# Patient Record
Sex: FEMALE | Race: WHITE | NOT HISPANIC OR LATINO | Employment: OTHER | URBAN - NONMETROPOLITAN AREA
[De-identification: names, ages, dates, MRNs, and addresses within clinical notes are randomized per-mention and may not be internally consistent; named-entity substitution may affect disease eponyms.]

---

## 2017-01-06 ENCOUNTER — TRANSCRIBE ORDERS (OUTPATIENT)
Dept: ADMINISTRATIVE | Facility: HOSPITAL | Age: 52
End: 2017-01-06

## 2017-01-06 DIAGNOSIS — E66.9 OBESITY, UNSPECIFIED OBESITY SEVERITY, UNSPECIFIED OBESITY TYPE: Primary | ICD-10-CM

## 2017-01-17 ENCOUNTER — NUTRITION (OUTPATIENT)
Dept: NUTRITION | Facility: HOSPITAL | Age: 52
End: 2017-01-17

## 2017-01-17 ENCOUNTER — TRANSCRIBE ORDERS (OUTPATIENT)
Dept: ADMINISTRATIVE | Facility: HOSPITAL | Age: 52
End: 2017-01-17

## 2017-01-17 DIAGNOSIS — E66.9 OBESITY, UNSPECIFIED OBESITY SEVERITY, UNSPECIFIED OBESITY TYPE: Primary | ICD-10-CM

## 2017-01-17 PROCEDURE — 97802 MEDICAL NUTRITION INDIV IN: CPT

## 2017-01-17 NOTE — MR AVS SNAPSHOT
[facility]  [phone]                    Edith Stephens   2017 10:00 AM   Nutrition    Dept Phone:  [phone]   Encounter #:  [id number]    Provider:  PAD DIETITIAN   Department:  [facility]                Your Full Care Plan              Your Updated Medication List          This list is accurate as of: 17 11:59 PM.  Always use your most recent med list.                ARIPiprazole 10 MG tablet   Commonly known as:  ABILIFY       CENTRUM SILVER PO       fluconazole 200 MG tablet   Commonly known as:  DIFLUCAN       FLUVIRIN suspension injection   Generic drug:  Influenza Vac Typ A&B Surf Ant       ibuprofen 200 MG tablet   Commonly known as:  ADVIL,MOTRIN       lamoTRIgine 25 MG tablet   Commonly known as:  LaMICtal       MAGNESIUM PO       mupirocin 2 % ointment   Commonly known as:  BACTROBAN       omeprazole 40 MG capsule   Commonly known as:  priLOSEC               Instructions     None    Patient Instructions History      Upcoming Appointments     Visit Type Date Time Department    NUTRITION COUNSELING 2017 10:00 AM [facility]    ULTRASOUND 2017 10:30 AM [facility] [place]      [facility]     [facility] allows you to send messages to your doctor, view your test results, renew your prescriptions, schedule appointments, and more. To sign up, go to [employer] and click on the Sign Up Now link in the New User? box. Enter your [employer] Activation Code exactly as it appears below along with the last four digits of your Social Security Number and your Date of Birth () to complete the sign-up process. If you do not sign up before the expiration date, you must request a new code.    [employer] Activation Code: [email or web address]  Expires: 2017  2:35 PM    If you have questions, you can email [email or web address] or call [phone] to talk to our [employer] staff. Remember, [employer] is NOT to be used  for urgent needs. For medical emergencies, dial 911.               Other Info from Your Visit           Your Appointments     Jan 31, 2017 10:00 AM CST   Nutrition Counseling with PAD DIETITIAN   [facility] (--)    [address] [address] [phone]            May 23, 2017 10:30 AM CDT   Ultrasound with ULTRASOUND 1 OBGYN [facility] (--)    [facility] [address] [address] [phone]              Allergies     Adderall [Amphetamine-dextroamphetamine]      Amphetamines      Aspirin      Bc Powder [Aspirin-salicylamide-caffeine]      Benztropine      Bupropion      Caffeine      Depakote [Divalproex Sodium]      Effexor [Venlafaxine]      Estradiol      Geodon [Ziprasidone Hcl]      Lithium      Lortab [Hydrocodone-acetaminophen]      Metoprolol      Neurontin [Gabapentin]      Penicillins      Provera [Medroxyprogesterone Acetate]      Prozac [Fluoxetine Hcl]      Tetracyclines & Related      Trazodone And Nefazodone      Trileptal [Oxcarbazepine]      Valium [Diazepam]      Zoloft [Sertraline Hcl]      Zyprexa [Olanzapine]        Vital Signs     Smoking Status                   Never Smoker

## 2017-01-17 NOTE — PROGRESS NOTES
Adult Outpatient Nutrition  Assessment/PES    Patient Name:  Edith Stephens  YOB: 1965  MRN: [id number]    Assessment Date:  1/17/2017          General Info       01/17/17 1114    Today's Session    Person(s) attending today's session Patient     Services Used Today? No    General Information    How well do you speak English? very well    Do you speak a language other than English at home? no    Lives With child(kaur), dependent                Nutritional Info/Activity       01/17/17 1115    Nutritional Information    Have you had weight changes? Yes    Describe weight changes 3# decrease at last MD visit    Have you tried to lose weight before? Yes    What is your motivation to lose weight? To be healthy, prevent DM and heart disease    Use of Supplements other (see comments)   sometimes snacks on glucerna snack bars    History of eating disorder? No    Do you have difficulty chewing food? No    Who Prepares Meals self    List any food cravings/trigger foods you have craves sweets and salty snacks, usually in the evening    Food Behaviors Stress eater;Boredom eater;Comfort eater;Continuous snacking    How often do you eat out and where? Has been eating out more often d/t family being in the hosptial (son's father).    How many snacks do you eat each day? --   usually has a snack between each meal and before bed.    What is the biggest challenge you have with your diet? Poor choices;Portions;Eating out;Weight maintenance;Food causing negative symptoms;Financial burden of food    What type of support do you currently use to help you with your health issues? Sees RD on a routine basis.    Eating Environment    Eating environment Alone;Family    Physical Activity    Are you currently involved in an activity/exercise program?  Yes    Describe physical activity Pt has been trying to go to the mall and walk. Has been trying to gradually increase endurance.                   Labs/Tests/Procedures/Meds       01/17/17 1121    Labs/Tests/Procedures/Meds    Labs/Tests Review Reviewed;Hgb A1C;Glucose;Triglycerides   (12/27/16) OaG5e-1.0, TRIG-166, Chol-169            Problem/Interventions:        Problem 1       01/17/17 1123    Nutrition Diagnoses Problem 1    Problem 1 Excessive Nutrient Intake    Macronutrient Kcal;Carbohydrate;Fat    Etiology (related to) Goals of Care;Medical Diagnosis;MNT for Treatment/Condition    Endocrine Pre-diabetes    Signs/Symptoms (evidenced by) Biochemical    Specific Labs Noted HgbA1C;Triglyceride;Glucose                    Intervention Goal       01/17/17 1125    Intervention Goal    General Improved nutrition related lab(s);Provide information regarding MNT for treatment/condition;Disease management/therapy    Weight Appropriate weight loss              Comments:      Electronically signed by:  Olimpia Tidwell  01/17/17 11:31 AM

## 2017-01-31 ENCOUNTER — APPOINTMENT (OUTPATIENT)
Dept: NUTRITION | Facility: HOSPITAL | Age: 52
End: 2017-01-31

## 2017-02-07 ENCOUNTER — NUTRITION (OUTPATIENT)
Dept: NUTRITION | Facility: HOSPITAL | Age: 52
End: 2017-02-07

## 2017-02-07 PROCEDURE — 97803 MED NUTRITION INDIV SUBSEQ: CPT

## 2017-02-07 NOTE — PROGRESS NOTES
Adult Outpatient Nutrition  Assessment/PES    Patient Name:  Edith Stephens  YOB: 1965  MRN: [id number]    Assessment Date:  2/7/2017          General Info       02/07/17 1145    Today's Session    Person(s) attending today's session Patient     Services Used Today? No    General Information    How well do you speak English? very well    Do you speak a language other than English at home? no    Are you able to read and write English? Yes    Lives With child(kaur), dependent            Physical Findings       02/07/17 1146    Physical Findings/Assessment    Additional Documentation Physical Appearance (Group)    Physical Appearance    Overall Physical Appearance overweight              Nutritional Info/Activity       02/07/17 1146    Nutritional Information    Have you had weight changes? Yes    Describe weight changes Pt reports that she has lost weight because her bras do not fit any longer.    What is your motivation to lose weight? To be healthy, prevent the progression of DM    Use of Supplements --   glucerna snack bars    Who Prepares Meals self    List any food cravings/trigger foods you have candy-pt can't stop eating candy if it is in the house    List any food aversions Does not eat very many vegetables.    How often during the day do you find yourself snacking? Always has a snack after breakfast and in the evening.    Food Behaviors Stress eater;Boredom eater;Comfort eater   When she becomes upset, she turns to food for comfort.    How often do you eat out and where? Although pt reports that she barely has enough money to pay her bills, she reports that she has been eating at [employer], [employer], etc. often.    What is the biggest challenge you have with your diet? Poor choices;Portions;Eating out;Food causing negative symptoms;Financial burden of food    What type of support do you currently use to help you with your health issues? Sees RD on a regular basis, also has a  counselor.    Eating Environment    Eating environment Alone;Family    Physical Activity    Are you currently involved in an activity/exercise program?  No    Reasons for Inactivity Limited activity                    Problem/Interventions:        Problem 1       02/07/17 1151    Nutrition Diagnoses Problem 1    Problem 1 Excessive Nutrient Intake    Macronutrient Kcal;Carbohydrate;Fat    Etiology (related to) Medical Diagnosis;MNT for Treatment/Condition;Goals of Care    Endocrine Pre-diabetes    Signs/Symptoms (evidenced by) Demonstrated Information Deficit;Report/Observation;Biochemical    Other Comment overweight, struggle to make appropriate food choices    Specific Labs Noted HgbA1C                    Intervention Goal       02/07/17 1154    Intervention Goal    General Provide information regarding MNT for treatment/condition    Weight Appropriate weight loss              Comments:  Pt very emotional today. Having problems with ex-. She reports that she has been unable to see her counselor recently. She reports that when she feels stressed, anxious, etc that she turns to food to feel better. Cont to be reluctant to write down all of her intake in her food journal. Admits that when she knows that she has made a bad choice, she does not want to face it or let anyone else see it. She is triggered by candy and can't stop eating it when it is in the house. Encouraged pt to keep those trigger foods out of the house. Encouraged her to be more active when she is feeling anxious and to try to plan meals ahead of time instead of after she has already eaten them. Pt upset that the MD told her that she still has Pre-DM. Stressed to pt that our goal was to keep the pre-DM from advancing to DM2. The only way to do that was to avoid excess calories, added sugars, saturated fats, etc. Encouraged pt to focus on increasing fiber intake (fruits/veg, whole grains) and increasing physical activity. Cont to follow and  reinforce.    Electronically signed by:  Olimpia Tidwell  02/07/17 12:01 PM

## 2017-02-28 ENCOUNTER — TRANSCRIBE ORDERS (OUTPATIENT)
Dept: ADMINISTRATIVE | Facility: HOSPITAL | Age: 52
End: 2017-02-28

## 2017-02-28 ENCOUNTER — NUTRITION (OUTPATIENT)
Dept: NUTRITION | Facility: HOSPITAL | Age: 52
End: 2017-02-28

## 2017-02-28 VITALS — WEIGHT: 239.5 LBS | BODY MASS INDEX: 41.11 KG/M2

## 2017-02-28 DIAGNOSIS — E66.9 OBESITY, UNSPECIFIED OBESITY SEVERITY, UNSPECIFIED OBESITY TYPE: Primary | ICD-10-CM

## 2017-02-28 PROCEDURE — 97803 MED NUTRITION INDIV SUBSEQ: CPT

## 2017-03-13 ENCOUNTER — NUTRITION (OUTPATIENT)
Dept: NUTRITION | Facility: HOSPITAL | Age: 52
End: 2017-03-13

## 2017-03-13 ENCOUNTER — TRANSCRIBE ORDERS (OUTPATIENT)
Dept: ADMINISTRATIVE | Facility: HOSPITAL | Age: 52
End: 2017-03-13

## 2017-03-13 VITALS — WEIGHT: 240 LBS | BODY MASS INDEX: 41.2 KG/M2

## 2017-03-13 DIAGNOSIS — R73.03 PRE-DIABETES: ICD-10-CM

## 2017-03-13 DIAGNOSIS — E66.9 OBESITY, UNSPECIFIED OBESITY SEVERITY, UNSPECIFIED OBESITY TYPE: Primary | ICD-10-CM

## 2017-03-13 PROCEDURE — 97803 MED NUTRITION INDIV SUBSEQ: CPT

## 2017-03-13 NOTE — PROGRESS NOTES
Adult Outpatient Nutrition  Assessment/PES    Patient Name:  Edith Stephens  YOB: 1965  MRN: [id number]    Assessment Date:  3/13/2017          General Info       03/13/17 1126    Today's Session    Person(s) attending today's session Patient     Services Used Today? No    General Information    How well do you speak English? very well    Do you speak a language other than English at home? no    Are you able to read and write English? Yes    Lives With child(kaur), dependent            Physical Findings       03/13/17 1126    Physical Findings/Assessment    Additional Documentation Physical Appearance (Group)    Physical Appearance    Overall Physical Appearance overweight              Nutritional Info/Activity       03/13/17 1126    Nutritional Information    Have you tried to lose weight before? Yes    List programs tried, date, and success Pt is now attending [employer]    What is your motivation to lose weight? Wants to prevent her Pre-DM from progressing to DM2    Use of Supplements other (see comments)   glucerna snack bars    History of eating disorder? No    Do you have difficulty chewing food? No    Who Prepares Meals self    List any food cravings/trigger foods you have Pt is triggered by her ex-. When she becomes anxious/stressed r/t issues with him, she tends to lose control of her eating habits.    How often during the day do you find yourself snacking? Usually snacks between each meal.    Food Behaviors Stress eater;Comfort eater    How often do you eat out and where? Pt has been eating out frequently, and reports that she does not have enough money to make through the rest of the month. Her parents are going to help her pay bills. Discussed food pantries and [employer] meals.    Do you use Food Assistance programs (WIC, food stamps, food bank)? yes   $45/month    What is the biggest challenge you have with your diet? Poor choices;Portions;Eating out;Food  procurement;Financial burden of food    What type of support do you currently use to help you with your health issues? MD, counselor, OA sponsor, parents    Enter everything you can remember eating in the last 24 hours (1 day) Brought in food diary for RD to review.  Pt consistently does not write down calorie content of evening meal and consistently goes over allotted daily calories. Pt gained 0.5# since last visit    Eating Environment    Eating environment Alone;Family    Physical Activity    Are you currently involved in an activity/exercise program?  Yes    Describe physical activity Inconsistent, but tries to go walking in the nearby park for 10minutes at a time. Going to the mall today to walk before dentist appt.              Estimated/Assessed Needs       03/13/17 1140    Calculation Measurements    Weight Used For Calculations 109 kg (240 lb)    Estimated/Assessed Energy Needs    Energy Need Method Kcal/kg    kcal/kg 16    16 Kcal/Kg (kcal) 1741.81    Estimated Kcal Range  9435-6351                Problem/Interventions:        Problem 1       03/13/17 1142    Nutrition Diagnoses Problem 1    Problem 1 Excessive Nutrient Intake    Macronutrient Kcal;Carbohydrate;Fat    Etiology (related to) MNT for Treatment/Condition;Factors Affecting Nutrition    Signs/Symptoms (evidenced by) BMI;PO Intake;Report/Observation                    Intervention Goal       03/13/17 1144    Intervention Goal    Weight Appropriate weight loss              Comments:      Electronically signed by:  Olimpia Tidwell  03/13/17 11:50 AM

## 2017-03-27 ENCOUNTER — TRANSCRIBE ORDERS (OUTPATIENT)
Dept: ADMINISTRATIVE | Facility: HOSPITAL | Age: 52
End: 2017-03-27

## 2017-03-27 DIAGNOSIS — Z09 FOLLOW UP: Primary | ICD-10-CM

## 2017-04-12 ENCOUNTER — APPOINTMENT (OUTPATIENT)
Dept: NUTRITION | Facility: HOSPITAL | Age: 52
End: 2017-04-12

## 2017-04-13 ENCOUNTER — OFFICE VISIT (OUTPATIENT)
Dept: OBSTETRICS AND GYNECOLOGY | Facility: CLINIC | Age: 52
End: 2017-04-13

## 2017-04-13 VITALS
SYSTOLIC BLOOD PRESSURE: 130 MMHG | WEIGHT: 231 LBS | HEIGHT: 64 IN | DIASTOLIC BLOOD PRESSURE: 90 MMHG | BODY MASS INDEX: 39.44 KG/M2

## 2017-04-13 DIAGNOSIS — N76.4 VULVAR ABSCESS: Primary | ICD-10-CM

## 2017-04-13 PROCEDURE — 99213 OFFICE O/P EST LOW 20 MIN: CPT | Performed by: NURSE PRACTITIONER

## 2017-04-13 RX ORDER — SULFAMETHOXAZOLE AND TRIMETHOPRIM 800; 160 MG/1; MG/1
1 TABLET ORAL 2 TIMES DAILY
Qty: 14 TABLET | Refills: 0 | Status: SHIPPED | OUTPATIENT
Start: 2017-04-13 | End: 2017-04-20 | Stop reason: SDUPTHER

## 2017-04-13 RX ORDER — NYSTATIN 100000 [USP'U]/G
POWDER TOPICAL 4 TIMES DAILY
COMMUNITY
End: 2018-12-13 | Stop reason: SDUPTHER

## 2017-04-13 NOTE — PATIENT INSTRUCTIONS
Bartholin Cyst or Abscess  A Bartholin cyst is a fluid-filled sac that forms on a Bartholin gland. Bartholin glands are small glands that are located within the folds of skin (labia) along the sides of the lower opening of the vagina. These glands produce a fluid to moisten the outside of the vagina during sexual intercourse.  A Bartholin cyst causes a bulge on the side of the vagina. A cyst that is not large or infected may not cause symptoms or problems. However, if the fluid within the cyst becomes infected, the cyst can turn into an abscess. An abscess may cause discomfort or pain.  CAUSES  A Bartholin cyst may develop when the duct of the gland becomes blocked. In many cases, the cause of this is not known. Various kinds of bacteria can cause the cyst to become infected and develop into an abscess.  RISK FACTORS  You may be at an increased risk of developing a Bartholin cyst or abscess if:  · You are a woman of reproductive age.  · You have a history of previous Bartholin cysts or abscesses.  · You have diabetes.  · You have a sexually transmitted disease (STD).  SIGNS AND SYMPTOMS  The severity of symptoms varies depending on the size of the cyst and whether it is infected. Symptoms may include:  · A bulge or swelling near the lower opening of your vagina.  · Discomfort or pain.  · Redness.  · Pain during sexual intercourse.  · Pain when walking.  · Fluid draining from the area.  DIAGNOSIS  Your health care provider may make a diagnosis based on your symptoms and a physical exam. He or she will look for swelling in your vaginal area. Blood tests may be done to check for infections. A sample of fluid from the cyst or abscess may also be taken to be tested in a lab.  TREATMENT  Small cysts that are not infected may not require any treatment. These often go away on their own. Your health care provider will recommend hot baths and the use of warm compresses. These may also be part of the treatment for an abscess.  Treatment options for a large cyst or abscess may include:    · Antibiotic medicine.  · A surgical procedure to drain the abscess. One of the following procedures may be done:    Incision and drainage. An incision is made in the cyst or abscess so that the fluid drains out. A catheter may be placed inside the cyst so that it does not close and fill up with fluid again. The catheter will be removed after you have a follow-up visit with a specialist (gynecologist).    Marsupialization. The cyst or abscess is opened and kept open by stitching the edges of the skin to the walls of the cyst or abscess. This allows it to continue to drain and not fill up with fluid again.  If you have cysts or abscesses that keep returning and have required incision and drainage multiple times, your health care provider may talk to you about surgery to remove the Bartholin gland.  HOME CARE INSTRUCTIONS  · Take medicines only as directed by your health care provider.  · If you were prescribed an antibiotic medicine, finish it all even if you start to feel better.  · Apply warm, wet compresses to the area or take warm, shallow baths that cover your pelvic region (sitz baths) several times a day or as directed by your health care provider.  · Do not squeeze the cyst or apply heavy pressure to it.  · Do not have sexual intercourse until the cyst has gone away.  · If your cyst or abscess was opened, a small piece of gauze or a drain may have been placed in the area to allow drainage. Do not remove the gauze or the drain until directed by your health care provider.  · Wear feminine pads--not tampons--as needed for any drainage or bleeding.  · Keep all follow-up visits as directed by your health care provider. This is important.  PREVENTION  Take these steps to help prevent a Bartholin cyst from returning:  · Practice good hygiene.    · Clean your vaginal area with mild soap and a soft cloth when you bathe.  · Practice safe sex to prevent  STDs.  SEEK MEDICAL CARE IF:  · You have increased pain, swelling, or redness in the area of the cyst.  · Puslike drainage is coming from the cyst.  · You have a fever.     This information is not intended to replace advice given to you by your health care provider. Make sure you discuss any questions you have with your health care provider.     Document Released: 12/18/2006 Document Revised: 01/13/2017 Document Reviewed: 08/03/2015  [employer] Interactive Patient Education ©2016 [employer]

## 2017-04-13 NOTE — PROGRESS NOTES
Subjective   Edith Stephens is a 52 y.o. female is here today as a self referral.    HPI Comments: I'm here to see about this sore that is on a sweat gland that I noticed last night It is very painful and did bleed this AM with wiping      The following portions of the patient's history were reviewed and updated as appropriate: allergies, current medications, past family history, past social history, past surgical history and problem list.    Review of Systems   Constitutional: Negative for activity change, appetite change, chills and fatigue.   HENT: Negative for congestion, dental problem, ear pain, hearing loss, nosebleeds, rhinorrhea, sneezing, sore throat and voice change.    Eyes: Negative for discharge, redness, itching and visual disturbance.   Respiratory: Negative for apnea, cough, choking, shortness of breath and wheezing.    Cardiovascular: Negative for chest pain and palpitations.   Gastrointestinal: Negative for abdominal distention, abdominal pain, constipation, diarrhea, nausea and vomiting.   Endocrine: Negative for cold intolerance, heat intolerance and polyuria.   Genitourinary: Positive for vaginal pain (on a sweat gland a sore spot). Negative for difficulty urinating, dyspareunia, flank pain, genital sores, menstrual problem, pelvic pain, vaginal bleeding and vaginal discharge.   Musculoskeletal: Negative for arthralgias, back pain, myalgias and neck pain.   Skin: Negative for pallor and rash.   Allergic/Immunologic: Negative for environmental allergies and food allergies.   Neurological: Negative for dizziness, tremors, seizures, numbness and headaches.   Hematological: Negative for adenopathy. Does not bruise/bleed easily.   Psychiatric/Behavioral: Negative for agitation, decreased concentration, sleep disturbance and suicidal ideas. The patient is not nervous/anxious.        Objective   Physical Exam   Constitutional: She is oriented to person, place, and time. She appears well-developed and  well-nourished.   Genitourinary: Vagina normal and uterus normal. Pelvic exam was performed with patient supine. There is no rash or tenderness on the right labia. There is no rash or tenderness on the left labia. Uterus is not tender. Cervix exhibits no motion tenderness. Right adnexum displays no mass and no tenderness. Left adnexum displays no mass and no tenderness. No vaginal discharge found.   Genitourinary Comments: Lt Vulvar abscess 3 cm x 2 cm   Urethra and urethral meatus normal  Bladder normal placement  Perineum and rectum examined and intact   Musculoskeletal: Normal range of motion.   Neurological: She is alert and oriented to person, place, and time.   Skin: Skin is warm and dry.   Psychiatric: She has a normal mood and affect. Her behavior is normal. Judgment and thought content normal.   Nursing note and vitals reviewed.        Assessment/Plan   Edith was seen today for groin swelling.    Diagnoses and all orders for this visit:    Vulvar abscess    Discussed treatment with patient since it does have a little cellulitis associated wityh it. Option Lidocaine 2% with lancing or just try the antibiotic. She opted to just try the antibiotic. She will RTO  In a week for reevaluation if no improvement then we will have to try lancing it.   -     sulfamethoxazole-trimethoprim (BACTRIM DS) 800-160 MG per tablet; Take 1 tablet by mouth 2 (Two) Times a Day for 7 days.

## 2017-04-20 ENCOUNTER — OFFICE VISIT (OUTPATIENT)
Dept: OBSTETRICS AND GYNECOLOGY | Facility: CLINIC | Age: 52
End: 2017-04-20

## 2017-04-20 VITALS
BODY MASS INDEX: 39.27 KG/M2 | SYSTOLIC BLOOD PRESSURE: 130 MMHG | DIASTOLIC BLOOD PRESSURE: 70 MMHG | WEIGHT: 230 LBS | HEIGHT: 64 IN

## 2017-04-20 DIAGNOSIS — N76.4 VULVAR ABSCESS: Primary | ICD-10-CM

## 2017-04-20 PROCEDURE — 99212 OFFICE O/P EST SF 10 MIN: CPT | Performed by: NURSE PRACTITIONER

## 2017-04-20 RX ORDER — SULFAMETHOXAZOLE AND TRIMETHOPRIM 800; 160 MG/1; MG/1
1 TABLET ORAL 2 TIMES DAILY
Qty: 14 TABLET | Refills: 0 | Status: SHIPPED | OUTPATIENT
Start: 2017-04-20 | End: 2017-04-27

## 2017-04-20 NOTE — PROGRESS NOTES
Subjective   Edith Stephens is a 52 y.o. female is here today for follow-up.    HPI Comments: I'm here for follow up on an abscess from last week.     Abscess   Pertinent negatives include no abdominal pain, arthralgias, chest pain, chills, congestion, coughing, fatigue, headaches, myalgias, nausea, neck pain, numbness, rash, sore throat or vomiting.       The following portions of the patient's history were reviewed and updated as appropriate: allergies, current medications, past family history, past social history, past surgical history and problem list.    Review of Systems   Constitutional: Negative for activity change, appetite change, chills and fatigue.   HENT: Negative for congestion, dental problem, ear pain, hearing loss, nosebleeds, rhinorrhea, sneezing, sore throat and voice change.    Eyes: Negative for discharge, redness, itching and visual disturbance.   Respiratory: Negative for apnea, cough, choking, shortness of breath and wheezing.    Cardiovascular: Negative for chest pain and palpitations.   Gastrointestinal: Negative for abdominal distention, abdominal pain, constipation, diarrhea, nausea and vomiting.   Endocrine: Negative for cold intolerance, heat intolerance and polyuria.   Genitourinary: Positive for genital sores (healing). Negative for difficulty urinating, dyspareunia, flank pain, menstrual problem, pelvic pain, vaginal bleeding and vaginal discharge.   Musculoskeletal: Negative for arthralgias, back pain, myalgias and neck pain.   Skin: Negative for pallor and rash.   Allergic/Immunologic: Negative for environmental allergies and food allergies.   Neurological: Negative for dizziness, tremors, seizures, numbness and headaches.   Hematological: Negative for adenopathy. Does not bruise/bleed easily.   Psychiatric/Behavioral: Negative for agitation, decreased concentration, sleep disturbance and suicidal ideas. The patient is not nervous/anxious.        Objective   Physical Exam    Constitutional: She is oriented to person, place, and time. She appears well-developed and well-nourished.   Genitourinary: Vagina normal and uterus normal. Pelvic exam was performed with patient supine. There is no rash or tenderness on the right labia. There is no rash or tenderness on the left labia. Uterus is not tender. Cervix exhibits no motion tenderness. Right adnexum displays no mass and no tenderness. Left adnexum displays no mass and no tenderness. No vaginal discharge found.   Genitourinary Comments: Urethra and urethral meatus normal  Bladder normal placement  Perineum and rectum examined and intact  Lt vulvar abscess healing small area of tenderness noted.    Musculoskeletal: Normal range of motion.   Neurological: She is alert and oriented to person, place, and time.   Skin: Skin is warm and dry.   Psychiatric: She has a normal mood and affect. Her behavior is normal. Judgment and thought content normal.   Nursing note and vitals reviewed.        Assessment/Plan   Edith was seen today for abscess.    Diagnoses and all orders for this visit:    Vulvar abscess     Abscess on the lt vulva was 3 X 2 cm and now is almost completely healed. It does have a small area that could use more antibiotic treatment but is doing much better. She has another appointment  Next month and will follow up on it then.   -     sulfamethoxazole-trimethoprim (BACTRIM DS) 800-160 MG per tablet; Take 1 tablet by mouth 2 (Two) Times a Day for 7 days.

## 2017-05-01 ENCOUNTER — OFFICE VISIT (OUTPATIENT)
Dept: OBSTETRICS AND GYNECOLOGY | Facility: CLINIC | Age: 52
End: 2017-05-01

## 2017-05-01 ENCOUNTER — PROCEDURE VISIT (OUTPATIENT)
Dept: OBSTETRICS AND GYNECOLOGY | Facility: CLINIC | Age: 52
End: 2017-05-01

## 2017-05-01 VITALS
DIASTOLIC BLOOD PRESSURE: 80 MMHG | HEIGHT: 64 IN | SYSTOLIC BLOOD PRESSURE: 110 MMHG | BODY MASS INDEX: 38.58 KG/M2 | WEIGHT: 226 LBS

## 2017-05-01 DIAGNOSIS — N95.0 POSTMENOPAUSAL BLEEDING: Primary | ICD-10-CM

## 2017-05-01 DIAGNOSIS — N95.0 PMB (POSTMENOPAUSAL BLEEDING): Primary | ICD-10-CM

## 2017-05-01 DIAGNOSIS — R93.89 THICKENED ENDOMETRIUM: ICD-10-CM

## 2017-05-01 PROCEDURE — 99213 OFFICE O/P EST LOW 20 MIN: CPT | Performed by: NURSE PRACTITIONER

## 2017-05-01 PROCEDURE — 76830 TRANSVAGINAL US NON-OB: CPT | Performed by: OBSTETRICS & GYNECOLOGY

## 2017-05-03 ENCOUNTER — PREP FOR SURGERY (OUTPATIENT)
Dept: OTHER | Facility: HOSPITAL | Age: 52
End: 2017-05-03

## 2017-05-03 DIAGNOSIS — N95.0 PMB (POSTMENOPAUSAL BLEEDING): Primary | ICD-10-CM

## 2017-05-03 RX ORDER — SODIUM CHLORIDE, SODIUM LACTATE, POTASSIUM CHLORIDE, CALCIUM CHLORIDE 600; 310; 30; 20 MG/100ML; MG/100ML; MG/100ML; MG/100ML
125 INJECTION, SOLUTION INTRAVENOUS CONTINUOUS
Status: CANCELLED | OUTPATIENT
Start: 2017-05-03

## 2017-05-03 RX ORDER — SODIUM CHLORIDE 0.9 % (FLUSH) 0.9 %
1-10 SYRINGE (ML) INJECTION AS NEEDED
Status: CANCELLED | OUTPATIENT
Start: 2017-05-03

## 2017-05-05 ENCOUNTER — TELEPHONE (OUTPATIENT)
Dept: OBSTETRICS AND GYNECOLOGY | Facility: CLINIC | Age: 52
End: 2017-05-05

## 2017-05-23 ENCOUNTER — APPOINTMENT (OUTPATIENT)
Dept: PREADMISSION TESTING | Facility: HOSPITAL | Age: 52
End: 2017-05-23

## 2017-05-23 ENCOUNTER — HOSPITAL ENCOUNTER (OUTPATIENT)
Dept: GENERAL RADIOLOGY | Facility: HOSPITAL | Age: 52
Discharge: HOME | End: 2017-05-23
Admitting: OBSTETRICS & GYNECOLOGY

## 2017-05-23 VITALS
HEART RATE: 70 BPM | DIASTOLIC BLOOD PRESSURE: 72 MMHG | RESPIRATION RATE: 18 BRPM | OXYGEN SATURATION: 97 % | BODY MASS INDEX: 40.47 KG/M2 | SYSTOLIC BLOOD PRESSURE: 131 MMHG | HEIGHT: 63 IN | WEIGHT: 228.4 LBS

## 2017-05-23 DIAGNOSIS — N95.0 PMB (POSTMENOPAUSAL BLEEDING): ICD-10-CM

## 2017-05-23 LAB
ANION GAP SERPL CALCULATED.3IONS-SCNC: 15 MMOL/L (ref 4–13)
BASOPHILS # BLD AUTO: 0.04 10*3/MM3 (ref 0–0.2)
BASOPHILS NFR BLD AUTO: 0.4 % (ref 0–2)
BUN BLD-MCNC: 13 MG/DL (ref 5–21)
BUN/CREAT SERPL: 19.4 (ref 7–25)
CALCIUM SPEC-SCNC: 9.8 MG/DL (ref 8.4–10.4)
CHLORIDE SERPL-SCNC: 107 MMOL/L (ref 98–110)
CO2 SERPL-SCNC: 20 MMOL/L (ref 24–31)
CREAT BLD-MCNC: 0.67 MG/DL (ref 0.5–1.4)
DEPRECATED RDW RBC AUTO: 44.5 FL (ref 40–54)
EOSINOPHIL # BLD AUTO: 0.1 10*3/MM3 (ref 0–0.7)
EOSINOPHIL NFR BLD AUTO: 1 % (ref 0–4)
ERYTHROCYTE [DISTWIDTH] IN BLOOD BY AUTOMATED COUNT: 13.6 % (ref 12–15)
GFR SERPL CREATININE-BSD FRML MDRD: 93 ML/MIN/1.73
GLUCOSE BLD-MCNC: 101 MG/DL (ref 70–100)
HCG SERPL QL: NEGATIVE
HCT VFR BLD AUTO: 42.5 % (ref 37–47)
HGB BLD-MCNC: 14.5 G/DL (ref 12–16)
IMM GRANULOCYTES # BLD: 0.07 10*3/MM3 (ref 0–0.03)
IMM GRANULOCYTES NFR BLD: 0.7 % (ref 0–5)
LYMPHOCYTES # BLD AUTO: 1.58 10*3/MM3 (ref 0.72–4.86)
LYMPHOCYTES NFR BLD AUTO: 15.5 % (ref 15–45)
MCH RBC QN AUTO: 30.3 PG (ref 28–32)
MCHC RBC AUTO-ENTMCNC: 34.1 G/DL (ref 33–36)
MCV RBC AUTO: 88.9 FL (ref 82–98)
MONOCYTES # BLD AUTO: 0.58 10*3/MM3 (ref 0.19–1.3)
MONOCYTES NFR BLD AUTO: 5.7 % (ref 4–12)
NEUTROPHILS # BLD AUTO: 7.82 10*3/MM3 (ref 1.87–8.4)
NEUTROPHILS NFR BLD AUTO: 76.7 % (ref 39–78)
PLATELET # BLD AUTO: 298 10*3/MM3 (ref 130–400)
PMV BLD AUTO: 10.4 FL (ref 6–12)
POTASSIUM BLD-SCNC: 4 MMOL/L (ref 3.5–5.3)
RBC # BLD AUTO: 4.78 10*6/MM3 (ref 4.2–5.4)
SODIUM BLD-SCNC: 142 MMOL/L (ref 135–145)
WBC NRBC COR # BLD: 10.19 10*3/MM3 (ref 4.8–10.8)

## 2017-05-23 PROCEDURE — 93010 ELECTROCARDIOGRAM REPORT: CPT | Performed by: INTERNAL MEDICINE

## 2017-05-23 PROCEDURE — 93005 ELECTROCARDIOGRAM TRACING: CPT

## 2017-05-23 PROCEDURE — 36415 COLL VENOUS BLD VENIPUNCTURE: CPT

## 2017-05-23 PROCEDURE — 71020 HC CHEST PA AND LATERAL: CPT

## 2017-05-23 PROCEDURE — 84703 CHORIONIC GONADOTROPIN ASSAY: CPT | Performed by: OBSTETRICS & GYNECOLOGY

## 2017-05-23 PROCEDURE — 85025 COMPLETE CBC W/AUTO DIFF WBC: CPT | Performed by: OBSTETRICS & GYNECOLOGY

## 2017-05-23 PROCEDURE — 80048 BASIC METABOLIC PNL TOTAL CA: CPT | Performed by: OBSTETRICS & GYNECOLOGY

## 2017-05-30 ENCOUNTER — HOSPITAL ENCOUNTER (OUTPATIENT)
Facility: HOSPITAL | Age: 52
Discharge: HOME | End: 2017-05-30
Attending: OBSTETRICS & GYNECOLOGY | Admitting: OBSTETRICS & GYNECOLOGY

## 2017-05-30 ENCOUNTER — ANESTHESIA EVENT (OUTPATIENT)
Dept: PERIOP | Facility: HOSPITAL | Age: 52
End: 2017-05-30

## 2017-05-30 ENCOUNTER — ANESTHESIA (OUTPATIENT)
Dept: PERIOP | Facility: HOSPITAL | Age: 52
End: 2017-05-30

## 2017-05-30 VITALS
SYSTOLIC BLOOD PRESSURE: 104 MMHG | DIASTOLIC BLOOD PRESSURE: 44 MMHG | TEMPERATURE: 97.2 F | HEART RATE: 92 BPM | RESPIRATION RATE: 16 BRPM | OXYGEN SATURATION: 92 %

## 2017-05-30 DIAGNOSIS — N95.0 PMB (POSTMENOPAUSAL BLEEDING): ICD-10-CM

## 2017-05-30 LAB
GLUCOSE BLDC GLUCOMTR-MCNC: 118 MG/DL (ref 70–130)
GLUCOSE BLDC GLUCOMTR-MCNC: 96 MG/DL (ref 70–130)

## 2017-05-30 PROCEDURE — 57505 ENDOCERVICAL CURETTAGE: CPT | Performed by: OBSTETRICS & GYNECOLOGY

## 2017-05-30 PROCEDURE — 25010000002 DEXAMETHASONE PER 1 MG: Performed by: NURSE ANESTHETIST, CERTIFIED REGISTERED

## 2017-05-30 PROCEDURE — 58558 HYSTEROSCOPY BIOPSY: CPT | Performed by: OBSTETRICS & GYNECOLOGY

## 2017-05-30 PROCEDURE — 82962 GLUCOSE BLOOD TEST: CPT

## 2017-05-30 PROCEDURE — 25010000002 SUCCINYLCHOLINE PER 20 MG: Performed by: NURSE ANESTHETIST, CERTIFIED REGISTERED

## 2017-05-30 PROCEDURE — 25010000002 ONDANSETRON PER 1 MG: Performed by: NURSE ANESTHETIST, CERTIFIED REGISTERED

## 2017-05-30 PROCEDURE — 25010000002 PROPOFOL 10 MG/ML EMULSION: Performed by: NURSE ANESTHETIST, CERTIFIED REGISTERED

## 2017-05-30 PROCEDURE — 25010000002 ONDANSETRON PER 1 MG: Performed by: ANESTHESIOLOGY

## 2017-05-30 PROCEDURE — 88305 TISSUE EXAM BY PATHOLOGIST: CPT | Performed by: OBSTETRICS & GYNECOLOGY

## 2017-05-30 RX ORDER — SODIUM CHLORIDE 0.9 % (FLUSH) 0.9 %
1-10 SYRINGE (ML) INJECTION AS NEEDED
Status: DISCONTINUED | OUTPATIENT
Start: 2017-05-30 | End: 2017-05-30 | Stop reason: HOSPADM

## 2017-05-30 RX ORDER — FLUMAZENIL 0.1 MG/ML
0.2 INJECTION INTRAVENOUS AS NEEDED
Status: DISCONTINUED | OUTPATIENT
Start: 2017-05-30 | End: 2017-05-30 | Stop reason: HOSPADM

## 2017-05-30 RX ORDER — SUCCINYLCHOLINE CHLORIDE 20 MG/ML
INJECTION INTRAMUSCULAR; INTRAVENOUS AS NEEDED
Status: DISCONTINUED | OUTPATIENT
Start: 2017-05-30 | End: 2017-05-30 | Stop reason: SURG

## 2017-05-30 RX ORDER — SCOLOPAMINE TRANSDERMAL SYSTEM 1 MG/1
1 PATCH, EXTENDED RELEASE TRANSDERMAL
Status: DISCONTINUED | OUTPATIENT
Start: 2017-05-30 | End: 2017-05-30 | Stop reason: HOSPADM

## 2017-05-30 RX ORDER — IPRATROPIUM BROMIDE AND ALBUTEROL SULFATE 2.5; .5 MG/3ML; MG/3ML
3 SOLUTION RESPIRATORY (INHALATION) ONCE AS NEEDED
Status: DISCONTINUED | OUTPATIENT
Start: 2017-05-30 | End: 2017-05-30 | Stop reason: HOSPADM

## 2017-05-30 RX ORDER — SODIUM CHLORIDE, SODIUM LACTATE, POTASSIUM CHLORIDE, CALCIUM CHLORIDE 600; 310; 30; 20 MG/100ML; MG/100ML; MG/100ML; MG/100ML
125 INJECTION, SOLUTION INTRAVENOUS CONTINUOUS
Status: DISCONTINUED | OUTPATIENT
Start: 2017-05-30 | End: 2017-05-30 | Stop reason: HOSPADM

## 2017-05-30 RX ORDER — ROCURONIUM BROMIDE 10 MG/ML
INJECTION, SOLUTION INTRAVENOUS AS NEEDED
Status: DISCONTINUED | OUTPATIENT
Start: 2017-05-30 | End: 2017-05-30 | Stop reason: SURG

## 2017-05-30 RX ORDER — ONDANSETRON 2 MG/ML
INJECTION INTRAMUSCULAR; INTRAVENOUS AS NEEDED
Status: DISCONTINUED | OUTPATIENT
Start: 2017-05-30 | End: 2017-05-30 | Stop reason: SURG

## 2017-05-30 RX ORDER — ONDANSETRON 2 MG/ML
4 INJECTION INTRAMUSCULAR; INTRAVENOUS AS NEEDED
Status: DISCONTINUED | OUTPATIENT
Start: 2017-05-30 | End: 2017-05-30 | Stop reason: HOSPADM

## 2017-05-30 RX ORDER — SODIUM CHLORIDE, SODIUM LACTATE, POTASSIUM CHLORIDE, CALCIUM CHLORIDE 600; 310; 30; 20 MG/100ML; MG/100ML; MG/100ML; MG/100ML
100 INJECTION, SOLUTION INTRAVENOUS CONTINUOUS
Status: DISCONTINUED | OUTPATIENT
Start: 2017-05-30 | End: 2017-05-30 | Stop reason: HOSPADM

## 2017-05-30 RX ORDER — HYDRALAZINE HYDROCHLORIDE 20 MG/ML
5 INJECTION INTRAMUSCULAR; INTRAVENOUS
Status: DISCONTINUED | OUTPATIENT
Start: 2017-05-30 | End: 2017-05-30 | Stop reason: HOSPADM

## 2017-05-30 RX ORDER — NALOXONE HCL 0.4 MG/ML
0.04 VIAL (ML) INJECTION AS NEEDED
Status: DISCONTINUED | OUTPATIENT
Start: 2017-05-30 | End: 2017-05-30 | Stop reason: HOSPADM

## 2017-05-30 RX ORDER — MEPERIDINE HYDROCHLORIDE 25 MG/ML
12.5 INJECTION INTRAMUSCULAR; INTRAVENOUS; SUBCUTANEOUS
Status: DISCONTINUED | OUTPATIENT
Start: 2017-05-30 | End: 2017-05-30 | Stop reason: HOSPADM

## 2017-05-30 RX ORDER — SUFENTANIL CITRATE 50 UG/ML
INJECTION EPIDURAL; INTRAVENOUS AS NEEDED
Status: DISCONTINUED | OUTPATIENT
Start: 2017-05-30 | End: 2017-05-30 | Stop reason: SURG

## 2017-05-30 RX ORDER — DEXAMETHASONE SODIUM PHOSPHATE 4 MG/ML
INJECTION, SOLUTION INTRA-ARTICULAR; INTRALESIONAL; INTRAMUSCULAR; INTRAVENOUS; SOFT TISSUE AS NEEDED
Status: DISCONTINUED | OUTPATIENT
Start: 2017-05-30 | End: 2017-05-30 | Stop reason: SURG

## 2017-05-30 RX ORDER — METOCLOPRAMIDE HYDROCHLORIDE 5 MG/ML
5 INJECTION INTRAMUSCULAR; INTRAVENOUS
Status: DISCONTINUED | OUTPATIENT
Start: 2017-05-30 | End: 2017-05-30 | Stop reason: HOSPADM

## 2017-05-30 RX ORDER — PROPOFOL 10 MG/ML
VIAL (ML) INTRAVENOUS AS NEEDED
Status: DISCONTINUED | OUTPATIENT
Start: 2017-05-30 | End: 2017-05-30 | Stop reason: SURG

## 2017-05-30 RX ORDER — LIDOCAINE HYDROCHLORIDE 40 MG/ML
SOLUTION TOPICAL AS NEEDED
Status: DISCONTINUED | OUTPATIENT
Start: 2017-05-30 | End: 2017-05-30 | Stop reason: SURG

## 2017-05-30 RX ORDER — LABETALOL HYDROCHLORIDE 5 MG/ML
5 INJECTION, SOLUTION INTRAVENOUS
Status: DISCONTINUED | OUTPATIENT
Start: 2017-05-30 | End: 2017-05-30 | Stop reason: HOSPADM

## 2017-05-30 RX ADMIN — SUFENTANIL CITRATE 20 MCG: 50 INJECTION, SOLUTION EPIDURAL; INTRAVENOUS at 08:22

## 2017-05-30 RX ADMIN — ONDANSETRON 4 MG: 2 INJECTION INTRAMUSCULAR; INTRAVENOUS at 09:25

## 2017-05-30 RX ADMIN — ROCURONIUM BROMIDE 10 MG: 10 INJECTION INTRAVENOUS at 08:22

## 2017-05-30 RX ADMIN — SCOPOLAMINE 1 PATCH: 1 PATCH, EXTENDED RELEASE TRANSDERMAL at 07:02

## 2017-05-30 RX ADMIN — SODIUM CHLORIDE, POTASSIUM CHLORIDE, SODIUM LACTATE AND CALCIUM CHLORIDE: 600; 310; 30; 20 INJECTION, SOLUTION INTRAVENOUS at 08:22

## 2017-05-30 RX ADMIN — DEXAMETHASONE SODIUM PHOSPHATE 4 MG: 4 INJECTION, SOLUTION INTRAMUSCULAR; INTRAVENOUS at 08:22

## 2017-05-30 RX ADMIN — SODIUM CHLORIDE, POTASSIUM CHLORIDE, SODIUM LACTATE AND CALCIUM CHLORIDE 100 ML/HR: 600; 310; 30; 20 INJECTION, SOLUTION INTRAVENOUS at 07:01

## 2017-05-30 RX ADMIN — SUFENTANIL CITRATE 5 MCG: 50 INJECTION, SOLUTION EPIDURAL; INTRAVENOUS at 08:40

## 2017-05-30 RX ADMIN — SUCCINYLCHOLINE CHLORIDE 100 MG: 20 INJECTION, SOLUTION INTRAMUSCULAR; INTRAVENOUS at 08:22

## 2017-05-30 RX ADMIN — ONDANSETRON HYDROCHLORIDE 4 MG: 2 SOLUTION INTRAMUSCULAR; INTRAVENOUS at 08:22

## 2017-05-30 RX ADMIN — PROPOFOL 80 MG: 10 INJECTION, EMULSION INTRAVENOUS at 08:22

## 2017-05-30 RX ADMIN — LIDOCAINE HYDROCHLORIDE 1 EACH: 40 SOLUTION TOPICAL at 08:22

## 2017-05-31 LAB
CYTO UR: NORMAL
LAB AP CASE REPORT: NORMAL
LAB AP CLINICAL INFORMATION: NORMAL
Lab: NORMAL
PATH REPORT.FINAL DX SPEC: NORMAL
PATH REPORT.GROSS SPEC: NORMAL

## 2017-06-07 ENCOUNTER — OFFICE VISIT (OUTPATIENT)
Dept: OBSTETRICS AND GYNECOLOGY | Facility: CLINIC | Age: 52
End: 2017-06-07

## 2017-06-07 VITALS
HEIGHT: 63 IN | BODY MASS INDEX: 39.51 KG/M2 | SYSTOLIC BLOOD PRESSURE: 122 MMHG | DIASTOLIC BLOOD PRESSURE: 82 MMHG | WEIGHT: 223 LBS

## 2017-06-07 DIAGNOSIS — N95.0 PMB (POSTMENOPAUSAL BLEEDING): Primary | ICD-10-CM

## 2017-06-07 PROCEDURE — 99212 OFFICE O/P EST SF 10 MIN: CPT | Performed by: OBSTETRICS & GYNECOLOGY

## 2017-06-07 RX ORDER — LAMOTRIGINE 100 MG/1
TABLET ORAL
Status: ON HOLD | COMMUNITY
Start: 2017-05-16 | End: 2017-12-15

## 2017-06-07 NOTE — PROGRESS NOTES
Subjective   Edith Stephens is a 52 y.o. female who is here to discuss her pathology report.    History of Present Illness  Patient had a D&C for PMB but she had some proliferative endometrium. Patient is having some mild spotting now.   The following portions of the patient's history were reviewed and updated as appropriate: allergies, current medications, past family history, past medical history, past social history, past surgical history and problem list.    Review of Systems   Constitutional: Negative for fatigue and unexpected weight change.   HENT: Negative.    Eyes: Negative.    Respiratory: Negative for cough, chest tightness and shortness of breath.    Cardiovascular: Negative for chest pain, palpitations and leg swelling.   Gastrointestinal: Negative for abdominal distention, abdominal pain, anal bleeding, blood in stool, constipation, diarrhea, nausea and vomiting.   Endocrine: Negative for polydipsia and polyuria.   Genitourinary: Positive for vaginal bleeding. Negative for difficulty urinating, dyspareunia, dysuria, frequency, genital sores, hematuria, menstrual problem, pelvic pain, urgency, vaginal discharge and vaginal pain.   Musculoskeletal: Negative.    Skin: Negative for color change and rash.   Allergic/Immunologic: Negative.    Neurological: Negative.  Negative for dizziness, seizures, syncope, light-headedness and headaches.   Hematological: Negative for adenopathy. Does not bruise/bleed easily.   Psychiatric/Behavioral: Negative for agitation, confusion, dysphoric mood, sleep disturbance and suicidal ideas. The patient is not nervous/anxious.        Objective   Physical Exam   Constitutional: She is oriented to person, place, and time. She appears well-developed.   HENT:   Head: Normocephalic.   Eyes: Pupils are equal, round, and reactive to light.   Neck: Normal range of motion.   Cardiovascular: Normal rate and regular rhythm.    Pulmonary/Chest: Effort normal.   Abdominal: Soft.    Musculoskeletal: Normal range of motion.   Neurological: She is alert and oriented to person, place, and time.   Skin: Skin is warm and dry.   Psychiatric: She has a normal mood and affect. Her behavior is normal. Judgment and thought content normal.   Vitals reviewed.        Assessment/Plan     Postop visit from D&C, Hysteroscopy without problems  Increase activity  RV 2month(s)  Keep a bleeding calendar.  I offered her Provera but she stated that is interfered with her psych meds.  Edith was seen today for post-op.    Diagnoses and all orders for this visit:    PMB (postmenopausal bleeding)

## 2017-08-28 ENCOUNTER — OFFICE VISIT (OUTPATIENT)
Dept: OBSTETRICS AND GYNECOLOGY | Facility: CLINIC | Age: 52
End: 2017-08-28

## 2017-08-28 VITALS
WEIGHT: 223 LBS | BODY MASS INDEX: 39.51 KG/M2 | SYSTOLIC BLOOD PRESSURE: 122 MMHG | DIASTOLIC BLOOD PRESSURE: 94 MMHG | HEIGHT: 63 IN

## 2017-08-28 DIAGNOSIS — Z09 POSTOP CHECK: Primary | ICD-10-CM

## 2017-08-28 PROCEDURE — 99213 OFFICE O/P EST LOW 20 MIN: CPT | Performed by: OBSTETRICS & GYNECOLOGY

## 2017-08-28 RX ORDER — LAMOTRIGINE 150 MG/1
TABLET ORAL
COMMUNITY
Start: 2017-08-08 | End: 2017-08-28

## 2017-08-28 NOTE — PROGRESS NOTES
Subjective   Edith Stephens is a 52 y.o. female who had a D&C, Hysteroscopy on 5/30/2017.    History of Present Illness  Patient has had no bleeding since her D&C on 5/30/2017.  Her pathology report was benign showing disordered proliferative endometrium.  The following portions of the patient's history were reviewed and updated as appropriate: allergies, current medications, past family history, past medical history, past social history, past surgical history and problem list.    Review of Systems   Constitutional: Negative for fatigue and unexpected weight change.   HENT: Negative.    Eyes: Negative.    Respiratory: Negative for cough, chest tightness and shortness of breath.    Cardiovascular: Negative for chest pain, palpitations and leg swelling.   Gastrointestinal: Negative for abdominal distention, abdominal pain, anal bleeding, blood in stool, constipation, diarrhea, nausea and vomiting.   Endocrine: Negative for polydipsia and polyuria.   Genitourinary: Negative for difficulty urinating, dyspareunia, dysuria, frequency, genital sores, hematuria, menstrual problem, pelvic pain, urgency, vaginal bleeding, vaginal discharge and vaginal pain.   Musculoskeletal: Negative.    Skin: Negative for color change and rash.   Allergic/Immunologic: Negative.    Neurological: Negative.  Negative for dizziness, seizures, syncope, light-headedness and headaches.   Hematological: Negative for adenopathy. Does not bruise/bleed easily.   Psychiatric/Behavioral: Negative for agitation, confusion, dysphoric mood, sleep disturbance and suicidal ideas. The patient is not nervous/anxious.        Objective   Physical Exam   Constitutional: She is oriented to person, place, and time. She appears well-developed and well-nourished.   HENT:   Head: Normocephalic.   Eyes: Pupils are equal, round, and reactive to light.   Cardiovascular: Normal rate and regular rhythm.    Pulmonary/Chest: Effort normal and breath sounds normal.    Abdominal: Soft.   Genitourinary: Vagina normal and uterus normal. Rectal exam shows anal tone normal. Pelvic exam was performed with patient supine. No labial fusion. There is no rash, tenderness, lesion or injury on the right labia. There is no rash, tenderness, lesion or injury on the left labia. Uterus is not enlarged and not tender. Cervix exhibits no motion tenderness, no discharge and no friability. Right adnexum displays no mass, no tenderness and no fullness. Left adnexum displays no mass, no tenderness and no fullness. No erythema, tenderness or bleeding in the vagina. No signs of injury around the vagina. No vaginal discharge found.   Genitourinary Comments: BUS glands appear nl, perineum intact, urethral orifice appears nl, vagina has good support.   Musculoskeletal: Normal range of motion.   Neurological: She is alert and oriented to person, place, and time.   Skin: Skin is warm and dry.   Psychiatric: She has a normal mood and affect. Her behavior is normal.   Nursing note and vitals reviewed.        Assessment/Plan     Postop visit from D&C, Hysteroscopy without problems  Since endometrium was proliferative, will order an FSH and T estrogens  Increase activity  RV 1year(s)    Edith was seen today for follow-up.    Diagnoses and all orders for this visit:    Postop check

## 2017-08-29 LAB
ESTRADIOL SERPL-MCNC: 5.9 PG/ML
ESTRONE SERPL-MCNC: 43 PG/ML
FSH SERPL-ACNC: 70.6 MIU/ML

## 2017-09-27 ENCOUNTER — APPOINTMENT (OUTPATIENT)
Dept: GENERAL RADIOLOGY | Facility: HOSPITAL | Age: 52
End: 2017-09-27

## 2017-09-27 ENCOUNTER — HOSPITAL ENCOUNTER (EMERGENCY)
Facility: HOSPITAL | Age: 52
Discharge: HOME | End: 2017-09-27
Attending: EMERGENCY MEDICINE | Admitting: EMERGENCY MEDICINE

## 2017-09-27 ENCOUNTER — APPOINTMENT (OUTPATIENT)
Dept: CARDIOLOGY | Facility: HOSPITAL | Age: 52
End: 2017-09-27
Attending: EMERGENCY MEDICINE

## 2017-09-27 VITALS
BODY MASS INDEX: 36.19 KG/M2 | RESPIRATION RATE: 18 BRPM | HEART RATE: 73 BPM | HEIGHT: 64 IN | SYSTOLIC BLOOD PRESSURE: 137 MMHG | WEIGHT: 212 LBS | OXYGEN SATURATION: 100 % | DIASTOLIC BLOOD PRESSURE: 68 MMHG | TEMPERATURE: 99.1 F

## 2017-09-27 DIAGNOSIS — R07.9 CHEST PAIN, UNSPECIFIED TYPE: Primary | ICD-10-CM

## 2017-09-27 LAB
ALBUMIN SERPL-MCNC: 4.6 G/DL (ref 3.5–5)
ALBUMIN/GLOB SERPL: 1.6 G/DL (ref 1.1–2.5)
ALP SERPL-CCNC: 98 U/L (ref 24–120)
ALT SERPL W P-5'-P-CCNC: 57 U/L (ref 0–54)
ANION GAP SERPL CALCULATED.3IONS-SCNC: 11 MMOL/L (ref 4–13)
AST SERPL-CCNC: 28 U/L (ref 7–45)
BASOPHILS # BLD AUTO: 0.04 10*3/MM3 (ref 0–0.2)
BASOPHILS NFR BLD AUTO: 0.5 % (ref 0–2)
BH CV ECHO MEAS - BSA(HAYCOCK): 2.1 M^2
BH CV ECHO MEAS - BSA: 2 M^2
BH CV ECHO MEAS - BZI_BMI: 36.4 KILOGRAMS/M^2
BH CV ECHO MEAS - BZI_METRIC_HEIGHT: 162.6 CM
BH CV ECHO MEAS - BZI_METRIC_WEIGHT: 96.2 KG
BH CV STRESS BP STAGE 1: NORMAL
BH CV STRESS DURATION MIN STAGE 1: 6
BH CV STRESS DURATION SEC STAGE 1: 0
BH CV STRESS ECHO POST STRESS EJECTION FRACTION EF: 65 %
BH CV STRESS GRADE STAGE 1: 10
BH CV STRESS HR STAGE 1: 144
BH CV STRESS PROTOCOL 1: NORMAL
BH CV STRESS RECOVERY BP: NORMAL MMHG
BH CV STRESS RECOVERY HR: 84 BPM
BH CV STRESS SPEED STAGE 1: 1.7
BH CV STRESS STAGE 1: 1
BILIRUB SERPL-MCNC: 0.6 MG/DL (ref 0.1–1)
BUN BLD-MCNC: 16 MG/DL (ref 5–21)
BUN/CREAT SERPL: 22.2 (ref 7–25)
CALCIUM SPEC-SCNC: 9.8 MG/DL (ref 8.4–10.4)
CHLORIDE SERPL-SCNC: 109 MMOL/L (ref 98–110)
CO2 SERPL-SCNC: 25 MMOL/L (ref 24–31)
CREAT BLD-MCNC: 0.72 MG/DL (ref 0.5–1.4)
DEPRECATED RDW RBC AUTO: 47.2 FL (ref 40–54)
EOSINOPHIL # BLD AUTO: 0.13 10*3/MM3 (ref 0–0.7)
EOSINOPHIL NFR BLD AUTO: 1.6 % (ref 0–4)
ERYTHROCYTE [DISTWIDTH] IN BLOOD BY AUTOMATED COUNT: 13.8 % (ref 12–15)
GFR SERPL CREATININE-BSD FRML MDRD: 85 ML/MIN/1.73
GLOBULIN UR ELPH-MCNC: 2.9 GM/DL
GLUCOSE BLD-MCNC: 104 MG/DL (ref 70–100)
HCT VFR BLD AUTO: 43 % (ref 37–47)
HGB BLD-MCNC: 14.3 G/DL (ref 12–16)
IMM GRANULOCYTES # BLD: 0.03 10*3/MM3 (ref 0–0.03)
IMM GRANULOCYTES NFR BLD: 0.4 % (ref 0–5)
LV EF 2D ECHO EST: 55 %
LYMPHOCYTES # BLD AUTO: 1.47 10*3/MM3 (ref 0.72–4.86)
LYMPHOCYTES NFR BLD AUTO: 18.4 % (ref 15–45)
MAXIMAL PREDICTED HEART RATE: 169 BPM
MCH RBC QN AUTO: 31.2 PG (ref 28–32)
MCHC RBC AUTO-ENTMCNC: 33.3 G/DL (ref 33–36)
MCV RBC AUTO: 93.7 FL (ref 82–98)
MONOCYTES # BLD AUTO: 0.62 10*3/MM3 (ref 0.19–1.3)
MONOCYTES NFR BLD AUTO: 7.8 % (ref 4–12)
NEUTROPHILS # BLD AUTO: 5.69 10*3/MM3 (ref 1.87–8.4)
NEUTROPHILS NFR BLD AUTO: 71.3 % (ref 39–78)
PERCENT MAX PREDICTED HR: 85.21 %
PLATELET # BLD AUTO: 305 10*3/MM3 (ref 130–400)
PMV BLD AUTO: 10.2 FL (ref 6–12)
POTASSIUM BLD-SCNC: 4.4 MMOL/L (ref 3.5–5.3)
PROT SERPL-MCNC: 7.5 G/DL (ref 6.3–8.7)
RBC # BLD AUTO: 4.59 10*6/MM3 (ref 4.2–5.4)
SODIUM BLD-SCNC: 145 MMOL/L (ref 135–145)
STRESS BASELINE BP: NORMAL MMHG
STRESS BASELINE HR: 68 BPM
STRESS PERCENT HR: 100 %
STRESS POST EXERCISE DUR MIN: 6 MIN
STRESS POST PEAK BP: NORMAL MMHG
STRESS POST PEAK HR: 144 BPM
STRESS TARGET HR: 144 BPM
TROPONIN I SERPL-MCNC: <0.012 NG/ML (ref 0–0.03)
TROPONIN I SERPL-MCNC: <0.012 NG/ML (ref 0–0.03)
WBC NRBC COR # BLD: 7.98 10*3/MM3 (ref 4.8–10.8)

## 2017-09-27 PROCEDURE — 84484 ASSAY OF TROPONIN QUANT: CPT | Performed by: EMERGENCY MEDICINE

## 2017-09-27 PROCEDURE — 71010 HC CHEST PA OR AP: CPT

## 2017-09-27 PROCEDURE — 93352 ADMIN ECG CONTRAST AGENT: CPT | Performed by: INTERNAL MEDICINE

## 2017-09-27 PROCEDURE — 93010 ELECTROCARDIOGRAM REPORT: CPT | Performed by: INTERNAL MEDICINE

## 2017-09-27 PROCEDURE — 93017 CV STRESS TEST TRACING ONLY: CPT

## 2017-09-27 PROCEDURE — 99284 EMERGENCY DEPT VISIT MOD MDM: CPT

## 2017-09-27 PROCEDURE — 80053 COMPREHEN METABOLIC PANEL: CPT | Performed by: EMERGENCY MEDICINE

## 2017-09-27 PROCEDURE — 93005 ELECTROCARDIOGRAM TRACING: CPT | Performed by: EMERGENCY MEDICINE

## 2017-09-27 PROCEDURE — 93350 STRESS TTE ONLY: CPT | Performed by: INTERNAL MEDICINE

## 2017-09-27 PROCEDURE — 93350 STRESS TTE ONLY: CPT

## 2017-09-27 PROCEDURE — 85025 COMPLETE CBC W/AUTO DIFF WBC: CPT | Performed by: EMERGENCY MEDICINE

## 2017-09-27 PROCEDURE — 93018 CV STRESS TEST I&R ONLY: CPT | Performed by: INTERNAL MEDICINE

## 2017-09-27 PROCEDURE — 25010000002 PERFLUTREN 6.52 MG/ML SUSPENSION: Performed by: INTERNAL MEDICINE

## 2017-09-27 RX ORDER — SODIUM CHLORIDE 0.9 % (FLUSH) 0.9 %
10 SYRINGE (ML) INJECTION AS NEEDED
Status: DISCONTINUED | OUTPATIENT
Start: 2017-09-27 | End: 2017-09-27 | Stop reason: HOSPADM

## 2017-09-27 RX ORDER — MAGNESIUM HYDROXIDE/ALUMINUM HYDROXICE/SIMETHICONE 120; 1200; 1200 MG/30ML; MG/30ML; MG/30ML
15 SUSPENSION ORAL ONCE
Status: COMPLETED | OUTPATIENT
Start: 2017-09-27 | End: 2017-09-27

## 2017-09-27 RX ADMIN — PERFLUTREN 8.48 MG: 6.52 INJECTION, SUSPENSION INTRAVENOUS at 11:41

## 2017-09-27 RX ADMIN — LIDOCAINE HYDROCHLORIDE 15 ML: 20 SOLUTION ORAL; TOPICAL at 08:42

## 2017-09-27 RX ADMIN — ALUMINUM HYDROXIDE, MAGNESIUM HYDROXIDE, AND SIMETHICONE 15 ML: 200; 200; 20 SUSPENSION ORAL at 08:42

## 2017-10-02 NOTE — ED NOTES
"ED Call Back Questions    1. How are you doing since leaving the Emergency Department?    Feeling much better  2. Do you have any questions about your discharge instructions? No     3. Have you filled your new prescriptions yet? N/A  a. Do you have any questions about those medications? N/A    4. Were you able to make a follow-up appointment with the physician? Yes     5. Do you have a primary care physician? Yes   a. If No, would you like for me to set you up with one? N/A  i. If Yes, “I will have our ED  give you a call right back at this number to work with you on the best time for an appointment.”    6. We are always looking to get better at what we do. Do you have any suggestions for what we can do to be even better? N/A  a. If Yes, \"Thank you for sharing your concerns. I apologize. I will follow up with our manager and patient . Would you like someone to call you back?\" No     7. Is there anything else I can do for you? No   Visit was really good, everyone was so nice and kept me comfortable even when I was scared to death     Eron Smith  10/02/17 1218    "

## 2017-11-13 LAB
T4 FREE: 1.3 NG/DL (ref 0.9–1.7)
TSH SERPL DL<=0.05 MIU/L-ACNC: 1.05 UIU/ML (ref 0.27–4.2)
VITAMIN B-12: 677 PG/ML (ref 211–946)
VITAMIN D 25-HYDROXY: 32.8 NG/ML

## 2017-11-18 LAB
ESTRADIOL LEVEL: 8.6 PG/ML
ESTROGEN TOTAL: 43.1 PG/ML
ESTRONE: 34.5 PG/ML

## 2017-12-11 ENCOUNTER — HOSPITAL ENCOUNTER (EMERGENCY)
Facility: HOSPITAL | Age: 52
Discharge: PSYCHIATRIC OR SUBSTANCE USE FACILITY | End: 2017-12-11
Attending: EMERGENCY MEDICINE | Admitting: EMERGENCY MEDICINE

## 2017-12-11 ENCOUNTER — HOSPITAL ENCOUNTER (INPATIENT)
Facility: HOSPITAL | Age: 52
LOS: 4 days | Discharge: HOME | End: 2017-12-15
Attending: PSYCHIATRY & NEUROLOGY | Admitting: PSYCHIATRY & NEUROLOGY

## 2017-12-11 VITALS
DIASTOLIC BLOOD PRESSURE: 65 MMHG | TEMPERATURE: 97.9 F | RESPIRATION RATE: 18 BRPM | HEIGHT: 64 IN | HEART RATE: 77 BPM | SYSTOLIC BLOOD PRESSURE: 128 MMHG | WEIGHT: 216 LBS | OXYGEN SATURATION: 97 % | BODY MASS INDEX: 36.88 KG/M2

## 2017-12-11 DIAGNOSIS — R45.851 SUICIDAL IDEATION: Primary | ICD-10-CM

## 2017-12-11 DIAGNOSIS — F31.4 BIPOLAR DISORDER, CURRENT EPISODE DEPRESSED, SEVERE, WITHOUT PSYCHOTIC FEATURES (HCC): ICD-10-CM

## 2017-12-11 DIAGNOSIS — N39.0 UTI (URINARY TRACT INFECTION), UNCOMPLICATED: ICD-10-CM

## 2017-12-11 LAB
ALBUMIN SERPL-MCNC: 4.7 G/DL (ref 3.4–4.8)
ALBUMIN/GLOB SERPL: 1.5 G/DL (ref 1.1–1.8)
ALP SERPL-CCNC: 84 U/L (ref 38–126)
ALT SERPL W P-5'-P-CCNC: 34 U/L (ref 9–52)
AMPHET+METHAMPHET UR QL: NEGATIVE
ANION GAP SERPL CALCULATED.3IONS-SCNC: 12 MMOL/L (ref 5–15)
APAP SERPL-MCNC: <10 MCG/ML (ref 10–30)
AST SERPL-CCNC: 25 U/L (ref 14–36)
BACTERIA UR QL AUTO: ABNORMAL /HPF
BARBITURATES UR QL SCN: NEGATIVE
BASOPHILS # BLD AUTO: 0.03 10*3/MM3 (ref 0–0.2)
BASOPHILS NFR BLD AUTO: 0.3 % (ref 0–2)
BENZODIAZ UR QL SCN: NEGATIVE
BILIRUB SERPL-MCNC: 0.4 MG/DL (ref 0.2–1.3)
BILIRUB UR QL STRIP: NEGATIVE
BUN BLD-MCNC: 16 MG/DL (ref 7–21)
BUN/CREAT SERPL: 20 (ref 7–25)
CALCIUM SPEC-SCNC: 10.4 MG/DL (ref 8.4–10.2)
CANNABINOIDS SERPL QL: NEGATIVE
CHLORIDE SERPL-SCNC: 100 MMOL/L (ref 95–110)
CLARITY UR: ABNORMAL
CO2 SERPL-SCNC: 27 MMOL/L (ref 22–31)
COCAINE UR QL: NEGATIVE
COLOR UR: YELLOW
CREAT BLD-MCNC: 0.8 MG/DL (ref 0.5–1)
DEPRECATED RDW RBC AUTO: 44.9 FL (ref 36.4–46.3)
EOSINOPHIL # BLD AUTO: 0.16 10*3/MM3 (ref 0–0.7)
EOSINOPHIL NFR BLD AUTO: 1.4 % (ref 0–7)
ERYTHROCYTE [DISTWIDTH] IN BLOOD BY AUTOMATED COUNT: 13.3 % (ref 11.5–14.5)
ETHANOL BLD-MCNC: <10 MG/DL (ref 0–10)
ETHANOL UR QL: <0.01 %
GFR SERPL CREATININE-BSD FRML MDRD: 76 ML/MIN/1.73 (ref 51–120)
GLOBULIN UR ELPH-MCNC: 3.2 GM/DL (ref 2.3–3.5)
GLUCOSE BLD-MCNC: 92 MG/DL (ref 60–100)
GLUCOSE UR STRIP-MCNC: NEGATIVE MG/DL
HCG SERPL QL: NEGATIVE
HCT VFR BLD AUTO: 42.4 % (ref 35–45)
HGB BLD-MCNC: 14.1 G/DL (ref 12–15.5)
HGB UR QL STRIP.AUTO: NEGATIVE
HYALINE CASTS UR QL AUTO: ABNORMAL /LPF
IMM GRANULOCYTES # BLD: 0.04 10*3/MM3 (ref 0–0.02)
IMM GRANULOCYTES NFR BLD: 0.3 % (ref 0–0.5)
KETONES UR QL STRIP: NEGATIVE
LEUKOCYTE ESTERASE UR QL STRIP.AUTO: ABNORMAL
LYMPHOCYTES # BLD AUTO: 2.47 10*3/MM3 (ref 0.6–4.2)
LYMPHOCYTES NFR BLD AUTO: 21.1 % (ref 10–50)
MCH RBC QN AUTO: 30.7 PG (ref 26.5–34)
MCHC RBC AUTO-ENTMCNC: 33.3 G/DL (ref 31.4–36)
MCV RBC AUTO: 92.2 FL (ref 80–98)
METHADONE UR QL SCN: NEGATIVE
MONOCYTES # BLD AUTO: 0.87 10*3/MM3 (ref 0–0.9)
MONOCYTES NFR BLD AUTO: 7.4 % (ref 0–12)
NEUTROPHILS # BLD AUTO: 8.12 10*3/MM3 (ref 2–8.6)
NEUTROPHILS NFR BLD AUTO: 69.5 % (ref 37–80)
NITRITE UR QL STRIP: NEGATIVE
OPIATES UR QL: NEGATIVE
OXYCODONE UR QL SCN: NEGATIVE
PH UR STRIP.AUTO: 6 [PH] (ref 5–9)
PLATELET # BLD AUTO: 331 10*3/MM3 (ref 150–450)
PMV BLD AUTO: 9.7 FL (ref 8–12)
POTASSIUM BLD-SCNC: 4 MMOL/L (ref 3.5–5.1)
PROT SERPL-MCNC: 7.9 G/DL (ref 6.3–8.6)
PROT UR QL STRIP: NEGATIVE
RBC # BLD AUTO: 4.6 10*6/MM3 (ref 3.77–5.16)
RBC # UR: ABNORMAL /HPF
REF LAB TEST METHOD: ABNORMAL
SALICYLATES SERPL-MCNC: <1 MG/DL (ref 10–20)
SODIUM BLD-SCNC: 139 MMOL/L (ref 137–145)
SP GR UR STRIP: 1.02 (ref 1–1.03)
SQUAMOUS #/AREA URNS HPF: ABNORMAL /HPF
UROBILINOGEN UR QL STRIP: ABNORMAL
WBC NRBC COR # BLD: 11.69 10*3/MM3 (ref 3.2–9.8)
WBC UR QL AUTO: ABNORMAL /HPF

## 2017-12-11 PROCEDURE — 99284 EMERGENCY DEPT VISIT MOD MDM: CPT

## 2017-12-11 PROCEDURE — 80307 DRUG TEST PRSMV CHEM ANLYZR: CPT | Performed by: EMERGENCY MEDICINE

## 2017-12-11 PROCEDURE — 81001 URINALYSIS AUTO W/SCOPE: CPT | Performed by: EMERGENCY MEDICINE

## 2017-12-11 PROCEDURE — 85025 COMPLETE CBC W/AUTO DIFF WBC: CPT | Performed by: EMERGENCY MEDICINE

## 2017-12-11 PROCEDURE — 87086 URINE CULTURE/COLONY COUNT: CPT | Performed by: EMERGENCY MEDICINE

## 2017-12-11 PROCEDURE — 80053 COMPREHEN METABOLIC PANEL: CPT | Performed by: EMERGENCY MEDICINE

## 2017-12-11 PROCEDURE — 84703 CHORIONIC GONADOTROPIN ASSAY: CPT | Performed by: EMERGENCY MEDICINE

## 2017-12-11 RX ORDER — MELATONIN 200 MCG
3 TABLET ORAL NIGHTLY PRN
Status: ON HOLD | COMMUNITY
End: 2017-12-15

## 2017-12-11 RX ORDER — LEVOFLOXACIN 500 MG/1
500 TABLET, FILM COATED ORAL ONCE
Status: DISCONTINUED | OUTPATIENT
Start: 2017-12-11 | End: 2017-12-11 | Stop reason: HOSPADM

## 2017-12-11 RX ORDER — FAMOTIDINE 20 MG/1
20 TABLET, FILM COATED ORAL 2 TIMES DAILY PRN
Status: DISCONTINUED | OUTPATIENT
Start: 2017-12-11 | End: 2017-12-11 | Stop reason: HOSPADM

## 2017-12-11 RX ORDER — IBUPROFEN 600 MG/1
600 TABLET ORAL EVERY 6 HOURS PRN
Status: DISCONTINUED | OUTPATIENT
Start: 2017-12-11 | End: 2017-12-11 | Stop reason: HOSPADM

## 2017-12-11 RX ORDER — HYDROXYZINE PAMOATE 25 MG/1
50 CAPSULE ORAL EVERY 6 HOURS PRN
Status: DISCONTINUED | OUTPATIENT
Start: 2017-12-11 | End: 2017-12-11 | Stop reason: HOSPADM

## 2017-12-11 RX ORDER — LEVOFLOXACIN 500 MG/1
500 TABLET, FILM COATED ORAL DAILY
Qty: 3 TABLET | Refills: 0 | Status: SHIPPED | OUTPATIENT
Start: 2017-12-11 | End: 2017-12-15 | Stop reason: HOSPADM

## 2017-12-11 RX ORDER — LOPERAMIDE HYDROCHLORIDE 2 MG/1
2 CAPSULE ORAL 4 TIMES DAILY PRN
Status: DISCONTINUED | OUTPATIENT
Start: 2017-12-11 | End: 2017-12-11 | Stop reason: HOSPADM

## 2017-12-12 PROBLEM — F39 MOOD DISORDER (HCC): Status: ACTIVE | Noted: 2017-12-12

## 2017-12-12 PROBLEM — Z63.4 BEREAVEMENT: Status: ACTIVE | Noted: 2017-12-12

## 2017-12-12 PROBLEM — F41.1 GENERALIZED ANXIETY DISORDER: Status: ACTIVE | Noted: 2017-12-12

## 2017-12-12 PROCEDURE — 99232 SBSQ HOSP IP/OBS MODERATE 35: CPT | Performed by: FAMILY MEDICINE

## 2017-12-12 PROCEDURE — 90791 PSYCH DIAGNOSTIC EVALUATION: CPT | Performed by: PSYCHIATRY & NEUROLOGY

## 2017-12-12 RX ORDER — NICOTINE 21 MG/24HR
1 PATCH, TRANSDERMAL 24 HOURS TRANSDERMAL EVERY 24 HOURS
Status: DISCONTINUED | OUTPATIENT
Start: 2017-12-12 | End: 2017-12-13

## 2017-12-12 RX ORDER — NYSTATIN 100000 [USP'U]/G
POWDER TOPICAL 4 TIMES DAILY
Status: DISCONTINUED | OUTPATIENT
Start: 2017-12-12 | End: 2017-12-15 | Stop reason: HOSPADM

## 2017-12-12 RX ORDER — ARIPIPRAZOLE 10 MG/1
10 TABLET ORAL NIGHTLY
Status: DISCONTINUED | OUTPATIENT
Start: 2017-12-12 | End: 2017-12-15 | Stop reason: HOSPADM

## 2017-12-12 RX ORDER — LOPERAMIDE HYDROCHLORIDE 2 MG/1
2 CAPSULE ORAL 4 TIMES DAILY PRN
Status: DISCONTINUED | OUTPATIENT
Start: 2017-12-12 | End: 2017-12-15 | Stop reason: HOSPADM

## 2017-12-12 RX ORDER — MULTIVIT-MIN/FA/LYCOPEN/LUTEIN .4-300-25
1 TABLET ORAL DAILY
Status: DISCONTINUED | OUTPATIENT
Start: 2017-12-12 | End: 2017-12-15 | Stop reason: HOSPADM

## 2017-12-12 RX ORDER — FAMOTIDINE 20 MG/1
20 TABLET, FILM COATED ORAL 2 TIMES DAILY
Status: DISCONTINUED | OUTPATIENT
Start: 2017-12-12 | End: 2017-12-15 | Stop reason: HOSPADM

## 2017-12-12 RX ORDER — ASCORBATE CALCIUM/BIOFLAVONOID 1000-200MG
3 TABLET, EXTENDED RELEASE ORAL DAILY
Status: DISCONTINUED | OUTPATIENT
Start: 2017-12-12 | End: 2017-12-15 | Stop reason: HOSPADM

## 2017-12-12 RX ORDER — CLONIDINE HYDROCHLORIDE 0.1 MG/1
0.1 TABLET ORAL EVERY 4 HOURS PRN
Status: DISCONTINUED | OUTPATIENT
Start: 2017-12-12 | End: 2017-12-15 | Stop reason: HOSPADM

## 2017-12-12 RX ORDER — METRONIDAZOLE 7.5 MG/G
0.75 GEL TOPICAL 2 TIMES DAILY
Status: DISCONTINUED | OUTPATIENT
Start: 2017-12-12 | End: 2017-12-15 | Stop reason: HOSPADM

## 2017-12-12 RX ORDER — DOCUSATE SODIUM 100 MG/1
100 CAPSULE, LIQUID FILLED ORAL 2 TIMES DAILY PRN
Status: DISCONTINUED | OUTPATIENT
Start: 2017-12-12 | End: 2017-12-15 | Stop reason: HOSPADM

## 2017-12-12 RX ORDER — PANTOPRAZOLE SODIUM 40 MG/1
40 TABLET, DELAYED RELEASE ORAL EVERY MORNING
Status: DISCONTINUED | OUTPATIENT
Start: 2017-12-12 | End: 2017-12-12

## 2017-12-12 RX ORDER — LANOLIN ALCOHOL/MO/W.PET/CERES
6 CREAM (GRAM) TOPICAL NIGHTLY
Status: DISCONTINUED | OUTPATIENT
Start: 2017-12-12 | End: 2017-12-15 | Stop reason: HOSPADM

## 2017-12-12 RX ORDER — MULTIVIT WITH MINERALS/LUTEIN
1000 TABLET ORAL DAILY
Status: DISCONTINUED | OUTPATIENT
Start: 2017-12-12 | End: 2017-12-15 | Stop reason: HOSPADM

## 2017-12-12 RX ORDER — LAMOTRIGINE 100 MG/1
100 TABLET ORAL NIGHTLY
Status: DISCONTINUED | OUTPATIENT
Start: 2017-12-12 | End: 2017-12-13

## 2017-12-12 RX ORDER — HYDROXYZINE PAMOATE 50 MG/1
50 CAPSULE ORAL EVERY 6 HOURS PRN
Status: DISCONTINUED | OUTPATIENT
Start: 2017-12-12 | End: 2017-12-15 | Stop reason: HOSPADM

## 2017-12-12 RX ORDER — LANOLIN ALCOHOL/MO/W.PET/CERES
3 CREAM (GRAM) TOPICAL NIGHTLY
Status: DISCONTINUED | OUTPATIENT
Start: 2017-12-12 | End: 2017-12-12

## 2017-12-12 RX ORDER — ONDANSETRON 4 MG/1
4 TABLET, FILM COATED ORAL EVERY 6 HOURS PRN
Status: DISCONTINUED | OUTPATIENT
Start: 2017-12-12 | End: 2017-12-15 | Stop reason: HOSPADM

## 2017-12-12 RX ADMIN — FAMOTIDINE 20 MG: 20 TABLET ORAL at 17:04

## 2017-12-12 RX ADMIN — LAMOTRIGINE 100 MG: 100 TABLET ORAL at 21:35

## 2017-12-12 RX ADMIN — NYSTATIN: 100000 POWDER TOPICAL at 17:04

## 2017-12-12 RX ADMIN — METRONIDAZOLE 0.75 APPLICATION: 7.5 GEL TOPICAL at 17:04

## 2017-12-12 RX ADMIN — NYSTATIN: 100000 POWDER TOPICAL at 21:37

## 2017-12-12 RX ADMIN — ARIPIPRAZOLE 10 MG: 10 TABLET ORAL at 21:35

## 2017-12-12 RX ADMIN — NYSTATIN: 100000 POWDER TOPICAL at 12:32

## 2017-12-12 RX ADMIN — MELATONIN 6 MG: 3 TAB ORAL at 21:35

## 2017-12-12 RX ADMIN — FAMOTIDINE 20 MG: 20 TABLET ORAL at 12:38

## 2017-12-12 NOTE — CONSULTS
CHIEF COMPLAINT/REASON FOR VISIT:  Suicidal Ideation    HPI:  Patient presented to our ED with the above complaint on December 11 at 10:30 PM.  She had a plan to overdose on pills and reported these ideas to her therapist earlier in the day and was suggested she go to the emergency room for evaluation.  Once on the behavioral health unit she reported 2 previous suicide attempts and that her plan this time was to take an overdose of ibuprofen in response to significant problems with her ex-.    PROBLEM LIST:  Patient Active Problem List    Diagnosis   • Suicidal ideation [R45.851]   • PMB (postmenopausal bleeding) [N95.0]         CURRENT MEDICATIONS:  Prescriptions Prior to Admission   Medication Sig Dispense Refill Last Dose   • ARIPiprazole (ABILIFY) 10 MG tablet Take 10 mg by mouth Every Night.   12/11/2017 at Unknown time   • ibuprofen (ADVIL,MOTRIN) 200 MG tablet Take 200 mg by mouth Every 8 (Eight) Hours As Needed for Mild Pain (1-3).   12/11/2017 at Unknown time   • lamoTRIgine (LaMICtal) 100 MG tablet    12/11/2017 at Unknown time   • levoFLOXacin (LEVAQUIN) 500 MG tablet Take 1 tablet by mouth Daily for 3 days. 3 tablet 0    • Melatonin 3 MG tablet Take 3 mg by mouth At Night As Needed for Sleep.   12/10/2017 at Unknown time   • Multiple Minerals-Vitamins (CALCIUM-MAGNESIUM-ZINC-D3 PO) Take 3 tablets by mouth Daily.   12/11/2017 at Unknown time   • Multiple Vitamins-Minerals (CENTRUM SILVER PO) Take 1 tablet by mouth Daily.   12/11/2017 at Unknown time   • omeprazole (PriLOSEC) 40 MG capsule Take 40 mg by mouth Daily.   12/11/2017 at Unknown time   • nystatin (MYCOSTATIN) 066870 UNIT/GM powder Apply  topically 4 (Four) Times a Day. USES UNDER BREAST   Taking       ALLERGIES:  Bc powder [aspirin-salicylamide-caffeine]; Zoloft [sertraline hcl]; Adderall [amphetamine-dextroamphetamine]; Amphetamines; Aspirin; Benztropine; Bupropion; Caffeine; Depakote [divalproex sodium]; Effexor [venlafaxine];  Estradiol; Geodon [ziprasidone hcl]; Lithium; Lortab [hydrocodone-acetaminophen]; Metoprolol; Neurontin [gabapentin]; Penicillins; Provera [medroxyprogesterone acetate]; Prozac [fluoxetine hcl]; Tetracyclines & related; Trazodone and nefazodone; Trileptal [oxcarbazepine]; Valium [diazepam]; Vancomycin; and Zyprexa [olanzapine]      PAST MEDICAL/SURGICAL HISTORY:  Past Medical History:   Diagnosis Date   • Abnormal vaginal bleeding    • Anxiety    • Bipolar disorder    • Depression    • Diabetes mellitus     Prediabetic   • GERD (gastroesophageal reflux disease)    • Hypoglycemia    • Hypoglycemic reaction    • PONV (postoperative nausea and vomiting)    • Pre-diabetes    • Prediabetes    • Psychiatric illness    • PTSD (post-traumatic stress disorder)    • Suicide attempt     pt. has had two previous attempts       Past Surgical History:   Procedure Laterality Date   • CERVICAL BIOPSY  W/ LOOP ELECTRODE EXCISION     • D&C HYSTEROSCOPY N/A 5/30/2017    Procedure: DILATATION AND CURETTAGE HYSTEROSCOPY;  Surgeon: Citlali Arita MD;  Location: [facility] OR;  Service:    • OTHER SURGICAL HISTORY      cyst removed from foot as a child       Review of Systems   Constitutional: Negative for activity change, appetite change, fatigue and fever.   HENT: Negative for congestion, ear discharge, ear pain, facial swelling, hearing loss, nosebleeds, postnasal drip, rhinorrhea, sinus pressure, sore throat, tinnitus and trouble swallowing.    Eyes: Negative for pain, discharge and visual disturbance.   Respiratory: Negative for cough, shortness of breath and wheezing.    Cardiovascular: Negative for chest pain, palpitations and leg swelling.   Gastrointestinal: Negative for abdominal pain, blood in stool, constipation, diarrhea, nausea and vomiting.   Genitourinary: Negative for difficulty urinating, dyspareunia, dysuria, flank pain, frequency, hematuria, menstrual problem, pelvic pain, urgency, vaginal bleeding and vaginal discharge.  "  Musculoskeletal: Negative for arthralgias, back pain, joint swelling, myalgias and neck pain.   Skin: Negative for rash and wound.   Neurological: Negative for dizziness, seizures, syncope, weakness, light-headedness and headaches.   Hematological: Negative for adenopathy.       Social History     Social History   • Marital status:      Spouse name: N/A   • Number of children: N/A   • Years of education: N/A     Occupational History   • Not on file.     Social History Main Topics   • Smoking status: Never Smoker   • Smokeless tobacco: Never Used   • Alcohol use No   • Drug use: No   • Sexual activity: No      Comment: last act intercourse 7 years     Other Topics Concern   • Not on file     Social History Narrative       Family History   Problem Relation Age of Onset   • Hypertension Father    • Coronary artery disease Paternal Grandfather    • Coronary artery disease Paternal Grandmother    • Diabetes Paternal Grandmother    • Kidney cancer Maternal Grandfather              Objective     /52 (BP Location: Left arm, Patient Position: Lying)  Pulse 77  Temp 98.3 °F (36.8 °C) (Tympanic)   Resp 18  Ht 162.6 cm (64\")  Wt 96.2 kg (212 lb)  SpO2 96%  BMI 36.39 kg/m2    Physical Exam   Constitutional: She appears well-developed and well-nourished.   HENT:   Head: Normocephalic and atraumatic.   Eyes: Conjunctivae and EOM are normal.   Neck: Normal range of motion. Neck supple. No thyromegaly present.   Cardiovascular: Normal rate, regular rhythm and normal heart sounds.  Exam reveals no gallop and no friction rub.    No murmur heard.  Pulmonary/Chest: Effort normal and breath sounds normal. No respiratory distress. She has no wheezes. She has no rales.   Abdominal: Soft. She exhibits no distension and no mass. There is no tenderness. There is no rebound and no guarding.   Musculoskeletal: Normal range of motion.   Lymphadenopathy:     She has no cervical adenopathy.   Neurological: She is alert. She " has normal strength and normal reflexes. She displays no tremor and normal reflexes. No cranial nerve deficit or sensory deficit. She exhibits normal muscle tone. Coordination normal. She displays no Babinski's sign on the right side. She displays no Babinski's sign on the left side.   Reflex Scores:       Tricep reflexes are 2+ on the right side and 2+ on the left side.       Bicep reflexes are 2+ on the right side and 2+ on the left side.       Brachioradialis reflexes are 2+ on the right side and 2+ on the left side.       Patellar reflexes are 2+ on the right side and 2+ on the left side.       Achilles reflexes are 2+ on the right side and 2+ on the left side.  Skin: Skin is warm and dry. Rash noted. There is erythema.   Patient has diffuse acne across the face bilaterally with some very small cystic changes   Nursing note and vitals reviewed.      Dystonia/Tardive Dyskinesia  Absent  Meningeal Signs  Absent    Diagnostic Studies  CBC, CMP,TSH, UDS, acetaminophen level, salicylate level, ethanol level, U/A all normal except  COMPREHENSIVE METABOLIC PANEL - Abnormal; Notable for the following:        Result Value      Calcium 10.4 (*)       All other components within normal limits   URINALYSIS W/ CULTURE IF INDICATED - Abnormal; Notable for the following:      Appearance, UA Cloudy (*)       Leuk Esterase, UA Large (3+) (*)       All other components within normal limits   SALICYLATE LEVEL - Abnormal; Notable for the following:      Salicylate <1.0 (*)       All other components within normal limits   ACETAMINOPHEN LEVEL - Abnormal; Notable for the following:      Acetaminophen <10.0 (*)       All other components within normal limits   CBC WITH AUTO DIFFERENTIAL - Abnormal; Notable for the following:      WBC 11.69 (*)       Immature Grans, Absolute 0.04 (*)       All other components within normal limits   URINALYSIS, MICROSCOPIC ONLY - Abnormal; Notable for the following:      RBC, UA 3-5 (*)       WBC, UA  Too Numerous to Count (*)       Bacteria, UA 1+ (*)       Squamous Epithelial Cells, UA 3-5 (*)       All other components within normal limits   HCG, SERUM, QUALITATIVE - Normal   URINE DRUG SCREEN - Normal   Ethanol less than 10      On September 7 2017:  Normal stress echo with no significant echocardiographic evidence for myocardial ischemia.    UCG  CXR Nl except  EKG Nl except    Assessment/Plan     Patient Active Problem List    Diagnosis   • Suicidal ideation [R45.851]   • PMB (postmenopausal bleeding) [N95.0]     Urinalysis is borderline and patient denies any symptoms of UTI.  She prefers that we not use antibiotics as in the past that has affected her psychiatric medicines.  I agree.  Will monitor for symptoms.    Minimal elevation of calcium.  No further evaluation at this time.    GERD by history    Pre-diabetes is listed in the problem list but hypoglycemia also is.  With normal blood sugars here we will evaluate no further here area      Continue Home Meds as ordered. Mental health and pain issues managed per psychiatry.  Further diagnostic studies or intervention based on hospital course.

## 2017-12-12 NOTE — NURSING NOTE
Dr Nolasco ROS       Patient is hypoglycemic, dermatitis around her mouth that she uses metrodinazole cream for also she was diagnosed with a uti in the ED that she has a RX for levaquin to tx it.        General  Good general health lately    Eyes   glasses/contact lens    ENT/Mouth   None    Cardio   None    Resp   None    GI    Heart burn       None    MS    Joint Pain    Skin/Hair/Nails   None    Neuro   None

## 2017-12-12 NOTE — NURSING NOTE
Patient arrived to unit escorted by security and ED tech. Patient has stable vital signs, alert and oriented. She presented to the ED for medical clearance from [address] [place] [address] in [place] for admission with suicidal ideation with a plan. Patient's speech is clear, makes appropriate eye contact, kempt. She said that her plan was to take a lot of ibuprofen to commit suicide. She's having some problems with her ex  that she says is one of her main stressors. She appears to be very depressed but seems to have a handle on it for now. She said that she's here for some help, also she has a long mental health history that extends back to her teenage years. She's had two previous suicide attempts. Her mother is very supportive and was with her up until the decision was made to admit.

## 2017-12-12 NOTE — PLAN OF CARE
Problem: [facility] Patient Care Overview (Adult)  Goal: Individualization and Mutuality  Outcome: Ongoing (interventions implemented as appropriate)  Goal: Discharge Needs Assessment  Outcome: Ongoing (interventions implemented as appropriate)    Problem:  Overarching Goals  Goal: Adheres to Safety Considerations for Self and Others  Outcome: Ongoing (interventions implemented as appropriate)  Goal: Optimized Coping Skills in Response to Life Stressors  Outcome: Ongoing (interventions implemented as appropriate)  Goal: Develops/Participates in Therapeutic [place] to Support Successful Transition  Outcome: Ongoing (interventions implemented as appropriate)

## 2017-12-12 NOTE — ED TRIAGE NOTES
Pt presents to the ED stating she is suicidal, she was going to overdose on her son's 600 mg Ibuprofen. X 2 suicide attempts, ingestion of chemicals and overdoses. Denies homicidal ideations. No AH or VH.

## 2017-12-12 NOTE — NURSING NOTE
Dr Gaurav MCCAULEY     Patient was diagnosed with a uti in the ED and given a prescription for levaquin to treat it, also patient has metronidazole cream for her dermatitis around her mouth area, also patient states that she's hypoglycemic and gets her HgbA1C checked every 3 months by her pcp.      General  Good general health lately    Eyes   glasses/contact lens    ENT/Mouth   None    Cardio   None    Resp   None    GI    None       None    MS    Joint Pain    Skin/Hair/Nails   Rash    Neuro   None

## 2017-12-12 NOTE — NURSING NOTE
"Behavior   Feeling anxious  loss of energy/fatigue  Pain 0  AVH no  S/I no  H/I no    Affect flat    Note: Patient has been to nurses station multiple times this morning, frequently seeks out staff, pt has voiced concern over her vitamins and face cream multiple times, stating she \"has to have it\". Patient states she has not had her Prilosec \"and I take it in the mornings\". Patient eager to speak to provider about her medications.       Intervention  Instructed in medication usage and effects  Medications administered as ordered  Encouraged to verbalize needs. Patient reassured that provider has her list of medications and will order them, that provider will speak with patient soon.       Response  Verbalized understanding but remains overly concerned  Did patient take medications as ordered yes   Did patient interact with assessment?  yes     Plan  Will monitor for safety  Will monitor every 15 minutes as ordered  Will evaluate and promote the plan of care    "

## 2017-12-12 NOTE — ED PROVIDER NOTES
Subjective   HPI Comments: 51yo female pmh significant bipolar disorder presents ED c/o suicidal ideation with plan for overdose.  Pt reported symptoms to therapist earlier today who referred patient to ED for evaluation.  Denies HI/av hallucination/etoh/illicit/ingestion.  Denies physical complaints.    Patient is a 52 y.o. female presenting with mental health disorder.   Mental Health Problem   Presenting symptoms: depression and suicidal thoughts    Patient accompanied by:  Parent  Onset quality:  Sudden  Duration:  1 day  Timing:  Constant  Chronicity:  Recurrent      Review of Systems   Constitutional: Negative.    HENT: Negative.    Eyes: Negative.    Respiratory: Negative.    Cardiovascular: Negative.    Gastrointestinal: Negative.    Musculoskeletal: Negative.    Skin: Negative.    Psychiatric/Behavioral: Positive for dysphoric mood and suicidal ideas.       Past Medical History:   Diagnosis Date   • Abnormal vaginal bleeding    • Anxiety    • Bipolar disorder    • Depression    • Diabetes mellitus     Prediabetic   • GERD (gastroesophageal reflux disease)    • Hypoglycemia    • Hypoglycemic reaction    • PONV (postoperative nausea and vomiting)    • Pre-diabetes    • Prediabetes    • Psychiatric illness    • PTSD (post-traumatic stress disorder)    • Suicide attempt     pt. has had two previous attempts       Allergies   Allergen Reactions   • Bc Powder [Aspirin-Salicylamide-Caffeine] Swelling     LIPS SWELL   • Zoloft [Sertraline Hcl] Other (See Comments)     SUICIDAL   • Adderall [Amphetamine-Dextroamphetamine] Other (See Comments)     UNKNOWN    • Amphetamines Other (See Comments)     UNKNOWN   • Aspirin Other (See Comments)     UNKNOWN   • Benztropine Other (See Comments)     UNKNOWN   • Bupropion Other (See Comments)     Makes patient manic   • Caffeine Other (See Comments)     STATES CAN TAKE IN THE MORNING BUT IF TAKEN IN THE AFTERNOON SHE CANNOT SLEEP   • Depakote [Divalproex Sodium] Other (See  Comments)     UNKNOWN   • Effexor [Venlafaxine] Other (See Comments)     UNKNOWN   • Estradiol Other (See Comments)     UNKNOWN   • Geodon [Ziprasidone Hcl] Other (See Comments)     UNKNOWN   • Lithium Other (See Comments)     UNKNOWN   • Lortab [Hydrocodone-Acetaminophen] Other (See Comments)     UNKNOWN     • Metoprolol Other (See Comments)     UNKNOWN   • Neurontin [Gabapentin] Other (See Comments)     UNKNOWN   • Penicillins Other (See Comments)     UNKNOWN   • Provera [Medroxyprogesterone Acetate] Other (See Comments)     UNKNOWN   • Prozac [Fluoxetine Hcl] Rash   • Tetracyclines & Related Other (See Comments)     UNKNOWN     • Trazodone And Nefazodone Other (See Comments)     UNKNOWN   • Trileptal [Oxcarbazepine] Other (See Comments)     UNKNOWN   • Valium [Diazepam] Other (See Comments)     UNKNOWN   • Vancomycin Other (See Comments)     ALLERGIC TO ALL MYCIN ANTIBIOTICS - UNKNOWN REACTION   • Zyprexa [Olanzapine] Other (See Comments)     UNKNOWN       Past Surgical History:   Procedure Laterality Date   • CERVICAL BIOPSY  W/ LOOP ELECTRODE EXCISION     • D&C HYSTEROSCOPY N/A 5/30/2017    Procedure: DILATATION AND CURETTAGE HYSTEROSCOPY;  Surgeon: Citlali Arita MD;  Location: [facility] OR;  Service:    • OTHER SURGICAL HISTORY      cyst removed from foot as a child       Family History   Problem Relation Age of Onset   • Hypertension Father    • Coronary artery disease Paternal Grandfather    • Coronary artery disease Paternal Grandmother    • Diabetes Paternal Grandmother    • Kidney cancer Maternal Grandfather        Social History     Social History   • Marital status:      Spouse name: N/A   • Number of children: N/A   • Years of education: N/A     Social History Main Topics   • Smoking status: Never Smoker   • Smokeless tobacco: Never Used   • Alcohol use No   • Drug use: No   • Sexual activity: No      Comment: last act intercourse 7 years     Other Topics Concern   • None     Social History  Narrative   • None           Objective   Physical Exam   Constitutional: She is oriented to person, place, and time. She appears well-developed and well-nourished.   HENT:   Head: Normocephalic and atraumatic.   Mouth/Throat: Oropharynx is clear and moist.   Eyes: Pupils are equal, round, and reactive to light.   Neck: Normal range of motion. Neck supple. No JVD present. No tracheal deviation present. No thyromegaly present.   Cardiovascular: Normal rate, regular rhythm, normal heart sounds and intact distal pulses.  Exam reveals no gallop and no friction rub.    No murmur heard.  Pulmonary/Chest: Effort normal and breath sounds normal. She has no wheezes. She has no rales.   Abdominal: Soft. Bowel sounds are normal. There is no tenderness. There is no rebound and no guarding.   Lymphadenopathy:     She has no cervical adenopathy.   Neurological: She is alert and oriented to person, place, and time. GCS eye subscore is 4. GCS verbal subscore is 5. GCS motor subscore is 6.   Skin: Skin is warm and dry.   Psychiatric: Her speech is normal and behavior is normal. Thought content is not paranoid. She expresses impulsivity. She exhibits a depressed mood. She expresses suicidal ideation. She expresses no homicidal ideation. She expresses suicidal plans. She expresses no homicidal plans.   Nursing note and vitals reviewed.      Procedures         ED Course  ED Course   Comment By Time   Pt evaluated per [facility] psych and staffed with them to Dr. Isbell. Pt stable admission for stabilization. Gabe Peng MD 12/11 8531        Labs Reviewed   COMPREHENSIVE METABOLIC PANEL - Abnormal; Notable for the following:        Result Value    Calcium 10.4 (*)     All other components within normal limits   URINALYSIS W/ CULTURE IF INDICATED - Abnormal; Notable for the following:     Appearance, UA Cloudy (*)     Leuk Esterase, UA Large (3+) (*)     All other components within normal limits   SALICYLATE LEVEL - Abnormal; Notable for  the following:     Salicylate <1.0 (*)     All other components within normal limits   ACETAMINOPHEN LEVEL - Abnormal; Notable for the following:     Acetaminophen <10.0 (*)     All other components within normal limits   CBC WITH AUTO DIFFERENTIAL - Abnormal; Notable for the following:     WBC 11.69 (*)     Immature Grans, Absolute 0.04 (*)     All other components within normal limits   URINALYSIS, MICROSCOPIC ONLY - Abnormal; Notable for the following:     RBC, UA 3-5 (*)     WBC, UA Too Numerous to Count (*)     Bacteria, UA 1+ (*)     Squamous Epithelial Cells, UA 3-5 (*)     All other components within normal limits   HCG, SERUM, QUALITATIVE - Normal   URINE DRUG SCREEN - Normal    Narrative:     Negative Thresholds For Drugs Screened in Urine:     Amphetamines          500 ng/ml  Barbiturates          200 ng/ml  Benzodiazepines       200 ng/ml  Cocaine               150 ng/ml  Methadone             300 ng/mL  Opiates               300 ng/mL  Oxycodone             100 ng/mL  THC                   20 ng/mL    The normal value for all drugs tested is negative. This report includes final unconfirmed screening results.  A positive result by this assay can be, at your request, sent to the Reference Lab for confirmation by gas chromatography. Unconfirmed results must not be used for non-medical purposes, such as employment or legal testing. Clinical consideration should be applied to any drug of abuse test result, particularly when unconfirmed results are used.   URINE CULTURE   ETHANOL   CBC AND DIFFERENTIAL    Narrative:     The following orders were created for panel order CBC & Differential.  Procedure                               Abnormality         Status                     ---------                               -----------         ------                     CBC Auto Differential[[id number]]        Abnormal            Final result                 Please view results for these tests on the individual orders.              MDM    Final diagnoses:   Suicidal ideation   Bipolar disorder, current episode depressed, severe, without psychotic features   UTI (urinary tract infection), uncomplicated            Gabe Peng MD  12/11/17 2905
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
## 2017-12-12 NOTE — PLAN OF CARE
Problem: BH Patient Care Overview (Adult)  Goal: Plan of Care Review  Outcome: Ongoing (interventions implemented as appropriate)    Problem: BH Overarching Goals  Goal: Adheres to Safety Considerations for Self and Others  Outcome: Ongoing (interventions implemented as appropriate)  Goal: Optimized Coping Skills in Response to Life Stressors  Outcome: Ongoing (interventions implemented as appropriate)  Goal: Develops/Participates in Therapeutic [place] to Support Successful Transition  Outcome: Ongoing (interventions implemented as appropriate)

## 2017-12-13 LAB
BACTERIA SPEC AEROBE CULT: NORMAL
BACTERIA SPEC AEROBE CULT: NORMAL

## 2017-12-13 PROCEDURE — 99232 SBSQ HOSP IP/OBS MODERATE 35: CPT | Performed by: PSYCHIATRY & NEUROLOGY

## 2017-12-13 RX ORDER — IBUPROFEN 200 MG
200 TABLET ORAL EVERY 6 HOURS SCHEDULED
Status: DISCONTINUED | OUTPATIENT
Start: 2017-12-13 | End: 2017-12-15 | Stop reason: HOSPADM

## 2017-12-13 RX ADMIN — MELATONIN 6 MG: 3 TAB ORAL at 21:08

## 2017-12-13 RX ADMIN — METRONIDAZOLE 0.75 APPLICATION: 7.5 GEL TOPICAL at 09:09

## 2017-12-13 RX ADMIN — Medication 1000 MG: at 08:23

## 2017-12-13 RX ADMIN — METRONIDAZOLE 0.75 APPLICATION: 7.5 GEL TOPICAL at 17:24

## 2017-12-13 RX ADMIN — ARIPIPRAZOLE 10 MG: 10 TABLET ORAL at 21:08

## 2017-12-13 RX ADMIN — FAMOTIDINE 20 MG: 20 TABLET ORAL at 17:25

## 2017-12-13 RX ADMIN — NYSTATIN: 100000 POWDER TOPICAL at 09:08

## 2017-12-13 RX ADMIN — NYSTATIN: 100000 POWDER TOPICAL at 17:26

## 2017-12-13 RX ADMIN — Medication 3 TABLET: at 08:23

## 2017-12-13 RX ADMIN — Medication 200 MG: at 17:25

## 2017-12-13 RX ADMIN — FAMOTIDINE 20 MG: 20 TABLET ORAL at 08:23

## 2017-12-13 RX ADMIN — LAMOTRIGINE 150 MG: 100 TABLET ORAL at 21:09

## 2017-12-13 RX ADMIN — Medication 1 TABLET: at 08:23

## 2017-12-13 RX ADMIN — Medication 200 MG: at 12:51

## 2017-12-13 NOTE — NURSING NOTE
"Behavior   Feeling anxious, Feeling worried and Sleep disturbance  anxiety and disturbed sleep  Pain 5  AVH no  S/I no  H/I no    Affect anxious    Note:  Patient appears anxious.  States she is feeling anxiety - \"I feel nervous\".  When asked what is causing her to feel that way she stated that she is going to have to start talking to the people that she has concerns with - for instance, she needs to talk to her mom when there are problems and she needs to talk to her ex- as he just has a triple bi-pass surgery and wants their 16 year old son to go and take care of him.  Patient states that her ex- is staying with his brother right now and she doesn't feel her son needs to have all that responsibility put on him.  She states that she is having pain which she states is caused by arthritis in her right hand/thumb area and requested that doctor order her Ibuprofen gel caps that she takes at home.      Intervention  Instructed in medication usage and effects  Medications administered as ordered  Encouraged to verbalize needs  Spoke with Dr. Isbell regarding her Ibuprofen gel caps and Dr. Isbell gave new order for this med.  Encouraged patient to attend groups and let staff know if she has any other concerns.      Response  Verbalized understanding   Did patient take medications as ordered yes   Did patient interact with assessment?  yes     Plan  Will monitor for safety  Will monitor every 15 minutes as ordered  Will evaluate and promote the plan of care    "
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
## 2017-12-13 NOTE — NURSING NOTE
Patient had been up and interacting well throughout day with staff and other peers.  She has attended and participated in groups today.  She receives Nystatin powder under breasts and under abdominal fold for redness.  Slight redness under left breast and redness under abdominal fold.

## 2017-12-13 NOTE — PLAN OF CARE
Problem: BH Patient Care Overview (Adult)  Goal: Individualization and Mutuality  Outcome: Ongoing (interventions implemented as appropriate)  Goal: Discharge Needs Assessment  Outcome: Ongoing (interventions implemented as appropriate)    Problem: BH Overarching Goals  Goal: Adheres to Safety Considerations for Self and Others  Outcome: Ongoing (interventions implemented as appropriate)  Goal: Optimized Coping Skills in Response to Life Stressors  Outcome: Ongoing (interventions implemented as appropriate)  Goal: Develops/Participates in Therapeutic [place] to Support Successful Transition  Outcome: Ongoing (interventions implemented as appropriate)    Problem: Depression  Goal: Treatment Goal: Demonstrate behavioral control of depressive symptoms, verbalize feelings of improved mood/affect, and adopt new coping skills prior to discharge  Outcome: Ongoing (interventions implemented as appropriate)  Goal: Verbalize thoughts and feelings associated with:  Outcome: Ongoing (interventions implemented as appropriate)  Goal: Refrain from harming self  Outcome: Ongoing (interventions implemented as appropriate)    Problem: Risk for Self Injury/Neglect  Goal: Treatment Goal: Remain safe during length of stay, learn and adopt new coping skills, and be free of self-injurious ideation, impulses and acts at the time of discharge  Outcome: Ongoing (interventions implemented as appropriate)  Goal: Verbalize thoughts and feelings associated with:  Outcome: Ongoing (interventions implemented as appropriate)

## 2017-12-13 NOTE — PROGRESS NOTES
12/13/2017    Chief Complaint: suicidal ideation    Subjective:  Patient is a 52 y.o. female who was hospitalized for suicidal ideation.   Since yesterday the patient has been had some difficulty with sleep and continues to grieve the loss of her grandmothers.  She also continues to be concerned about her relationship with her mom.  She notes she would like to trial an increase in her meds b/c she continues to be feeling depressed.    Objective     Vital Signs    Temp:  [96.1 °F (35.6 °C)-97.5 °F (36.4 °C)] 96.1 °F (35.6 °C)  Heart Rate:  [72-73] 73  Resp:  [18] 18  BP: (117-126)/(55-58) 126/58    Physical Exam:   General Appearance: alert, appears stated age and cooperative,  Hygiene:   good  Gait & Station: Normal  Musculoskeletal: No tremors or abnormal involuntary movements    Mental Status Exam:   Cooperation:  Cooperative  Eye Contact:  Good  Psychomotor Behavior:  Appropriate  Mood: Sad/Depressed, Anxious/Nervous and Improving  Affect:  mood-congruent  Speech:  Normal  Thought Process:  Coherent  Associations: Goal Directed  Thought Content:     Normal   Suicidal:  None   Homicidal:  None   Hallucinations:  None   Delusion:  None  Cognitive Functioning:   Consciousness: awake, alert and oriented  Reliability:  fair  Insight:  Fair  Judgement:  Fair  Impulse Control:  Fair    Lab Results (last 24 hours)     ** No results found for the last 24 hours. **        Imaging Results (last 24 hours)     ** No results found for the last 24 hours. **          Medicine:   Current Facility-Administered Medications:   •  ARIPiprazole (ABILIFY) tablet 10 mg, 10 mg, Oral, Nightly, Martha Isbell MD, 10 mg at 12/12/17 2135  •  ascorbic acid (VITAMIN C) tablet 1,000 mg, 1,000 mg, Oral, Daily, Martha Isbell MD, 1,000 mg at 12/13/17 0823  •  Calcium-Magnesium-Zinc tablet 3 tablet, 3 tablet, Oral, Daily, Martha Isbell MD, 3 tablet at 12/13/17 0823  •  CENTRUM SILVER ADULT 50+ tablet 1 tablet, 1 tablet, Oral, Daily,  Martha Isbell MD, 1 tablet at 12/13/17 0823  •  CloNIDine (CATAPRES) tablet 0.1 mg, 0.1 mg, Oral, Q4H PRN, Martha Isbell MD  •  docusate sodium (COLACE) capsule 100 mg, 100 mg, Oral, BID PRN, Martha Isbell MD  •  famotidine (PEPCID) tablet 20 mg, 20 mg, Oral, BID, Martha Isbell MD, 20 mg at 12/13/17 0823  •  hydrOXYzine (VISTARIL) capsule 50 mg, 50 mg, Oral, Q6H PRN, Martha Isbell MD  •  lamoTRIgine (LaMICtal) tablet 100 mg, 100 mg, Oral, Nightly, Martha Isbell MD, 100 mg at 12/12/17 2135  •  loperamide (IMODIUM) capsule 2 mg, 2 mg, Oral, 4x Daily PRN, Martha Isbell MD  •  magnesium hydroxide (MILK OF MAGNESIA) suspension 2400 mg/10mL 10 mL, 10 mL, Oral, Daily PRN, Martha Isbell MD  •  melatonin tablet 6 mg, 6 mg, Oral, Nightly, Martha Isbell MD, 6 mg at 12/12/17 2135  •  metroNIDAZOLE (METROGEL) 0.75 % gel 0.75 application, 0.75 application, Topical, BID, Martha Isbell MD, 0.75 application at 12/13/17 0909  •  nicotine (NICODERM CQ) 21 MG/24HR patch 1 patch, 1 patch, Transdermal, Q24H, Martha Isbell MD  •  nystatin (MYCOSTATIN) powder, , Topical, 4x Daily, Martha Isbell MD  •  ondansetron (ZOFRAN) tablet 4 mg, 4 mg, Oral, Q6H PRN, Martha Isbell MD    Diagnoses/Assessment:   Principal Problem:    Generalized anxiety disorder  Active Problems:    Suicidal ideation    Mood disorder    Bereavement      Treatment Plan:    1) Will continue care for the patient on the behavioral health unit at [facility] to ensure patient safety.  2) Will continue to provide treatment with the unit milieu, activities, therapies and psychopharmacological management.  3) Patient to be placed on or continued on  Q15 minute checks  and Suicide precautions.  4) Will continue medical management by Dr. Nolasco.  5) Will order following labs: none  6) Will make the following medication changes: Will continue the melatonin and abilify.  Will increase the lamictal to  150mg qhs.  7) Will continue discharge planning as appropriate for patient.  8) Psychotherapy provided for less than 16 minutes.    Treatment plan and medication risks and benefits discussed with: Patient    Martha Isbell MD  12/13/17  10:52 AM

## 2017-12-13 NOTE — NURSING NOTE
"Behavior   Feeling anxious and Difficulty concentrating  depressed mood, fatigue, difficulty concentrating and anxiety  Pain 0  AVH no  S/I no  H/I no  Affect anxious    Patient has been out in day area interacting with peers.  Cooperative and polite, smiles frequently. Reports feeling very anxious.  States \"I have a problem with my mom, she lives right across the road and doesn't give me any privacy\".  Notes that she relies on her parents financially so she feels \"stuck\" and is afraid to confront her mom about their problems.  Also reports that she has been having problems with her ex .  \"He has had heart surgery and he expects our 16 year old son to come and take care of him\".  Patient got tearful as she was talking.  Vistaril offered, does not want at this time. No needs voiced at this time.  Will continue to monitor.     Intervention  Medications reviewed and administered  Assessment complete    Response  Verbalized understanding   Took medications  Interacted with assessment    Plan  Will promote and reinforce current treatment plan and encourage involvement in care plan goals.   Will provide for safe, calm, quiet environment.  Will promote open communication with staff and foster a trusting/working relationship with patient.   Will promote participation in groups and therapies and independent reflection.      "

## 2017-12-14 LAB
ARTICHOKE IGE QN: 67 MG/DL (ref 1–129)
CHOLEST SERPL-MCNC: 153 MG/DL (ref 0–199)
GLUCOSE P FAST SERPL-MCNC: 108 MG/DL (ref 60–110)
HDLC SERPL-MCNC: 51 MG/DL (ref 60–200)
LDLC/HDLC SERPL: 1.38 {RATIO} (ref 0–3.22)
TRIGL SERPL-MCNC: 157 MG/DL (ref 20–199)

## 2017-12-14 PROCEDURE — 82947 ASSAY GLUCOSE BLOOD QUANT: CPT | Performed by: PSYCHIATRY & NEUROLOGY

## 2017-12-14 PROCEDURE — 80061 LIPID PANEL: CPT | Performed by: PSYCHIATRY & NEUROLOGY

## 2017-12-14 PROCEDURE — 99232 SBSQ HOSP IP/OBS MODERATE 35: CPT | Performed by: PSYCHIATRY & NEUROLOGY

## 2017-12-14 RX ADMIN — FAMOTIDINE 20 MG: 20 TABLET ORAL at 08:02

## 2017-12-14 RX ADMIN — NYSTATIN: 100000 POWDER TOPICAL at 08:59

## 2017-12-14 RX ADMIN — Medication 3 TABLET: at 08:03

## 2017-12-14 RX ADMIN — Medication 200 MG: at 17:09

## 2017-12-14 RX ADMIN — Medication 200 MG: at 08:04

## 2017-12-14 RX ADMIN — Medication 200 MG: at 12:05

## 2017-12-14 RX ADMIN — METRONIDAZOLE 0.75 APPLICATION: 7.5 GEL TOPICAL at 20:37

## 2017-12-14 RX ADMIN — Medication 1 TABLET: at 08:04

## 2017-12-14 RX ADMIN — Medication 1000 MG: at 08:03

## 2017-12-14 RX ADMIN — METRONIDAZOLE 0.75 APPLICATION: 7.5 GEL TOPICAL at 08:59

## 2017-12-14 RX ADMIN — FAMOTIDINE 20 MG: 20 TABLET ORAL at 17:09

## 2017-12-14 RX ADMIN — LAMOTRIGINE 150 MG: 100 TABLET ORAL at 20:38

## 2017-12-14 RX ADMIN — MELATONIN 6 MG: 3 TAB ORAL at 20:37

## 2017-12-14 RX ADMIN — ARIPIPRAZOLE 10 MG: 10 TABLET ORAL at 20:38

## 2017-12-14 RX ADMIN — NYSTATIN: 100000 POWDER TOPICAL at 20:37

## 2017-12-14 NOTE — PLAN OF CARE
Problem: BH Patient Care Overview (Adult)  Goal: Individualization and Mutuality  Outcome: Ongoing (interventions implemented as appropriate)  Goal: Discharge Needs Assessment  Outcome: Ongoing (interventions implemented as appropriate)    Problem:  Overarching Goals  Goal: Adheres to Safety Considerations for Self and Others  Outcome: Ongoing (interventions implemented as appropriate)  Goal: Optimized Coping Skills in Response to Life Stressors  Outcome: Ongoing (interventions implemented as appropriate)  Goal: Develops/Participates in Therapeutic [place] to Support Successful Transition  Outcome: Ongoing (interventions implemented as appropriate)

## 2017-12-14 NOTE — PROGRESS NOTES
12/14/2017    Chief Complaint: suicidal ideation    Subjective:  Patient is a 52 y.o. female who was hospitalized for suicidal ideation.   Since yesterday the patient has been feeling better.  She notes she is sleeping better on the increased dose of lamictal.  She has not tolerance issues.  She has been thinking about her issues with communicating and dealing with her mom.    Objective     Vital Signs    Temp:  [97.7 °F (36.5 °C)-98.2 °F (36.8 °C)] 97.7 °F (36.5 °C)  Heart Rate:  [69-86] 86  Resp:  [18] 18  BP: (109-130)/(62-75) 130/62    Physical Exam:   General Appearance: alert, appears stated age and cooperative,  Hygiene:   good  Gait & Station: Normal  Musculoskeletal: No tremors or abnormal involuntary movements    Mental Status Exam:   Cooperation:  Cooperative  Eye Contact:  Good  Psychomotor Behavior:  Appropriate  Mood: Euthymic  Affect:  normal  Speech:  Normal  Thought Process:  Coherent  Associations: Goal Directed  Thought Content:     Normal   Suicidal:  None   Homicidal:  None   Hallucinations:  None   Delusion:  None  Cognitive Functioning:   Consciousness: awake, alert and oriented  Reliability:  good  Insight:  Good  Judgement:  Good  Impulse Control:  Good    Lab Results (last 24 hours)     Procedure Component Value Units Date/Time    Glucose, Fasting [[id number]]  (Normal) Collected:  12/14/17 0655    Specimen:  Blood Updated:  12/14/17 0809     Glucose, Fasting 108 mg/dL     Lipid Panel [[id number]]  (Abnormal) Collected:  12/14/17 0655    Specimen:  Blood Updated:  12/14/17 0820     Total Cholesterol 153 mg/dL      Triglycerides 157 mg/dL      HDL Cholesterol 51 (L) mg/dL      LDL Cholesterol  67 mg/dL      LDL/HDL Ratio 1.38        Imaging Results (last 24 hours)     ** No results found for the last 24 hours. **          Medicine:   Current Facility-Administered Medications:   •  ARIPiprazole (ABILIFY) tablet 10 mg, 10 mg, Oral, Nightly, Martha Isbell MD, 10 mg at 12/13/17 2975  •   ascorbic acid (VITAMIN C) tablet 1,000 mg, 1,000 mg, Oral, Daily, Martha Isbell MD, 1,000 mg at 12/14/17 0803  •  Calcium-Magnesium-Zinc tablet 3 tablet, 3 tablet, Oral, Daily, Martha Isbell MD, 3 tablet at 12/14/17 0803  •  CENTRUM SILVER ADULT 50+ tablet 1 tablet, 1 tablet, Oral, Daily, Martha Isbell MD, 1 tablet at 12/14/17 0804  •  CloNIDine (CATAPRES) tablet 0.1 mg, 0.1 mg, Oral, Q4H PRN, Martha Isbell MD  •  docusate sodium (COLACE) capsule 100 mg, 100 mg, Oral, BID PRN, Martha Isbell MD  •  famotidine (PEPCID) tablet 20 mg, 20 mg, Oral, BID, Martha Isbell MD, 20 mg at 12/14/17 0802  •  hydrOXYzine (VISTARIL) capsule 50 mg, 50 mg, Oral, Q6H PRN, Martha Isbell MD  •  ibuprofen (ADVIL,MOTRIN) tablet 200 mg, 200 mg, Oral, Q6H, Martha Isbell MD, 200 mg at 12/14/17 1205  •  lamoTRIgine (LaMICtal) tablet 150 mg, 150 mg, Oral, Nightly, Martha Isbell MD, 150 mg at 12/13/17 2109  •  loperamide (IMODIUM) capsule 2 mg, 2 mg, Oral, 4x Daily PRN, Martha Isbell MD  •  magnesium hydroxide (MILK OF MAGNESIA) suspension 2400 mg/10mL 10 mL, 10 mL, Oral, Daily PRN, Martha Isbell MD  •  melatonin tablet 6 mg, 6 mg, Oral, Nightly, Martha Isbell MD, 6 mg at 12/13/17 2108  •  metroNIDAZOLE (METROGEL) 0.75 % gel 0.75 application, 0.75 application, Topical, BID, Martha Isbell MD, 0.75 application at 12/14/17 0859  •  nystatin (MYCOSTATIN) powder, , Topical, 4x Daily, Martha Isbell MD  •  ondansetron (ZOFRAN) tablet 4 mg, 4 mg, Oral, Q6H PRN, Martha Isbell MD    Diagnoses/Assessment:   Principal Problem:    Generalized anxiety disorder  Active Problems:    Suicidal ideation    Mood disorder    Bereavement      Treatment Plan:    1) Will continue care for the patient on the behavioral health unit at [facility] to ensure patient safety.  2) Will continue to provide treatment with the unit milieu, activities, therapies and psychopharmacological  management.  3) Patient to be placed on or continued on  Q15 minute checks  and Suicide precautions.  4) Will continue medical management by Dr. Nolasco.  5) Will order following labs: none  6) Will make the following medication changes: Will continue the melatonin, abilify and lamictal.  7) Will continue discharge planning as appropriate for patient.  8) Psychotherapy provided for less than 16 minutes.    Treatment plan and medication risks and benefits discussed with: Patient    Martha Isbell MD  12/14/17  1:42 PM

## 2017-12-14 NOTE — NURSING NOTE
"Behavior   Feeling anxious    Pain 0  AVH no  S/I no  H/I no    Affect calm and pleasant and anxious    Note: Patient denies feeling sad. Reveals feeling anxious with statement of \" I am not as anxious this morning as I was last night, but I am nervous trying to become more assertive\". Nystatin applied under breast and at abd. Folds, skin clean & dry without redness. Patient stated \" I slept better last night, dreamed all night, but they were good dreams.      Intervention  Instructed in medication usage and effects  Medications administered as ordered  Encouraged to verbalize needs, Active listening, emotional support provided;empathic listening provided;questions answered;questions encouraged;reassurance provided;thoughts/feelings acknowledged      Response  Verbalized understanding   Did patient take medications as ordered yes   Did patient interact with assessment?  yes     Plan  Will monitor for safety  Will monitor every 15 minutes as ordered  Will evaluate and promote the plan of care  "

## 2017-12-14 NOTE — NURSING NOTE
Behavior   Feeling anxious  anxiety  Pain 0  AVH no  S/I no  H/I no    Affect anxious    Note:  Pt has been interacting with ts and staff. Pt was in visitation area watching tv and interacting with peers most of this shift. Pt has been to desk a few times to discuss medications and let the RN know that she did not want her nystatin at 2100 and that she did not want to be waken up for her ibuprofen at 0000. Pt has been sleeping well throughout the night. Since going to bed pt  Has not returned to desk.    Intervention  Instructed in medication usage and effects  Medications administered as ordered  Encouraged to verbalize needs      Response  Verbalized understanding   Did patient take medications as ordered no  Did patient interact with assessment?  yes     Plan  Will monitor for safety  Will monitor every 15 minutes as ordered  Will evaluate and promote the plan of care

## 2017-12-14 NOTE — PLAN OF CARE
Problem: Communication Impaired, Verbal (Adult,Obstetrics,Pediatric)  Goal: Improved/Effective Communication  Outcome: Ongoing (interventions implemented as appropriate)

## 2017-12-15 VITALS
HEART RATE: 78 BPM | TEMPERATURE: 97.9 F | OXYGEN SATURATION: 98 % | WEIGHT: 212 LBS | DIASTOLIC BLOOD PRESSURE: 69 MMHG | SYSTOLIC BLOOD PRESSURE: 104 MMHG | HEIGHT: 64 IN | RESPIRATION RATE: 18 BRPM | BODY MASS INDEX: 36.19 KG/M2

## 2017-12-15 PROCEDURE — 99238 HOSP IP/OBS DSCHRG MGMT 30/<: CPT | Performed by: PSYCHIATRY & NEUROLOGY

## 2017-12-15 RX ORDER — LAMOTRIGINE 150 MG/1
150 TABLET ORAL
Qty: 30 TABLET | Refills: 0 | Status: ON HOLD | OUTPATIENT
Start: 2017-12-15 | End: 2018-01-08

## 2017-12-15 RX ORDER — MELATONIN 200 MCG
6 TABLET ORAL NIGHTLY PRN
Start: 2017-12-15 | End: 2022-10-10

## 2017-12-15 RX ORDER — FAMOTIDINE 20 MG/1
20 TABLET, FILM COATED ORAL 2 TIMES DAILY
Qty: 60 TABLET | Refills: 0 | Status: SHIPPED | OUTPATIENT
Start: 2017-12-15 | End: 2018-08-22

## 2017-12-15 RX ADMIN — METRONIDAZOLE 0.75 APPLICATION: 7.5 GEL TOPICAL at 08:46

## 2017-12-15 RX ADMIN — Medication 3 TABLET: at 08:44

## 2017-12-15 RX ADMIN — Medication 1000 MG: at 08:44

## 2017-12-15 RX ADMIN — NYSTATIN: 100000 POWDER TOPICAL at 08:47

## 2017-12-15 RX ADMIN — FAMOTIDINE 20 MG: 20 TABLET ORAL at 08:43

## 2017-12-15 RX ADMIN — Medication 1 TABLET: at 08:44

## 2017-12-15 RX ADMIN — Medication 200 MG: at 08:44

## 2017-12-15 NOTE — NURSING NOTE
"Behavior   Interacting with peers. Good eye conntact  Pleasant and cooperative  Pain 0  AVH no  S/I no  H/I no    Affect calm and pleasant    Note:up in visitation room with peers. Good eye contact. Interacting well. \"I dreamed a lot last night about my grandparents most of the night.I was worried about increasing my medicine but I did ok.\"     Intervention  Instructed in medication usage and effects  Medications administered as ordered  Encouraged to verbalize needs.       Response  Verbalized understanding   Did patient take medications as ordered yes   Did patient interact with assessment?  yes     Plan  Will monitor for safety  Will monitor every 15 minutes as ordered  Will evaluate and promote the plan of care    "

## 2017-12-15 NOTE — PLAN OF CARE
Problem: BH Overarching Goals  Goal: Adheres to Safety Considerations for Self and Others  Outcome: Ongoing (interventions implemented as appropriate)  Goal: Optimized Coping Skills in Response to Life Stressors  Outcome: Ongoing (interventions implemented as appropriate)  Goal: Develops/Participates in Therapeutic [place] to Support Successful Transition  Outcome: Ongoing (interventions implemented as appropriate)    Problem: Depression  Goal: Treatment Goal: Demonstrate behavioral control of depressive symptoms, verbalize feelings of improved mood/affect, and adopt new coping skills prior to discharge  Outcome: Ongoing (interventions implemented as appropriate)  Goal: Verbalize thoughts and feelings associated with:  Outcome: Ongoing (interventions implemented as appropriate)  Goal: Refrain from harming self  Outcome: Ongoing (interventions implemented as appropriate)    Problem: Grieving (Adult)  Goal: Identify Related Risk Factors and Signs and Symptoms  Outcome: Ongoing (interventions implemented as appropriate)  Goal: Knowledge of Grief and Grieving  Outcome: Ongoing (interventions implemented as appropriate)    Problem: Communication Impaired, Verbal (Adult,Obstetrics,Pediatric)  Goal: Improved/Effective Communication  Outcome: Ongoing (interventions implemented as appropriate)

## 2017-12-15 NOTE — NURSING NOTE
Patient ambulated from [facility] for discharge home, pt stable, no s/s of distress noted. All discharge paperwork, follow up appointments and medications reviewed with the patient. Patient verbalized understanding. All personal belongings and medications from pharmacy sent with patient. Prescriptions sent to patient's pharmacy. Patient denies any questions or concerns.

## 2017-12-15 NOTE — PLAN OF CARE
Problem: BH Patient Care Overview (Adult)  Goal: Plan of Care Review  Outcome: Ongoing (interventions implemented as appropriate)    Problem: BH Overarching Goals  Goal: Adheres to Safety Considerations for Self and Others  Outcome: Ongoing (interventions implemented as appropriate)  Goal: Optimized Coping Skills in Response to Life Stressors  Outcome: Ongoing (interventions implemented as appropriate)  Goal: Develops/Participates in Therapeutic [place] to Support Successful Transition  Outcome: Ongoing (interventions implemented as appropriate)
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
## 2017-12-15 NOTE — NURSING NOTE
Behavior   Feeling worried  difficulty concentrating  Pain 0  AVH no  S/I no  H/I no    Affect calm and pleasant    Note: Patient has been up in hallway sitting at table with peers, pt pleasant, calm and interacting appropriately.       Intervention  Instructed in medication usage and effects  Medications administered as ordered  Encouraged to verbalize needs      Response  Verbalized understanding   Did patient take medications as ordered yes   Did patient interact with assessment?  yes     Plan  Will monitor for safety  Will monitor every 15 minutes as ordered  Will evaluate and promote the plan of care

## 2018-01-04 ENCOUNTER — HOSPITAL ENCOUNTER (INPATIENT)
Facility: HOSPITAL | Age: 53
LOS: 4 days | Discharge: HOME | End: 2018-01-08
Attending: PSYCHIATRY & NEUROLOGY | Admitting: PSYCHIATRY & NEUROLOGY

## 2018-01-04 ENCOUNTER — HOSPITAL ENCOUNTER (EMERGENCY)
Facility: HOSPITAL | Age: 53
Discharge: PSYCHIATRIC OR SUBSTANCE USE FACILITY | End: 2018-01-04
Attending: EMERGENCY MEDICINE

## 2018-01-04 ENCOUNTER — TRANSCRIBE ORDERS (OUTPATIENT)
Dept: ADMINISTRATIVE | Facility: HOSPITAL | Age: 53
End: 2018-01-04

## 2018-01-04 VITALS
WEIGHT: 216 LBS | RESPIRATION RATE: 18 BRPM | HEART RATE: 80 BPM | BODY MASS INDEX: 35.99 KG/M2 | SYSTOLIC BLOOD PRESSURE: 111 MMHG | OXYGEN SATURATION: 98 % | HEIGHT: 65 IN | TEMPERATURE: 97.7 F | DIASTOLIC BLOOD PRESSURE: 68 MMHG

## 2018-01-04 DIAGNOSIS — R45.851 SUICIDAL IDEATION: Primary | ICD-10-CM

## 2018-01-04 DIAGNOSIS — E66.9 OBESITY, UNSPECIFIED CLASSIFICATION, UNSPECIFIED OBESITY TYPE, UNSPECIFIED WHETHER SERIOUS COMORBIDITY PRESENT: Primary | ICD-10-CM

## 2018-01-04 LAB
ALBUMIN SERPL-MCNC: 4.9 G/DL (ref 3.4–4.8)
ALBUMIN/GLOB SERPL: 1.4 G/DL (ref 1.1–1.8)
ALP SERPL-CCNC: 94 U/L (ref 38–126)
ALT SERPL W P-5'-P-CCNC: 45 U/L (ref 9–52)
AMPHET+METHAMPHET UR QL: NEGATIVE
ANION GAP SERPL CALCULATED.3IONS-SCNC: 11 MMOL/L (ref 5–15)
APAP SERPL-MCNC: <10 MCG/ML (ref 10–30)
AST SERPL-CCNC: 25 U/L (ref 14–36)
B-HCG UR QL: NEGATIVE
BARBITURATES UR QL SCN: NEGATIVE
BASOPHILS # BLD AUTO: 0.02 10*3/MM3 (ref 0–0.2)
BASOPHILS NFR BLD AUTO: 0.2 % (ref 0–2)
BENZODIAZ UR QL SCN: NEGATIVE
BILIRUB SERPL-MCNC: 0.6 MG/DL (ref 0.2–1.3)
BILIRUB UR QL STRIP: NEGATIVE
BUN BLD-MCNC: 16 MG/DL (ref 7–21)
BUN/CREAT SERPL: 22.9 (ref 7–25)
CALCIUM SPEC-SCNC: 10.5 MG/DL (ref 8.4–10.2)
CANNABINOIDS SERPL QL: NEGATIVE
CHLORIDE SERPL-SCNC: 103 MMOL/L (ref 95–110)
CLARITY UR: CLEAR
CO2 SERPL-SCNC: 24 MMOL/L (ref 22–31)
COCAINE UR QL: NEGATIVE
COLOR UR: YELLOW
CREAT BLD-MCNC: 0.7 MG/DL (ref 0.5–1)
DEPRECATED RDW RBC AUTO: 45.8 FL (ref 36.4–46.3)
EOSINOPHIL # BLD AUTO: 0.09 10*3/MM3 (ref 0–0.7)
EOSINOPHIL NFR BLD AUTO: 0.8 % (ref 0–7)
ERYTHROCYTE [DISTWIDTH] IN BLOOD BY AUTOMATED COUNT: 13.6 % (ref 11.5–14.5)
ETHANOL BLD-MCNC: <10 MG/DL (ref 0–10)
ETHANOL UR QL: <0.01 %
GFR SERPL CREATININE-BSD FRML MDRD: 88 ML/MIN/1.73 (ref 51–120)
GLOBULIN UR ELPH-MCNC: 3.4 GM/DL (ref 2.3–3.5)
GLUCOSE BLD-MCNC: 87 MG/DL (ref 60–100)
GLUCOSE UR STRIP-MCNC: NEGATIVE MG/DL
HCT VFR BLD AUTO: 44.9 % (ref 35–45)
HGB BLD-MCNC: 14.9 G/DL (ref 12–15.5)
HGB UR QL STRIP.AUTO: NEGATIVE
IMM GRANULOCYTES # BLD: 0.04 10*3/MM3 (ref 0–0.02)
IMM GRANULOCYTES NFR BLD: 0.4 % (ref 0–0.5)
KETONES UR QL STRIP: NEGATIVE
LEUKOCYTE ESTERASE UR QL STRIP.AUTO: NEGATIVE
LYMPHOCYTES # BLD AUTO: 2.16 10*3/MM3 (ref 0.6–4.2)
LYMPHOCYTES NFR BLD AUTO: 19.3 % (ref 10–50)
MCH RBC QN AUTO: 30.6 PG (ref 26.5–34)
MCHC RBC AUTO-ENTMCNC: 33.2 G/DL (ref 31.4–36)
MCV RBC AUTO: 92.2 FL (ref 80–98)
METHADONE UR QL SCN: NEGATIVE
MONOCYTES # BLD AUTO: 0.77 10*3/MM3 (ref 0–0.9)
MONOCYTES NFR BLD AUTO: 6.9 % (ref 0–12)
NEUTROPHILS # BLD AUTO: 8.1 10*3/MM3 (ref 2–8.6)
NEUTROPHILS NFR BLD AUTO: 72.4 % (ref 37–80)
NITRITE UR QL STRIP: NEGATIVE
OPIATES UR QL: NEGATIVE
OXYCODONE UR QL SCN: NEGATIVE
PH UR STRIP.AUTO: 7 [PH] (ref 5–9)
PLATELET # BLD AUTO: 309 10*3/MM3 (ref 150–450)
PMV BLD AUTO: 10 FL (ref 8–12)
POTASSIUM BLD-SCNC: 3.9 MMOL/L (ref 3.5–5.1)
PROT SERPL-MCNC: 8.3 G/DL (ref 6.3–8.6)
PROT UR QL STRIP: NEGATIVE
RBC # BLD AUTO: 4.87 10*6/MM3 (ref 3.77–5.16)
SALICYLATES SERPL-MCNC: <1 MG/DL (ref 10–20)
SODIUM BLD-SCNC: 138 MMOL/L (ref 137–145)
SP GR UR STRIP: 1.01 (ref 1–1.03)
TSH SERPL DL<=0.05 MIU/L-ACNC: 0.79 MIU/ML (ref 0.46–4.68)
UROBILINOGEN UR QL STRIP: NORMAL
WBC NRBC COR # BLD: 11.18 10*3/MM3 (ref 3.2–9.8)
WHOLE BLOOD HOLD SPECIMEN: NORMAL

## 2018-01-04 PROCEDURE — 81025 URINE PREGNANCY TEST: CPT | Performed by: PHYSICIAN ASSISTANT

## 2018-01-04 PROCEDURE — 80307 DRUG TEST PRSMV CHEM ANLYZR: CPT | Performed by: PHYSICIAN ASSISTANT

## 2018-01-04 PROCEDURE — 81003 URINALYSIS AUTO W/O SCOPE: CPT | Performed by: PHYSICIAN ASSISTANT

## 2018-01-04 PROCEDURE — 93005 ELECTROCARDIOGRAM TRACING: CPT | Performed by: PSYCHIATRY & NEUROLOGY

## 2018-01-04 PROCEDURE — 93010 ELECTROCARDIOGRAM REPORT: CPT | Performed by: INTERNAL MEDICINE

## 2018-01-04 PROCEDURE — 80050 GENERAL HEALTH PANEL: CPT | Performed by: PHYSICIAN ASSISTANT

## 2018-01-04 RX ORDER — HYDROXYZINE PAMOATE 50 MG/1
50 CAPSULE ORAL EVERY 6 HOURS PRN
Status: DISCONTINUED | OUTPATIENT
Start: 2018-01-04 | End: 2018-01-08 | Stop reason: HOSPADM

## 2018-01-04 RX ORDER — ALUMINA, MAGNESIA, AND SIMETHICONE 2400; 2400; 240 MG/30ML; MG/30ML; MG/30ML
15 SUSPENSION ORAL EVERY 6 HOURS PRN
Status: DISCONTINUED | OUTPATIENT
Start: 2018-01-04 | End: 2018-01-08 | Stop reason: HOSPADM

## 2018-01-04 RX ORDER — ARIPIPRAZOLE 10 MG/1
10 TABLET ORAL NIGHTLY
Status: DISCONTINUED | OUTPATIENT
Start: 2018-01-04 | End: 2018-01-08 | Stop reason: HOSPADM

## 2018-01-04 RX ORDER — LOPERAMIDE HYDROCHLORIDE 2 MG/1
2 CAPSULE ORAL 4 TIMES DAILY PRN
Status: DISCONTINUED | OUTPATIENT
Start: 2018-01-04 | End: 2018-01-08 | Stop reason: HOSPADM

## 2018-01-04 RX ADMIN — ARIPIPRAZOLE 10 MG: 10 TABLET ORAL at 20:21

## 2018-01-04 RX ADMIN — LAMOTRIGINE 150 MG: 100 TABLET ORAL at 20:21

## 2018-01-04 NOTE — PLAN OF CARE
Problem: Anxiety (Adult)  Goal: Identify Related Risk Factors and Signs and Symptoms  Outcome: Ongoing (interventions implemented as appropriate)    Goal: Reduction/Resolution  Outcome: Ongoing (interventions implemented as appropriate)      Problem: Depression  Goal: Treatment Goal: Demonstrate behavioral control of depressive symptoms, verbalize feelings of improved mood/affect, and adopt new coping skills prior to discharge  Outcome: Ongoing (interventions implemented as appropriate)    Goal: Verbalize thoughts and feelings associated with:  Outcome: Ongoing (interventions implemented as appropriate)    Goal: Refrain from harming self  Outcome: Ongoing (interventions implemented as appropriate)    Goal: Refrain from isolation  Outcome: Ongoing (interventions implemented as appropriate)    Goal: Attend and participate in unit activities, including therapeutic, recreational, and educational groups  Outcome: Ongoing (interventions implemented as appropriate)

## 2018-01-04 NOTE — ED PROVIDER NOTES
Subjective   Patient is a 52 y.o. female presenting with mental health disorder.   History provided by:  Patient   used: No    Mental Health Problem   Presenting symptoms: depression, suicidal thoughts and suicidal threats    Presenting symptoms: no aggressive behavior, no agitation, no bizarre behavior, no delusions, no disorganized speech, no disorganized thought process, no hallucinations, no homicidal ideas, no paranoid behavior, no self-mutilation and no suicide attempt    Degree of incapacity (severity):  Moderate  Onset quality:  Gradual  Duration:  2 days  Timing:  Constant  Progression:  Worsening  Chronicity:  New  Context: medication    Context: not alcohol use, not drug abuse, not noncompliant, not recent medication change and not stressful life event    Treatment compliance:  All of the time  Time since last psychoactive medication taken:  3 hours  Relieved by:  Nothing  Worsened by:  Nothing  Ineffective treatments:  Antidepressants and mood stabilizers  Associated symptoms: feelings of worthlessness, irritability and poor judgment    Associated symptoms: no abdominal pain, no anhedonia, no anxiety, no appetite change, no chest pain, no decreased need for sleep, not distractible, no euphoric mood, no fatigue, no headaches, no hypersomnia, no hyperventilation, no insomnia, no school problems and no weight change    Risk factors: hx of mental illness    Risk factors: no family hx of mental illness, no family violence, no hx of suicide attempts, no neurological disease and no recent psychiatric admission        Review of Systems   Constitutional: Positive for irritability. Negative for activity change, appetite change, chills, diaphoresis, fatigue, fever and unexpected weight change.   HENT: Negative.    Eyes: Negative.    Respiratory: Negative.    Cardiovascular: Negative.  Negative for chest pain.   Gastrointestinal: Negative.  Negative for abdominal pain.   Endocrine: Negative.     Genitourinary: Negative.    Musculoskeletal: Negative.    Skin: Negative.    Allergic/Immunologic: Negative.    Neurological: Negative.  Negative for headaches.   Hematological: Negative.    Psychiatric/Behavioral: Positive for suicidal ideas. Negative for agitation, behavioral problems, confusion, decreased concentration, dysphoric mood, hallucinations, homicidal ideas, paranoia, self-injury and sleep disturbance. The patient is not nervous/anxious, does not have insomnia and is not hyperactive.        Past Medical History:   Diagnosis Date   • Abnormal vaginal bleeding    • Anxiety    • Bipolar disorder    • Depression    • Diabetes mellitus     Prediabetic   • GERD (gastroesophageal reflux disease)    • Hypoglycemia    • Hypoglycemic reaction    • PONV (postoperative nausea and vomiting)    • Pre-diabetes    • Prediabetes    • Psychiatric illness    • PTSD (post-traumatic stress disorder)    • Suicide attempt     pt. has had two previous attempts       Allergies   Allergen Reactions   • Bc Powder [Aspirin-Salicylamide-Caffeine] Swelling     LIPS SWELL   • Zoloft [Sertraline Hcl] Other (See Comments)     SUICIDAL   • Adderall [Amphetamine-Dextroamphetamine] Other (See Comments)     UNKNOWN    • Amphetamines Other (See Comments)     UNKNOWN   • Aspirin Other (See Comments)     UNKNOWN   • Benztropine Other (See Comments)     UNKNOWN   • Bupropion Other (See Comments)     Makes patient manic   • Caffeine Other (See Comments)     STATES CAN TAKE IN THE MORNING BUT IF TAKEN IN THE AFTERNOON SHE CANNOT SLEEP   • Depakote [Divalproex Sodium] Other (See Comments)     UNKNOWN   • Effexor [Venlafaxine] Other (See Comments)     UNKNOWN   • Estradiol Other (See Comments)     UNKNOWN   • Geodon [Ziprasidone Hcl] Other (See Comments)     UNKNOWN   • Lithium Other (See Comments)     UNKNOWN   • Lortab [Hydrocodone-Acetaminophen] Other (See Comments)     UNKNOWN     • Metoprolol Other (See Comments)     UNKNOWN   • Neurontin  [Gabapentin] Other (See Comments)     UNKNOWN   • Penicillins Other (See Comments)     UNKNOWN   • Provera [Medroxyprogesterone Acetate] Other (See Comments)     UNKNOWN   • Prozac [Fluoxetine Hcl] Rash   • Tetracyclines & Related Other (See Comments)     UNKNOWN     • Trazodone And Nefazodone Other (See Comments)     UNKNOWN   • Trileptal [Oxcarbazepine] Other (See Comments)     UNKNOWN   • Valium [Diazepam] Other (See Comments)     UNKNOWN   • Vancomycin Other (See Comments)     ALLERGIC TO ALL MYCIN ANTIBIOTICS - UNKNOWN REACTION   • Zyprexa [Olanzapine] Other (See Comments)     UNKNOWN       Past Surgical History:   Procedure Laterality Date   • CERVICAL BIOPSY  W/ LOOP ELECTRODE EXCISION     • D&C HYSTEROSCOPY N/A 5/30/2017    Procedure: DILATATION AND CURETTAGE HYSTEROSCOPY;  Surgeon: Citlali Arita MD;  Location: [facility] OR;  Service:    • OTHER SURGICAL HISTORY      cyst removed from foot as a child       Family History   Problem Relation Age of Onset   • Hypertension Father    • Coronary artery disease Paternal Grandfather    • Coronary artery disease Paternal Grandmother    • Diabetes Paternal Grandmother    • Kidney cancer Maternal Grandfather        Social History     Social History   • Marital status:      Spouse name: N/A   • Number of children: N/A   • Years of education: N/A     Social History Main Topics   • Smoking status: Never Smoker   • Smokeless tobacco: Never Used   • Alcohol use No   • Drug use: No   • Sexual activity: No      Comment: last act intercourse 7 years     Other Topics Concern   • None     Social History Narrative    Substance Abuse: denies any A&D issues        Marriages: 4    Current Relationships:     Children: 3        Education: technical college     Occupation: on disability    Living Situation: alone and 16yr old son           Objective   Physical Exam   Constitutional: She is oriented to person, place, and time. She appears well-developed and  well-nourished. No distress.   HENT:   Head: Normocephalic and atraumatic.   Eyes: Conjunctivae and EOM are normal. Pupils are equal, round, and reactive to light. Right eye exhibits no discharge. Left eye exhibits no discharge. No scleral icterus.   Neck: Normal range of motion. Neck supple. No JVD present. No tracheal deviation present. No thyromegaly present.   Cardiovascular: Normal rate, regular rhythm, normal heart sounds and intact distal pulses.  Exam reveals no gallop and no friction rub.    No murmur heard.  Pulmonary/Chest: Effort normal and breath sounds normal. No stridor. No respiratory distress. She has no wheezes. She has no rales. She exhibits no tenderness.   Abdominal: Soft. Bowel sounds are normal. She exhibits no distension and no mass. There is no tenderness. There is no rebound and no guarding. No hernia.   Musculoskeletal: Normal range of motion. She exhibits no edema, tenderness or deformity.   Lymphadenopathy:     She has no cervical adenopathy.   Neurological: She is alert and oriented to person, place, and time. She has normal reflexes.   Skin: Skin is warm and dry. No rash noted. She is not diaphoretic. No erythema. No pallor.   Psychiatric: Her speech is normal. Judgment normal. Her mood appears not anxious. Her affect is not angry, not blunt, not labile and not inappropriate. She is agitated. She is not aggressive, not hyperactive, not slowed, not withdrawn, not actively hallucinating and not combative. Thought content is not paranoid and not delusional. Cognition and memory are normal. She exhibits a depressed mood. She expresses suicidal ideation. She expresses no homicidal ideation. She expresses suicidal plans. She expresses no homicidal plans. She is attentive.   Nursing note and vitals reviewed.      Procedures         ED Course  ED Course      Labs Reviewed   COMPREHENSIVE METABOLIC PANEL - Abnormal; Notable for the following:        Result Value    Calcium 10.5 (*)     Albumin  4.90 (*)     All other components within normal limits   ACETAMINOPHEN LEVEL - Abnormal; Notable for the following:     Acetaminophen <10.0 (*)     All other components within normal limits   SALICYLATE LEVEL - Abnormal; Notable for the following:     Salicylate <1.0 (*)     All other components within normal limits   CBC WITH AUTO DIFFERENTIAL - Abnormal; Notable for the following:     WBC 11.18 (*)     Immature Grans, Absolute 0.04 (*)     All other components within normal limits   URINALYSIS W/ CULTURE IF INDICATED - Normal    Narrative:     Urine microscopic not indicated.   PREGNANCY, URINE - Normal   URINE DRUG SCREEN - Normal    Narrative:     Negative Thresholds For Drugs Screened in Urine:     Amphetamines          500 ng/ml  Barbiturates          200 ng/ml  Benzodiazepines       200 ng/ml  Cocaine               150 ng/ml  Methadone             300 ng/mL  Opiates               300 ng/mL  Oxycodone             100 ng/mL  THC                   20 ng/mL    The normal value for all drugs tested is negative. This report includes final unconfirmed screening results.  A positive result by this assay can be, at your request, sent to the Reference Lab for confirmation by gas chromatography. Unconfirmed results must not be used for non-medical purposes, such as employment or legal testing. Clinical consideration should be applied to any drug of abuse test result, particularly when unconfirmed results are used.   TSH - Normal   ETHANOL   CBC AND DIFFERENTIAL    Narrative:     The following orders were created for panel order CBC & Differential.  Procedure                               Abnormality         Status                     ---------                               -----------         ------                     CBC Auto Differential[[id number]]        Abnormal            Final result                 Please view results for these tests on the individual orders.   EXTRA TUBES    Narrative:     The following orders  were created for panel order Extra Tubes.  Procedure                               Abnormality         Status                     ---------                               -----------         ------                     Light Blue Top[[id number]]                                   Final result                 Please view results for these tests on the individual orders.   LIGHT BLUE TOP       Patient will be discharge to behavioral health for admission.             MDM  Number of Diagnoses or Management Options      Final diagnoses:   Suicidal ideation            KALE Hoffman  01/04/18 1522

## 2018-01-04 NOTE — ED TRIAGE NOTES
Pt presents to the ED with SI. Pt reports that she has been having thoughts of overdosing on ibuprofen to end her life. Pt states that the last few days she has also had thoughts of taking her son's knives and cutting herself to end her life.

## 2018-01-04 NOTE — NURSING NOTE
Pt arrived to unit via wheelchair with security and ER staff. Pt reports she is here because she wanted to hurt herself this morning. She is alert, oriented x3 verbal and ambulatory. Denies suicidal thoughts at this time.

## 2018-01-04 NOTE — NURSING NOTE
Dr Nolasco ROS         General  Good general health lately    Eyes   None     ENT/Mouth   None    Cardio   None    Resp   None    GI    None       None    MS    None    Skin/Hair/Nails   None    Neuro   None

## 2018-01-05 PROBLEM — F33.3 MAJOR DEPRESSIVE DISORDER, RECURRENT, SEVERE WITH PSYCHOTIC FEATURES: Status: ACTIVE | Noted: 2017-12-12

## 2018-01-05 PROBLEM — R45.851 SUICIDAL IDEATION: Status: RESOLVED | Noted: 2017-12-11 | Resolved: 2018-01-05

## 2018-01-05 LAB
ARTICHOKE IGE QN: 76 MG/DL (ref 1–129)
CHOLEST SERPL-MCNC: 160 MG/DL (ref 0–199)
GLUCOSE P FAST SERPL-MCNC: 143 MG/DL (ref 60–110)
HDLC SERPL-MCNC: 54 MG/DL (ref 60–200)
LDLC/HDLC SERPL: 1.47 {RATIO} (ref 0–3.22)
TRIGL SERPL-MCNC: 133 MG/DL (ref 20–199)

## 2018-01-05 PROCEDURE — 80061 LIPID PANEL: CPT | Performed by: PSYCHIATRY & NEUROLOGY

## 2018-01-05 PROCEDURE — 90791 PSYCH DIAGNOSTIC EVALUATION: CPT | Performed by: PSYCHIATRY & NEUROLOGY

## 2018-01-05 PROCEDURE — 82947 ASSAY GLUCOSE BLOOD QUANT: CPT | Performed by: PSYCHIATRY & NEUROLOGY

## 2018-01-05 PROCEDURE — 99232 SBSQ HOSP IP/OBS MODERATE 35: CPT | Performed by: FAMILY MEDICINE

## 2018-01-05 RX ORDER — NYSTATIN 100000 [USP'U]/G
POWDER TOPICAL 4 TIMES DAILY
Status: DISCONTINUED | OUTPATIENT
Start: 2018-01-05 | End: 2018-01-08 | Stop reason: HOSPADM

## 2018-01-05 RX ORDER — NYSTATIN 100000 [USP'U]/G
POWDER TOPICAL EVERY 8 HOURS SCHEDULED
Status: DISCONTINUED | OUTPATIENT
Start: 2018-01-05 | End: 2018-01-05

## 2018-01-05 RX ORDER — FAMOTIDINE 20 MG/1
20 TABLET, FILM COATED ORAL 2 TIMES DAILY
Status: DISCONTINUED | OUTPATIENT
Start: 2018-01-05 | End: 2018-01-08 | Stop reason: HOSPADM

## 2018-01-05 RX ORDER — LAMOTRIGINE 100 MG/1
100 TABLET ORAL EVERY 12 HOURS SCHEDULED
Status: DISCONTINUED | OUTPATIENT
Start: 2018-01-05 | End: 2018-01-08 | Stop reason: HOSPADM

## 2018-01-05 RX ORDER — LANOLIN ALCOHOL/MO/W.PET/CERES
6 CREAM (GRAM) TOPICAL NIGHTLY
Status: DISCONTINUED | OUTPATIENT
Start: 2018-01-05 | End: 2018-01-08 | Stop reason: HOSPADM

## 2018-01-05 RX ORDER — IBUPROFEN 200 MG
200 TABLET ORAL EVERY 8 HOURS PRN
Status: DISCONTINUED | OUTPATIENT
Start: 2018-01-05 | End: 2018-01-08 | Stop reason: HOSPADM

## 2018-01-05 RX ORDER — LAMOTRIGINE 100 MG/1
100 TABLET ORAL NIGHTLY
Status: DISCONTINUED | OUTPATIENT
Start: 2018-01-05 | End: 2018-01-05

## 2018-01-05 RX ADMIN — LAMOTRIGINE 100 MG: 100 TABLET ORAL at 21:09

## 2018-01-05 RX ADMIN — LAMOTRIGINE 100 MG: 100 TABLET ORAL at 13:41

## 2018-01-05 RX ADMIN — ARIPIPRAZOLE 10 MG: 10 TABLET ORAL at 21:09

## 2018-01-05 RX ADMIN — IBUPROFEN 200 MG: 200 TABLET, FILM COATED ORAL at 13:43

## 2018-01-05 RX ADMIN — MELATONIN TAB 3 MG 6 MG: 3 TAB at 21:09

## 2018-01-05 RX ADMIN — FAMOTIDINE 20 MG: 20 TABLET, FILM COATED ORAL at 13:38

## 2018-01-05 RX ADMIN — NYSTATIN: 100000 POWDER TOPICAL at 21:10

## 2018-01-05 RX ADMIN — NYSTATIN: 100000 POWDER TOPICAL at 10:59

## 2018-01-05 RX ADMIN — FAMOTIDINE 20 MG: 20 TABLET, FILM COATED ORAL at 21:09
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
## 2018-01-05 NOTE — PLAN OF CARE
Problem: BH Patient Care Overview (Adult)  Goal: Plan of Care Review  Outcome: Ongoing (interventions implemented as appropriate)    Goal: Interdisciplinary Rounds/Family Conference  Outcome: Ongoing (interventions implemented as appropriate)    Goal: Individualization and Mutuality  Outcome: Ongoing (interventions implemented as appropriate)    Goal: Discharge Needs Assessment  Outcome: Ongoing (interventions implemented as appropriate)      Problem: BH Overarching Goals  Goal: Adheres to Safety Considerations for Self and Others  Outcome: Ongoing (interventions implemented as appropriate)    Goal: Optimized Coping Skills in Response to Life Stressors  Outcome: Ongoing (interventions implemented as appropriate)    Goal: Develops/Participates in Therapeutic [place] to Support Successful Transition  Outcome: Ongoing (interventions implemented as appropriate)      Problem: Anxiety (Adult)  Goal: Identify Related Risk Factors and Signs and Symptoms  Outcome: Ongoing (interventions implemented as appropriate)    Goal: Reduction/Resolution  Outcome: Ongoing (interventions implemented as appropriate)      Problem: Depression  Goal: Treatment Goal: Demonstrate behavioral control of depressive symptoms, verbalize feelings of improved mood/affect, and adopt new coping skills prior to discharge  Outcome: Ongoing (interventions implemented as appropriate)  Patient able to verbalize positive statements about life and is insightful of depression and effective coping skills  Goal: Verbalize thoughts and feelings associated with:  Outcome: Ongoing (interventions implemented as appropriate)  Staff to be available as needed for patient to verbalize thoughts, time spent 1:1 with patient this am  Goal: Refrain from harming self  Outcome: Ongoing (interventions implemented as appropriate)  Patient encouraged to verbalize to staff when having thoughts of self harm  Goal: Refrain from isolation  Outcome: Ongoing (interventions  implemented as appropriate)  Patient encouraged to spend time out of her room and increase interaction with staff and peers.   Goal: Attend and participate in unit activities, including therapeutic, recreational, and educational groups  Outcome: Ongoing (interventions implemented as appropriate)  Reviewed group times and encouraged patient to attend

## 2018-01-05 NOTE — CONSULTS
CHIEF COMPLAINT/REASON FOR VISIT:  Suicidal Ideation    HPI:  Patient presented to our ED with the above complaint on January 5 about 7 AM saying that she has had return of serious suicidal thoughts over the last 2 days.  She saw her PCP nurse practitioner recently and somehow this made her really think of her need for help and she presented to our emergency room.  Patient was admitted to this unit almost a month ago with similar symptoms.    PROBLEM LIST:  Patient Active Problem List    Diagnosis   • Suicide ideation [R45.851]   • Generalized anxiety disorder [F41.1]   • Mood disorder [F39]   • Bereavement [Z63.4]   • Suicidal ideation [R45.851]   • PMB (postmenopausal bleeding) [N95.0]         CURRENT MEDICATIONS:  Prescriptions Prior to Admission   Medication Sig Dispense Refill Last Dose   • ARIPiprazole (ABILIFY) 10 MG tablet Take 10 mg by mouth Every Night.   1/3/2018 at Unknown time   • famotidine (PEPCID) 20 MG tablet Take 1 tablet by mouth 2 (Two) Times a Day. 60 tablet 0 1/4/2018 at Unknown time   • ibuprofen (ADVIL,MOTRIN) 200 MG tablet Take 200 mg by mouth Every 8 (Eight) Hours As Needed for Mild Pain (1-3).   1/4/2018 at Unknown time   • lamoTRIgine (LaMICtal) 150 MG tablet Take 1 tablet by mouth every night at bedtime. 30 tablet 0 1/3/2018 at Unknown time   • Melatonin 3 MG tablet Take 2 tablets by mouth At Night As Needed for Sleep.   1/3/2018 at Unknown time   • Multiple Minerals-Vitamins (CALCIUM-MAGNESIUM-ZINC-D3 PO) Take 3 tablets by mouth Daily.   1/4/2018 at Unknown time   • Multiple Vitamins-Minerals (CENTRUM SILVER PO) Take 1 tablet by mouth Daily.   1/4/2018 at Unknown time   • nystatin (MYCOSTATIN) 090025 UNIT/GM powder Apply  topically 4 (Four) Times a Day. USES UNDER BREAST   1/3/2018 at Unknown time       ALLERGIES:  Bc powder [aspirin-salicylamide-caffeine]; Zoloft [sertraline hcl]; Adderall [amphetamine-dextroamphetamine]; Amphetamines; Aspirin; Benztropine; Bupropion; Caffeine;  Depakote [divalproex sodium]; Effexor [venlafaxine]; Estradiol; Geodon [ziprasidone hcl]; Lithium; Lortab [hydrocodone-acetaminophen]; Metoprolol; Neurontin [gabapentin]; Penicillins; Provera [medroxyprogesterone acetate]; Prozac [fluoxetine hcl]; Tetracyclines & related; Trazodone and nefazodone; Trileptal [oxcarbazepine]; Valium [diazepam]; Vancomycin; and Zyprexa [olanzapine]      PAST MEDICAL/SURGICAL HISTORY:  Past Medical History:   Diagnosis Date   • Abnormal vaginal bleeding    • Anxiety    • Bipolar disorder    • Depression    • Diabetes mellitus     Prediabetic   • GERD (gastroesophageal reflux disease)    • Hypoglycemia    • Hypoglycemic reaction    • PONV (postoperative nausea and vomiting)    • Pre-diabetes    • Prediabetes    • Psychiatric illness    • PTSD (post-traumatic stress disorder)    • Suicide attempt     pt. has had two previous attempts       Past Surgical History:   Procedure Laterality Date   • CERVICAL BIOPSY  W/ LOOP ELECTRODE EXCISION     • D&C HYSTEROSCOPY N/A 5/30/2017    Procedure: DILATATION AND CURETTAGE HYSTEROSCOPY;  Surgeon: Citlali Arita MD;  Location: [facility] OR;  Service:    • OTHER SURGICAL HISTORY      cyst removed from foot as a child       Review of Systems   Constitutional: Negative for activity change, appetite change, fatigue and fever.   HENT: Negative for congestion, ear discharge, ear pain, facial swelling, hearing loss, nosebleeds, postnasal drip, rhinorrhea, sinus pressure, sore throat, tinnitus and trouble swallowing.    Eyes: Negative for pain, discharge and visual disturbance.   Respiratory: Negative for cough, shortness of breath and wheezing.    Cardiovascular: Negative for chest pain, palpitations and leg swelling.   Gastrointestinal: Negative for abdominal pain, blood in stool, constipation, diarrhea, nausea and vomiting.   Genitourinary: Negative for difficulty urinating, dyspareunia, dysuria, flank pain, frequency, hematuria, menstrual problem, pelvic  "pain, urgency, vaginal bleeding and vaginal discharge.   Musculoskeletal: Negative for arthralgias, back pain, joint swelling, myalgias and neck pain.   Skin: Positive for rash. Negative for wound.   Neurological: Negative for dizziness, seizures, syncope, weakness, light-headedness and headaches.   Hematological: Negative for adenopathy.       Social History     Social History   • Marital status:      Spouse name: N/A   • Number of children: N/A   • Years of education: N/A     Occupational History   • Not on file.     Social History Main Topics   • Smoking status: Never Smoker   • Smokeless tobacco: Never Used   • Alcohol use No   • Drug use: No   • Sexual activity: No      Comment: last act intercourse 7 years     Other Topics Concern   • Not on file     Social History Narrative    Substance Abuse: denies any A&D issues        Marriages: 4    Current Relationships:     Children: 3        Education: technical college     Occupation: on disability    Living Situation: alone and 16yr old son       Family History   Problem Relation Age of Onset   • Hypertension Father    • Coronary artery disease Paternal Grandfather    • Coronary artery disease Paternal Grandmother    • Diabetes Paternal Grandmother    • Kidney cancer Maternal Grandfather              Objective     /58 (BP Location: Right arm, Patient Position: Lying)  Pulse 71  Temp 98.3 °F (36.8 °C) (Tympanic)   Resp 18  Ht 162.6 cm (64\")  Wt 95.8 kg (211 lb 4.8 oz)  SpO2 96%  BMI 36.27 kg/m2    Physical Exam   Constitutional: She appears well-developed and well-nourished.   HENT:   Head: Normocephalic and atraumatic.   Eyes: Conjunctivae and EOM are normal.   Neck: Normal range of motion. Neck supple. No thyromegaly present.   Cardiovascular: Normal rate, regular rhythm and normal heart sounds.  Exam reveals no gallop and no friction rub.    No murmur heard.  Pulmonary/Chest: Effort normal and breath sounds normal. No respiratory " distress. She has no wheezes. She has no rales.   Abdominal: Soft. She exhibits no distension and no mass. There is no tenderness. There is no rebound and no guarding.   Musculoskeletal: Normal range of motion.   Lymphadenopathy:     She has no cervical adenopathy.   Neurological: She is alert. She has normal strength and normal reflexes. She displays tremor. She displays normal reflexes. No cranial nerve deficit or sensory deficit. She exhibits normal muscle tone. Coordination normal. She displays no Babinski's sign on the right side. She displays no Babinski's sign on the left side.   Reflex Scores:       Tricep reflexes are 2+ on the right side and 2+ on the left side.       Bicep reflexes are 2+ on the right side and 2+ on the left side.       Brachioradialis reflexes are 2+ on the right side and 2+ on the left side.       Patellar reflexes are 2+ on the right side and 2+ on the left side.       Achilles reflexes are 2+ on the right side and 2+ on the left side.  Very slight tremor diffusely with the same at rest and with intention.   Skin: Skin is warm and dry. Rash noted. No erythema.   Under the left breast is an area approximately 6 x 8 cm of small papules with erythema without exudate   Nursing note and vitals reviewed.      Dystonia/Tardive Dyskinesia  Absent  Meningeal Signs  Absent    Diagnostic Studies  CBC, CMP,TSH, UDS, acetaminophen level, salicylate level, ethanol level, U/A all normal except  COMPREHENSIVE METABOLIC PANEL - Abnormal; Notable for the following:        Result Value      Calcium 10.5 (*)       Albumin 4.90 (*)       All other components within normal limits   ACETAMINOPHEN LEVEL - Abnormal; Notable for the following:      Acetaminophen <10.0 (*)       All other components within normal limits   SALICYLATE LEVEL - Abnormal; Notable for the following:      Salicylate <1.0 (*)       All other components within normal limits   CBC WITH AUTO DIFFERENTIAL - Abnormal; Notable for the  following:      WBC 11.18 (*)       Immature Grans, Absolute 0.04 (*)       All other components within normal limits   URINALYSIS W/ CULTURE IF INDICATED - Normal     Narrative:      Urine microscopic not indicated.   PREGNANCY, URINE - Normal   URINE DRUG SCREEN - Normal     TSH - Normal   ETHANOL Less than 10      Calcium level on December 11 was 10.4.    EKG on this admission shows:  Vent. Rate : 070 BPM     Atrial Rate : 070 BPM     P-R Int : 128 ms          QRS Dur : 088 ms      QT Int : 348 ms       P-R-T Axes : 035 -58 046 degrees     QTc Int : 375 ms    Normal sinus rhythm  Left axis deviation  RSR' or QR pattern in V1 suggests right ventricular conduction delay  Abnormal ECG    EKG from 09/27/2017 shows:  Vent. Rate : 066 BPM     Atrial Rate : 066 BPM     P-R Int : 144 ms          QRS Dur : 090 ms      QT Int : 372 ms       P-R-T Axes : 051 -32 033 degrees     QTc Int : 389 ms    Normal sinus rhythm  Left axis deviation  Abnormal ECG  When compared with ECG of 27-SEP-2017 06:56,  No significant change was found    Patient Active Problem List    Diagnosis   • Suicide ideation [R45.851]   • Generalized anxiety disorder [F41.1]   • Mood disorder [F39]   • Bereavement [Z63.4]   • Suicidal ideation [R45.851]   • PMB (postmenopausal bleeding) [N95.0]     History of prediabetes with elevated fasting blood sugar this morning.  Will check hemoglobin A1c.     mild hypercalcemia.  No further evaluation needed.    Monilial infection in the inframammary fold on the left.  Will continue to use the nystatin powder.     Continue Home Meds as ordered. Mental health and pain issues managed per psychiatry.  Further diagnostic studies or intervention based on hospital course.
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
## 2018-01-05 NOTE — NURSING NOTE
Patient up twice throughout the night, to check the time, and get a drink.  No other needs voiced or concerns.  Will continue to monitor.

## 2018-01-05 NOTE — NURSING NOTE
"Behavior   Anxiety: Feeling anxious  Depression: depressed mood, hopelessness and anxiety  Pain   0  AVH   no  S/I   yes   H/I   no  Affect   depressed and flat    Patient resting in room.  Orientated x4.  Has been seeking staff frequently.  Reports having SI at this time, states \"I dont know just started having suicidal thoughts\".  No plan.  Will continue to monitor.      Intervention  Medications reviewed and administered  Assessment complete    Response  Verbalized understanding   Took medications  Interacted with assessment    Plan  Will promote and reinforce current treatment plan and encourage involvement in care plan goals.   Will provide for safe, calm, quiet environment.  Will promote open communication with staff and foster a trusting/working relationship with patient.   Will promote participation in groups and therapies and independent reflection.      "

## 2018-01-05 NOTE — NURSING NOTE
Behavior   Anxiety: Feeling worried  Depression: depressed mood  Pain  0  AVH   no  S/I   no  H/I   no    Affect   calm and pleasant    Note: Patient has been spending most of her time out of her room, pt calm, pleasant with staff. No thoughts of self harm at this time      Intervention  Instructed in medication usage and effects  Medications administered as ordered  Encouraged to verbalize needs      Response  Verbalized understanding   Did patient take medications as ordered yes   Did patient interact with assessment?  yes     Plan  Will monitor for safety  Will monitor every 15 minutes as ordered  Will evaluate and promote the plan of care

## 2018-01-05 NOTE — PLAN OF CARE
Problem: [facility] Patient Care Overview (Adult)  Goal: Plan of Care Review  Outcome: Ongoing (interventions implemented as appropriate)   01/05/18 0455   Coping/Psychosocial Response Interventions   Plan Of Care Reviewed With patient   Coping/Psychosocial   Patient Agreement with Plan of Care agrees   Patient Care Overview   Progress no change     Goal: Individualization and Mutuality  Outcome: Ongoing (interventions implemented as appropriate)    Goal: Discharge Needs Assessment  Outcome: Ongoing (interventions implemented as appropriate)      Problem:  Overarching Goals  Goal: Adheres to Safety Considerations for Self and Others  Outcome: Ongoing (interventions implemented as appropriate)    Goal: Optimized Coping Skills in Response to Life Stressors  Outcome: Ongoing (interventions implemented as appropriate)    Goal: Develops/Participates in Therapeutic [place] to Support Successful Transition  Outcome: Ongoing (interventions implemented as appropriate)

## 2018-01-06 PROCEDURE — 99233 SBSQ HOSP IP/OBS HIGH 50: CPT | Performed by: PSYCHIATRY & NEUROLOGY

## 2018-01-06 PROCEDURE — 83036 HEMOGLOBIN GLYCOSYLATED A1C: CPT | Performed by: PSYCHIATRY & NEUROLOGY

## 2018-01-06 RX ADMIN — FAMOTIDINE 20 MG: 20 TABLET, FILM COATED ORAL at 09:11

## 2018-01-06 RX ADMIN — MELATONIN TAB 3 MG 6 MG: 3 TAB at 21:04

## 2018-01-06 RX ADMIN — IBUPROFEN 200 MG: 200 TABLET, FILM COATED ORAL at 21:08

## 2018-01-06 RX ADMIN — LAMOTRIGINE 100 MG: 100 TABLET ORAL at 21:04

## 2018-01-06 RX ADMIN — NYSTATIN: 100000 POWDER TOPICAL at 17:20

## 2018-01-06 RX ADMIN — ARIPIPRAZOLE 10 MG: 10 TABLET ORAL at 21:04

## 2018-01-06 RX ADMIN — NYSTATIN: 100000 POWDER TOPICAL at 12:58

## 2018-01-06 RX ADMIN — NYSTATIN: 100000 POWDER TOPICAL at 21:06

## 2018-01-06 RX ADMIN — FAMOTIDINE 20 MG: 20 TABLET, FILM COATED ORAL at 21:04

## 2018-01-06 RX ADMIN — LAMOTRIGINE 100 MG: 100 TABLET ORAL at 09:11

## 2018-01-06 RX ADMIN — NYSTATIN: 100000 POWDER TOPICAL at 09:12

## 2018-01-06 NOTE — NURSING NOTE
Behavior   Anxiety: Feeling anxious  Depression: depressed mood  Pain  0  AVH   no  S/I   no  H/I   no    Affect   depressed    Note:  Patient is pleasant, cooperative.  Is a/o x 4.  She socializes approp. with peers.  States she was here about 3 wks ago, is here now because she has started hearing voices at home.  Denies voices today.  States her 17 y/o son is staying with her 32 y/o daugh.  She contracts for safety while on unit.  Discussed coping skills for at home.  Patient tends to agree with whatever staff says.  Noted area under left breast is barely pink, she did apply nystatin powder.        Intervention  Interviewed patient alone.  Assisted applying powder under left breast.  Offered snack/meds.  Discussed coping skills she could use at home.  Instructed in medication usage and effects  Medications administered as ordered  Encouraged to verbalize needs      Response  Patient receptive to any sugg. staff makes.  She took meds without diff.  Ate good snack.  She interacts approp. with peers.  Verbalized understanding   Did patient take medications as ordered yes   Did patient interact with assessment?  yes     Plan  Will monitor for safety  Will monitor every 15 minutes as ordered  Will evaluate and promote the plan of care

## 2018-01-06 NOTE — NURSING NOTE
"Patient has slept well this night, total 7.25 hours.  She was up x 1 asking for \"peanutbutter/crackers and water.\"  States she is hypoglycemic and thinks BS is low.  Offered to take BS but patient refused.  States peers told her to ask for peanutbutter/crackers at night if she woke up feeling shaky.  Patient was given snack.  Noted patient showed no s/s of hypoglycemia.  She went right back to sleep.  She is independent with voiding and turning in bed.  She has water at bedside.  No distress noted.  Safety rounds completed q 1 hour by RN.  "

## 2018-01-06 NOTE — NURSING NOTE
Behavior   Anxiety: Feeling worried  Depression: depressed mood  Pain  0  AVH   no  S/I   no  H/I   no    Affect   calm and pleasant    Note: Patient worried about home life. Smiles with interactions & engages in conversations. Maintained eye contact. Patient distant towards peers & directs conversations primarily towards staff.      Intervention  Instructed in medication usage and effects  Medications administered as ordered  Encouraged to verbalize needs,active listening, emotional support provided; empathic listening provided; questions answered; questions encouraged; reassurance provided; thoughts/feelings acknowledged.      Response  Verbalized understanding   Did patient take medications as ordered yes   Did patient interact with assessment?  yes     Plan  Will monitor for safety  Will monitor every 15 minutes as ordered  Will evaluate and promote the plan of care

## 2018-01-06 NOTE — NURSING NOTE
Patient has been out of her room most of the day, pt is pleasant and cooperative, seeks out staff often and is preoccupied with her medications. Patient attended groups today and participated well. Patient able to make positive statements about life and future, denies feelings of self harm.

## 2018-01-06 NOTE — PLAN OF CARE
Problem: BH Patient Care Overview (Adult)  Goal: Plan of Care Review  Outcome: Ongoing (interventions implemented as appropriate)    Goal: Individualization and Mutuality  Outcome: Ongoing (interventions implemented as appropriate)    Goal: Discharge Needs Assessment  Outcome: Ongoing (interventions implemented as appropriate)      Problem: BH Overarching Goals  Goal: Adheres to Safety Considerations for Self and Others  Outcome: Ongoing (interventions implemented as appropriate)    Goal: Optimized Coping Skills in Response to Life Stressors  Outcome: Ongoing (interventions implemented as appropriate)    Goal: Develops/Participates in Therapeutic [place] to Support Successful Transition  Outcome: Ongoing (interventions implemented as appropriate)

## 2018-01-06 NOTE — PROGRESS NOTES
"     Chief Complaint.   Today I am feeling better and good Lst time I had suicidal thought and auditory hallucination was last Thursday                   Objective         Vital Signs     Temp:  [96.1 °F (35.6 °C)-98.3 °F (36.8 °C)] 98.3 °F (36.8 °C),98.3  Heart Rate:  [71-85] 71,79  Resp:  [16-18] 18,18  BP: (109-133)/(58-80) 124/58,128/58     Physical Exam:   General Appearance: alert, appears stated age and cooperative,  Hygiene:   good  Gait & Station: Normal  Musculoskeletal: No tremors or abnormal involuntary movements     Mental Status Exam:   Cooperation:  Cooperative  Eye Contact:  Good  Psychomotor Behavior:  Appropriate  Mood: ok  Affect:  mood congruent  Speech:  Normal  Thought Process:  Coherent  Associations: Circumstantial  Thought Content:                          Normal                        Suicidal:  Suicidal Ideation                        Homicidal:  None                        Hallucinations:  Auditory                        Delusion:  None  Cognitive Functioning:                        Consciousness: awake and alert                        Orientation:  Person, Place, Time and Situation                        Attention: normal                   Concentration: World Backwards: \"dlrow\"                        Language:  Intact                  Vocabulary: Average                        Short Term Memory: Deficits and 1/3                        Long Term Memory: Intact                        Fund of Knowledge: Below Average  Reliability:  good  Insight:  Poor  Judgement:  Poor  Impulse Control:  Good     Diagnostic Data:                   Patient Strengths: communication skills, patient is voluntary, is willing to work on problems      Patient Barriers: mary personality and poor assertiveness        Assessment/Plan          Principal Problem:    Major depressive disorder, recurrent, severe with psychotic features  Active Problems:    Generalized anxiety disorder    Suicide " ideation        Treatment Plan:     1) Pt. Will be monitored for unpredictable behavior.  2) Patient will be provided treatment with the unit milieu, activities, therapies and psychopharmacological management.  3) Patient placed on  Q15 minute checks  and Suicide precautions.  4) Patient will be continued on abilify 10mg qhs.and  lamictal to 100mg bid.  5) Psychotherapy will be provided during her stay in the hospital..           Estimated Length of Stay: 3-5 days  Prognosis: fair     Luigi WAGNER MD  01/06/18  11;50 AM

## 2018-01-07 LAB — HBA1C MFR BLD: 5.4 % (ref 4–5.6)

## 2018-01-07 PROCEDURE — 99233 SBSQ HOSP IP/OBS HIGH 50: CPT | Performed by: PSYCHIATRY & NEUROLOGY

## 2018-01-07 RX ADMIN — NYSTATIN: 100000 POWDER TOPICAL at 08:48

## 2018-01-07 RX ADMIN — ARIPIPRAZOLE 10 MG: 10 TABLET ORAL at 20:49

## 2018-01-07 RX ADMIN — LAMOTRIGINE 100 MG: 100 TABLET ORAL at 08:03

## 2018-01-07 RX ADMIN — LAMOTRIGINE 100 MG: 100 TABLET ORAL at 20:49

## 2018-01-07 RX ADMIN — FAMOTIDINE 20 MG: 20 TABLET, FILM COATED ORAL at 20:49

## 2018-01-07 RX ADMIN — MELATONIN TAB 3 MG 6 MG: 3 TAB at 20:49

## 2018-01-07 RX ADMIN — FAMOTIDINE 20 MG: 20 TABLET, FILM COATED ORAL at 08:04

## 2018-01-07 RX ADMIN — IBUPROFEN 200 MG: 200 TABLET, FILM COATED ORAL at 11:37

## 2018-01-07 NOTE — PROGRESS NOTES
"  01-    Chief Complaint.   Today I am feeling  Much better and good. Lst time I had suicidal thought and auditory hallucination was last Thursday morning          Objective          Vital Signs      Temp:  [96.1 °F (35.6 °C)-98.3 °F (36.8 °C)] 98.3 °F (36.8 °C),98.3,97.6  Heart Rate:  [71-85] 71,79,87  Resp:  [16-18] 18,18,18  BP: (109-133)/(58-80) 124/58,128/58,130/78      Physical Exam:   General Appearance: alert, appears stated age and cooperative,  Hygiene:   good  Gait & Station: Normal  Musculoskeletal: No tremors or abnormal involuntary movements      Mental Status Exam:   Cooperation:  Cooperative  Eye Contact:  Good  Psychomotor Behavior:  Appropriate  Mood: ok  Affect:  mood congruent  Speech:  Normal  Thought Process:  Coherent  Associations: Circumstantial  Thought Content:                          Normal                        Suicidal:  Suicidal Ideation                        Homicidal:  None                        Hallucinations:  Auditory                        Delusion:  None  Cognitive Functioning:                        Consciousness: awake and alert                        Orientation:  Person, Place, Time and Situation                        Attention: normal                   Concentration: World Backwards: \"dlrow\"                        Language:  Intact                  Vocabulary: Average                        Short Term Memory: Deficits and 1/3                        Long Term Memory: Intact                        Fund of Knowledge: Below Average  Reliability:  good  Insight:  Poor  Judgement:  Poor  Impulse Control:  Good      Diagnostic Data:              Patient Strengths: communication skills, patient is voluntary, is willing to work on problems       Patient Barriers: mary personality and poor assertiveness          Assessment/Plan           Principal Problem:    Major depressive disorder, recurrent, severe with psychotic features  Active Problems:    Generalized anxiety " disorder    Suicide ideation          Treatment Plan:      1) Pt. Will be monitored for unpredictable behavior.  2) Patient will be provided treatment with the unit milieu, activities, therapies and psychopharmacological management.  3) Will continue   Q15 minute checks  and Suicide precautions.  4) Patient will be continued on abilify 10mg qhs.and  lamictal to 100mg bid.  5) Psychotherapy will be provided during her stay in the hospital..       Estimated Length of Stay: 3-5 days  Prognosis: cassie WAGNER MD  01/07/18  11;50 AM

## 2018-01-07 NOTE — NURSING NOTE
"Behavior   Anxiety: Feeling anxious  Depression: anxiety  Pain   0  AVH   no  S/I   no  H/I   no  Affect   flat    Intervention  Medications reviewed and administered Applied Nystatin power below right breast, redness is minimal.    Assessment complete  Offered patient education on hypoglycemia and medication interaction with Vitamin C.   Breakfast was given this morning and she ate all of her food    Response  Verbalized understanding   Took medications  Interacted with assessment  Patient is very anxious about her medications, diet, and lab work. She is fixated on her \"hypoglycemia\"  She speaks how her mother has googled her medications and not taking vitamins with it.  I explained that she can not have grapefruit with abilify. But otherwise she is ok to take other vitamins.   She denies SI/HI, she states she is ready to go home soon.   She does need redirection at times away from desk and that she should focus on other things other than medications and diet.     Plan  Will promote and reinforce current treatment plan and encourage involvement in care plan goals.   Will provide for safe, calm, quiet environment.  Will promote open communication with staff and foster a trusting/working relationship with patient.   Will promote participation in groups and therapies and independent reflection.        "

## 2018-01-07 NOTE — NURSING NOTE
Patient has been up and attending [employer] group today. She states she enjoyed that service.  She is alert/oriented. She states that when she goes home she will follow up with her regular therapist.   She has ate well today.  She has not c/o low blood sugar today.

## 2018-01-07 NOTE — PLAN OF CARE
Problem: BH Patient Care Overview (Adult)  Goal: Plan of Care Review  Outcome: Ongoing (interventions implemented as appropriate)    Goal: Individualization and Mutuality  Outcome: Ongoing (interventions implemented as appropriate)    Goal: Discharge Needs Assessment  Outcome: Ongoing (interventions implemented as appropriate)      Problem: BH Overarching Goals  Goal: Adheres to Safety Considerations for Self and Others  Outcome: Ongoing (interventions implemented as appropriate)    Goal: Optimized Coping Skills in Response to Life Stressors  Outcome: Ongoing (interventions implemented as appropriate)    Goal: Develops/Participates in Therapeutic [place] to Support Successful Transition  Outcome: Ongoing (interventions implemented as appropriate)

## 2018-01-07 NOTE — PLAN OF CARE
Problem: BH Patient Care Overview (Adult)  Goal: Plan of Care Review  Outcome: Ongoing (interventions implemented as appropriate)   01/06/18 1811   Coping/Psychosocial Response Interventions   Plan Of Care Reviewed With patient   Coping/Psychosocial   Patient Agreement with Plan of Care agrees   Patient Care Overview   Progress progress toward functional goals as expected     Goal: Individualization and Mutuality  Outcome: Ongoing (interventions implemented as appropriate)    Goal: Discharge Needs Assessment  Outcome: Ongoing (interventions implemented as appropriate)      Problem:  Overarching Goals  Goal: Adheres to Safety Considerations for Self and Others  Outcome: Ongoing (interventions implemented as appropriate)    Goal: Optimized Coping Skills in Response to Life Stressors  Outcome: Ongoing (interventions implemented as appropriate)    Goal: Develops/Participates in Therapeutic [place] to Support Successful Transition  Outcome: Ongoing (interventions implemented as appropriate)  Participates in group.

## 2018-01-07 NOTE — NURSING NOTE
Patient has slept soundly all night for total 7.75 hours.  She has water at bedside.  She is independent with bed mobility and voiding.  Safety checks q 1 hour completed by RN.

## 2018-01-07 NOTE — NURSING NOTE
Behavior   Anxiety: Feeling anxious  Depression: depressed mood  Pain  0  AVH   no  S/I   no  H/I   no    Affect   anxious    Note:  Patient is pleasant, cooperative with staff.  She states feels better today, depression/anxiety mod.  She focused on her hypoglycemia.  States staff gave her s/s of hypoglycemia.  States she has meter at home but needs new strips and lancets.  We discussed what to do when she feels BS dropping.  States BS feels low in am, at home she immed. eats when she gets up but here she gets up at 0600 like she does at home but can't eat til 0730, this is when she feels BS low.  Patient talked about her son.  He is 15 y/o, is staying with 30 y/o sister.  Patient states he has been on zoloft for long time.  States he started cutting on self and was [facility] to [facility] in [place].  States he has been doing really well since then.        Intervention   Interviewed patient in room.  Gave patient snack and bedtime meds.  Discussed hypoglycemia with her.  Instructed in medication usage and effects  Medications administered as ordered  Encouraged to verbalize needs      Response  Patient was hyper talkative tonight.  Discussed what to do when hypoglycemia happens.  Enc. her to carry peanutbutter/crackers in her purse/car for emergency.  Enc. her to take BS when she feels this happen so she can report BS to Dr. Laguna she has talked with dietician and they told her about carbs.  Verbalized understanding   Did patient take medications as ordered yes   Did patient interact with assessment?  yes     Plan  Will monitor for safety  Will monitor every 15 minutes as ordered  Will evaluate and promote the plan of care

## 2018-01-07 NOTE — PLAN OF CARE
Problem: Anxiety (Adult)  Goal: Identify Related Risk Factors and Signs and Symptoms  Outcome: Ongoing (interventions implemented as appropriate)    Goal: Reduction/Resolution  Outcome: Ongoing (interventions implemented as appropriate)      Problem: Depression  Goal: Verbalize thoughts and feelings associated with:  Outcome: Ongoing (interventions implemented as appropriate)  Good at talking to others.     Able to speak openly to others.  Goal: Refrain from harming self  Outcome: Ongoing (interventions implemented as appropriate)    Intervention: Monitor patient closely, per order  No thoughts of self harm    Goal: Refrain from isolation  Outcome: Outcome(s) achieved Date Met: 01/07/18    Goal: Attend and participate in unit activities, including therapeutic, recreational, and educational groups  Outcome: Outcome(s) achieved Date Met: 01/07/18  Attends groups and is social with others.

## 2018-01-08 VITALS
TEMPERATURE: 99.2 F | OXYGEN SATURATION: 96 % | SYSTOLIC BLOOD PRESSURE: 121 MMHG | HEIGHT: 64 IN | RESPIRATION RATE: 18 BRPM | HEART RATE: 98 BPM | BODY MASS INDEX: 36.07 KG/M2 | WEIGHT: 211.3 LBS | DIASTOLIC BLOOD PRESSURE: 76 MMHG

## 2018-01-08 PROBLEM — F31.9 BIPOLAR DEPRESSION (HCC): Status: ACTIVE | Noted: 2017-12-12

## 2018-01-08 PROCEDURE — 99238 HOSP IP/OBS DSCHRG MGMT 30/<: CPT | Performed by: PSYCHIATRY & NEUROLOGY

## 2018-01-08 RX ORDER — LAMOTRIGINE 100 MG/1
100 TABLET ORAL 2 TIMES DAILY
Qty: 60 TABLET | Refills: 0 | Status: SHIPPED | OUTPATIENT
Start: 2018-01-08 | End: 2018-08-22

## 2018-01-08 RX ADMIN — IBUPROFEN 200 MG: 200 TABLET, FILM COATED ORAL at 08:25

## 2018-01-08 RX ADMIN — LAMOTRIGINE 100 MG: 100 TABLET ORAL at 08:16

## 2018-01-08 RX ADMIN — NYSTATIN: 100000 POWDER TOPICAL at 08:17

## 2018-01-08 RX ADMIN — FAMOTIDINE 20 MG: 20 TABLET, FILM COATED ORAL at 08:16

## 2018-01-08 NOTE — NURSING NOTE
Discharge insructions given written and verbal to pt. rx's sent to pharmacy per dr gilbert. Instructions for follow up given. Crisis number given. Go to er for emergencies. Daily planner developed with pt. Discharged via ambulatory with belongings with parents.

## 2018-01-08 NOTE — PLAN OF CARE
Problem: Anxiety (Adult)  Goal: Reduction/Resolution  wll monitor for changes in anxiety. Review anxiety methods for anxiety reduction    Problem: Depression  Goal: Treatment Goal: Demonstrate behavioral control of depressive symptoms, verbalize feelings of improved mood/affect, and adopt new coping skills prior to discharge  Outcome: Ongoing (interventions implemented as appropriate)    Goal: Verbalize thoughts and feelings associated with:  Outcome: Ongoing (interventions implemented as appropriate)    Goal: Refrain from harming self  Outcome: Ongoing (interventions implemented as appropriate)

## 2018-01-08 NOTE — NURSING NOTE
Behavior   Anxiety: Feeling anxious  Depression: depressed mood  Pain  0  AVH   no  S/I   no  H/I   no    Affect   calm and pleasant    Note:  Patient socializing with peers.  Spends free time out of room interacting with others.  States had good day.  States depression is better.  Not as attention seeking with staff.      Intervention  Interviewed patient alone, given snack and hs meds.  Instructed in medication usage and effects  Medications administered as ordered  Encouraged to verbalize needs      Response  She is pleasant, cooperative.  She ate good snack, took bedtime meds.  She did not talk about her hypoglycemia tonight.  Verbalized understanding   Did patient take medications as ordered yes   Did patient interact with assessment?  yes     Plan  Will monitor for safety  Will monitor every 15 minutes as ordered  Will evaluate and promote the plan of care

## 2018-01-08 NOTE — NURSING NOTE
Patient slept well thru out night, total 9 hours.  She is independent with turning in bed and voiding.  She has water at bedside.  No distress noted.  Safety checks q 1 hour completed by RN.

## 2018-01-08 NOTE — DISCHARGE SUMMARY
"Admission Date: 1/4/2018    Discharge Date: 1/8/2018    Psychiatric History: Patient is a 52 y.o. female who presents with suicidal ideation and auditory hallucinations. Onset of symptoms was abrupt starting 2 days ago.  Symptoms have been present on a intemittent basis. Symptoms are associated with anxiety and depressed mood.  Symptoms are aggravated by issues with her ex-.   Patient's symptoms occur in the context of chronic mental health issues and issues with her ex-.     Trouble stay out of the bed. She notes voice telling her \"why don't you overdose on your pills? Why don't you cut yourself with your knife\". She denies intent or desire but she felt scared. Ex- asked to borrow money on Tuesday and voices started Wednesday when he did not pay her back.     Psychiatric Review Of Systems:  Pertinent items are noted in HPI.     History  Past psychiatric history:  She notes struggling with depression since she was a teenager.  She felt caught b/w her mom and grandmother and had boyfriend problems and did not know how to \"stand up to them and break up with boy I needed to break up with.\"      Psychiatric Hospitalizations: Patient has had numerous prior hospitalizations.  Initially said first hosp was at [facility] for OD on \"the mean green cleaning agent\" but then noted that was the first adult hosp.  She later noted several hosp during teens.  She was hospitalized on the [facility] about a month ago.      Suicide Attempts: Patient has had 2  prior suicide attempts.  First time was about 20 yrs ago and was hosp at [facility].  She drank \"the mean green cleaning agent.\"  Second time she [place] on Depakote in 2000.      Prior Treatment and Medications Tried: report feeling suicidal on zoloft.  She has allergies listed to adderall, cogentin, wellbutrin, depakote, effexor, geodon, lithium, neurontin, prozac, trazodone, nefazodone, trileptal, valium, zyprexa and zoloft.      History of violence or legal " issues: The patient has no significant history of legal issues.       Diagnostic Data:    Recent Results (from the past 168 hour(s))   Comprehensive Metabolic Panel    Collection Time: 01/04/18 12:11 PM   Result Value Ref Range    Glucose 87 60 - 100 mg/dL    BUN 16 7 - 21 mg/dL    Creatinine 0.70 0.50 - 1.00 mg/dL    Sodium 138 137 - 145 mmol/L    Potassium 3.9 3.5 - 5.1 mmol/L    Chloride 103 95 - 110 mmol/L    CO2 24.0 22.0 - 31.0 mmol/L    Calcium 10.5 (H) 8.4 - 10.2 mg/dL    Total Protein 8.3 6.3 - 8.6 g/dL    Albumin 4.90 (H) 3.40 - 4.80 g/dL    ALT (SGPT) 45 9 - 52 U/L    AST (SGOT) 25 14 - 36 U/L    Alkaline Phosphatase 94 38 - 126 U/L    Total Bilirubin 0.6 0.2 - 1.3 mg/dL    eGFR Non  Amer 88 51 - 120 mL/min/1.73    Globulin 3.4 2.3 - 3.5 gm/dL    A/G Ratio 1.4 1.1 - 1.8 g/dL    BUN/Creatinine Ratio 22.9 7.0 - 25.0    Anion Gap 11.0 5.0 - 15.0 mmol/L   Urinalysis With / Culture If Indicated - Urine, Clean Catch    Collection Time: 01/04/18 12:11 PM   Result Value Ref Range    Color, UA Yellow Yellow, Straw, Dark Yellow, Holli    Appearance, UA Clear Clear    pH, UA 7.0 5.0 - 9.0    Specific Gravity, UA 1.007 1.003 - 1.030    Glucose, UA Negative Negative    Ketones, UA Negative Negative    Bilirubin, UA Negative Negative    Blood, UA Negative Negative    Protein, UA Negative Negative    Leuk Esterase, UA Negative Negative    Nitrite, UA Negative Negative    Urobilinogen, UA 0.2 E.U./dL 0.2 - 1.0 E.U./dL   Pregnancy, Urine - Urine, Clean Catch    Collection Time: 01/04/18 12:11 PM   Result Value Ref Range    HCG, Urine QL Negative Negative   Urine Drug Screen - Urine, Clean Catch    Collection Time: 01/04/18 12:11 PM   Result Value Ref Range    Amphetamine Screen, Urine Negative Negative    Barbiturates Screen, Urine Negative Negative    Benzodiazepine Screen, Urine Negative Negative    Cocaine Screen, Urine Negative Negative    Methadone Screen, Urine Negative Negative    Opiate Screen Negative  Negative    Oxycodone Screen, Urine Negative Negative    THC, Screen, Urine Negative Negative   Ethanol    Collection Time: 01/04/18 12:11 PM   Result Value Ref Range    Ethanol <10 0 - 10 mg/dL    Ethanol % <0.010 %   TSH    Collection Time: 01/04/18 12:11 PM   Result Value Ref Range    TSH 0.790 0.460 - 4.680 mIU/mL   Acetaminophen Level    Collection Time: 01/04/18 12:11 PM   Result Value Ref Range    Acetaminophen <10.0 (L) 10.0 - 30.0 mcg/mL   Salicylate Level    Collection Time: 01/04/18 12:11 PM   Result Value Ref Range    Salicylate <1.0 (L) 10.0 - 20.0 mg/dL   CBC Auto Differential    Collection Time: 01/04/18 12:11 PM   Result Value Ref Range    WBC 11.18 (H) 3.20 - 9.80 10*3/mm3    RBC 4.87 3.77 - 5.16 10*6/mm3    Hemoglobin 14.9 12.0 - 15.5 g/dL    Hematocrit 44.9 35.0 - 45.0 %    MCV 92.2 80.0 - 98.0 fL    MCH 30.6 26.5 - 34.0 pg    MCHC 33.2 31.4 - 36.0 g/dL    RDW 13.6 11.5 - 14.5 %    RDW-SD 45.8 36.4 - 46.3 fl    MPV 10.0 8.0 - 12.0 fL    Platelets 309 150 - 450 10*3/mm3    Neutrophil % 72.4 37.0 - 80.0 %    Lymphocyte % 19.3 10.0 - 50.0 %    Monocyte % 6.9 0.0 - 12.0 %    Eosinophil % 0.8 0.0 - 7.0 %    Basophil % 0.2 0.0 - 2.0 %    Immature Grans % 0.4 0.0 - 0.5 %    Neutrophils, Absolute 8.10 2.00 - 8.60 10*3/mm3    Lymphocytes, Absolute 2.16 0.60 - 4.20 10*3/mm3    Monocytes, Absolute 0.77 0.00 - 0.90 10*3/mm3    Eosinophils, Absolute 0.09 0.00 - 0.70 10*3/mm3    Basophils, Absolute 0.02 0.00 - 0.20 10*3/mm3    Immature Grans, Absolute 0.04 (H) 0.00 - 0.02 10*3/mm3   Light Blue Top    Collection Time: 01/04/18 12:11 PM   Result Value Ref Range    Extra Tube hold for add-on    Glucose, Fasting    Collection Time: 01/05/18  6:33 AM   Result Value Ref Range    Glucose, Fasting 143 (H) 60 - 110 mg/dL   Lipid Panel    Collection Time: 01/05/18  6:33 AM   Result Value Ref Range    Total Cholesterol 160 0 - 199 mg/dL    Triglycerides 133 20 - 199 mg/dL    HDL Cholesterol 54 (L) 60 - 200 mg/dL    LDL  Cholesterol  76 1 - 129 mg/dL    LDL/HDL Ratio 1.47 0.00 - 3.22   Hemoglobin A1c    Collection Time: 01/06/18  8:18 AM   Result Value Ref Range    Hemoglobin A1C 5.4 4 - 5.6 %     No results found.    Summary of Hospital Course:  Patient was admitted to the behavioral health unit at [facility] to ensure patient safety.  Patient was provided treatment with the unit milieu, activities, therapies and psychopharmacological management.  Patient was placed on Q15 minute checks and Suicide.  Dr. Nolasco was consulted for management of medical co-morbidities.  Patient was restarted on the following psychiatric medications: Abilify 10mg qhs.  The following medication changes were made during the hospital stay: Increased her lamictal to 100mg bid.  Patient had improvement over the course of the hospital stay and tolerated his medications.  Her AH and mood improved.  She request that I call and speak with her therapist regarding her case.  Called and informed Madeline Xiong regarding her course and concerns that can be helped by therapy. Substance abuse issues were not present.  Changed her dx to bipolar depression based on her history of sada with wellbutrin and report of manic symptoms with decreased sleep, increase energy, increased spending of money.    Patients Condition at Discharge:  Patient is stable for discharge and is not an imminent threat to self or others.  The patient's behavrior was Appropriate.  Patient reported that mood was Euthymic.  Patient's affect was normal.  Patient's thought content was as follows:   Suicidal:  None   Homicidal:  None   Hallucinations:  None   Delusion:  None    Discharge Diagnosis:  Principal Problem:    Bipolar depression  Active Problems:    Generalized anxiety disorder    Suicide ideation      Discharge Medications:      Your medication list      CHANGE how you take these medications       Instructions Last Dose Given Next Dose Due    lamoTRIgine 100 MG tablet    Commonly known as:  LaMICtal   What changed:    - medication strength  - how much to take  - when to take this        Take 1 tablet by mouth 2 (Two) Times a Day.           CONTINUE taking these medications       Instructions Last Dose Given Next Dose Due    ARIPiprazole 10 MG tablet   Commonly known as:  ABILIFY              CALCIUM-MAGNESIUM-ZINC-D3 PO              CENTRUM SILVER PO              famotidine 20 MG tablet   Commonly known as:  PEPCID        Take 1 tablet by mouth 2 (Two) Times a Day.         ibuprofen 200 MG tablet   Commonly known as:  ADVIL,MOTRIN              Melatonin 3 MG tablet        Take 2 tablets by mouth At Night As Needed for Sleep.         nystatin 114304 UNIT/GM powder   Commonly known as:  MYCOSTATIN                   Where to Get Your Medications      These medications were sent to [facility] - [place] [id number] [place] [address] [phone] [place] [phone] [facility] [address]     Phone:  [phone]    • lamoTRIgine 100 MG tablet             Justification for multiple antipsychotic medications at discharge:  Not Applicable.    Medication for smoking cessation: Patient does not smoke and medication is not indicated.    Medication for substance abuse: Patient does not have a substance use diagnosis and medication is not indicated.    Disposition: Patient was discharged home with self.    Follow-up Information     Follow up with GAYATHRI Tijerina .    Specialty:  Family Medicine    Contact information:    [address] [facility] [address] [phone]          Follow up with [facility] Follow up on 1/18/2018.    Specialty:  Behavioral Health    Why:  Appt with Madeline Pond at 9:00 am   Bring Your ID,SS, insurance card and med bottles to follow up appt   Call Crisis hotline as needed [phone]      Contact information:    [address] [place] [id number] [phone]        Follow up with [facility]. Go on 1/19/2018.    Why:  Appt with Tresa at 8:00 am   Bring Your ID,SS, insurance card and med bottles to follow up appt   Call Crisis hotline as needed [phone]    Contact information:    [address] [place] [phone]          Psychiatric follow up will be with [facility].  Medical follow up will be with primary care physician.    Time Spent: Less than 30 minutes.    Martha Isbell MD  01/08/18  10:51 AM

## 2018-01-08 NOTE — NURSING NOTE
"Behavior   Anxiety: Feeling anxious  Depression: States shes anxious to go home  Pain  0  AVH   no  S/I   no  H/I   no    Affect   calm and pleasant    Note:up in gupta. Good eye contact. Pleasant and cooperative.\"I'm a little nervous about going home.\" participated in group. Interacting well with staff and peers. Needs direction often. Daily hourly schedule developed with RN and pt. \"I dont know what to do when I get up.\"      Intervention  Instructed in medication usage and effects  Medications administered as ordered  Encouraged to verbalize needs. Encouraged pt to increase activities, make her bed follow up with appointments and continue meds      Response  Verbalized understanding   Did patient take medications as ordered no  Did patient interact with assessment?  no    Plan  Will monitor for safety  Will monitor every 15 minutes as ordered  Will evaluate and promote the plan of care    "

## 2018-01-08 NOTE — PLAN OF CARE
Problem: BH Overarching Goals  Goal: Adheres to Safety Considerations for Self and Others  Outcome: Ongoing (interventions implemented as appropriate)    Goal: Optimized Coping Skills in Response to Life Stressors  Outcome: Ongoing (interventions implemented as appropriate)

## 2018-01-08 NOTE — PLAN OF CARE
Problem: BH Patient Care Overview (Adult)  Goal: Plan of Care Review  Outcome: Ongoing (interventions implemented as appropriate)    Goal: Individualization and Mutuality  Outcome: Ongoing (interventions implemented as appropriate)    Goal: Discharge Needs Assessment  Outcome: Ongoing (interventions implemented as appropriate)      Problem: BH Overarching Goals  Goal: Adheres to Safety Considerations for Self and Others  Outcome: Ongoing (interventions implemented as appropriate)    Goal: Optimized Coping Skills in Response to Life Stressors  Outcome: Ongoing (interventions implemented as appropriate)    Goal: Develops/Participates in Therapeutic [place] to Support Successful Transition  Outcome: Ongoing (interventions implemented as appropriate)

## 2018-03-01 ENCOUNTER — HOSPITAL ENCOUNTER (OUTPATIENT)
Dept: NUTRITION | Facility: HOSPITAL | Age: 53
Discharge: HOME | End: 2018-03-01
Admitting: NURSE PRACTITIONER

## 2018-03-01 VITALS — HEIGHT: 65 IN

## 2018-03-01 PROCEDURE — 97802 MEDICAL NUTRITION INDIV IN: CPT

## 2018-03-01 NOTE — PROGRESS NOTES
Adult Outpatient Nutrition  Assessment/PES    Patient Name:  Edith Stephens  YOB: 1965  MRN: [id number]    Assessment Date:  3/1/2018    Comments:  Pt has decided that she wants to start seeing this RD again. She has not been here since March 2017. She quit going to [employer] about 6 months ago because her sponsor was too strict. She has lost weight since her last visit. Current weight is 216#, weight ~ 1 year ago was 240#. Will begin following pt on a more regular basis. Her most recent labs were good, A1c-5.5, chol/TRIG WDL. Cont to follow and support.          General Info       03/01/18 1443    Today's Session    Person(s) attending today's session Patient     Services Used Today? No    General Information    How well do you speak English? very well    Do you speak a language other than English at home? no    Lives With child(kaur), dependent            Physical Findings       03/01/18 1443    Physical Findings/Assessment    Additional Documentation Physical Appearance (Group)    Physical Appearance    Overall Physical Appearance obese              Nutritional Info/Activity       03/01/18 1444    Nutritional Information    Have you had weight changes? Yes    Describe weight changes 24lb    Have you tried to lose weight before? Yes    What is your motivation to lose weight? To stay healthy and prevent DM2    History of eating disorder? No    Do you have difficulty chewing food? No    Who Prepares Meals self    Food Behaviors Comfort eater    How often do you eat out and where? Pt reports that she eats dinner out most nights.    Do you use Food Assistance programs (WIC, food stamps, food bank)? yes    Do you need information about Food Assistance programs? no    What is the biggest challenge you have with your diet? Poor choices;Eating out;Knowledge;Food causing negative symptoms;Food procurement    Enter everything you can remember eating in the last 24 hours (1 day) Geo Salinas  "sausage, egg, cheese biscuit, 4 oz OJ; light yogurt; open face turkey sandwich with delaney, whole grain bread, cutie, salad/V-8; fast food ([employer] roast beef and side salad); cottage cheese and pineapple    Eating Environment    Eating environment Alone;Family    Physical Activity    Are you currently involved in an activity/exercise program?  Yes    Describe physical activity Pt tries to walk as often as possible. She was non-descript of how much or how often.              Estimated/Assessed Needs       03/01/18 1458    Calculation Measurements    Weight Used For Calculations 98 kg (216 lb)    Height Used for Calculations 1.65 m (5' 4.96\")    Estimated/Assessed Energy Needs    Energy Need Method [place] Je[place]    Age 52    RMR ([place] Jeor Equation) 1595.03    Activity Factors ([place] Jeor)  Out of bed, ambulatory  1.3    Total estimated needs ([place] Jeor) 2073-558=[id number]    Estimated Kcal Range  7662-1888    Estimated/Assessed Protein Needs    Weight Used for Protein Calculation 98 kg (216 lb 0.8 oz)    Protein (gm/kg) 0.8    0.8 Gm Protein (gm) 78.4    Estimated Protein Range 70-80    Estimated/Assessed Fluid Needs    Fluid Need Method Other (comment)   [id number]              Labs/Tests/Procedures/Meds       03/01/18 1500    Labs/Tests/Procedures/Meds    Labs/Tests Review Reviewed;Glucose   A1c is 5.5, chol, TRIG, HDL, LDL-WNL    Medication Review Reviewed, pertinent;Antacid;Multivitamin;Other (comment)   Has been started on Trintellix ~ 2 weeks ago; Centrum, calcium/mag/zinc/D3, Vit D          Problem/Interventions:        Problem 1       03/01/18 1502    Nutrition Diagnoses Problem 1    Problem 1 Excessive Nutrient Intake    Macronutrient Kcal;Carbohydrate    Etiology (related to) MNT for Treatment/Condition;Medical Diagnosis    Endocrine Pre-diabetes    Signs/Symptoms (evidenced by) BMI;Biochemical;Report/Observation    BMI 35 - 39.9   36    Other Comment Pt reports that she cont to use food as a " comfort    Specific Labs Noted Glucose                    Intervention Goal       03/01/18 1504    Intervention Goal    General Provide information regarding MNT for treatment/condition;Improved nutrition related lab(s)              Electronically signed by:  Olipmia Tidwell RDN, LD  03/01/18 3:06 PM

## 2018-03-02 ENCOUNTER — TRANSCRIBE ORDERS (OUTPATIENT)
Dept: ADMINISTRATIVE | Facility: HOSPITAL | Age: 53
End: 2018-03-02

## 2018-05-10 ENCOUNTER — HOSPITAL ENCOUNTER (OUTPATIENT)
Dept: NUTRITION | Facility: HOSPITAL | Age: 53
Discharge: HOME | End: 2018-05-10
Admitting: NURSE PRACTITIONER

## 2018-05-10 VITALS — BODY MASS INDEX: 35.82 KG/M2 | WEIGHT: 215 LBS

## 2018-05-10 PROCEDURE — 97803 MED NUTRITION INDIV SUBSEQ: CPT

## 2018-05-10 NOTE — PROGRESS NOTES
Adult Outpatient Nutrition  Assessment/PES    Patient Name:  Edith Stephens  YOB: 1965  MRN: [id number]    Assessment Date:  5/10/2018    Comments:  Pt counseled today for ongoing reinforcement for obesity and DM prevention.          OP RD Assessment     Row Name 05/10/18 1450       Labs/Procedures/Meds    Lab Results Comments no new labs    Row Name 05/10/18 1445       Nutritional Information    Have you had weight changes? No    Have you tried to lose weight before? Yes    List programs tried, date, and success RD counseling, OA    What is your motivation to lose weight? To be healthy, prevent DM    History of eating disorder? No    Do you have difficulty chewing food? No    Functional Status able to prepare meals    List any food cravings/trigger foods you have Has trouble being around her children or other people that are making unhealthy choices. She admits to trouble with her willpower.    List any food aversions Tries to get in her vegetables with salads and V-8.    How often during the day do you find yourself snacking? Tries to have a healthy snack between each meal.    Food Behaviors Stress eater;Comfort eater    How often do you eat out and where? Often eats out daily.    What is the biggest challenge you have with your diet? Poor choices;Portions;Knowledge;Eating out;Food causing negative symptoms;Food procurement    What type of support do you currently use to help you with your health issues? RD counseling, counselor, MD, family    Enter everything you can remember eating in the last 24 hours (1 day) cottage cheese with pineapple and blueberries, coffee with milk and stevia, yogurt or PB and crackers, fish sandwich or CB with side salad, or open faced turkey sandwich with pepper stephanie cheese, V-8 or salad, popcorn for evening snack       Eating Environment    Eating environment Alone;Family       Physical Activity    Are you currently involved in an activity/exercise program?  Yes     Describe physical activity Pt reports that she tries to go for walks as often as possible. She suffers with depression and it is a daily struggle to stay out of bed.    Row Name 05/10/18 1448       Physical Findings    Overall Physical Appearance obese       Anthropometrics    Weight Method Stated    Weight 97.5 kg (215 lb)   per pt, 2 weeks ago at her MD appt.       Body Mass Index (BMI)    BMI Grade 35 - 39.9 - obesity - grade II   35.77    Row Name 05/10/18 1443       Today's Session    Person(s) attending today's session Patient     Services Used Today? No       General Information    How Well Do You Speak English? very well    Do You Speak a Language Other Than English at Home? no    Are you able to read and write English? Yes    Lives With child(kaur), dependent          Problem/Interventions:        Problem 1     Row Name 05/10/18 2464       Nutrition Diagnoses Problem 1    Problem 1 Excessive Nutrient Intake    Macronutrient Kcal;Carbohydrate    Etiology (related to) Goals of Care    Signs/Symptoms (evidenced by) Report/Observation;BMI    BMI 35 - 39.9    Other Comment Pt cannot get in the habit of tracking her food intake, makes poor choices at times, struggles with depression and eating for comfort                    Intervention Goal     Row Name 05/10/18 1458       Intervention Goal    General Provide information regarding MNT for treatment/condition;Disease management/therapy    Weight Appropriate weight loss              Electronically signed by:  Olimpia Tidwell  05/10/18 2:55 PM

## 2018-07-19 ENCOUNTER — APPOINTMENT (OUTPATIENT)
Dept: CT IMAGING | Age: 53
DRG: 885 | End: 2018-07-19
Payer: MEDICAID

## 2018-07-19 ENCOUNTER — HOSPITAL ENCOUNTER (INPATIENT)
Age: 53
LOS: 5 days | Discharge: HOME | DRG: 885 | End: 2018-07-24
Attending: EMERGENCY MEDICINE | Admitting: PSYCHIATRY & NEUROLOGY
Payer: MEDICAID

## 2018-07-19 DIAGNOSIS — T14.91XA SUICIDE ATTEMPT (HCC): ICD-10-CM

## 2018-07-19 DIAGNOSIS — T50.902A INTENTIONAL DRUG OVERDOSE, INITIAL ENCOUNTER (HCC): Primary | ICD-10-CM

## 2018-07-19 LAB
ACETAMINOPHEN LEVEL: <15 UG/ML
ALBUMIN SERPL-MCNC: 4.7 G/DL (ref 3.5–5.2)
ALP BLD-CCNC: 101 U/L (ref 35–104)
ALT SERPL-CCNC: 20 U/L (ref 5–33)
AMPHETAMINE SCREEN, URINE: NEGATIVE
ANION GAP SERPL CALCULATED.3IONS-SCNC: 12 MMOL/L (ref 7–19)
AST SERPL-CCNC: 15 U/L (ref 5–32)
BACTERIA: NEGATIVE /HPF
BARBITURATE SCREEN URINE: NEGATIVE
BASOPHILS ABSOLUTE: 0.1 K/UL (ref 0–0.2)
BASOPHILS RELATIVE PERCENT: 0.6 % (ref 0–1)
BENZODIAZEPINE SCREEN, URINE: NEGATIVE
BILIRUB SERPL-MCNC: 0.5 MG/DL (ref 0.2–1.2)
BILIRUBIN URINE: NEGATIVE
BLOOD, URINE: NEGATIVE
BUN BLDV-MCNC: 11 MG/DL (ref 6–20)
CALCIUM SERPL-MCNC: 9.6 MG/DL (ref 8.6–10)
CANNABINOID SCREEN URINE: NEGATIVE
CHLORIDE BLD-SCNC: 104 MMOL/L (ref 98–111)
CLARITY: CLEAR
CO2: 23 MMOL/L (ref 22–29)
COCAINE METABOLITE SCREEN URINE: NEGATIVE
COLOR: YELLOW
CREAT SERPL-MCNC: 0.7 MG/DL (ref 0.5–0.9)
EOSINOPHILS ABSOLUTE: 0.1 K/UL (ref 0–0.6)
EOSINOPHILS RELATIVE PERCENT: 1.5 % (ref 0–5)
EPITHELIAL CELLS, UA: 2 /HPF (ref 0–5)
ETHANOL: <10 MG/DL (ref 0–0.08)
GFR NON-AFRICAN AMERICAN: >60
GLUCOSE BLD-MCNC: 163 MG/DL (ref 74–109)
GLUCOSE URINE: NEGATIVE MG/DL
HCT VFR BLD CALC: 47.3 % (ref 37–47)
HEMOGLOBIN: 15.2 G/DL (ref 12–16)
HYALINE CASTS: 0 /HPF (ref 0–8)
KETONES, URINE: NEGATIVE MG/DL
LEUKOCYTE ESTERASE, URINE: ABNORMAL
LITHIUM LEVEL: 0.1 MMOL/L (ref 0.6–1.2)
LYMPHOCYTES ABSOLUTE: 1.8 K/UL (ref 1.1–4.5)
LYMPHOCYTES RELATIVE PERCENT: 22.7 % (ref 20–40)
Lab: NORMAL
MCH RBC QN AUTO: 31 PG (ref 27–31)
MCHC RBC AUTO-ENTMCNC: 32.1 G/DL (ref 33–37)
MCV RBC AUTO: 96.3 FL (ref 81–99)
MONOCYTES ABSOLUTE: 0.4 K/UL (ref 0–0.9)
MONOCYTES RELATIVE PERCENT: 5.5 % (ref 0–10)
NEUTROPHILS ABSOLUTE: 5.6 K/UL (ref 1.5–7.5)
NEUTROPHILS RELATIVE PERCENT: 69.3 % (ref 50–65)
NITRITE, URINE: NEGATIVE
OPIATE SCREEN URINE: NEGATIVE
PDW BLD-RTO: 13 % (ref 11.5–14.5)
PH UA: 6
PLATELET # BLD: 281 K/UL (ref 130–400)
PMV BLD AUTO: 9.3 FL (ref 9.4–12.3)
POTASSIUM SERPL-SCNC: 4.1 MMOL/L (ref 3.5–5)
PROTEIN UA: NEGATIVE MG/DL
RBC # BLD: 4.91 M/UL (ref 4.2–5.4)
RBC UA: 2 /HPF (ref 0–4)
SALICYLATE, SERUM: <3 MG/DL (ref 3–10)
SODIUM BLD-SCNC: 139 MMOL/L (ref 136–145)
SPECIFIC GRAVITY UA: 1.01
TOTAL PROTEIN: 7.9 G/DL (ref 6.6–8.7)
URINE REFLEX TO CULTURE: YES
UROBILINOGEN, URINE: 0.2 E.U./DL
WBC # BLD: 8.1 K/UL (ref 4.8–10.8)
WBC UA: 10 /HPF (ref 0–5)

## 2018-07-19 PROCEDURE — 93005 ELECTROCARDIOGRAM TRACING: CPT

## 2018-07-19 PROCEDURE — 87086 URINE CULTURE/COLONY COUNT: CPT

## 2018-07-19 PROCEDURE — 6370000000 HC RX 637 (ALT 250 FOR IP): Performed by: EMERGENCY MEDICINE

## 2018-07-19 PROCEDURE — G0480 DRUG TEST DEF 1-7 CLASSES: HCPCS

## 2018-07-19 PROCEDURE — 81001 URINALYSIS AUTO W/SCOPE: CPT

## 2018-07-19 PROCEDURE — 80178 ASSAY OF LITHIUM: CPT

## 2018-07-19 PROCEDURE — 2580000003 HC RX 258: Performed by: EMERGENCY MEDICINE

## 2018-07-19 PROCEDURE — 70450 CT HEAD/BRAIN W/O DYE: CPT

## 2018-07-19 PROCEDURE — 80053 COMPREHEN METABOLIC PANEL: CPT

## 2018-07-19 PROCEDURE — 99285 EMERGENCY DEPT VISIT HI MDM: CPT | Performed by: EMERGENCY MEDICINE

## 2018-07-19 PROCEDURE — 99285 EMERGENCY DEPT VISIT HI MDM: CPT

## 2018-07-19 PROCEDURE — 6370000000 HC RX 637 (ALT 250 FOR IP): Performed by: PSYCHIATRY & NEUROLOGY

## 2018-07-19 PROCEDURE — 80307 DRUG TEST PRSMV CHEM ANLYZR: CPT

## 2018-07-19 PROCEDURE — 1240000000 HC EMOTIONAL WELLNESS R&B

## 2018-07-19 PROCEDURE — 85025 COMPLETE CBC W/AUTO DIFF WBC: CPT

## 2018-07-19 PROCEDURE — 36415 COLL VENOUS BLD VENIPUNCTURE: CPT

## 2018-07-19 RX ORDER — ARIPIPRAZOLE 10 MG/1
10 TABLET ORAL NIGHTLY
Status: ON HOLD | COMMUNITY
End: 2018-07-24 | Stop reason: HOSPADM

## 2018-07-19 RX ORDER — SODIUM CHLORIDE 9 MG/ML
INJECTION, SOLUTION INTRAVENOUS ONCE
Status: COMPLETED | OUTPATIENT
Start: 2018-07-19 | End: 2018-07-19

## 2018-07-19 RX ORDER — CALCIUM CARBONATE 500(1250)
500 TABLET ORAL DAILY
COMMUNITY
End: 2020-02-17

## 2018-07-19 RX ORDER — LANOLIN ALCOHOL/MO/W.PET/CERES
3 CREAM (GRAM) TOPICAL NIGHTLY
Status: ON HOLD | COMMUNITY
End: 2018-07-24 | Stop reason: HOSPADM

## 2018-07-19 RX ORDER — IBUPROFEN 200 MG
200 TABLET ORAL EVERY 8 HOURS PRN
Status: ON HOLD | COMMUNITY
End: 2018-07-24 | Stop reason: HOSPADM

## 2018-07-19 RX ORDER — MULTIVIT WITH MINERALS/LUTEIN
1000 TABLET ORAL DAILY
Status: ON HOLD | COMMUNITY
End: 2018-07-24 | Stop reason: HOSPADM

## 2018-07-19 RX ORDER — LANOLIN ALCOHOL/MO/W.PET/CERES
3 CREAM (GRAM) TOPICAL NIGHTLY PRN
Status: DISCONTINUED | OUTPATIENT
Start: 2018-07-19 | End: 2018-07-23

## 2018-07-19 RX ORDER — LAMOTRIGINE 25 MG/1
75 TABLET ORAL ONCE
Status: DISCONTINUED | OUTPATIENT
Start: 2018-07-19 | End: 2018-07-20

## 2018-07-19 RX ORDER — ERGOCALCIFEROL 1.25 MG/1
50000 CAPSULE ORAL WEEKLY
COMMUNITY
End: 2020-02-17

## 2018-07-19 RX ORDER — NYSTATIN 100000 [USP'U]/G
POWDER TOPICAL 2 TIMES DAILY PRN
COMMUNITY
End: 2020-02-17

## 2018-07-19 RX ORDER — LAMOTRIGINE 150 MG/1
75 TABLET ORAL DAILY
Status: ON HOLD | COMMUNITY
End: 2018-07-24 | Stop reason: HOSPADM

## 2018-07-19 RX ORDER — ARIPIPRAZOLE 5 MG/1
5 TABLET ORAL ONCE
Status: DISCONTINUED | OUTPATIENT
Start: 2018-07-19 | End: 2018-07-20

## 2018-07-19 RX ORDER — FAMOTIDINE 20 MG/1
20 TABLET, FILM COATED ORAL 2 TIMES DAILY
Status: ON HOLD | COMMUNITY
End: 2018-07-19 | Stop reason: ALTCHOICE

## 2018-07-19 RX ORDER — ACETAMINOPHEN 325 MG/1
650 TABLET ORAL EVERY 4 HOURS PRN
Status: DISCONTINUED | OUTPATIENT
Start: 2018-07-19 | End: 2018-07-24 | Stop reason: HOSPADM

## 2018-07-19 RX ADMIN — MELATONIN 3 MG ORAL TABLET 3 MG: 3 TABLET ORAL at 21:21

## 2018-07-19 RX ADMIN — ACTIVATED CHARCOAL 50 G: 208 SUSPENSION ORAL at 09:36

## 2018-07-19 RX ADMIN — SODIUM CHLORIDE: 9 INJECTION, SOLUTION INTRAVENOUS at 09:45

## 2018-07-19 ASSESSMENT — ENCOUNTER SYMPTOMS
RHINORRHEA: 0
DIARRHEA: 0
SHORTNESS OF BREATH: 0
ABDOMINAL PAIN: 0
BACK PAIN: 0
SORE THROAT: 0
NAUSEA: 1
VOMITING: 1

## 2018-07-19 ASSESSMENT — SLEEP AND FATIGUE QUESTIONNAIRES
RESTFUL SLEEP: YES
DIFFICULTY STAYING ASLEEP: NO
DIFFICULTY FALLING ASLEEP: YES
DIFFICULTY ARISING: NO
DO YOU USE A SLEEP AID: YES
SLEEP PATTERN: DIFFICULTY FALLING ASLEEP
AVERAGE NUMBER OF SLEEP HOURS: 7
DO YOU HAVE DIFFICULTY SLEEPING: YES

## 2018-07-19 ASSESSMENT — LIFESTYLE VARIABLES: HISTORY_ALCOHOL_USE: NO

## 2018-07-19 ASSESSMENT — PATIENT HEALTH QUESTIONNAIRE - PHQ9: SUM OF ALL RESPONSES TO PHQ QUESTIONS 1-9: 22

## 2018-07-19 NOTE — ED NOTES
Spoke with Magnolia Martinez with [facility]. Plan of care: charcoal if patient will drink (not necessary). Pt took 10 g of ibuprofen. Pt would need to take 23g to be toxic. Watch for GI upset, GI bleed, and increased renal function. Recommended fluids to help with kidney function. Labs recommended are CMP, Acetaminophen, Aspirin levels. Pt should be observed for 4 hours after ingestion (1200). Ingestion time was 0800. A consultation with psych is suggested when appropriate. Magnolia Martinez states that she will call later to follow up.       Marybeth Brown, ARCHANA  07/19/18 [address] [facility], ARCHANA  07/19/18 0788
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
## 2018-07-19 NOTE — ED NOTES
ASSESSMENT:    PT ALERT/ORIENTED X4. PUPILS EQUAL/REACTIVE    SKIN:  WARM/DRY PINK CAPILLARY REFILL < 2SECS    CARDIAC:  S1 S2 NOTED     LUNGS: CLEAR UPPER AND LOWER LOBES, RESPIRATIONS EVEN/UNLABORED     ABDOMEN: BOWEL SOUNDS NOTED UPPER AND LOWER QUADRANTS                     SOFT AND NONTENDER. EXTREMITIES:  BILATERAL DP AND PT AND NO EDEMA NOTED. SITTER AND SUICIDE PRECAUTIONS INITIATED. NO DISTRESS NOTED. SIDE RAILS UP AND CALL LIGHT IN REACH.        Rodrick Wong RN  07/19/18 8954

## 2018-07-19 NOTE — ED PROVIDER NOTES
[facility]  eMERGENCY dEPARTMENT eNCOUnter      Pt Name: Singh Huitron  MRN: [id number]  [place] 1965  Date of evaluation: 7/19/2018  Provider: Osmany Barakat MD    CHIEF COMPLAINT       Chief Complaint   Patient presents with    Drug Overdose     600mg tablets x 17    Suicide Attempt         HISTORY OF PRESENT ILLNESS   (Location/Symptom, Timing/Onset, Context/Setting, Quality, Duration, Modifying Factors, Severity)  Note limiting factors. Singh Huitron is a 47 y.o. female who presents to the emergency department After an overdose. The patient took 17 600 mg ibuprofen tablets at 8:00. She said about 8:15 she tried to vomit some of them up. She doesn't know how much she was able to get up. The patient has a history of overdosing in the past. She said she was trying to kill herself. She's been admitted here previously. Patient states she also took half a Lamictal.     HPI    Nursing Notes were reviewed. REVIEW OF SYSTEMS    (2-9 systems for level 4, 10 or more for level 5)     Review of Systems   Constitutional: Negative for chills and fever. HENT: Negative for rhinorrhea and sore throat. Respiratory: Negative for shortness of breath. Cardiovascular: Negative for chest pain and leg swelling. Gastrointestinal: Positive for nausea and vomiting. Negative for abdominal pain and diarrhea. Genitourinary: Negative for difficulty urinating. Musculoskeletal: Negative for back pain and neck pain. Skin: Negative for rash. Neurological: Negative for weakness and headaches. Psychiatric/Behavioral: Negative for confusion. A complete review of systems was performed and is negative except as noted above in the HPI.        PAST MEDICAL HISTORY     Past Medical History:   Diagnosis Date    Chest pain 2/8/2012    Depression     Hypoglycemia     Schizoaffective disorder ([facility] [address] 2/8/2012    Suicide attempt          SURGICAL HISTORY       Past Surgical History:   Procedure Laterality Date    DILATION AND CURETTAGE OF UTERUS  2017    [facility]           CURRENT MEDICATIONS       Previous Medications    ASCORBIC ACID (C 500 PO)    Take 1 tablet by mouth daily    ASCORBIC ACID (VITAMIN C) 1000 MG TABLET    Take 1,000 mg by mouth daily    BENZTROPINE (COGENTIN) 1 MG TABLET    Take 1 tablet by mouth 2 times daily    CALCIUM CARBONATE (OSCAL) 500 MG TABS TABLET    Take 500 mg by mouth daily    FAMOTIDINE (PEPCID) 20 MG TABLET    Take 20 mg by mouth 2 times daily    HALOPERIDOL (HALDOL) 10 MG TABLET    Take 1 tablet by mouth nightly    IBUPROFEN (ADVIL;MOTRIN) 200 MG TABLET    Take 200 mg by mouth every 8 hours as needed for Pain    LAMOTRIGINE (LAMICTAL) 150 MG TABLET    Take 75 mg by mouth nightly    LITHIUM 600 MG CAPSULE    Take 1 capsule by mouth 2 times daily (with meals)    MAGNESIUM PO    Take by mouth    MELATONIN 3 MG TABS TABLET    Take 3 mg by mouth daily    METFORMIN (GLUCOPHAGE) 500 MG TABLET    Take 500 mg by mouth 2 times daily (with meals)    MULTIPLE VITAMINS-MINERALS (CENTRUM SILVER PO)    Take 1 tablet by mouth daily    OLANZAPINE (ZYPREXA) 20 MG TABLET    Take 1 tablet by mouth nightly    TRAZODONE (DESYREL) 50 MG TABLET    Take 1 tablet by mouth nightly as needed for Sleep    ZINC PO    Take by mouth       ALLERGIES     Adderall [amphetamine-dextroamphetamine]; Amphetamines; Doylene Rogers [aspirin]; Codeine; Cogentin [benztropine]; Depakote er [divalproex sodium er]; Depakote [valproic acid]; Effexor [venlafaxine]; Estrogens; Geodon [ziprasidone hcl]; Hydrocodone; Lithium; Lortab [hydrocodone-acetaminophen]; Macrolides and ketolides; Metoprolol; Neurontin [gabapentin]; Other; Pcn [penicillins]; Provera [medroxyprogesterone]; Prozac [fluoxetine hcl]; Tetracyclines & related; Trazodone and nefazodone; Trintellix [vortioxetine]; Valium [diazepam]; Wellbutrin [bupropion]; and Zoloft [sertraline hcl]    FAMILY HISTORY     History reviewed. No pertinent family history.        SOCIAL HISTORY       Social History     Social History    Marital status:      Spouse name: N/A    Number of children: N/A    Years of education: N/A     Social History Main Topics    Smoking status: Never Smoker    Smokeless tobacco: Never Used    Alcohol use No    Drug use: No    Sexual activity: Not Asked     Other Topics Concern    None     Social History Narrative    None       SCREENINGS             PHYSICAL EXAM    (up to 7 for level 4, 8 or more for level 5)     ED Triage Vitals [07/19/18 0902]   BP Temp Temp Source Pulse Resp SpO2 Height Weight   116/73 98.6 °F (37 °C) Oral 70 14 98 % 5' 4\" (1.626 m) 250 lb (113.4 kg)       Physical Exam   Constitutional: She is oriented to person, place, and time. She appears well-developed and well-nourished. No distress. HENT:   Head: Normocephalic and atraumatic. Eyes: Pupils are equal, round, and reactive to light. Neck: Normal range of motion. Neck supple. Cardiovascular: Normal rate, regular rhythm, normal heart sounds and intact distal pulses. Pulmonary/Chest: Effort normal and breath sounds normal. No respiratory distress. Abdominal: Soft. Bowel sounds are normal. She exhibits no distension. There is no tenderness. Musculoskeletal: Normal range of motion. She exhibits no edema. Neurological: She is alert and oriented to person, place, and time. No cranial nerve deficit. She exhibits normal muscle tone. Coordination normal.   Skin: Skin is warm and dry. No rash noted. She is not diaphoretic. Psychiatric: She has a normal mood and affect. Her behavior is normal.   Nursing note and vitals reviewed.       DIAGNOSTIC RESULTS     EKG: All EKG's are interpreted by the Emergency Department Physician who either signs or Co-signs this chart in the absence of a cardiologist.  Sinus rhythm rate 69 no acute findings    RADIOLOGY:   Non-plain film images such as CT, Ultrasound and MRI are read by the radiologist. Plain radiographic images are visualized and preliminarily interpreted by the emergency physician with the below findings:      Interpretation per the Radiologist below, if available at the time of this note:    CT Head WO Contrast   Final Result   No acute intracranial process. Signed by Dr Neal Mallory on 7/19/2018 10:02 AM            ED BEDSIDE ULTRASOUND:   Performed by ED Physician - none    LABS:  Labs Reviewed   COMPREHENSIVE METABOLIC PANEL - Abnormal; Notable for the following:        Result Value    Glucose 163 (*)     All other components within normal limits   CBC WITH AUTO DIFFERENTIAL - Abnormal; Notable for the following:     Hematocrit 47.3 (*)     MCHC 32.1 (*)     MPV 9.3 (*)     Neutrophils % 69.3 (*)     All other components within normal limits   URINE RT REFLEX TO CULTURE - Abnormal; Notable for the following:     Leukocyte Esterase, Urine MODERATE (*)     All other components within normal limits   LITHIUM LEVEL - Abnormal; Notable for the following:     Lithium Lvl 0.10 (*)     All other components within normal limits   MICROSCOPIC URINALYSIS - Abnormal; Notable for the following:     WBC, UA 10 (*)     All other components within normal limits   URINE CULTURE   ACETAMINOPHEN LEVEL   ETHANOL   SALICYLATE LEVEL   URINE DRUG SCREEN       All other labs were within normal range or not returned as of this dictation. EMERGENCY DEPARTMENT COURSE and DIFFERENTIAL DIAGNOSIS/MDM:   Vitals:    Vitals:    07/19/18 1102 07/19/18 1130 07/19/18 1153 07/19/18 1202   BP: (!) 116/56 114/60  132/80   Pulse: 78 77 83 98   Resp: 14 14 15 18   Temp:  98.1 °F (36.7 °C)     TempSrc:  Oral     SpO2: 99% 99% 98% 94%   Weight:       Height:           MDM  Poison control     CONSULTS:  IP CONSULT TO PSYCHIATRY  Pt seen by xavier with psych intake, lynn Mcintosh and accepted for admission  PROCEDURES:  Unless otherwise noted below, none     Procedures    FINAL IMPRESSION      1. Intentional drug overdose, initial encounter ([facility] [address]    2.  Suicide attempt          DISPOSITION/PLAN DISPOSITION        PATIENT REFERRED TO:  No follow-up provider specified.     DISCHARGE MEDICATIONS:  New Prescriptions    No medications on file          (Please note that portions of this note were completed with a voice recognition program.  Efforts were made to edit the dictations but occasionally words are mis-transcribed.)    Adina Puga MD (electronically signed)  Attending Emergency Physician         Adina Puga MD  07/19/18 [phone]

## 2018-07-20 PROCEDURE — 1240000000 HC EMOTIONAL WELLNESS R&B

## 2018-07-20 PROCEDURE — 2500000003 HC RX 250 WO HCPCS: Performed by: FAMILY MEDICINE

## 2018-07-20 PROCEDURE — 6370000000 HC RX 637 (ALT 250 FOR IP): Performed by: PSYCHIATRY & NEUROLOGY

## 2018-07-20 PROCEDURE — 90791 PSYCH DIAGNOSTIC EVALUATION: CPT | Performed by: PSYCHIATRY & NEUROLOGY

## 2018-07-20 RX ORDER — LAMOTRIGINE 100 MG/1
75 TABLET ORAL 2 TIMES DAILY
Status: ON HOLD | COMMUNITY
Start: 2018-01-08 | End: 2018-07-24 | Stop reason: HOSPADM

## 2018-07-20 RX ORDER — FAMOTIDINE 20 MG/1
20 TABLET, FILM COATED ORAL
Status: ON HOLD | COMMUNITY
Start: 2017-12-15 | End: 2018-07-24 | Stop reason: HOSPADM

## 2018-07-20 RX ORDER — LANOLIN ALCOHOL/MO/W.PET/CERES
9 CREAM (GRAM) TOPICAL ONCE
Status: COMPLETED | OUTPATIENT
Start: 2018-07-20 | End: 2018-07-20

## 2018-07-20 RX ORDER — MELATONIN 200 MCG
6 TABLET ORAL
Status: ON HOLD | COMMUNITY
Start: 2017-12-15 | End: 2018-07-24 | Stop reason: HOSPADM

## 2018-07-20 RX ORDER — HYDROXYZINE HYDROCHLORIDE 25 MG/1
25 TABLET, FILM COATED ORAL 3 TIMES DAILY PRN
Status: DISCONTINUED | OUTPATIENT
Start: 2018-07-20 | End: 2018-07-24 | Stop reason: HOSPADM

## 2018-07-20 RX ORDER — SULFAMETHOXAZOLE AND TRIMETHOPRIM 800; 160 MG/1; MG/1
1 TABLET ORAL EVERY 12 HOURS SCHEDULED
Status: DISCONTINUED | OUTPATIENT
Start: 2018-07-21 | End: 2018-07-23

## 2018-07-20 RX ADMIN — MELATONIN 3 MG ORAL TABLET 9 MG: 3 TABLET ORAL at 21:00

## 2018-07-20 RX ADMIN — MICONAZOLE NITRATE: 20 POWDER TOPICAL at 21:00

## 2018-07-20 RX ADMIN — MICONAZOLE NITRATE: 20 POWDER TOPICAL at 10:46

## 2018-07-20 RX ADMIN — HYDROXYZINE HYDROCHLORIDE 25 MG: 25 TABLET ORAL at 16:31

## 2018-07-20 RX ADMIN — LURASIDONE HYDROCHLORIDE 20 MG: 40 TABLET, FILM COATED ORAL at 10:45

## 2018-07-20 NOTE — BH NOTE
[facility] OF [facility] [address], [address] [facility]                              PSYCHIATRIC EVALUATION    PATIENT NAME: Khoi Chappell                        :        1965  MED REC NO:   [id number]                              ROOM:       [facility]  ACCOUNT NO:   [de-identified]                           ADMIT DATE: 2018  PROVIDER:     Angelic Hammond MD    DATE OF EVALUATION:  2018    IDENTIFYING INFORMATION:  The patient is a 28-year-old    female, who lives with her 80-year-old son in [place], [place]. The  patient has been unemployed, but on disability since the year . The  patient was admitted from ER to [address] [place] [address] on 2018. This  MD has interviewed, examined the patient in person, face-to-face, for  psychiatric evaluation on 2018. The admission was voluntary. CHIEF COMPLAINT:  \"I overdosed 17 tablets, 600 mg, ibuprofen as suicide  attempt. \"    HISTORY OF PRESENT ILLNESS:  The patient is a 28-year-old    female with past medical history significant for GERD, arthritis,  and past psychiatric history of bipolar, question borderline personality  disorder, multiple suicide attempts, multiple hospitalizations was brought  to the emergency room by her own father, who found the patient had  overdosed ibuprofen as suicide attempt. The patient was admitted to  Inpatient Psychiatric Care as she was medically cleared at ER. Per the  patient, during the interview today, she has a long history of depression,  started at about age of 16. She has had multiple episodes of depression  and last episode of depression started last December because of financial  difficulty, she lost her car, cannot afford to buy a new one, and her  daughter and son have had depression and her ex- had been putting  pressure on her to try to get her son to live with him, not with her.    Lately, her daughter had a Denied any new medical condition, denied any new medication. SUBSTANCE ABUSE HISTORY:  Per the patient, she denied any alcohol abuse,  denied any illegal drug abuse. Denied any prescribed medication abuse. Denied IV drug use. PAST PSYCHIATRIC HISTORY:  Per the patient, she was admitted to inpatient  care too many to count times, mostly for depression, suicide attempt and  mariam. She overdosed 3 times. First overdose happened in her 25s, second  overdose happened in year 2000. This is her third overdose. Denied any  other cutting behavior. She had been followed up by Dr. Michelle Hughes, last visit  was last month. Dr. Michelle Hughes divided her dose of Lamictal from 150 mg once a  day to 75 mg twice a day. Dr. Michelle Hughes decreased her dose of Abilify from 10  mg a day to 5 mg a day. Per the patient, she tried a lot of different  medications for her mental illness including Prozac, ZOLOFT but ZOLOFT made  her more suicidal.  She tried [facility], which made her manic. She also  tried [facility], she was on LITHIUM not very long but she was on DEPAKOTE,  two episodes, each episode lasted about a year. DEPAKOTE helped her in the  beginning but did not help later on. She overdosed on Depakote once. She  was also tried on Neurontin, Trileptal, Seroquel, risperidone, Zyprexa,  Geodon, Abilify, Latuda, and Thorazine. She feels [facility] was helpful. FAMILY PSYCHIATRIC HISTORY:  Per the patient, both her parents had  depression. Her son and her daughter have depression but is not sure who  has had bipolar in her family. Denied any family history of suicide  attempts. Denied family history of substance abuse.     PAST MEDICAL HISTORY:  The patient is allergic to multiple medications  including ADDERALL, AMPHETAMINE, ASPIRIN, CODEINE, COGENTIN, DEPAKOTE ER,  DEPAKOTE, EFFEXOR, ESTROGEN, GEODON, HYDROCODONE, LITHIUM, LORTAB,  KETOLIDES, MACROBID, METOPROLOL, NEURONTIN, PENICILLIN, PROZAC,  TETRACYCLINE, TRAZODONE, nausea, vomiting or diarrhea. The patient denied any cold or heat intolerance. The patient denied any  focal testing for sensory or motor activity. The patient denied any skin  rash or itching lesion. Denied any muscle spasm or joint pain. MENTAL STATUS EXAMINATION:  The patient is a 60-year-old well-developed,  well-nourished  female who is dressed in hospital gown with poor  personal hygiene and grooming, but she looked younger than her stated age. The patient has average body, height and weight. The patient looked  anxious but cooperative during the interview. Has fair eye contact. The  patient related herself to this MD in an anxious but cooperative manner. Did show moderate to severe psychomotor retardation. She walks slow,  thinks slow, answers questions slow, but she is awake, alert, oriented to  time, place and person. Her short-term and long-term memory both seem to  be impaired. It took longer for her to answer questions because she cannot  remember either recent events or her past psychiatric history. Language is  articulated. Speech is slow rate, normal rhythm, low volume, and  articulated coherent. The patient volunteered her information for only a  few parts of the interview, most part of the interview she only yes and no  answer. Her attention span is shorter. Her conversation is poor. Mood,  the patient reports she feels sad. Affect and mood congruent, anxious,  flat. Thought process is logical and organized. Association is intact. Thought content, the patient admits active suicide thoughts, but denied any  homicide ideation. Denied any current auditory hallucinations or any  paranoia. The patient seemed to have average IQ is on a level of  vocabulary. The patient has limited insight. She was not sure what kind  of mental illness she has, possible depression, possible bipolar or  possible borderline personality disorder, she is not sure.   Her judgment  seemed

## 2018-07-20 NOTE — PLAN OF CARE
Problem: Depressive Behavior With or Without Suicide Precautions:  Intervention: Assess coping skills and behavior                                                                      Group Therapy Note    Date: 7/20/2018  Start Time: 1100  End Time:  6728  Number of Participants: 11    Type of Group: Psychoeducation    Wellness Binder Information  Module Name:  stress  Session Number:  2    Patient's Goal:  Recognizing signs of stress    Notes:  Pt acknowledged increased irritability, isolation may be signs of increased stress. Status After Intervention:  Improved    Participation Level: Interactive    Participation Quality: Appropriate, Attentive and Sharing      Speech:  normal      Thought Process/Content: Logical      Affective Functioning: Congruent      Mood: congruent      Level of consciousness:  Alert, Oriented x4 and Attentive      Response to Learning: Able to verbalize current knowledge/experience      Endings: None Reported    Modes of Intervention: Education      Discipline Responsible: Psychoeducational Specialist      Signature:  Cookie Alberts      Problem: Altered Mood, Depressive Behavior:  Intervention: Assess coping skills and behavior                                                                      Group Therapy Note    Date: 7/20/2018  Start Time: 1100  End Time:  7631  Number of Participants: 11    Type of Group: Psychoeducation    Wellness Binder Information  Module Name:  stress  Session Number:  2    Patient's Goal:  Recognizing signs of stress    Notes:  Pt acknowledged increased irritability, isolation may be signs of increased stress.     Status After Intervention:  Improved    Participation Level: Interactive    Participation Quality: Appropriate, Attentive and Sharing      Speech:  normal      Thought Process/Content: Logical      Affective Functioning: Congruent      Mood: congruent      Level of consciousness:  Alert, Oriented x4 and Attentive      Response to Learning: Able to

## 2018-07-20 NOTE — PLAN OF CARE
Problem: Depressive Behavior With or Without Suicide Precautions:  Intervention: Assess coping skills and behavior                                                                      Group Therapy Note    Date: 7/20/2018  Start Time: 1430  End Time:  1515  Number of Participants: 9    Type of Group: Cognitive Skills    Wellness Binder Information  Module Name:  Emotional wellness  Session Number:  1    Patient's Goal:  Validation of feelings    Notes:  Pt acknowledged to have feelings validated you must share feelings with other. Status After Intervention:  Improved    Participation Level: Interactive    Participation Quality: Appropriate, Attentive and Sharing      Speech:  normal      Thought Process/Content: Logical      Affective Functioning: Congruent      Mood: congruent      Level of consciousness:  Alert, Oriented x4 and Attentive      Response to Learning: Able to verbalize current knowledge/experience      Endings: None Reported    Modes of Intervention: Education      Discipline Responsible: Psychoeducational Specialist      Signature:  Petty Yung      Problem: Altered Mood, Depressive Behavior:  Intervention: Assess coping skills and behavior                                                                      Group Therapy Note    Date: 7/20/2018  Start Time: 1430  End Time:  1515  Number of Participants: 9    Type of Group: Cognitive Skills    Wellness Binder Information  Module Name:  Emotional wellness  Session Number:  1    Patient's Goal:  Validation of feelings    Notes:  Pt acknowledged to have feelings validated you must share feelings with other.     Status After Intervention:  Improved    Participation Level: Interactive    Participation Quality: Appropriate, Attentive and Sharing      Speech:  normal      Thought Process/Content: Logical      Affective Functioning: Congruent      Mood: congruent      Level of consciousness:  Alert, Oriented x4 and Attentive      Response to Learning:

## 2018-07-20 NOTE — PROGRESS NOTES
Group Therapy Note    Date:7/19/18  Time:2030    Patient attended and participated in [facility]. Patient filled out wrap up group documentation.     Notes:    Arelis CONNOR

## 2018-07-20 NOTE — PROGRESS NOTES
Group Therapy Note    Start Time: 0900  End Time:  0930  Number of Participants: 14    Type of Group: Community Meeting       Patient's Goal: \"Talkin with my doctor about changing my medicine. \"      Notes:      Participation Level:  Active Listener       Participation Quality: Appropriate      Thought Process/Content: Logical      Affective Functioning: Congruent      Mood: calm      Level of consciousness:  Alert and Oriented x4      Modes of Intervention: Support      Discipline Responsible: Behavioral Health Tech II      Signature:  Ken Rodriguez

## 2018-07-20 NOTE — PLAN OF CARE
Problem: Depressive Behavior With or Without Suicide Precautions:  Intervention: Assess coping skills and behavior                                                                      Group Therapy Note    Date: 7/20/2018  Start Time: 2191  End Time:  1600  Number of Participants: 9    Type of Group: Recovery    Wellness Binder Information  Module Name:  Emotional wellness  Session Number:  5    Patient's Goal:  Obstacles to emotional wellness    Notes:  Pt acknowledged negative thinking as an obstacle to emotional wellness. Status After Intervention:  Improved    Participation Level: Interactive    Participation Quality: Appropriate, Attentive and Sharing      Speech:  normal      Thought Process/Content: Logical      Affective Functioning: Congruent      Mood: congruent      Level of consciousness:  Alert, Oriented x4 and Attentive      Response to Learning: Able to verbalize current knowledge/experience      Endings: None Reported    Modes of Intervention: Education      Discipline Responsible: Psychoeducational Specialist      Signature:  Ta Contreras      Problem: Altered Mood, Depressive Behavior:  Intervention: Assess coping skills and behavior                                                                      Group Therapy Note    Date: 7/20/2018  Start Time: 1515  End Time:  1600  Number of Participants: 9    Type of Group: Recovery    Wellness Binder Information  Module Name:  Emotional wellness  Session Number:  5    Patient's Goal:  Obstacles to emotional wellness    Notes:  Pt acknowledged negative thinking as an obstacle to emotional wellness.     Status After Intervention:  Improved    Participation Level: Interactive    Participation Quality: Appropriate, Attentive and Sharing      Speech:  normal      Thought Process/Content: Logical      Affective Functioning: Congruent      Mood: congruent      Level of consciousness:  Alert, Oriented x4 and Attentive      Response to Learning: Able to verbalize current knowledge/experience      Endings: None Reported    Modes of Intervention: Education      Discipline Responsible: Psychoeducational Specialist      Signature:  Libra Heredia

## 2018-07-20 NOTE — PLAN OF CARE
Problem: Depressive Behavior With or Without Suicide Precautions:  Goal: Able to verbalize acceptance of life and situations over which he or she has no control  Able to verbalize acceptance of life and situations over which he or she has no control   Outcome: Ongoing                                                                      Group Therapy Note    Date: 7/20/2018  Start Time: 1000  End Time:  1045  Number of Participants: 12    Type of Group: Psychotherapy    Patient's Goal: Patient will process emotions and actions and how to relate to other or their with others. Patient discussed open communication and feelings and emotions. Notes:  Patient attended process group as scheduled, patient identified a issue to work on today regarding how they will interact and relate to others. Status After Intervention:  Improved    Participation Level:  Active Listener    Participation Quality: Appropriate, Attentive and Sharing      Speech:  normal      Thought Process/Content: Logical      Affective Functioning: Congruent      Mood: euthymic      Level of consciousness:  Alert      Response to Learning: Able to verbalize current knowledge/experience      Endings: None Reported    Modes of Intervention: Education, Support and Socialization      Discipline Responsible: /Counselor      Signature:  León Monroy [facility]

## 2018-07-21 PROBLEM — F31.9 BIPOLAR 1 DISORDER, DEPRESSED (HCC): Status: ACTIVE | Noted: 2018-07-21

## 2018-07-21 LAB
HBA1C MFR BLD: 5.5 % (ref 4–6)
TSH SERPL DL<=0.05 MIU/L-ACNC: 1.15 UIU/ML (ref 0.27–4.2)
URINE CULTURE, ROUTINE: NORMAL
VITAMIN B-12: 515 PG/ML (ref 211–946)
VITAMIN D 25-HYDROXY: 51.1 NG/ML

## 2018-07-21 PROCEDURE — 84443 ASSAY THYROID STIM HORMONE: CPT

## 2018-07-21 PROCEDURE — 99232 SBSQ HOSP IP/OBS MODERATE 35: CPT | Performed by: PSYCHIATRY & NEUROLOGY

## 2018-07-21 PROCEDURE — 6370000000 HC RX 637 (ALT 250 FOR IP): Performed by: PSYCHIATRY & NEUROLOGY

## 2018-07-21 PROCEDURE — 1240000000 HC EMOTIONAL WELLNESS R&B

## 2018-07-21 PROCEDURE — 82607 VITAMIN B-12: CPT

## 2018-07-21 PROCEDURE — 36415 COLL VENOUS BLD VENIPUNCTURE: CPT

## 2018-07-21 PROCEDURE — 82306 VITAMIN D 25 HYDROXY: CPT

## 2018-07-21 PROCEDURE — 83036 HEMOGLOBIN GLYCOSYLATED A1C: CPT

## 2018-07-21 RX ORDER — IBUPROFEN 400 MG/1
200 TABLET ORAL EVERY 8 HOURS PRN
Status: DISCONTINUED | OUTPATIENT
Start: 2018-07-21 | End: 2018-07-24 | Stop reason: HOSPADM

## 2018-07-21 RX ORDER — M-VIT,TX,IRON,MINS/CALC/FOLIC 27MG-0.4MG
1 TABLET ORAL DAILY
Status: DISCONTINUED | OUTPATIENT
Start: 2018-07-21 | End: 2018-07-24 | Stop reason: HOSPADM

## 2018-07-21 RX ORDER — ASCORBIC ACID 500 MG
500 TABLET ORAL DAILY
Status: DISCONTINUED | OUTPATIENT
Start: 2018-07-21 | End: 2018-07-24 | Stop reason: HOSPADM

## 2018-07-21 RX ORDER — CALCIUM CARBONATE 500(1250)
500 TABLET ORAL DAILY
Status: DISCONTINUED | OUTPATIENT
Start: 2018-07-21 | End: 2018-07-24 | Stop reason: HOSPADM

## 2018-07-21 RX ORDER — ERGOCALCIFEROL 1.25 MG/1
50000 CAPSULE ORAL WEEKLY
Status: DISCONTINUED | OUTPATIENT
Start: 2018-07-21 | End: 2018-07-24 | Stop reason: HOSPADM

## 2018-07-21 RX ADMIN — CALCIUM 500 MG: 500 TABLET ORAL at 17:20

## 2018-07-21 RX ADMIN — MICONAZOLE NITRATE: 20 POWDER TOPICAL at 09:54

## 2018-07-21 RX ADMIN — MULTIPLE VITAMINS W/ MINERALS TAB 1 TABLET: TAB at 17:19

## 2018-07-21 RX ADMIN — ERGOCALCIFEROL 50000 UNITS: 1.25 CAPSULE ORAL at 17:20

## 2018-07-21 RX ADMIN — MELATONIN 3 MG ORAL TABLET 3 MG: 3 TABLET ORAL at 20:31

## 2018-07-21 RX ADMIN — OXYCODONE HYDROCHLORIDE AND ACETAMINOPHEN 500 MG: 500 TABLET ORAL at 17:20

## 2018-07-21 RX ADMIN — MICONAZOLE NITRATE: 20 POWDER TOPICAL at 20:31

## 2018-07-21 RX ADMIN — HYDROXYZINE HYDROCHLORIDE 25 MG: 25 TABLET ORAL at 10:17

## 2018-07-21 RX ADMIN — LURASIDONE HYDROCHLORIDE 20 MG: 40 TABLET, FILM COATED ORAL at 08:37

## 2018-07-21 NOTE — PLAN OF CARE
Problem: Depressive Behavior With or Without Suicide Precautions:  Goal: Able to verbalize acceptance of life and situations over which he or she has no control  Able to verbalize acceptance of life and situations over which he or she has no control   Outcome: Ongoing                                                                      Group Therapy Note    Date: 7/21/2018  Start Time: 1400  End Time:  1450  Number of Participants: 17    Type of Group: Psychoeducation    Wellness Binder Information  Module Name:  Staying well   Session Number:  1    Patient's Goal:  Daily maintenance and coping skills    Notes:  Pt attended group as scheduled. Pt participated by identifying negative and positive coping skills. Pt participated in group activity worksheet of looking back over the past year and focusing on positive things in life and looking forward the next year and focusing on positive goals in life. Status After Intervention:  Improved    Participation Level:  Active Listener    Participation Quality: Attentive      Speech:  normal      Thought Process/Content: Logical      Affective Functioning: Congruent      Mood: depressed      Level of consciousness:  Attentive      Response to Learning: Able to verbalize current knowledge/experience      Endings: None Reported    Modes of Intervention: Education, Support, Socialization and Exploration      Discipline Responsible: /Counselor      Signature:  Angelic Macedo

## 2018-07-21 NOTE — PROGRESS NOTES
Department of Psychiatry   Progress Note      SUBJECTIVE:  I tried to kill myself by od on ibuprofen. , pt reports that she is feeling a little anxious today and feel slow      PHYSICAL:  VITALS:  /73   Pulse 97   Temp 97.4 °F (36.3 °C) (Temporal)   Resp 16   Ht 5' 4\" (1.626 m)   Wt 211 lb (95.7 kg)   SpO2 95%   BMI 36.22 kg/m²       MENTAL STATUS EXAMINATION:  Level of consciousness:    Patient is  A A& O x4. Appearance: Appears stated age, appropriately dressed  Cognition:  Recent memory intact , remote memory intact , good fund of knowledge,  Speech:  normal  Behavior: Cooperative with interview  Mood: scored both depression and anxiety are '8'   Affect: congruent with mood but flat  Thought Content: no evidence of delusions  Thought Process: linear and goal directed  Delusions: none  Hallucinations: none  Sleep Pattern: good with melatonin  Appetite: good today  Judgement Insight:  normal and appropriate  Gait and Station: intact  SI/HI: denies at this time        MEDICATIONS:  Current Facility-Administered Medications   Medication Dose Route Frequency Provider Last Rate Last Dose    lurasidone (LATUDA) tablet 20 mg  20 mg Oral Daily Cory Hobbs MD   20 mg at 07/21/18 0837    miconazole (MICOTIN) 2 % powder   Topical BID Jaime Bermeo MD        hydrOXYzine (ATARAX) tablet 25 mg  25 mg Oral TID PRN Cory Hobbs MD   25 mg at 07/21/18 1017    sulfamethoxazole-trimethoprim (BACTRIM DS;SEPTRA DS) 800-160 MG per tablet 1 tablet  1 tablet Oral 2 times per day Jaime Bermeo MD        acetaminophen (TYLENOL) tablet 650 mg  650 mg Oral Q4H PRN Fely Acevedo MD        magnesium hydroxide (MILK OF MAGNESIA) 400 MG/5ML suspension 30 mL  30 mL Oral Daily PRN Fely Acevedo MD        melatonin tablet 3 mg  3 mg Oral Nightly PRN Fely Acevedo MD   3 mg at 07/19/18 2121       1. Bipolar disorder type 1, current depressive episode without psychotic  feature due to PTSD, general anxiety disorder.   2.  History of GERD. 3.  Arthritis. 4.  Status post overdose of ibuprofen    ASSESSMENT : Pt seen today she admits to increasing anxiety slept well, denies any mood swings, denies any si from the meds, she said she is feeling a little lightheaded but not sure if it is her nerves or the meds. Pt denies any sihi today. PLAN:    1. Continue precautions, protocols as ordered. 2. Initiate, continue, adjust medications as ordered. .  3. Individual / group therapies, including coping skill development and hope building strategies  4. Ongoing discharge and aftercare planning. 5. Continue with current treatment at this time. 6.   Pt was educated about cessation of drugs, etoh, smoking. Pt requested to take some of her vitamins  Support given.

## 2018-07-21 NOTE — PROGRESS NOTES
Morning Group/ Community Meeting    Date: 7/21/18  Time: 0930    No. of Participants: 18    Patient attended and participated in morning group. Patient Goal: none listed.     [address] [place] Technician

## 2018-07-21 NOTE — H&P
(HALDOL) 10 MG tablet, Take 1 tablet by mouth nightly  lithium 600 MG capsule, Take 1 capsule by mouth 2 times daily (with meals)  OLANZapine (ZYPREXA) 20 MG tablet, Take 1 tablet by mouth nightly  traZODone (DESYREL) 50 MG tablet, Take 1 tablet by mouth nightly as needed for Sleep  Ascorbic Acid (C 500 PO), Take 1 tablet by mouth daily  metFORMIN (GLUCOPHAGE) 500 MG tablet, Take 500 mg by mouth 2 times daily (with meals)    Allergies: Adderall [amphetamine-dextroamphetamine]; Amphetamines; Blanco Grippe [aspirin]; Codeine; Cogentin [benztropine]; Depakote er [divalproex sodium er]; Depakote [valproic acid]; Effexor [venlafaxine]; Estrogens; Geodon [ziprasidone hcl]; Hydrocodone; Lithium; Lortab [hydrocodone-acetaminophen]; Macrolides and ketolides; Metoprolol; Neurontin [gabapentin]; Other; Pcn [penicillins]; Provera [medroxyprogesterone]; Prozac [fluoxetine hcl]; Tetracyclines & related; Trazodone and nefazodone; Trintellix [vortioxetine]; Valium [diazepam]; Wellbutrin [bupropion]; and Zoloft [sertraline hcl]    Social History:   TOBACCO:   reports that she has never smoked. She has never used smokeless tobacco.  ETOH:   reports that she does not drink alcohol. DRUGS:   reports that she does not use drugs. MARITAL STATUS:    OCCUPATION:  Not working  Patient currently lives with family       Family History:   History reviewed. No pertinent family history.   REVIEW OF SYSTEMS:  Constitutional: neg  CV: neg  Pulmonary: neg  GI: neg  : neg  Psych: depression, SA  Neuro: neg  Skin: neg  MusculoSkeletal: neg  HEENT: neg  Joints: neg    Vitals:  /73   Pulse 80   Temp 97.8 °F (36.6 °C) (Temporal)   Resp 18   Ht 5' 4\" (1.626 m)   Wt 211 lb (95.7 kg)   SpO2 94%   BMI 36.22 kg/m²     PHYSICAL EXAM:  Gen: NAD, alert  HEENT: WNL  Lymph: no LAD  Neck: no JVD or masses  Chest: CTA bilat  CV: RRR  Abdomen: NT/ND  Extrem: no C/C/E  Neuro: non focal  Skin: no rashes  Joints: no redness    DATA:  I have reviewed the admission labs and imaging tests.     ASSESSMENT AND PLAN:      Patient Active Hospital Problem List:   Intentional drug overdose   Depression--follow with Psych   Hyperglycemia--check HgA1C   UTI--start tretament   Intertrigo--use Nystatin powder          Jaime Bermeo MD  10:13 PM 7/20/2018

## 2018-07-22 PROCEDURE — 1240000000 HC EMOTIONAL WELLNESS R&B

## 2018-07-22 PROCEDURE — 6370000000 HC RX 637 (ALT 250 FOR IP): Performed by: PSYCHIATRY & NEUROLOGY

## 2018-07-22 RX ADMIN — OXYCODONE HYDROCHLORIDE AND ACETAMINOPHEN 500 MG: 500 TABLET ORAL at 08:43

## 2018-07-22 RX ADMIN — MELATONIN 3 MG ORAL TABLET 3 MG: 3 TABLET ORAL at 21:26

## 2018-07-22 RX ADMIN — CALCIUM 500 MG: 500 TABLET ORAL at 08:44

## 2018-07-22 RX ADMIN — LURASIDONE HYDROCHLORIDE 20 MG: 40 TABLET, FILM COATED ORAL at 08:43

## 2018-07-22 RX ADMIN — ACETAMINOPHEN 650 MG: 325 TABLET ORAL at 15:42

## 2018-07-22 RX ADMIN — MULTIPLE VITAMINS W/ MINERALS TAB 1 TABLET: TAB at 08:44

## 2018-07-22 RX ADMIN — MICONAZOLE NITRATE: 20 POWDER TOPICAL at 21:26

## 2018-07-22 RX ADMIN — HYDROXYZINE HYDROCHLORIDE 25 MG: 25 TABLET ORAL at 09:42

## 2018-07-22 RX ADMIN — MICONAZOLE NITRATE: 20 POWDER TOPICAL at 09:42

## 2018-07-22 ASSESSMENT — PAIN SCALES - GENERAL
PAINLEVEL_OUTOF10: 2
PAINLEVEL_OUTOF10: 5

## 2018-07-22 NOTE — PROGRESS NOTES
NURSING PROGRESS NOTE:    DATA: Patient interview    ACTION: Continuous 15 minute monitoring of patient; interview and observation of patient; medications given as ordered. Suicide Precautions in place. RESPONSE: Patient does interact though select, patient reports having been preoccupied in regards to her son, who is at this time wishing to live with his father. Patient reports good day, though during interview patient is making eye contact though expression is flat, patient concentration is poor during interview.         Depression:8  Anxiety:8  [address]    Electronically signed by Berenice Cooper LPN on 2/89/83 at 92:50 PM

## 2018-07-22 NOTE — PROGRESS NOTES
Group Therapy Note     Date: 07/22/18  Start Time: 1600  End Time:   1700     Number of Participants: 10     Type of Group: RECREATION AND RELAXATION     Patient's Goal:  TO PARTICIPATE IN A SMALL GROUP AND COMPLETE A CRAFT     Notes:  PATIENT  COMPLETED TASK AND ENJOYED GROUP!!!!     Status After Intervention:  Improved     Participation Level:  Active Listener and Interactive     Participation Quality: Appropriate, Attentive, Sharing and Supportive     Speech:  normal     Thought Process/Content: Linear, logical, obsessive      Affective Functioning: congruent     Mood:  Anxious, flat, depressed     Level of consciousness:  Alert, Oriented x4 and Attentive     Response to Learning: Capable of insight and Progressing to goal     Modes of Intervention: Education and Support     Discipline Responsible: Registered Nurse  Sunny Lindsey

## 2018-07-22 NOTE — PLAN OF CARE
Problem: Depressive Behavior With or Without Suicide Precautions:  Goal: Able to verbalize acceptance of life and situations over which he or she has no control  Able to verbalize acceptance of life and situations over which he or she has no control   Outcome: Ongoing                                                                      Group Therapy Note    Date: 7/22/2018  Start Time: 1400  End Time:  1445  Number of Participants: 20    Type of Group: Psychoeducation    Wellness Binder Information  Module Name:  Emotional wellness  Session Number:  2    Patient's Goal:  Anger management    Notes:   Pt attended group as scheduled. Pt participated in group discussion exploring anger management skills such as taking a time out, recognizing anger early, and deep breathing. .    Status After Intervention:  Improved    Participation Level:  Active Listener and Interactive    Participation Quality: Attentive and Sharing      Speech:  normal      Thought Process/Content: Logical      Affective Functioning: Congruent      Mood: depressed      Level of consciousness:  Attentive      Response to Learning: Able to verbalize current knowledge/experience and Able to verbalize/acknowledge new learning      Endings: None Reported    Modes of Intervention: Education and Support      Discipline Responsible: /Counselor      Signature:  Dread Espinosa

## 2018-07-22 NOTE — PLAN OF CARE
Problem: Depressive Behavior With or Without Suicide Precautions:  Goal: Able to verbalize and/or display a decrease in depressive symptoms  Able to verbalize and/or display a decrease in depressive symptoms   Outcome: Ongoing    Goal: Ability to disclose and discuss suicidal ideas will improve  Ability to disclose and discuss suicidal ideas will improve   Outcome: Ongoing    Goal: Able to verbalize support systems  Able to verbalize support systems   Outcome: Ongoing    Goal: Absence of self-harm  Absence of self-harm   Outcome: Met This Shift    Goal: Patient specific goal  Patient specific goal   Outcome: Ongoing

## 2018-07-23 PROCEDURE — 6370000000 HC RX 637 (ALT 250 FOR IP): Performed by: PSYCHIATRY & NEUROLOGY

## 2018-07-23 PROCEDURE — 99232 SBSQ HOSP IP/OBS MODERATE 35: CPT | Performed by: PSYCHIATRY & NEUROLOGY

## 2018-07-23 PROCEDURE — 1240000000 HC EMOTIONAL WELLNESS R&B

## 2018-07-23 PROCEDURE — 6370000000 HC RX 637 (ALT 250 FOR IP): Performed by: FAMILY MEDICINE

## 2018-07-23 RX ORDER — LANOLIN ALCOHOL/MO/W.PET/CERES
9 CREAM (GRAM) TOPICAL NIGHTLY PRN
Status: DISCONTINUED | OUTPATIENT
Start: 2018-07-23 | End: 2018-07-24 | Stop reason: HOSPADM

## 2018-07-23 RX ADMIN — HYDROXYZINE HYDROCHLORIDE 25 MG: 25 TABLET ORAL at 09:52

## 2018-07-23 RX ADMIN — CALCIUM 500 MG: 500 TABLET ORAL at 08:09

## 2018-07-23 RX ADMIN — MELATONIN 3 MG ORAL TABLET 9 MG: 3 TABLET ORAL at 21:23

## 2018-07-23 RX ADMIN — SULFAMETHOXAZOLE AND TRIMETHOPRIM 1 TABLET: 800; 160 TABLET ORAL at 08:09

## 2018-07-23 RX ADMIN — MULTIPLE VITAMINS W/ MINERALS TAB 1 TABLET: TAB at 08:09

## 2018-07-23 RX ADMIN — OXYCODONE HYDROCHLORIDE AND ACETAMINOPHEN 500 MG: 500 TABLET ORAL at 08:09

## 2018-07-23 RX ADMIN — IBUPROFEN 200 MG: 400 TABLET ORAL at 21:48

## 2018-07-23 RX ADMIN — MICONAZOLE NITRATE: 20 POWDER TOPICAL at 21:22

## 2018-07-23 RX ADMIN — LURASIDONE HYDROCHLORIDE 20 MG: 40 TABLET, FILM COATED ORAL at 08:09

## 2018-07-23 RX ADMIN — MICONAZOLE NITRATE: 20 POWDER TOPICAL at 08:12

## 2018-07-23 ASSESSMENT — PAIN SCALES - GENERAL: PAINLEVEL_OUTOF10: 5

## 2018-07-23 NOTE — PROGRESS NOTES
NURSING PROGRESS NOTE:    DATA: Patient interview    ACTION: Continuous 15 minute monitoring of patient; interview and observation of patient; medications given as ordered. Suicide Precautions in place. RESPONSE: pleasant and cooperative, patient has walked around in day area, not really talking to one select peer group. Patient has participated in group sessions , thought when asked about her sleep ,does not report poor sleep though patient said I just want to make sure I get up at the time I normally do.       Depression:3  Anxiety:3  [id number] [facility]    Electronically signed by Sacha Watson LPN on 7/72/67 at 96:80 PM

## 2018-07-23 NOTE — PROGRESS NOTES
Patient is cooperative with medication and care. Patient is social and attends groups. Patient is alert and oriented to person time place and situation. Patient is alert and oriented to person time place and situation. Patient at this time denies SI, HI & AVH. Patient rates anxiety on a scale of 1-10,8. Patient rates depression on a scale of 1-10,5.  Electronically signed by Sekou Rogers RN on 7/23/2018 at 12:18 PM

## 2018-07-23 NOTE — PLAN OF CARE
Problem: Depressive Behavior With or Without Suicide Precautions:  Intervention: Assess coping skills and behavior                                                                      Group Therapy Note    Date: 7/23/2018  Start Time: 1515  End Time:  1600  Number of Participants: 14    Type of Group: Recovery    Wellness Binder Information  Module Name:  Relapse prevention  Session Number:  3    Patient's Goal:  Too much stress can lead to relapse    Notes:  Pt acknowledged recognition of limitations to help decrease stress and prevent relapse. Status After Intervention:  Improved    Participation Level: Interactive    Participation Quality: Appropriate, Attentive and Sharing      Speech:  normal      Thought Process/Content: Logical      Affective Functioning: Congruent      Mood: congruent      Level of consciousness:  Alert, Oriented x4 and Attentive      Response to Learning: Able to verbalize current knowledge/experience      Endings: None Reported    Modes of Intervention: Education      Discipline Responsible: Psychoeducational Specialist      Signature:  Yaw Wolff      Problem: Altered Mood, Depressive Behavior:  Intervention: Assess coping skills and behavior                                                                      Group Therapy Note    Date: 7/23/2018  Start Time: 1515  End Time:  1600  Number of Participants: 14    Type of Group: Recovery    Wellness Binder Information  Module Name:  Relapse prevention  Session Number:  3    Patient's Goal:  Too much stress can lead to relapse    Notes:  Pt acknowledged recognition of limitations to help decrease stress and prevent relapse.     Status After Intervention:  Improved    Participation Level: Interactive    Participation Quality: Appropriate, Attentive and Sharing      Speech:  normal      Thought Process/Content: Logical      Affective Functioning: Congruent      Mood: congruent      Level of consciousness:  Alert, Oriented x4 and

## 2018-07-23 NOTE — PROGRESS NOTES
Group Therapy Note    Start Time: 0900  End Time:  0930  Number of Participants: 21    Type of Group: Community Meeting       Patient's Goal:  \"Going to groups\"      Notes:      Participation Level:  Active Listener       Participation Quality: Appropriate      Thought Process/Content: Logical      Affective Functioning: Congruent      Mood: Calm      Level of consciousness:  Alert      Modes of Intervention: Support      Discipline Responsible: Behavioral Health Tech II      Signature:  Lukas Mejia

## 2018-07-23 NOTE — PLAN OF CARE
Problem: Depressive Behavior With or Without Suicide Precautions:  Goal: Able to verbalize acceptance of life and situations over which he or she has no control  Able to verbalize acceptance of life and situations over which he or she has no control   Outcome: Ongoing                                                                      Group Therapy Note    Date: 7/23/2018  Start Time: 1000  End Time:  1045  Number of Participants: 14    Type of Group: Psychotherapy    Patient's Goal: Patient will process emotions and actions and how to relate to other or their with others. Patient discussed open communication and feelings and emotions. Notes:  Patient attended process group as scheduled, patient identified a issue to work on today regarding how they will interact and relate to others. Status After Intervention:  Improved    Participation Level:  Active Listener    Participation Quality: Appropriate, Attentive and Sharing      Speech:  normal      Thought Process/Content: Logical      Affective Functioning: Congruent      Mood: euthymic      Level of consciousness:  Alert      Response to Learning: Able to verbalize current knowledge/experience      Endings: None Reported    Modes of Intervention: Education, Support and Socialization      Discipline Responsible: /Counselor      Signature:  Suzi Rose [facility]

## 2018-07-24 VITALS
OXYGEN SATURATION: 99 % | HEIGHT: 64 IN | RESPIRATION RATE: 18 BRPM | HEART RATE: 79 BPM | TEMPERATURE: 96.3 F | WEIGHT: 211 LBS | BODY MASS INDEX: 36.02 KG/M2 | DIASTOLIC BLOOD PRESSURE: 62 MMHG | SYSTOLIC BLOOD PRESSURE: 106 MMHG

## 2018-07-24 LAB
ANION GAP SERPL CALCULATED.3IONS-SCNC: 14 MMOL/L (ref 7–19)
BUN BLDV-MCNC: 14 MG/DL (ref 6–20)
CALCIUM SERPL-MCNC: 9.6 MG/DL (ref 8.6–10)
CHLORIDE BLD-SCNC: 104 MMOL/L (ref 98–111)
CO2: 23 MMOL/L (ref 22–29)
CREAT SERPL-MCNC: 0.6 MG/DL (ref 0.5–0.9)
GFR NON-AFRICAN AMERICAN: >60
GLUCOSE BLD-MCNC: 147 MG/DL (ref 74–109)
POTASSIUM SERPL-SCNC: 4.4 MMOL/L (ref 3.5–5)
SODIUM BLD-SCNC: 141 MMOL/L (ref 136–145)

## 2018-07-24 PROCEDURE — 99239 HOSP IP/OBS DSCHRG MGMT >30: CPT | Performed by: PSYCHIATRY & NEUROLOGY

## 2018-07-24 PROCEDURE — 6370000000 HC RX 637 (ALT 250 FOR IP): Performed by: PSYCHIATRY & NEUROLOGY

## 2018-07-24 PROCEDURE — 80048 BASIC METABOLIC PNL TOTAL CA: CPT

## 2018-07-24 PROCEDURE — 36415 COLL VENOUS BLD VENIPUNCTURE: CPT

## 2018-07-24 RX ORDER — HYDROXYZINE HYDROCHLORIDE 25 MG/1
25 TABLET, FILM COATED ORAL 3 TIMES DAILY PRN
Qty: 30 TABLET | Refills: 0 | Status: ON HOLD | OUTPATIENT
Start: 2018-07-24 | End: 2018-08-02 | Stop reason: HOSPADM

## 2018-07-24 RX ORDER — LANOLIN ALCOHOL/MO/W.PET/CERES
9 CREAM (GRAM) TOPICAL NIGHTLY PRN
Qty: 30 TABLET | Refills: 0 | COMMUNITY
Start: 2018-07-24 | End: 2018-12-21 | Stop reason: ALTCHOICE

## 2018-07-24 RX ADMIN — CALCIUM 500 MG: 500 TABLET ORAL at 08:07

## 2018-07-24 RX ADMIN — OXYCODONE HYDROCHLORIDE AND ACETAMINOPHEN 500 MG: 500 TABLET ORAL at 08:07

## 2018-07-24 RX ADMIN — MICONAZOLE NITRATE: 20 POWDER TOPICAL at 08:09

## 2018-07-24 RX ADMIN — MULTIPLE VITAMINS W/ MINERALS TAB 1 TABLET: TAB at 08:07

## 2018-07-24 RX ADMIN — LURASIDONE HYDROCHLORIDE 20 MG: 40 TABLET, FILM COATED ORAL at 08:07

## 2018-07-24 NOTE — PROGRESS NOTES
Progress Note  Milagro Romero  7/23/2018 9:30 PM  Subjective:   Admit Date:   7/19/2018      CC/ADMIT DX:       Interval History:   Reviewed overnight events and nursing notes. No new physical complaints. I have reviewed all labs/diagnostics from the last 24hrs. ROS:   I have done a 10 point ROS and all are negative, except what is mentioned in the HPI. DIET GENERAL;    Medications:      ascorbic acid  500 mg Oral Daily    calcium elemental  500 mg Oral Daily    therapeutic multivitamin-minerals  1 tablet Oral Daily    vitamin D  50,000 Units Oral Weekly    lurasidone  20 mg Oral Daily    miconazole   Topical BID           Objective:   Vitals: /83   Pulse 93   Temp 97 °F (36.1 °C) (Temporal)   Resp 20   Ht 5' 4\" (1.626 m)   Wt 211 lb (95.7 kg)   SpO2 100%   BMI 36.22 kg/m²  No intake or output data in the 24 hours ending 07/23/18 2130  General appearance: alert and cooperative with exam  Lungs: clear to auscultation bilaterally  Heart: RRR  Abdomen: soft, non-tender; bowel sounds normal; no masses,  no organomegaly  Extremities: extremities normal, atraumatic, no cyanosis or edema  Neurologic:  No obvious focal neurologic deficits. Assessment and Plan: Active Problems:    Intentional drug overdose ([address]    Bipolar 1 disorder, depressed ([address]  Resolved Problems:    * No resolved hospital problems. *      Plan:  1. Continue present medication(s)   2. She is medically stable. I will monitor for any changes or concerns. 3.  Follow with Psych      Discharge planning:   her home     Reviewed treatment plans with the patient and/or family.              Electronically signed by Lenora Small MD on 7/23/2018 at 9:30 PM

## 2018-07-24 NOTE — PROGRESS NOTES
Wrap-Up Group Note    Date: 7/23/2018  Time: 21:00    Patient attended and participated in wrap up group therapy. Patient filled out wrap up group documentation.      Notes:    Ruth Roldan

## 2018-07-24 NOTE — DISCHARGE SUMMARY
Patient ID:  Agatha Jain  [id number]  11 y.o.  1965    Admit date: 7/19/2018  Discharge date: 7/24/2018    Admitting Physician: Dany Lieberman MD   Attending Physician: Merlin Vernon MD  Discharge Physician: Mayo Cam MD    Admission Diagnoses: Bipolar 1 disorder, depressed ([facility] [F31.9]    Discharge Diagnoses:     1.  Bipolar disorder type 1, current depressive episode without psychotic feature       General anxiety disorder. 2.  Deferred  3. Arthritis, history of GERD. 4.  Chronic mental illness. Admission Condition: poor    Discharged Condition: stable    Indication for Admission: Status post overdose of ibuprofen    Hospital Course:     Patient was admitted on a voluntary basis to inpatient psychiatry after medical clearance. She was evaluated by Dr. Merlin Vernon and started on [place] for mood stabilization for history of bipolar depression. The patient's depression did improve though persistent anxiety was noted. She reported the use of Vistaril as needed was helpful in managing her anxiety. She indicated no further suicidal ideation and expressed remorse for reports impulsive overdose with ibuprofen. During her hospital course no evidence of acute mariam or psychosis. Patient had fair participation in groups. Social with select peers otherwise isolated in her room. No noted disruptive behavior noted. Patient indicated readiness for discharge as she indicated her son would be returning home from his biological father's. Given the patient was not imminently danger to herself nor others and agreed to transition to a lower level care, she was discharged. Partial hospitalization was recommended, though the patient indicated a preference towards intensive outpatient program given schedule and limitation of transportation. A family meeting was held with patient's mother Shekhar Matamoros by telephone who agreed to support patient in transportation as well as dispense medications as an outpatient.  Patient was agreeable with tablet Comments:   Reason for Stopping:         lamoTRIgine (LAMICTAL) 100 MG tablet Comments:   Reason for Stopping:         Melatonin 3 MG SUBL Comments:   Reason for Stopping:         ibuprofen (ADVIL;MOTRIN) 200 MG tablet Comments:   Reason for Stopping:         lamoTRIgine (LAMICTAL) 150 MG tablet Comments:   Reason for Stopping:         ARIPiprazole (ABILIFY) 10 MG tablet Comments:   Reason for Stopping:         benztropine (COGENTIN) 1 MG tablet Comments:   Reason for Stopping:         haloperidol (HALDOL) 10 MG tablet Comments:   Reason for Stopping:         lithium 600 MG capsule Comments:   Reason for Stopping:         OLANZapine (ZYPREXA) 20 MG tablet Comments:   Reason for Stopping:         traZODone (DESYREL) 50 MG tablet Comments:   Reason for Stopping:         metFORMIN (GLUCOPHAGE) 500 MG tablet Comments:   Reason for Stopping:             Activity: activity as tolerated  Diet: regular diet  Wound Care: none needed    Follow-up  TING Short CNP  In 4 weeks  Post hospitalization.   [address] [facility]  On 7/26/2018  8:30AM go to outpatient registration in the [facility], then to the 3rd floor-[address], plan to stay until 2:15PM    Therapy/Intake, Med Management  [facility], [address]   Phone: [phone] or [phone]   Fax: [phone]    Primary Care Physician  In 4 weeks  or sooner if needed         Time worked: More than 31 minutes    Electronically signed by Sharon Cancino MD on 7/24/2018 at 10:29 AM

## 2018-07-25 LAB
EKG P AXIS: 64 DEGREES
EKG P-R INTERVAL: 136 MS
EKG Q-T INTERVAL: 368 MS
EKG QRS DURATION: 96 MS
EKG QTC CALCULATION (BAZETT): 382 MS
EKG T AXIS: 42 DEGREES

## 2018-07-26 ENCOUNTER — HOSPITAL ENCOUNTER (INPATIENT)
Age: 53
LOS: 7 days | Discharge: HOME | DRG: 885 | End: 2018-08-02
Attending: FAMILY MEDICINE | Admitting: PSYCHIATRY & NEUROLOGY
Payer: MEDICAID

## 2018-07-26 ENCOUNTER — HOSPITAL ENCOUNTER (OUTPATIENT)
Dept: PSYCHIATRY | Age: 53
Setting detail: THERAPIES SERIES
Discharge: HOME | End: 2018-07-26
Payer: MEDICAID

## 2018-07-26 VITALS
SYSTOLIC BLOOD PRESSURE: 116 MMHG | RESPIRATION RATE: 18 BRPM | HEART RATE: 82 BPM | TEMPERATURE: 97.2 F | OXYGEN SATURATION: 98 % | DIASTOLIC BLOOD PRESSURE: 86 MMHG

## 2018-07-26 DIAGNOSIS — N39.0 BACTERIAL UTI: ICD-10-CM

## 2018-07-26 DIAGNOSIS — A49.9 BACTERIAL UTI: ICD-10-CM

## 2018-07-26 DIAGNOSIS — F32.A DEPRESSION WITH SUICIDAL IDEATION: Primary | ICD-10-CM

## 2018-07-26 DIAGNOSIS — R45.851 DEPRESSION WITH SUICIDAL IDEATION: Primary | ICD-10-CM

## 2018-07-26 PROBLEM — F31.4 BIPOLAR 1 DISORDER, DEPRESSED, SEVERE (HCC): Status: ACTIVE | Noted: 2018-07-26

## 2018-07-26 LAB
ACETAMINOPHEN LEVEL: <15 UG/ML
ALBUMIN SERPL-MCNC: 4.3 G/DL (ref 3.5–5.2)
ALP BLD-CCNC: 97 U/L (ref 35–104)
ALT SERPL-CCNC: 22 U/L (ref 5–33)
AMPHETAMINE SCREEN, URINE: NEGATIVE
ANION GAP SERPL CALCULATED.3IONS-SCNC: 11 MMOL/L (ref 7–19)
AST SERPL-CCNC: 13 U/L (ref 5–32)
BACTERIA: ABNORMAL /HPF
BARBITURATE SCREEN URINE: NEGATIVE
BASOPHILS ABSOLUTE: 0.1 K/UL (ref 0–0.2)
BASOPHILS RELATIVE PERCENT: 0.6 % (ref 0–1)
BENZODIAZEPINE SCREEN, URINE: NEGATIVE
BILIRUB SERPL-MCNC: 0.4 MG/DL (ref 0.2–1.2)
BILIRUBIN URINE: NEGATIVE
BLOOD, URINE: NEGATIVE
BUN BLDV-MCNC: 13 MG/DL (ref 6–20)
CALCIUM SERPL-MCNC: 9.6 MG/DL (ref 8.6–10)
CANNABINOID SCREEN URINE: NEGATIVE
CHLORIDE BLD-SCNC: 105 MMOL/L (ref 98–111)
CLARITY: CLEAR
CO2: 25 MMOL/L (ref 22–29)
COCAINE METABOLITE SCREEN URINE: NEGATIVE
COLOR: YELLOW
CREAT SERPL-MCNC: 0.6 MG/DL (ref 0.5–0.9)
EOSINOPHILS ABSOLUTE: 0 K/UL (ref 0–0.6)
EOSINOPHILS RELATIVE PERCENT: 0.4 % (ref 0–5)
EPITHELIAL CELLS, UA: ABNORMAL /HPF
ETHANOL: <10 MG/DL (ref 0–0.08)
GFR NON-AFRICAN AMERICAN: >60
GLUCOSE BLD-MCNC: 151 MG/DL (ref 74–109)
GLUCOSE URINE: NEGATIVE MG/DL
HCT VFR BLD CALC: 43.7 % (ref 37–47)
HEMOGLOBIN: 14.4 G/DL (ref 12–16)
KETONES, URINE: NEGATIVE MG/DL
LEUKOCYTE ESTERASE, URINE: ABNORMAL
LYMPHOCYTES ABSOLUTE: 1.4 K/UL (ref 1.1–4.5)
LYMPHOCYTES RELATIVE PERCENT: 13.4 % (ref 20–40)
Lab: NORMAL
MCH RBC QN AUTO: 31.4 PG (ref 27–31)
MCHC RBC AUTO-ENTMCNC: 33 G/DL (ref 33–37)
MCV RBC AUTO: 95.2 FL (ref 81–99)
MONOCYTES ABSOLUTE: 0.5 K/UL (ref 0–0.9)
MONOCYTES RELATIVE PERCENT: 4.9 % (ref 0–10)
NEUTROPHILS ABSOLUTE: 8.5 K/UL (ref 1.5–7.5)
NEUTROPHILS RELATIVE PERCENT: 80 % (ref 50–65)
NITRITE, URINE: NEGATIVE
OPIATE SCREEN URINE: NEGATIVE
PDW BLD-RTO: 12.9 % (ref 11.5–14.5)
PH UA: 6
PLATELET # BLD: 292 K/UL (ref 130–400)
PMV BLD AUTO: 9.2 FL (ref 9.4–12.3)
POTASSIUM SERPL-SCNC: 4.6 MMOL/L (ref 3.5–5)
PROTEIN UA: NEGATIVE MG/DL
RBC # BLD: 4.59 M/UL (ref 4.2–5.4)
RBC UA: ABNORMAL /HPF (ref 0–2)
SALICYLATE, SERUM: <3 MG/DL (ref 3–10)
SODIUM BLD-SCNC: 141 MMOL/L (ref 136–145)
SPECIFIC GRAVITY UA: 1.01
T4 TOTAL: 6.5 UG/DL (ref 4.5–11.7)
TOTAL PROTEIN: 7.4 G/DL (ref 6.6–8.7)
TSH SERPL DL<=0.05 MIU/L-ACNC: 0.95 UIU/ML (ref 0.27–4.2)
URINE REFLEX TO CULTURE: YES
UROBILINOGEN, URINE: 0.2 E.U./DL
WBC # BLD: 10.6 K/UL (ref 4.8–10.8)
WBC UA: ABNORMAL /HPF (ref 0–5)

## 2018-07-26 PROCEDURE — 1240000000 HC EMOTIONAL WELLNESS R&B

## 2018-07-26 PROCEDURE — G0480 DRUG TEST DEF 1-7 CLASSES: HCPCS

## 2018-07-26 PROCEDURE — 90792 PSYCH DIAG EVAL W/MED SRVCS: CPT | Performed by: NURSE PRACTITIONER

## 2018-07-26 PROCEDURE — 80053 COMPREHEN METABOLIC PANEL: CPT

## 2018-07-26 PROCEDURE — 6370000000 HC RX 637 (ALT 250 FOR IP): Performed by: NURSE PRACTITIONER

## 2018-07-26 PROCEDURE — 2500000003 HC RX 250 WO HCPCS: Performed by: FAMILY MEDICINE

## 2018-07-26 PROCEDURE — 81001 URINALYSIS AUTO W/SCOPE: CPT

## 2018-07-26 PROCEDURE — 84436 ASSAY OF TOTAL THYROXINE: CPT

## 2018-07-26 PROCEDURE — 84443 ASSAY THYROID STIM HORMONE: CPT

## 2018-07-26 PROCEDURE — 85025 COMPLETE CBC W/AUTO DIFF WBC: CPT

## 2018-07-26 PROCEDURE — 36415 COLL VENOUS BLD VENIPUNCTURE: CPT

## 2018-07-26 PROCEDURE — G0378 HOSPITAL OBSERVATION PER HR: HCPCS

## 2018-07-26 PROCEDURE — 99285 EMERGENCY DEPT VISIT HI MDM: CPT | Performed by: FAMILY MEDICINE

## 2018-07-26 PROCEDURE — 99285 EMERGENCY DEPT VISIT HI MDM: CPT

## 2018-07-26 PROCEDURE — 80307 DRUG TEST PRSMV CHEM ANLYZR: CPT

## 2018-07-26 PROCEDURE — 87086 URINE CULTURE/COLONY COUNT: CPT

## 2018-07-26 PROCEDURE — 6370000000 HC RX 637 (ALT 250 FOR IP): Performed by: FAMILY MEDICINE

## 2018-07-26 RX ORDER — HYDROXYZINE HYDROCHLORIDE 25 MG/1
25 TABLET, FILM COATED ORAL 3 TIMES DAILY PRN
Status: DISCONTINUED | OUTPATIENT
Start: 2018-07-26 | End: 2018-07-30

## 2018-07-26 RX ORDER — LANOLIN ALCOHOL/MO/W.PET/CERES
9 CREAM (GRAM) TOPICAL NIGHTLY PRN
Status: DISCONTINUED | OUTPATIENT
Start: 2018-07-26 | End: 2018-07-31

## 2018-07-26 RX ORDER — CALCIUM CARBONATE 500(1250)
500 TABLET ORAL DAILY
Status: DISCONTINUED | OUTPATIENT
Start: 2018-07-26 | End: 2018-08-02 | Stop reason: HOSPADM

## 2018-07-26 RX ORDER — ERGOCALCIFEROL 1.25 MG/1
50000 CAPSULE ORAL WEEKLY
Status: DISCONTINUED | OUTPATIENT
Start: 2018-08-01 | End: 2018-08-02 | Stop reason: HOSPADM

## 2018-07-26 RX ORDER — SULFAMETHOXAZOLE AND TRIMETHOPRIM 800; 160 MG/1; MG/1
1 TABLET ORAL ONCE
Status: COMPLETED | OUTPATIENT
Start: 2018-07-26 | End: 2018-07-26

## 2018-07-26 RX ADMIN — MICONAZOLE NITRATE: 20 POWDER TOPICAL at 21:20

## 2018-07-26 RX ADMIN — SULFAMETHOXAZOLE AND TRIMETHOPRIM 1 TABLET: 800; 160 TABLET ORAL at 11:58

## 2018-07-26 RX ADMIN — CALCIUM 500 MG: 500 TABLET ORAL at 14:32

## 2018-07-26 RX ADMIN — MELATONIN 3 MG ORAL TABLET 9 MG: 3 TABLET ORAL at 21:20

## 2018-07-26 ASSESSMENT — ENCOUNTER SYMPTOMS
COLOR CHANGE: 0
COUGH: 0
CONSTIPATION: 0
BACK PAIN: 0
NAUSEA: 0
VOMITING: 0
SINUS PAIN: 0
CHEST TIGHTNESS: 0
ABDOMINAL PAIN: 0
WHEEZING: 0
RHINORRHEA: 0
SHORTNESS OF BREATH: 0
DIARRHEA: 0

## 2018-07-26 ASSESSMENT — PATIENT HEALTH QUESTIONNAIRE - PHQ9: SUM OF ALL RESPONSES TO PHQ QUESTIONS 1-9: 22

## 2018-07-26 ASSESSMENT — SLEEP AND FATIGUE QUESTIONNAIRES
SLEEP PATTERN: DIFFICULTY FALLING ASLEEP
AVERAGE NUMBER OF SLEEP HOURS: 7
DIFFICULTY ARISING: NO
RESTFUL SLEEP: YES
DO YOU USE A SLEEP AID: YES
DO YOU HAVE DIFFICULTY SLEEPING: YES
DIFFICULTY FALLING ASLEEP: YES
DIFFICULTY STAYING ASLEEP: NO

## 2018-07-26 ASSESSMENT — LIFESTYLE VARIABLES: HISTORY_ALCOHOL_USE: NO

## 2018-07-26 NOTE — PROGRESS NOTES
Discharge Note     Pt discharging on this date from the [facility] Intensive Outpatient Group Therapy Program due to being admitted to [address]

## 2018-07-26 NOTE — ED NOTES
Clean catch urine specimen obtained and sent to lab     Georgie Fuentes [facility]  07/26/18 1020

## 2018-07-26 NOTE — BH NOTE
Pt presented for admit to IOP today. Nursing assessment completed by NEHA Lara RN intake for [facility] who says pt is suicidal and at risk for self harm. She spoke with Norman MAGUIRE and pt is being taken to ER for eval--reports given to [facility] in ER. Pt escorted to ER by wheelchair by Joe Santa RN and Sheila in security. Joe Santa RN will contact the on call psychiatrist for [facility] inpt to address further.

## 2018-07-26 NOTE — ED TRIAGE NOTES
Pt was at IOP today and was having trouble \"filling out paperwork\" bc she has SI but no plan , denies HI/AH/VH

## 2018-07-26 NOTE — ED NOTES
Pt resting comfortably, no sitter at bedside.  Michelle cee, called [facility] who said there are no more sitters at this time     Emelia Dobbs, [address]  07/26/18 3238

## 2018-07-26 NOTE — PROGRESS NOTES
[facility] Admission From ED  Nursing Admission Note              Patient Active Problem List   Diagnosis    Chest pain    [facility]ffective [facility]    Bipolar 1 disorder ([address]    Bipolar I disorder with mariam ([address]    Psychosis    Mariam ([address]    Intentional drug overdose ([facility]    Bipolar 1 disorder, depressed ([address]    Bipolar 1 disorder, depressed, severe ([address]       Pt admitted from Dr. Dwain girard in ED to Adult [facility] room # 238-4. Arrived on unit via [facility] with security and [address] [place] [address] escort. Pt appropriately attired in hospital gown. Body assessment completed by ARCHANA Lund and [address] [place] [address] with no contraband discovered. All tubes, lines, and drains were appropriately discontinued by ED staff prior to pt transfer to [facility]. Pt belongings and valuables inventoried and cataloged, stored per policy. Pt oriented to surroundings, program expectations, and copy pt rights given. Received admit packet: [facility], Visitation Info, Fall Prevention, Restraints Info. Consents reviewed, signed Pt Rights, Handbook Acceptance, Visit/Call Acceptance, PHI Release, Social Info Release, and Treatment Agreement. Pt verbalizes understanding. Identifies stressors.         Mary Kay Wilson RN

## 2018-07-26 NOTE — ED PROVIDER NOTES
HISTORY     Past Medical History:   Diagnosis Date    Chest pain 2/8/2012    Depression     Hypoglycemia     Schizoaffective disorder ([facility] 2/8/2012    Suicide attempt          SURGICAL HISTORY       Past Surgical History:   Procedure Laterality Date    DILATION AND CURETTAGE OF UTERUS  2017    [facility]           CURRENT MEDICATIONS       Previous Medications    ASCORBIC ACID (C 500 PO)    Take 1 tablet by mouth daily    CALCIUM CARBONATE (OSCAL) 500 MG TABS TABLET    Take 500 mg by mouth daily    HYDROXYZINE (ATARAX) 25 MG TABLET    Take 1 tablet by mouth 3 times daily as needed for Anxiety    LURASIDONE (LATUDA) 20 MG TABS TABLET    Take 1 tablet by mouth daily    MAGNESIUM PO    Take by mouth    MELATONIN 3 MG TABS TABLET    Take 3 tablets by mouth nightly as needed (INSOMNIA)    MULTIPLE VITAMINS-MINERALS (CENTRUM SILVER PO)    Take 1 tablet by mouth daily    NYSTATIN (MYCOSTATIN) 263214 UNIT/GM POWDER    Apply topically 2 times daily as needed     VITAMIN D (ERGOCALCIFEROL) 03335 UNITS CAPS CAPSULE    Take 50,000 Units by mouth once a week    ZINC PO    Take by mouth       ALLERGIES     Adderall [amphetamine-dextroamphetamine]; Amphetamines; Doylene Rogers [aspirin]; Codeine; Cogentin [benztropine]; Depakote er [divalproex sodium er]; Depakote [valproic acid]; Effexor [venlafaxine]; Estrogens; Geodon [ziprasidone hcl]; Hydrocodone; Lithium; Lortab [hydrocodone-acetaminophen]; Macrolides and ketolides; Metoprolol; Neurontin [gabapentin]; Other; Pcn [penicillins]; Provera [medroxyprogesterone]; Prozac [fluoxetine hcl]; Tetracyclines & related; Trazodone and nefazodone; Trintellix [vortioxetine]; Valium [diazepam]; Wellbutrin [bupropion]; and Zoloft [sertraline hcl]    FAMILY HISTORY     History reviewed. No pertinent family history.        SOCIAL HISTORY       Social History     Social History    Marital status:      Spouse name: N/A    Number of children: N/A    Years of education: N/A     Social History Main Scale Score: 15        PHYSICAL EXAM    (up to 7 for level 4, 8 or more for level 5)     ED Triage Vitals   BP Temp Temp src Pulse Resp SpO2 Height Weight   -- -- -- -- -- -- -- --       Physical Exam   Constitutional: She is oriented to person, place, and time. She appears well-developed and well-nourished. No distress. HENT:   Head: Normocephalic. Cardiovascular: Normal rate, normal heart sounds and intact distal pulses. Pulmonary/Chest: Effort normal and breath sounds normal.   Abdominal: Soft. Bowel sounds are normal. She exhibits no distension and no mass. There is no tenderness. There is no rebound and no guarding. Musculoskeletal: Normal range of motion. She exhibits no edema or tenderness. Neurological: She is alert and oriented to person, place, and time. Skin: She is not diaphoretic. Psychiatric: Her speech is tangential. Thought content is paranoid. Cognition and memory are normal. She expresses impulsivity. She exhibits a depressed mood. She expresses no homicidal and no suicidal ideation.        DIAGNOSTIC RESULTS     EKG: All EKG's are interpreted by the Emergency Department Physician who either signs or Co-signs this chart in the absence of a cardiologist.        RADIOLOGY:   Non-plain film images such as CT, Ultrasound and MRI are read by the radiologist. Plain radiographic images are visualized and preliminarily interpreted by the emergency physician with the below findings:          No orders to display           LABS:  Labs Reviewed   CBC WITH AUTO DIFFERENTIAL - Abnormal; Notable for the following:        Result Value    MCH 31.4 (*)     MPV 9.2 (*)     Neutrophils % 80.0 (*)     Lymphocytes % 13.4 (*)     Neutrophils # 8.5 (*)     All other components within normal limits   COMPREHENSIVE METABOLIC PANEL - Abnormal; Notable for the following:     Glucose 151 (*)     All other components within normal limits   URINE RT REFLEX TO CULTURE - Abnormal; Notable for the following: Leukocyte Esterase, Urine LARGE (*)     All other components within normal limits   MICROSCOPIC URINALYSIS - Abnormal; Notable for the following:     WBC, UA 11-15 (*)     All other components within normal limits   URINE CULTURE   TSH WITHOUT REFLEX   URINE DRUG SCREEN   ETHANOL   T4   SALICYLATE LEVEL   ACETAMINOPHEN LEVEL       All other labs were within normal range or not returned as of this dictation. EMERGENCY DEPARTMENT COURSE and DIFFERENTIAL DIAGNOSIS/MDM:   Vitals:    Vitals:    07/26/18 1003   BP: (!) 99/57   Pulse: 81   Resp: 16   Temp: 97.8 °F (36.6 °C)   TempSrc: Oral   SpO2: 97%   Weight: 211 lb (95.7 kg)   Height: 5' 4\" (1.626 m)       MDM    Reassessment  12:03 PM patient appears medically clear she does have a UTI which is most likely a chronic issue for this time she is refusing to take the antibiotics and wishes to talk to her mental health provider as she was told in the past that antibiotics can make her depression worse. CONSULTS:  None    PROCEDURES:  Unless otherwise noted below, none     Procedures    FINAL IMPRESSION      1. Depression with suicidal ideation    2. Bacterial UTI          DISPOSITION/PLAN   DISPOSITION Decision To Admit 07/26/2018 12:03:18 PM      PATIENT REFERRED TO:  No follow-up provider specified.     DISCHARGE MEDICATIONS:  New Prescriptions    No medications on file          (Please note that portions of this note were completed with a voice recognition program.  Efforts were made to edit the dictations but occasionally words are mis-transcribed.)    Lor Metcalf MD (electronically signed)  Attending Emergency Physician          Blayne Ford MD  07/26/18 1965

## 2018-07-27 PROCEDURE — 6370000000 HC RX 637 (ALT 250 FOR IP): Performed by: NURSE PRACTITIONER

## 2018-07-27 PROCEDURE — 1240000000 HC EMOTIONAL WELLNESS R&B

## 2018-07-27 PROCEDURE — G0378 HOSPITAL OBSERVATION PER HR: HCPCS

## 2018-07-27 PROCEDURE — 99231 SBSQ HOSP IP/OBS SF/LOW 25: CPT | Performed by: NURSE PRACTITIONER

## 2018-07-27 RX ADMIN — MICONAZOLE NITRATE: 20 POWDER TOPICAL at 10:03

## 2018-07-27 RX ADMIN — CALCIUM 500 MG: 500 TABLET ORAL at 10:02

## 2018-07-27 RX ADMIN — MICONAZOLE NITRATE: 20 POWDER TOPICAL at 20:46

## 2018-07-27 RX ADMIN — LURASIDONE HYDROCHLORIDE 20 MG: 40 TABLET, FILM COATED ORAL at 10:03

## 2018-07-27 RX ADMIN — MELATONIN 3 MG ORAL TABLET 9 MG: 3 TABLET ORAL at 20:46

## 2018-07-27 NOTE — PROGRESS NOTES
Group Therapy Note    Date: 7/26/18  Start Time: 2000  End Time:  2015  Number of Participants: 8    Type of Group: Wrap-up    Notes:  Patient refused to attend Wrap-Up group, staff encouraged patient to attend group sessions. Notified nurse. Status After Intervention: pt. Refused to attend group    Participation Level: Pt. Refused to participate in group session at this time. Participation Quality: patient refused to attend. Speech: Thought Process/Content: pt. Did not participate in group session      Affective Functioning: non-participating      Mood: pt declined to participate      Level of consciousness:        Response to Learning: pt. Declined patient education and group therapy at this time. Endings: Patient did not participate in session at this time. Modes of Intervention: Staff did encourage patient to participate in session, pt. Continued to decline at this time.       Discipline Responsible: Behavioral Health Tech      Signature:  Ann Rocha

## 2018-07-27 NOTE — PROGRESS NOTES
Collateral obtained from: patients daughter Jimmy Sharp [phone]    Immediate Stressors & Time Episode Began: Hasnt seen her much and has only talked to her on the phone. Patient has been very anxious. Diagnosis/Hx of compliance with meds: Has not been taking her medication. Tx Hx/Past hospitalizations:  Previous admissions. Family hx of psychiatric issues: No issues    Substance Abuse: No issues    Pending Legal: No issues    Safety Issues (Weapons? Hx of attempts): No guns    Support system/Medication Managed by: The importance of medication management and locking extra medication in a secured location was explained and reccommended to collateral.     Additional Info: Patient lives across the street from her parents and they help with her medication.

## 2018-07-27 NOTE — H&P
Trazodone and nefazodone; Trintellix [vortioxetine]; Valium [diazepam]; Wellbutrin [bupropion]; and Zoloft [sertraline hcl]    Social History:   TOBACCO:   reports that she has never smoked. She has never used smokeless tobacco.  ETOH:   reports that she does not drink alcohol. DRUGS:   reports that she does not use drugs. MARITAL STATUS:    OCCUPATION:  Not working  Patient currently lives with family       Family History:   History reviewed. No pertinent family history. REVIEW OF SYSTEMS:  Constitutional: neg  CV: neg  Pulmonary: neg  GI: neg  : neg  Psych: depression, SI  Neuro: neg  Skin: neg  MusculoSkeletal: neg  HEENT: neg  Joints: neg    Vitals:  BP (!) 114/49   Pulse 74   Temp 96.6 °F (35.9 °C) (Temporal)   Resp 18   Ht 5' 4\" (1.626 m)   Wt 211 lb (95.7 kg)   SpO2 93%   BMI 36.22 kg/m²     PHYSICAL EXAM:  Gen: NAD, alert  HEENT: WNL  Lymph: no LAD  Neck: no JVD or masses  Chest: CTA bilat  CV: RRR  Abdomen: NT/ND  Extrem: no C/C/E  Neuro: non focal  Skin: no rashes  Joints: no redness    DATA:  I have reviewed the admission labs and imaging tests.     ASSESSMENT AND PLAN:      Patient Active Hospital Problem List:   Depression, SI--follow with Psych   Pyuria---follow with culture   Intertrigo--use Nystatin powder          Aracely Noel MD  11:02 PM 7/26/2018
Nemo Villagran denies  Suicidal Ideation:  active  Delusions:  no evidence of delusions  Perceptual Disturbance:  denies any perceptual disturbance  Cognition:  oriented to person, place, and time  Concentration :good  Memory intact for recent and remote  Fund of knowledge:  average  Abstract thinking:  adequate  Insight:  fair  Judgment:  fair        Review of Systems:  History obtained from the patient  General ROS: positive for  - sleep disturbance  Psychological ROS: positive for - anxiety, depression, sleep disturbances and suicidal ideation  Ophthalmic ROS: negative  ENT ROS: negative  Allergy and Immunology ROS: negative  Hematological and Lymphatic ROS: negative  Endocrine ROS: negative  Breast ROS: negative  Respiratory ROS: negative  Cardiovascular ROS: negative  Gastrointestinal ROS: negative  Genito-Urinary ROS: negative  Musculoskeletal ROS: negative  Neurological ROS: negative  Dermatological ROS: negative               ACTIVE MEDICAL PROBLEMS:  Bipolar Depression    Assessment  Patient is a 48 y/o c/f with a history of Bipolar Depression. Patient was recently discharged from this unit. Patient was following up in [facility] and reported feeling suicidal and stated \"I don't feel like being on this earth. \" Patient reports several stressors in her life at this time. Will admit patient and monitor. Will restart Latuda and increase dose to 40 mg po daily. ELOS 3-5 days. Recommendations:  1. Admit to [facility] and monitor on 15 min checks  2. Betito Bonner to be reviewed. 3. Collateral information from family with release  4. Medical monitoring by Dr Ayah Zhong and associates  5. Acclimate to the unit and encourage group attendance  6. Reconcile home medications   7.  Increase patients home medication of Latuda to 40 mg po daily     TING Rodas

## 2018-07-28 LAB — URINE CULTURE, ROUTINE: NORMAL

## 2018-07-28 PROCEDURE — 6370000000 HC RX 637 (ALT 250 FOR IP): Performed by: NURSE PRACTITIONER

## 2018-07-28 PROCEDURE — 99232 SBSQ HOSP IP/OBS MODERATE 35: CPT | Performed by: PSYCHIATRY & NEUROLOGY

## 2018-07-28 PROCEDURE — 6370000000 HC RX 637 (ALT 250 FOR IP): Performed by: PSYCHIATRY & NEUROLOGY

## 2018-07-28 PROCEDURE — G0378 HOSPITAL OBSERVATION PER HR: HCPCS

## 2018-07-28 PROCEDURE — 1240000000 HC EMOTIONAL WELLNESS R&B

## 2018-07-28 RX ADMIN — MELATONIN 3 MG ORAL TABLET 9 MG: 3 TABLET ORAL at 20:36

## 2018-07-28 RX ADMIN — LURASIDONE HYDROCHLORIDE 40 MG: 40 TABLET, FILM COATED ORAL at 20:51

## 2018-07-28 RX ADMIN — MICONAZOLE NITRATE: 20 POWDER TOPICAL at 10:21

## 2018-07-28 RX ADMIN — CALCIUM 500 MG: 500 TABLET ORAL at 10:21

## 2018-07-28 NOTE — PROGRESS NOTES
NURSING PROGRESS NOTE:    DATA: Patient interview    ACTION: Continuous 15 minute monitoring of patient; interview and observation of patient; medications given as ordered. Suicide Precautions in place. RESPONSE: Patient has been out in day area, though does at times talk with others,does not socialize often, stays out in day area, during interview with patient noted patient was repetitive in conversation in regarding taking Latuda and her concerns with the medication, redirection with patient at this time, asked patient if literature had been given regarding medications. Patient reports yes days nurses as well as physician had went over medication with her, though she had seen on the papers where it caused this symptom, redirection given once again with patient, asked patient to talk with psychiatrist with any concerns, that not every symptom listed will a patient experience with medications, those are only possible symptoms. Talked with patient on coping mechanisms to use in place. Patient does make eye contact. Complies with medication regime.       Depression:5  Anxiety:5  [id number] [address]    Electronically signed by Jeanette Savage LPN on 2/05/38 at 0:26 PM

## 2018-07-28 NOTE — PROGRESS NOTES
Morning Group/ Community Meeting    Date: 7/28/18  Time: 0930    No. of Participants: 14    Patient attended and participated in morning group. Patient Goal: none listed.     [address] [place] Technician

## 2018-07-28 NOTE — PROGRESS NOTES
BHI Daily Shift Assessment  Nursing Progress Note    Room: [facility]/608-02 Name: Mendez Rice Age: 47 y.o.    Ethnicity:  Gender: female   Dx: Bipolar 1 disorder, depressed, severe ([address]  Precautions: suicide risk  CPAP: No Accu-Chek: No  MSE:  Status and Exam  Normal: No  Facial Expression: Flat, Worried  Affect: Blunt, Congruent  Level of Consciousness: Alert  Mood:Normal: No  Mood: Depressed, Anxious, Worthless, low self-esteem  Motor Activity:Normal: No  Motor Activity: Decreased  Interview Behavior: Cooperative  Preception: [place] to Person, Bianca Ahum[place] to Time, [place] to Place, [place] to Situation  Attention:Normal: No  Attention: Unable to Concentrate  Thought Processes: Blocking  Thought Content:Normal: No  Thought Content: Preoccupations  Hallucinations: None  Delusions: No  Memory:Normal: Yes  Memory: Poor Remote  Insight and Judgment: No  Insight and Judgment: Poor Judgment, Poor Insight  Present Suicidal Ideation: No  Present Homicidal Ideation: No  Sleep: Yes, Good, no sleep issues and sleeps through the night Hours Slept: 6 Other PRN Meds: No Med Compliant: Yes Appetite: good Percent Meals: 100% Social: No ADLs: No Speech: normal Depression: 3 Anxiety: 5    Kassandra Ocampo RN

## 2018-07-28 NOTE — PLAN OF CARE
Problem: Depressive Behavior With or Without Suicide Precautions:  Goal: Able to verbalize acceptance of life and situations over which he or she has no control  Able to verbalize acceptance of life and situations over which he or she has no control   Outcome: Ongoing    Goal: Able to verbalize and/or display a decrease in depressive symptoms  Able to verbalize and/or display a decrease in depressive symptoms   Outcome: Ongoing    Goal: Ability to disclose and discuss suicidal ideas will improve  Ability to disclose and discuss suicidal ideas will improve   Outcome: Ongoing    Goal: Able to verbalize support systems  Able to verbalize support systems   Outcome: Ongoing    Goal: Absence of self-harm  Absence of self-harm   Outcome: Ongoing    Goal: Patient specific goal  Patient specific goal   Outcome: Ongoing    Goal: Participates in care planning  Participates in care planning   Outcome: Ongoing

## 2018-07-28 NOTE — PROGRESS NOTES
ADMIT DATE: 7/26/2018  Day 2     PROGRESS NOTE (Weekend Coverage)    SUBJECTIVE / INTERVAL HX    Milagro Romero expressed concerns and anxiety related to the use of Latuda. She expressed primary concerns of feeling overly tired or sedated on this medication and affordability. We discussed transitioning this medication to bedtime if she were to continue to have any further sedation. She denies any suicidal ideation at this time. Denied hallucinations. Patient declined this morning's Genetta Joel so she was able to speak with her provider today. No other somatic complaints reported. Reports she slept throughout the night. Depression rated as 3/10 and anxiety 5/10 per nursing report. OBJECTIVE    Vitals:    07/28/18 0913   BP: (!) 104/55   Pulse: 87   Resp: 18   Temp: 96.9 °F (36.1 °C)   SpO2: 90%       MENTAL STATUS EXAM:     female casually dressed. No distress. Stable gait, no abnormal movements. Eye contact fair. Speech regular rate and rhythm. Mood \"tired. \" affect constricted and anxious, mood congruent and not labile nor expansive. Thought process: linear, logical goal-directed, future oriented. Thought content: denies suicidal or homicidal ideation, no hallucinations, no paranoia, no delusions. No grandiosity. ++ anxiety , no panic symptoms. Insight and judgment fair. Cognition: Alert and oriented x 3. Recall is grossly intact. LABS:  No results found for this or any previous visit (from the past 24 hour(s)).       CURRENT HOSPITAL MEDICATIONS    Current Facility-Administered Medications   Medication Dose Route Frequency Provider Last Rate Last Dose    lurasidone (LATUDA) tablet 40 mg  40 mg Oral Daily Magnolia العراقي APRN   Stopped at 07/28/18 1023    calcium elemental (OSCAL) tablet 500 mg  500 mg Oral Daily Magnolia Keeng, APRN   500 mg at 07/28/18 1021    hydrOXYzine (ATARAX) tablet 25 mg  25 mg Oral TID PRN Magnolia Keeng, APRN        melatonin tablet 9 mg  9 mg Oral Nightly PRN

## 2018-07-29 LAB
ALBUMIN SERPL-MCNC: 4.3 G/DL (ref 3.5–5.2)
ALP BLD-CCNC: 103 U/L (ref 35–104)
ALT SERPL-CCNC: 21 U/L (ref 5–33)
ANION GAP SERPL CALCULATED.3IONS-SCNC: 10 MMOL/L (ref 7–19)
AST SERPL-CCNC: 14 U/L (ref 5–32)
BASOPHILS ABSOLUTE: 0.1 K/UL (ref 0–0.2)
BASOPHILS RELATIVE PERCENT: 0.6 % (ref 0–1)
BILIRUB SERPL-MCNC: 0.3 MG/DL (ref 0.2–1.2)
BUN BLDV-MCNC: 11 MG/DL (ref 6–20)
CALCIUM SERPL-MCNC: 10.1 MG/DL (ref 8.6–10)
CHLORIDE BLD-SCNC: 99 MMOL/L (ref 98–111)
CO2: 29 MMOL/L (ref 22–29)
CREAT SERPL-MCNC: 0.6 MG/DL (ref 0.5–0.9)
EOSINOPHILS ABSOLUTE: 0.1 K/UL (ref 0–0.6)
EOSINOPHILS RELATIVE PERCENT: 1 % (ref 0–5)
GFR NON-AFRICAN AMERICAN: >60
GLUCOSE BLD-MCNC: 150 MG/DL (ref 74–109)
HCT VFR BLD CALC: 42.6 % (ref 37–47)
HEMOGLOBIN: 13.9 G/DL (ref 12–16)
LYMPHOCYTES ABSOLUTE: 2.1 K/UL (ref 1.1–4.5)
LYMPHOCYTES RELATIVE PERCENT: 18.4 % (ref 20–40)
MCH RBC QN AUTO: 31 PG (ref 27–31)
MCHC RBC AUTO-ENTMCNC: 32.6 G/DL (ref 33–37)
MCV RBC AUTO: 94.9 FL (ref 81–99)
MONOCYTES ABSOLUTE: 0.7 K/UL (ref 0–0.9)
MONOCYTES RELATIVE PERCENT: 6.3 % (ref 0–10)
NEUTROPHILS ABSOLUTE: 8.3 K/UL (ref 1.5–7.5)
NEUTROPHILS RELATIVE PERCENT: 72.9 % (ref 50–65)
PDW BLD-RTO: 12.7 % (ref 11.5–14.5)
PLATELET # BLD: 317 K/UL (ref 130–400)
PMV BLD AUTO: 9.2 FL (ref 9.4–12.3)
POTASSIUM SERPL-SCNC: 4.2 MMOL/L (ref 3.5–5)
RBC # BLD: 4.49 M/UL (ref 4.2–5.4)
SODIUM BLD-SCNC: 138 MMOL/L (ref 136–145)
TOTAL PROTEIN: 7.2 G/DL (ref 6.6–8.7)
WBC # BLD: 11.4 K/UL (ref 4.8–10.8)

## 2018-07-29 PROCEDURE — 6370000000 HC RX 637 (ALT 250 FOR IP): Performed by: NURSE PRACTITIONER

## 2018-07-29 PROCEDURE — 36415 COLL VENOUS BLD VENIPUNCTURE: CPT

## 2018-07-29 PROCEDURE — 1240000000 HC EMOTIONAL WELLNESS R&B

## 2018-07-29 PROCEDURE — 99232 SBSQ HOSP IP/OBS MODERATE 35: CPT | Performed by: PSYCHIATRY & NEUROLOGY

## 2018-07-29 PROCEDURE — 85025 COMPLETE CBC W/AUTO DIFF WBC: CPT

## 2018-07-29 PROCEDURE — 80053 COMPREHEN METABOLIC PANEL: CPT

## 2018-07-29 PROCEDURE — 6360000002 HC RX W HCPCS: Performed by: PSYCHIATRY & NEUROLOGY

## 2018-07-29 PROCEDURE — G0378 HOSPITAL OBSERVATION PER HR: HCPCS

## 2018-07-29 RX ORDER — ONDANSETRON 4 MG/1
4 TABLET, ORALLY DISINTEGRATING ORAL EVERY 8 HOURS PRN
Status: DISCONTINUED | OUTPATIENT
Start: 2018-07-29 | End: 2018-08-02 | Stop reason: HOSPADM

## 2018-07-29 RX ADMIN — HYDROXYZINE HYDROCHLORIDE 25 MG: 25 TABLET ORAL at 17:27

## 2018-07-29 RX ADMIN — ONDANSETRON 4 MG: 4 TABLET, ORALLY DISINTEGRATING ORAL at 09:39

## 2018-07-29 RX ADMIN — MELATONIN 3 MG ORAL TABLET 9 MG: 3 TABLET ORAL at 20:25

## 2018-07-29 RX ADMIN — CALCIUM 500 MG: 500 TABLET ORAL at 08:26

## 2018-07-29 RX ADMIN — ONDANSETRON 4 MG: 4 TABLET, ORALLY DISINTEGRATING ORAL at 16:59

## 2018-07-29 RX ADMIN — MICONAZOLE NITRATE: 20 POWDER TOPICAL at 08:26

## 2018-07-29 NOTE — PLAN OF CARE
Problem: Depressive Behavior With or Without Suicide Precautions:  Goal: Able to verbalize and/or display a decrease in depressive symptoms  Able to verbalize and/or display a decrease in depressive symptoms   Outcome: Ongoing                                                                      Group Therapy Note    Date: 7/29/2018  Start Time: 1300  End Time:  1330  Number of Participants: 13    Type of Group: Recovery    Wellness Binder Information  Module Name:  Emotional Wellness    Session Number: 3. Patient's Goal: Happiness    Notes: Pt acknowledged that in order to improve their lives, they should strive to be more positive, develop a daily routine, and focus on one task or goal at a time. Status After Intervention:  Improved    Participation Level:  Active Listener and Interactive    Participation Quality: Appropriate, Attentive and Sharing      Speech:  normal      Thought Process/Content: Logical      Affective Functioning: Congruent      Mood: depressed      Level of consciousness:  Alert, Oriented x4 and Attentive      Response to Learning: Progressing to goal      Endings: None Reported    Modes of Intervention: Support      Discipline Responsible: Psychoeducational Specialist      Signature:  Maye Andre

## 2018-07-29 NOTE — PROGRESS NOTES
ADMIT DATE: 7/26/2018  Day 3     PROGRESS NOTE (Weekend Coverage)      SUBJECTIVE / INTERVAL HX     Dana Zambrano  had an episode of nausea vomiting that was witnessed after breakfast. Prior to that, she stated felt \"fine\" and had no somatic issues. She reports she slept well last night with change in Latuda to bedtime after lunch today she remained nauseous. And has remained in her bed for most of the day. no diarrhea, no reported chest pain. Currently reports feeling \"not good\". No new medications have been started. She was offered Zofran ODT for N/V with mild relief. OBJECTIVE    Vitals:    07/29/18 1137   BP: 114/61   Pulse: 71   Resp: 18   Temp: 96.4 °F (35.8 °C)   SpO2: 97%       MENTAL STATUS EXAM:     female casually dressed. Lying in bed in not acute distress. Eye contact fair. Speech regular rate and rhythm, soft spoken. Mood \"not good. \" affect constricted, mood congruent and not labile nor expansive. Thought process: linear, logical goal-directed, future oriented. Thought content: denies suicidal or homicidal ideation, no hallucinations, no paranoia, no delusions. No grandiosity. Less anxiety today, no panic symptoms. Insight and judgment fair. Cognition: Alert and oriented x 3. Recall is grossly intact.     LABS:  Recent Results (from the past 24 hour(s))   CBC Auto Differential    Collection Time: 07/29/18  2:48 PM   Result Value Ref Range    WBC 11.4 (H) 4.8 - 10.8 K/uL    RBC 4.49 4.20 - 5.40 M/uL    Hemoglobin 13.9 12.0 - 16.0 g/dL    Hematocrit 42.6 37.0 - 47.0 %    MCV 94.9 81.0 - 99.0 fL    MCH 31.0 27.0 - 31.0 pg    MCHC 32.6 (L) 33.0 - 37.0 g/dL    RDW 12.7 11.5 - 14.5 %    Platelets 851 456 - 170 K/uL    MPV 9.2 (L) 9.4 - 12.3 fL    Neutrophils % 72.9 (H) 50.0 - 65.0 %    Lymphocytes % 18.4 (L) 20.0 - 40.0 %    Monocytes % 6.3 0.0 - 10.0 %    Eosinophils % 1.0 0.0 - 5.0 %    Basophils % 0.6 0.0 - 1.0 %    Neutrophils # 8.3 (H) 1.5 - 7.5 K/uL    Lymphocytes # 2.1 1.1 - 4.5 K/uL Monocytes # 0.70 0.00 - 0.90 K/uL    Eosinophils # 0.10 0.00 - 0.60 K/uL    Basophils # 0.10 0.00 - 0.20 K/uL         CURRENT HOSPITAL MEDICATIONS    Current Facility-Administered Medications   Medication Dose Route Frequency Provider Last Rate Last Dose    ondansetron (ZOFRAN-ODT) disintegrating tablet 4 mg  4 mg Oral Q8H PRN Carl Leone MD   4 mg at 07/29/18 3740    lurasidone (LATUDA) tablet 40 mg  40 mg Oral Nightly Carl Leone MD   40 mg at 07/28/18 2051    calcium elemental (OSCAL) tablet 500 mg  500 mg Oral Daily Carmel Maxon, APRN   500 mg at 07/29/18 4101    hydrOXYzine (ATARAX) tablet 25 mg  25 mg Oral TID PRN Carmel Maxon, APRN        melatonin tablet 9 mg  9 mg Oral Nightly PRN Carmel Maxon, APRN   9 mg at 07/28/18 2036    [START ON 8/1/2018] vitamin D (ERGOCALCIFEROL) capsule 50,000 Units  50,000 Units Oral Weekly Carmel Maxon, APRN        miconazole (MICOTIN) 2 % powder   Topical BID Ying Montgomery MD        magnesium hydroxide (MILK OF MAGNESIA) 400 MG/5ML suspension 30 mL  30 mL Oral Daily PRN Rex Torres MD             DIAGNOSES    Bipolar depression. General anxiety disorder. Arthritis  history of GERD. ASSESSMENT / TREATMENT PLAN    Acute nausea and vomiting. Zofran ODT 4 mg q8 prn N/V offered. Check CMP. CBC shows mild WBC and neutrophil shift, VSS. Will up with medicine if symptoms persists. Unlikely secondary from [place], though will continue to monitor. TIME SPENT:  20 minutes with greater than 50% devoted to direct patient care and case management.

## 2018-07-29 NOTE — PROGRESS NOTES
BHI Daily Shift Assessment  Nursing Progress Note    Room: [facility]/608-02 Name: Ignacia Mi Age: 47 y.o.    Ethnicity:  Gender: female   Dx: Bipolar 1 disorder, depressed, severe ([address]  Precautions: suicide risk  CPAP: No Accu-Chek: No  MSE:  Status and Exam  Normal: No  Facial Expression: Avoids Gaze, Flat, Worried, Sad  Affect: Blunt, Congruent  Level of Consciousness: Alert  Mood:Normal: No  Mood: Depressed, Anxious  Motor Activity:Normal: No  Motor Activity: Decreased  Interview Behavior: Cooperative  Preception: [place] to Time, [place] to Person, [place] to Place, [place] to Situation  Attention:Normal: No  Attention: Unable to Concentrate  Thought Processes: Blocking  Thought Content:Normal: No  Thought Content: Poverty of Content, Preoccupations  Hallucinations: None  Delusions: No  Memory:Normal: Yes  Memory: Poor Remote  Insight and Judgment: No  Insight and Judgment: Poor Insight, Poor Judgment  Present Suicidal Ideation: No  Present Homicidal Ideation: No  Sleep: Yes, Good, sleeps excessively Hours Slept: 9 } Other PRN Meds: Yes and zofran Med Compliant: Yes Appetite: good Percent Meals: 100% Social: No ADLs: No Speech: normal Depression: 8 Anxiety: 5    Jenni Taylor RN

## 2018-07-29 NOTE — PROGRESS NOTES
Talked with patient's mother. Mother says that patient is much more lethargic than she has been in the past. Patient has spent most of the day today and yesterday in bed, getting up rarely. This is different behavior than she exhibited when she was here last week. Patient also c/o of feelings of paranoia. Mother stated that patient has taken [place] before and had these problems to the point that she was finally taken off of the medication.

## 2018-07-29 NOTE — PROGRESS NOTES
Patients mother came up to nurses desk during visitation concerned about her daughter. States Redd Valente is going backward and is more paranoid now then when she came in. I would like to speak with the doctor tomorrow if you could leave a message. \"  Patient has been less sociable and has been in bed most of day due to nausea and vomiting. Affect is flat. Paranoia noted. Hesitant when spoken to.

## 2018-07-29 NOTE — PLAN OF CARE
Problem: Depressive Behavior With or Without Suicide Precautions:  Goal: Able to verbalize acceptance of life and situations over which he or she has no control  Able to verbalize acceptance of life and situations over which he or she has no control   Outcome: Ongoing                                                                      Group Therapy Note    Date: 7/29/2018  Start Time: 1330  End Time:  1400  Number of Participants: 13    Type of Group: Cognitive Skills    Wellness Binder Information  Module Name:  Staying Well  Session Number:  1. Patient's Goal: Daily Maintenance and Coping Skills       SW used verbal prompts to encourage pt to attend group but pt refused.       Discipline Responsible: Psychoeducational Specialist      Signature:  Jax Jacobson

## 2018-07-29 NOTE — PROGRESS NOTES
BHI Daily Shift Assessment  Nursing Progress Note    Room: [facility]/608-02 Name: Milagro Romero Age: 47 y.o.    Ethnicity:  Gender: female   Dx: Bipolar 1 disorder, depressed, severe ([address]  Precautions: suicide risk  CPAP: No Accu-Chek: No  MSE:  Status and Exam  Normal: No  Facial Expression: Flat, Worried  Affect: Blunt, Congruent  Level of Consciousness: Alert  Mood:Normal: No  Mood: Depressed, Anxious  Motor Activity:Normal: No  Motor Activity: Decreased  Interview Behavior: Cooperative  Preception: [place] to Person, [place] to Time, [place] to Place, [place] to Situation  Attention:Normal: No  Attention: Unable to Concentrate  Thought Processes: Blocking  Thought Content:Normal: No  Thought Content: Preoccupations  Hallucinations: None  Delusions: No  Memory:Normal: Yes  Memory: Poor Remote  Insight and Judgment: No  Insight and Judgment: Poor Judgment, Poor Insight  Present Suicidal Ideation: No  Present Homicidal Ideation: No  Sleep: Yes, Fair, has difficulty falling asleep and has interrupted sleep Sched Sleep Meds: No PRN Sleep Meds: Yes Other PRN Meds: No Med Compliant: Yes Appetite: good Percent Meals: 75% Social: No ADLs: No Speech: normal Depression: 8 Anxiety: 5    Sadia aPn RN

## 2018-07-29 NOTE — PROGRESS NOTES
Progress Note  Elton Chinchilla  7/28/2018 8:57 PM  Subjective:   Admit Date:   7/26/2018      CC/ADMIT DX:       Interval History:   Reviewed overnight events and nursing notes. She is c/o nausea today. No vomiting. She is eating. I have reviewed all labs/diagnostics from the last 24hrs. ROS:   I have done a 10 point ROS and all are negative, except what is mentioned in the HPI. DIET GENERAL;    Medications:      lurasidone  40 mg Oral Nightly    calcium elemental  500 mg Oral Daily    [START ON 8/1/2018] vitamin D  50,000 Units Oral Weekly    miconazole   Topical BID           Objective:   Vitals: BP (!) 114/55   Pulse 89   Temp 98.3 °F (36.8 °C) (Temporal)   Resp 16   Ht 5' 4\" (1.626 m)   Wt 211 lb (95.7 kg)   SpO2 95%   BMI 36.22 kg/m²  No intake or output data in the 24 hours ending 07/28/18 2057  General appearance: alert and cooperative with exam  Lungs: clear to auscultation bilaterally  Heart: RRR  Abdomen: soft, non-tender; bowel sounds normal; no masses,  no organomegaly  Extremities: extremities normal, atraumatic, no cyanosis or edema  Neurologic:  No obvious focal neurologic deficits. Assessment and Plan:   Principal Problem:    Bipolar 1 disorder, depressed, severe ([address]  Active Problems:    Suicidal ideation  Resolved Problems:    Depression with suicidal ideation      Plan:  1. Continue present medication(s)   2. She has no new medical issues. I will monitor for any changes or concerns. 3.  Her urine culture is negative. 4.  Follow with Psych      Discharge planning:   her home     Reviewed treatment plans with the patient and/or family.              Electronically signed by Irena Cazares MD on 7/28/2018 at 8:57 PM

## 2018-07-29 NOTE — PROGRESS NOTES
Morning Group/ Community Meeting    Date: 7/29/18  Time: 0900    No. of Participants: 16    Patient attended and participated in morning group. Patient Goal: Doing daily things.      [address] [place] Technician

## 2018-07-30 PROCEDURE — 6370000000 HC RX 637 (ALT 250 FOR IP): Performed by: NURSE PRACTITIONER

## 2018-07-30 PROCEDURE — 1240000000 HC EMOTIONAL WELLNESS R&B

## 2018-07-30 PROCEDURE — G0378 HOSPITAL OBSERVATION PER HR: HCPCS

## 2018-07-30 PROCEDURE — 99231 SBSQ HOSP IP/OBS SF/LOW 25: CPT | Performed by: NURSE PRACTITIONER

## 2018-07-30 RX ORDER — QUETIAPINE FUMARATE 50 MG/1
50 TABLET, EXTENDED RELEASE ORAL NIGHTLY
Status: DISCONTINUED | OUTPATIENT
Start: 2018-07-30 | End: 2018-08-02 | Stop reason: HOSPADM

## 2018-07-30 RX ADMIN — QUETIAPINE FUMARATE 50 MG: 50 TABLET, EXTENDED RELEASE ORAL at 20:44

## 2018-07-30 RX ADMIN — MELATONIN 3 MG ORAL TABLET 9 MG: 3 TABLET ORAL at 20:44

## 2018-07-30 RX ADMIN — CALCIUM 500 MG: 500 TABLET ORAL at 09:17

## 2018-07-30 RX ADMIN — MICONAZOLE NITRATE: 20 POWDER TOPICAL at 09:18

## 2018-07-30 NOTE — PROGRESS NOTES
Group Therapy Note    Date: 7/30/2018  Start Time: 1600  End Time:  1630  Number of Participants: 14    Type of Group: Healthy Living/Wellness    Notes: Focusing on the positive     Status After Intervention:  Unchanged    Participation Level:  Active Listener    Participation Quality: Appropriate      Speech:  normal      Thought Process/Content: Logical  Linear      Affective Functioning: Congruent      Mood: anxious and depressed      Level of consciousness:  Oriented x4      Response to Learning: Able to retain information      Signature:  Floresita Foster RN

## 2018-07-30 NOTE — PROGRESS NOTES
Group Therapy Note    Date: 7/30/18  Start Time: 0830  End Time:  0900  Number of Participants: 19    Type of Group: Community Meeting    Patient's Goal:  Going to groups and talking with the doctor    Notes:    Patient attended group, filled out menu and goals sheet. Continue to monitor for safety. Status After Intervention:  Improved    Participation Level:  Active Listener    Participation Quality: Appropriate    Discipline Responsible: SOHM      Signature:  Xiao Arreola

## 2018-07-30 NOTE — PROGRESS NOTES
BHI Daily Shift Assessment  Nursing Progress Note    Room: [facility]/608-02 Name: Yun Stafford Age: 47 y.o.    Ethnicity:  Gender: female   Dx: Bipolar 1 disorder, depressed, severe ([address]  Precautions: suicide risk  CPAP: No Accu-Chek: No  MSE:  Status and Exam  Normal: No  Facial Expression: Avoids Gaze  Affect: Blunt  Level of Consciousness: Alert  Mood:Normal: No  Mood: Depressed, Anxious  Motor Activity:Normal: No  Motor Activity: Decreased  Interview Behavior: Cooperative  Preception: [place] to Person, Geraldene Piggs to Time, [place] to Place, [place] to Situation  Attention:Normal: No  Attention: Unable to Concentrate  Thought Processes: Blocking  Thought Content:Normal: No  Thought Content: Poverty of Content  Hallucinations: None  Delusions: No  Memory:Normal: Yes  Memory: Poor Remote  Insight and Judgment: No  Insight and Judgment: Poor Judgment, Poor Insight  Present Suicidal Ideation: No  Present Homicidal Ideation: No  Sleep: Yes, Good, has difficulty falling asleep Sched Sleep Meds: No PRN Sleep Meds: Yes Other PRN Meds: No Med Compliant: Yes Appetite: improved Percent Meals: 75% Social: No ADLs: No Speech: normal Depression: 8 Anxiety: 5    Cinthya Osuna RN

## 2018-07-30 NOTE — PLAN OF CARE
Problem: Depressive Behavior With or Without Suicide Precautions:  Intervention: Assess coping skills and behavior                                                                      Group Therapy Note    Date: 7/30/2018  Start Time: 1430  End Time:  6614  Number of Participants: 13    Type of Group: Cognitive Skills    Wellness Binder Information  Module Name:  Staying well  Session Number:  1    Patient's Goal:  Daily maintenance and coping skills    Notes:  Pt  Acknowledged use of positive coping skills daily to help stay well. Status After Intervention:  Improved    Participation Level: Interactive    Participation Quality: Appropriate, Attentive and Sharing      Speech:  normal      Thought Process/Content: Logical      Affective Functioning: Congruent      Mood: congruent      Level of consciousness:  Alert, Oriented x4 and Attentive      Response to Learning: Able to verbalize current knowledge/experience      Endings: None Reported    Modes of Intervention: Education      Discipline Responsible: Psychoeducational Specialist      Signature:  Amos Pritchard      Problem: Altered Mood, Depressive Behavior:  Intervention: Assess coping skills and behavior                                                                      Group Therapy Note    Date: 7/30/2018  Start Time: 1430  End Time:  1515  Number of Participants: 13    Type of Group: Cognitive Skills    Wellness Binder Information  Module Name:  Staying well  Session Number:  1    Patient's Goal:  Daily maintenance and coping skills    Notes:  Pt  Acknowledged use of positive coping skills daily to help stay well.     Status After Intervention:  Improved    Participation Level: Interactive    Participation Quality: Appropriate, Attentive and Sharing      Speech:  normal      Thought Process/Content: Logical      Affective Functioning: Congruent      Mood: congruent      Level of consciousness:  Alert, Oriented x4 and Attentive      Response to Learning:

## 2018-07-30 NOTE — PLAN OF CARE
Problem: Depressive Behavior With or Without Suicide Precautions:  Intervention: Assess coping skills and behavior                                                                      Group Therapy Note    Date: 7/30/2018  Start Time: 1100  End Time:  0646  Number of Participants: 16    Type of Group: Psychoeducation    Wellness Binder Information  Module Name:  stress  Session Number:  2    Patient's Goal:  Recognizing signs of stress    Notes:  Pt acknowledged increased anger or depression can be a sign of increased stress. Status After Intervention:  Improved    Participation Level: Interactive    Participation Quality: Appropriate, Attentive and Sharing      Speech:  normal      Thought Process/Content: Logical      Affective Functioning: Congruent      Mood: congruent      Level of consciousness:  Alert, Oriented x4 and Attentive      Response to Learning: Able to verbalize current knowledge/experience      Endings: None Reported    Modes of Intervention: Education      Discipline Responsible: Psychoeducational Specialist      Signature:  David Sandoval      Problem: Altered Mood, Depressive Behavior:  Intervention: Assess coping skills and behavior                                                                      Group Therapy Note    Date: 7/30/2018  Start Time: 1100  End Time:  9685  Number of Participants: 16    Type of Group: Psychoeducation    Wellness Binder Information  Module Name:  stress  Session Number:  2    Patient's Goal:  Recognizing signs of stress    Notes:  Pt acknowledged increased anger or depression can be a sign of increased stress.     Status After Intervention:  Improved    Participation Level: Interactive    Participation Quality: Appropriate, Attentive and Sharing      Speech:  normal      Thought Process/Content: Logical      Affective Functioning: Congruent      Mood: congruent      Level of consciousness:  Alert, Oriented x4 and Attentive      Response to Learning: Able to verbalize current knowledge/experience      Endings: None Reported    Modes of Intervention: Education      Discipline Responsible: Psychoeducational Specialist      Signature:  Tana Rosado

## 2018-07-30 NOTE — PROGRESS NOTES
Group Therapy Note    Start Time: 245  End Time:  403  Number of Participants: 14    Type of Group: Wrap-Up      Patient's Goal:  Pt was compliant with the wrap up paper. Notes:        Participation Level:  Active Listener    Participation Quality: Appropriate      Speech:  normal      Thought Process/Content: Perseverating      Affective Functioning: Flat      Mood: calm      Level of consciousness:  Alert, Oriented x4 and Attentive        Modes of Intervention: Support      Discipline Responsible: Behavorial Health Tech       Signature:  [employer]

## 2018-07-31 PROCEDURE — 6370000000 HC RX 637 (ALT 250 FOR IP): Performed by: FAMILY MEDICINE

## 2018-07-31 PROCEDURE — G0378 HOSPITAL OBSERVATION PER HR: HCPCS

## 2018-07-31 PROCEDURE — 1240000000 HC EMOTIONAL WELLNESS R&B

## 2018-07-31 PROCEDURE — 6370000000 HC RX 637 (ALT 250 FOR IP): Performed by: NURSE PRACTITIONER

## 2018-07-31 PROCEDURE — 99231 SBSQ HOSP IP/OBS SF/LOW 25: CPT | Performed by: NURSE PRACTITIONER

## 2018-07-31 PROCEDURE — 6370000000 HC RX 637 (ALT 250 FOR IP): Performed by: PSYCHIATRY & NEUROLOGY

## 2018-07-31 RX ORDER — POLYETHYLENE GLYCOL 3350 17 G/17G
17 POWDER, FOR SOLUTION ORAL DAILY
Status: DISCONTINUED | OUTPATIENT
Start: 2018-07-31 | End: 2018-08-02 | Stop reason: HOSPADM

## 2018-07-31 RX ORDER — DOCUSATE SODIUM 100 MG/1
100 CAPSULE, LIQUID FILLED ORAL 2 TIMES DAILY
Status: DISCONTINUED | OUTPATIENT
Start: 2018-07-31 | End: 2018-08-02 | Stop reason: HOSPADM

## 2018-07-31 RX ADMIN — CALCIUM 500 MG: 500 TABLET ORAL at 08:44

## 2018-07-31 RX ADMIN — QUETIAPINE FUMARATE 50 MG: 50 TABLET, EXTENDED RELEASE ORAL at 20:19

## 2018-07-31 RX ADMIN — MAGNESIUM HYDROXIDE 30 ML: 400 SUSPENSION ORAL at 10:59

## 2018-07-31 RX ADMIN — DOCUSATE SODIUM 100 MG: 100 CAPSULE, LIQUID FILLED ORAL at 20:19

## 2018-07-31 RX ADMIN — MICONAZOLE NITRATE: 20 POWDER TOPICAL at 20:19

## 2018-07-31 NOTE — PROGRESS NOTES
BHI Daily Shift Assessment  Nursing Progress Note    Room: [facility]/608-02 Name: Tali Sharp Age: 47 y.o.    Ethnicity:  Gender: female   Dx: Bipolar 1 disorder, depressed, severe ([address]  Precautions: suicide risk  CPAP: No Accu-Chek: No  MSE:  Status and Exam  Normal: No  Facial Expression: Avoids Gaze  Affect: Blunt, Constricted  Level of Consciousness: Alert  Mood:Normal: No  Mood: Depressed, Anxious  Motor Activity:Normal: No  Motor Activity: Decreased  Interview Behavior: Cooperative  Preception: [place] to Person, Erleen Bussey to Place, [place] to Time, [place] to Situation  Attention:Normal: No  Attention: Unable to Concentrate  Thought Processes: Circumstantial  Thought Content:Normal: No  Thought Content: Poverty of Content  Hallucinations: None  Delusions: No  Memory:Normal: Yes  Memory: Poor Recent  Insight and Judgment: No  Insight and Judgment: Poor Judgment, Poor Insight  Present Suicidal Ideation: No  Present Homicidal Ideation: No  Sleep: Yes, Good, no sleep issues Hours Slept: 7 Sched Sleep Meds: Yes PRN Sleep Meds: Yes Other PRN Meds: Yes Med Compliant: Yes Appetite: good Percent Meals: 100% Social: Yes ADLs: Yes Speech: normal Depression: 5 Anxiety: 5    Manohar Nina RN

## 2018-07-31 NOTE — PLAN OF CARE
current knowledge/experience      Endings: None Reported    Modes of Intervention: Education      Discipline Responsible: Psychoeducational Specialist      Signature:  Shakila Ramos

## 2018-07-31 NOTE — PLAN OF CARE
Attentive and Sharing      Speech:  normal      Thought Process/Content: Logical      Affective Functioning: Congruent      Mood: euthymic      Level of consciousness:  Alert      Response to Learning: Able to verbalize current knowledge/experience      Endings: None Reported    Modes of Intervention: Education, Support and Socialization      Discipline Responsible: /Counselor      Signature:  [facility] [place]

## 2018-07-31 NOTE — PLAN OF CARE
Problem: Depressive Behavior With or Without Suicide Precautions:  Intervention: Assess coping skills and behavior                                                                      Group Therapy Note    Date: 7/31/2018  Start Time: 1100  End Time:  2436  Number of Participants: 15    Type of Group: Psychoeducation    Wellness Binder Information  Module Name:  Emotional wellness  Session Number:  1    Patient's Goal:  Validation of feelings    Notes:  Pt acknowledged to have feelings validated it may require sharing feelings with others. Status After Intervention:  Improved    Participation Level: Interactive    Participation Quality: Appropriate, Attentive and Sharing      Speech:  normal      Thought Process/Content: Logical      Affective Functioning: Congruent      Mood: congruent      Level of consciousness:  Alert, Oriented x4 and Attentive      Response to Learning: Able to verbalize current knowledge/experience      Endings: None Reported    Modes of Intervention: Education      Discipline Responsible: Psychoeducational Specialist      Signature:  Suzi Morley      Problem: Altered Mood, Depressive Behavior:  Intervention: Assess coping skills and behavior                                                                      Group Therapy Note    Date: 7/31/2018  Start Time: 1100  End Time:  6916  Number of Participants: 15    Type of Group: Psychoeducation    Wellness Binder Information  Module Name:  Emotional wellness  Session Number:  1    Patient's Goal:  Validation of feelings    Notes:  Pt acknowledged to have feelings validated it may require sharing feelings with others.     Status After Intervention:  Improved    Participation Level: Interactive    Participation Quality: Appropriate, Attentive and Sharing      Speech:  normal      Thought Process/Content: Logical      Affective Functioning: Congruent      Mood: congruent      Level of consciousness:  Alert, Oriented x4 and

## 2018-07-31 NOTE — PROGRESS NOTES
Progress Note  [place]  7/30/2018 11:26 PM  Subjective:   Admit Date:   7/26/2018      CC/ADMIT DX:       Interval History:   Reviewed overnight events and nursing notes. She denies any abdominal pain or N/V. She is eating. I have reviewed all labs/diagnostics from the last 24hrs. ROS:   I have done a 10 point ROS and all are negative, except what is mentioned in the HPI. DIET GENERAL;    Medications:      QUEtiapine  50 mg Oral Nightly    calcium elemental  500 mg Oral Daily    [START ON 8/1/2018] vitamin D  50,000 Units Oral Weekly    miconazole   Topical BID           Objective:   Vitals: BP (!) 107/49   Pulse 78   Temp 97.8 °F (36.6 °C) (Temporal)   Resp 18   Ht 5' 4\" (1.626 m)   Wt 211 lb (95.7 kg)   SpO2 96%   BMI 36.22 kg/m²  No intake or output data in the 24 hours ending 07/30/18 2326  General appearance: alert and cooperative with exam  Lungs: clear to auscultation bilaterally  Heart: RRR  Abdomen: soft, non-tender; bowel sounds normal; no masses,  no organomegaly  Extremities: extremities normal, atraumatic, no cyanosis or edema  Neurologic:  No obvious focal neurologic deficits. Assessment and Plan:   Principal Problem:    Bipolar 1 disorder, depressed, severe ([address]  Active Problems:    Suicidal ideation  Resolved Problems:    Depression with suicidal ideation   Nausea   Vit D Def    Plan:  1. Continue present medication(s)   2. Resume Vit D  3. Follow with Psych      Discharge planning:   her home     Reviewed treatment plans with the patient and/or family.              Electronically signed by Susan Bernstein MD on 7/30/2018 at 11:26 PM

## 2018-07-31 NOTE — PROGRESS NOTES
abnormalities noted  Attitude toward examiner:  cooperative, attentive and good eye contact  Speech:  normal rate and normal volume  Mood:  \" I had a hard time waking. \"  Affect:  flat  Thought processes:  linear and goal directed  Thought content:  Homocidal ideation : denies  Suicidal Ideation:  denies suicidal ideation  Delusions:  no evidence of delusions  Perceptual Disturbance:  denies any perceptual disturbance  Cognition:  oriented to person, place, and time  Concentration : fair  Memory intact for recent and remote  Fund of knowledge:  average  Abstract thinking:  adequate  Insight:  fair  Judgment:  good        Current Medications:  Current Facility-Administered Medications   Medication Dose Route Frequency Provider Last Rate Last Dose    QUEtiapine (SEROQUEL XR) extended release tablet 50 mg  50 mg Oral Nightly Elizabeth Andrews, APRN   50 mg at 07/30/18 2044    ondansetron (ZOFRAN-ODT) disintegrating tablet 4 mg  4 mg Oral Q8H PRN Kelsy Joseph MD   4 mg at 07/29/18 1659    calcium elemental (OSCAL) tablet 500 mg  500 mg Oral Daily Elizabeth Andrews, APRN   500 mg at 07/31/18 0844    [START ON 8/1/2018] vitamin D (ERGOCALCIFEROL) capsule 50,000 Units  50,000 Units Oral Weekly Elizabeth Andrews, APRN        miconazole (MICOTIN) 2 % powder   Topical BID Ofelia Ragland MD   Stopped at 07/30/18 2044    magnesium hydroxide (MILK OF MAGNESIA) 400 MG/5ML suspension 30 mL  30 mL Oral Daily PRN Castillo Schofield MD           Psychotherapy:   SUPPORTIVE    DSM V Diagnoses:      ACTIVE MEDICAL PROBLEMS:  Patient Active Problem List   Diagnosis    Chest pain    Schizoaffective disorder ([address]    Bipolar 1 disorder ([address]    Bipolar I disorder with mariam ([address]    Psychosis    Mariam ([address]    Intentional drug overdose ([address]    Bipolar 1 disorder, depressed ([address]    Bipolar 1 disorder, depressed, severe ([address]    Suicidal ideation             Plan:    Encourage group therapy  15 minute safety

## 2018-07-31 NOTE — PROGRESS NOTES
Group Therapy Note    Date:7/30/18  Time:2030    Patient attended and participated in [facility] [employer]. Patient filled out wrap up group documentation.     Notes:met daily goal    Judie CONNOR

## 2018-08-01 PROCEDURE — G0378 HOSPITAL OBSERVATION PER HR: HCPCS

## 2018-08-01 PROCEDURE — 6370000000 HC RX 637 (ALT 250 FOR IP): Performed by: NURSE PRACTITIONER

## 2018-08-01 PROCEDURE — 1240000000 HC EMOTIONAL WELLNESS R&B

## 2018-08-01 PROCEDURE — 99231 SBSQ HOSP IP/OBS SF/LOW 25: CPT | Performed by: NURSE PRACTITIONER

## 2018-08-01 PROCEDURE — 6370000000 HC RX 637 (ALT 250 FOR IP): Performed by: FAMILY MEDICINE

## 2018-08-01 RX ORDER — POLYETHYLENE GLYCOL 3350 17 G/17G
17 POWDER, FOR SOLUTION ORAL DAILY
Qty: 527 G | Refills: 0 | Status: SHIPPED | OUTPATIENT
Start: 2018-08-02 | End: 2018-09-01

## 2018-08-01 RX ORDER — PSEUDOEPHEDRINE HCL 30 MG
100 TABLET ORAL 2 TIMES DAILY
Qty: 60 CAPSULE | Refills: 0 | Status: SHIPPED | OUTPATIENT
Start: 2018-08-01 | End: 2018-12-21 | Stop reason: ALTCHOICE

## 2018-08-01 RX ADMIN — ERGOCALCIFEROL 50000 UNITS: 1.25 CAPSULE ORAL at 09:01

## 2018-08-01 RX ADMIN — POLYETHYLENE GLYCOL 3350 17 G: 17 POWDER, FOR SOLUTION ORAL at 09:02

## 2018-08-01 RX ADMIN — DOCUSATE SODIUM 100 MG: 100 CAPSULE, LIQUID FILLED ORAL at 21:05

## 2018-08-01 RX ADMIN — CALCIUM 500 MG: 500 TABLET ORAL at 09:02

## 2018-08-01 RX ADMIN — DOCUSATE SODIUM 100 MG: 100 CAPSULE, LIQUID FILLED ORAL at 09:01

## 2018-08-01 RX ADMIN — QUETIAPINE FUMARATE 50 MG: 50 TABLET, EXTENDED RELEASE ORAL at 21:05

## 2018-08-01 RX ADMIN — MICONAZOLE NITRATE: 20 POWDER TOPICAL at 09:02

## 2018-08-01 NOTE — PLAN OF CARE
Problem: Depressive Behavior With or Without Suicide Precautions:  Goal: Able to verbalize and/or display a decrease in depressive symptoms  Able to verbalize and/or display a decrease in depressive symptoms   Outcome: Ongoing   Group Therapy Note     Date: 8/1/2018  Start Time: 1100  End Time:  6402  Number of Participants: 12     Type of Group: Psychoeducation     Wellness Binder Information  Module Name:  Staying Well   Session Number:  1     Patient's Goal:  Daily maintenance and coping skills      Notes: Pt participated in group as scheduled. Pt discussed 25 Ways of Relieving Anxiety and everyday stressors         Status After Intervention:  Improved     Participation Level: Active Listener and Interactive     Participation Quality: Appropriate, Attentive and Sharing        Speech:  normal        Thought Process/Content: Logical        Affective Functioning: Congruent        Mood: congruent         Level of consciousness:  Alert and Oriented x4        Response to Learning: Able to verbalize current knowledge/experience, Able to verbalize/acknowledge new learning and Able to retain information        Endings: None Reported     Modes of Intervention: Education, Support and Socialization        Discipline Responsible: Psychoeducational Specialist        Signature:   Vy Mackey

## 2018-08-01 NOTE — PROGRESS NOTES
Group Therapy Note    Start Time: 415  End Time:  930  Number of Participants: 17    Type of Group: Community Meeting       Patient's Goal:  \"going to group\"      Notes:      Participation Level:  Active Listener       Participation Quality: Appropriate      Thought Process/Content: Logical      Affective Functioning: Congruent      Mood: calm      Level of consciousness:  Alert      Modes of Intervention: Support      Discipline Responsible: Behavioral Health Tech II      Signature:  Celina Jones

## 2018-08-02 VITALS
HEART RATE: 100 BPM | TEMPERATURE: 97.2 F | WEIGHT: 211 LBS | OXYGEN SATURATION: 96 % | RESPIRATION RATE: 20 BRPM | HEIGHT: 64 IN | BODY MASS INDEX: 36.02 KG/M2 | SYSTOLIC BLOOD PRESSURE: 121 MMHG | DIASTOLIC BLOOD PRESSURE: 66 MMHG

## 2018-08-02 PROBLEM — F31.32 BIPOLAR AFFECTIVE DISORDER, DEPRESSED, MODERATE (HCC): Status: ACTIVE | Noted: 2018-08-02

## 2018-08-02 PROCEDURE — G0378 HOSPITAL OBSERVATION PER HR: HCPCS

## 2018-08-02 PROCEDURE — 99238 HOSP IP/OBS DSCHRG MGMT 30/<: CPT | Performed by: NURSE PRACTITIONER

## 2018-08-02 PROCEDURE — 6370000000 HC RX 637 (ALT 250 FOR IP): Performed by: FAMILY MEDICINE

## 2018-08-02 PROCEDURE — 5130000000 HC BRIDGE APPOINTMENT

## 2018-08-02 PROCEDURE — 6370000000 HC RX 637 (ALT 250 FOR IP): Performed by: NURSE PRACTITIONER

## 2018-08-02 RX ORDER — QUETIAPINE FUMARATE 50 MG/1
50 TABLET, EXTENDED RELEASE ORAL NIGHTLY
Qty: 30 TABLET | Refills: 0 | Status: SHIPPED | OUTPATIENT
Start: 2018-08-02 | End: 2018-08-06 | Stop reason: SINTOL

## 2018-08-02 RX ADMIN — MICONAZOLE NITRATE: 20 POWDER TOPICAL at 08:09

## 2018-08-02 RX ADMIN — DOCUSATE SODIUM 100 MG: 100 CAPSULE, LIQUID FILLED ORAL at 08:09

## 2018-08-02 RX ADMIN — POLYETHYLENE GLYCOL 3350 17 G: 17 POWDER, FOR SOLUTION ORAL at 08:09

## 2018-08-02 RX ADMIN — CALCIUM 500 MG: 500 TABLET ORAL at 08:09

## 2018-08-02 NOTE — PROGRESS NOTES
Patient discharged to home as per doctor's orders. Reviewed discharge medications, belongings, and follow-up appointments with patient and patient expressed understanding. Patient denies SI, HI, and AVH and appears to be in no distress. Patient to continue routine discharge medications until discontinued by patient's provider. At this visit patient has no valuables or home medications. Bridge Appointment completed: Reviewed Discharge Instructions with patient. Patient verbalizes understanding and agreement with the discharge plan using the teachback method.      Referral for Outpatient Tobacco Cessation Counseling, upon discharge (umu X if applicable and completed):    ( )  Hospital staff assisted patient to call Quit Line or faxed referral                                   during hospitalization                  ( )  Recognizing danger situations (included triggers and roadblocks), if not completed on admission                    ( )  Coping skills (new ways to manage stress, exercise, relaxation techniques, changing routine, distraction), if not completed on admission                                                           ( )  Basic information about quitting (benefits of quitting, techniques in how to quit, available resources, if not completed on admission  ( ) Referral for counseling faxed to [facility]   ( ) Patient refused referral  (X ) Patient refused counseling  ( ) Patient refused smoking cessation medication upon discharge

## 2018-08-06 ENCOUNTER — APPOINTMENT (OUTPATIENT)
Dept: GENERAL RADIOLOGY | Age: 53
End: 2018-08-06
Payer: MEDICAID

## 2018-08-06 ENCOUNTER — HOSPITAL ENCOUNTER (EMERGENCY)
Age: 53
Discharge: HOME | End: 2018-08-06
Payer: MEDICAID

## 2018-08-06 VITALS
OXYGEN SATURATION: 96 % | HEIGHT: 64 IN | TEMPERATURE: 98.5 F | WEIGHT: 211 LBS | DIASTOLIC BLOOD PRESSURE: 84 MMHG | RESPIRATION RATE: 20 BRPM | SYSTOLIC BLOOD PRESSURE: 121 MMHG | BODY MASS INDEX: 36.02 KG/M2 | HEART RATE: 85 BPM

## 2018-08-06 DIAGNOSIS — F32.A DEPRESSION, UNSPECIFIED DEPRESSION TYPE: Primary | ICD-10-CM

## 2018-08-06 DIAGNOSIS — T50.905A MEDICATION SIDE EFFECT, INITIAL ENCOUNTER: ICD-10-CM

## 2018-08-06 LAB
ALBUMIN SERPL-MCNC: 4.6 G/DL (ref 3.5–5.2)
ALP BLD-CCNC: 105 U/L (ref 35–104)
ALT SERPL-CCNC: 19 U/L (ref 5–33)
AMPHETAMINE SCREEN, URINE: NEGATIVE
ANION GAP SERPL CALCULATED.3IONS-SCNC: 14 MMOL/L (ref 7–19)
AST SERPL-CCNC: 15 U/L (ref 5–32)
BACTERIA: ABNORMAL /HPF
BARBITURATE SCREEN URINE: NEGATIVE
BENZODIAZEPINE SCREEN, URINE: NEGATIVE
BILIRUB SERPL-MCNC: 0.3 MG/DL (ref 0.2–1.2)
BILIRUBIN URINE: NEGATIVE
BLOOD, URINE: NEGATIVE
BUN BLDV-MCNC: 14 MG/DL (ref 6–20)
CALCIUM SERPL-MCNC: 10 MG/DL (ref 8.6–10)
CANNABINOID SCREEN URINE: NEGATIVE
CHLORIDE BLD-SCNC: 103 MMOL/L (ref 98–111)
CLARITY: ABNORMAL
CO2: 24 MMOL/L (ref 22–29)
COCAINE METABOLITE SCREEN URINE: NEGATIVE
COLOR: YELLOW
CREAT SERPL-MCNC: 0.6 MG/DL (ref 0.5–0.9)
CRYSTALS, UA: ABNORMAL /HPF
EPITHELIAL CELLS, UA: ABNORMAL /HPF
ETHANOL: <10 MG/DL (ref 0–0.08)
GFR NON-AFRICAN AMERICAN: >60
GLUCOSE BLD-MCNC: 113 MG/DL (ref 74–109)
GLUCOSE URINE: NEGATIVE MG/DL
HCT VFR BLD CALC: 44.8 % (ref 37–47)
HEMOGLOBIN: 14.4 G/DL (ref 12–16)
KETONES, URINE: NEGATIVE MG/DL
LEUKOCYTE ESTERASE, URINE: ABNORMAL
Lab: NORMAL
MCH RBC QN AUTO: 31.1 PG (ref 27–31)
MCHC RBC AUTO-ENTMCNC: 32.1 G/DL (ref 33–37)
MCV RBC AUTO: 96.8 FL (ref 81–99)
NITRITE, URINE: NEGATIVE
OPIATE SCREEN URINE: NEGATIVE
PDW BLD-RTO: 12.9 % (ref 11.5–14.5)
PH UA: 6
PLATELET # BLD: 300 K/UL (ref 130–400)
PMV BLD AUTO: 9.4 FL (ref 9.4–12.3)
POTASSIUM SERPL-SCNC: 4.4 MMOL/L (ref 3.5–5)
PROTEIN UA: NEGATIVE MG/DL
RBC # BLD: 4.63 M/UL (ref 4.2–5.4)
RBC UA: ABNORMAL /HPF (ref 0–2)
SODIUM BLD-SCNC: 141 MMOL/L (ref 136–145)
SPECIFIC GRAVITY UA: 1.02
TOTAL PROTEIN: 7.2 G/DL (ref 6.6–8.7)
UROBILINOGEN, URINE: 0.2 E.U./DL
WBC # BLD: 10.3 K/UL (ref 4.8–10.8)
WBC UA: ABNORMAL /HPF (ref 0–5)

## 2018-08-06 PROCEDURE — 81001 URINALYSIS AUTO W/SCOPE: CPT

## 2018-08-06 PROCEDURE — 87086 URINE CULTURE/COLONY COUNT: CPT

## 2018-08-06 PROCEDURE — 80053 COMPREHEN METABOLIC PANEL: CPT

## 2018-08-06 PROCEDURE — 36415 COLL VENOUS BLD VENIPUNCTURE: CPT

## 2018-08-06 PROCEDURE — 99283 EMERGENCY DEPT VISIT LOW MDM: CPT

## 2018-08-06 PROCEDURE — 99284 EMERGENCY DEPT VISIT MOD MDM: CPT | Performed by: NURSE PRACTITIONER

## 2018-08-06 PROCEDURE — 71045 X-RAY EXAM CHEST 1 VIEW: CPT

## 2018-08-06 PROCEDURE — G0480 DRUG TEST DEF 1-7 CLASSES: HCPCS

## 2018-08-06 PROCEDURE — 80307 DRUG TEST PRSMV CHEM ANLYZR: CPT

## 2018-08-06 PROCEDURE — 85027 COMPLETE CBC AUTOMATED: CPT

## 2018-08-06 RX ORDER — QUETIAPINE FUMARATE 25 MG/1
25 TABLET, FILM COATED ORAL 2 TIMES DAILY
Qty: 30 TABLET | Refills: 0 | Status: ON HOLD | OUTPATIENT
Start: 2018-08-06 | End: 2018-10-05 | Stop reason: HOSPADM

## 2018-08-06 ASSESSMENT — ENCOUNTER SYMPTOMS
COUGH: 0
EYE REDNESS: 0
TROUBLE SWALLOWING: 0
ABDOMINAL DISTENTION: 0
VOMITING: 0
EYE DISCHARGE: 0
ABDOMINAL PAIN: 0
DIARRHEA: 0
SHORTNESS OF BREATH: 0
NAUSEA: 0

## 2018-08-07 NOTE — ED PROVIDER NOTES
ketolides; Metoprolol; Neurontin [gabapentin]; Other; Pcn [penicillins]; Provera [medroxyprogesterone]; Prozac [fluoxetine hcl]; Tetracyclines & related; Trazodone and nefazodone; Trintellix [vortioxetine]; Valium [diazepam]; Wellbutrin [bupropion]; and Zoloft [sertraline hcl]    FAMILY HISTORY     History reviewed. No pertinent family history. SOCIAL HISTORY       Social History     Social History    Marital status:      Spouse name: N/A    Number of children: N/A    Years of education: N/A     Social History Main Topics    Smoking status: Never Smoker    Smokeless tobacco: Never Used    Alcohol use No    Drug use: No    Sexual activity: Not Asked     Other Topics Concern    None     Social History Narrative    Per the patient, she was admitted to inpatient care too many to count times, mostly for depression, suicide attempt and mariam. She overdosed 3 times. First overdose happened in her 25s, second overdose happened in year 2000. This is her third overdose. Denied any ther cutting behavior. She had been followed up by Dr. Sunita Trent, last visit    was last month. Dr. Sunita Trent divided her dose of Lamictal from 150 mg once a day to 75 mg twice a day. Dr. Sunita Trent decreased her dose of Abilify from 10    mg a day to 5 mg a day. Per the patient, she tried a lot of different medications for her mental illness including Prozac, ZOLOFT but ZOLOFT made    her more suicidal.  She tried [facility], which made her manic. She also tried [place] [facility], she was on LITHIUM not very long but she was on DEPAKOTE,    two episodes, each episode lasted about a year. DEPAKOTE helped her in the beginning but did not help later on. She overdosed on Depakote once. She    was also tried on Neurontin, Trileptal, Seroquel, risperidone, Zyprexa, Geodon, Abilify, Latuda, and Thorazine. She feels Mounika Jama was helpful.         The patient is allergic to multiple medications    including ADDERALL, AMPHETAMINE, ASPIRIN, CODEINE, COGENTIN, DEPAKOTE ER,    DEPAKOTE, EFFEXOR, ESTROGEN, GEODON, HYDROCODONE, LITHIUM, LORTAB,    KETOLIDES, MACROBID, METOPROLOL, NEURONTIN, PENICILLIN, PROZAC,    TETRACYCLINE, TRAZODONE, NEFAZODONE, VALIUM, WELLBUTRIN, ZOLOFT. The    patient denied any history of seizures, denied any history of head injury,    denied any history of surgery. She had tonsillectomy, and a D and C.       SCREENINGS             PHYSICAL EXAM    (up to 7 for level 4, 8 or more for level 5)     ED Triage Vitals   BP Temp Temp Source Pulse Resp SpO2 Height Weight   08/06/18 1801 08/06/18 1801 08/06/18 1801 08/06/18 1801 08/06/18 1801 08/06/18 1801 08/06/18 1802 08/06/18 1802   121/84 98.5 °F (36.9 °C) Temporal 85 20 96 % 5' 4\" (1.626 m) 211 lb (95.7 kg)       Physical Exam   Constitutional: She appears well-developed and well-nourished. HENT:   Head: Normocephalic and atraumatic. Eyes: Right eye exhibits no discharge. Left eye exhibits no discharge. No scleral icterus. Neck: Normal range of motion. Neck supple. Cardiovascular: Normal rate, regular rhythm and normal heart sounds. Pulmonary/Chest: No respiratory distress. She has no wheezes. Neurological: She is alert. Psychiatric: Her speech is normal and behavior is normal. She exhibits a depressed mood. She expresses no homicidal and no suicidal ideation. Flat effect   Nursing note and vitals reviewed. DIAGNOSTIC RESULTS     EKG: All EKG's are interpreted by the Emergency Department Physician who either signs or Co-signs this chart in the absence of a cardiologist.        RADIOLOGY:   Non-plain film images such as CT, Ultrasound and MRI are read by the radiologist. Plain radiographic images are visualized and preliminarily interpreted by the emergency physician with the below findings:      Interpretation per the Radiologist below, if available at the time of this note:    XR CHEST PORTABLE   Final Result   1.   No radiographic evidence of acute 36080  [phone]      as scheduled      DISCHARGE MEDICATIONS:  Discharge Medication List as of 8/6/2018 10:55 PM      START taking these medications    Details   QUEtiapine (SEROQUEL) 25 MG tablet Take 1 tablet by mouth 2 times daily, Disp-30 tablet, R-0Print                (Please note that portions of this note were completed with a voice recognition program.  Efforts were made to edit the dictations but occasionally words are mis-transcribed.)    TING Moody (electronically signed)          TING Moody  08/07/18 3699

## 2018-08-08 LAB — URINE CULTURE, ROUTINE: NORMAL

## 2018-08-22 ENCOUNTER — OFFICE VISIT (OUTPATIENT)
Dept: OBSTETRICS AND GYNECOLOGY | Facility: CLINIC | Age: 53
End: 2018-08-22

## 2018-08-22 VITALS
DIASTOLIC BLOOD PRESSURE: 74 MMHG | BODY MASS INDEX: 35.34 KG/M2 | SYSTOLIC BLOOD PRESSURE: 120 MMHG | HEIGHT: 64 IN | WEIGHT: 207 LBS

## 2018-08-22 DIAGNOSIS — R30.0 DYSURIA: ICD-10-CM

## 2018-08-22 DIAGNOSIS — B37.31 YEAST VAGINITIS: Primary | ICD-10-CM

## 2018-08-22 LAB
BILIRUB BLD-MCNC: NEGATIVE MG/DL
CLARITY, POC: CLEAR
COLOR UR: YELLOW
GLUCOSE UR STRIP-MCNC: NEGATIVE MG/DL
KETONES UR QL: NEGATIVE
LEUKOCYTE EST, POC: NORMAL
NITRITE UR-MCNC: NEGATIVE MG/ML
PH UR: 5.5 [PH] (ref 5–8)
PROT UR STRIP-MCNC: NEGATIVE MG/DL
RBC # UR STRIP: NEGATIVE /UL
SP GR UR: 1.02 (ref 1–1.03)
UROBILINOGEN UR QL: NORMAL
WET PREP GENITAL: ABNORMAL

## 2018-08-22 PROCEDURE — 99213 OFFICE O/P EST LOW 20 MIN: CPT | Performed by: NURSE PRACTITIONER

## 2018-08-22 PROCEDURE — 51701 INSERT BLADDER CATHETER: CPT | Performed by: NURSE PRACTITIONER

## 2018-08-22 PROCEDURE — 87210 SMEAR WET MOUNT SALINE/INK: CPT | Performed by: NURSE PRACTITIONER

## 2018-08-22 NOTE — PROGRESS NOTES
"      Edith Stephens is a 53 y.o.      Chief Complaint   Patient presents with   • Vaginitis     Pt complains of vaginal discharge.           HPI -postmenopausal female with c/o vaginal itching and discharge for several days. She thinks she has a yeast infection.  She is concerned that she is not urinating often enough, she states she has not urinated since this am at 4:00 am.      The following portions of the patient's history were reviewed and updated as appropriate:vital signs, allergies, current medications, past family history, past medical history, past social history, past surgical history and problem list.      Current Outpatient Prescriptions:   •  ibuprofen (ADVIL,MOTRIN) 200 MG tablet, Take 200 mg by mouth Every 8 (Eight) Hours As Needed for Mild Pain (1-3)., Disp: , Rfl:   •  Melatonin 3 MG tablet, Take 2 tablets by mouth At Night As Needed for Sleep., Disp: , Rfl:   •  Multiple Minerals-Vitamins (CALCIUM-MAGNESIUM-ZINC-D3 PO), Take 3 tablets by mouth Daily., Disp: , Rfl:   •  Multiple Vitamins-Minerals (CENTRUM SILVER PO), Take 1 tablet by mouth Daily., Disp: , Rfl:   •  nystatin (MYCOSTATIN) 259802 UNIT/GM powder, Apply  topically 4 (Four) Times a Day. USES UNDER BREAST, Disp: , Rfl:     Review of Systems   Constitutional: Negative for appetite change, chills and fatigue.   HENT: Negative for congestion, dental problem and ear discharge.    Eyes: Negative for blurred vision and double vision.   Respiratory: Negative for apnea and chest tightness.    Gastrointestinal: Negative for abdominal distention, abdominal pain and blood in stool.   Endocrine: Negative for breast discharge.   Genitourinary: Positive for vaginal discharge. Negative for breast lump.        VAGINAL ITCHING  Patient thinks she is not voiding her usual amount (frequency)   Psychiatric/Behavioral:        Patient appears anxious      Breast ROS: negative    Objective      /74   Ht 162.6 cm (64\")   Wt 93.9 kg (207 lb)   " BMI 35.53 kg/m²       Physical Exam   Constitutional: She appears well-developed.   Obese, appears anxious   Eyes: Right eye exhibits no discharge. Left eye exhibits no discharge.   Pulmonary/Chest: Effort normal.   Abdominal: Soft. She exhibits no distension.   Genitourinary: There is no rash, tenderness or lesion on the right labia. There is no tenderness or lesion on the left labia. Uterus is not deviated, enlarged, mobile or exhibiting a mass.   Cervix is not parous and not nulliparous. Cervix does not exhibit motion tenderness or discharge. Right adnexum displays no mass and no tenderness. Left adnexum displays no mass and no tenderness. There is erythema in the vagina. No tenderness or bleeding in the vagina. No foreign body in the vagina. No signs of injury around the vagina. Vaginal discharge found.   Musculoskeletal: She exhibits no deformity.   Neurological: She is alert.   Skin: No erythema. No pallor.   Nursing note and vitals reviewed.       Assessment/Plan     ASSESSMENT/PLAN    Edith was seen today for vaginitis.    Diagnoses and all orders for this visit:    Yeast vaginitis  -     POC Wet Prep   yeast  -     terconazole (TERAZOL 7) 0.4 % vaginal cream; Insert 1 applicator into the vagina Every Night for 7 days.    Dysuria  -     POC Urinalysis Dipstick, Automated  negative  -      -     Urine Culture, Comprehen ([facility]) - Urine, Clean Catch    Cath. Post void residual is 60 cc    Will treat if has UTI, patient is counseled re: no sig. Urinary retention today and if her symptoms persist, she should see her PCP.        Urine culture negative for infection    Mary Hawkins, APRN  8/22/2018

## 2018-08-27 LAB
BACTERIA UR CULT: NORMAL
BACTERIA UR CULT: NORMAL

## 2018-09-24 ENCOUNTER — HOSPITAL ENCOUNTER (INPATIENT)
Age: 53
LOS: 10 days | Discharge: HOME | DRG: 885 | End: 2018-10-05
Attending: EMERGENCY MEDICINE | Admitting: PSYCHIATRY & NEUROLOGY
Payer: MEDICAID

## 2018-09-24 ENCOUNTER — APPOINTMENT (OUTPATIENT)
Dept: CT IMAGING | Age: 53
DRG: 885 | End: 2018-09-24
Payer: MEDICAID

## 2018-09-24 DIAGNOSIS — F41.8 DEPRESSION WITH ANXIETY: Primary | ICD-10-CM

## 2018-09-24 LAB
ACETAMINOPHEN LEVEL: <15 UG/ML
ALBUMIN SERPL-MCNC: 4.4 G/DL (ref 3.5–5.2)
ALP BLD-CCNC: 92 U/L (ref 35–104)
ALT SERPL-CCNC: 22 U/L (ref 5–33)
AMPHETAMINE SCREEN, URINE: NEGATIVE
ANION GAP SERPL CALCULATED.3IONS-SCNC: 14 MMOL/L (ref 7–19)
AST SERPL-CCNC: 21 U/L (ref 5–32)
BACTERIA: ABNORMAL /HPF
BARBITURATE SCREEN URINE: NEGATIVE
BASOPHILS ABSOLUTE: 0 K/UL (ref 0–0.2)
BASOPHILS RELATIVE PERCENT: 0.4 % (ref 0–1)
BENZODIAZEPINE SCREEN, URINE: NEGATIVE
BILIRUB SERPL-MCNC: 0.5 MG/DL (ref 0.2–1.2)
BILIRUBIN URINE: NEGATIVE
BLOOD, URINE: NEGATIVE
BUN BLDV-MCNC: 15 MG/DL (ref 6–20)
CALCIUM SERPL-MCNC: 9.9 MG/DL (ref 8.6–10)
CANNABINOID SCREEN URINE: NEGATIVE
CHLORIDE BLD-SCNC: 108 MMOL/L (ref 98–111)
CLARITY: ABNORMAL
CO2: 18 MMOL/L (ref 22–29)
COCAINE METABOLITE SCREEN URINE: NEGATIVE
COLOR: YELLOW
CREAT SERPL-MCNC: 0.6 MG/DL (ref 0.5–0.9)
EOSINOPHILS ABSOLUTE: 0.1 K/UL (ref 0–0.6)
EOSINOPHILS RELATIVE PERCENT: 0.7 % (ref 0–5)
EPITHELIAL CELLS, UA: ABNORMAL /HPF
ETHANOL: <10 MG/DL (ref 0–0.08)
GFR NON-AFRICAN AMERICAN: >60
GLUCOSE BLD-MCNC: 96 MG/DL (ref 74–109)
GLUCOSE URINE: NEGATIVE MG/DL
HCT VFR BLD CALC: 44.5 % (ref 37–47)
HEMOGLOBIN: 14.7 G/DL (ref 12–16)
KETONES, URINE: NEGATIVE MG/DL
LEUKOCYTE ESTERASE, URINE: ABNORMAL
LYMPHOCYTES ABSOLUTE: 2 K/UL (ref 1.1–4.5)
LYMPHOCYTES RELATIVE PERCENT: 18.7 % (ref 20–40)
Lab: NORMAL
MCH RBC QN AUTO: 30.8 PG (ref 27–31)
MCHC RBC AUTO-ENTMCNC: 33 G/DL (ref 33–37)
MCV RBC AUTO: 93.1 FL (ref 81–99)
MONOCYTES ABSOLUTE: 0.7 K/UL (ref 0–0.9)
MONOCYTES RELATIVE PERCENT: 6.9 % (ref 0–10)
NEUTROPHILS ABSOLUTE: 7.6 K/UL (ref 1.5–7.5)
NEUTROPHILS RELATIVE PERCENT: 72.7 % (ref 50–65)
NITRITE, URINE: NEGATIVE
OPIATE SCREEN URINE: NEGATIVE
PDW BLD-RTO: 12.6 % (ref 11.5–14.5)
PH UA: 6.5
PLATELET # BLD: 352 K/UL (ref 130–400)
PMV BLD AUTO: 9.8 FL (ref 9.4–12.3)
POTASSIUM SERPL-SCNC: 3.7 MMOL/L (ref 3.5–5)
PROTEIN UA: NEGATIVE MG/DL
RBC # BLD: 4.78 M/UL (ref 4.2–5.4)
RBC UA: ABNORMAL /HPF (ref 0–2)
SALICYLATE, SERUM: <3 MG/DL (ref 3–10)
SODIUM BLD-SCNC: 140 MMOL/L (ref 136–145)
SPECIFIC GRAVITY UA: 1.02
TOTAL PROTEIN: 7.5 G/DL (ref 6.6–8.7)
UROBILINOGEN, URINE: 0.2 E.U./DL
WBC # BLD: 10.5 K/UL (ref 4.8–10.8)
WBC UA: ABNORMAL /HPF (ref 0–5)

## 2018-09-24 PROCEDURE — 70450 CT HEAD/BRAIN W/O DYE: CPT

## 2018-09-24 PROCEDURE — 87086 URINE CULTURE/COLONY COUNT: CPT

## 2018-09-24 PROCEDURE — G0480 DRUG TEST DEF 1-7 CLASSES: HCPCS

## 2018-09-24 PROCEDURE — 80053 COMPREHEN METABOLIC PANEL: CPT

## 2018-09-24 PROCEDURE — 99285 EMERGENCY DEPT VISIT HI MDM: CPT

## 2018-09-24 PROCEDURE — 36415 COLL VENOUS BLD VENIPUNCTURE: CPT

## 2018-09-24 PROCEDURE — 85025 COMPLETE CBC W/AUTO DIFF WBC: CPT

## 2018-09-24 PROCEDURE — 6370000000 HC RX 637 (ALT 250 FOR IP): Performed by: EMERGENCY MEDICINE

## 2018-09-24 PROCEDURE — 80307 DRUG TEST PRSMV CHEM ANLYZR: CPT

## 2018-09-24 PROCEDURE — 81001 URINALYSIS AUTO W/SCOPE: CPT

## 2018-09-24 RX ORDER — DIPHENHYDRAMINE HCL 25 MG
25 TABLET ORAL ONCE
Status: COMPLETED | OUTPATIENT
Start: 2018-09-24 | End: 2018-09-24

## 2018-09-24 RX ADMIN — DIPHENHYDRAMINE HCL 25 MG: 25 TABLET ORAL at 18:33

## 2018-09-24 NOTE — PROGRESS NOTES
ED:no  Use of 02 or CPAP: no  Ambulatory: yes  Independent Self Care: yes  Use of OTC: yes   Somatic symptoms: yes     PCP: TING Rivera CNP     Current Medications:   Scheduled Meds: No current facility-administered medications for this encounter.      Current Outpatient Prescriptions:     QUEtiapine (SEROQUEL) 25 MG tablet, Take 1 tablet by mouth 2 times daily, Disp: 30 tablet, Rfl: 0    docusate sodium (COLACE, DULCOLAX) 100 MG CAPS, Take 100 mg by mouth 2 times daily, Disp: 60 capsule, Rfl: 0    melatonin 3 MG TABS tablet, Take 3 tablets by mouth nightly as needed (INSOMNIA), Disp: 30 tablet, Rfl: 0    calcium carbonate (OSCAL) 500 MG TABS tablet, Take 500 mg by mouth daily, Disp: , Rfl:     MAGNESIUM PO, Take by mouth, Disp: , Rfl:     ZINC PO, Take by mouth, Disp: , Rfl:     vitamin D (ERGOCALCIFEROL) 32183 units CAPS capsule, Take 50,000 Units by mouth once a week, Disp: , Rfl:     nystatin (MYCOSTATIN) 821631 UNIT/GM powder, Apply topically 2 times daily as needed , Disp: , Rfl:     Multiple Vitamins-Minerals (CENTRUM SILVER PO), Take 1 tablet by mouth daily, Disp: , Rfl:     Ascorbic Acid (C 500 PO), Take 1 tablet by mouth daily, Disp: , Rfl:        Mental Status Evaluation:     Appearance:  disheveled   Behavior:  Psychomotor Agitation and Tics   Speech:  normal pitch   Mood:  anxious   Affect:  constricted   Thought Process:  flight of ideas   Thought Content:  Denies SI, HI & AVH   Sensorium:  person, place, time/date, situation, day of week, month of year and year   Cognition:  grossly intact   Insight:  fair   Judgment:  fair     Social Information:    Education: high school diploma/ged  Employment where   unemployeed   Positive support system: Yes  Social Supports: yes and Family    Collateral Information:     Name: Venus Hoang   Relationship: Mother  Phone Number: [phone]                                  Disposition:     Choose one of the four options below for   disposition:

## 2018-09-24 NOTE — ED TRIAGE NOTES
Pt c/o anxiety/depression and mpossible medication Rx with \"thick tongue\" and pill rolling movements for approx 1 week Pt seen other ED for similar symptoms but has been unable to follow up with PCP

## 2018-09-25 PROBLEM — F22 PSYCHOSIS, PARANOID (HCC): Status: ACTIVE | Noted: 2018-09-25

## 2018-09-25 PROCEDURE — 99285 EMERGENCY DEPT VISIT HI MDM: CPT | Performed by: EMERGENCY MEDICINE

## 2018-09-25 PROCEDURE — 1240000000 HC EMOTIONAL WELLNESS R&B

## 2018-09-25 PROCEDURE — 90792 PSYCH DIAG EVAL W/MED SRVCS: CPT | Performed by: PSYCHIATRY & NEUROLOGY

## 2018-09-25 PROCEDURE — 6370000000 HC RX 637 (ALT 250 FOR IP): Performed by: PSYCHIATRY & NEUROLOGY

## 2018-09-25 RX ORDER — ERGOCALCIFEROL 1.25 MG/1
50000 CAPSULE ORAL WEEKLY
Status: DISCONTINUED | OUTPATIENT
Start: 2018-09-26 | End: 2018-10-05 | Stop reason: HOSPADM

## 2018-09-25 RX ORDER — DOCUSATE SODIUM 100 MG/1
100 CAPSULE, LIQUID FILLED ORAL 2 TIMES DAILY
Status: DISCONTINUED | OUTPATIENT
Start: 2018-09-26 | End: 2018-10-05 | Stop reason: HOSPADM

## 2018-09-25 RX ORDER — LAMOTRIGINE 100 MG/1
100 TABLET ORAL 2 TIMES DAILY
Status: ON HOLD | COMMUNITY
End: 2018-10-05 | Stop reason: HOSPADM

## 2018-09-25 RX ORDER — CALCIUM CARBONATE 500(1250)
500 TABLET ORAL DAILY
Status: DISCONTINUED | OUTPATIENT
Start: 2018-09-26 | End: 2018-10-05 | Stop reason: HOSPADM

## 2018-09-25 RX ORDER — HYDROXYZINE HYDROCHLORIDE 25 MG/1
25 TABLET, FILM COATED ORAL 3 TIMES DAILY PRN
Status: DISCONTINUED | OUTPATIENT
Start: 2018-09-25 | End: 2018-10-03

## 2018-09-25 RX ORDER — BENZTROPINE MESYLATE 1 MG/1
1 TABLET ORAL 3 TIMES DAILY
Status: ON HOLD | COMMUNITY
End: 2018-10-05 | Stop reason: HOSPADM

## 2018-09-25 RX ADMIN — HYDROXYZINE HYDROCHLORIDE 25 MG: 25 TABLET, FILM COATED ORAL at 20:56

## 2018-09-25 RX ADMIN — LURASIDONE HYDROCHLORIDE 20 MG: 40 TABLET, FILM COATED ORAL at 20:55

## 2018-09-25 ASSESSMENT — SLEEP AND FATIGUE QUESTIONNAIRES
DO YOU HAVE DIFFICULTY SLEEPING: YES
DIFFICULTY STAYING ASLEEP: YES
SLEEP PATTERN: DIFFICULTY FALLING ASLEEP;RESTLESSNESS
DIFFICULTY ARISING: NO
DO YOU USE A SLEEP AID: NO
DIFFICULTY FALLING ASLEEP: YES
AVERAGE NUMBER OF SLEEP HOURS: 7
RESTFUL SLEEP: NO

## 2018-09-25 ASSESSMENT — PATIENT HEALTH QUESTIONNAIRE - PHQ9: SUM OF ALL RESPONSES TO PHQ QUESTIONS 1-9: 16

## 2018-09-25 ASSESSMENT — ENCOUNTER SYMPTOMS
TROUBLE SWALLOWING: 0
SHORTNESS OF BREATH: 1
FACIAL SWELLING: 0
EYE PAIN: 0
DIARRHEA: 0
VOMITING: 0
VOICE CHANGE: 0

## 2018-09-25 ASSESSMENT — LIFESTYLE VARIABLES: HISTORY_ALCOHOL_USE: NO

## 2018-09-25 NOTE — PROGRESS NOTES
Collateral obtained from: [place]    Immediate Stressors & Time Episode Began: Patient had a medication change and she became very anxious for 3 weeks and her tounge and lips began to swell or seemed like she was chewing involuntary    Diagnosis/Hx of compliance with meds: Patient has been taking her medication but felt as if she was having a allergic reaction to her medication. Patient went to the ER    Tx Hx/Past hospitalizations:  Previous admissions    Family hx of psychiatric issues: No family history    Substance Abuse: No issues    Pending Legal: No issues    Safety Issues (Weapons? Hx of attempts): No guns    Support system/Medication Managed by: The importance of medication management and locking extra medication in a secured location was explained and reccommended to collateral.     Additional Info: Has legal custody of her son and she has a good support system.

## 2018-09-25 NOTE — ED NOTES
Patient undecided on whether to stay and go upstairs, or to go home. She is pacing around room making changes in her decisions. Dr. Tobi Avila has made four separate attempts to address this, but patient remains indecisive.       Nini Garces RN  09/24/18 [id number]

## 2018-09-25 NOTE — BH NOTE
Psychiatry Initial Intake    9/24/18    Comfort Taylor ,a 47 y.o. female, presents to the ED for a psychiatric assessment. ED Arrival time: 1300  ED physician: [facility] Notification time: In ER  [facility] AN AFFILIATE OF [facility] Assess time: 2330  Psychiatrist call time: Claire Phelan with Dr. Titi Martell    Patient is referred by: self. Patient's mother drove her to the ER. Reason for visit to ED - Presenting problem:     PT states reason for ED visit, \"My tongue is twirling and chewing after I was taking Cogentin. I had trouble taking the Seroquel because it sedated me, so we tried a smaller amount but it still made me really tired. I have been without meds for about 2 weeks. There has been some isolation but we do try to get out some times. I have been having some trouble getting things done at home. I live at home with my 16year old son. I go to [facility] and see Dr. Manjinder Kapadia. I have been very anxious and depressed but  I have a son at home and he has to go to school. I need to be there. I'm very scared. I have a long list of allergies. I am nervous. \"  Patient denies SI, HI, and AVH at this time. Patient is evasive about voluntary status. She states that she is scared because she has so many allergies. She has repeated that about 10 times. She states that she is having trouble eating because of her tongue. Patient was evaluated this afternoon by MALATHI. MALATHI reports:  PT states reason for ED visit, \" I haven't been acting right lately, having trouble with my medicine. \" Denies SI, Denies AVH, Denies HI. Pt reports she needs her meds regulated, I am depressed and anxious. Patient states she is having problems and want to go upstairs, \"I really don't want to answer these questions, my tongue keeps rolling. \"     Patient was told that she was discharging and her mother insisted to get a head CT. Head CT was completed and unremarkable.   Patient again was told that she was discharging and her mother stated that she needed to stay

## 2018-09-25 NOTE — PROGRESS NOTES
Treatment Team Note:    NAINA met with [facility] team to discuss Pts [address] plans. Progress/Behavior/Group Attendance: attending groups    Target Symptoms/Reason for admission:  Patient admitted to [facility] due to My tongue is twirling and chewing after I was taking Cogentin.  I had trouble taking the Seroquel because it sedated me, so we tried a smaller amount but it still made me really tired.  I have been without meds for about 2 weeks. There has been some isolation but we do try to get out some times.  I have been having some trouble getting things done at home.  I live at home with my 16year old son. Desireenoemi Vazquez go to [facility] and see Dr. Anneliese Paris. Diagnoses:     UDS: Neg-     BAL: Neg    AftercarePlan: [address] lives with: NAINA will meet with pt to gather information. Collateral obtained from: NAINA will meet with pt to gather release of information.   On:    Family Session: CRISTEL    Misc:

## 2018-09-25 NOTE — PROGRESS NOTES
Group Therapy Note    Date: 9/25/18  Start Time: 0900  End Time:  0930  Number of Participants: 20    Type of Group: Community Meeting    Patient's Goal:  Going home    Notes:    Patient attended group, filled out menu and goals sheet. Continue to monitor for safety. Status After Intervention:  Improved    Participation Level:  Active Listener    Participation Quality: Appropriate    Discipline Responsible: [employer]      Signature:  Praveen Rodriguez

## 2018-09-26 LAB
TSH SERPL DL<=0.05 MIU/L-ACNC: 1.2 UIU/ML (ref 0.27–4.2)
VITAMIN B-12: 809 PG/ML (ref 211–946)
VITAMIN D 25-HYDROXY: 42 NG/ML

## 2018-09-26 PROCEDURE — 6370000000 HC RX 637 (ALT 250 FOR IP): Performed by: PSYCHIATRY & NEUROLOGY

## 2018-09-26 PROCEDURE — 6370000000 HC RX 637 (ALT 250 FOR IP): Performed by: FAMILY MEDICINE

## 2018-09-26 PROCEDURE — 82306 VITAMIN D 25 HYDROXY: CPT

## 2018-09-26 PROCEDURE — 82607 VITAMIN B-12: CPT

## 2018-09-26 PROCEDURE — 36415 COLL VENOUS BLD VENIPUNCTURE: CPT

## 2018-09-26 PROCEDURE — 84443 ASSAY THYROID STIM HORMONE: CPT

## 2018-09-26 PROCEDURE — 99232 SBSQ HOSP IP/OBS MODERATE 35: CPT | Performed by: PSYCHIATRY & NEUROLOGY

## 2018-09-26 PROCEDURE — 1240000000 HC EMOTIONAL WELLNESS R&B

## 2018-09-26 RX ADMIN — CALCIUM 500 MG: 500 TABLET ORAL at 10:20

## 2018-09-26 RX ADMIN — HYDROXYZINE HYDROCHLORIDE 25 MG: 25 TABLET, FILM COATED ORAL at 21:19

## 2018-09-26 RX ADMIN — DOCUSATE SODIUM 100 MG: 100 CAPSULE, LIQUID FILLED ORAL at 10:20

## 2018-09-26 RX ADMIN — DOCUSATE SODIUM 100 MG: 100 CAPSULE, LIQUID FILLED ORAL at 21:19

## 2018-09-26 RX ADMIN — ERGOCALCIFEROL 50000 UNITS: 1.25 CAPSULE ORAL at 10:20

## 2018-09-26 RX ADMIN — Medication: at 21:19

## 2018-09-26 RX ADMIN — LURASIDONE HYDROCHLORIDE 40 MG: 40 TABLET, FILM COATED ORAL at 21:19

## 2018-09-26 NOTE — BH NOTE
Group Therapy Note    Date: 9/26/2018  Start Time: 1330  End Time:  3448  Number of Participants: 9    Type of Group: Spirituality    Wellness Binder Information  Module Name:  Mindfulness  Session Number:      Patient's Goal:  Skills in meditation and relaxation    Notes:      Status After Intervention:  Improved    Participation Level: Interactive    Participation Quality: Attentive and Sharing      Speech:  normal      Thought Process/Content:       Affective Functioning: Congruent      Mood: euthymic      Level of consciousness:  Oriented x4 and Attentive      Response to Learning: Capable of insight      Endings:     Modes of Intervention: Education and Activity      Discipline Responsible:       Signature:  Fei Spencer MA [facility]

## 2018-09-26 NOTE — PROGRESS NOTES
Content:   a little unclear about suicidal ideation but suggests she might have had suicidal thoughts yesterday, no HI, as above, it is almost impossible to get her to stop talking about her medication allergies   Insight:  Absent  Judgement:  lacking       Assessment:  61-year-old female with long-standing bipolar disorder and multiple hospitalizations. Her current psychosis including paranoia about her medications making it very difficult to obtain her cooperation treatment. Unfortunately, she has developed tardive symptoms I suspect either from chronic medication use or from the withdrawal of the medication. Although her psychosis and anxiety remains rather extreme I do think she is somewhat better than yesterday. Diagnoses:       Patient Active Problem List   Diagnosis    Chest pain    Schizoaffective disorder ([address]    Bipolar 1 disorder ([address]    Bipolar I disorder with estela ([address]    Psychosis    Estela ([address]    Intentional drug overdose ([address]    Bipolar 1 disorder, depressed ([address]    Bipolar 1 disorder, depressed, severe ([address]    Suicidal ideation    Bipolar affective disorder, depressed, moderate ([address]    Psychosis, paranoid ([address]       Plan:   1. PsychiatricMedications: I will increase her Latuda to 40 mg at bedtime. I had considered making it to 20 and 20 but it is difficult enough just to get her to take medication once a day. We will continue to consider other options to treat TD. 2. Medical Issues:  Continue medical monitoring by Dr Stefany Lund and associates. Amount of time spent with patient:  25 minutes with greater than 50% of the time spent in counseling and collaboration of care. (Please note that portions of this note were completed with a voice recognition program.  Efforts were made to edit the dictations but occasionally words are mis-transcribed. )  MD Name: Sharyle Fillers, MD, Psychiatrist

## 2018-09-27 PROBLEM — G24.01 TARDIVE DYSKINESIA: Status: ACTIVE | Noted: 2018-09-27

## 2018-09-27 LAB — URINE CULTURE, ROUTINE: NORMAL

## 2018-09-27 PROCEDURE — 6370000000 HC RX 637 (ALT 250 FOR IP): Performed by: FAMILY MEDICINE

## 2018-09-27 PROCEDURE — 1240000000 HC EMOTIONAL WELLNESS R&B

## 2018-09-27 PROCEDURE — 99233 SBSQ HOSP IP/OBS HIGH 50: CPT | Performed by: PSYCHIATRY & NEUROLOGY

## 2018-09-27 RX ORDER — DIVALPROEX SODIUM 250 MG/1
250 TABLET, EXTENDED RELEASE ORAL 2 TIMES DAILY
Status: DISCONTINUED | OUTPATIENT
Start: 2018-09-27 | End: 2018-09-28

## 2018-09-27 RX ADMIN — DOCUSATE SODIUM 100 MG: 100 CAPSULE, LIQUID FILLED ORAL at 09:37

## 2018-09-27 RX ADMIN — CALCIUM 500 MG: 500 TABLET ORAL at 09:37

## 2018-09-27 NOTE — PROGRESS NOTES
Patient is calm and cooperative with staff and peers. Patient has fair appetite, not social, participated in group, med compliant,  ADLs performed. Patient would not rate depression or anxiety but denies SI, HI, and AVH. Will continue to monitor.

## 2018-09-28 PROCEDURE — 1240000000 HC EMOTIONAL WELLNESS R&B

## 2018-09-28 PROCEDURE — 6370000000 HC RX 637 (ALT 250 FOR IP): Performed by: FAMILY MEDICINE

## 2018-09-28 PROCEDURE — 99233 SBSQ HOSP IP/OBS HIGH 50: CPT | Performed by: PSYCHIATRY & NEUROLOGY

## 2018-09-28 PROCEDURE — 6370000000 HC RX 637 (ALT 250 FOR IP): Performed by: PSYCHIATRY & NEUROLOGY

## 2018-09-28 RX ORDER — FLUCONAZOLE 150 MG/1
150 TABLET ORAL ONCE
Status: COMPLETED | OUTPATIENT
Start: 2018-09-28 | End: 2018-09-28

## 2018-09-28 RX ORDER — DIVALPROEX SODIUM 250 MG/1
250 TABLET, EXTENDED RELEASE ORAL 3 TIMES DAILY
Status: DISCONTINUED | OUTPATIENT
Start: 2018-09-28 | End: 2018-10-01

## 2018-09-28 RX ADMIN — HYDROXYZINE HYDROCHLORIDE 25 MG: 25 TABLET, FILM COATED ORAL at 16:48

## 2018-09-28 RX ADMIN — DIVALPROEX SODIUM 250 MG: 250 TABLET, FILM COATED, EXTENDED RELEASE ORAL at 20:49

## 2018-09-28 RX ADMIN — DOCUSATE SODIUM 100 MG: 100 CAPSULE, LIQUID FILLED ORAL at 08:13

## 2018-09-28 RX ADMIN — LURASIDONE HYDROCHLORIDE 40 MG: 40 TABLET, FILM COATED ORAL at 20:49

## 2018-09-28 RX ADMIN — DIVALPROEX SODIUM 250 MG: 250 TABLET, EXTENDED RELEASE ORAL at 11:15

## 2018-09-28 RX ADMIN — FLUCONAZOLE 150 MG: 150 TABLET ORAL at 08:12

## 2018-09-28 RX ADMIN — CALCIUM 500 MG: 500 TABLET ORAL at 08:13

## 2018-09-28 RX ADMIN — DOCUSATE SODIUM 100 MG: 100 CAPSULE, LIQUID FILLED ORAL at 20:49

## 2018-09-28 NOTE — PLAN OF CARE
Problem: Altered Mood, Depressive Behavior:  Intervention: Assess coping skills and behavior                                                                      Group Therapy Note    Date: 9/28/2018  Start Time: 1000  End Time:  3134  Number of Participants: 15    Type of Group: Psychoeducation    Wellness Binder Information  Module Name:  Emotional wellness  Session Number:  1    Patient's Goal:  Validation of feelings    Notes:  Pt was verbally prompted to attend group. Pt refused. Information about validation of feelings was provided. Status After Intervention:      Participation Level:     Participation Quality:       Speech:         Thought Process/Content:       Affective Functioning:       Mood:       Level of consciousness:        Response to Learning:       Endings:     Modes of Intervention:       Discipline Responsible: Psychoeducational Specialist      Signature:  Mary Kay Castellanos

## 2018-09-28 NOTE — PLAN OF CARE
Problem: Pain:  Goal: Pain level will decrease  Pain level will decrease   Outcome: Ongoing    Goal: Control of acute pain  Control of acute pain   Outcome: Ongoing    Goal: Control of chronic pain  Control of chronic pain   Outcome: Ongoing      Problem: Depressive Behavior With or Without Suicide Precautions:  Goal: Able to verbalize acceptance of life and situations over which he or she has no control  Able to verbalize acceptance of life and situations over which he or she has no control   Outcome: Ongoing    Goal: Able to verbalize and/or display a decrease in depressive symptoms  Able to verbalize and/or display a decrease in depressive symptoms   Outcome: Ongoing    Goal: Ability to disclose and discuss suicidal ideas will improve  Ability to disclose and discuss suicidal ideas will improve   Outcome: Ongoing    Goal: Able to verbalize support systems  Able to verbalize support systems   Outcome: Ongoing    Goal: Absence of self-harm  Absence of self-harm   Outcome: Ongoing

## 2018-09-29 PROCEDURE — 1240000000 HC EMOTIONAL WELLNESS R&B

## 2018-09-29 PROCEDURE — 6370000000 HC RX 637 (ALT 250 FOR IP): Performed by: FAMILY MEDICINE

## 2018-09-29 PROCEDURE — 6370000000 HC RX 637 (ALT 250 FOR IP): Performed by: PSYCHIATRY & NEUROLOGY

## 2018-09-29 PROCEDURE — 99233 SBSQ HOSP IP/OBS HIGH 50: CPT | Performed by: PSYCHIATRY & NEUROLOGY

## 2018-09-29 RX ADMIN — LURASIDONE HYDROCHLORIDE 40 MG: 40 TABLET, FILM COATED ORAL at 20:17

## 2018-09-29 RX ADMIN — DOCUSATE SODIUM 100 MG: 100 CAPSULE, LIQUID FILLED ORAL at 20:18

## 2018-09-29 RX ADMIN — DIVALPROEX SODIUM 250 MG: 250 TABLET, FILM COATED, EXTENDED RELEASE ORAL at 18:00

## 2018-09-29 RX ADMIN — DOCUSATE SODIUM 100 MG: 100 CAPSULE, LIQUID FILLED ORAL at 09:49

## 2018-09-29 RX ADMIN — CALCIUM 500 MG: 500 TABLET ORAL at 09:48

## 2018-09-29 RX ADMIN — HYDROXYZINE HYDROCHLORIDE 25 MG: 25 TABLET, FILM COATED ORAL at 20:18

## 2018-09-29 RX ADMIN — DIVALPROEX SODIUM 250 MG: 250 TABLET, FILM COATED, EXTENDED RELEASE ORAL at 09:48

## 2018-09-29 RX ADMIN — DIVALPROEX SODIUM 250 MG: 250 TABLET, FILM COATED, EXTENDED RELEASE ORAL at 20:17

## 2018-09-29 NOTE — PROGRESS NOTES
BHI Daily Shift Assessment  Nursing Progress Note    [place] [facility] [place] [place] Name: Gatito Devi Age: 47 y.o.    Ethnicity:  Gender: female   Dx: <principal problem not specified>  Precautions: suicide risk  CPAP: No Accu-Chek: No  MSE:  Status and Exam  Normal: No  Facial Expression: Flat, Worried, Sad  Affect: Incongruent, Congruent  Level of Consciousness: Alert  Mood:Normal: No  Mood: Depressed, Anxious, Sad, Terrified  Motor Activity:Normal: No  Motor Activity: Repetitive Acts  Interview Behavior: Cooperative  Preception: [place] to Person, [place] to Time, [place] to Situation, [place] to Place  Attention:Normal: No  Attention: Distractible, Unable to Concentrate  Thought Processes: Circumstantial  Thought Content:Normal: No  Thought Content: Preoccupations  Hallucinations: None  Delusions: No  Memory:Normal: No  Memory: Poor Recent, Poor Remote  Insight and Judgment: No  Insight and Judgment: Poor Judgment, Poor Insight  Present Suicidal Ideation: No  Present Homicidal Ideation: No  Sleep: Yes, Fair, no sleep issues Hours Slept: 7 Sched Sleep Meds: No PRN Sleep Meds: No Other PRN Meds: Yes Med Compliant: Yes Appetite: good Percent Meals: 100% Social: No ADLs: No Speech: hesitant Depression: 8 Anxiety: 8    Giles Metcalf RN

## 2018-09-30 PROCEDURE — 6370000000 HC RX 637 (ALT 250 FOR IP): Performed by: FAMILY MEDICINE

## 2018-09-30 PROCEDURE — 6370000000 HC RX 637 (ALT 250 FOR IP): Performed by: PSYCHIATRY & NEUROLOGY

## 2018-09-30 PROCEDURE — 1240000000 HC EMOTIONAL WELLNESS R&B

## 2018-09-30 RX ADMIN — DIVALPROEX SODIUM 250 MG: 250 TABLET, FILM COATED, EXTENDED RELEASE ORAL at 15:44

## 2018-09-30 RX ADMIN — DIVALPROEX SODIUM 250 MG: 250 TABLET, FILM COATED, EXTENDED RELEASE ORAL at 09:41

## 2018-09-30 RX ADMIN — HYDROXYZINE HYDROCHLORIDE 25 MG: 25 TABLET, FILM COATED ORAL at 20:29

## 2018-09-30 RX ADMIN — DOCUSATE SODIUM 100 MG: 100 CAPSULE, LIQUID FILLED ORAL at 20:29

## 2018-09-30 RX ADMIN — DIVALPROEX SODIUM 250 MG: 250 TABLET, FILM COATED, EXTENDED RELEASE ORAL at 20:29

## 2018-09-30 RX ADMIN — LURASIDONE HYDROCHLORIDE 40 MG: 40 TABLET, FILM COATED ORAL at 20:29

## 2018-09-30 RX ADMIN — DOCUSATE SODIUM 100 MG: 100 CAPSULE, LIQUID FILLED ORAL at 09:41

## 2018-09-30 RX ADMIN — CALCIUM 500 MG: 500 TABLET ORAL at 09:42

## 2018-09-30 NOTE — PROGRESS NOTES
Group Therapy Note    Date: 9/30/18  Start Time: 0900  End Time:  0930  Number of Participants: 11    Type of Group: Community Meeting    Patient's Goal:  Patient did not attend group    Notes:  Patient did not attend group    Status After Intervention:  Unchanged    Participation Level: Minimal    Participation Quality: Resistant    Discipline Responsible: [address] [place] [address]      Signature:  Kenrick Begum

## 2018-09-30 NOTE — BH NOTE
BHI Daily Shift Assessment  Nursing Progress Note    Room: [facility]/613-01 Name: Day Chaney Age: 47 y.o. Ethnicity:  Gender: female   Dx: <principal problem not specified>  Precautions: suicide risk  CPAP: No Accu-Chek: No  MSE:  Status and Exam  Normal: No  Facial Expression: Flat, Worried  Affect: Appropriate, Blunt, Congruent  Level of Consciousness: Alert  Mood:Normal: No  Mood: Depressed, Anxious, Ambivalent, Anhedonia, Worthless, low self-esteem, Sad, Helpless  Motor Activity:Normal: No  Motor Activity: Decreased, Repetitive Acts  Interview Behavior: Cooperative, Impulsive  Preception: [place] to Person, [place] Goes to Time, [place] to Place, [place] to Situation  Attention:Normal: No  Attention: Unable to Concentrate (very poor)  Thought Processes: Tangential, Perseveration (difficulty comprehending what is explained to her)  Thought Content:Normal: No  Thought Content: Compulsions, Preoccupations, Obsessions  Hallucinations: None  Delusions: No  Memory:Normal: No  Memory: Poor Recent, Poor Remote (very very poor)  Insight and Judgment: No  Insight and Judgment: Poor Judgment, Poor Insight  Present Suicidal Ideation: No  Present Homicidal Ideation: No  Sleep: Yes, Very good, falls asleep easily with meds  Hours Slept: see flow sheet in AM Sched Sleep Meds: Yes PRN Sleep Meds: Yes Other PRN Meds: Yes Med Compliant: Yes Appetite: decreased Percent Meals: 75% Social: No ADLs: No Speech: normal Depression: 8 Anxiety: 10    Pt noted with flat/expressionless affect, anxious mood. Pt continues to ask staff the same questions over and over, very poor concentration, poor comprehension, and very very poor memory. Pt unable to concentrate, disorganized. She continues to obsess over her allergies, writing  down her meds and allergies for her Dr. She reports feeling exhausted from doing this all day. Gave pt some raisin bran with mild for a PM snack to help with constipation. She reports no BM for the past 2 days.  She

## 2018-10-01 PROCEDURE — 6370000000 HC RX 637 (ALT 250 FOR IP): Performed by: PSYCHIATRY & NEUROLOGY

## 2018-10-01 PROCEDURE — 6370000000 HC RX 637 (ALT 250 FOR IP): Performed by: FAMILY MEDICINE

## 2018-10-01 PROCEDURE — 99231 SBSQ HOSP IP/OBS SF/LOW 25: CPT | Performed by: NURSE PRACTITIONER

## 2018-10-01 PROCEDURE — 6370000000 HC RX 637 (ALT 250 FOR IP): Performed by: NURSE PRACTITIONER

## 2018-10-01 PROCEDURE — 1240000000 HC EMOTIONAL WELLNESS R&B

## 2018-10-01 RX ORDER — LANOLIN ALCOHOL/MO/W.PET/CERES
6 CREAM (GRAM) TOPICAL NIGHTLY PRN
Status: DISCONTINUED | OUTPATIENT
Start: 2018-10-01 | End: 2018-10-05 | Stop reason: HOSPADM

## 2018-10-01 RX ORDER — DIVALPROEX SODIUM 500 MG/1
500 TABLET, EXTENDED RELEASE ORAL 2 TIMES DAILY
Status: DISCONTINUED | OUTPATIENT
Start: 2018-10-01 | End: 2018-10-05 | Stop reason: HOSPADM

## 2018-10-01 RX ORDER — RISPERIDONE 1 MG/1
1 TABLET, FILM COATED ORAL 2 TIMES DAILY
Status: DISCONTINUED | OUTPATIENT
Start: 2018-10-01 | End: 2018-10-05 | Stop reason: HOSPADM

## 2018-10-01 RX ADMIN — DIVALPROEX SODIUM 250 MG: 250 TABLET, FILM COATED, EXTENDED RELEASE ORAL at 09:43

## 2018-10-01 RX ADMIN — DOCUSATE SODIUM 100 MG: 100 CAPSULE, LIQUID FILLED ORAL at 21:16

## 2018-10-01 RX ADMIN — RISPERIDONE 1 MG: 1 TABLET, FILM COATED ORAL at 21:16

## 2018-10-01 RX ADMIN — CALCIUM 500 MG: 500 TABLET ORAL at 09:48

## 2018-10-01 RX ADMIN — MELATONIN 3 MG ORAL TABLET 6 MG: 3 TABLET ORAL at 21:16

## 2018-10-01 RX ADMIN — DIVALPROEX SODIUM 500 MG: 500 TABLET, EXTENDED RELEASE ORAL at 15:23

## 2018-10-01 RX ADMIN — HYDROXYZINE HYDROCHLORIDE 25 MG: 25 TABLET, FILM COATED ORAL at 21:17

## 2018-10-01 RX ADMIN — RISPERIDONE 1 MG: 1 TABLET, FILM COATED ORAL at 15:23

## 2018-10-01 RX ADMIN — DOCUSATE SODIUM 100 MG: 100 CAPSULE, LIQUID FILLED ORAL at 09:43

## 2018-10-01 NOTE — BH NOTE
BHI Daily Shift Assessment  Nursing Progress Note    Room: [facility]/613-01 Name: Freda Jurado Age: 47 y.o. Ethnicity:  Gender: female   Dx: <principal problem not specified>  Precautions: suicide risk  CPAP: No Accu-Chek: No  MSE:  Status and Exam  Normal: No  Facial Expression: Flat, Worried  Affect: Blunt, Congruent  Level of Consciousness: Confused  Mood:Normal: No  Mood: Depressed, Anxious, Helpless, Sad, Anhedonia, Worthless, low self-esteem, Ambivalent  Motor Activity:Normal: No  Motor Activity: Decreased, Repetitive Acts, Other(See Comments) (EPS mouth movements)  Interview Behavior: Cooperative  Preception: [place] to Person, [address] to Time, [place] to Place, [place] to Situation  Attention:Normal: No  Attention: Unable to Concentrate  Thought Processes: Perseveration, Tangential  Thought Content:Normal: No  Thought Content: Compulsions, Preoccupations, Paranoia (pt endorses paranoia)  Hallucinations: None  Delusions: No  Memory:Normal: No  Memory: Poor Recent, Poor Remote  Insight and Judgment: No  Insight and Judgment: Poor Judgment, Poor Insight  Present Suicidal Ideation: No  Present Homicidal Ideation: No  Sleep: Yes, Very good, no sleep issues Hours Slept: see flow sheet in AM Sched Sleep Meds: Yes PRN Sleep Meds: Yes Other PRN Meds: Yes Med Compliant: Yes Appetite: decreased Percent Meals: 50% Social: Yes ADLs: Yes Speech: normal Depression: 5 Anxiety: 10    Pt condition and behaviors have not changed since last PM assessment. Pt continues to wander around the unit holding papers in her hand, telling staff repeatedly that she has been working on her papers for the Dr, trying to remember all of her meds and allergies. Pt has to be reassured multiple times that she does not need to do this, that everything is in the computer. Pt has a hard time processing and understanding this, and continues to want reassurance within the same conversation. Pt continues to have the same EPS mouth movements.  Pt did take a shower and wash her hair this PM. Pt endorses being paranoid, and has very poor concentration and very, very poor memory. Pt stated, \"I'm very nervous\". Pt was a little more sociable this PM and sat with peers during recreation group.     Juan Iyer RN

## 2018-10-01 NOTE — PLAN OF CARE
Problem: Altered Mood, Depressive Behavior:  Intervention: Assess coping skills and behavior                                                                      Group Therapy Note    Date: 10/1/2018  Start Time: 1430  End Time:  2319  Number of Participants: 15    Type of Group: Cognitive Skills    Wellness Binder Information  Module Name:  Emotional wellness  Session Number:  5    Patient's Goal:  Obstacles to emotional wellness    Notes:  Pt acknowledged negative thinking as an obstacle to emotional wellness.     Status After Intervention:  Improved    Participation Level: Interactive    Participation Quality: Appropriate, Attentive and Sharing      Speech:  normal      Thought Process/Content: Logical      Affective Functioning: Congruent      Mood: congruent      Level of consciousness:  Alert, Oriented x4 and Attentive      Response to Learning: Able to verbalize current knowledge/experience      Endings: None Reported    Modes of Intervention: Education      Discipline Responsible: Psychoeducational Specialist      Signature:  Bradley Encinas

## 2018-10-02 PROCEDURE — 6370000000 HC RX 637 (ALT 250 FOR IP): Performed by: NURSE PRACTITIONER

## 2018-10-02 PROCEDURE — 6370000000 HC RX 637 (ALT 250 FOR IP): Performed by: PSYCHIATRY & NEUROLOGY

## 2018-10-02 PROCEDURE — 1240000000 HC EMOTIONAL WELLNESS R&B

## 2018-10-02 PROCEDURE — 6370000000 HC RX 637 (ALT 250 FOR IP): Performed by: FAMILY MEDICINE

## 2018-10-02 PROCEDURE — 99231 SBSQ HOSP IP/OBS SF/LOW 25: CPT | Performed by: NURSE PRACTITIONER

## 2018-10-02 RX ADMIN — MELATONIN 3 MG ORAL TABLET 6 MG: 3 TABLET ORAL at 20:43

## 2018-10-02 RX ADMIN — DIVALPROEX SODIUM 500 MG: 500 TABLET, EXTENDED RELEASE ORAL at 17:07

## 2018-10-02 RX ADMIN — DOCUSATE SODIUM 100 MG: 100 CAPSULE, LIQUID FILLED ORAL at 20:43

## 2018-10-02 RX ADMIN — Medication: at 21:42

## 2018-10-02 RX ADMIN — DOCUSATE SODIUM 100 MG: 100 CAPSULE, LIQUID FILLED ORAL at 09:42

## 2018-10-02 RX ADMIN — CALCIUM 500 MG: 500 TABLET ORAL at 09:42

## 2018-10-02 RX ADMIN — DIVALPROEX SODIUM 500 MG: 500 TABLET, EXTENDED RELEASE ORAL at 09:42

## 2018-10-02 RX ADMIN — HYDROXYZINE HYDROCHLORIDE 25 MG: 25 TABLET, FILM COATED ORAL at 17:08

## 2018-10-02 RX ADMIN — RISPERIDONE 1 MG: 1 TABLET, FILM COATED ORAL at 20:43

## 2018-10-02 RX ADMIN — RISPERIDONE 1 MG: 1 TABLET, FILM COATED ORAL at 09:42

## 2018-10-02 NOTE — PROGRESS NOTES
BHI Daily Shift Assessment  Nursing Progress Note    Room: [facility]/613-01 Name: Olga Torres Age: 47 y.o. Ethnicity:  Gender: female   Dx: <principal problem not specified>  Precautions: suicide risk  CPAP: No Accu-Chek: No  MSE:  Status and Exam  Normal: No  Facial Expression: Worried, Flat  Affect: Congruent  Level of Consciousness: Alert  Mood:Normal: No  Mood: Anxious, Depressed, Helpless  Motor Activity:Normal: No  Motor Activity: Increased  Interview Behavior: Cooperative  Preception: [place] to Person, Euel Alpers to Place, [place] to Situation, [place] to Time  Attention:Normal: No  Attention: Distractible, Unable to Concentrate  Thought Processes: Circumstantial  Thought Content:Normal: Yes  Thought Content: Preoccupations, Obsessions  Hallucinations: None  Delusions: No  Memory:Normal: No  Memory: Poor Recent, Poor Remote  Insight and Judgment: No  Insight and Judgment: Poor Insight, Poor Judgment  Present Suicidal Ideation: No  Present Homicidal Ideation: No  Sleep: Yes, Good, has difficulty falling asleep, has interrupted sleep and awakens early Hours Slept: 9 Sched Sleep Meds: Yes PRN Sleep Meds: Yes Other PRN Meds: Yes Med Compliant: Yes Appetite: improved Percent Meals: 75% Social: No ADLs: Yes Speech: normal Depression: 7 Anxiety: 7    Chato Cali RN       Pt is at the desk asking about evening meds at 2000. Pt is polite and cooperative. ,wants printouts for medications,prntouts given. Pt complains of a sore outh from repeated mouth movements,biotene prescribed to help with the discomfort. Took medications and stated she was looking forward to going home,thinks her son thought she would be home today.

## 2018-10-02 NOTE — PROGRESS NOTES
Group Therapy Note    Date: 10/2/18  Start Time: 0900  End Time:  0930  Number of Participants: 17    Type of Group: Community Meeting    Patient's Goal:  My responsibilities    Notes:    Patient attended group, filled out menu and goals sheet. Continue to monitor for safety. Status After Intervention:  Improved    Participation Level:  Active Listener    Participation Quality: Appropriate    Discipline Responsible: [employer]      Signature:  Karie Adam

## 2018-10-02 NOTE — PROGRESS NOTES
MD   50,000 Units at 09/26/18 1020       Psychotherapy:   SUPPORTIVE    DSM V Diagnoses:      ACTIVE MEDICAL PROBLEMS:  Patient Active Problem List   Diagnosis    Chest pain    Schizoaffective disorder ([address]    Bipolar 1 disorder ([address]    Bipolar I disorder with estela ([address]    Psychosis ([address]    Estela ([address]    Intentional drug overdose ([address]    Bipolar 1 disorder, depressed ([address]    Bipolar 1 disorder, depressed, severe ([address]    Suicidal ideation    Bipolar affective disorder, depressed, moderate ([address]    Psychosis, paranoid ([address]    Tardive dyskinesia    Depression with anxiety             Plan:    Encourage group therapy  15 minute safety checks  Medical monitoring by Dr. Stefany Lund and associates  Continue current therapy and medications    Amount of time spent with patient:  15 minutes with greater than 50% of the time spent in counseling and collaboration of care.     Amadou Cox, APRN  Clinician Signature: signed electronically

## 2018-10-02 NOTE — PROGRESS NOTES
Group Therapy Note    Date:10/1/18  Time:2030    Patient attended and participated in [id number]. Patient filled out wrap up group documentation.     Notes:    Janell CONNOR

## 2018-10-03 PROCEDURE — 6370000000 HC RX 637 (ALT 250 FOR IP): Performed by: FAMILY MEDICINE

## 2018-10-03 PROCEDURE — 1240000000 HC EMOTIONAL WELLNESS R&B

## 2018-10-03 PROCEDURE — 6370000000 HC RX 637 (ALT 250 FOR IP): Performed by: NURSE PRACTITIONER

## 2018-10-03 PROCEDURE — 99231 SBSQ HOSP IP/OBS SF/LOW 25: CPT | Performed by: NURSE PRACTITIONER

## 2018-10-03 RX ADMIN — DIVALPROEX SODIUM 500 MG: 500 TABLET, EXTENDED RELEASE ORAL at 15:50

## 2018-10-03 RX ADMIN — DOCUSATE SODIUM 100 MG: 100 CAPSULE, LIQUID FILLED ORAL at 20:08

## 2018-10-03 RX ADMIN — RISPERIDONE 1 MG: 1 TABLET, FILM COATED ORAL at 20:08

## 2018-10-03 RX ADMIN — CALCIUM 500 MG: 500 TABLET ORAL at 08:37

## 2018-10-03 RX ADMIN — RISPERIDONE 1 MG: 1 TABLET, FILM COATED ORAL at 08:37

## 2018-10-03 RX ADMIN — DIVALPROEX SODIUM 500 MG: 500 TABLET, EXTENDED RELEASE ORAL at 08:37

## 2018-10-03 RX ADMIN — DOCUSATE SODIUM 100 MG: 100 CAPSULE, LIQUID FILLED ORAL at 08:37

## 2018-10-03 RX ADMIN — ERGOCALCIFEROL 50000 UNITS: 1.25 CAPSULE ORAL at 08:37

## 2018-10-03 RX ADMIN — MELATONIN 3 MG ORAL TABLET 6 MG: 3 TABLET ORAL at 20:08

## 2018-10-03 NOTE — PROGRESS NOTES
10/03/18 8393       Psychotherapy:   SUPPORTIVE    DSM V Diagnoses:      ACTIVE MEDICAL PROBLEMS:  Patient Active Problem List   Diagnosis    Chest pain    Schizoaffective disorder ([address]    Bipolar 1 disorder ([address]    Bipolar I disorder with estela ([address]    Psychosis ([address]    Estela ([address]    Intentional drug overdose ([address]    Bipolar 1 disorder, depressed ([address]    Bipolar 1 disorder, depressed, severe ([address]    Suicidal ideation    Bipolar affective disorder, depressed, moderate ([address]    Psychosis, paranoid ([address]    Tardive dyskinesia    Depression with anxiety             Plan:    Encourage group therapy  15 minute safety checks  Medical monitoring by Dr. Stefany Lund and associates  Continue current therapy and medications  VPA level in the am    Amount of time spent with patient:  15 minutes with greater than 50% of the time spent in counseling and collaboration of care.     Amadou Cox, APRN  Clinician Signature: signed electronically

## 2018-10-04 LAB — VALPROIC ACID LEVEL: 82.7 UG/ML (ref 50–100)

## 2018-10-04 PROCEDURE — 1240000000 HC EMOTIONAL WELLNESS R&B

## 2018-10-04 PROCEDURE — 36415 COLL VENOUS BLD VENIPUNCTURE: CPT

## 2018-10-04 PROCEDURE — 80164 ASSAY DIPROPYLACETIC ACD TOT: CPT

## 2018-10-04 PROCEDURE — 6370000000 HC RX 637 (ALT 250 FOR IP): Performed by: FAMILY MEDICINE

## 2018-10-04 PROCEDURE — 99231 SBSQ HOSP IP/OBS SF/LOW 25: CPT | Performed by: NURSE PRACTITIONER

## 2018-10-04 PROCEDURE — 6370000000 HC RX 637 (ALT 250 FOR IP): Performed by: NURSE PRACTITIONER

## 2018-10-04 RX ADMIN — RISPERIDONE 1 MG: 1 TABLET, FILM COATED ORAL at 08:33

## 2018-10-04 RX ADMIN — RISPERIDONE 1 MG: 1 TABLET, FILM COATED ORAL at 21:04

## 2018-10-04 RX ADMIN — DOCUSATE SODIUM 100 MG: 100 CAPSULE, LIQUID FILLED ORAL at 21:04

## 2018-10-04 RX ADMIN — CALCIUM 500 MG: 500 TABLET ORAL at 08:33

## 2018-10-04 RX ADMIN — DIVALPROEX SODIUM 500 MG: 500 TABLET, EXTENDED RELEASE ORAL at 16:45

## 2018-10-04 RX ADMIN — MELATONIN 3 MG ORAL TABLET 6 MG: 3 TABLET ORAL at 21:04

## 2018-10-04 RX ADMIN — DOCUSATE SODIUM 100 MG: 100 CAPSULE, LIQUID FILLED ORAL at 08:33

## 2018-10-04 RX ADMIN — DIVALPROEX SODIUM 500 MG: 500 TABLET, EXTENDED RELEASE ORAL at 08:33

## 2018-10-04 NOTE — PROGRESS NOTES
[address] [place] [address]      Psychiatric Progress Note    Name:  Asuncion Beard  Date:  10/4/2018  Age:  47 y.o. Sex:  female  Ethnicity:   Primary Care Physician:  TING Rahman CNP   Patient Care Team:  Patient Care Team:  TING Rahman CNP as PCP - General  Lisette Mims MD (Cardiology)  Chief Complaint: \"I have been moving my tongue a lot. \"        Historian:patient  Complaint Type: anxiety, decreased appetite, depression, fatigue, loss of interest in favorite activities, sleep disturbance and tobacco use  Course of Symptoms: ongoing  Precipitating Factors: history of mental illness        Subjective  Patient reports sleep as \"it was fine. \" Patient denies SI and HI. Patient reports seeing \"flames\" on her paper today. Patient states, \"I saw them when I first came here, the have faded now. \" Patient continues to reports dry mouth. Patients mouth is not involuntary moving today nor are her lips and tongue. Patient does report that her tongue stills feels thick however it has improved. Patient has been performing ADL'S which is an improvement. Patient reports depression as 4/10. Patient has been calm and cooperative with staff and peers. Patient has been compliant with medications. Patient has been attending groups. Patient reports appetite as good. Patient reports no side effects from medications. Previous Psychiatric/Substance Use History      Medical History:  Past Medical History:   Diagnosis Date    Chest pain 2/8/2012    Depression     Hypoglycemia     [facility]ive disorder [facility] 2/8/2012    Suicide attempt ([facility]         BRAMBILA History:   History   Alcohol Use No         History   Drug Use No        History   Smoking Status    Never Smoker   Smokeless Tobacco    Never Used        Family History:     History reviewed. No pertinent family history.       Vital Signs:  Last set of tests and vitals:  Vitals:    10/04/18 0803   BP: (!) 144/79   Pulse: 97   Resp: 18 Temp: 96.5 °F (35.8 °C)   SpO2: 99%          Mental Status:  Level of consciousness:  within normal limits and awake  Appearance:  well-appearing, street clothes, in chair, good grooming and good hygiene  Behavior/Motor:  no abnormalities noted  Attitude toward examiner:  cooperative, attentive and good eye contact  Speech:  normal rate and normal volume  Mood:  \" I am doing a little better today. \"  Affect:  mood congruent  Thought processes:  linear and goal directed  Thought content:  Homocidal ideation : denies  Suicidal Ideation:  denies suicidal ideation  Delusions:  no evidence of delusions  Perceptual Disturbance:   Auditory- patient report seeing flames on her paper today  Cognition:  oriented to person, place, and time  Concentration : fair  Memory intact for recent and remote  Fund of knowledge:  average  Abstract thinking:  adequate  Insight:  fair  Judgment:  fair     risperiDONE  1 mg Oral BID    divalproex  500 mg Oral BID    calcium elemental  500 mg Oral Daily    docusate sodium  100 mg Oral BID    vitamin D  50,000 Units Oral Weekly       Current Medications:  Current Facility-Administered Medications   Medication Dose Route Frequency Provider Last Rate Last Dose    risperiDONE (RISPERDAL) tablet 1 mg  1 mg Oral BID Shaune José Miguel, APRN   1 mg at 10/04/18 7181    melatonin tablet 6 mg  6 mg Oral Nightly PRN Shaune José Miguel, APRN   6 mg at 10/03/18 2008    divalproex (DEPAKOTE ER) extended release tablet 500 mg  500 mg Oral BID Shaune [place], APRN   500 mg at 10/04/18 [place]   Oral PRN Didi Tenorio MD        calcium elemental (OSCAL) tablet 500 mg  500 mg Oral Daily Didi Tenorio MD   500 mg at 10/04/18 9630    docusate sodium (COLACE) capsule 100 mg  100 mg Oral BID Didi Tenorio MD   100 mg at 10/04/18 5342    vitamin D (ERGOCALCIFEROL) capsule 50,000 Units  50,000 Units Oral Weekly Didi Tenorio MD   50,000 Units at 10/03/18

## 2018-10-04 NOTE — PROGRESS NOTES
Group Therapy Note    Start Time: 930  End Time:  1000  Number of Participants: 13    Type of Group: Community Meeting       Patient's Goal:  \"groups\"      Notes:      Participation Level:  Active Listener       Participation Quality: Appropriate      Thought Process/Content: Logical      Affective Functioning: Congruent      Mood: calm      Level of consciousness:  Alert      Modes of Intervention: Support      Discipline Responsible: Behavioral Health Tech II      Signature:  Sathish Acevedo

## 2018-10-04 NOTE — PROGRESS NOTES
Patient is up and  awake and in her room. She has all of her medication information sheets spread on the floor, bed, tables and bed. \" I struggle with my medicines\". \" Some of the side effects are amarilis scary\" She reports her mood as \"ok\" . She rates depression and anxiety at 4. She denies suicidal and homicidal ideation. She denies hallucinations. She reports she is \" a little better\"  She remains preoccupied with her medications. She is calm and cooperative with the staff and peers. She is attending and participating in groups.

## 2018-10-05 VITALS
SYSTOLIC BLOOD PRESSURE: 139 MMHG | TEMPERATURE: 97 F | HEIGHT: 64 IN | WEIGHT: 202.6 LBS | HEART RATE: 101 BPM | RESPIRATION RATE: 18 BRPM | DIASTOLIC BLOOD PRESSURE: 94 MMHG | BODY MASS INDEX: 34.59 KG/M2 | OXYGEN SATURATION: 97 %

## 2018-10-05 PROBLEM — F22 PSYCHOSIS, PARANOID (HCC): Status: RESOLVED | Noted: 2018-09-25 | Resolved: 2018-10-05

## 2018-10-05 PROCEDURE — 99238 HOSP IP/OBS DSCHRG MGMT 30/<: CPT | Performed by: NURSE PRACTITIONER

## 2018-10-05 PROCEDURE — 6370000000 HC RX 637 (ALT 250 FOR IP): Performed by: FAMILY MEDICINE

## 2018-10-05 PROCEDURE — 6370000000 HC RX 637 (ALT 250 FOR IP): Performed by: NURSE PRACTITIONER

## 2018-10-05 PROCEDURE — 5130000000 HC BRIDGE APPOINTMENT

## 2018-10-05 RX ORDER — DIVALPROEX SODIUM 500 MG/1
500 TABLET, EXTENDED RELEASE ORAL 2 TIMES DAILY
Qty: 60 TABLET | Refills: 0 | Status: ON HOLD | OUTPATIENT
Start: 2018-10-05 | End: 2018-12-27 | Stop reason: HOSPADM

## 2018-10-05 RX ORDER — RISPERIDONE 1 MG/1
1 TABLET, FILM COATED ORAL 2 TIMES DAILY
Qty: 60 TABLET | Refills: 0 | Status: ON HOLD | OUTPATIENT
Start: 2018-10-05 | End: 2019-01-14 | Stop reason: HOSPADM

## 2018-10-05 RX ADMIN — DIVALPROEX SODIUM 500 MG: 500 TABLET, EXTENDED RELEASE ORAL at 09:15

## 2018-10-05 RX ADMIN — DOCUSATE SODIUM 100 MG: 100 CAPSULE, LIQUID FILLED ORAL at 09:15

## 2018-10-05 RX ADMIN — RISPERIDONE 1 MG: 1 TABLET, FILM COATED ORAL at 09:15

## 2018-10-05 RX ADMIN — CALCIUM 500 MG: 500 TABLET ORAL at 09:15

## 2018-10-05 NOTE — DISCHARGE SUMMARY
Discharge Summary     Patient ID:  Day Chaney  [id number]  69 y.o.  1965    Admit date: 9/24/2018  Discharge date: 10/5/2018    Admitting Physician: Vida Keller MD   Attending Physician: Taylor att. providers found  Discharge Provider: Kel Young     Discharge Diagnoses: Bipolar 1 Disorder, with psychosis    Admission Condition: fair    Discharged Condition: good    Indication for Admission: lip smakcing    HPI: PER  PSYCH EVAL  This is a 47 y. o. female with a long psychiatric history, who presented to the emergency room with paranoia, psychosis and symptoms of tardive dyskinesia despite being off psychiatric medications. She was admitted here in July 2018 and discharged on August 2. At that time she was discharged on Seroquel. She had been on Latuda previously but Latuda was discontinued. Patient felt like the Seroquel was making her fatigued so this was discontinued on August 6 about 4 days after she left the hospital. Patient followed back up with her psychiatrist Dr. Debra Sifuentes. Her Cogentin was increased and about a week ago she started having trouble with constant movement of her lips along list of allergies as were. She felt like her tongue was swollen a week ago. It is not swollen at all now. She is still having constant lip smacking which she says she cannot control. Uncertain if this might be tardive dyskinesia. I think dystonic reactions very unlikely. She discontinued the Cogentin a week ago. Currently, she is not taking any medication for her depression or anxiety or bipolar. She has been taking Benadryl at night for anxiety.      Today she tells me that she really does not remember when the mouth movements started. Mostly she is just worried about all the medications she can't take and keeps getting up to go make a list of all the medicines she is allergic to.  I show her the medications we have again the computer as allergies, but she says there are more and insists she has to go write 4  Appearance:  Grooming and Hygiene attended to  Speech with Regular Rate and Rhythm  Eye Contact:  Good  No Psychomotor Agitation/Retardation Noted  Attitude:  Cooperative  Mood:  \"I am feeling really good\"  Affective: Congruent, appropriate to the situation, with a normal range and intensity  Thought Processes:  Coherently communicated, logical and goal oriented  Thought Content:  At this time  No Suicidal Ideation, No Homicidal Ideation, No Auditory or Visual  Hallucinations, NO Overt Delusions  Insight:  Present  Judgement:  Normal  Memory is intact for both remote and recent  Intellectual Functioning:  Within the [address] of Knowledge:  Adequate  Attention and Concentration:  Adequate        Discharge Exam:  PLEASE SEE  MEDICAL NOTE    Disposition: home    Patient Instructions:   Current Discharge Medication List      START taking these medications    Details   divalproex (DEPAKOTE ER) 500 MG extended release tablet Take 1 tablet by mouth 2 times daily  Qty: 60 tablet, Refills: 0      risperiDONE (RISPERDAL) 1 MG tablet Take 1 tablet by mouth 2 times daily  Qty: 60 tablet, Refills: 0         CONTINUE these medications which have NOT CHANGED    Details   docusate sodium (COLACE, DULCOLAX) 100 MG CAPS Take 100 mg by mouth 2 times daily  Qty: 60 capsule, Refills: 0      vitamin D (ERGOCALCIFEROL) 04771 units CAPS capsule Take 50,000 Units by mouth once a week      melatonin 3 MG TABS tablet Take 3 tablets by mouth nightly as needed (INSOMNIA)  Qty: 30 tablet, Refills: 0      calcium carbonate (OSCAL) 500 MG TABS tablet Take 500 mg by mouth daily      nystatin (MYCOSTATIN) 945536 UNIT/GM powder Apply topically 2 times daily as needed          STOP taking these medications       benztropine (COGENTIN) 1 MG tablet Comments:   Reason for Stopping:         lamoTRIgine (LAMICTAL) 100 MG tablet Comments:   Reason for Stopping:         QUEtiapine (SEROQUEL) 25 MG tablet Comments:   Reason for Stopping:

## 2018-10-05 NOTE — PROGRESS NOTES
Group Therapy Note     Date: 10/5/2018  Start Time: 1115  End Time:  1200  Number of Participants: 10     Type of Group: Psychoeducation     Wellness Binder Information  Module Name:  Social Wellness   Session Number:  1     Patient's Goal:  Your Support Network      Notes:  Pt was able to identify support system and participate using an ecogram activity sheet      Status After Intervention:  Improved     Participation Level: Active Listener and Interactive     Participation Quality: Appropriate, Attentive, Sharing and Supportive        Speech:  normal        Thought Process/Content: Logical        Affective Functioning: Congruent        Mood: congruent         Level of consciousness:  Alert, Oriented x4 and Attentive        Response to Learning: Able to verbalize current knowledge/experience, Able to verbalize/acknowledge new learning and Able to retain information        Endings: None Reported     Modes of Intervention: Education, Support and Socialization        Discipline Responsible: Psychoeducational Specialist        Signature:   Anthony Amado

## 2018-11-20 DIAGNOSIS — Z12.31 VISIT FOR SCREENING MAMMOGRAM: Primary | ICD-10-CM

## 2018-11-20 NOTE — PROGRESS NOTES
Pt has upcoming appt with Kimberly Daley and need mammogram order before then to get done at [facility]

## 2018-12-04 DIAGNOSIS — Z12.31 VISIT FOR SCREENING MAMMOGRAM: ICD-10-CM

## 2018-12-10 LAB — VALPROIC ACID LEVEL: 75.5 UG/ML (ref 50–100)

## 2018-12-13 ENCOUNTER — OFFICE VISIT (OUTPATIENT)
Dept: OBSTETRICS AND GYNECOLOGY | Facility: CLINIC | Age: 53
End: 2018-12-13

## 2018-12-13 VITALS
HEIGHT: 64 IN | SYSTOLIC BLOOD PRESSURE: 102 MMHG | WEIGHT: 200 LBS | DIASTOLIC BLOOD PRESSURE: 64 MMHG | BODY MASS INDEX: 34.15 KG/M2

## 2018-12-13 DIAGNOSIS — L68.0 HIRSUTISM: ICD-10-CM

## 2018-12-13 DIAGNOSIS — F31.9 BIPOLAR DEPRESSION (HCC): ICD-10-CM

## 2018-12-13 DIAGNOSIS — R21 RASH: ICD-10-CM

## 2018-12-13 DIAGNOSIS — Z01.419 WELL WOMAN EXAM WITH ROUTINE GYNECOLOGICAL EXAM: Primary | ICD-10-CM

## 2018-12-13 PROCEDURE — 99396 PREV VISIT EST AGE 40-64: CPT | Performed by: NURSE PRACTITIONER

## 2018-12-13 PROCEDURE — G0123 SCREEN CERV/VAG THIN LAYER: HCPCS | Performed by: NURSE PRACTITIONER

## 2018-12-13 PROCEDURE — 99213 OFFICE O/P EST LOW 20 MIN: CPT | Performed by: NURSE PRACTITIONER

## 2018-12-13 PROCEDURE — 87624 HPV HI-RISK TYP POOLED RSLT: CPT | Performed by: NURSE PRACTITIONER

## 2018-12-13 RX ORDER — DIVALPROEX SODIUM 250 MG/1
250 TABLET, DELAYED RELEASE ORAL 3 TIMES DAILY
COMMUNITY
End: 2020-12-22

## 2018-12-13 RX ORDER — RISPERIDONE 1 MG/1
1 TABLET ORAL 2 TIMES DAILY
COMMUNITY
End: 2020-12-22

## 2018-12-13 RX ORDER — NYSTATIN 100000 [USP'U]/G
POWDER TOPICAL 4 TIMES DAILY
Qty: 1 EACH | Refills: 3 | Status: SHIPPED | OUTPATIENT
Start: 2018-12-13 | End: 2020-12-22

## 2018-12-13 RX ORDER — DIVALPROEX SODIUM 500 MG/1
500 TABLET, DELAYED RELEASE ORAL 3 TIMES DAILY
COMMUNITY
End: 2020-12-22

## 2018-12-13 NOTE — PROGRESS NOTES
Subjective     Edith Stephens is a 53 y.o. female    Here for yearly check up. Has C/O hair growth on her face. She is followed by Psych for her Bi-Polar disorder and they have recently increased her meds.       Gynecologic Exam   The patient's pertinent negatives include no pelvic pain, vaginal bleeding or vaginal discharge. The patient is experiencing no pain. Associated symptoms include rash. Pertinent negatives include no abdominal pain, anorexia, back pain, chills, constipation, diarrhea, discolored urine, dysuria, fever, flank pain, frequency, headaches, hematuria, joint pain, joint swelling, nausea, painful intercourse, sore throat, urgency or vomiting. She is sexually active. She is postmenopausal.   Hirsutism   This is a new problem. The current episode started more than 1 month ago. The problem occurs daily. The problem has been gradually worsening. Associated symptoms include a rash. Pertinent negatives include no abdominal pain, anorexia, arthralgias, change in bowel habit, chest pain, chills, congestion, coughing, diaphoresis, fatigue, fever, headaches, joint swelling, myalgias, nausea, neck pain, numbness, sore throat, swollen glands, urinary symptoms, vertigo, visual change, vomiting or weakness. Nothing aggravates the symptoms. She has tried nothing for the symptoms.   Other   This is a chronic (Rash) problem. The current episode started more than 1 year ago. The problem occurs intermittently. The problem has been waxing and waning. Associated symptoms include a rash. Pertinent negatives include no abdominal pain, anorexia, arthralgias, change in bowel habit, chest pain, chills, congestion, coughing, diaphoresis, fatigue, fever, headaches, joint swelling, myalgias, nausea, neck pain, numbness, sore throat, swollen glands, urinary symptoms, vertigo, visual change, vomiting or weakness. Nothing aggravates the symptoms. Treatments tried: Nystatin powder.  The treatment provided significant relief.  "  Depression   Visit Type: follow-up  Patient presents with the following symptoms: depressed mood and excessive worry.  Patient is not experiencing: anhedonia, chest pain, choking sensation, compulsions, confusion, decreased concentration, dizziness, dry mouth, fatigue, feelings of hopelessness, feelings of worthlessness, hypersomnia, hyperventilation, impotence, insomnia, irritability, malaise, memory impairment, muscle tension, nausea, nervousness/anxiety, obsessions, palpitations, panic, psychomotor agitation, psychomotor retardation, restlessness, shortness of breath, suicidal ideas, suicidal planning, thoughts of death, weight gain and weight loss.  Frequency of symptoms: constantly   Severity: moderate   Sleep quality: fair  Nighttime awakenings: occasional  Compliance with medications:  %              /64   Ht 162.6 cm (64\")   Wt 90.7 kg (200 lb)   LMP 12/13/2015 (Approximate)   BMI 34.33 kg/m²     Outpatient Encounter Medications as of 12/13/2018   Medication Sig Dispense Refill   • divalproex (DEPAKOTE) 250 MG DR tablet Take 250 mg by mouth 3 (Three) Times a Day.     • divalproex (DEPAKOTE) 500 MG DR tablet Take 500 mg by mouth 3 (Three) Times a Day.     • risperiDONE (risperDAL) 1 MG tablet Take 1 mg by mouth 2 (Two) Times a Day.     • ibuprofen (ADVIL,MOTRIN) 200 MG tablet Take 200 mg by mouth Every 8 (Eight) Hours As Needed for Mild Pain (1-3).     • Melatonin 3 MG tablet Take 2 tablets by mouth At Night As Needed for Sleep.     • Multiple Minerals-Vitamins (CALCIUM-MAGNESIUM-ZINC-D3 PO) Take 3 tablets by mouth Daily.     • Multiple Vitamins-Minerals (CENTRUM SILVER PO) Take 1 tablet by mouth Daily.     • nystatin (MYCOSTATIN) 380853 UNIT/GM powder Apply  topically to the appropriate area as directed 4 (Four) Times a Day. USES UNDER BREAST 1 each 3   • [DISCONTINUED] nystatin (MYCOSTATIN) 244304 UNIT/GM powder Apply  topically 4 (Four) Times a Day. USES UNDER BREAST       No " facility-administered encounter medications on file as of 12/13/2018.        Surgical History  Past Surgical History:   Procedure Laterality Date   • CERVICAL BIOPSY  W/ LOOP ELECTRODE EXCISION     • OTHER SURGICAL HISTORY      cyst removed from foot as a child       Family History  Family History   Problem Relation Age of Onset   • Hypertension Father    • Coronary artery disease Paternal Grandfather    • Coronary artery disease Paternal Grandmother    • Diabetes Paternal Grandmother    • Kidney cancer Maternal Grandfather    • Thyroid disease Mother    • No Known Problems Sister        The following portions of the patient's history were reviewed and updated as appropriate: allergies, current medications, past family history, past medical history, past social history, past surgical history and problem list.    Review of Systems   Constitutional: Negative for activity change, appetite change, chills, diaphoresis, fatigue, fever, irritability, unexpected weight gain and unexpected weight loss.   HENT: Negative for congestion, dental problem, drooling, ear discharge, ear pain, facial swelling, hearing loss, mouth sores, nosebleeds, postnasal drip, rhinorrhea, sinus pressure, sneezing, sore throat, tinnitus, trouble swallowing and voice change.    Eyes: Negative for blurred vision, double vision, photophobia, pain, discharge, redness, itching and visual disturbance.   Respiratory: Negative for apnea, cough, choking, chest tightness, shortness of breath, wheezing and stridor.    Cardiovascular: Negative for chest pain, palpitations and leg swelling.   Gastrointestinal: Negative for abdominal distention, abdominal pain, anal bleeding, anorexia, blood in stool, change in bowel habit, constipation, diarrhea, nausea, rectal pain, vomiting, GERD and indigestion.   Endocrine: Negative for cold intolerance, heat intolerance, polydipsia, polyphagia and polyuria.   Genitourinary: Negative for breast discharge, urinary  incontinence, decreased libido, decreased urine volume, difficulty urinating, dyspareunia, dysuria, flank pain, frequency, genital sores, hematuria, impotence, menstrual problem, pelvic pain, urgency, vaginal bleeding, vaginal discharge, vaginal pain, breast lump and breast pain.   Musculoskeletal: Negative for arthralgias, back pain, gait problem, joint pain, joint swelling, myalgias, neck pain, neck stiffness and bursitis.   Skin: Positive for rash. Negative for color change, dry skin, pallor, skin lesions and bruise.   Allergic/Immunologic: Negative for environmental allergies, food allergies and immunocompromised state.   Neurological: Negative for dizziness, vertigo, tremors, seizures, syncope, facial asymmetry, speech difficulty, weakness, light-headedness, numbness, headache, memory problem and confusion.   Hematological: Negative for adenopathy. Does not bruise/bleed easily.   Psychiatric/Behavioral: Positive for sleep disturbance and depressed mood. Negative for agitation, behavioral problems, decreased concentration, dysphoric mood, hallucinations, self-injury, suicidal ideas, negative for hyperactivity and stress. The patient is not nervous/anxious and does not have insomnia.        Objective   Physical Exam   Constitutional: She is oriented to person, place, and time. She appears well-developed and well-nourished. No distress.   HENT:   Head: Normocephalic.   Right Ear: External ear normal.   Left Ear: External ear normal.   Nose: Nose normal.   Mouth/Throat: Oropharynx is clear and moist.   Eyes: Conjunctivae are normal. Right eye exhibits no discharge. Left eye exhibits no discharge. No scleral icterus.   Neck: Normal range of motion. Neck supple. Carotid bruit is not present. No tracheal deviation present. No thyromegaly present.   Cardiovascular: Normal rate, regular rhythm, normal heart sounds and intact distal pulses.   No murmur heard.  Pulmonary/Chest: Effort normal and breath sounds normal. No  respiratory distress. She has no wheezes. Right breast exhibits no inverted nipple, no mass, no nipple discharge, no skin change and no tenderness. Left breast exhibits no inverted nipple, no mass, no nipple discharge, no skin change and no tenderness. Breasts are symmetrical. There is no breast swelling.   Abdominal: Soft. She exhibits no distension and no mass. There is no tenderness. There is no guarding. No hernia. Hernia confirmed negative in the right inguinal area and confirmed negative in the left inguinal area.   Genitourinary: Rectum normal, vagina normal and uterus normal. Rectal exam shows no mass. No breast tenderness, discharge or bleeding. Pelvic exam was performed with patient supine. There is no rash, tenderness, lesion or injury on the right labia. There is no rash, tenderness, lesion or injury on the left labia. Uterus is not enlarged, not fixed and not tender. Cervix exhibits no motion tenderness, no discharge and no friability. Right adnexum displays no mass, no tenderness and no fullness. Left adnexum displays no mass, no tenderness and no fullness. No erythema, tenderness or bleeding in the vagina. No foreign body in the vagina. No signs of injury around the vagina. No vaginal discharge found.   Genitourinary Comments:   BSU normal  Urethral meatus  Normal  Perineum  Normal   Musculoskeletal: Normal range of motion. She exhibits no edema or tenderness.   Lymphadenopathy:        Head (right side): No submental, no submandibular, no tonsillar, no preauricular, no posterior auricular and no occipital adenopathy present.        Head (left side): No submental, no submandibular, no tonsillar, no preauricular, no posterior auricular and no occipital adenopathy present.     She has no cervical adenopathy.        Right cervical: No superficial cervical, no deep cervical and no posterior cervical adenopathy present.       Left cervical: No superficial cervical, no deep cervical and no posterior cervical  adenopathy present.     She has no axillary adenopathy.        Right: No inguinal adenopathy present.        Left: No inguinal adenopathy present.   Neurological: She is alert and oriented to person, place, and time. Coordination normal.   Skin: Skin is warm and dry. No bruising and no rash noted. She is not diaphoretic. No erythema.        Psychiatric: Judgment and thought content normal. Her speech is delayed. She is slowed. Cognition and memory are normal. She exhibits a depressed mood.   Nursing note and vitals reviewed.      Assessment/Plan   Edith was seen today for gynecologic exam.    Diagnoses and all orders for this visit:    Well woman exam with routine gynecological exam  Normal GYN exam. Will have lab work at PCP. Encouraged SBE, pt is aware how to do self breast exam and the importance of same. Discussed weight management and importance of maintaining a healthy weight. Discussed Vitamin D intake and the importance of adequate vitamin D for both Bone Health and a healthy immune system.  Discussed Daily exercise and the importance of same, in regards to a healthy heart as well as helping to maintain her weight and improving her mental health.  BMI  34.3. Colonoscopy will be scheduled.   Mammogram was already done and was normal. Bone Density will be scheduled at [facility]. Pap smear is done per ASCCP guidelines.  -     Liquid-based Pap Smear, Screening  -     Ambulatory Referral For Screening Colonoscopy  -     DEXA Bone Density Axial; Future    Hirsutism  Comments:  Pt has C/O increased hair growth on her chin. Wishes to get labs done and try Aldactone if indicated and if won't interfere with her Psych meds.  Orders:  -     Testosterone  -     Cortisol  -     DHEA-Sulfate    Bipolar depression (CMS/HCC)  Comments:  Pt is followed by Dr. brian Rogers. She saw them this week.     Rash  Comments:  Pt has a rash under her breasts and uses Nystatin powder. Needs a RF.  Orders:  -     nystatin (MYCOSTATIN) 118474  UNIT/GM powder; Apply  topically to the appropriate area as directed 4 (Four) Times a Day. USES UNDER BREAST         Patient's Body mass index is 34.33 kg/m². BMI is above normal parameters. Recommendations include: educational material, exercise counseling and nutrition counseling.      Kimberly Daley, APRN  12/13/2018

## 2018-12-13 NOTE — PATIENT INSTRUCTIONS

## 2018-12-13 NOTE — PROGRESS NOTES
Attempted to obtain health maintenance information, patient unable to provide answers for the following items Tdap, Colonoscopy, Pneumococcal Vaccine, Medicare Annual Wellness Exam, Diabetic Eye Exam, Diabetic Foot Exam, HPV Vaccine, Chlamydia Screening  and Zoster Vaccine.

## 2018-12-14 DIAGNOSIS — R79.89 ELEVATED TESTOSTERONE LEVEL: Primary | ICD-10-CM

## 2018-12-14 LAB
CORTIS SERPL-MCNC: 13 UG/DL
DHEA-S SERPL-MCNC: 247.1 UG/DL (ref 41.2–243.7)
TESTOST SERPL-MCNC: 46 NG/DL (ref 3–41)

## 2018-12-14 RX ORDER — SPIRONOLACTONE 25 MG/1
25 TABLET ORAL 2 TIMES DAILY
Qty: 60 TABLET | Refills: 3 | Status: SHIPPED | OUTPATIENT
Start: 2018-12-14 | End: 2019-03-25

## 2018-12-18 ENCOUNTER — TELEPHONE (OUTPATIENT)
Dept: OBSTETRICS AND GYNECOLOGY | Facility: CLINIC | Age: 53
End: 2018-12-18

## 2018-12-18 NOTE — TELEPHONE ENCOUNTER
----- Message from GAYATHRI Inman sent at 12/17/2018  9:11 PM CST -----  Normal pap. TRC  ---MOVM for pt to call for results.

## 2018-12-21 ENCOUNTER — HOSPITAL ENCOUNTER (INPATIENT)
Age: 53
LOS: 6 days | Discharge: HOME | DRG: 885 | End: 2018-12-27
Attending: EMERGENCY MEDICINE | Admitting: PSYCHIATRY & NEUROLOGY
Payer: MEDICAID

## 2018-12-21 DIAGNOSIS — R45.851 SUICIDAL IDEATION: Primary | ICD-10-CM

## 2018-12-21 LAB
ALBUMIN SERPL-MCNC: 4.6 G/DL (ref 3.5–5.2)
ALP BLD-CCNC: 77 U/L (ref 35–104)
ALT SERPL-CCNC: 43 U/L (ref 5–33)
AMPHETAMINE SCREEN, URINE: NEGATIVE
ANION GAP SERPL CALCULATED.3IONS-SCNC: 10 MMOL/L (ref 7–19)
AST SERPL-CCNC: 26 U/L (ref 5–32)
BARBITURATE SCREEN URINE: NEGATIVE
BASOPHILS ABSOLUTE: 0.1 K/UL (ref 0–0.2)
BASOPHILS RELATIVE PERCENT: 0.5 % (ref 0–1)
BENZODIAZEPINE SCREEN, URINE: NEGATIVE
BILIRUB SERPL-MCNC: 0.3 MG/DL (ref 0.2–1.2)
BILIRUBIN URINE: NEGATIVE
BLOOD, URINE: NEGATIVE
BUN BLDV-MCNC: 16 MG/DL (ref 6–20)
CALCIUM SERPL-MCNC: 9.9 MG/DL (ref 8.6–10)
CANNABINOID SCREEN URINE: NEGATIVE
CHLORIDE BLD-SCNC: 105 MMOL/L (ref 98–111)
CLARITY: CLEAR
CO2: 25 MMOL/L (ref 22–29)
COCAINE METABOLITE SCREEN URINE: NEGATIVE
COLOR: YELLOW
CREAT SERPL-MCNC: 0.7 MG/DL (ref 0.5–0.9)
EOSINOPHILS ABSOLUTE: 0 K/UL (ref 0–0.6)
EOSINOPHILS RELATIVE PERCENT: 0.2 % (ref 0–5)
ETHANOL: <10 MG/DL (ref 0–0.08)
GEN CATEG CVX/VAG CYTO-IMP: NORMAL
GFR NON-AFRICAN AMERICAN: >60
GLUCOSE BLD-MCNC: 108 MG/DL (ref 74–109)
GLUCOSE URINE: NEGATIVE MG/DL
HCT VFR BLD CALC: 43.9 % (ref 37–47)
HEMOGLOBIN: 14.5 G/DL (ref 12–16)
KETONES, URINE: ABNORMAL MG/DL
LAB AP CASE REPORT: NORMAL
LAB AP GYN ADDITIONAL INFORMATION: NORMAL
LAB AP GYN OTHER FINDINGS: NORMAL
LEUKOCYTE ESTERASE, URINE: NEGATIVE
LYMPHOCYTES ABSOLUTE: 1.7 K/UL (ref 1.1–4.5)
LYMPHOCYTES RELATIVE PERCENT: 18.5 % (ref 20–40)
Lab: NORMAL
MCH RBC QN AUTO: 31.8 PG (ref 27–31)
MCHC RBC AUTO-ENTMCNC: 33 G/DL (ref 33–37)
MCV RBC AUTO: 96.3 FL (ref 81–99)
MONOCYTES ABSOLUTE: 0.9 K/UL (ref 0–0.9)
MONOCYTES RELATIVE PERCENT: 9.2 % (ref 0–10)
NEUTROPHILS ABSOLUTE: 6.6 K/UL (ref 1.5–7.5)
NEUTROPHILS RELATIVE PERCENT: 71.1 % (ref 50–65)
NITRITE, URINE: NEGATIVE
OPIATE SCREEN URINE: NEGATIVE
PATH INTERP SPEC-IMP: NORMAL
PDW BLD-RTO: 13.8 % (ref 11.5–14.5)
PH UA: 6
PLATELET # BLD: 228 K/UL (ref 130–400)
PMV BLD AUTO: 10.1 FL (ref 9.4–12.3)
POTASSIUM SERPL-SCNC: 4.4 MMOL/L (ref 3.5–5)
PROTEIN UA: NEGATIVE MG/DL
RBC # BLD: 4.56 M/UL (ref 4.2–5.4)
SODIUM BLD-SCNC: 140 MMOL/L (ref 136–145)
SPECIFIC GRAVITY UA: 1.02
STAT OF ADQ CVX/VAG CYTO-IMP: NORMAL
TOTAL PROTEIN: 7.1 G/DL (ref 6.6–8.7)
URINE REFLEX TO CULTURE: ABNORMAL
UROBILINOGEN, URINE: 0.2 E.U./DL
VALPROIC ACID LEVEL: 101.3 UG/ML (ref 50–100)
WBC # BLD: 9.2 K/UL (ref 4.8–10.8)

## 2018-12-21 PROCEDURE — 80307 DRUG TEST PRSMV CHEM ANLYZR: CPT

## 2018-12-21 PROCEDURE — 6370000000 HC RX 637 (ALT 250 FOR IP): Performed by: PSYCHIATRY & NEUROLOGY

## 2018-12-21 PROCEDURE — 80053 COMPREHEN METABOLIC PANEL: CPT

## 2018-12-21 PROCEDURE — 99285 EMERGENCY DEPT VISIT HI MDM: CPT

## 2018-12-21 PROCEDURE — 80164 ASSAY DIPROPYLACETIC ACD TOT: CPT

## 2018-12-21 PROCEDURE — 36415 COLL VENOUS BLD VENIPUNCTURE: CPT

## 2018-12-21 PROCEDURE — G0480 DRUG TEST DEF 1-7 CLASSES: HCPCS

## 2018-12-21 PROCEDURE — 81003 URINALYSIS AUTO W/O SCOPE: CPT

## 2018-12-21 PROCEDURE — 85025 COMPLETE CBC W/AUTO DIFF WBC: CPT

## 2018-12-21 PROCEDURE — 1240000000 HC EMOTIONAL WELLNESS R&B

## 2018-12-21 PROCEDURE — 99285 EMERGENCY DEPT VISIT HI MDM: CPT | Performed by: EMERGENCY MEDICINE

## 2018-12-21 RX ORDER — SPIRONOLACTONE 50 MG/1
25 TABLET, FILM COATED ORAL ONCE
Status: COMPLETED | OUTPATIENT
Start: 2018-12-21 | End: 2018-12-21

## 2018-12-21 RX ORDER — SPIRONOLACTONE 25 MG/1
25 TABLET ORAL 2 TIMES DAILY
COMMUNITY
End: 2020-02-17

## 2018-12-21 RX ORDER — LANOLIN ALCOHOL/MO/W.PET/CERES
9 CREAM (GRAM) TOPICAL ONCE
Status: COMPLETED | OUTPATIENT
Start: 2018-12-21 | End: 2018-12-21

## 2018-12-21 RX ORDER — RISPERIDONE 1 MG/1
1 TABLET, FILM COATED ORAL ONCE
Status: COMPLETED | OUTPATIENT
Start: 2018-12-21 | End: 2018-12-21

## 2018-12-21 RX ORDER — DIVALPROEX SODIUM 250 MG/1
250 TABLET, EXTENDED RELEASE ORAL NIGHTLY
Status: ON HOLD | COMMUNITY
End: 2018-12-27 | Stop reason: HOSPADM

## 2018-12-21 RX ADMIN — RISPERIDONE 1 MG: 1 TABLET, FILM COATED ORAL at 22:04

## 2018-12-21 RX ADMIN — SPIRONOLACTONE 25 MG: 50 TABLET ORAL at 22:04

## 2018-12-21 RX ADMIN — Medication 9 MG: at 22:04

## 2018-12-21 ASSESSMENT — SLEEP AND FATIGUE QUESTIONNAIRES
DIFFICULTY FALLING ASLEEP: NO
RESTFUL SLEEP: NO
DIFFICULTY ARISING: YES
SLEEP PATTERN: DIFFICULTY ARISING
DO YOU USE A SLEEP AID: YES
AVERAGE NUMBER OF SLEEP HOURS: 8
DO YOU HAVE DIFFICULTY SLEEPING: NO
DIFFICULTY STAYING ASLEEP: NO

## 2018-12-21 ASSESSMENT — ENCOUNTER SYMPTOMS
SHORTNESS OF BREATH: 0
VOMITING: 0
RHINORRHEA: 0
DIARRHEA: 0
NAUSEA: 0
ABDOMINAL PAIN: 0
BACK PAIN: 0
SORE THROAT: 0

## 2018-12-21 ASSESSMENT — LIFESTYLE VARIABLES: HISTORY_ALCOHOL_USE: NO

## 2018-12-21 ASSESSMENT — PATIENT HEALTH QUESTIONNAIRE - PHQ9: SUM OF ALL RESPONSES TO PHQ QUESTIONS 1-9: 17

## 2018-12-21 NOTE — ED NOTES
Report to [facility].      [address] [facility] [place]  12/21/18 1334

## 2018-12-21 NOTE — ED PROVIDER NOTES
[facility])          SURGICAL HISTORY       Past Surgical History:   Procedure Laterality Date    DILATION AND CURETTAGE OF UTERUS  2017    [facility]           CURRENT MEDICATIONS       Previous Medications    CALCIUM CARBONATE (OSCAL) 500 MG TABS TABLET    Take 500 mg by mouth daily    DIVALPROEX (DEPAKOTE ER) 250 MG EXTENDED RELEASE TABLET    Take 250 mg by mouth nightly    DIVALPROEX (DEPAKOTE ER) 500 MG EXTENDED RELEASE TABLET    Take 1 tablet by mouth 2 times daily    NYSTATIN (MYCOSTATIN) 808536 UNIT/GM POWDER    Apply topically 2 times daily as needed     RISPERIDONE (RISPERDAL) 1 MG TABLET    Take 1 tablet by mouth 2 times daily    SPIRONOLACTONE (ALDACTONE) 25 MG TABLET    Take 25 mg by mouth 2 times daily    VITAMIN D (ERGOCALCIFEROL) 01417 UNITS CAPS CAPSULE    Take 50,000 Units by mouth once a week       ALLERGIES     Seroquel [quetiapine fumarate]; Adderall [amphetamine-dextroamphetamine]; Amphetamines; Ruth Constable [aspirin]; Codeine; Cogentin [benztropine]; Effexor [venlafaxine]; Estrogens; Geodon [ziprasidone hcl]; Hydrocodone; Lithium; Lortab [hydrocodone-acetaminophen]; Macrolides and ketolides; Metoprolol; Neurontin [gabapentin]; Other; Pcn [penicillins]; Provera [medroxyprogesterone]; Prozac [fluoxetine hcl]; Tetracyclines & related; Trazodone and nefazodone; Trintellix [vortioxetine]; Valium [diazepam]; Wellbutrin [bupropion]; and Zoloft [sertraline hcl]    FAMILY HISTORY     History reviewed. No pertinent family history.        SOCIAL HISTORY       Social History     Social History    Marital status:      Spouse name: N/A    Number of children: N/A    Years of education: N/A     Social History Main Topics    Smoking status: Never Smoker    Smokeless tobacco: Never Used    Alcohol use No    Drug use: No    Sexual activity: Not Asked     Other Topics Concern    None     Social History Narrative    Past Psychiatric History         She has been admitted to inpatient care too many to count times,

## 2018-12-22 PROBLEM — F31.4 BIPOLAR AFFECTIVE DISORDER, DEPRESSED, SEVERE (HCC): Status: ACTIVE | Noted: 2018-12-22

## 2018-12-22 LAB
CHOLESTEROL, TOTAL: 130 MG/DL (ref 160–199)
HDLC SERPL-MCNC: 52 MG/DL (ref 65–121)
LDL CHOLESTEROL CALCULATED: 61 MG/DL
TRIGL SERPL-MCNC: 83 MG/DL (ref 0–149)

## 2018-12-22 PROCEDURE — 6370000000 HC RX 637 (ALT 250 FOR IP): Performed by: PSYCHIATRY & NEUROLOGY

## 2018-12-22 PROCEDURE — 99223 1ST HOSP IP/OBS HIGH 75: CPT | Performed by: PSYCHIATRY & NEUROLOGY

## 2018-12-22 PROCEDURE — 1240000000 HC EMOTIONAL WELLNESS R&B

## 2018-12-22 PROCEDURE — 36415 COLL VENOUS BLD VENIPUNCTURE: CPT

## 2018-12-22 PROCEDURE — 80061 LIPID PANEL: CPT

## 2018-12-22 PROCEDURE — 6370000000 HC RX 637 (ALT 250 FOR IP): Performed by: FAMILY MEDICINE

## 2018-12-22 RX ORDER — ERGOCALCIFEROL 1.25 MG/1
50000 CAPSULE ORAL WEEKLY
Status: DISCONTINUED | OUTPATIENT
Start: 2018-12-22 | End: 2018-12-27 | Stop reason: HOSPADM

## 2018-12-22 RX ORDER — LANOLIN ALCOHOL/MO/W.PET/CERES
9 CREAM (GRAM) TOPICAL NIGHTLY PRN
Status: DISCONTINUED | OUTPATIENT
Start: 2018-12-22 | End: 2018-12-27 | Stop reason: HOSPADM

## 2018-12-22 RX ORDER — SPIRONOLACTONE 50 MG/1
25 TABLET, FILM COATED ORAL 2 TIMES DAILY
Status: DISCONTINUED | OUTPATIENT
Start: 2018-12-22 | End: 2018-12-27 | Stop reason: HOSPADM

## 2018-12-22 RX ORDER — OYSTER SHELL CALCIUM WITH VITAMIN D 500; 200 MG/1; [IU]/1
1 TABLET, FILM COATED ORAL DAILY
Status: DISCONTINUED | OUTPATIENT
Start: 2018-12-22 | End: 2018-12-27 | Stop reason: HOSPADM

## 2018-12-22 RX ORDER — RISPERIDONE 1 MG/1
1 TABLET, FILM COATED ORAL DAILY
Status: DISCONTINUED | OUTPATIENT
Start: 2018-12-22 | End: 2018-12-27 | Stop reason: HOSPADM

## 2018-12-22 RX ADMIN — CALCIUM CARBONATE-VITAMIN D TAB 500 MG-200 UNIT 1 TABLET: 500-200 TAB at 15:25

## 2018-12-22 RX ADMIN — RISPERIDONE 1 MG: 1 TABLET, FILM COATED ORAL at 12:32

## 2018-12-22 RX ADMIN — SPIRONOLACTONE 25 MG: 50 TABLET ORAL at 12:30

## 2018-12-22 RX ADMIN — ERGOCALCIFEROL 50000 UNITS: 1.25 CAPSULE ORAL at 12:30

## 2018-12-22 RX ADMIN — Medication 9 MG: at 21:55

## 2018-12-22 RX ADMIN — SPIRONOLACTONE 25 MG: 50 TABLET ORAL at 21:55

## 2018-12-22 NOTE — PROGRESS NOTES
Group Therapy Note    Date: 12/22/2018  Start Time: 1600  End Time:  5276  Number of Participants: 10    Type of Group: Recreational    Wellness Binder Information  Module Name:  Loren Mace   Session Number:      Patient's Goal:  SOCIAL SKILLS    Notes:  PARTICIPATED AND ENJOYED  Status After Intervention:  Improved    Participation Level:  Active Listener and Interactive    Participation Quality: Appropriate, Attentive and Sharing      Speech:  normal      Thought Process/Content: Linear      Affective Functioning: Flat      Mood: anxious and depressed      Level of consciousness:  Alert, Oriented x4 and Preoccupied      Response to Learning: Progressing to goal      Endings: None Reported THIS EVENING    Modes of Intervention: Socialization      Discipline Responsible: Registered Nurse      Signature:  Placido Pederson RN

## 2018-12-22 NOTE — BH NOTE
[facility] [address] [facility]                           PSYCHIATRIC ADMISSION NOTE    PATIENT NAME: Kaz Plata                        :        1965  MED REC NO:   [id number]                              ROOM:       [facility]  ACCOUNT NO:   [de-identified]                           ADMIT DATE: 2018  PROVIDER:     Nancy Sow. Glenroy Bynum MD    IDENTIFICATION:  The patient is a 64-year-old female well known to the  undersigned. I have admitted her off and on for many years. She has  been hospitalized psychiatrically probably at least 30 times. CHIEF COMPLAINT:  \"Suicidal thoughts. \"    PRESENT ILLNESS:  The patient has chronic suicidal ideation  necessitating multiple, multiple admissions. She also has a history  consistent not only with depression, but with manic episodes manifested  by racing thoughts and overspending hyperactivity. When she is  depressed, she has characteristic atypical sign. She stays in bed,  tries to sleep as much as possible. Interestingly, she describes  periods when depressed, she also has racing thoughts. There is no drug  or alcohol abuse. Despite many suicidal thoughts, she has attempted 3  times, once brought her to the ICU. She sees Dr. Noelle Dallas at [facility]. Apparently, she  has had some side effects from Depakote after the dose was increased by  to 1250 mg a day couple weeks ago because of increasing depressed  symptoms. Her level here at the hospital on admission was 101.3, which  normally would not represent a dangerously toxic level. Some time this fall, her antipsychotics were discontinued, which exposed  some tardive dyskinesia with dystonic tongue movements, this has  resolved. She remains on 1 mg risperidone. Notably, she has never tried clozapine. MEDICAL HISTORY:  She claims allergic to numerous agents, they are  probably intolerances, see chart.   She is  5,

## 2018-12-23 LAB — VITAMIN D 25-HYDROXY: 43.9 NG/ML

## 2018-12-23 PROCEDURE — 1240000000 HC EMOTIONAL WELLNESS R&B

## 2018-12-23 PROCEDURE — 82306 VITAMIN D 25 HYDROXY: CPT

## 2018-12-23 PROCEDURE — 36415 COLL VENOUS BLD VENIPUNCTURE: CPT

## 2018-12-23 PROCEDURE — 6370000000 HC RX 637 (ALT 250 FOR IP): Performed by: PSYCHIATRY & NEUROLOGY

## 2018-12-23 PROCEDURE — 6370000000 HC RX 637 (ALT 250 FOR IP): Performed by: FAMILY MEDICINE

## 2018-12-23 RX ORDER — DOCUSATE SODIUM 100 MG/1
100 CAPSULE, LIQUID FILLED ORAL 2 TIMES DAILY
Status: DISCONTINUED | OUTPATIENT
Start: 2018-12-23 | End: 2018-12-27 | Stop reason: HOSPADM

## 2018-12-23 RX ADMIN — CALCIUM CARBONATE-VITAMIN D TAB 500 MG-200 UNIT 1 TABLET: 500-200 TAB at 08:40

## 2018-12-23 RX ADMIN — Medication 9 MG: at 21:24

## 2018-12-23 RX ADMIN — SPIRONOLACTONE 25 MG: 50 TABLET ORAL at 21:24

## 2018-12-23 RX ADMIN — RISPERIDONE 1 MG: 1 TABLET, FILM COATED ORAL at 08:40

## 2018-12-23 RX ADMIN — DOCUSATE SODIUM 100 MG: 100 CAPSULE, LIQUID FILLED ORAL at 21:24

## 2018-12-23 RX ADMIN — DOCUSATE SODIUM 100 MG: 100 CAPSULE, LIQUID FILLED ORAL at 12:29

## 2018-12-23 RX ADMIN — SPIRONOLACTONE 25 MG: 50 TABLET ORAL at 08:40

## 2018-12-23 NOTE — PROGRESS NOTES
Group Therapy Note    Start Time: 900  End Time:  930  Number of Participants: 9    Type of Group: Community Meeting       Patient's Goal:  Going to group      Notes:      Participation Level:  Active Listener       Participation Quality: Appropriate      Thought Process/Content: Logical      Affective Functioning: Congruent      Mood: calm      Level of consciousness:  Alert      Modes of Intervention: Support      Discipline Responsible: Behavioral Health Tech II      Signature:  Melania Terrazas

## 2018-12-24 LAB — VALPROIC ACID LEVEL: 7.4 UG/ML (ref 50–100)

## 2018-12-24 PROCEDURE — 6370000000 HC RX 637 (ALT 250 FOR IP): Performed by: PSYCHIATRY & NEUROLOGY

## 2018-12-24 PROCEDURE — 80164 ASSAY DIPROPYLACETIC ACD TOT: CPT

## 2018-12-24 PROCEDURE — 99231 SBSQ HOSP IP/OBS SF/LOW 25: CPT | Performed by: NURSE PRACTITIONER

## 2018-12-24 PROCEDURE — 6370000000 HC RX 637 (ALT 250 FOR IP): Performed by: FAMILY MEDICINE

## 2018-12-24 PROCEDURE — 2500000003 HC RX 250 WO HCPCS: Performed by: FAMILY MEDICINE

## 2018-12-24 PROCEDURE — 36415 COLL VENOUS BLD VENIPUNCTURE: CPT

## 2018-12-24 PROCEDURE — 1240000000 HC EMOTIONAL WELLNESS R&B

## 2018-12-24 RX ORDER — IBUPROFEN 400 MG/1
400 TABLET ORAL EVERY 6 HOURS PRN
Status: DISCONTINUED | OUTPATIENT
Start: 2018-12-24 | End: 2018-12-27 | Stop reason: HOSPADM

## 2018-12-24 RX ORDER — LIDOCAINE 4 G/G
1 PATCH TOPICAL DAILY
Status: DISCONTINUED | OUTPATIENT
Start: 2018-12-24 | End: 2018-12-27 | Stop reason: HOSPADM

## 2018-12-24 RX ORDER — LIDOCAINE 50 MG/G
1 PATCH TOPICAL DAILY
Status: DISCONTINUED | OUTPATIENT
Start: 2018-12-24 | End: 2018-12-24 | Stop reason: SDUPTHER

## 2018-12-24 RX ADMIN — MICONAZOLE NITRATE: 20 POWDER TOPICAL at 08:23

## 2018-12-24 RX ADMIN — CALCIUM CARBONATE-VITAMIN D TAB 500 MG-200 UNIT 1 TABLET: 500-200 TAB at 08:23

## 2018-12-24 RX ADMIN — SPIRONOLACTONE 25 MG: 50 TABLET ORAL at 20:59

## 2018-12-24 RX ADMIN — DIVALPROEX SODIUM 750 MG: 500 TABLET, EXTENDED RELEASE ORAL at 20:59

## 2018-12-24 RX ADMIN — IBUPROFEN 400 MG: 400 TABLET ORAL at 21:46

## 2018-12-24 RX ADMIN — SPIRONOLACTONE 25 MG: 50 TABLET ORAL at 08:23

## 2018-12-24 RX ADMIN — Medication 9 MG: at 20:58

## 2018-12-24 RX ADMIN — IBUPROFEN 400 MG: 400 TABLET ORAL at 11:36

## 2018-12-24 RX ADMIN — DOCUSATE SODIUM 100 MG: 100 CAPSULE, LIQUID FILLED ORAL at 20:59

## 2018-12-24 RX ADMIN — RISPERIDONE 1 MG: 1 TABLET, FILM COATED ORAL at 08:23

## 2018-12-24 RX ADMIN — DOCUSATE SODIUM 100 MG: 100 CAPSULE, LIQUID FILLED ORAL at 08:23

## 2018-12-24 ASSESSMENT — PAIN SCALES - GENERAL
PAINLEVEL_OUTOF10: 8
PAINLEVEL_OUTOF10: 4

## 2018-12-24 NOTE — PROGRESS NOTES
Patient is alert and oriented to person time place and situation. Patient is cooperative with care and medication. Patient is social and attends groups. Patient performed ADL's this shift. Patient at this time denies SI, HI & AVH.  Electronically signed by Tia Wise RN on 12/24/2018 at 9:26 AM

## 2018-12-24 NOTE — PROGRESS NOTES
[address] [place] [address]      Psychiatric Progress Note    Name:  Ruthie Begin  Date:  12/24/2018  Age:  47 y.o. Sex:  female  Ethnicity:   Primary Care Physician:  TING Rodrigez CNP   Patient Care Team:  Patient Care Team:  TING Rodrigez CNP as PCP - General  Frannie Adam MD (Cardiology)  Chief Complaint: Suha Harkins was starting to have suicidal thoughts. \"        Historian:patient  Complaint Type: anxiety, decreased appetite, depression, fatigue, loss of interest in favorite activities and sleep disturbance  Course of Symptoms: improved  Precipitating Factors: history of mental illness      HPI:  The patient has chronic suicidal ideation  necessitating multiple, multiple admissions. She also has a history  consistent not only with depression, but with manic episodes manifested  by racing thoughts and overspending hyperactivity. When she is  depressed, she has characteristic atypical sign. She stays in bed,  tries to sleep as much as possible. Interestingly, she describes  periods when depressed, she also has racing thoughts. There is no drug  or alcohol abuse. Despite many suicidal thoughts, she has attempted 3  times, once brought her to the ICU.     She sees Dr. Ang Sanchez at [address] [place] [address]. Apparently, she  has had some side effects from Depakote after the dose was increased by  to 1250 mg a day couple weeks ago because of increasing depressed  symptoms. Her level here at the hospital on admission was 101.3, which  normally would not represent a dangerously toxic level.     Some time this fall, her antipsychotics were discontinued, which exposed  some tardive dyskinesia with dystonic tongue movements, this has  resolved. She remains on 1 mg risperidone. Subjective  Patient reports sleep as \"it has been ok. \" She denies SI, HI and psychosis.  She reports that she was feeling overwhelmed at home and had added stressors, her son getting his license, reapplying

## 2018-12-24 NOTE — PROGRESS NOTES
Group Therapy Note    Start Time: 0930  End Time:  1000  Number of Participants: 12    Type of Group: Community Meeting       Patient's Goal:  \"Talking to my doctor about meds\"      Notes:      Participation Level:  Active Listener       Participation Quality: Appropriate      Thought Process/Content: Logical      Affective Functioning: Congruent      Mood: Calm      Level of consciousness:  Alert      Modes of Intervention: Support      Discipline Responsible: Behavioral Health Tech II      Signature:  Michelle Winchester

## 2018-12-24 NOTE — PLAN OF CARE
Problem: Health Behavior:  Goal: Ability to verbalize adaptive coping strategies to use when the urge to self-mutilate occurs will improve  Ability to verbalize adaptive coping strategies to use when the urge to self-mutilate occurs will improve   Outcome: Ongoing      Problem: Safety:  Goal: Ability to contract for his/her safety will improve  Ability to contract for his/her safety will improve   Outcome: Ongoing

## 2018-12-25 PROCEDURE — 6370000000 HC RX 637 (ALT 250 FOR IP): Performed by: PSYCHIATRY & NEUROLOGY

## 2018-12-25 PROCEDURE — 1240000000 HC EMOTIONAL WELLNESS R&B

## 2018-12-25 PROCEDURE — 99231 SBSQ HOSP IP/OBS SF/LOW 25: CPT | Performed by: NURSE PRACTITIONER

## 2018-12-25 PROCEDURE — 6370000000 HC RX 637 (ALT 250 FOR IP): Performed by: FAMILY MEDICINE

## 2018-12-25 RX ADMIN — SPIRONOLACTONE 25 MG: 50 TABLET ORAL at 20:29

## 2018-12-25 RX ADMIN — DOCUSATE SODIUM 100 MG: 100 CAPSULE, LIQUID FILLED ORAL at 09:09

## 2018-12-25 RX ADMIN — Medication 9 MG: at 20:28

## 2018-12-25 RX ADMIN — CALCIUM CARBONATE-VITAMIN D TAB 500 MG-200 UNIT 1 TABLET: 500-200 TAB at 09:08

## 2018-12-25 RX ADMIN — DIVALPROEX SODIUM 750 MG: 500 TABLET, EXTENDED RELEASE ORAL at 20:28

## 2018-12-25 RX ADMIN — SPIRONOLACTONE 25 MG: 50 TABLET ORAL at 09:08

## 2018-12-25 RX ADMIN — DOCUSATE SODIUM 100 MG: 100 CAPSULE, LIQUID FILLED ORAL at 20:29

## 2018-12-25 RX ADMIN — IBUPROFEN 400 MG: 400 TABLET ORAL at 21:17

## 2018-12-25 RX ADMIN — RISPERIDONE 1 MG: 1 TABLET, FILM COATED ORAL at 09:15

## 2018-12-25 ASSESSMENT — PAIN SCALES - GENERAL: PAINLEVEL_OUTOF10: 7

## 2018-12-25 NOTE — PROGRESS NOTES
delusions  Perceptual Disturbance:  denies any perceptual disturbance  Cognition:  oriented to person, place, and time  Concentration : good  Memory intact for recent and remote  Fund of knowledge:     Abstract thinking:  average  Insight:  adequate  Judgment:  good     Scheduled Medications    lidocaine  1 patch Transdermal Daily    docusate sodium  100 mg Oral BID    miconazole   Topical BID    spironolactone  25 mg Oral BID    calcium-vitamin D  1 tablet Oral Daily    vitamin D  50,000 Units Oral Weekly    risperiDONE  1 mg Oral Daily    divalproex  750 mg Oral Daily            Current Medications:  Current Facility-Administered Medications             Current Facility-Administered Medications   Medication Dose Route Frequency Provider Last Rate Last Dose    ibuprofen (ADVIL;MOTRIN) tablet 400 mg  400 mg Oral Q6H PRN Gisela Vazquez MD   400 mg at 12/24/18 1136    lidocaine 4 % external patch 1 patch  1 patch Transdermal Daily Gisela Vazquez MD   1 patch at 12/24/18 1135    docusate sodium (COLACE) capsule 100 mg  100 mg Oral BID Gisela Vazquez MD   100 mg at 12/24/18 3214    miconazole (MICOTIN) 2 % powder   Topical BID Gisela Vazquez MD        spironolactone (ALDACTONE) tablet 25 mg  25 mg Oral BID Gisela Vazquez MD   25 mg at 12/24/18 9752    calcium-vitamin D (OSCAL-500) 500-200 MG-UNIT per tablet 1 tablet  1 tablet Oral Daily Gisela Vazquez MD   1 tablet at 12/24/18 7559    vitamin D (ERGOCALCIFEROL) capsule 50,000 Units  50,000 Units Oral Weekly Gisela Vazquez MD   50,000 Units at 12/22/18 1230    risperiDONE (RISPERDAL) tablet 1 mg  1 mg Oral Daily Dasha Mejia MD   1 mg at 12/24/18 1827    divalproex (DEPAKOTE ER) extended release tablet 750 mg  750 mg Oral Daily Dasha Mejia MD        melatonin tablet 9 mg  9 mg Oral Nightly PRN Dasha Mejia MD   9 mg at 12/23/18 2124            Psychotherapy:   SUPPORTIVE     DSM V Diagnoses:     Bipolar affective disorder,

## 2018-12-26 PROBLEM — F31.9 BIPOLAR 1 DISORDER, DEPRESSED (HCC): Status: RESOLVED | Noted: 2018-07-21 | Resolved: 2018-12-26

## 2018-12-26 PROBLEM — F31.32 BIPOLAR AFFECTIVE DISORDER, DEPRESSED, MODERATE (HCC): Status: RESOLVED | Noted: 2018-08-02 | Resolved: 2018-12-26

## 2018-12-26 PROBLEM — F31.4 BIPOLAR 1 DISORDER, DEPRESSED, SEVERE (HCC): Status: RESOLVED | Noted: 2018-07-26 | Resolved: 2018-12-26

## 2018-12-26 PROCEDURE — 99231 SBSQ HOSP IP/OBS SF/LOW 25: CPT | Performed by: NURSE PRACTITIONER

## 2018-12-26 PROCEDURE — 6370000000 HC RX 637 (ALT 250 FOR IP): Performed by: PSYCHIATRY & NEUROLOGY

## 2018-12-26 PROCEDURE — 1240000000 HC EMOTIONAL WELLNESS R&B

## 2018-12-26 PROCEDURE — 6370000000 HC RX 637 (ALT 250 FOR IP): Performed by: FAMILY MEDICINE

## 2018-12-26 RX ADMIN — SPIRONOLACTONE 25 MG: 50 TABLET ORAL at 08:40

## 2018-12-26 RX ADMIN — RISPERIDONE 1 MG: 1 TABLET, FILM COATED ORAL at 08:40

## 2018-12-26 RX ADMIN — DOCUSATE SODIUM 100 MG: 100 CAPSULE, LIQUID FILLED ORAL at 21:36

## 2018-12-26 RX ADMIN — IBUPROFEN 400 MG: 400 TABLET ORAL at 21:38

## 2018-12-26 RX ADMIN — SPIRONOLACTONE 25 MG: 50 TABLET ORAL at 21:36

## 2018-12-26 RX ADMIN — Medication 9 MG: at 21:36

## 2018-12-26 RX ADMIN — DIVALPROEX SODIUM 750 MG: 500 TABLET, EXTENDED RELEASE ORAL at 21:36

## 2018-12-26 RX ADMIN — CALCIUM CARBONATE-VITAMIN D TAB 500 MG-200 UNIT 1 TABLET: 500-200 TAB at 08:40

## 2018-12-26 RX ADMIN — IBUPROFEN 400 MG: 400 TABLET ORAL at 11:06

## 2018-12-26 RX ADMIN — DOCUSATE SODIUM 100 MG: 100 CAPSULE, LIQUID FILLED ORAL at 08:40

## 2018-12-26 ASSESSMENT — PAIN SCALES - GENERAL
PAINLEVEL_OUTOF10: 7
PAINLEVEL_OUTOF10: 4

## 2018-12-26 NOTE — PROGRESS NOTES
I wake up in the morning. \" She reports that she has been sleeping all night and has been waking up at 4 am ready for the day. Patient has been calm and cooperative with staff and peers. Patient has been compliant with medications. Patient has been attending groups. Patient reports appetite as \"good. \"  Patient reports no side effects from medications. Previous Psychiatric/Substance Use History      Medical History:  Past Medical History:   Diagnosis Date    Chest pain 2/8/2012    Depression     Hypoglycemia     Schizoaffective disorder ([facility] 2/8/2012    Suicide attempt ([facility]         BRAMBILA History:   History   Alcohol Use No         History   Drug Use No        History   Smoking Status    Never Smoker   Smokeless Tobacco    Never Used        Family History:     History reviewed. No pertinent family history. Vital Signs:  Last set of tests and vitals:  Vitals:    12/26/18 0719   BP: 132/68   Pulse: 80   Resp: 20   Temp: 96.1 °F (35.6 °C)   SpO2: 96%          Mental Status:  Level of consciousness:  within normal limits and awake  Appearance:  well-appearing, street clothes, in chair, good grooming and good hygiene  Behavior/Motor:  no abnormalities noted  Attitude toward examiner:  cooperative, attentive and good eye contact  Speech:  normal rate and normal volume  Mood:  \" I keep going to bed. \"  Affect:  flat  Thought processes:  linear and goal directed  Thought content:  Homocidal ideation :denies  Suicidal Ideation:  denies suicidal ideation  Delusions:  no evidence of delusions  Perceptual Disturbance:  denies any perceptual disturbance  Cognition:  oriented to person, place, and time  Concentration : good  Memory intact for recent and remote  Fund of knowledge:  average  Abstract thinking:  adequate  Insight:  fair  Judgment:  fair     lidocaine  1 patch Transdermal Daily    docusate sodium  100 mg Oral BID    miconazole   Topical BID    spironolactone  25 mg Oral BID    calcium-vitamin D 1 tablet Oral Daily    vitamin D  50,000 Units Oral Weekly    risperiDONE  1 mg Oral Daily    divalproex  750 mg Oral Daily       Current Medications:  Current Facility-Administered Medications   Medication Dose Route Frequency Provider Last Rate Last Dose    ibuprofen (ADVIL;MOTRIN) tablet 400 mg  400 mg Oral Q6H PRN Jose Juan Bryan MD   400 mg at 12/25/18 2117    lidocaine 4 % external patch 1 patch  1 patch Transdermal Daily Jose Juan Bryan MD   1 patch at 12/26/18 0840    docusate sodium (COLACE) capsule 100 mg  100 mg Oral BID Jose Juan Bryan MD   100 mg at 12/26/18 0840    miconazole (MICOTIN) 2 % powder   Topical BID Jose Juan Bryan MD        spironolactone (ALDACTONE) tablet 25 mg  25 mg Oral BID Jose Juan Bryan MD   25 mg at 12/26/18 0840    calcium-vitamin D (OSCAL-500) 500-200 MG-UNIT per tablet 1 tablet  1 tablet Oral Daily Jose Juan Bryan MD   1 tablet at 12/26/18 0840    vitamin D (ERGOCALCIFEROL) capsule 50,000 Units  50,000 Units Oral Weekly Jose Juan Bryan MD   50,000 Units at 12/22/18 1230    risperiDONE (RISPERDAL) tablet 1 mg  1 mg Oral Daily Colin Mccullough MD   1 mg at 12/26/18 0840    divalproex (DEPAKOTE ER) extended release tablet 750 mg  750 mg Oral Daily Colin Mccullough MD   750 mg at 12/25/18 2028    melatonin tablet 9 mg  9 mg Oral Nightly PRN Colin Mccullough MD   9 mg at 12/25/18 2028       Psychotherapy:   SUPPORTIVE    DSM V Diagnoses:    Bipolar affective disorder, depressed, severe ([facility]            Plan:    Encourage group therapy  15 minute safety checks  Medical monitoring by Dr. Wilmer Schmitz and associates  Continue current therapy and medications  VPA level in am    Amount of time spent with patient:  15 minutes with greater than 50% of the time spent in counseling and collaboration of care.     TING Frederick  Clinician Signature: signed electronically

## 2018-12-26 NOTE — PROGRESS NOTES
NURSING PROGRESS NOTE:    DATA: Patient interview     ACTION: Continuous 15 minute monitoring of patient; interview and observation of patient. Suicide Precautions in place. Medications given as ordered. Reminders given to patient to report any issues with sleep. Patient currently on Melatonin for sleep. RESPONSE: Patient is much brighter, patient has been social and interacting with peers and staff. Patient reports she is feeling better, sleeping well with use of Melatonin at night for sleep. Patient reports she is only worried about her SSI disability check at this time, reports medications are working at this time.       Depression:8  Anxiety:7  [id number] [address]        Electronically signed by Vida Priest LPN on 00/79/35 at 7:20 PM

## 2018-12-26 NOTE — PROGRESS NOTES
[facility] Daily Shift Assessment  Nursing Progress Note    Room: 06/608-01 Name: Coral Singletary Age: 47 y.o. Ethnicity:  Gender: female   Dx: <principal problem not specified>  Precautions: suicide risk  CPAP: No Accu-Chek: No  Sleep: Yes, Good, no sleep issues Sched Sleep Meds: No PRN Sleep Meds: Yes Other PRN Meds: Yes Med Compliant: Yes Appetite: no change from normal Percent Meals: 75% Social: Yes ADLs: Yes Speech: normal Depression: 8 Anxiety: 7  Pt up in day room during interview. Pt calm and cooperative. Pt denies si, hi, and avh. Pt rates depression 8 and anxiety 7. Pt reports good sleep and normal appetite. Pt is social with others, goes to group, and does ADLs.   Torrie Alonzo RN

## 2018-12-27 VITALS
OXYGEN SATURATION: 97 % | RESPIRATION RATE: 18 BRPM | SYSTOLIC BLOOD PRESSURE: 109 MMHG | BODY MASS INDEX: 34.15 KG/M2 | HEART RATE: 75 BPM | DIASTOLIC BLOOD PRESSURE: 52 MMHG | HEIGHT: 64 IN | TEMPERATURE: 97 F | WEIGHT: 200 LBS

## 2018-12-27 LAB — VALPROIC ACID LEVEL: 48 UG/ML (ref 50–100)

## 2018-12-27 PROCEDURE — 36415 COLL VENOUS BLD VENIPUNCTURE: CPT

## 2018-12-27 PROCEDURE — 6370000000 HC RX 637 (ALT 250 FOR IP): Performed by: PSYCHIATRY & NEUROLOGY

## 2018-12-27 PROCEDURE — 80164 ASSAY DIPROPYLACETIC ACD TOT: CPT

## 2018-12-27 PROCEDURE — 6370000000 HC RX 637 (ALT 250 FOR IP): Performed by: FAMILY MEDICINE

## 2018-12-27 PROCEDURE — 5130000000 HC BRIDGE APPOINTMENT

## 2018-12-27 RX ORDER — DIVALPROEX SODIUM 250 MG/1
750 TABLET, EXTENDED RELEASE ORAL DAILY
Qty: 90 TABLET | Refills: 0 | Status: ON HOLD | OUTPATIENT
Start: 2018-12-27 | End: 2019-01-14 | Stop reason: HOSPADM

## 2018-12-27 RX ADMIN — DOCUSATE SODIUM 100 MG: 100 CAPSULE, LIQUID FILLED ORAL at 08:09

## 2018-12-27 RX ADMIN — SPIRONOLACTONE 50 MG: 50 TABLET ORAL at 08:08

## 2018-12-27 RX ADMIN — CALCIUM CARBONATE-VITAMIN D TAB 500 MG-200 UNIT 1 TABLET: 500-200 TAB at 08:08

## 2018-12-27 RX ADMIN — RISPERIDONE 1 MG: 1 TABLET, FILM COATED ORAL at 08:09

## 2018-12-27 NOTE — PROGRESS NOTES
Group Note  Date: 12/26/18  Start Time: 2030  End Time:  2045  Number of Participants in Group: 11  Number of Patients on Unit:12  Reason for Absence:  Intervention for patient absence:       Type of Group:  Wrap-Up/Relaxation __       Patient's Goal:  Met Goal:   X/worked hard          Did not Met Goal:          Staff Intervention:      Participation Level:  participated       Patient Response to Group:        Notes:     GEETA Man

## 2018-12-27 NOTE — DISCHARGE SUMMARY
and benefits of medications were reviewed with the patient. Initially patients VPA level was 101.3, Depakote was held for 48 hours and restarted at a lower dose of Depakote  mg po nightly and she tolerated well. VPA level was 48 upon discharge. She was insightful, no longer felt depressed and was looking forward to seeing her family. She would benefit greatly from [address] however reports that she no able to afford to drive [facility] three days per [facility]. Patient attended and participated in groups.  Patient was calm and cooperative with staff and peers. Patient was compliant with her medications. Patient was sleeping through the night. This patient is not suicidal, homicidal or psychotic at discharge. She does not present a danger to self or others. She will follow up at [place] [facility] and will return to her home. Number of antipsychotic medication prescribed at discharge: 1      Referral to addiction treatment: n/a    Prescription for Alcohol or Drug Disorder Medication: n/a    Prescription for Tobacco Cessation medication: n/a    If no prescriptions for Tobacco Cessation must document why: n/a    Consults: internal medicine    Significant Diagnostic Studies: labs:     Lab Results   Component Value Date    WBC 9.2 12/21/2018    HGB 14.5 12/21/2018    HCT 43.9 12/21/2018    MCV 96.3 12/21/2018     12/21/2018     Lab Results   Component Value Date     12/21/2018     05/04/2012    K 4.4 12/21/2018    K 4.7 05/04/2012     12/21/2018     05/04/2012    CO2 25 12/21/2018    BUN 16 12/21/2018    CREATININE 0.7 12/21/2018    CREATININE 0.9 05/04/2012    GLUCOSE 108 12/21/2018    CALCIUM 9.9 12/21/2018      Lab Results   Component Value Date    TSH 1.200 09/26/2018     Lab Results   Component Value Date    VITD25 43.9 12/23/2018     Results for Jorie Gitelman (MRN [id number]) as of 12/27/2018 10:17   Ref.  Range 12/22/2018 04:41   Cholesterol, Total Latest Ref Range: 160 - 199 mg/dL 130 (L) Take 25 mg by mouth 2 times daily      risperiDONE (RISPERDAL) 1 MG tablet Take 1 tablet by mouth 2 times daily  Qty: 60 tablet, Refills: 0      calcium carbonate (OSCAL) 500 MG TABS tablet Take 500 mg by mouth daily      vitamin D (ERGOCALCIFEROL) 02166 units CAPS capsule Take 50,000 Units by mouth once a week      nystatin (MYCOSTATIN) 739746 UNIT/GM powder Apply topically 2 times daily as needed            Activity: activity as tolerated  Diet: regular diet  Wound Care: none needed    Follow-up with   PCP in 2 weeks.   [facility] on 12/31/18 at 8 am and 1/11/18 at 8:30 am    Time worked: Less than 30 minutes    Participation:good    Electronically signed by TING Jaeger on 12/27/2018 at 10:15 AM

## 2019-01-07 ENCOUNTER — HOSPITAL ENCOUNTER (INPATIENT)
Age: 54
LOS: 7 days | Discharge: HOME | DRG: 885 | End: 2019-01-14
Attending: PSYCHIATRY & NEUROLOGY | Admitting: PSYCHIATRY & NEUROLOGY
Payer: MEDICAID

## 2019-01-07 DIAGNOSIS — R45.851 SUICIDAL THOUGHTS: Primary | ICD-10-CM

## 2019-01-07 DIAGNOSIS — F31.4 BIPOLAR AFFECTIVE DISORDER, DEPRESSED, SEVERE (HCC): ICD-10-CM

## 2019-01-07 LAB
ACETAMINOPHEN LEVEL: <15 UG/ML
ALBUMIN SERPL-MCNC: 4.6 G/DL (ref 3.5–5.2)
ALP BLD-CCNC: 76 U/L (ref 35–104)
ALT SERPL-CCNC: 14 U/L (ref 5–33)
AMPHETAMINE SCREEN, URINE: NEGATIVE
ANION GAP SERPL CALCULATED.3IONS-SCNC: 15 MMOL/L (ref 7–19)
AST SERPL-CCNC: 11 U/L (ref 5–32)
BACTERIA: NEGATIVE /HPF
BARBITURATE SCREEN URINE: NEGATIVE
BASOPHILS ABSOLUTE: 0.1 K/UL (ref 0–0.2)
BASOPHILS RELATIVE PERCENT: 0.7 % (ref 0–1)
BENZODIAZEPINE SCREEN, URINE: NEGATIVE
BILIRUB SERPL-MCNC: 0.3 MG/DL (ref 0.2–1.2)
BILIRUBIN URINE: NEGATIVE
BLOOD, URINE: NEGATIVE
BUN BLDV-MCNC: 13 MG/DL (ref 6–20)
CALCIUM SERPL-MCNC: 9.9 MG/DL (ref 8.6–10)
CANNABINOID SCREEN URINE: NEGATIVE
CHLORIDE BLD-SCNC: 107 MMOL/L (ref 98–111)
CLARITY: CLEAR
CO2: 21 MMOL/L (ref 22–29)
COCAINE METABOLITE SCREEN URINE: NEGATIVE
COLOR: YELLOW
CREAT SERPL-MCNC: 0.6 MG/DL (ref 0.5–0.9)
EOSINOPHILS ABSOLUTE: 0.1 K/UL (ref 0–0.6)
EOSINOPHILS RELATIVE PERCENT: 0.6 % (ref 0–5)
EPITHELIAL CELLS, UA: 2 /HPF (ref 0–5)
ETHANOL: <10 MG/DL (ref 0–0.08)
GFR NON-AFRICAN AMERICAN: >60
GLUCOSE BLD-MCNC: 128 MG/DL (ref 74–109)
GLUCOSE URINE: NEGATIVE MG/DL
HCT VFR BLD CALC: 45.4 % (ref 37–47)
HEMOGLOBIN: 14.5 G/DL (ref 12–16)
HYALINE CASTS: 2 /HPF (ref 0–8)
KETONES, URINE: NEGATIVE MG/DL
LEUKOCYTE ESTERASE, URINE: ABNORMAL
LYMPHOCYTES ABSOLUTE: 1.5 K/UL (ref 1.1–4.5)
LYMPHOCYTES RELATIVE PERCENT: 17.3 % (ref 20–40)
Lab: NORMAL
MCH RBC QN AUTO: 31.3 PG (ref 27–31)
MCHC RBC AUTO-ENTMCNC: 31.9 G/DL (ref 33–37)
MCV RBC AUTO: 97.8 FL (ref 81–99)
MONOCYTES ABSOLUTE: 0.7 K/UL (ref 0–0.9)
MONOCYTES RELATIVE PERCENT: 8.2 % (ref 0–10)
NEUTROPHILS ABSOLUTE: 6.3 K/UL (ref 1.5–7.5)
NEUTROPHILS RELATIVE PERCENT: 72.7 % (ref 50–65)
NITRITE, URINE: NEGATIVE
OPIATE SCREEN URINE: NEGATIVE
PDW BLD-RTO: 13.6 % (ref 11.5–14.5)
PH UA: 6.5
PLATELET # BLD: 253 K/UL (ref 130–400)
PMV BLD AUTO: 9.9 FL (ref 9.4–12.3)
POTASSIUM SERPL-SCNC: 4.5 MMOL/L (ref 3.5–5)
PROTEIN UA: NEGATIVE MG/DL
RBC # BLD: 4.64 M/UL (ref 4.2–5.4)
RBC UA: 0 /HPF (ref 0–4)
SALICYLATE, SERUM: <3 MG/DL (ref 3–10)
SODIUM BLD-SCNC: 143 MMOL/L (ref 136–145)
SPECIFIC GRAVITY UA: 1.01
TOTAL PROTEIN: 7.4 G/DL (ref 6.6–8.7)
URINE REFLEX TO CULTURE: YES
UROBILINOGEN, URINE: 0.2 E.U./DL
VALPROIC ACID LEVEL: 53.1 UG/ML (ref 50–100)
WBC # BLD: 8.6 K/UL (ref 4.8–10.8)
WBC UA: 3 /HPF (ref 0–5)

## 2019-01-07 PROCEDURE — G0480 DRUG TEST DEF 1-7 CLASSES: HCPCS

## 2019-01-07 PROCEDURE — 80053 COMPREHEN METABOLIC PANEL: CPT

## 2019-01-07 PROCEDURE — 81001 URINALYSIS AUTO W/SCOPE: CPT

## 2019-01-07 PROCEDURE — 36415 COLL VENOUS BLD VENIPUNCTURE: CPT

## 2019-01-07 PROCEDURE — 99285 EMERGENCY DEPT VISIT HI MDM: CPT

## 2019-01-07 PROCEDURE — 6370000000 HC RX 637 (ALT 250 FOR IP): Performed by: PSYCHIATRY & NEUROLOGY

## 2019-01-07 PROCEDURE — 85025 COMPLETE CBC W/AUTO DIFF WBC: CPT

## 2019-01-07 PROCEDURE — 99285 EMERGENCY DEPT VISIT HI MDM: CPT | Performed by: NURSE PRACTITIONER

## 2019-01-07 PROCEDURE — 1240000000 HC EMOTIONAL WELLNESS R&B

## 2019-01-07 PROCEDURE — 80164 ASSAY DIPROPYLACETIC ACD TOT: CPT

## 2019-01-07 PROCEDURE — 87086 URINE CULTURE/COLONY COUNT: CPT

## 2019-01-07 PROCEDURE — 80307 DRUG TEST PRSMV CHEM ANLYZR: CPT

## 2019-01-07 RX ORDER — RISPERIDONE 1 MG/1
1 TABLET, FILM COATED ORAL ONCE
Status: COMPLETED | OUTPATIENT
Start: 2019-01-07 | End: 2019-01-07

## 2019-01-07 RX ORDER — LANOLIN ALCOHOL/MO/W.PET/CERES
6 CREAM (GRAM) TOPICAL ONCE
Status: COMPLETED | OUTPATIENT
Start: 2019-01-07 | End: 2019-01-07

## 2019-01-07 RX ORDER — ACETAMINOPHEN 325 MG/1
650 TABLET ORAL EVERY 4 HOURS PRN
Status: DISCONTINUED | OUTPATIENT
Start: 2019-01-07 | End: 2019-01-14 | Stop reason: HOSPADM

## 2019-01-07 RX ADMIN — MELATONIN 6 MG: 3 TAB ORAL at 21:01

## 2019-01-07 RX ADMIN — RISPERIDONE 1 MG: 1 TABLET, FILM COATED ORAL at 21:01

## 2019-01-07 RX ADMIN — DIVALPROEX SODIUM 750 MG: 500 TABLET, EXTENDED RELEASE ORAL at 21:01

## 2019-01-07 ASSESSMENT — SLEEP AND FATIGUE QUESTIONNAIRES
DIFFICULTY STAYING ASLEEP: NO
DO YOU USE A SLEEP AID: YES
RESTFUL SLEEP: NO
DO YOU HAVE DIFFICULTY SLEEPING: NO
DIFFICULTY FALLING ASLEEP: NO
DIFFICULTY ARISING: YES
SLEEP PATTERN: DIFFICULTY ARISING

## 2019-01-07 ASSESSMENT — LIFESTYLE VARIABLES: HISTORY_ALCOHOL_USE: NO

## 2019-01-07 ASSESSMENT — ENCOUNTER SYMPTOMS: RESPIRATORY NEGATIVE: 1

## 2019-01-07 ASSESSMENT — PATIENT HEALTH QUESTIONNAIRE - PHQ9: SUM OF ALL RESPONSES TO PHQ QUESTIONS 1-9: 16

## 2019-01-08 ENCOUNTER — APPOINTMENT (OUTPATIENT)
Dept: BONE DENSITY | Facility: HOSPITAL | Age: 54
End: 2019-01-08

## 2019-01-08 PROBLEM — F31.75 BIPOLAR AFFECTIVE DISORDER, DEPRESSED IN PARTIAL REMISSION (HCC): Status: RESOLVED | Noted: 2019-01-08 | Resolved: 2019-01-08

## 2019-01-08 PROBLEM — F31.75 BIPOLAR AFFECTIVE DISORDER, DEPRESSED IN PARTIAL REMISSION (HCC): Status: ACTIVE | Noted: 2019-01-08

## 2019-01-08 PROCEDURE — 6370000000 HC RX 637 (ALT 250 FOR IP): Performed by: NURSE PRACTITIONER

## 2019-01-08 PROCEDURE — 6370000000 HC RX 637 (ALT 250 FOR IP): Performed by: FAMILY MEDICINE

## 2019-01-08 PROCEDURE — 90792 PSYCH DIAG EVAL W/MED SRVCS: CPT | Performed by: NURSE PRACTITIONER

## 2019-01-08 PROCEDURE — 1240000000 HC EMOTIONAL WELLNESS R&B

## 2019-01-08 RX ORDER — RISPERIDONE 1 MG/1
1 TABLET, FILM COATED ORAL DAILY
Status: DISCONTINUED | OUTPATIENT
Start: 2019-01-08 | End: 2019-01-09

## 2019-01-08 RX ORDER — SULFAMETHOXAZOLE AND TRIMETHOPRIM 800; 160 MG/1; MG/1
1 TABLET ORAL EVERY 12 HOURS SCHEDULED
Status: DISCONTINUED | OUTPATIENT
Start: 2019-01-08 | End: 2019-01-09

## 2019-01-08 RX ORDER — LITHIUM CARBONATE 150 MG/1
150 CAPSULE ORAL 2 TIMES DAILY WITH MEALS
Status: DISCONTINUED | OUTPATIENT
Start: 2019-01-08 | End: 2019-01-10

## 2019-01-08 RX ORDER — DOXEPIN HYDROCHLORIDE 25 MG/1
25 CAPSULE ORAL NIGHTLY
Status: DISCONTINUED | OUTPATIENT
Start: 2019-01-08 | End: 2019-01-14 | Stop reason: HOSPADM

## 2019-01-08 RX ORDER — SPIRONOLACTONE 50 MG/1
25 TABLET, FILM COATED ORAL 2 TIMES DAILY
Status: DISCONTINUED | OUTPATIENT
Start: 2019-01-08 | End: 2019-01-14 | Stop reason: HOSPADM

## 2019-01-08 RX ADMIN — LITHIUM CARBONATE 150 MG: 150 CAPSULE, GELATIN COATED ORAL at 16:09

## 2019-01-08 RX ADMIN — RISPERIDONE 1 MG: 1 TABLET, FILM COATED ORAL at 16:09

## 2019-01-08 RX ADMIN — SPIRONOLACTONE 25 MG: 50 TABLET ORAL at 20:32

## 2019-01-08 RX ADMIN — DOXEPIN HYDROCHLORIDE 25 MG: 25 CAPSULE ORAL at 20:32

## 2019-01-08 RX ADMIN — SULFAMETHOXAZOLE AND TRIMETHOPRIM 1 TABLET: 800; 160 TABLET ORAL at 20:31

## 2019-01-09 LAB
HCG(URINE) PREGNANCY TEST: NEGATIVE
URINE CULTURE, ROUTINE: NORMAL

## 2019-01-09 PROCEDURE — 84703 CHORIONIC GONADOTROPIN ASSAY: CPT

## 2019-01-09 PROCEDURE — 6370000000 HC RX 637 (ALT 250 FOR IP): Performed by: NURSE PRACTITIONER

## 2019-01-09 PROCEDURE — 6370000000 HC RX 637 (ALT 250 FOR IP): Performed by: FAMILY MEDICINE

## 2019-01-09 PROCEDURE — 99231 SBSQ HOSP IP/OBS SF/LOW 25: CPT | Performed by: NURSE PRACTITIONER

## 2019-01-09 PROCEDURE — 1240000000 HC EMOTIONAL WELLNESS R&B

## 2019-01-09 RX ORDER — ERGOCALCIFEROL 1.25 MG/1
50000 CAPSULE ORAL WEEKLY
Status: DISCONTINUED | OUTPATIENT
Start: 2019-01-09 | End: 2019-01-14 | Stop reason: HOSPADM

## 2019-01-09 RX ORDER — DOCUSATE SODIUM 100 MG/1
100 CAPSULE, LIQUID FILLED ORAL 2 TIMES DAILY
Status: DISCONTINUED | OUTPATIENT
Start: 2019-01-09 | End: 2019-01-14 | Stop reason: HOSPADM

## 2019-01-09 RX ORDER — RISPERIDONE 1 MG/1
1 TABLET, FILM COATED ORAL NIGHTLY
Status: DISCONTINUED | OUTPATIENT
Start: 2019-01-10 | End: 2019-01-14 | Stop reason: HOSPADM

## 2019-01-09 RX ORDER — LANOLIN ALCOHOL/MO/W.PET/CERES
6 CREAM (GRAM) TOPICAL NIGHTLY PRN
Status: DISCONTINUED | OUTPATIENT
Start: 2019-01-09 | End: 2019-01-14 | Stop reason: HOSPADM

## 2019-01-09 RX ADMIN — DOCUSATE SODIUM 100 MG: 100 CAPSULE, LIQUID FILLED ORAL at 10:36

## 2019-01-09 RX ADMIN — LITHIUM CARBONATE 150 MG: 150 CAPSULE, GELATIN COATED ORAL at 08:27

## 2019-01-09 RX ADMIN — Medication 6 MG: at 20:23

## 2019-01-09 RX ADMIN — SPIRONOLACTONE 25 MG: 50 TABLET ORAL at 20:23

## 2019-01-09 RX ADMIN — DOXEPIN HYDROCHLORIDE 25 MG: 25 CAPSULE ORAL at 20:24

## 2019-01-09 RX ADMIN — ERGOCALCIFEROL 50000 UNITS: 1.25 CAPSULE ORAL at 10:36

## 2019-01-09 RX ADMIN — SPIRONOLACTONE 25 MG: 50 TABLET ORAL at 08:27

## 2019-01-09 RX ADMIN — SULFAMETHOXAZOLE AND TRIMETHOPRIM 1 TABLET: 800; 160 TABLET ORAL at 08:27

## 2019-01-09 RX ADMIN — DOCUSATE SODIUM 100 MG: 100 CAPSULE, LIQUID FILLED ORAL at 20:23

## 2019-01-09 RX ADMIN — LITHIUM CARBONATE 150 MG: 150 CAPSULE, GELATIN COATED ORAL at 18:07

## 2019-01-10 PROCEDURE — 99231 SBSQ HOSP IP/OBS SF/LOW 25: CPT | Performed by: NURSE PRACTITIONER

## 2019-01-10 PROCEDURE — 6370000000 HC RX 637 (ALT 250 FOR IP): Performed by: PSYCHIATRY & NEUROLOGY

## 2019-01-10 PROCEDURE — 6370000000 HC RX 637 (ALT 250 FOR IP): Performed by: NURSE PRACTITIONER

## 2019-01-10 PROCEDURE — 6370000000 HC RX 637 (ALT 250 FOR IP): Performed by: FAMILY MEDICINE

## 2019-01-10 PROCEDURE — 1240000000 HC EMOTIONAL WELLNESS R&B

## 2019-01-10 RX ORDER — LITHIUM CARBONATE 150 MG/1
150 CAPSULE ORAL
Status: DISCONTINUED | OUTPATIENT
Start: 2019-01-10 | End: 2019-01-14 | Stop reason: HOSPADM

## 2019-01-10 RX ADMIN — LITHIUM CARBONATE 150 MG: 150 CAPSULE, GELATIN COATED ORAL at 16:48

## 2019-01-10 RX ADMIN — LITHIUM CARBONATE 150 MG: 150 CAPSULE, GELATIN COATED ORAL at 08:31

## 2019-01-10 RX ADMIN — DOXEPIN HYDROCHLORIDE 25 MG: 25 CAPSULE ORAL at 20:56

## 2019-01-10 RX ADMIN — RISPERIDONE 1 MG: 1 TABLET, FILM COATED ORAL at 20:56

## 2019-01-10 RX ADMIN — ACETAMINOPHEN 650 MG: 325 TABLET ORAL at 20:56

## 2019-01-10 RX ADMIN — DOCUSATE SODIUM 100 MG: 100 CAPSULE, LIQUID FILLED ORAL at 08:31

## 2019-01-10 RX ADMIN — DOCUSATE SODIUM 100 MG: 100 CAPSULE, LIQUID FILLED ORAL at 20:56

## 2019-01-10 RX ADMIN — ACETAMINOPHEN 650 MG: 325 TABLET ORAL at 12:36

## 2019-01-10 RX ADMIN — SPIRONOLACTONE 25 MG: 50 TABLET ORAL at 20:56

## 2019-01-10 RX ADMIN — Medication 6 MG: at 20:56

## 2019-01-10 RX ADMIN — SPIRONOLACTONE 25 MG: 50 TABLET ORAL at 08:29

## 2019-01-10 ASSESSMENT — PAIN SCALES - GENERAL
PAINLEVEL_OUTOF10: 7
PAINLEVEL_OUTOF10: 7
PAINLEVEL_OUTOF10: 3

## 2019-01-11 LAB — LITHIUM LEVEL: 0.2 MMOL/L (ref 0.6–1.2)

## 2019-01-11 PROCEDURE — 80178 ASSAY OF LITHIUM: CPT

## 2019-01-11 PROCEDURE — 1240000000 HC EMOTIONAL WELLNESS R&B

## 2019-01-11 PROCEDURE — 36415 COLL VENOUS BLD VENIPUNCTURE: CPT

## 2019-01-11 PROCEDURE — 99231 SBSQ HOSP IP/OBS SF/LOW 25: CPT | Performed by: NURSE PRACTITIONER

## 2019-01-11 PROCEDURE — 6370000000 HC RX 637 (ALT 250 FOR IP): Performed by: NURSE PRACTITIONER

## 2019-01-11 PROCEDURE — 6370000000 HC RX 637 (ALT 250 FOR IP): Performed by: PSYCHIATRY & NEUROLOGY

## 2019-01-11 PROCEDURE — 6370000000 HC RX 637 (ALT 250 FOR IP): Performed by: FAMILY MEDICINE

## 2019-01-11 RX ADMIN — ACETAMINOPHEN 650 MG: 325 TABLET ORAL at 16:01

## 2019-01-11 RX ADMIN — DOCUSATE SODIUM 100 MG: 100 CAPSULE, LIQUID FILLED ORAL at 20:58

## 2019-01-11 RX ADMIN — Medication 6 MG: at 20:57

## 2019-01-11 RX ADMIN — SPIRONOLACTONE 25 MG: 50 TABLET ORAL at 20:57

## 2019-01-11 RX ADMIN — DOCUSATE SODIUM 100 MG: 100 CAPSULE, LIQUID FILLED ORAL at 08:46

## 2019-01-11 RX ADMIN — DOXEPIN HYDROCHLORIDE 25 MG: 25 CAPSULE ORAL at 20:57

## 2019-01-11 RX ADMIN — LITHIUM CARBONATE 150 MG: 150 CAPSULE, GELATIN COATED ORAL at 18:30

## 2019-01-11 RX ADMIN — SPIRONOLACTONE 25 MG: 50 TABLET ORAL at 08:45

## 2019-01-11 RX ADMIN — RISPERIDONE 1 MG: 1 TABLET, FILM COATED ORAL at 20:58

## 2019-01-11 RX ADMIN — LITHIUM CARBONATE 150 MG: 150 CAPSULE, GELATIN COATED ORAL at 08:46

## 2019-01-11 RX ADMIN — LITHIUM CARBONATE 150 MG: 150 CAPSULE, GELATIN COATED ORAL at 12:41

## 2019-01-11 ASSESSMENT — PAIN SCALES - GENERAL
PAINLEVEL_OUTOF10: 4
PAINLEVEL_OUTOF10: 0

## 2019-01-12 PROCEDURE — 1240000000 HC EMOTIONAL WELLNESS R&B

## 2019-01-12 PROCEDURE — 6370000000 HC RX 637 (ALT 250 FOR IP): Performed by: NURSE PRACTITIONER

## 2019-01-12 PROCEDURE — 6370000000 HC RX 637 (ALT 250 FOR IP): Performed by: PSYCHIATRY & NEUROLOGY

## 2019-01-12 PROCEDURE — 6370000000 HC RX 637 (ALT 250 FOR IP): Performed by: FAMILY MEDICINE

## 2019-01-12 PROCEDURE — 99232 SBSQ HOSP IP/OBS MODERATE 35: CPT | Performed by: PSYCHIATRY & NEUROLOGY

## 2019-01-12 RX ORDER — LIDOCAINE 4 G/G
1 PATCH TOPICAL DAILY PRN
Status: DISCONTINUED | OUTPATIENT
Start: 2019-01-12 | End: 2019-01-14 | Stop reason: HOSPADM

## 2019-01-12 RX ADMIN — LITHIUM CARBONATE 150 MG: 150 CAPSULE, GELATIN COATED ORAL at 16:46

## 2019-01-12 RX ADMIN — DOCUSATE SODIUM 100 MG: 100 CAPSULE, LIQUID FILLED ORAL at 20:48

## 2019-01-12 RX ADMIN — LITHIUM CARBONATE 150 MG: 150 CAPSULE, GELATIN COATED ORAL at 12:57

## 2019-01-12 RX ADMIN — SPIRONOLACTONE 25 MG: 50 TABLET ORAL at 08:27

## 2019-01-12 RX ADMIN — SPIRONOLACTONE 25 MG: 50 TABLET ORAL at 20:48

## 2019-01-12 RX ADMIN — RISPERIDONE 1 MG: 1 TABLET, FILM COATED ORAL at 20:48

## 2019-01-12 RX ADMIN — DOCUSATE SODIUM 100 MG: 100 CAPSULE, LIQUID FILLED ORAL at 08:28

## 2019-01-12 RX ADMIN — LITHIUM CARBONATE 150 MG: 150 CAPSULE, GELATIN COATED ORAL at 08:27

## 2019-01-12 RX ADMIN — ACETAMINOPHEN 650 MG: 325 TABLET ORAL at 08:28

## 2019-01-12 RX ADMIN — Medication 6 MG: at 20:48

## 2019-01-12 RX ADMIN — DOXEPIN HYDROCHLORIDE 25 MG: 25 CAPSULE ORAL at 20:48

## 2019-01-12 ASSESSMENT — PAIN SCALES - GENERAL
PAINLEVEL_OUTOF10: 3
PAINLEVEL_OUTOF10: 0

## 2019-01-13 PROCEDURE — 6370000000 HC RX 637 (ALT 250 FOR IP): Performed by: NURSE PRACTITIONER

## 2019-01-13 PROCEDURE — 6370000000 HC RX 637 (ALT 250 FOR IP): Performed by: FAMILY MEDICINE

## 2019-01-13 PROCEDURE — 1240000000 HC EMOTIONAL WELLNESS R&B

## 2019-01-13 PROCEDURE — 6370000000 HC RX 637 (ALT 250 FOR IP): Performed by: PSYCHIATRY & NEUROLOGY

## 2019-01-13 RX ADMIN — LITHIUM CARBONATE 150 MG: 150 CAPSULE, GELATIN COATED ORAL at 17:01

## 2019-01-13 RX ADMIN — DOCUSATE SODIUM 100 MG: 100 CAPSULE, LIQUID FILLED ORAL at 09:28

## 2019-01-13 RX ADMIN — LITHIUM CARBONATE 150 MG: 150 CAPSULE, GELATIN COATED ORAL at 09:28

## 2019-01-13 RX ADMIN — RISPERIDONE 1 MG: 1 TABLET, FILM COATED ORAL at 22:06

## 2019-01-13 RX ADMIN — DOXEPIN HYDROCHLORIDE 25 MG: 25 CAPSULE ORAL at 21:07

## 2019-01-13 RX ADMIN — SPIRONOLACTONE 25 MG: 50 TABLET ORAL at 09:28

## 2019-01-13 RX ADMIN — SPIRONOLACTONE 25 MG: 50 TABLET ORAL at 21:08

## 2019-01-13 RX ADMIN — LITHIUM CARBONATE 150 MG: 150 CAPSULE, GELATIN COATED ORAL at 12:54

## 2019-01-13 RX ADMIN — Medication 6 MG: at 21:07

## 2019-01-13 RX ADMIN — DOCUSATE SODIUM 100 MG: 100 CAPSULE, LIQUID FILLED ORAL at 21:08

## 2019-01-13 RX ADMIN — ACETAMINOPHEN 650 MG: 325 TABLET ORAL at 14:06

## 2019-01-13 ASSESSMENT — PAIN SCALES - GENERAL: PAINLEVEL_OUTOF10: 3

## 2019-01-14 VITALS
RESPIRATION RATE: 18 BRPM | WEIGHT: 200 LBS | HEIGHT: 64 IN | SYSTOLIC BLOOD PRESSURE: 120 MMHG | DIASTOLIC BLOOD PRESSURE: 77 MMHG | HEART RATE: 83 BPM | OXYGEN SATURATION: 71 % | TEMPERATURE: 98 F | BODY MASS INDEX: 34.15 KG/M2

## 2019-01-14 PROBLEM — F31.9 BIPOLAR DISORDER WITH DEPRESSION (HCC): Status: ACTIVE | Noted: 2019-01-14

## 2019-01-14 PROBLEM — F31.9 BIPOLAR DISORDER WITH DEPRESSION (HCC): Status: RESOLVED | Noted: 2019-01-14 | Resolved: 2019-01-14

## 2019-01-14 LAB
LITHIUM LEVEL: 0.3 MMOL/L (ref 0.6–1.2)
TSH SERPL DL<=0.05 MIU/L-ACNC: 1.86 UIU/ML (ref 0.27–4.2)

## 2019-01-14 PROCEDURE — 6370000000 HC RX 637 (ALT 250 FOR IP): Performed by: FAMILY MEDICINE

## 2019-01-14 PROCEDURE — 84443 ASSAY THYROID STIM HORMONE: CPT

## 2019-01-14 PROCEDURE — 80178 ASSAY OF LITHIUM: CPT

## 2019-01-14 PROCEDURE — 6370000000 HC RX 637 (ALT 250 FOR IP): Performed by: NURSE PRACTITIONER

## 2019-01-14 PROCEDURE — 36415 COLL VENOUS BLD VENIPUNCTURE: CPT

## 2019-01-14 PROCEDURE — 99238 HOSP IP/OBS DSCHRG MGMT 30/<: CPT | Performed by: NURSE PRACTITIONER

## 2019-01-14 PROCEDURE — 5130000000 HC BRIDGE APPOINTMENT

## 2019-01-14 RX ORDER — LITHIUM CARBONATE 150 MG/1
150 CAPSULE ORAL
Qty: 90 CAPSULE | Refills: 0 | Status: SHIPPED | OUTPATIENT
Start: 2019-01-14 | End: 2020-02-17

## 2019-01-14 RX ORDER — RISPERIDONE 1 MG/1
1 TABLET, FILM COATED ORAL NIGHTLY
Qty: 30 TABLET | Refills: 0 | Status: SHIPPED | OUTPATIENT
Start: 2019-01-14 | End: 2020-02-17

## 2019-01-14 RX ORDER — LANOLIN ALCOHOL/MO/W.PET/CERES
6 CREAM (GRAM) TOPICAL NIGHTLY PRN
Qty: 30 TABLET | Refills: 3 | Status: SHIPPED | OUTPATIENT
Start: 2019-01-14 | End: 2020-02-17

## 2019-01-14 RX ORDER — DOXEPIN HYDROCHLORIDE 25 MG/1
25 CAPSULE ORAL NIGHTLY
Qty: 30 CAPSULE | Refills: 0 | Status: SHIPPED | OUTPATIENT
Start: 2019-01-14 | End: 2020-02-17

## 2019-01-14 RX ADMIN — LITHIUM CARBONATE 150 MG: 150 CAPSULE, GELATIN COATED ORAL at 08:17

## 2019-01-14 RX ADMIN — SPIRONOLACTONE 25 MG: 50 TABLET ORAL at 08:17

## 2019-01-14 RX ADMIN — DOCUSATE SODIUM 100 MG: 100 CAPSULE, LIQUID FILLED ORAL at 08:17

## 2019-01-14 RX ADMIN — LITHIUM CARBONATE 150 MG: 150 CAPSULE, GELATIN COATED ORAL at 12:55

## 2019-01-25 ENCOUNTER — NURSE TRIAGE (OUTPATIENT)
Dept: CALL CENTER | Facility: HOSPITAL | Age: 54
End: 2019-01-25

## 2019-01-25 NOTE — TELEPHONE ENCOUNTER
"Caller is wanting information of where she needs to go to obtain her Lithium lever, she has spoken with the lab, who infomed her where she should go.    Reason for Disposition  • General information question, no triage required and triager able to answer question    Additional Information  • Negative: [1] Caller is not with the adult (patient) AND [2] reporting urgent symptoms  • Negative: Lab result questions  • Negative: Medication questions  • Negative: Caller cannot be reached by phone  • Negative: Caller has already spoken to PCP or another triager  • Negative: RN needs further essential information from caller in order to complete triage  • Negative: Requesting regular office appointment  • Negative: [1] Caller requesting NON-URGENT health information AND [2] PCP's office is the best resource  • Negative: Health Information question, no triage required and triager able to answer question    Answer Assessment - Initial Assessment Questions  1. REASON FOR CALL or QUESTION: \"What is your reason for calling today?\" or \"How can I best help you?\" or \"What question do you have that I can help answer?\"      Needing  Information concerning lab    Protocols used: INFORMATION ONLY CALL-ADULT-      "

## 2019-02-01 ENCOUNTER — TRANSCRIBE ORDERS (OUTPATIENT)
Dept: ADMINISTRATIVE | Facility: HOSPITAL | Age: 54
End: 2019-02-01

## 2019-02-01 ENCOUNTER — APPOINTMENT (OUTPATIENT)
Dept: LAB | Facility: HOSPITAL | Age: 54
End: 2019-02-01

## 2019-02-01 DIAGNOSIS — Z79.899 ENCOUNTER FOR LONG-TERM (CURRENT) USE OF MEDICATIONS: Primary | ICD-10-CM

## 2019-02-01 LAB — LITHIUM SERPL-SCNC: 0.2 MMOL/L (ref 0.6–1.2)

## 2019-02-01 PROCEDURE — 36415 COLL VENOUS BLD VENIPUNCTURE: CPT

## 2019-02-01 PROCEDURE — 80178 ASSAY OF LITHIUM: CPT | Performed by: NURSE PRACTITIONER

## 2019-02-27 ENCOUNTER — APPOINTMENT (OUTPATIENT)
Dept: BONE DENSITY | Facility: HOSPITAL | Age: 54
End: 2019-02-27

## 2019-03-08 ENCOUNTER — APPOINTMENT (OUTPATIENT)
Dept: LAB | Facility: HOSPITAL | Age: 54
End: 2019-03-08

## 2019-03-08 ENCOUNTER — TRANSCRIBE ORDERS (OUTPATIENT)
Dept: ADMINISTRATIVE | Facility: HOSPITAL | Age: 54
End: 2019-03-08

## 2019-03-08 ENCOUNTER — TELEPHONE (OUTPATIENT)
Dept: OBSTETRICS AND GYNECOLOGY | Facility: CLINIC | Age: 54
End: 2019-03-08

## 2019-03-08 DIAGNOSIS — R79.89 ELEVATED TESTOSTERONE LEVEL: Primary | ICD-10-CM

## 2019-03-08 DIAGNOSIS — Z79.899 ENCOUNTER FOR LONG-TERM (CURRENT) USE OF MEDICATIONS: Primary | ICD-10-CM

## 2019-03-08 LAB — LITHIUM SERPL-SCNC: 0.3 MMOL/L (ref 0.6–1.2)

## 2019-03-08 PROCEDURE — 36415 COLL VENOUS BLD VENIPUNCTURE: CPT | Performed by: NURSE PRACTITIONER

## 2019-03-08 PROCEDURE — 80178 ASSAY OF LITHIUM: CPT | Performed by: NURSE PRACTITIONER

## 2019-03-08 NOTE — TELEPHONE ENCOUNTER
Pt said she was to come in to get her testosterone level rechecked. She is going to come in next week but just wants to make sure order is in.

## 2019-03-15 ENCOUNTER — TRANSCRIBE ORDERS (OUTPATIENT)
Dept: ADMINISTRATIVE | Facility: HOSPITAL | Age: 54
End: 2019-03-15

## 2019-03-15 ENCOUNTER — APPOINTMENT (OUTPATIENT)
Dept: LAB | Facility: HOSPITAL | Age: 54
End: 2019-03-15

## 2019-03-15 DIAGNOSIS — Z79.899 ENCOUNTER FOR LONG-TERM (CURRENT) USE OF MEDICATIONS: Primary | ICD-10-CM

## 2019-03-15 LAB — LITHIUM SERPL-SCNC: 0.4 MMOL/L (ref 0.6–1.2)

## 2019-03-15 PROCEDURE — 80178 ASSAY OF LITHIUM: CPT | Performed by: NURSE PRACTITIONER

## 2019-03-15 PROCEDURE — 82627 DEHYDROEPIANDROSTERONE: CPT | Performed by: NURSE PRACTITIONER

## 2019-03-15 PROCEDURE — 36415 COLL VENOUS BLD VENIPUNCTURE: CPT | Performed by: NURSE PRACTITIONER

## 2019-03-15 PROCEDURE — 84403 ASSAY OF TOTAL TESTOSTERONE: CPT | Performed by: NURSE PRACTITIONER

## 2019-03-16 LAB
DHEA-S SERPL-MCNC: 222.9 UG/DL (ref 41.2–243.7)
TESTOST SERPL-MCNC: 25 NG/DL (ref 3–41)

## 2019-03-22 ENCOUNTER — TELEPHONE (OUTPATIENT)
Dept: OBSTETRICS AND GYNECOLOGY | Facility: CLINIC | Age: 54
End: 2019-03-22

## 2019-03-22 NOTE — TELEPHONE ENCOUNTER
"Pt is asking about her recent labs I told her they were normal. She is asking if she is to continue the Spirinolactone because she feels its is \"washing out\" her depression medication  "

## 2019-12-03 ENCOUNTER — APPOINTMENT (OUTPATIENT)
Dept: BONE DENSITY | Facility: HOSPITAL | Age: 54
End: 2019-12-03

## 2019-12-06 ENCOUNTER — HOSPITAL ENCOUNTER (OUTPATIENT)
Dept: BONE DENSITY | Facility: HOSPITAL | Age: 54
Discharge: HOME | End: 2019-12-06
Admitting: NURSE PRACTITIONER

## 2019-12-06 DIAGNOSIS — Z01.419 WELL WOMAN EXAM WITH ROUTINE GYNECOLOGICAL EXAM: ICD-10-CM

## 2019-12-06 PROCEDURE — 77080 DXA BONE DENSITY AXIAL: CPT

## 2019-12-16 ENCOUNTER — OFFICE VISIT (OUTPATIENT)
Dept: OBSTETRICS AND GYNECOLOGY | Facility: CLINIC | Age: 54
End: 2019-12-16

## 2019-12-16 VITALS
DIASTOLIC BLOOD PRESSURE: 68 MMHG | SYSTOLIC BLOOD PRESSURE: 116 MMHG | BODY MASS INDEX: 37.22 KG/M2 | HEIGHT: 64 IN | WEIGHT: 218 LBS

## 2019-12-16 DIAGNOSIS — R35.0 URINARY FREQUENCY: ICD-10-CM

## 2019-12-16 DIAGNOSIS — Z12.31 ENCOUNTER FOR SCREENING MAMMOGRAM FOR BREAST CANCER: ICD-10-CM

## 2019-12-16 DIAGNOSIS — F31.9 BIPOLAR DEPRESSION (HCC): ICD-10-CM

## 2019-12-16 DIAGNOSIS — N89.8 VAGINAL DISCHARGE: ICD-10-CM

## 2019-12-16 DIAGNOSIS — Z01.419 WELL WOMAN EXAM WITH ROUTINE GYNECOLOGICAL EXAM: Primary | ICD-10-CM

## 2019-12-16 PROCEDURE — G0123 SCREEN CERV/VAG THIN LAYER: HCPCS | Performed by: NURSE PRACTITIONER

## 2019-12-16 PROCEDURE — 87481 CANDIDA DNA AMP PROBE: CPT | Performed by: NURSE PRACTITIONER

## 2019-12-16 PROCEDURE — 87512 GARDNER VAG DNA QUANT: CPT | Performed by: NURSE PRACTITIONER

## 2019-12-16 PROCEDURE — 87798 DETECT AGENT NOS DNA AMP: CPT | Performed by: NURSE PRACTITIONER

## 2019-12-16 PROCEDURE — 87624 HPV HI-RISK TYP POOLED RSLT: CPT | Performed by: NURSE PRACTITIONER

## 2019-12-16 PROCEDURE — 87661 TRICHOMONAS VAGINALIS AMPLIF: CPT | Performed by: NURSE PRACTITIONER

## 2019-12-16 PROCEDURE — 99396 PREV VISIT EST AGE 40-64: CPT | Performed by: NURSE PRACTITIONER

## 2019-12-16 RX ORDER — BENZOYL PEROXIDE 50 MG/ML
LIQUID TOPICAL
Refills: 3 | COMMUNITY
Start: 2019-09-25 | End: 2023-02-17

## 2019-12-16 RX ORDER — CLINDAMYCIN PHOSPHATE 11.9 MG/ML
SOLUTION TOPICAL
Refills: 3 | COMMUNITY
Start: 2019-09-25

## 2019-12-16 RX ORDER — CLINDAMYCIN PHOSPHATE 10 MG/G
GEL TOPICAL
COMMUNITY
End: 2020-12-22

## 2019-12-16 RX ORDER — FLUCONAZOLE 150 MG/1
150 TABLET ORAL ONCE
Qty: 2 TABLET | Refills: 0 | Status: SHIPPED | OUTPATIENT
Start: 2019-12-16 | End: 2019-12-16

## 2019-12-16 RX ORDER — LAMOTRIGINE 25 MG/1
100 TABLET ORAL
Refills: 2 | COMMUNITY
Start: 2019-11-22 | End: 2020-12-22

## 2019-12-16 RX ORDER — ARIPIPRAZOLE 30 MG/1
TABLET ORAL
Refills: 0 | COMMUNITY
Start: 2019-11-21 | End: 2020-12-22

## 2019-12-16 NOTE — PROGRESS NOTES
"Subjective     Edith Stephens is a 54 y.o. female    Patient is here for yearly checkup.  She feels that she has a vaginal infection or a urinary tract infection.    Gynecologic Exam   The patient's primary symptoms include vaginal discharge. The patient's pertinent negatives include no pelvic pain or vaginal bleeding. This is a recurrent problem. The current episode started in the past 7 days. The problem occurs daily. The problem has been waxing and waning. The patient is experiencing no pain. She is not pregnant. Associated symptoms include frequency. Pertinent negatives include no abdominal pain, anorexia, back pain, chills, constipation, diarrhea, discolored urine, dysuria, fever, flank pain, headaches, hematuria, joint pain, joint swelling, nausea, painful intercourse, rash, sore throat, urgency or vomiting. There has been no bleeding. She has not been passing clots. She has not been passing tissue. She is sexually active. She is postmenopausal.         /68   Ht 162.6 cm (64\")   Wt 98.9 kg (218 lb)   LMP 12/13/2015 (Approximate)   Breastfeeding No   BMI 37.42 kg/m²     Outpatient Encounter Medications as of 12/16/2019   Medication Sig Dispense Refill   • ARIPiprazole (ABILIFY) 30 MG tablet   0   • BENZOYL PEROXIDE 5 % external wash   3   • clindamycin (CLEOCIN T) 1 % external solution   3   • Influenza Vac Typ A&B Surf Ant suspension injection Fluvirin [id number] 45 mcg (15 mcg x 3)/0.5 mL intramuscular suspension     • lamoTRIgine (LaMICtal) 25 MG tablet 100 mg.  2   • Multiple Vitamins-Minerals (CENTRUM SILVER PO) Take 1 tablet by mouth Daily.     • nystatin (MYCOSTATIN) 429974 UNIT/GM powder Apply  topically to the appropriate area as directed 4 (Four) Times a Day. USES UNDER BREAST 1 each 3   • clindamycin (CLINDAGEL) 1 % gel clindamycin 1 % topical gel     • divalproex (DEPAKOTE) 250 MG DR tablet Take 250 mg by mouth 3 (Three) Times a Day.     • divalproex (DEPAKOTE) 500 MG DR tablet Take 500 mg " by mouth 3 (Three) Times a Day.     • fluconazole (DIFLUCAN) 150 MG tablet Take 1 tablet by mouth 1 (One) Time for 1 dose. Take once a week for 2 weeks. 2 tablet 0   • ibuprofen (ADVIL,MOTRIN) 200 MG tablet Take 200 mg by mouth Every 8 (Eight) Hours As Needed for Mild Pain (1-3).     • Melatonin 3 MG tablet Take 2 tablets by mouth At Night As Needed for Sleep.     • Multiple Minerals-Vitamins (CALCIUM-MAGNESIUM-ZINC-D3 PO) Take 3 tablets by mouth Daily.     • risperiDONE (risperDAL) 1 MG tablet Take 1 mg by mouth 2 (Two) Times a Day.       No facility-administered encounter medications on file as of 12/16/2019.        Surgical History  Past Surgical History:   Procedure Laterality Date   • CERVICAL BIOPSY  W/ LOOP ELECTRODE EXCISION     • D&C HYSTEROSCOPY N/A 5/30/2017    Procedure: DILATATION AND CURETTAGE HYSTEROSCOPY;  Surgeon: Citlali Arita MD;  Location: [facility] OR;  Service:    • OTHER SURGICAL HISTORY      cyst removed from foot as a child       Family History  Family History   Problem Relation Age of Onset   • Hypertension Father    • Coronary artery disease Paternal Grandfather    • Coronary artery disease Paternal Grandmother    • Diabetes Paternal Grandmother    • Kidney cancer Maternal Grandfather    • Thyroid disease Mother    • No Known Problems Sister    • Breast cancer Neg Hx    • Ovarian cancer Neg Hx    • Uterine cancer Neg Hx    • Colon cancer Neg Hx    • Melanoma Neg Hx    • Prostate cancer Neg Hx        The following portions of the patient's history were reviewed and updated as appropriate: allergies, current medications, past family history, past medical history, past social history, past surgical history and problem list.    Review of Systems   Constitutional: Negative for activity change, appetite change, chills, diaphoresis, fatigue, fever, unexpected weight gain and unexpected weight loss.   HENT: Negative for congestion, dental problem, drooling, ear discharge, ear pain, facial  swelling, hearing loss, mouth sores, nosebleeds, postnasal drip, rhinorrhea, sinus pressure, sneezing, sore throat, swollen glands, tinnitus, trouble swallowing and voice change.    Eyes: Negative for blurred vision, double vision, photophobia, pain, discharge, redness, itching and visual disturbance.   Respiratory: Negative for apnea, cough, choking, chest tightness, shortness of breath, wheezing and stridor.    Cardiovascular: Negative for chest pain, palpitations and leg swelling.   Gastrointestinal: Negative for abdominal distention, abdominal pain, anal bleeding, anorexia, blood in stool, constipation, diarrhea, nausea, rectal pain, vomiting, GERD and indigestion.   Endocrine: Negative for cold intolerance, heat intolerance, polydipsia, polyphagia and polyuria.   Genitourinary: Positive for frequency and vaginal discharge. Negative for amenorrhea, breast discharge, breast lump, breast pain, decreased libido, decreased urine volume, difficulty urinating, dyspareunia, dysuria, flank pain, genital sores, hematuria, menstrual problem, pelvic pain, pelvic pressure, urgency, urinary incontinence, vaginal bleeding and vaginal pain.   Musculoskeletal: Negative for arthralgias, back pain, gait problem, joint pain, joint swelling, myalgias, neck pain, neck stiffness and bursitis.   Skin: Negative for color change, dry skin and rash.   Allergic/Immunologic: Negative for environmental allergies, food allergies and immunocompromised state.   Neurological: Negative for dizziness, tremors, seizures, syncope, facial asymmetry, speech difficulty, weakness, light-headedness, numbness, headache, memory problem and confusion.   Hematological: Negative for adenopathy. Does not bruise/bleed easily.   Psychiatric/Behavioral: Negative for agitation, behavioral problems, decreased concentration, dysphoric mood, hallucinations, self-injury, sleep disturbance, suicidal ideas, negative for hyperactivity, depressed mood and stress. The  patient is not nervous/anxious.        Objective   Physical Exam   Constitutional: She is oriented to person, place, and time. She appears well-developed and well-nourished. No distress.   HENT:   Head: Normocephalic.   Right Ear: External ear normal.   Left Ear: External ear normal.   Nose: Nose normal.   Mouth/Throat: Oropharynx is clear and moist.   Eyes: Conjunctivae are normal. Right eye exhibits no discharge. Left eye exhibits no discharge. No scleral icterus.   Neck: Normal range of motion. Neck supple. Carotid bruit is not present. No tracheal deviation present. No thyromegaly present.   Cardiovascular: Normal rate, regular rhythm, normal heart sounds and intact distal pulses.   No murmur heard.  Pulmonary/Chest: Effort normal and breath sounds normal. No respiratory distress. She has no wheezes. Right breast exhibits no inverted nipple, no mass, no nipple discharge, no skin change and no tenderness. Left breast exhibits no inverted nipple, no mass, no nipple discharge, no skin change and no tenderness. No breast swelling, tenderness, discharge or bleeding. Breasts are symmetrical.   Abdominal: Soft. She exhibits no distension and no mass. There is no tenderness. There is no guarding. No hernia. Hernia confirmed negative in the right inguinal area and confirmed negative in the left inguinal area.   Genitourinary: Rectum normal and uterus normal. Rectal exam shows no mass. No breast swelling, tenderness, discharge or bleeding. Pelvic exam was performed with patient supine. There is no rash, tenderness, lesion or injury on the right labia. There is no rash, tenderness, lesion or injury on the left labia. Uterus is not enlarged, not fixed and not tender. Cervix exhibits no motion tenderness, no discharge and no friability. Right adnexum displays no mass, no tenderness and no fullness. Left adnexum displays no mass, no tenderness and no fullness. No erythema, tenderness or bleeding in the vagina. No foreign body  in the vagina. No signs of injury around the vagina. Vaginal discharge found.   Genitourinary Comments:   BSU normal  Urethral meatus  Normal  Perineum  Normal   Musculoskeletal: Normal range of motion. She exhibits no edema or tenderness.   Lymphadenopathy:        Head (right side): No submental, no submandibular, no tonsillar, no preauricular, no posterior auricular and no occipital adenopathy present.        Head (left side): No submental, no submandibular, no tonsillar, no preauricular, no posterior auricular and no occipital adenopathy present.     She has no cervical adenopathy.        Right cervical: No superficial cervical, no deep cervical and no posterior cervical adenopathy present.       Left cervical: No superficial cervical, no deep cervical and no posterior cervical adenopathy present.     She has no axillary adenopathy.        Right: No inguinal adenopathy present.        Left: No inguinal adenopathy present.   Neurological: She is alert and oriented to person, place, and time. Coordination normal.   Skin: Skin is warm and dry. No bruising and no rash noted. She is not diaphoretic. No erythema.   Psychiatric: She has a normal mood and affect. Her behavior is normal. Judgment and thought content normal.   Nursing note and vitals reviewed.      Assessment/Plan   Edith was seen today for gynecologic exam.    Diagnoses and all orders for this visit:    Well woman exam with routine gynecological exam  Normal GYN exam. Will have lab work at PCP. Encouraged SBE, pt is aware how to do self breast exam and the importance of same. Discussed weight management and importance of maintaining a healthy weight. Discussed Vitamin D intake and the importance of adequate vitamin D for both Bone Health and a healthy immune system.  Discussed Daily exercise and the importance of same, in regards to a healthy heart as well as helping to maintain her weight and improving her mental health.  BMI 37.4.  Patient declines  colonoscopy.  She is willing to have Cologuard order is sent for same.  Mammogram will be scheduled at [facility] per her request.  Pap smear is done per [facility] guidelines.  -     Liquid-based Pap Smear, Screening       Cologuard - Stool, Per Rectum; Future  Encounter for screening mammogram for breast cancer  Comments:  Patient will have mammogram at [facility] per her request.  Orders:  -     Mammo Screening Digital Tomosynthesis Bilateral With CAD; Future  -     Cologuard - Stool, Per Rectum; Future    Vaginal discharge  Comments:  Patient has a moderate amount of vaginal discharge.  We will add BV panel to Pap.  Orders:  -     Liquid-based Pap Smear, Screening  -     fluconazole (DIFLUCAN) 150 MG tablet; Take 1 tablet by mouth 1 (One) Time for 1 dose. Take once a week for 2 weeks.    Bipolar depression ([facility])  Comments:  Patient is followed at [facility] for her bipolar depression.    Urinary frequency  Comments:  Patient complains of urinary frequency.  Urine is sent to lab.  Orders:  -     UA / M With / Rflx Culture([facility] ONLY) - Urine, Clean Catch         Patient's Body mass index is 37.42 kg/m². BMI is above normal parameters. Recommendations include: educational material, exercise counseling and nutrition counseling.      Kimberly Daley, APRN  12/16/2019

## 2019-12-16 NOTE — PATIENT INSTRUCTIONS

## 2019-12-18 ENCOUNTER — TELEPHONE (OUTPATIENT)
Dept: OBSTETRICS AND GYNECOLOGY | Facility: CLINIC | Age: 54
End: 2019-12-18

## 2019-12-18 LAB
APPEARANCE UR: ABNORMAL
BACTERIA #/AREA URNS HPF: ABNORMAL /HPF
BACTERIA UR CULT: NORMAL
BACTERIA UR CULT: NORMAL
BILIRUB UR QL STRIP: NEGATIVE
COLOR UR: YELLOW
CRYSTALS URNS MICRO: ABNORMAL
EPI CELLS #/AREA URNS HPF: ABNORMAL /HPF (ref 0–10)
GLUCOSE UR QL: NEGATIVE
HGB UR QL STRIP: NEGATIVE
HPV I/H RISK 4 DNA CVX QL PROBE+SIG AMP: NOT DETECTED
KETONES UR QL STRIP: NEGATIVE
LEUKOCYTE ESTERASE UR QL STRIP: ABNORMAL
MICRO URNS: ABNORMAL
MUCOUS THREADS URNS QL MICRO: PRESENT /HPF
NITRITE UR QL STRIP: NEGATIVE
PH UR STRIP: 5 [PH] (ref 5–7.5)
PROT UR QL STRIP: NEGATIVE
RBC #/AREA URNS HPF: ABNORMAL /HPF (ref 0–2)
SP GR UR: 1.02 (ref 1–1.03)
TRICHOMONAS VAGINALIS PCR: NOT DETECTED
UNIDENT CRYS URNS QL MICRO: PRESENT /LPF
URINALYSIS REFLEX: ABNORMAL
UROBILINOGEN UR STRIP-MCNC: 0.2 MG/DL (ref 0.2–1)
WBC #/AREA URNS HPF: ABNORMAL /HPF (ref 0–5)

## 2019-12-19 ENCOUNTER — TELEPHONE (OUTPATIENT)
Dept: OBSTETRICS AND GYNECOLOGY | Facility: CLINIC | Age: 54
End: 2019-12-19

## 2019-12-19 DIAGNOSIS — Z12.31 ENCOUNTER FOR SCREENING MAMMOGRAM FOR BREAST CANCER: ICD-10-CM

## 2019-12-19 LAB
GEN CATEG CVX/VAG CYTO-IMP: NORMAL
LAB AP CASE REPORT: NORMAL
LAB AP GYN ADDITIONAL INFORMATION: NORMAL
LAB AP GYN OTHER FINDINGS: NORMAL
PATH INTERP SPEC-IMP: NORMAL
STAT OF ADQ CVX/VAG CYTO-IMP: NORMAL

## 2019-12-21 ENCOUNTER — RESULTS ENCOUNTER (OUTPATIENT)
Dept: OBSTETRICS AND GYNECOLOGY | Facility: CLINIC | Age: 54
End: 2019-12-21

## 2019-12-21 DIAGNOSIS — Z01.419 WELL WOMAN EXAM WITH ROUTINE GYNECOLOGICAL EXAM: ICD-10-CM

## 2020-02-17 ENCOUNTER — HOSPITAL ENCOUNTER (INPATIENT)
Age: 55
LOS: 4 days | Discharge: PSYCHIATRIC OR SUBSTANCE USE FACILITY | DRG: 918 | End: 2020-02-21
Attending: PEDIATRICS | Admitting: HOSPITALIST
Payer: MEDICAID

## 2020-02-17 ENCOUNTER — APPOINTMENT (OUTPATIENT)
Dept: GENERAL RADIOLOGY | Age: 55
DRG: 918 | End: 2020-02-17
Payer: MEDICAID

## 2020-02-17 PROBLEM — T50.902A DRUG OVERDOSE, INTENTIONAL SELF-HARM, INITIAL ENCOUNTER (HCC): Status: ACTIVE | Noted: 2020-02-17

## 2020-02-17 LAB
ACETAMINOPHEN LEVEL: <15 UG/ML
ALBUMIN SERPL-MCNC: 4.1 G/DL (ref 3.5–5.2)
ALP BLD-CCNC: 87 U/L (ref 35–104)
ALT SERPL-CCNC: 16 U/L (ref 5–33)
AMORPHOUS: ABNORMAL /HPF
AMPHETAMINE SCREEN, URINE: NEGATIVE
ANION GAP SERPL CALCULATED.3IONS-SCNC: 14 MMOL/L (ref 7–19)
APTT: 29.1 SEC (ref 26–36.2)
AST SERPL-CCNC: 15 U/L (ref 5–32)
BACTERIA: ABNORMAL /HPF
BARBITURATE SCREEN URINE: NEGATIVE
BASE EXCESS ARTERIAL: -2.3 MMOL/L (ref -2–2)
BASOPHILS ABSOLUTE: 0 K/UL (ref 0–0.2)
BASOPHILS RELATIVE PERCENT: 0.4 % (ref 0–1)
BENZODIAZEPINE SCREEN, URINE: POSITIVE
BILIRUB SERPL-MCNC: 0.6 MG/DL (ref 0.2–1.2)
BILIRUBIN URINE: NEGATIVE
BLOOD, URINE: ABNORMAL
BUN BLDV-MCNC: 13 MG/DL (ref 6–20)
CALCIUM SERPL-MCNC: 9.4 MG/DL (ref 8.6–10)
CANNABINOID SCREEN URINE: NEGATIVE
CARBOXYHEMOGLOBIN ARTERIAL: 1.4 % (ref 0–5)
CHLORIDE BLD-SCNC: 104 MMOL/L (ref 98–111)
CLARITY: ABNORMAL
CO2: 22 MMOL/L (ref 22–29)
COCAINE METABOLITE SCREEN URINE: NEGATIVE
COLOR: YELLOW
CREAT SERPL-MCNC: 0.6 MG/DL (ref 0.5–0.9)
EOSINOPHILS ABSOLUTE: 0.1 K/UL (ref 0–0.6)
EOSINOPHILS RELATIVE PERCENT: 0.9 % (ref 0–5)
EPITHELIAL CELLS, UA: ABNORMAL /HPF
ETHANOL: <10 MG/DL (ref 0–0.08)
GFR NON-AFRICAN AMERICAN: >60
GLUCOSE BLD-MCNC: 155 MG/DL (ref 74–109)
GLUCOSE URINE: NEGATIVE MG/DL
HCO3 ARTERIAL: 21.6 MMOL/L (ref 22–26)
HCT VFR BLD CALC: 45.9 % (ref 37–47)
HEMOGLOBIN, ART, EXTENDED: 14.4 G/DL (ref 12–16)
HEMOGLOBIN: 14.7 G/DL (ref 12–16)
IMMATURE GRANULOCYTES #: 0 K/UL
INR BLD: 1.01 (ref 0.88–1.18)
KETONES, URINE: NEGATIVE MG/DL
LEUKOCYTE ESTERASE, URINE: NEGATIVE
LITHIUM LEVEL: 0.1 MMOL/L (ref 0.6–1.2)
LYMPHOCYTES ABSOLUTE: 2.1 K/UL (ref 1.1–4.5)
LYMPHOCYTES RELATIVE PERCENT: 21.4 % (ref 20–40)
Lab: ABNORMAL
MAGNESIUM: 1.9 MG/DL (ref 1.6–2.6)
MCH RBC QN AUTO: 30.5 PG (ref 27–31)
MCHC RBC AUTO-ENTMCNC: 32 G/DL (ref 33–37)
MCV RBC AUTO: 95.2 FL (ref 81–99)
METHEMOGLOBIN ARTERIAL: 1.1 %
MONOCYTES ABSOLUTE: 0.7 K/UL (ref 0–0.9)
MONOCYTES RELATIVE PERCENT: 7.5 % (ref 0–10)
NEUTROPHILS ABSOLUTE: 6.7 K/UL (ref 1.5–7.5)
NEUTROPHILS RELATIVE PERCENT: 69.4 % (ref 50–65)
NITRITE, URINE: NEGATIVE
O2 CONTENT ARTERIAL: 20.9 ML/DL
O2 SAT, ARTERIAL: 97.8 %
O2 THERAPY: ABNORMAL
OPIATE SCREEN URINE: NEGATIVE
PCO2 ARTERIAL: 34 MMHG (ref 35–45)
PDW BLD-RTO: 12.9 % (ref 11.5–14.5)
PH ARTERIAL: 7.41 (ref 7.35–7.45)
PH UA: 5.5 (ref 5–8)
PHOSPHORUS: 3.3 MG/DL (ref 2.5–4.5)
PLATELET # BLD: 286 K/UL (ref 130–400)
PMV BLD AUTO: 10.1 FL (ref 9.4–12.3)
PO2 ARTERIAL: 435 MMHG (ref 80–100)
POTASSIUM REFLEX MAGNESIUM: 3.8 MMOL/L (ref 3.5–5)
POTASSIUM, WHOLE BLOOD: 3.2
PROTEIN UA: NEGATIVE MG/DL
PROTHROMBIN TIME: 12.7 SEC (ref 12–14.6)
RBC # BLD: 4.82 M/UL (ref 4.2–5.4)
RBC UA: ABNORMAL /HPF (ref 0–2)
REASON FOR REJECTION: NORMAL
REJECTED TEST: NORMAL
SALICYLATE, SERUM: <3 MG/DL (ref 3–10)
SODIUM BLD-SCNC: 140 MMOL/L (ref 136–145)
SPECIFIC GRAVITY UA: >=1.03 (ref 1–1.03)
TOTAL PROTEIN: 6.4 G/DL (ref 6.6–8.7)
TSH SERPL DL<=0.05 MIU/L-ACNC: 0.81 UIU/ML (ref 0.27–4.2)
URINE REFLEX TO CULTURE: ABNORMAL
UROBILINOGEN, URINE: 0.2 E.U./DL
WBC # BLD: 9.6 K/UL (ref 4.8–10.8)
WBC UA: ABNORMAL /HPF (ref 0–5)

## 2020-02-17 PROCEDURE — 6370000000 HC RX 637 (ALT 250 FOR IP): Performed by: HOSPITALIST

## 2020-02-17 PROCEDURE — 0BH17EZ INSERTION OF ENDOTRACHEAL AIRWAY INTO TRACHEA, VIA NATURAL OR ARTIFICIAL OPENING: ICD-10-PCS | Performed by: HOSPITALIST

## 2020-02-17 PROCEDURE — 84132 ASSAY OF SERUM POTASSIUM: CPT

## 2020-02-17 PROCEDURE — 36600 WITHDRAWAL OF ARTERIAL BLOOD: CPT

## 2020-02-17 PROCEDURE — 85610 PROTHROMBIN TIME: CPT

## 2020-02-17 PROCEDURE — 6360000002 HC RX W HCPCS

## 2020-02-17 PROCEDURE — 99285 EMERGENCY DEPT VISIT HI MDM: CPT

## 2020-02-17 PROCEDURE — G0480 DRUG TEST DEF 1-7 CLASSES: HCPCS

## 2020-02-17 PROCEDURE — 84100 ASSAY OF PHOSPHORUS: CPT

## 2020-02-17 PROCEDURE — 2700000000 HC OXYGEN THERAPY PER DAY

## 2020-02-17 PROCEDURE — 6360000002 HC RX W HCPCS: Performed by: HOSPITALIST

## 2020-02-17 PROCEDURE — 80307 DRUG TEST PRSMV CHEM ANLYZR: CPT

## 2020-02-17 PROCEDURE — 2000000000 HC ICU R&B

## 2020-02-17 PROCEDURE — 6360000002 HC RX W HCPCS: Performed by: PEDIATRICS

## 2020-02-17 PROCEDURE — 6370000000 HC RX 637 (ALT 250 FOR IP): Performed by: PEDIATRICS

## 2020-02-17 PROCEDURE — 84443 ASSAY THYROID STIM HORMONE: CPT

## 2020-02-17 PROCEDURE — 85730 THROMBOPLASTIN TIME PARTIAL: CPT

## 2020-02-17 PROCEDURE — 71045 X-RAY EXAM CHEST 1 VIEW: CPT

## 2020-02-17 PROCEDURE — 2580000003 HC RX 258: Performed by: PEDIATRICS

## 2020-02-17 PROCEDURE — 93005 ELECTROCARDIOGRAM TRACING: CPT | Performed by: PEDIATRICS

## 2020-02-17 PROCEDURE — 94002 VENT MGMT INPAT INIT DAY: CPT

## 2020-02-17 PROCEDURE — 5A1945Z RESPIRATORY VENTILATION, 24-96 CONSECUTIVE HOURS: ICD-10-PCS | Performed by: HOSPITALIST

## 2020-02-17 PROCEDURE — 83735 ASSAY OF MAGNESIUM: CPT

## 2020-02-17 PROCEDURE — C9113 INJ PANTOPRAZOLE SODIUM, VIA: HCPCS | Performed by: HOSPITALIST

## 2020-02-17 PROCEDURE — 80053 COMPREHEN METABOLIC PANEL: CPT

## 2020-02-17 PROCEDURE — 82803 BLOOD GASES ANY COMBINATION: CPT

## 2020-02-17 PROCEDURE — 2500000003 HC RX 250 WO HCPCS: Performed by: PEDIATRICS

## 2020-02-17 PROCEDURE — 36415 COLL VENOUS BLD VENIPUNCTURE: CPT

## 2020-02-17 PROCEDURE — 2580000003 HC RX 258: Performed by: HOSPITALIST

## 2020-02-17 PROCEDURE — 81001 URINALYSIS AUTO W/SCOPE: CPT

## 2020-02-17 PROCEDURE — 80178 ASSAY OF LITHIUM: CPT

## 2020-02-17 PROCEDURE — 85025 COMPLETE CBC W/AUTO DIFF WBC: CPT

## 2020-02-17 RX ORDER — HEPARIN SODIUM 5000 [USP'U]/ML
5000 INJECTION, SOLUTION INTRAVENOUS; SUBCUTANEOUS EVERY 8 HOURS SCHEDULED
Status: DISCONTINUED | OUTPATIENT
Start: 2020-02-17 | End: 2020-02-21 | Stop reason: HOSPADM

## 2020-02-17 RX ORDER — PROPOFOL 10 MG/ML
10 INJECTION, EMULSION INTRAVENOUS
Status: DISCONTINUED | OUTPATIENT
Start: 2020-02-17 | End: 2020-02-19

## 2020-02-17 RX ORDER — POLYVINYL ALCOHOL 14 MG/ML
1 SOLUTION/ DROPS OPHTHALMIC EVERY 4 HOURS
Status: DISCONTINUED | OUTPATIENT
Start: 2020-02-17 | End: 2020-02-19 | Stop reason: ALTCHOICE

## 2020-02-17 RX ORDER — CHLORHEXIDINE GLUCONATE 0.12 MG/ML
15 RINSE ORAL 2 TIMES DAILY
Status: DISCONTINUED | OUTPATIENT
Start: 2020-02-17 | End: 2020-02-19 | Stop reason: ALTCHOICE

## 2020-02-17 RX ORDER — SODIUM CHLORIDE 0.9 % (FLUSH) 0.9 %
10 SYRINGE (ML) INJECTION PRN
Status: DISCONTINUED | OUTPATIENT
Start: 2020-02-17 | End: 2020-02-21 | Stop reason: HOSPADM

## 2020-02-17 RX ORDER — PANTOPRAZOLE SODIUM 40 MG/10ML
40 INJECTION, POWDER, LYOPHILIZED, FOR SOLUTION INTRAVENOUS DAILY
Status: DISCONTINUED | OUTPATIENT
Start: 2020-02-17 | End: 2020-02-19 | Stop reason: ALTCHOICE

## 2020-02-17 RX ORDER — ARIPIPRAZOLE 10 MG/1
30 TABLET ORAL DAILY
Status: ON HOLD | COMMUNITY
End: 2020-02-25 | Stop reason: HOSPADM

## 2020-02-17 RX ORDER — MAGNESIUM SULFATE 1 G/100ML
1 INJECTION INTRAVENOUS PRN
Status: DISCONTINUED | OUTPATIENT
Start: 2020-02-17 | End: 2020-02-21 | Stop reason: HOSPADM

## 2020-02-17 RX ORDER — ONDANSETRON 2 MG/ML
4 INJECTION INTRAMUSCULAR; INTRAVENOUS EVERY 6 HOURS PRN
Status: DISCONTINUED | OUTPATIENT
Start: 2020-02-17 | End: 2020-02-21 | Stop reason: HOSPADM

## 2020-02-17 RX ORDER — POTASSIUM CHLORIDE 7.45 MG/ML
10 INJECTION INTRAVENOUS PRN
Status: DISCONTINUED | OUTPATIENT
Start: 2020-02-17 | End: 2020-02-21 | Stop reason: HOSPADM

## 2020-02-17 RX ORDER — SUCCINYLCHOLINE CHLORIDE 20 MG/ML
100 INJECTION INTRAMUSCULAR; INTRAVENOUS ONCE
Status: COMPLETED | OUTPATIENT
Start: 2020-02-17 | End: 2020-02-17

## 2020-02-17 RX ORDER — PROPOFOL 10 MG/ML
INJECTION, EMULSION INTRAVENOUS
Status: COMPLETED
Start: 2020-02-17 | End: 2020-02-17

## 2020-02-17 RX ORDER — MIDAZOLAM HYDROCHLORIDE 1 MG/ML
4 INJECTION INTRAMUSCULAR; INTRAVENOUS ONCE
Status: COMPLETED | OUTPATIENT
Start: 2020-02-17 | End: 2020-02-17

## 2020-02-17 RX ORDER — ETOMIDATE 2 MG/ML
20 INJECTION INTRAVENOUS ONCE
Status: COMPLETED | OUTPATIENT
Start: 2020-02-17 | End: 2020-02-17

## 2020-02-17 RX ORDER — 0.9 % SODIUM CHLORIDE 0.9 %
1000 INTRAVENOUS SOLUTION INTRAVENOUS ONCE
Status: COMPLETED | OUTPATIENT
Start: 2020-02-17 | End: 2020-02-17

## 2020-02-17 RX ORDER — LAMOTRIGINE 100 MG/1
100 TABLET ORAL DAILY
Status: ON HOLD | COMMUNITY
End: 2020-02-25 | Stop reason: HOSPADM

## 2020-02-17 RX ORDER — SODIUM CHLORIDE 0.9 % (FLUSH) 0.9 %
10 SYRINGE (ML) INJECTION EVERY 12 HOURS SCHEDULED
Status: DISCONTINUED | OUTPATIENT
Start: 2020-02-17 | End: 2020-02-21 | Stop reason: HOSPADM

## 2020-02-17 RX ORDER — PROPOFOL 10 MG/ML
10 INJECTION, EMULSION INTRAVENOUS ONCE
Status: COMPLETED | OUTPATIENT
Start: 2020-02-17 | End: 2020-02-17

## 2020-02-17 RX ORDER — BISACODYL 10 MG
10 SUPPOSITORY, RECTAL RECTAL DAILY PRN
Status: DISCONTINUED | OUTPATIENT
Start: 2020-02-17 | End: 2020-02-21 | Stop reason: HOSPADM

## 2020-02-17 RX ORDER — ALBUTEROL SULFATE 2.5 MG/3ML
2.5 SOLUTION RESPIRATORY (INHALATION)
Status: DISCONTINUED | OUTPATIENT
Start: 2020-02-17 | End: 2020-02-21 | Stop reason: HOSPADM

## 2020-02-17 RX ORDER — SODIUM CHLORIDE, SODIUM LACTATE, POTASSIUM CHLORIDE, CALCIUM CHLORIDE 600; 310; 30; 20 MG/100ML; MG/100ML; MG/100ML; MG/100ML
INJECTION, SOLUTION INTRAVENOUS CONTINUOUS
Status: DISCONTINUED | OUTPATIENT
Start: 2020-02-17 | End: 2020-02-20

## 2020-02-17 RX ORDER — SODIUM CHLORIDE 9 MG/ML
10 INJECTION INTRAVENOUS DAILY
Status: DISCONTINUED | OUTPATIENT
Start: 2020-02-17 | End: 2020-02-19 | Stop reason: ALTCHOICE

## 2020-02-17 RX ADMIN — ACTIVATED CHARCOAL 50 G: 208 SUSPENSION ORAL at 15:29

## 2020-02-17 RX ADMIN — SODIUM CHLORIDE, PRESERVATIVE FREE 10 ML: 5 INJECTION INTRAVENOUS at 19:35

## 2020-02-17 RX ADMIN — POLYVINYL ALCOHOL 1 DROP: 14 SOLUTION/ DROPS OPHTHALMIC at 21:09

## 2020-02-17 RX ADMIN — PROPOFOL 50 MCG/KG/MIN: 10 INJECTION, EMULSION INTRAVENOUS at 21:08

## 2020-02-17 RX ADMIN — SODIUM CHLORIDE 1000 ML: 9 INJECTION, SOLUTION INTRAVENOUS at 15:32

## 2020-02-17 RX ADMIN — HEPARIN SODIUM 5000 UNITS: 5000 INJECTION INTRAVENOUS; SUBCUTANEOUS at 22:07

## 2020-02-17 RX ADMIN — MIDAZOLAM 4 MG: 1 INJECTION INTRAMUSCULAR; INTRAVENOUS at 16:37

## 2020-02-17 RX ADMIN — CHLORHEXIDINE GLUCONATE 15 ML: 1.2 RINSE ORAL at 21:08

## 2020-02-17 RX ADMIN — ETOMIDATE 20 MG: 2 INJECTION, SOLUTION INTRAVENOUS at 16:38

## 2020-02-17 RX ADMIN — SODIUM CHLORIDE, POTASSIUM CHLORIDE, SODIUM LACTATE AND CALCIUM CHLORIDE: 600; 310; 30; 20 INJECTION, SOLUTION INTRAVENOUS at 19:55

## 2020-02-17 RX ADMIN — MINERAL OIL AND PETROLATUM: 150; 830 OINTMENT OPHTHALMIC at 19:34

## 2020-02-17 RX ADMIN — SODIUM CHLORIDE, PRESERVATIVE FREE 10 ML: 5 INJECTION INTRAVENOUS at 21:09

## 2020-02-17 RX ADMIN — PROPOFOL 40 MCG/KG/MIN: 10 INJECTION, EMULSION INTRAVENOUS at 16:36

## 2020-02-17 RX ADMIN — PANTOPRAZOLE SODIUM 40 MG: 40 INJECTION, POWDER, FOR SOLUTION INTRAVENOUS at 19:34

## 2020-02-17 RX ADMIN — MIDAZOLAM 3 MG/HR: 5 INJECTION INTRAMUSCULAR; INTRAVENOUS at 19:06

## 2020-02-17 RX ADMIN — SUCCINYLCHOLINE CHLORIDE 100 MG: 20 INJECTION INTRAMUSCULAR; INTRAVENOUS at 16:37

## 2020-02-17 RX ADMIN — MINERAL OIL AND PETROLATUM: 150; 830 OINTMENT OPHTHALMIC at 23:00

## 2020-02-17 ASSESSMENT — PULMONARY FUNCTION TESTS
PIF_VALUE: 26
PIF_VALUE: 38
PIF_VALUE: 24
PIF_VALUE: 25
PIF_VALUE: 26
PIF_VALUE: 25

## 2020-02-17 ASSESSMENT — PAIN SCALES - GENERAL: PAINLEVEL_OUTOF10: 5

## 2020-02-17 NOTE — ED NOTES
20 of etomidate given at 1614, 100 succ at 1615. Successful intubation at 1615, 22 at the lip, color change, delifna breath sounds, sp02 99%. NG tube placed at 1620, xray ordered.  Propofol given, and titrated quickly to 40mg/kg/min to maintain RASS goal       Phoebe Odom RN  02/17/20 2110

## 2020-02-17 NOTE — ED NOTES
Bed: 02-A  Expected date:   Expected time:   Means of arrival:   Comments:  137 Marco Antonio Mace RN  02/17/20 5700
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
## 2020-02-17 NOTE — H&P
Patient Information:   Caron El Centro  YOB: 1965  Date of Service: 2/17/2020  MRN: [id number]   Acct: [de-identified]   Primary Care Physician: TING Jackson CNP  Advance Directive: Prior  Admit Date: 2/17/2020       Hospital Day: 1        SUBJECTIVE:    Chief Complaint   Patient presents with    Drug Overdose     up to 3,000 mg Lamictal    Suicidal     2nd attempt in 3 weeks     EP Sign Out:  S/P 3G Lamictal  Risk of: Slurred speech, lethargy, nausea, vomiting, seizures, arrhythmias  No QTc changed  NSR  Lithium level pending  Intubated for airway protection  Propofol GGT  Versed GGT  Poison Control Following  ABG pending  CBC, PTT:  all WNL  HCG negative  UDS, Lithium, TSH, CMP: all pending    HPI:  Mrs. Pamela Luong is a 49year old  american lady. She had taken a whole bottle of Lamictal tablets which reportedly contained 120 pills of 25mg each. Poison control was contacted by the ED and warned of the possibility of seizures and arrhythmia. We will monitor in the ICU and provide supportive care. Her EP found that she also has a script for lithium, but the patient has denied taking any of these to the EP. She had also reportedly denied other substances including alchol while with the ED staff. As per records she has had prior suicide attempts in 200, 2018, and 2019.      Review of Systems:   Not possible as per general medical condition    Past Medical History:   Diagnosis Date    Chest pain 2/8/2012    Depression     Hypoglycemia     [facility]izoaffective disorder ([facility] 2/8/2012    Suicide attempt [facility])      Past Psychiatric History:  As per above    Past Surgical History:   Procedure Laterality Date    DILATION AND CURETTAGE OF UTERUS  2017    [facility]       Social History     Tobacco History     Smoking Status  Never Smoker    Smokeless Tobacco Use  Never Used          Alcohol History     Alcohol Use Status  No          Drug Use     Drug Use Status  No          Sexual Activity Sexually Active  Not Asked              Family History:  None on record    Allergies   Allergen Reactions    Seroquel [Quetiapine Fumarate] Other (See Comments)     Pt states it makes her lethargic      Adderall [Amphetamine-Dextroamphetamine]     Amphetamines     Asa [Aspirin]     Codeine     Cogentin [Benztropine]     Effexor [Venlafaxine]     Estrogens      \" I CAN'T TAKE WITH MY MEDICINE, IT MESSES IT UP. \"     Geodon [Ziprasidone Hcl]     Hydrocodone     Lortab [Hydrocodone-Acetaminophen]     Macrolides And Ketolides     Metoprolol     Neurontin [Gabapentin]     Other      BC powder    Pcn [Penicillins]     Provera [Medroxyprogesterone]     Prozac [Fluoxetine Hcl]     Tetracyclines & Related     Trazodone And Nefazodone     Trintellix [Vortioxetine]     Wellbutrin [Bupropion]     Zoloft [Sertraline Hcl]      Current Facility-Administered Medications   Medication Dose Route Frequency Provider Last Rate Last Dose    sodium chloride flush 0.9 % injection 10 mL  10 mL Intravenous 2 times per day Estrella Guardado MD        sodium chloride flush 0.9 % injection 10 mL  10 mL Intravenous PRN Estrella Guardado MD        ondansetron [facility]) injection 4 mg  4 mg Intravenous Q6H PRN Estrella Guardado MD        bisacodyl (DULCOLAX) suppository 10 mg  10 mg Rectal Daily PRN Estrella Guardado MD        potassium chloride 10 mEq/100 mL IVPB (Peripheral Line)  10 mEq Intravenous PRN Estrella Guardado MD        magnesium sulfate 1 g in dextrose 5% 100 mL IVPB  1 g Intravenous PRN Estrella Guardado MD        heparin (porcine) injection 5,000 Units  5,000 Units Subcutaneous 3 times per day Estrella Guardado MD        chlorhexidine (PERIDEX) 0.12 % solution 15 mL  15 mL Mouth/Throat BID Estrella Guardado MD        pantoprazole (PROTONIX) injection 40 mg  40 mg Intravenous Daily Estrella Guardado MD        And    sodium chloride (PF) 0.9 % injection 10 mL  10 mL Intravenous Daily Estrella Guardado MD        polyvinyl alcohol (LIQUIFILM TEARS) 1.4 % ophthalmic solution 1 drop  1 drop Both Eyes Q4H Aimee Parekh MD        And    Akwa Tears (LACRILUBE) 2-15-83 % opthalmic ointment   Both Eyes Q4H Aimee Parekh MD        albuterol (PROVENTIL) nebulizer solution 2.5 mg  2.5 mg Nebulization Q1H PRN Aimee Parekh MD        propofol injection  10 mcg/kg/min Intravenous Titrated Charlene Gallegos MD 47.8 mL/hr at 02/17/20 1804 82 mcg/kg/min at 02/17/20 1804    midazolam (VERSED) 100 mg in dextrose 5 % 100 mL infusion  1 mg/hr Intravenous Continuous Aimee Parekh MD         Home Medications:  Prior to Admission medications    Medication Sig Start Date End Date Taking? Authorizing Provider   lamoTRIgine (LAMICTAL) 100 MG tablet Take 100 mg by mouth daily   Yes Historical Provider, MD   ARIPiprazole (ABILIFY) 10 MG tablet Take 10 mg by mouth daily   Yes Historical Provider, MD   [place] at home? OBJECTIVE:    Vitals:    02/17/20 1637   BP:    Pulse: 86   Resp: 27   Temp:    SpO2: 100%     BP (!) 141/77   Pulse 86   Temp 98.2 °F (36.8 °C) (Oral)   Resp 27   Ht 5' 4\" (1.626 m)   Wt 214 lb (97.1 kg)   SpO2 100%   BMI 36.73 kg/m²     No intake or output data in the 24 hours ending 02/17/20 1639    Physical Exam  Vitals signs reviewed. Constitutional:       General: She is not in acute distress. Appearance: She is obese. She is not ill-appearing or toxic-appearing. HENT:      Head: Normocephalic and atraumatic. Comments: Hirsutism of chin     Nose: No congestion or rhinorrhea. Eyes:      General:         Right eye: No discharge. Left eye: No discharge. Neck:      Musculoskeletal: Neck supple. Comments: Trachea appears mildine  Cardiovascular:      Rate and Rhythm: Normal rate and regular rhythm. Heart sounds: No murmur. No friction rub. No gallop. Pulmonary:      Effort: No respiratory distress. Breath sounds: No stridor. No wheezing, rhonchi or rales.    Abdominal:      General: Bowel

## 2020-02-18 LAB
ALBUMIN SERPL-MCNC: 4.3 G/DL (ref 3.5–5.2)
ALP BLD-CCNC: 91 U/L (ref 35–104)
ALT SERPL-CCNC: 17 U/L (ref 5–33)
ANION GAP SERPL CALCULATED.3IONS-SCNC: 15 MMOL/L (ref 7–19)
AST SERPL-CCNC: 19 U/L (ref 5–32)
BASOPHILS ABSOLUTE: 0 K/UL (ref 0–0.2)
BASOPHILS RELATIVE PERCENT: 0.2 % (ref 0–1)
BILIRUB SERPL-MCNC: 0.5 MG/DL (ref 0.2–1.2)
BUN BLDV-MCNC: 10 MG/DL (ref 6–20)
CALCIUM SERPL-MCNC: 9.4 MG/DL (ref 8.6–10)
CHLORIDE BLD-SCNC: 104 MMOL/L (ref 98–111)
CO2: 22 MMOL/L (ref 22–29)
CREAT SERPL-MCNC: 0.6 MG/DL (ref 0.5–0.9)
EOSINOPHILS ABSOLUTE: 0.1 K/UL (ref 0–0.6)
EOSINOPHILS RELATIVE PERCENT: 0.4 % (ref 0–5)
GFR NON-AFRICAN AMERICAN: >60
GLUCOSE BLD-MCNC: 113 MG/DL (ref 74–109)
HCT VFR BLD CALC: 47.7 % (ref 37–47)
HEMOGLOBIN: 14.7 G/DL (ref 12–16)
IMMATURE GRANULOCYTES #: 0 K/UL
LYMPHOCYTES ABSOLUTE: 1.9 K/UL (ref 1.1–4.5)
LYMPHOCYTES RELATIVE PERCENT: 15.4 % (ref 20–40)
MCH RBC QN AUTO: 30.9 PG (ref 27–31)
MCHC RBC AUTO-ENTMCNC: 30.8 G/DL (ref 33–37)
MCV RBC AUTO: 100.2 FL (ref 81–99)
MONOCYTES ABSOLUTE: 0.8 K/UL (ref 0–0.9)
MONOCYTES RELATIVE PERCENT: 6.3 % (ref 0–10)
NEUTROPHILS ABSOLUTE: 9.6 K/UL (ref 1.5–7.5)
NEUTROPHILS RELATIVE PERCENT: 77.5 % (ref 50–65)
PDW BLD-RTO: 13 % (ref 11.5–14.5)
PLATELET # BLD: 249 K/UL (ref 130–400)
PMV BLD AUTO: 10 FL (ref 9.4–12.3)
POTASSIUM REFLEX MAGNESIUM: 4.6 MMOL/L (ref 3.5–5)
RBC # BLD: 4.76 M/UL (ref 4.2–5.4)
SODIUM BLD-SCNC: 141 MMOL/L (ref 136–145)
TOTAL PROTEIN: 6.8 G/DL (ref 6.6–8.7)
WBC # BLD: 12.3 K/UL (ref 4.8–10.8)

## 2020-02-18 PROCEDURE — 6360000002 HC RX W HCPCS: Performed by: PEDIATRICS

## 2020-02-18 PROCEDURE — 85025 COMPLETE CBC W/AUTO DIFF WBC: CPT

## 2020-02-18 PROCEDURE — 2700000000 HC OXYGEN THERAPY PER DAY

## 2020-02-18 PROCEDURE — 2580000003 HC RX 258: Performed by: HOSPITALIST

## 2020-02-18 PROCEDURE — 6360000002 HC RX W HCPCS: Performed by: HOSPITALIST

## 2020-02-18 PROCEDURE — 94002 VENT MGMT INPAT INIT DAY: CPT

## 2020-02-18 PROCEDURE — 36415 COLL VENOUS BLD VENIPUNCTURE: CPT

## 2020-02-18 PROCEDURE — 80053 COMPREHEN METABOLIC PANEL: CPT

## 2020-02-18 PROCEDURE — C9113 INJ PANTOPRAZOLE SODIUM, VIA: HCPCS | Performed by: HOSPITALIST

## 2020-02-18 PROCEDURE — 2000000000 HC ICU R&B

## 2020-02-18 RX ADMIN — SODIUM CHLORIDE, PRESERVATIVE FREE 10 ML: 5 INJECTION INTRAVENOUS at 08:40

## 2020-02-18 RX ADMIN — POLYVINYL ALCOHOL 1 DROP: 14 SOLUTION/ DROPS OPHTHALMIC at 05:35

## 2020-02-18 RX ADMIN — POLYVINYL ALCOHOL 1 DROP: 14 SOLUTION/ DROPS OPHTHALMIC at 09:30

## 2020-02-18 RX ADMIN — CHLORHEXIDINE GLUCONATE 15 ML: 1.2 RINSE ORAL at 08:40

## 2020-02-18 RX ADMIN — PROPOFOL 40 MCG/KG/MIN: 10 INJECTION, EMULSION INTRAVENOUS at 08:39

## 2020-02-18 RX ADMIN — PROPOFOL 40 MCG/KG/MIN: 10 INJECTION, EMULSION INTRAVENOUS at 20:29

## 2020-02-18 RX ADMIN — POLYVINYL ALCOHOL 1 DROP: 14 SOLUTION/ DROPS OPHTHALMIC at 01:33

## 2020-02-18 RX ADMIN — SODIUM CHLORIDE, PRESERVATIVE FREE 10 ML: 5 INJECTION INTRAVENOUS at 20:29

## 2020-02-18 RX ADMIN — MINERAL OIL AND PETROLATUM: 150; 830 OINTMENT OPHTHALMIC at 22:18

## 2020-02-18 RX ADMIN — HEPARIN SODIUM 5000 UNITS: 5000 INJECTION INTRAVENOUS; SUBCUTANEOUS at 13:30

## 2020-02-18 RX ADMIN — HEPARIN SODIUM 5000 UNITS: 5000 INJECTION INTRAVENOUS; SUBCUTANEOUS at 22:07

## 2020-02-18 RX ADMIN — PROPOFOL 40 MCG/KG/MIN: 10 INJECTION, EMULSION INTRAVENOUS at 16:31

## 2020-02-18 RX ADMIN — HEPARIN SODIUM 5000 UNITS: 5000 INJECTION INTRAVENOUS; SUBCUTANEOUS at 05:34

## 2020-02-18 RX ADMIN — MINERAL OIL AND PETROLATUM: 150; 830 OINTMENT OPHTHALMIC at 06:22

## 2020-02-18 RX ADMIN — POLYVINYL ALCOHOL 1 DROP: 14 SOLUTION/ DROPS OPHTHALMIC at 13:22

## 2020-02-18 RX ADMIN — SODIUM CHLORIDE, POTASSIUM CHLORIDE, SODIUM LACTATE AND CALCIUM CHLORIDE: 600; 310; 30; 20 INJECTION, SOLUTION INTRAVENOUS at 16:31

## 2020-02-18 RX ADMIN — SODIUM CHLORIDE, POTASSIUM CHLORIDE, SODIUM LACTATE AND CALCIUM CHLORIDE: 600; 310; 30; 20 INJECTION, SOLUTION INTRAVENOUS at 06:02

## 2020-02-18 RX ADMIN — MINERAL OIL AND PETROLATUM: 150; 830 OINTMENT OPHTHALMIC at 10:30

## 2020-02-18 RX ADMIN — MINERAL OIL AND PETROLATUM: 150; 830 OINTMENT OPHTHALMIC at 02:16

## 2020-02-18 RX ADMIN — PROPOFOL 40 MCG/KG/MIN: 10 INJECTION, EMULSION INTRAVENOUS at 04:17

## 2020-02-18 RX ADMIN — PROPOFOL 35 MCG/KG/MIN: 10 INJECTION, EMULSION INTRAVENOUS at 01:32

## 2020-02-18 RX ADMIN — MINERAL OIL AND PETROLATUM: 150; 830 OINTMENT OPHTHALMIC at 14:45

## 2020-02-18 RX ADMIN — POLYVINYL ALCOHOL 1 DROP: 14 SOLUTION/ DROPS OPHTHALMIC at 22:07

## 2020-02-18 RX ADMIN — CHLORHEXIDINE GLUCONATE 15 ML: 1.2 RINSE ORAL at 20:29

## 2020-02-18 RX ADMIN — PANTOPRAZOLE SODIUM 40 MG: 40 INJECTION, POWDER, FOR SOLUTION INTRAVENOUS at 08:40

## 2020-02-18 RX ADMIN — MIDAZOLAM 4 MG/HR: 5 INJECTION INTRAMUSCULAR; INTRAVENOUS at 21:05

## 2020-02-18 ASSESSMENT — PULMONARY FUNCTION TESTS
PIF_VALUE: 30
PIF_VALUE: 29
PIF_VALUE: 29
PIF_VALUE: 28
PIF_VALUE: 27
PIF_VALUE: 28
PIF_VALUE: 26
PIF_VALUE: 29
PIF_VALUE: 27
PIF_VALUE: 25
PIF_VALUE: 61
PIF_VALUE: 27
PIF_VALUE: 27
PIF_VALUE: 28
PIF_VALUE: 27
PIF_VALUE: 27
PIF_VALUE: 28
PIF_VALUE: 26
PIF_VALUE: 27
PIF_VALUE: 25
PIF_VALUE: 26
PIF_VALUE: 27

## 2020-02-18 NOTE — PLAN OF CARE
Problem: Restraint Use - Nonviolent/Non-Self-Destructive Behavior:  Goal: Absence of restraint indications  Description  Absence of restraint indications  Outcome: Ongoing  Goal: Absence of restraint-related injury  Description  Absence of restraint-related injury  Outcome: Ongoing     Problem: Suicide risk  Goal: Provide patient with safe environment  Description  Provide patient with safe environment  Outcome: Ongoing     Problem: Airway Clearance - Ineffective:  Goal: Ability to maintain a clear airway will improve  Description  Ability to maintain a clear airway will improve  Outcome: Ongoing     Problem: Aspiration:  Goal: Absence of aspiration  Description  Absence of aspiration  Outcome: Ongoing     Problem: Mental Status - Impaired:  Goal: Mental status will be restored to baseline  Description  Mental status will be restored to baseline  Outcome: Ongoing

## 2020-02-18 NOTE — PROGRESS NOTES
Nutrition Assessment    Type and Reason for Visit: Initial, Consult    Nutrition Recommendations: Initiate EN: Oxepa @ 20 mL/hr. Propofol currently provides 615.12 non-protein kcals/day. TF & Propofol will provide 1335 kcals, 30 g/PRO, 50 g/CHO, & 377 mL/fluid daily. Nutrition Assessment: Pt is nutritionally compromised AEB inadequate oral intake. Pt is at risk for further compromise d/t mechanical ventilation and NPO status. Will initiate EN per consult. Malnutrition Assessment:  · Malnutrition Status: At risk for malnutrition  · Context: Acute illness or injury  · Findings of the 6 clinical characteristics of malnutrition (Minimum of 2 out of 6 clinical characteristics is required to make the diagnosis of moderate or severe Protein Calorie Malnutrition based on AND/ASPEN Guidelines):  1. Energy Intake-Less than or equal to 50% of estimated energy requirement, Unable to assess    2. Weight Loss-No significant weight loss,    3. Fat Loss-No significant subcutaneous fat loss,    4. Muscle Loss-No significant muscle mass loss,    5. Fluid Accumulation-No significant fluid accumulation,    6.   Strength-Not measured    Nutrition Risk Level: High    Nutrient Needs:  · Estimated Daily Total Kcal: 4316-9721 kcals/day  · Estimated Daily Protein (g): 108-135 g/PRO/day  · Estimated Daily Total Fluid (ml/day): 1202-3148 mL/day    Nutrition Diagnosis:   · Problem: Inadequate oral intake  · Etiology: related to Acute injury/trauma, Impaired respiratory function-inability to consume food     Signs and symptoms:  as evidenced by Intubation, NPO status due to medical condition    Objective Information:  · Current Nutrition Therapies:  · Oral Diet Orders: NPO   · Anthropometric Measures:  · Ht: 5' 4\" (162.6 cm)   · Current Body Wt: 211 lb (95.7 kg)  · Ideal Body Wt: 120 lb (54.4 kg)   · BMI Classification: BMI 35.0 - 39.9 Obese Class II    Nutrition Interventions:   Continue NPO, Start Tube Feeding  Continued

## 2020-02-18 NOTE — PROGRESS NOTES
Subjective:   Critical Care Daily Progress Note: 2/18/2020 8:19 AM    Interval History:  [id number]:  Mrs. Sarah Grant remains sedated on vent. ROS not possible at this time. She has had no arrhythmia. Poison control following, awaiting for further reccs. Hope to wean off vent in AM.   [id number]:  Mrs. Sarah Grant is a 49year old  american lady. She had taken a whole bottle of Lamictal tablets which reportedly contained 120 pills of 25mg each. Poison control was contacted by the ED and warned of the possibility of seizures and arrhythmia. We will monitor in the ICU and provide supportive care. Her EP found that she also has a script for lithium, but the patient has denied taking any of these to the EP. She had also reportedly denied other substances including alchol while with the ED staff. As per records she has had prior suicide attempts in 200, 2018, and 2019. Scheduled Meds:   sodium chloride flush  10 mL Intravenous 2 times per day    heparin (porcine)  5,000 Units Subcutaneous 3 times per day    chlorhexidine  15 mL Mouth/Throat BID    pantoprazole  40 mg Intravenous Daily    And    sodium chloride (PF)  10 mL Intravenous Daily    polyvinyl alcohol  1 drop Both Eyes Q4H    And    Akwa Tears   Both Eyes Q4H     Continuous Infusions:   propofol 40 mcg/kg/min (02/18/20 0417)    midazolam 2 mg/hr (02/18/20 0033)    lactated ringers 100 mL/hr at 02/18/20 0602     PRN Meds:sodium chloride flush, ondansetron, bisacodyl, potassium chloride, magnesium sulfate, albuterol    Review of Systems  Pertinent items are noted in HPI. Objective:     I/O last 3 completed shifts: In: 2367.5 [I.V.:1767.5; IV Piggyback:600]  Out: 9411 [Urine:2495]  No intake/output data recorded. /63   Pulse 68   Temp 97.3 °F (36.3 °C) (Temporal)   Resp 13   Ht 5' 4\" (1.626 m)   Wt 211 lb (95.7 kg)   SpO2 100%   BMI 36.22 kg/m²     Physical Exam  Vitals signs reviewed.    Constitutional:       General:

## 2020-02-19 LAB
ANION GAP SERPL CALCULATED.3IONS-SCNC: 13 MMOL/L (ref 7–19)
BUN BLDV-MCNC: 7 MG/DL (ref 6–20)
CALCIUM SERPL-MCNC: 8.8 MG/DL (ref 8.6–10)
CHLORIDE BLD-SCNC: 106 MMOL/L (ref 98–111)
CO2: 22 MMOL/L (ref 22–29)
CREAT SERPL-MCNC: 0.6 MG/DL (ref 0.5–0.9)
EKG P AXIS: 70 DEGREES
EKG P-R INTERVAL: 142 MS
EKG Q-T INTERVAL: 346 MS
EKG QRS DURATION: 94 MS
EKG QTC CALCULATION (BAZETT): 389 MS
EKG T AXIS: 57 DEGREES
GFR NON-AFRICAN AMERICAN: >60
GLUCOSE BLD-MCNC: 95 MG/DL (ref 74–109)
HCT VFR BLD CALC: 40.2 % (ref 37–47)
HEMOGLOBIN: 13 G/DL (ref 12–16)
MAGNESIUM: 1.8 MG/DL (ref 1.6–2.6)
MCH RBC QN AUTO: 30.8 PG (ref 27–31)
MCHC RBC AUTO-ENTMCNC: 32.3 G/DL (ref 33–37)
MCV RBC AUTO: 95.3 FL (ref 81–99)
PDW BLD-RTO: 13.1 % (ref 11.5–14.5)
PLATELET # BLD: 237 K/UL (ref 130–400)
PMV BLD AUTO: 9.9 FL (ref 9.4–12.3)
POTASSIUM REFLEX MAGNESIUM: 3.4 MMOL/L (ref 3.5–5)
RBC # BLD: 4.22 M/UL (ref 4.2–5.4)
SODIUM BLD-SCNC: 141 MMOL/L (ref 136–145)
WBC # BLD: 11.9 K/UL (ref 4.8–10.8)

## 2020-02-19 PROCEDURE — 2000000000 HC ICU R&B

## 2020-02-19 PROCEDURE — 36415 COLL VENOUS BLD VENIPUNCTURE: CPT

## 2020-02-19 PROCEDURE — 2580000003 HC RX 258: Performed by: HOSPITALIST

## 2020-02-19 PROCEDURE — 93010 ELECTROCARDIOGRAM REPORT: CPT | Performed by: INTERNAL MEDICINE

## 2020-02-19 PROCEDURE — 2700000000 HC OXYGEN THERAPY PER DAY

## 2020-02-19 PROCEDURE — 94003 VENT MGMT INPAT SUBQ DAY: CPT

## 2020-02-19 PROCEDURE — 6360000002 HC RX W HCPCS: Performed by: PEDIATRICS

## 2020-02-19 PROCEDURE — 85027 COMPLETE CBC AUTOMATED: CPT

## 2020-02-19 PROCEDURE — 80048 BASIC METABOLIC PNL TOTAL CA: CPT

## 2020-02-19 PROCEDURE — 6360000002 HC RX W HCPCS: Performed by: HOSPITALIST

## 2020-02-19 PROCEDURE — 83735 ASSAY OF MAGNESIUM: CPT

## 2020-02-19 PROCEDURE — C9113 INJ PANTOPRAZOLE SODIUM, VIA: HCPCS | Performed by: HOSPITALIST

## 2020-02-19 RX ADMIN — POLYVINYL ALCOHOL 1 DROP: 14 SOLUTION/ DROPS OPHTHALMIC at 09:25

## 2020-02-19 RX ADMIN — HEPARIN SODIUM 5000 UNITS: 5000 INJECTION INTRAVENOUS; SUBCUTANEOUS at 05:51

## 2020-02-19 RX ADMIN — SODIUM CHLORIDE, PRESERVATIVE FREE 10 ML: 5 INJECTION INTRAVENOUS at 09:00

## 2020-02-19 RX ADMIN — PROPOFOL 40 MCG/KG/MIN: 10 INJECTION, EMULSION INTRAVENOUS at 07:26

## 2020-02-19 RX ADMIN — SODIUM CHLORIDE, POTASSIUM CHLORIDE, SODIUM LACTATE AND CALCIUM CHLORIDE: 600; 310; 30; 20 INJECTION, SOLUTION INTRAVENOUS at 23:31

## 2020-02-19 RX ADMIN — HEPARIN SODIUM 5000 UNITS: 5000 INJECTION INTRAVENOUS; SUBCUTANEOUS at 21:30

## 2020-02-19 RX ADMIN — SODIUM CHLORIDE, POTASSIUM CHLORIDE, SODIUM LACTATE AND CALCIUM CHLORIDE: 600; 310; 30; 20 INJECTION, SOLUTION INTRAVENOUS at 02:29

## 2020-02-19 RX ADMIN — SODIUM CHLORIDE, POTASSIUM CHLORIDE, SODIUM LACTATE AND CALCIUM CHLORIDE: 600; 310; 30; 20 INJECTION, SOLUTION INTRAVENOUS at 07:30

## 2020-02-19 RX ADMIN — SODIUM CHLORIDE, PRESERVATIVE FREE 10 ML: 5 INJECTION INTRAVENOUS at 20:39

## 2020-02-19 RX ADMIN — PROPOFOL 40 MCG/KG/MIN: 10 INJECTION, EMULSION INTRAVENOUS at 03:44

## 2020-02-19 RX ADMIN — CHLORHEXIDINE GLUCONATE 15 ML: 1.2 RINSE ORAL at 09:25

## 2020-02-19 RX ADMIN — POLYVINYL ALCOHOL 1 DROP: 14 SOLUTION/ DROPS OPHTHALMIC at 01:54

## 2020-02-19 RX ADMIN — MINERAL OIL AND PETROLATUM: 150; 830 OINTMENT OPHTHALMIC at 10:45

## 2020-02-19 RX ADMIN — MINERAL OIL AND PETROLATUM: 150; 830 OINTMENT OPHTHALMIC at 02:32

## 2020-02-19 RX ADMIN — SODIUM CHLORIDE, PRESERVATIVE FREE 10 ML: 5 INJECTION INTRAVENOUS at 09:25

## 2020-02-19 RX ADMIN — MINERAL OIL AND PETROLATUM: 150; 830 OINTMENT OPHTHALMIC at 14:30

## 2020-02-19 RX ADMIN — HEPARIN SODIUM 5000 UNITS: 5000 INJECTION INTRAVENOUS; SUBCUTANEOUS at 14:00

## 2020-02-19 RX ADMIN — PROPOFOL 40 MCG/KG/MIN: 10 INJECTION, EMULSION INTRAVENOUS at 00:20

## 2020-02-19 RX ADMIN — POLYVINYL ALCOHOL 1 DROP: 14 SOLUTION/ DROPS OPHTHALMIC at 13:45

## 2020-02-19 RX ADMIN — PANTOPRAZOLE SODIUM 40 MG: 40 INJECTION, POWDER, FOR SOLUTION INTRAVENOUS at 07:28

## 2020-02-19 ASSESSMENT — PULMONARY FUNCTION TESTS
PIF_VALUE: 55
PIF_VALUE: 24
PIF_VALUE: 33
PIF_VALUE: 29
PIF_VALUE: 28
PIF_VALUE: 30
PIF_VALUE: 28
PIF_VALUE: 29
PIF_VALUE: 29
PIF_VALUE: 27
PIF_VALUE: 11
PIF_VALUE: 26
PIF_VALUE: 29
PIF_VALUE: 30
PIF_VALUE: 43
PIF_VALUE: 27
PIF_VALUE: 48
PIF_VALUE: 32
PIF_VALUE: 39
PIF_VALUE: 28

## 2020-02-19 ASSESSMENT — PAIN SCALES - GENERAL
PAINLEVEL_OUTOF10: 0
PAINLEVEL_OUTOF10: 0

## 2020-02-19 ASSESSMENT — PAIN SCALES - WONG BAKER: WONGBAKER_NUMERICALRESPONSE: 0

## 2020-02-19 NOTE — PLAN OF CARE
Problem: Restraint Use - Nonviolent/Non-Self-Destructive Behavior:  Goal: Absence of restraint indications  Description  Absence of restraint indications  2/19/2020 0818 by Alma Álvarez RN  Outcome: Ongoing  2/19/2020 0427 by Sea Medellin RN  Outcome: Ongoing  Goal: Absence of restraint-related injury  Description  Absence of restraint-related injury  2/19/2020 0818 by Alma Álvarez RN  Outcome: Ongoing  2/19/2020 0427 by Sea Medellin RN  Outcome: Ongoing     Problem: Suicide risk  Goal: Provide patient with safe environment  Description  Provide patient with safe environment  2/19/2020 0818 by Alma Álvarez RN  Outcome: Ongoing  2/19/2020 0427 by Sea Medellin RN  Outcome: Ongoing     Problem: Airway Clearance - Ineffective:  Goal: Ability to maintain a clear airway will improve  Description  Ability to maintain a clear airway will improve  2/19/2020 0818 by Alma Álvarez RN  Outcome: Ongoing  2/19/2020 0427 by Sea Medellin RN  Outcome: Ongoing     Problem: Aspiration:  Goal: Absence of aspiration  Description  Absence of aspiration  2/19/2020 0818 by Alma Álvarez RN  Outcome: Ongoing  2/19/2020 0427 by Sea Medellin RN  Outcome: Ongoing     Problem: Mental Status - Impaired:  Goal: Mental status will be restored to baseline  Description  Mental status will be restored to baseline  2/19/2020 0818 by Alma Álvarez RN  Outcome: Ongoing  2/19/2020 0427 by Sea Medellin RN  Outcome: Ongoing     Problem: Nutrition  Goal: Optimal nutrition therapy  2/19/2020 0818 by Alma Álvarez RN  Outcome: Ongoing  2/19/2020 0427 by Sea Medellin RN  Outcome: Ongoing     Problem: Risk for Impaired Skin Integrity  Goal: Tissue integrity - skin and mucous membranes  Description  Structural intactness and normal physiological function of skin and  mucous membranes.   2/19/2020 0818 by Alma Álvarez RN  Outcome: Ongoing  2/19/2020 0427 by Sea Medellin RN  Outcome: Ongoing     Problem: Falls - Risk of:  Goal: Will remain free from falls  Description  Will remain free from falls  2/19/2020 0818 by Addison Camilo RN  Outcome: Ongoing  2/19/2020 0427 by Ulises Pathak RN  Outcome: Ongoing  Goal: Absence of physical injury  Description  Absence of physical injury  2/19/2020 0818 by Addison Camilo RN  Outcome: Ongoing  2/19/2020 0427 by Ulises Pathak RN  Outcome: Ongoing
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
## 2020-02-20 LAB
ANION GAP SERPL CALCULATED.3IONS-SCNC: 13 MMOL/L (ref 7–19)
BUN BLDV-MCNC: 6 MG/DL (ref 6–20)
CALCIUM SERPL-MCNC: 8.6 MG/DL (ref 8.6–10)
CHLORIDE BLD-SCNC: 111 MMOL/L (ref 98–111)
CO2: 22 MMOL/L (ref 22–29)
CREAT SERPL-MCNC: 0.5 MG/DL (ref 0.5–0.9)
GFR NON-AFRICAN AMERICAN: >60
GLUCOSE BLD-MCNC: 114 MG/DL (ref 70–99)
GLUCOSE BLD-MCNC: 89 MG/DL (ref 74–109)
HCT VFR BLD CALC: 39.6 % (ref 37–47)
HEMOGLOBIN: 12.4 G/DL (ref 12–16)
MCH RBC QN AUTO: 31.1 PG (ref 27–31)
MCHC RBC AUTO-ENTMCNC: 31.3 G/DL (ref 33–37)
MCV RBC AUTO: 99.2 FL (ref 81–99)
PDW BLD-RTO: 13 % (ref 11.5–14.5)
PERFORMED ON: ABNORMAL
PLATELET # BLD: 199 K/UL (ref 130–400)
PMV BLD AUTO: 9.8 FL (ref 9.4–12.3)
POTASSIUM REFLEX MAGNESIUM: 3.6 MMOL/L (ref 3.5–5)
RBC # BLD: 3.99 M/UL (ref 4.2–5.4)
SODIUM BLD-SCNC: 146 MMOL/L (ref 136–145)
WBC # BLD: 10.1 K/UL (ref 4.8–10.8)

## 2020-02-20 PROCEDURE — 99253 IP/OBS CNSLTJ NEW/EST LOW 45: CPT | Performed by: PSYCHIATRY & NEUROLOGY

## 2020-02-20 PROCEDURE — 6360000002 HC RX W HCPCS: Performed by: INTERNAL MEDICINE

## 2020-02-20 PROCEDURE — 6360000002 HC RX W HCPCS: Performed by: HOSPITALIST

## 2020-02-20 PROCEDURE — 36415 COLL VENOUS BLD VENIPUNCTURE: CPT

## 2020-02-20 PROCEDURE — 82948 REAGENT STRIP/BLOOD GLUCOSE: CPT

## 2020-02-20 PROCEDURE — 85027 COMPLETE CBC AUTOMATED: CPT

## 2020-02-20 PROCEDURE — 94640 AIRWAY INHALATION TREATMENT: CPT

## 2020-02-20 PROCEDURE — 2580000003 HC RX 258: Performed by: HOSPITALIST

## 2020-02-20 PROCEDURE — 80048 BASIC METABOLIC PNL TOTAL CA: CPT

## 2020-02-20 PROCEDURE — 2700000000 HC OXYGEN THERAPY PER DAY

## 2020-02-20 PROCEDURE — 1210000000 HC MED SURG R&B

## 2020-02-20 RX ADMIN — ALBUTEROL SULFATE 2.5 MG: 2.5 SOLUTION RESPIRATORY (INHALATION) at 15:24

## 2020-02-20 RX ADMIN — HEPARIN SODIUM 5000 UNITS: 5000 INJECTION INTRAVENOUS; SUBCUTANEOUS at 13:59

## 2020-02-20 RX ADMIN — HEPARIN SODIUM 5000 UNITS: 5000 INJECTION INTRAVENOUS; SUBCUTANEOUS at 21:51

## 2020-02-20 RX ADMIN — SODIUM CHLORIDE, PRESERVATIVE FREE 10 ML: 5 INJECTION INTRAVENOUS at 08:42

## 2020-02-20 RX ADMIN — ALBUTEROL SULFATE 2.5 MG: 2.5 SOLUTION RESPIRATORY (INHALATION) at 19:29

## 2020-02-20 RX ADMIN — SODIUM CHLORIDE, POTASSIUM CHLORIDE, SODIUM LACTATE AND CALCIUM CHLORIDE: 600; 310; 30; 20 INJECTION, SOLUTION INTRAVENOUS at 08:41

## 2020-02-20 RX ADMIN — HEPARIN SODIUM 5000 UNITS: 5000 INJECTION INTRAVENOUS; SUBCUTANEOUS at 05:56

## 2020-02-20 ASSESSMENT — ENCOUNTER SYMPTOMS
NAUSEA: 0
VOMITING: 0
DIARRHEA: 0
CONSTIPATION: 0
COUGH: 0
SHORTNESS OF BREATH: 0
BACK PAIN: 0

## 2020-02-20 ASSESSMENT — PAIN SCALES - GENERAL: PAINLEVEL_OUTOF10: 0

## 2020-02-20 NOTE — PLAN OF CARE
Problem: Restraint Use - Nonviolent/Non-Self-Destructive Behavior:  Goal: Absence of restraint indications  Description  Absence of restraint indications  Outcome: Ongoing  Goal: Absence of restraint-related injury  Description  Absence of restraint-related injury  Outcome: Ongoing     Problem: Suicide risk  Description  Suicide risk  Goal: Provide patient with safe environment  Description  Provide patient with safe environment  Outcome: Ongoing     Problem: Airway Clearance - Ineffective:  Goal: Ability to maintain a clear airway will improve  Description  Ability to maintain a clear airway will improve  Outcome: Ongoing     Problem: Aspiration:  Goal: Absence of aspiration  Description  Absence of aspiration  Outcome: Ongoing     Problem: Aspiration:  Goal: Absence of aspiration  Description  Absence of aspiration  Outcome: Ongoing     Problem: Mental Status - Impaired:  Goal: Mental status will be restored to baseline  Description  Mental status will be restored to baseline  Outcome: Ongoing     Problem: Nutrition  Goal: Optimal nutrition therapy  Outcome: Ongoing     Problem: Risk for Impaired Skin Integrity  Goal: Tissue integrity - skin and mucous membranes  Description  Structural intactness and normal physiological function of skin and  mucous membranes.   Outcome: Ongoing     Problem: Falls - Risk of:  Goal: Will remain free from falls  Description  Will remain free from falls  Outcome: Ongoing  Goal: Absence of physical injury  Description  Absence of physical injury  Outcome: Ongoing

## 2020-02-20 NOTE — PROGRESS NOTES
Nutrition Assessment (Low Risk)    Type and Reason for Visit: Reassess    Nutrition Assessment:  Patient assessed for nutritional risk. Deemed to be at low risk at this time. Will continue to monitor for changes in status. Pt has improved from a nutritional standpoint AEB extubation and transfer to medical floor. Pt is adequately nourished with good PO intake on General diet. No s/s intolerance at this time. Will continue to monitor nutrition progression and implement nutrition intervention as needed.      Malnutrition Assessment:  · Malnutrition Status: No malnutrition    Nutrition Risk Level   Risk Level: Low    Nutrition Diagnosis:   · Problem: No nutrition diagnosis at this time    Nutrition Intervention:  Food and/or Delivery: Continue current diet  Nutrition Education/Counseling/Coordination of Care:  Continued Inpatient Monitoring      Electronically signed by Humble Barney MS, RD, LD on 2/20/20 at 1:35 PM    Contact Number: [phone]
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
## 2020-02-20 NOTE — CONSULTS
tiredness and muscle weakness, decreased concentration, poor motivation, feeling of hopelessness, helplessness and worthlessness, decreased pleasure in previously enjoyed activities. Patient denies current active suicidal or homicidal ideations, denies any plans. Also, she denies any auditory and visual hallucinations. PSYCHIATRIC HISTORY:  Diagnoses: Depression, anxiety, bipolar disorder  Suicide attempts/gestures: 3 times, not counting the current one, by overdose of different medications, which included Tylenol, Depakote and using house cleaning fluid  Prior hospitalizations: Multiple to [facility] and [facility]  Medication trials: Depakote, doxepin, lithium, risperidone. Most recently prescribed - Lamictal and Abilify  Mental health contact: [facility] [place] [id number] [facility]      Allergies:  Seroquel [quetiapine fumarate]; Adderall [amphetamine-dextroamphetamine]; Amphetamines; Rafael Mealing [aspirin]; Codeine; Cogentin [benztropine]; Effexor [venlafaxine]; Estrogens; Geodon [ziprasidone hcl]; Hydrocodone; Lortab [hydrocodone-acetaminophen]; Macrolides and ketolides; Metoprolol; Neurontin [gabapentin]; Other; Pcn [penicillins]; Provera [medroxyprogesterone]; Prozac [fluoxetine hcl]; Tetracyclines & related; Trazodone and nefazodone; Trintellix [vortioxetine]; Wellbutrin [bupropion]; and Zoloft [sertraline hcl]    Social History:  Lives with her mother and 25years old son. Patient denies history of smoking tobacco, drinking alcohol or using any illicit drugs. Mental Status  Appearance: Looks very tired, constantly coughing, properly groomed, wearing hospital attire. Made intermittent eye contact. Behavior: Anxious, cooperative. Mild psychomotor agitation appreciated. Speech: Decreased in tone and volume, normal in quality. Mood: \"Not too good\"   Affect: Mood congruent. Range is restricted.    Thought Process: Circumstantial  Thought Content: Patient does not have any current active suicidal and   homicidal ideations. No overt delusions or paranoia appreciated. Perceptions: Seems patient does not have any auditory or visual hallucinations at present time. Patient did not appear to be responding to internal stimuli. No overt psychosis. Executive Functions: Appear impaired. Concentration: Poor  Reasoning: Appears impaired based on interaction from interview   Orientation: to person, place, and situation. Alert. Language: Intact. Fund of information: Intact. Memory: recent and remote appear intact. Impulsivity: Poor. Neurovegitative: Decreased appetite, decreased sleep. Insight: Poor. Judgment: Impaired. Assessment  DSM 5 DIAGNOSIS:  Bipolar disorder, most recent episode depressed  Status post suicidal attempt by overdose of prescribed psychotropic medication      Recommendations  1. Currently patient is not suicidal, homicidal or psychotic. However, considering seriousness of recent suicidal attempt, lack of the effect of prescribed psychotropic medications, lack of the coping skills, I would recommend to admit patient to psychiatry, when she is medically stable. 2.  Please, continue one-to-one sitter. 3.  Hold prescribed psychotropic medications for now. 4.  Please, call to psychiatry for evaluation when patient is stable for transfer. Patient will need to be ambulatory, performing ADLs independently, and able to participate in groups and her recovery.         Laura Carl MD

## 2020-02-21 ENCOUNTER — HOSPITAL ENCOUNTER (INPATIENT)
Age: 55
LOS: 4 days | Discharge: HOME | DRG: 885 | End: 2020-02-25
Attending: PSYCHIATRY & NEUROLOGY | Admitting: PSYCHIATRY & NEUROLOGY
Payer: MEDICAID

## 2020-02-21 VITALS
TEMPERATURE: 97.2 F | HEIGHT: 64 IN | SYSTOLIC BLOOD PRESSURE: 115 MMHG | RESPIRATION RATE: 20 BRPM | OXYGEN SATURATION: 95 % | DIASTOLIC BLOOD PRESSURE: 77 MMHG | WEIGHT: 208 LBS | BODY MASS INDEX: 35.51 KG/M2 | HEART RATE: 83 BPM

## 2020-02-21 LAB
AMYLASE: 62 U/L (ref 28–100)
GLUCOSE BLD-MCNC: 104 MG/DL (ref 70–99)
GLUCOSE BLD-MCNC: 129 MG/DL (ref 70–99)
LIPASE: 34 U/L (ref 13–60)
PERFORMED ON: ABNORMAL
PERFORMED ON: ABNORMAL
T4 FREE: 1.38 NG/DL (ref 0.93–1.7)
TSH SERPL DL<=0.05 MIU/L-ACNC: 1.14 UIU/ML (ref 0.27–4.2)

## 2020-02-21 PROCEDURE — 84443 ASSAY THYROID STIM HORMONE: CPT

## 2020-02-21 PROCEDURE — 84439 ASSAY OF FREE THYROXINE: CPT

## 2020-02-21 PROCEDURE — 99233 SBSQ HOSP IP/OBS HIGH 50: CPT | Performed by: PSYCHIATRY & NEUROLOGY

## 2020-02-21 PROCEDURE — 1240000000 HC EMOTIONAL WELLNESS R&B

## 2020-02-21 PROCEDURE — 83690 ASSAY OF LIPASE: CPT

## 2020-02-21 PROCEDURE — 84466 ASSAY OF TRANSFERRIN: CPT

## 2020-02-21 PROCEDURE — 6360000002 HC RX W HCPCS: Performed by: INTERNAL MEDICINE

## 2020-02-21 PROCEDURE — 6370000000 HC RX 637 (ALT 250 FOR IP): Performed by: PSYCHIATRY & NEUROLOGY

## 2020-02-21 PROCEDURE — 36415 COLL VENOUS BLD VENIPUNCTURE: CPT

## 2020-02-21 PROCEDURE — 6370000000 HC RX 637 (ALT 250 FOR IP): Performed by: HOSPITALIST

## 2020-02-21 PROCEDURE — 82150 ASSAY OF AMYLASE: CPT

## 2020-02-21 PROCEDURE — 82948 REAGENT STRIP/BLOOD GLUCOSE: CPT

## 2020-02-21 RX ORDER — HEPARIN SODIUM 5000 [USP'U]/ML
5000 INJECTION, SOLUTION INTRAVENOUS; SUBCUTANEOUS EVERY 8 HOURS SCHEDULED
Status: CANCELLED | OUTPATIENT
Start: 2020-02-21

## 2020-02-21 RX ORDER — LANOLIN ALCOHOL/MO/W.PET/CERES
3 CREAM (GRAM) TOPICAL ONCE
Status: COMPLETED | OUTPATIENT
Start: 2020-02-21 | End: 2020-02-21

## 2020-02-21 RX ORDER — ALBUTEROL SULFATE 2.5 MG/3ML
2.5 SOLUTION RESPIRATORY (INHALATION)
Status: CANCELLED | OUTPATIENT
Start: 2020-02-21

## 2020-02-21 RX ORDER — BISACODYL 10 MG
10 SUPPOSITORY, RECTAL RECTAL DAILY PRN
Status: CANCELLED | OUTPATIENT
Start: 2020-02-21

## 2020-02-21 RX ORDER — CHOLECALCIFEROL (VITAMIN D3) 1250 MCG
50000 CAPSULE ORAL WEEKLY
COMMUNITY
End: 2020-03-06 | Stop reason: SDUPTHER

## 2020-02-21 RX ORDER — POTASSIUM CHLORIDE 7.45 MG/ML
10 INJECTION INTRAVENOUS PRN
Status: CANCELLED | OUTPATIENT
Start: 2020-02-21

## 2020-02-21 RX ORDER — POLYETHYLENE GLYCOL 3350 17 G/17G
17 POWDER, FOR SOLUTION ORAL DAILY PRN
Status: DISCONTINUED | OUTPATIENT
Start: 2020-02-21 | End: 2020-02-25 | Stop reason: HOSPADM

## 2020-02-21 RX ORDER — MAGNESIUM SULFATE 1 G/100ML
1 INJECTION INTRAVENOUS PRN
Status: CANCELLED | OUTPATIENT
Start: 2020-02-21

## 2020-02-21 RX ORDER — BENZONATATE 100 MG/1
200 CAPSULE ORAL 3 TIMES DAILY
COMMUNITY
End: 2020-03-06

## 2020-02-21 RX ORDER — ONDANSETRON 2 MG/ML
4 INJECTION INTRAMUSCULAR; INTRAVENOUS EVERY 6 HOURS PRN
Status: CANCELLED | OUTPATIENT
Start: 2020-02-21

## 2020-02-21 RX ORDER — ONDANSETRON 4 MG/1
4 TABLET, FILM COATED ORAL EVERY 8 HOURS PRN
Status: ON HOLD | COMMUNITY
End: 2020-02-25 | Stop reason: HOSPADM

## 2020-02-21 RX ORDER — SODIUM CHLORIDE 0.9 % (FLUSH) 0.9 %
10 SYRINGE (ML) INJECTION PRN
Status: CANCELLED | OUTPATIENT
Start: 2020-02-21

## 2020-02-21 RX ORDER — DOXEPIN HYDROCHLORIDE 50 MG/1
50 CAPSULE ORAL ONCE
Status: COMPLETED | OUTPATIENT
Start: 2020-02-21 | End: 2020-02-21

## 2020-02-21 RX ORDER — ACETAMINOPHEN 325 MG/1
650 TABLET ORAL EVERY 6 HOURS PRN
Status: CANCELLED | OUTPATIENT
Start: 2020-02-21

## 2020-02-21 RX ORDER — ACETAMINOPHEN 325 MG/1
650 TABLET ORAL EVERY 4 HOURS PRN
Status: DISCONTINUED | OUTPATIENT
Start: 2020-02-21 | End: 2020-02-25 | Stop reason: HOSPADM

## 2020-02-21 RX ORDER — ACETAMINOPHEN 325 MG/1
650 TABLET ORAL EVERY 6 HOURS PRN
Status: DISCONTINUED | OUTPATIENT
Start: 2020-02-21 | End: 2020-02-21 | Stop reason: HOSPADM

## 2020-02-21 RX ORDER — SODIUM CHLORIDE 0.9 % (FLUSH) 0.9 %
10 SYRINGE (ML) INJECTION EVERY 12 HOURS SCHEDULED
Status: CANCELLED | OUTPATIENT
Start: 2020-02-21

## 2020-02-21 RX ADMIN — HEPARIN SODIUM 5000 UNITS: 5000 INJECTION INTRAVENOUS; SUBCUTANEOUS at 05:45

## 2020-02-21 RX ADMIN — DOXEPIN HYDROCHLORIDE 50 MG: 50 CAPSULE ORAL at 20:16

## 2020-02-21 RX ADMIN — MELATONIN 3 MG: at 20:16

## 2020-02-21 RX ADMIN — ACETAMINOPHEN 650 MG: 325 TABLET ORAL at 02:23

## 2020-02-21 ASSESSMENT — SLEEP AND FATIGUE QUESTIONNAIRES
DO YOU HAVE DIFFICULTY SLEEPING: YES
DIFFICULTY STAYING ASLEEP: YES
RESTFUL SLEEP: NO
DO YOU USE A SLEEP AID: YES
SLEEP PATTERN: DIFFICULTY FALLING ASLEEP;DISTURBED/INTERRUPTED SLEEP
AVERAGE NUMBER OF SLEEP HOURS: 6
DIFFICULTY ARISING: YES
DIFFICULTY FALLING ASLEEP: YES

## 2020-02-21 ASSESSMENT — PATIENT HEALTH QUESTIONNAIRE - PHQ9: SUM OF ALL RESPONSES TO PHQ QUESTIONS 1-9: 18

## 2020-02-21 ASSESSMENT — PAIN SCALES - GENERAL
PAINLEVEL_OUTOF10: 0
PAINLEVEL_OUTOF10: 7

## 2020-02-21 ASSESSMENT — LIFESTYLE VARIABLES: HISTORY_ALCOHOL_USE: NO

## 2020-02-21 NOTE — PROGRESS NOTES
[facility] Admission From   Nursing Admission Note    Admission Type: Voluntary    Reason for Admission: Patient reported that she has frequent arguments with her mother, especially, because patient was \"lying down on the bed for a few months and was not going to the Taoist\". Patient stated that her mother told her that \"I am the devil and I need to stay out of the bed\". Patient Active Problem List   Diagnosis    Chest pain    Intentional drug overdose ([facility] [address]    Suicidal ideation    Tardive dyskinesia    Depression with anxiety    Bipolar affective disorder, depressed, severe ([facility] [address]    Drug overdose, intentional self-harm, initial encounter ([facility] [address]       Pt admitted from 's care on 3rd floor to [address] [place] [address] room # 618. Arrived on unit via [facility] with staff escort. Pt appropriately attired in paper scrubs. Body assessment completed by RN with no contraband discovered. All tubes, lines, and drains were appropriately discontinued by RN staff prior to pt transfer to [facility]. Pt belongings and valuables inventoried and cataloged, stored per policy. Pt oriented to surroundings, program expectations, and copy pt rights given. Received admit packet: [facility], Visitation Info, Fall Prevention, Restraints Info. Consents reviewed, signed Pt Rights, Handbook Acceptance, Visit/Call Acceptance, PHI Release, Social Info Release, and Treatment Agreement. Pt verbalizes understanding. Identifies stressors.      Status and Exam  Normal: No  Facial Expression: Worried, Sad  Affect: Appropriate  Level of Consciousness: Alert  Mood:Normal: No  Mood: Depressed, Anxious, Sad  Motor Activity:Normal: No  Motor Activity: Decreased  Interview Behavior: Cooperative  Preception: Bowers to Person, Inna Cheko to Time, Bowers to Place, Bowers to Situation  Attention:Normal: No  Attention: Distractible, Unable to Concentrate  Thought Processes: Circumstantial  Thought Content:Normal: No  Thought Content: Preoccupations  Hallucinations: None  Delusions: No  Memory:Normal: No  Memory: Poor Recent, Poor Remote  Insight and Judgment: No  Insight and Judgment: Poor Judgment, Poor Insight  Present Suicidal Ideation: No  Present Homicidal Ideation: No    C/o:    Notes:

## 2020-02-21 NOTE — PLAN OF CARE
Problem: Health Maintenance - Impaired:  Goal: Ability to perform activities of daily living will improve  Description  Ability to perform activities of daily living will improve  Outcome: Ongoing  Goal: Able to sleep without medication for appropriate length of time  Description  Able to sleep without medication for appropriate length of time  Outcome: Ongoing     Problem: Mood - Altered:  Goal: Mood stable  Description  Mood stable  Outcome: Ongoing     Problem: Self-Esteem - Low:  Goal: Demonstrates positive self-esteem  Description  Demonstrates positive self-esteem  Outcome: Ongoing     Problem: Falls - Risk of:  Goal: Will remain free from falls  Description  Will remain free from falls  Outcome: Ongoing  Goal: Absence of physical injury  Description  Absence of physical injury  Outcome: Ongoing     Problem: Pain:  Description  Pain management should include both nonpharmacologic and pharmacologic interventions.   Goal: Pain level will decrease  Description  Pain level will decrease  Outcome: Ongoing  Goal: Control of acute pain  Description  Control of acute pain  Outcome: Ongoing

## 2020-02-21 NOTE — DISCHARGE SUMMARY
Discharge Summary        Date:2/21/2020        Patient Alvarado Jain     YOB: 1965     Age:54 y.o. Admit Date:2/17/2020   Admission Condition:serious   Discharged Condition:stable  Discharge Date: 02/21/20       Discharge Diagnoses   Active Problems:    Drug overdose, intentional self-harm, initial encounter [facility])  Resolved Problems:    * No resolved hospital problems. [facility] Stay   Narrative of Hospital Course:     49year old female with a past medical history of schizoaffective disorder, depression, and suicidal attempts in the past presented to the hospital with concerns of intentional drug overdose. Concerns that patient was have taking extra doses of her Lamictal, patient was intubated and ventilated in the ICU on admission, with improvement in medical status patient was eventually extubated, was monitored for possible seizures and arrhythmia which were not evident during the ICU stay. And has had prior suicide attempts back in 2018 and 2019, currently having a lot of social issues with mother as well as her son. Patient was seen in-house by psychiatry team which recommended that patient would benefit from inpatient psych admission. Patient psychotropics has been held in-house and to get reevaluated while up at inpatient psych unit by the psychiatrist for resuming as needed. Patient is currently medically stable for transfer to inpatient psych unit.         Consultants:   IP CONSULT TO DIETITIAN  IP CONSULT TO PSYCHIATRY    Time Spent on Discharge:  > 30mins    Surgeries/Procedures Performed:  NONE      Significant Diagnostic Studies:   Recent Labs:  CBC:   Lab Results   Component Value Date    WBC 10.1 02/20/2020    RBC 3.99 02/20/2020    HGB 12.4 02/20/2020    HCT 39.6 02/20/2020    HCT 43.2 05/04/2012    MCV 99.2 02/20/2020    MCH 31.1 02/20/2020    MCHC 31.3 02/20/2020    RDW 13.0 02/20/2020     02/20/2020     05/04/2012     BMP:    Lab Results   Component

## 2020-02-21 NOTE — PROGRESS NOTES
Department of Psychiatry  Attending Progress Note     Chief complaint: \"Very anxious and not sleeping\"    SUBJECTIVE:   Chart reviewed, discussed with the team.  No major issues reported. Patient is physically stable. Has been walking in the hallway with the sitter. Patient seen resting in bed this morning. She presents with anxious affect. States she did not sleep last night. Reports severe depression and nonspecific suicidal ideation. Agrees that she will benefit from inpatient psychiatric treatment. OBJECTIVE    Physical  Wt Readings from Last 3 Encounters:   02/21/20 208 lb (94.3 kg)   01/07/19 200 lb (90.7 kg)   12/21/18 200 lb (90.7 kg)     Temp Readings from Last 3 Encounters:   02/21/20 98.3 °F (36.8 °C) (Temporal)   01/14/19 98 °F (36.7 °C) (Temporal)   12/27/18 97 °F (36.1 °C) (Temporal)     BP Readings from Last 3 Encounters:   02/21/20 131/72   01/14/19 120/77   12/27/18 (!) 109/52     Pulse Readings from Last 3 Encounters:   02/21/20 86   01/14/19 83   12/27/18 75        Review of Systems: 14-point review of systems negative except as described above    Mental Status Examination:   Appearance: Stated age, obese, in hospital clothes. Behavior: Calm, cooperative. Speech: Normal in tone, volume, and quality. No slurring, dysarthria or pressured speech noted. Mood: \" Anxious\"   Affect: Mood congruent. Thought Process: Appears linear. Thought Content: Endorses nonspecific suicidal ideation. Denies homicidal ideation. No overt delusions or paranoia appreciated. Perceptions: Denies auditory or visual hallucinations at present time. Not responding to internal stimuli. Concentration: Intact. Orientation: to person, place, date, and situation. Language: Intact. Fund of information: Intact. Memory: Recent and remote appear intact. Impulsivity: Questionable. Neurovegitative: Poor appetite and sleep. Insight: Impaired. Judgment: Impaired.     Data  Lab Results   Component Value with patient:      35 minutes with greater than 50 % of the time spent in counseling and collaboration of care

## 2020-02-22 PROBLEM — F31.9 BIPOLAR DISORDER, UNSPECIFIED (HCC): Status: ACTIVE | Noted: 2020-02-22

## 2020-02-22 LAB
CHOLESTEROL, TOTAL: 152 MG/DL (ref 160–199)
HBA1C MFR BLD: 5.3 % (ref 4–6)
HDLC SERPL-MCNC: 52 MG/DL (ref 65–121)
LDL CHOLESTEROL CALCULATED: 74 MG/DL
TRIGL SERPL-MCNC: 130 MG/DL (ref 0–149)
VITAMIN B-12: 450 PG/ML (ref 211–946)
VITAMIN D 25-HYDROXY: 39.2 NG/ML

## 2020-02-22 PROCEDURE — 6370000000 HC RX 637 (ALT 250 FOR IP): Performed by: PSYCHIATRY & NEUROLOGY

## 2020-02-22 PROCEDURE — 82607 VITAMIN B-12: CPT

## 2020-02-22 PROCEDURE — 80061 LIPID PANEL: CPT

## 2020-02-22 PROCEDURE — 82306 VITAMIN D 25 HYDROXY: CPT

## 2020-02-22 PROCEDURE — 6370000000 HC RX 637 (ALT 250 FOR IP): Performed by: NURSE PRACTITIONER

## 2020-02-22 PROCEDURE — 1240000000 HC EMOTIONAL WELLNESS R&B

## 2020-02-22 PROCEDURE — 36415 COLL VENOUS BLD VENIPUNCTURE: CPT

## 2020-02-22 PROCEDURE — 99233 SBSQ HOSP IP/OBS HIGH 50: CPT | Performed by: PSYCHIATRY & NEUROLOGY

## 2020-02-22 PROCEDURE — 83036 HEMOGLOBIN GLYCOSYLATED A1C: CPT

## 2020-02-22 RX ORDER — LITHIUM CARBONATE 300 MG/1
300 CAPSULE ORAL 2 TIMES DAILY WITH MEALS
Status: DISCONTINUED | OUTPATIENT
Start: 2020-02-22 | End: 2020-02-25 | Stop reason: HOSPADM

## 2020-02-22 RX ORDER — HYDROXYZINE HYDROCHLORIDE 25 MG/1
25 TABLET, FILM COATED ORAL 3 TIMES DAILY PRN
Status: DISCONTINUED | OUTPATIENT
Start: 2020-02-22 | End: 2020-02-25 | Stop reason: HOSPADM

## 2020-02-22 RX ADMIN — PHENOL 1 SPRAY: 1.5 LIQUID ORAL at 17:59

## 2020-02-22 RX ADMIN — LITHIUM CARBONATE 300 MG: 300 CAPSULE, GELATIN COATED ORAL at 17:39

## 2020-02-22 RX ADMIN — PHENOL 1 SPRAY: 1.5 LIQUID ORAL at 12:20

## 2020-02-22 RX ADMIN — PHENOL 1 SPRAY: 1.5 LIQUID ORAL at 08:53

## 2020-02-22 RX ADMIN — LITHIUM CARBONATE 300 MG: 300 CAPSULE, GELATIN COATED ORAL at 10:31

## 2020-02-22 RX ADMIN — PHENOL 1 SPRAY: 1.5 LIQUID ORAL at 22:08

## 2020-02-22 ASSESSMENT — PAIN SCALES - GENERAL: PAINLEVEL_OUTOF10: 0

## 2020-02-22 NOTE — PROGRESS NOTES
Signed          CSW completed psychosocial and CSSR-S on this date. Pt long and short term goals discussed. Pt voiced understanding. Treatment plan sheet signed. Pt verbalized understanding of the treatment plan. Pt participated in goals and objectives of the treatment plan. Completed safety plan with pt and pt received copy of safety plan. Safety plan placed in chart. It was identified that pt will require outpatient follow up appointments at local community behavioral health facility such as; [facility] [address] . Pt validated need for appointments. Pt was also provided a handout of contact information for drug and alcohol treatment centers and other community support service such as [facility] and [employer]. In the last 6 months has the pt been danger to self: YES  In the last 6 months has the pt been danger to others:  NO     Provided pt with [employer] handout entitled \"Quitting Smoking,\" reviewed handout with pt addressing dangers of smoking, developing coping skills, and providing basic information about quitting. Patient declined practical counseling of tobacco practical counseling during the hospital stay.

## 2020-02-22 NOTE — PROGRESS NOTES
Treatment Team Note:     NAINA met with [facility] team to discuss Pts [address] plans. Progress/Behavior/Group Attendance: TBD     Target Symptoms/Reason for admission: Pt is a 49 yr old female who presented to the emergency department following an intentional medication overdose. Pt had just had her Lamictal 25 mg prescription filled with 120 pills and pt stated that in an effort to commit suicide she took all 120 pills for a total of 3 g. Pt reported incontinence and feeling \"sleepy\" after arriving in the ED on 2/17/20. Pt reported that this is her second attempt in the last 3 weeks. Pt denied active SI, HI, or AVH, but reported feelings of depression, anxiety, fatigue, poor motivation, and feeling hopeless and helpless recently. Diagnoses: Intentional Drug Overdose; Bipolar Affective D/O; Depression; Anxiety  UDS: Positive for Benzodiazepines  BAL: Negative <10     [facility] SW will meet with pt to gather information. Collateral obtained from: SW will meet with pt to gather release of information.   On:     Family Session: CRISTEL     Misc:

## 2020-02-22 NOTE — H&P
use.  MARITAL STATUS:    OCCUPATION:  Not working  Patient currently lives with family       Family History:   No family history on file. REVIEW OF SYSTEMS:  Constitutional: neg  CV: neg  Pulmonary: neg  GI: neg  : neg  Psych: depression, SA  Neuro: neg  Skin: neg  MusculoSkeletal: neg  HEENT: neg  Joints: neg    Vitals:  /74   Pulse 81   Temp 97.5 °F (36.4 °C) (Temporal)   Resp 16   Ht 5' 4\" (1.626 m)   Wt 217 lb (98.4 kg)   SpO2 95%   BMI 37.25 kg/m²     PHYSICAL EXAM:  Gen: NAD, alert  HEENT: WNL  Lymph: no LAD  Neck: no JVD or masses  Chest: CTA bilat  CV: RRR  Abdomen: NT/ND  Extrem: no C/C/E  Neuro: non focal  Skin: no rashes  Joints: no redness    DATA:  I have reviewed the admission labs and imaging tests.     ASSESSMENT AND PLAN:      Patient Active Hospital Problem List:   Depression, SA--follow with Kwabena Martinez MD  2:35 AM 2/22/2020

## 2020-02-22 NOTE — H&P
Department of Psychiatry  Attending History and Physical        CHIEF COMPLAINT: \"I slept better\"    History obtained from: patient, chart    HISTORY OF PRESENT ILLNESS:    49 y.o. white female with history of depression, anxiety, bipolar disorder, admitted to medical services secondary to overdose on Lamictal.     When seen by our consult service, patient reported that she has frequent arguments with her mother because patient was \"lying down on the bed for a few months and was not going to the [employer]\". Patient stated that her mother told her that she is \" the devil and needs to stay out of the bed\". Also, patient reported that they had arguments over patient's 25years old son who spends evening time outside of the house with his girlfriend. Mother requested that her grandson Washington Aly to be at home at decent hours\", and it created additional arguments between them. Patient reported that after last arguments with her mother, she started to feel \"even more depressed\" and decided to commit suicide by using whole bottle of pills off prescribed Lamictal. Patient reported that after last discharge from our hospital in January 2019, she has been compliant with prescribed risperidone and lithium, and it seems that it worked for some period of time. She stated that 6 months ago her medications have been changed for Lamictal and Abilify, but she never noticed any effect of prescribed medications. Patient described depression, anxiety, anhedonia, decreased energy level, fatigue, poor concentration, lack of motivation, hopelessness. She reported nonspecific suicidal ideation yesterday and was transferred to inpatient psychiatry for further management. No issues reported overnight. Patient is compliant with treatment. Patient is seen in the dining area working on crafts. She presents with somewhat brighter affect. States she slept better last night. Suicidal ideation is not as intense as before.   She is trying to distract herself. Talks about her life stressors. We processed her feelings. Patient states lithium worked well in the past.  She would like to try it again. Feels that doxepin is helping with sleep. Denies physical symptoms other than generalized muscle pain.     PSYCHIATRIC HISTORY:  Diagnoses: Depression, anxiety, bipolar disorder  Suicide attempts/gestures: 3 times, not counting the current one, by overdose on different medications and using house cleaning liquid  Prior hospitalizations: Multiple to [facility] and [facility]  Medication trials: Depakote, doxepin, lithium, risperidone. Most recently prescribed - Lamictal and Abilify  Mental health contact: Alexandra  [facility]    SUBSTANCE USE HISTORY:  Denies alcohol and illicit drug use. Denies tobacco use. Past Medical History:    Past Medical History:   Diagnosis Date    Chest pain 2/8/2012    Depression     Hypoglycemia     Schizoaffective disorder ([facility] [address] 2/8/2012    Suicide attempt [facility])        Past Surgical History:    Past Surgical History:   Procedure Laterality Date    DILATION AND CURETTAGE OF UTERUS  2017    LEEP         Medications Prior to Admission:   Prior to Admission medications    Medication Sig Start Date End Date Taking? Authorizing Provider   benzonatate (TESSALON) 100 MG capsule Take 200 mg by mouth three times daily   Yes Historical Provider, MD   ondansetron (ZOFRAN) 4 MG tablet Take 4 mg by mouth every 8 hours as needed for Nausea or Vomiting   Yes Historical Provider, MD   Cholecalciferol (VITAMIN D3) 1.25 MG (89898 UT) CAPS Take 50,000 Units by mouth once a week   Yes Historical Provider, MD   lamoTRIgine (LAMICTAL) 100 MG tablet Take 100 mg by mouth daily    Historical Provider, MD   ARIPiprazole (ABILIFY) 10 MG tablet Take 30 mg by mouth daily     Historical Provider, MD       Allergies:  Seroquel [quetiapine fumarate]; Adderall [amphetamine-dextroamphetamine];  Amphetamines; Tutu Norman [aspirin];
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
## 2020-02-22 NOTE — PLAN OF CARE
Problem: Health Maintenance - Impaired:  Goal: Ability to perform activities of daily living will improve  Description  Ability to perform activities of daily living will improve  2/21/2020 2234 by Meron Posey RN  Outcome: Ongoing  2/21/2020 1735 by Irvin Larry RN  Outcome: Ongoing  Goal: Able to sleep without medication for appropriate length of time  Description  Able to sleep without medication for appropriate length of time  2/21/2020 2234 by Meron Posey RN  Outcome: Ongoing  2/21/2020 1735 by Irvin Larry RN  Outcome: Ongoing     Problem: Mood - Altered:  Goal: Mood stable  Description  Mood stable  2/21/2020 2234 by Meron Posey RN  Outcome: Ongoing  2/21/2020 1735 by Irvin Larry RN  Outcome: Ongoing     Problem: Self-Esteem - Low:  Goal: Demonstrates positive self-esteem  Description  Demonstrates positive self-esteem  2/21/2020 2234 by Meron Posey RN  Outcome: Ongoing  2/21/2020 1735 by Irvin Larry RN  Outcome: Ongoing     Problem: Falls - Risk of:  Goal: Will remain free from falls  Description  Will remain free from falls  2/21/2020 2234 by Meron Posey RN  Outcome: Ongoing  2/21/2020 1735 by Irvin Larry RN  Outcome: Ongoing  Goal: Absence of physical injury  Description  Absence of physical injury  2/21/2020 2234 by Meron Posey RN  Outcome: Ongoing  2/21/2020 1735 by Irvin Larry RN  Outcome: Ongoing     Problem: Pain:  Goal: Pain level will decrease  Description  Pain level will decrease  2/21/2020 2234 by Meron Posey RN  Outcome: Ongoing  2/21/2020 1735 by Irvin Larry RN  Outcome: Ongoing  Goal: Control of acute pain  Description  Control of acute pain  2/21/2020 2234 by Meron Posey RN  Outcome: Ongoing  2/21/2020 1735 by Irvin Larry RN  Outcome: Ongoing

## 2020-02-23 PROCEDURE — 1240000000 HC EMOTIONAL WELLNESS R&B

## 2020-02-23 PROCEDURE — 6370000000 HC RX 637 (ALT 250 FOR IP): Performed by: PSYCHIATRY & NEUROLOGY

## 2020-02-23 RX ADMIN — PHENOL 1 SPRAY: 1.5 LIQUID ORAL at 20:25

## 2020-02-23 RX ADMIN — LITHIUM CARBONATE 300 MG: 300 CAPSULE, GELATIN COATED ORAL at 08:19

## 2020-02-23 RX ADMIN — LITHIUM CARBONATE 300 MG: 300 CAPSULE, GELATIN COATED ORAL at 17:32

## 2020-02-23 ASSESSMENT — PAIN SCALES - GENERAL
PAINLEVEL_OUTOF10: 0
PAINLEVEL_OUTOF10: 0

## 2020-02-23 NOTE — PLAN OF CARE
Problem: Health Maintenance - Impaired:  Goal: Ability to perform activities of daily living will improve  Description  Ability to perform activities of daily living will improve  2/23/2020 1020 by Bladimir Anderson RN  Outcome: Ongoing  2/23/2020 0951 by Bladimir Anderson RN  Outcome: Ongoing  2/23/2020 0021 by Josue Brower RN  Outcome: Ongoing  Goal: Able to sleep without medication for appropriate length of time  Description  Able to sleep without medication for appropriate length of time  2/23/2020 1020 by Bladimir Anderson RN  Outcome: Ongoing  2/23/2020 0951 by Bladimir Anderson RN  Outcome: Ongoing  2/23/2020 0021 by Josue Brower RN  Outcome: Ongoing     Problem: Mood - Altered:  Goal: Mood stable  Description  Mood stable  2/23/2020 1020 by Bladimir Anderson RN  Outcome: Ongoing  2/23/2020 0951 by Bladimir Anderson RN  Outcome: Ongoing  2/23/2020 0021 by Josue Brower RN  Outcome: Ongoing     Problem: Self-Esteem - Low:  Goal: Demonstrates positive self-esteem  Description  Demonstrates positive self-esteem  2/23/2020 1020 by Bladimir Anderson RN  Outcome: Ongoing  2/23/2020 0951 by Bladimir Anderson RN  Outcome: Ongoing  2/23/2020 0021 by Josue Brower RN  Outcome: Ongoing     Problem: Falls - Risk of:  Goal: Will remain free from falls  Description  Will remain free from falls  2/23/2020 1020 by Bladimir Anderson RN  Outcome: Ongoing  2/23/2020 0951 by Bladimir Anderson RN  Outcome: Ongoing  2/23/2020 0021 by Josue Brower RN  Outcome: Met This Shift  Goal: Absence of physical injury  Description  Absence of physical injury  2/23/2020 1020 by Bladimir Anderson RN  Outcome: Ongoing  2/23/2020 0951 by Bladimir Anderson RN  Outcome: Ongoing  2/23/2020 0021 by Josue Brower RN  Outcome: Met This Shift     Problem: Pain:  Description  Pain management should include both nonpharmacologic and pharmacologic interventions.   Goal: Pain level will decrease  Description  Pain level will decrease  2/23/2020 1020 by Bladimir Anderson RN  Outcome: Ongoing  2/23/2020 0951 by Debbie Estrada RN  Outcome: Ongoing  2/23/2020 0021 by Jerry Davies RN  Outcome: Ongoing  Goal: Control of acute pain  Description  Control of acute pain  2/23/2020 1020 by Debbie Estrada RN  Outcome: Ongoing  2/23/2020 0951 by Debbie Estrada RN  Outcome: Ongoing  2/23/2020 0021 by Jerry Davies RN  Outcome: Ongoing

## 2020-02-23 NOTE — GROUP NOTE
Group Therapy Note    Date: 2/23/2020    Group Start Time: 0830  Group End Time: 0930  Group Topic: Community Meeting    [facility] GERIATRIC BEHAVIORAL UNIT    Rah Kaba RN        Group Therapy Note    Attendees:          Patient's Goal:  \"make list of goals and setting boundaries\"    Notes:  Pt calm and cooperative during group    Status After Intervention:  Improved    Participation Level:  Active Listener and Interactive    Participation Quality: Appropriate, Attentive, Sharing and Supportive      Speech:  normal      Thought Process/Content: Logical      Affective Functioning: Congruent      Mood: bright      Level of consciousness:  Alert, Oriented x4 and Attentive      Response to Learning: Able to verbalize current knowledge/experience, Able to verbalize/acknowledge new learning, Able to retain information and Capable of insight      Endings: None Reported    Modes of Intervention: Education, Support and Socialization      Discipline Responsible: Registered Nurse      Signature:  Amanda Zhong RN

## 2020-02-23 NOTE — PROGRESS NOTES
WRAP UP GROUP NOTE:     Patient's Goal:  Providing feedback as to their own progress in the care-plan provided. Pt's have an opportunity to explore self-reflective skills and share any additional cares and concerns not yet addressed. Pt effectively participated. Energy level:  Normal   Appetite:  Improving   Concentration:  Improving     Hallucinations:  Gone/none  Depression:  Improved   Anxiety:  On/off   How I worked today:  Worked hard;  Tried a lot  What helps me sleep:  Try a relaxation exercise  Any questions/complaints/comments:  Blank   Group/activities that helped me today were:  Seeing doctor; One-to-one with staff.

## 2020-02-23 NOTE — PLAN OF CARE
Problem: Health Maintenance - Impaired:  Goal: Ability to perform activities of daily living will improve  Description  Ability to perform activities of daily living will improve  2/23/2020 0951 by Debbie Estrada RN  Outcome: Ongoing  2/23/2020 0021 by Jerry Davies RN  Outcome: Ongoing  Goal: Able to sleep without medication for appropriate length of time  Description  Able to sleep without medication for appropriate length of time  2/23/2020 0951 by Debbie Estrada RN  Outcome: Ongoing  2/23/2020 0021 by Jerry Davies RN  Outcome: Ongoing     Problem: Mood - Altered:  Goal: Mood stable  Description  Mood stable  2/23/2020 0951 by Debbie Estrada RN  Outcome: Ongoing  2/23/2020 0021 by Jerry Davies RN  Outcome: Ongoing     Problem: Self-Esteem - Low:  Goal: Demonstrates positive self-esteem  Description  Demonstrates positive self-esteem  2/23/2020 0951 by Debbie Estrada RN  Outcome: Ongoing  2/23/2020 0021 by Jerry Davies RN  Outcome: Ongoing     Problem: Falls - Risk of:  Goal: Will remain free from falls  Description  Will remain free from falls  2/23/2020 0951 by Debbie Estrada RN  Outcome: Ongoing  2/23/2020 0021 by Jerry Davies RN  Outcome: Met This Shift  Goal: Absence of physical injury  Description  Absence of physical injury  2/23/2020 0951 by Debbie Estrada RN  Outcome: Ongoing  2/23/2020 0021 by Jerry Davies RN  Outcome: Met This Shift     Problem: Pain:  Description  Pain management should include both nonpharmacologic and pharmacologic interventions.   Goal: Pain level will decrease  Description  Pain level will decrease  2/23/2020 0951 by Debbie Estrada RN  Outcome: Ongoing  2/23/2020 0021 by Jerry Davies RN  Outcome: Ongoing  Goal: Control of acute pain  Description  Control of acute pain  2/23/2020 0951 by Debbie Estrada RN  Outcome: Ongoing  2/23/2020 0021 by Jerry Davies RN  Outcome: Ongoing

## 2020-02-24 LAB — TRANSFERRIN: 242 MG/DL (ref 200–400)

## 2020-02-24 PROCEDURE — 1240000000 HC EMOTIONAL WELLNESS R&B

## 2020-02-24 PROCEDURE — 6370000000 HC RX 637 (ALT 250 FOR IP): Performed by: PSYCHIATRY & NEUROLOGY

## 2020-02-24 RX ORDER — DOXEPIN HYDROCHLORIDE 50 MG/1
50 CAPSULE ORAL NIGHTLY
Status: DISCONTINUED | OUTPATIENT
Start: 2020-02-24 | End: 2020-02-25 | Stop reason: HOSPADM

## 2020-02-24 RX ORDER — ERGOCALCIFEROL 1.25 MG/1
50000 CAPSULE ORAL WEEKLY
Status: DISCONTINUED | OUTPATIENT
Start: 2020-02-24 | End: 2020-02-25 | Stop reason: HOSPADM

## 2020-02-24 RX ADMIN — DOXEPIN HYDROCHLORIDE 50 MG: 50 CAPSULE ORAL at 20:38

## 2020-02-24 RX ADMIN — LITHIUM CARBONATE 300 MG: 300 CAPSULE, GELATIN COATED ORAL at 08:15

## 2020-02-24 RX ADMIN — PHENOL 1 SPRAY: 1.5 LIQUID ORAL at 12:07

## 2020-02-24 RX ADMIN — LITHIUM CARBONATE 300 MG: 300 CAPSULE, GELATIN COATED ORAL at 17:01

## 2020-02-24 RX ADMIN — PHENOL 1 SPRAY: 1.5 LIQUID ORAL at 08:15

## 2020-02-24 RX ADMIN — ERGOCALCIFEROL 50000 UNITS: 1.25 CAPSULE ORAL at 13:10

## 2020-02-24 RX ADMIN — PHENOL 1 SPRAY: 1.5 LIQUID ORAL at 21:15

## 2020-02-24 ASSESSMENT — PAIN SCALES - GENERAL: PAINLEVEL_OUTOF10: 0

## 2020-02-24 NOTE — PROGRESS NOTES
Admission Note      Reason for admission/Target Symptom: Patient admitted to [facility] due to overdosing on medications, depression, and anxiety. Diagnoses: Bipolar I disorder, most recent episode depressed, severe, without psychotic features, Suicidal ideation, Status post suicide attempt  UDS: Positive for benzodiazepine  BAL:  Negative    SW met with treatment team to discuss patient's treatment including care planning, discharge planning, and follow-up needs. Pt has been admitted to [facility]. Treatment team has identified patient's discharge needs as medication management and outpatient therapy/counseling. Pt confirmed  the need for ongoing treatment post inpatient stay. Pt was also provided a handout of contact information for drug and alcohol treatment centers and other community support service such as [facility], AA, and [facility] .     Electronically signed by Brian London, [address] on 2/24/2020 at 8:43 AM

## 2020-02-24 NOTE — PROGRESS NOTES
Department of Psychiatry  Attending Progress Note     Chief complaint: \"I am okay\"    SUBJECTIVE:   Chart reviewed, discussed with the team.  No major issues overnight. Patient is compliant with treatment. Pleasant and cooperative. Engages in recreational activities. All interactions with staff and peers appropriate. Patient presents with brighter affect this morning. States she slept well last night. No issues with appetite. Denies suicidal ideation. States her mother visited last night. Mother questions patient's diagnosis and worries about side effects. We processed her feelings. Supportive psychotherapy provided. Patient brought a list of questions for the writer. All questions answered and concerns addressed. She is requesting vitamin D. Patient would like to follow-up with our clinic upon discharge. OBJECTIVE    Physical  Wt Readings from Last 3 Encounters:   02/21/20 217 lb (98.4 kg)   02/21/20 208 lb (94.3 kg)   01/07/19 200 lb (90.7 kg)     Temp Readings from Last 3 Encounters:   02/24/20 99 °F (37.2 °C) (Temporal)   02/21/20 97.2 °F (36.2 °C) (Temporal)   01/14/19 98 °F (36.7 °C) (Temporal)     BP Readings from Last 3 Encounters:   02/24/20 117/75   02/21/20 115/77   01/14/19 120/77     Pulse Readings from Last 3 Encounters:   02/24/20 99   02/21/20 83   01/14/19 83        Review of Systems: 14-point review of systems negative except as described above    Mental Status Examination:   Appearance: Stated age, obese, casually dressed. Behavior: Calm, cooperative, pleasant. Speech: Normal in tone, volume, and quality. No slurring, dysarthria or pressured speech noted. Mood: \" Okay\"   Affect: Mood congruent. Thought Process: Appears linear. Thought Content: Denies suicidal homicidal ideation. No overt delusions or paranoia appreciated. Perceptions: Denies auditory or visual hallucinations at present time. Not responding to internal stimuli. Concentration: Intact.    Orientation: to person, place, date, and situation. Language: Intact. Fund of information: Intact. Memory: Recent and remote appear intact. Impulsivity: Limited. Neurovegitative: Improved appetite and sleep. Insight: Improving. Judgment: Improving. Data  Lab Results   Component Value Date    WBC 10.1 02/20/2020    HGB 12.4 02/20/2020    HCT 39.6 02/20/2020    MCV 99.2 (H) 02/20/2020     02/20/2020      Lab Results   Component Value Date     (H) 02/20/2020    K 3.6 02/20/2020     02/20/2020    CO2 22 02/20/2020    BUN 6 02/20/2020    CREATININE 0.5 02/20/2020    GLUCOSE 89 02/20/2020    CALCIUM 8.6 02/20/2020    PROT 6.8 02/18/2020    LABALBU 4.3 02/18/2020    BILITOT 0.5 02/18/2020    ALKPHOS 91 02/18/2020    AST 19 02/18/2020    ALT 17 02/18/2020    LABGLOM >60 02/20/2020    GLOB 3.3 06/18/2016       Medications    Current Facility-Administered Medications:     phenol 1.4 % mouth spray 1 spray, 1 spray, Mouth/Throat, Q2H PRN, TING Vieyra, 1 spray at 02/24/20 0815    lithium capsule 300 mg, 300 mg, Oral, BID WC, Carrolyn Curling, MD, 300 mg at 02/24/20 0815    hydrOXYzine (ATARAX) tablet 25 mg, 25 mg, Oral, TID PRN, Carrolyn Curling, MD    acetaminophen (TYLENOL) tablet 650 mg, 650 mg, Oral, Q4H PRN, Carrolyn Curling, MD    polyethylene glycol (GLYCOLAX) packet 17 g, 17 g, Oral, Daily PRN, Carrolyn Curling, MD    ASSESSMENT AND PLAN  DSM 5 DIAGNOSIS  Impression  Bipolar I disorder, most recent episode depressed, severe, without psychotic features  Status post suicide attempt  Cluster B traits    Patient is improving. Plan to discharge tomorrow if stable and pending lithium level. Plan:   1. Psychiatric Medications:   Continue current psychotropic medications as recommended. Check lithium level tomorrow. The risks, benefits, side effects, indications, contraindications, alternatives and adverse effects of the medications have been discussed with patient.     2. Continue to provide supportive psychotherapy. Encourage socialization and participation in recreational activities. Work on coping skills. 3. Medical Issues:    Continue medical monitoring by Dr. Edwar Boland and associates. Vitamin supplementation. 4. Disposition:     to provide outpatient resources and facilitate disposition.      Amount of time spent with patient:      35 minutes with greater than 50 % of the time spent in counseling and collaboration of care

## 2020-02-24 NOTE — PROGRESS NOTES
Group Therapy Note    Date: 2/24/2020  Start Time: 0900  End Time:   0930  Number of Participants: 2    Type of Group: Community Meeting    [id number]  Module Name:   Session Number:      Patient's Goal:  Socialization, self survey, goals and menu    Notes:      Status After Intervention:  Improved    Participation Level:  Active Listener and Interactive    Participation Quality: Appropriate, Attentive, Sharing and Supportive      Speech:  normal      Thought Process/Content: Logical      Affective Functioning: Congruent      Mood: anxious and depressed      Level of consciousness:  Alert, Oriented x4 and Attentive      Response to Learning: Capable of insight and Progressing to goal      Endings: None Reported    Modes of Intervention: Education, Support and Socialization      Discipline Responsible: Registered Nurse      Signature:  Diane Hinojosa RN

## 2020-02-24 NOTE — PROGRESS NOTES
SW attempted to call pt's daughter Phoebe Murdock [phone] to obtain collateral information. However, this writer received voicemail and left contact information requesting call back.      Electronically signed by Papito Whalen, [id number] on 2/24/2020 at 9:07 AM

## 2020-02-24 NOTE — PROGRESS NOTES
Patient is up and dressed. She reports she bathed last night. Her hygiene and grooming is good. She reports she slept well last night. Reports appetite 100% at breakfast. She reports depression and anxiety at 5 on a scale of 1-10. She denies suicidal and homicidal ideation. She denies any auditory or visual hallucinations.  \" I am looking forward to getting better\"

## 2020-02-25 VITALS
SYSTOLIC BLOOD PRESSURE: 125 MMHG | HEART RATE: 98 BPM | DIASTOLIC BLOOD PRESSURE: 89 MMHG | RESPIRATION RATE: 20 BRPM | TEMPERATURE: 97.8 F | HEIGHT: 64 IN | BODY MASS INDEX: 37.05 KG/M2 | OXYGEN SATURATION: 96 % | WEIGHT: 217 LBS

## 2020-02-25 PROBLEM — F31.9 BIPOLAR DISORDER, UNSPECIFIED (HCC): Status: RESOLVED | Noted: 2020-02-22 | Resolved: 2020-02-25

## 2020-02-25 PROBLEM — T50.902A DRUG OVERDOSE, INTENTIONAL SELF-HARM, INITIAL ENCOUNTER (HCC): Status: RESOLVED | Noted: 2020-02-17 | Resolved: 2020-02-25

## 2020-02-25 PROBLEM — G24.01 TARDIVE DYSKINESIA: Status: RESOLVED | Noted: 2018-09-27 | Resolved: 2020-02-25

## 2020-02-25 PROBLEM — R45.851 SUICIDAL IDEATION: Status: RESOLVED | Noted: 2018-07-26 | Resolved: 2020-02-25

## 2020-02-25 LAB — LITHIUM LEVEL: 0.4 MMOL/L (ref 0.6–1.2)

## 2020-02-25 PROCEDURE — 6370000000 HC RX 637 (ALT 250 FOR IP): Performed by: PSYCHIATRY & NEUROLOGY

## 2020-02-25 PROCEDURE — 36415 COLL VENOUS BLD VENIPUNCTURE: CPT

## 2020-02-25 PROCEDURE — 5130000000 HC BRIDGE APPOINTMENT

## 2020-02-25 PROCEDURE — 80178 ASSAY OF LITHIUM: CPT

## 2020-02-25 PROCEDURE — 99239 HOSP IP/OBS DSCHRG MGMT >30: CPT | Performed by: PSYCHIATRY & NEUROLOGY

## 2020-02-25 RX ORDER — ERGOCALCIFEROL 1.25 MG/1
50000 CAPSULE ORAL WEEKLY
Qty: 5 CAPSULE | Refills: 1 | Status: SHIPPED | OUTPATIENT
Start: 2020-03-02 | End: 2020-08-13

## 2020-02-25 RX ORDER — DOXEPIN HYDROCHLORIDE 50 MG/1
50 CAPSULE ORAL NIGHTLY
Qty: 30 CAPSULE | Refills: 1 | Status: SHIPPED | OUTPATIENT
Start: 2020-02-25 | End: 2020-04-17 | Stop reason: ALTCHOICE

## 2020-02-25 RX ORDER — LITHIUM CARBONATE 300 MG/1
300 CAPSULE ORAL 2 TIMES DAILY WITH MEALS
Qty: 90 CAPSULE | Refills: 1 | Status: SHIPPED | OUTPATIENT
Start: 2020-02-25 | End: 2020-04-03 | Stop reason: SDUPTHER

## 2020-02-25 RX ADMIN — LITHIUM CARBONATE 300 MG: 300 CAPSULE, GELATIN COATED ORAL at 08:46

## 2020-02-25 RX ADMIN — HYDROXYZINE HYDROCHLORIDE 25 MG: 25 TABLET, FILM COATED ORAL at 11:21

## 2020-02-25 RX ADMIN — PHENOL 1 SPRAY: 1.5 LIQUID ORAL at 08:50

## 2020-02-25 NOTE — DISCHARGE SUMMARY
Discharge Summary     Patient ID:  Tushar Carballo  [id number]  34 y.o.  1965    Admit date: 2/21/2020  Discharge date: 2/25/2020    Admitting Physician: Alethea Sicard, MD   Attending Physician: Alethea Sicard, MD  Discharge Provider: Alethea Sicard, MD     Admission Diagnoses:  Bipolar I disorder, most recent episode depressed, severe, without psychotic features  Suicidal ideation  Status post suicide attempt    Discharge Diagnoses:   Bipolar I disorder, most recent episode depressed, severe, without psychotic features  Cluster B traits    Admission Condition: poor    Discharged Condition: stable    Indication for Admission: Suicidal ideation    CHIEF COMPLAINT: \"I slept better\"     History obtained from: patient, chart     HISTORY OF PRESENT ILLNESS:    49 y.o. white female with history of depression, anxiety, bipolar disorder, admitted to medical services secondary to overdose on Lamictal.     When seen by our consult service, patient reported that she has frequent arguments with her mother because patient was \"lying down on the bed for a few months and was not going to the Druze\".  Patient stated that her mother told her that she is \" the devil and needs to stay out of the bed\".  Also, patient reported that they had arguments over patient's 25years old son who spends evening time outside of the house with his girlfriend. Mother requested that her grandson Ayah Tierney to be at home at decent hours\", and it created additional arguments between them.  Patient reported that after last arguments with her mother, she started to feel \"even more depressed\" and decided to commit suicide by using whole bottle of pills off prescribed Lamictal. Patient reported that after last discharge from our hospital in January 2019, she has been compliant with prescribed risperidone and lithium, and it seems that it worked for some period of time. Noe Agrawal stated that 6 months ago her medications have been changed for Lamictal and Abilify, but PROT 6.8 02/18/2020    LABALBU 4.3 02/18/2020    BILITOT 0.5 02/18/2020    ALKPHOS 91 02/18/2020    AST 19 02/18/2020    ALT 17 02/18/2020    LABGLOM >60 02/20/2020    GLOB 3.3 06/18/2016         Lab Results   Component Value Date    PYUCBCVP05 450 02/22/2020     Lab Results   Component Value Date    VITD25 39.2 02/22/2020     Lab Results   Component Value Date    CHOL 152 (L) 02/22/2020    CHOL 130 (L) 12/22/2018    CHOL 154 02/11/2014     Lab Results   Component Value Date    TRIG 130 02/22/2020    TRIG 83 12/22/2018    TRIG 113 02/11/2014     Lab Results   Component Value Date    HDL 52 (L) 02/22/2020    HDL 52 (L) 12/22/2018    HDL 56 02/11/2014     Lab Results   Component Value Date    LDLCALC 74 02/22/2020    LDLCALC 61 12/22/2018     No results found for: LABVLDL, VLDL  No results found for: CHOLHDLRATIO  Lab Results   Component Value Date    LABA1C 5.3 02/22/2020     No results found for: EAG  Lab Results   Component Value Date    TSH 1.140 02/21/2020     Lab Results   Component Value Date    LITHIUM 0.40 (L) 02/25/2020     (H) 02/20/2020    BUN 6 02/20/2020    CREATININE 0.5 02/20/2020    TSH 1.140 02/21/2020    WBC 10.1 02/20/2020       Treatments: RN and SW    Mental status examination at the time of discharge:  Alert, Oriented X 4  Appearance:  Improved Hygiene  Speech with Regular Rate and Rhythm  Eye Contact:  Good  No Psychomotor Agitation/Retardation Noted  Attitude:  Cooperative  Mood:  \"Much better\"  Affective: Congruent, appropriate to the situation, with a normal range and intensity  Thought Processes:  Coherently communicated, logical and goal oriented  Thought Content:  No Suicidal Ideation, No Homicidal Ideation, No Auditory or Visual Hallucinations, NO Overt Delusions  Insight: Improved  Judgement: Improved  Memory is intact for both remote and recent  Intellectual Functioning:  Within the [employer] of Knowledge:  Adequate  Attention and Concentration:

## 2020-02-25 NOTE — PROGRESS NOTES
SW met with treatment team to discuss pt's progress and setbacks. Pt reportedly slept well last night with medications, appetite is good, attending group scheduled group activities, social with peers/staff, performs ADL's, compliant with medications, calm, cooperative, reports her depression 4 and anxiety 3, denies SI/HI/AVH, tentative discharge today, follow up appointments will be scheduled, continue current treatment plan.     Electronically signed by Joe Lopes on 2/25/2020 at 8:25 AM

## 2020-02-25 NOTE — PROGRESS NOTES
Progress Note  Keven Favorite  2/25/2020 8:22 AM  Subjective:   Admit Date:   2/21/2020      CC/ADMIT DX:       Interval History:   Reviewed overnight events and nursing notes. No new physical complaints. I have reviewed all labs/diagnostics from the last 24hrs. ROS:   I have done a 10 point ROS and all are negative, except what is mentioned in the HPI. DIET GENERAL;    Medications:      doxepin  50 mg Oral Nightly    vitamin D  50,000 Units Oral Weekly    lithium  300 mg Oral BID WC           Objective:   Vitals: /70   Pulse 75   Temp 97 °F (36.1 °C) (Temporal)   Resp 20   Ht 5' 4\" (1.626 m)   Wt 217 lb (98.4 kg)   SpO2 93%   BMI 37.25 kg/m²  No intake or output data in the 24 hours ending 02/25/20 6641  General appearance: alert and cooperative with exam  Lungs: clear to auscultation bilaterally  Heart: RRR  Abdomen: soft, non-tender; bowel sounds normal; no masses,  no organomegaly  Extremities: extremities normal, atraumatic, no cyanosis or edema  Neurologic:  No obvious focal neurologic deficits. Assessment and Plan: Active Problems:    Bipolar disorder, unspecified ([address]  Resolved Problems:    * No resolved hospital problems. *    Vit D Def    Plan:  1. Continue present medication(s)   2. Follow with Psych  3. She is medically stable. I will monitor for any changes or concerns. Discharge planning:   her home     Reviewed treatment plans with the patient and/or family.              Electronically signed by Araceli Briseno MD on 2/25/2020 at 8:22 AM

## 2020-02-25 NOTE — PROGRESS NOTES
SW attempted to call pt's daughter Bubba Mcburney to obtain collateral. However, pt received voicemail and left contact information requesting call back. SW spoke with pt about collateral information and pt reported her daughter works a lot and would try to call this writer back when she can. Pt reported she did talk to her daughter last night and her daughter told her that this writer did call and she did get the voicemail that was left requesting a call back.      Electronically signed by Justyn Chapin, [address] on 2/25/2020 at 8:16 AM

## 2020-03-04 ENCOUNTER — OFFICE VISIT (OUTPATIENT)
Dept: PSYCHIATRY | Age: 55
End: 2020-03-04
Payer: MEDICAID

## 2020-03-04 PROCEDURE — 90791 PSYCH DIAGNOSTIC EVALUATION: CPT | Performed by: SOCIAL WORKER

## 2020-03-04 NOTE — PROGRESS NOTES
Initial Session Note  Anam Sotelo MSW, LCSW  3/4/2020  9:30 AM  10:22 AM      Time spent with Patient: 52 minutes  This is patient's FIRST  Therapy appointment. Reason for Consult:  depression and anxiety  Referring Provider: No referring provider defined for this encounter. Pt provided informed consent for the behavioral health program. Discussed with patient model of service to include the limits of confidentiality (i.e. abuse reporting, suicide intervention, etc.) and short-term intervention focused approach. Discussed no show and late cancellation policy. Pt indicated understanding. Osbaldo Horvath ,a 49 y.o. female, for initial evaluation visit. Reason:    Pt reported she was recently discharged from [facility] inpatient unit after a suicide attempt- overdose. Pt reported he mother was lecturing her when she became overwhelmed and took all of the pills in her new prescription. Pt reported her 34 y/o twin children were aware of the suicide attempt, however her 24 y/o son was told the hospital was adjusting her medications. Pt reported she lives with her 25year old son and she is a hoarder. Pt denies Suicidal Ideations, Homicidal Ideation, Auditory Hallucinations, Visual Hallucinations, Tactical Hallucinations.     Current Medications:  Scheduled Meds:   Current Outpatient Medications:     doxepin (SINEQUAN) 50 MG capsule, Take 1 capsule by mouth nightly, Disp: 30 capsule, Rfl: 1    lithium 300 MG capsule, Take 1 capsule by mouth 2 times daily (with meals), Disp: 90 capsule, Rfl: 1    vitamin D (ERGOCALCIFEROL) 1.25 MG (15011 UT) CAPS capsule, Take 1 capsule by mouth once a week for 11 doses, Disp: 5 capsule, Rfl: 1    benzonatate (TESSALON) 100 MG capsule, Take 200 mg by mouth three times daily, Disp: , Rfl:     Cholecalciferol (VITAMIN D3) 1.25 MG (78405 UT) CAPS, Take 50,000 Units by mouth once a week, Disp: , Rfl:       History:     Past Psychiatric History:   Previous therapy: yes  Previous Stress: Not on file   Relationships    Social connections:     Talks on phone: Not on file     Gets together: Not on file     Attends Restorationist service: Not on file     Active member of club or organization: Not on file     Attends meetings of clubs or organizations: Not on file     Relationship status: Not on file    Intimate partner violence:     Fear of current or ex partner: Not on file     Emotionally abused: Not on file     Physically abused: Not on file     Forced sexual activity: Not on file   Other Topics Concern    Not on file   Social History Narrative    Past Psychiatric History         She has been admitted to inpatient care too many to count times, mostly for depression, suicide attempt and mariam. She overdosed 3 times. First overdose happened in her 25s, second overdose happened in year 2000. Denied any ther cutting behavior. She had been followed up by Dr. Carine Killian  Per the patient, she tried a lot of different medications for her mental illness including Prozac, ZOLOFT but ZOLOFT made    her more suicidal.  She tried [facility], which made her manic. She also tried [facility], she was on LITHIUM not very long but she was on DEPAKOTE, two episodes, each episode lasted about a year. DEPAKOTE helped her in the beginning but did not help later on. She overdosed on Depakote once. She was also tried on Neurontin, Trileptal, Seroquel, risperidone, Zyprexa, Geodon, Abilify, Latuda, and Thorazine. She felt Nicole Cueva was helpful during an admission earlier this year.        Her most recent hospitalization here was in July                Outpatient  provider(s):  Dr. Bernadette Salazar at [facility]                Medications tried: She is obviously tried many things, just from looking at the list that she has given as \"allergies\"                Diagnoses: Bipolar 1, from the chart as it is obvious she has had multiple episodes of psychosis                Previous SI/SA: Yes        patient denied any history of HILDA

## 2020-03-06 ENCOUNTER — OFFICE VISIT (OUTPATIENT)
Dept: PSYCHIATRY | Age: 55
End: 2020-03-06
Payer: MEDICAID

## 2020-03-06 VITALS
DIASTOLIC BLOOD PRESSURE: 85 MMHG | HEART RATE: 93 BPM | OXYGEN SATURATION: 93 % | BODY MASS INDEX: 37.39 KG/M2 | SYSTOLIC BLOOD PRESSURE: 130 MMHG | HEIGHT: 64 IN | WEIGHT: 219 LBS

## 2020-03-06 PROCEDURE — G8417 CALC BMI ABV UP PARAM F/U: HCPCS | Performed by: NURSE PRACTITIONER

## 2020-03-06 PROCEDURE — G8427 DOCREV CUR MEDS BY ELIG CLIN: HCPCS | Performed by: NURSE PRACTITIONER

## 2020-03-06 PROCEDURE — 1111F DSCHRG MED/CURRENT MED MERGE: CPT | Performed by: NURSE PRACTITIONER

## 2020-03-06 PROCEDURE — 3017F COLORECTAL CA SCREEN DOC REV: CPT | Performed by: NURSE PRACTITIONER

## 2020-03-06 PROCEDURE — G8484 FLU IMMUNIZE NO ADMIN: HCPCS | Performed by: NURSE PRACTITIONER

## 2020-03-06 PROCEDURE — 1036F TOBACCO NON-USER: CPT | Performed by: NURSE PRACTITIONER

## 2020-03-06 PROCEDURE — 99215 OFFICE O/P EST HI 40 MIN: CPT | Performed by: NURSE PRACTITIONER
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
## 2020-03-13 ENCOUNTER — TELEPHONE (OUTPATIENT)
Dept: PSYCHIATRY | Age: 55
End: 2020-03-13

## 2020-03-13 NOTE — TELEPHONE ENCOUNTER
Pt called to reported she had attended [facility] and she was worried a friend from [employer] will be wanting to come over and this was upsetting. Pt verbalized she felt better and discussing negative thought stopping.

## 2020-03-16 ENCOUNTER — TELEPHONE (OUTPATIENT)
Dept: PSYCHIATRY | Age: 55
End: 2020-03-16

## 2020-03-18 ENCOUNTER — OFFICE VISIT (OUTPATIENT)
Dept: PSYCHIATRY | Age: 55
End: 2020-03-18
Payer: MEDICAID

## 2020-03-18 PROCEDURE — 90837 PSYTX W PT 60 MINUTES: CPT | Performed by: SOCIAL WORKER
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
## 2020-04-02 ENCOUNTER — VIRTUAL VISIT (OUTPATIENT)
Dept: PSYCHIATRY | Age: 55
End: 2020-04-02

## 2020-04-02 PROCEDURE — 98968 PH1 ASSMT&MGMT NQHP 21-30: CPT | Performed by: SOCIAL WORKER
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
## 2020-04-03 ENCOUNTER — TELEPHONE (OUTPATIENT)
Dept: PSYCHIATRY | Age: 55
End: 2020-04-03

## 2020-04-03 ENCOUNTER — VIRTUAL VISIT (OUTPATIENT)
Dept: PSYCHIATRY | Age: 55
End: 2020-04-03
Payer: MEDICAID

## 2020-04-03 ENCOUNTER — VIRTUAL VISIT (OUTPATIENT)
Dept: PSYCHIATRY | Age: 55
End: 2020-04-03

## 2020-04-03 DIAGNOSIS — Z79.899 HIGH RISK MEDICATION USE: ICD-10-CM

## 2020-04-03 LAB — LITHIUM LEVEL: 0.3 MMOL/L (ref 0.6–1.2)

## 2020-04-03 PROCEDURE — 99443 PR PHYS/QHP TELEPHONE EVALUATION 21-30 MIN: CPT | Performed by: NURSE PRACTITIONER

## 2020-04-03 RX ORDER — ARIPIPRAZOLE 5 MG/1
5 TABLET ORAL DAILY
Qty: 30 TABLET | Refills: 3 | Status: SHIPPED | OUTPATIENT
Start: 2020-04-03 | End: 2020-04-03 | Stop reason: ALTCHOICE

## 2020-04-03 RX ORDER — LITHIUM CARBONATE 300 MG/1
CAPSULE ORAL
Qty: 90 CAPSULE | Refills: 1 | Status: SHIPPED | OUTPATIENT
Start: 2020-04-03 | End: 2020-05-13

## 2020-04-03 NOTE — PROGRESS NOTES
Yazmin Galeas is a 48 y.o. female evaluated via telephone on 4/3/2020. Consent:  She and/or health care decision maker is aware that that she may receive a bill for this telephone service, depending on her insurance coverage, and has provided verbal consent to proceed: Yes      Documentation:  I communicated with the patient and/or health care decision maker about depression/anxiety. Details of this discussion including any medical advice provided: see below      I affirm this is a Patient Initiated Episode with an Established Patient who has not had a related appointment within my department in the past 7 days or scheduled within the next 24 hours. Total Time: minutes: 21-30 minutes    Note: not billable if this call serves to triage the patient into an appointment for the relevant concern      Yohana Tam     4/6/2020 9:07 AM   Progress Note    IN:  1010  OUT: [address] 1965      Chief Complaint   Patient presents with    Follow-up    Depression    Anxiety         Subjective:  Patient is a 48 y.o. female diagnosed with Bipolar Affective Disorder and presents today for follow-up. Last seen in clinic on 3/6/20 and prior records were reviewed. Today patient states, \"I had done pretty good until my birthday. I've been having trouble getting showers. \" She reports that she discussed this with Angus Knapp yesterday. She thinks that Lithium is making her sleepy during the day. She reports having a hard time getting things done due to being in bed a lot. Patient reports side effects as follows: none. No evidence of EPS, no cogwheeling or abnormal motor movements. Absent  suicidal ideation. Reports compliance with medications as good . Current Substance Use:  See history    BP: There were no vitals taken for this visit.       Review of Systems - 14 point review:  Negative except being treated for:  hypoglycemic (needs to eat snacks), depression, anxiety, insomnia      Constitutional: mouth daily 4/3/20  Yes Prisca LunaTING NP   lithium 300 MG capsule Take 1 capsule by mouth in the morning and 2 capsules at bedtime. 4/3/20   Prisca LunaTING NP   doxepin (SINEQUAN) 50 MG capsule Take 1 capsule by mouth nightly 2/25/20 3/26/20  Dmitry Albright MD   vitamin D (ERGOCALCIFEROL) 1.25 MG (90445 UT) CAPS capsule Take 1 capsule by mouth once a week for 11 doses 3/2/20 5/12/20  Dmitry Albright MD     Social History     Socioeconomic History    Marital status:      Spouse name: Not on file    Number of children: Not on file    Years of education: Not on file    Highest education level: Not on file   Occupational History    Not on file   Social Needs    Financial resource strain: Not on file    Food insecurity     Worry: Not on file     Inability: Not on file    Transportation needs     Medical: Not on file     Non-medical: Not on file   Tobacco Use    Smoking status: Never Smoker    Smokeless tobacco: Never Used   Substance and Sexual Activity    Alcohol use: No    Drug use: No    Sexual activity: Not on file   Lifestyle    Physical activity     Days per week: Not on file     Minutes per session: Not on file    Stress: Not on file   Relationships    Social connections     Talks on phone: Not on file     Gets together: Not on file     Attends Yarsanism service: Not on file     Active member of club or organization: Not on file     Attends meetings of clubs or organizations: Not on file     Relationship status: Not on file    Intimate partner violence     Fear of current or ex partner: Not on file     Emotionally abused: Not on file     Physically abused: Not on file     Forced sexual activity: Not on file   Other Topics Concern    Not on file   Social History Narrative    Past Psychiatric History         She has been admitted to inpatient care too many to count times, mostly for depression, suicide attempt and mariam. She overdosed 4 times.    First overdose happened in organizing, rituals, hang-ups, obsessive thoughts, counting, rational vs. Irrational beliefs)    . PTSD symptoms:  positive for hx of flashbacks (none recent)      (nightmares, flashbacks, startle response, avoidance)    . Social anxiety symptoms:  positive for not liking to go into D[address] (she says she is doing better since discharge from inpatient in February, 2020)    . Simple phobias: positive for spiders      (heights, planes, spiders, etc.)    . Psychosis: negative      (hallucinations, auditory, visual, tactile, olfactory)    . Paranoia: negative    . Delusions:  negative      (TV, radio, thought broadcasting, mind control, referential thinking)      (persecutory delusion - e.g., believing one is being followed and harassed by gangs)      (grandiose delusion - e.g., believing one is a billionaire  who owns casinos around the world)      (erotomanic delusion - e.g., believing a famous  is in love with them)       (somatic delusion - e.g., believing one's sinuses have been infested by worms)      (delusions of reference - e.g., believing dialogue on a TV program is directed specifically towards the patient)      (delusions of control - e.g., believing one's thoughts and movements are controlled by planetary overlords)    . Patient's perception: negative      (Spiritual or cultural context of symptoms, reality testing)    . ADHD symptoms: positive for being on Vyvanse, Dr. Anson Paredes told her that she needed to be on Vyvanse (she describes that she took a simple test in his office), they took her off of it because of interactions with other medications      (able to focus and concentrate, scattered thoughts, disorganized thoughts)    . Eating Disorder symptoms:  negative      (binging, purging, excessive exercising)       MSE:  Patient is  A & O x 4. Appearance:  TRISTEN.   Cognition:  Recent memory intact , remote memory intact , good fund of knowledge, average  intelligence taken the morning dose. Will start Latuda, 20mg to address ongoing depression. She has been on several antipsychotic medications which have been ineffective although she was tried on [place] in 2018 and she thought it was helpful. She has been on Seroquel, Risperidone, Zyprexa, Geodon, Abilify, Thorazine. Her blood glucose in the last year has fluctuated, sometimes high, other times low. Geodon and Seroquel are on her allergy list. Seroquel made her lethargic. Since her blood sugars are not stable it would probably be best to start Latuda rather than an antipsychotic which would likely increase them. In talking to her today, she was very unsure of herself, needing frequent reassurances and she asked a lot of questions, needing several reiterations of the directions from today's visit. Will follow up in about 2 weeks. She was encouraged to call 911 if she becomes unsafe. NOTE: New Lithium level done today was 0.3, increased Lithium to 300mg in the morning and 600mg at bedtime. Also ordered a new Lithium level to be done in 2 weeks. Spoke to the patient and she verbalized understanding. 10.Over 50% of the total visit time of   30  minutes was spent on counseling and/or coordination of care of:                        1. Bipolar affective disorder, depressed, severe ([facility] [address]    2. SALTY (generalized anxiety disorder)    3. [facility] disorder ([facility]    4.  High risk medication use                      Psychotherapy Topics: mood/medication effectiveness       [facility]

## 2020-04-03 NOTE — TELEPHONE ENCOUNTER
[facility] request form completed for Latuda 20 mg and faxed to [phone].

## 2020-04-06 ENCOUNTER — TELEPHONE (OUTPATIENT)
Dept: PSYCHIATRY | Age: 55
End: 2020-04-06

## 2020-04-09 ENCOUNTER — VIRTUAL VISIT (OUTPATIENT)
Dept: PSYCHIATRY | Age: 55
End: 2020-04-09
Payer: MEDICAID

## 2020-04-09 PROCEDURE — 98968 PH1 ASSMT&MGMT NQHP 21-30: CPT | Performed by: SOCIAL WORKER

## 2020-04-09 ASSESSMENT — ANXIETY QUESTIONNAIRES
6. BECOMING EASILY ANNOYED OR IRRITABLE: 0-NOT AT ALL
2. NOT BEING ABLE TO STOP OR CONTROL WORRYING: 1-SEVERAL DAYS
5. BEING SO RESTLESS THAT IT IS HARD TO SIT STILL: 1-SEVERAL DAYS
3. WORRYING TOO MUCH ABOUT DIFFERENT THINGS: 1-SEVERAL DAYS
GAD7 TOTAL SCORE: 6
4. TROUBLE RELAXING: 1-SEVERAL DAYS
7. FEELING AFRAID AS IF SOMETHING AWFUL MIGHT HAPPEN: 1-SEVERAL DAYS
1. FEELING NERVOUS, ANXIOUS, OR ON EDGE: 1-SEVERAL DAYS

## 2020-04-09 ASSESSMENT — PATIENT HEALTH QUESTIONNAIRE - PHQ9
SUM OF ALL RESPONSES TO PHQ QUESTIONS 1-9: 2
1. LITTLE INTEREST OR PLEASURE IN DOING THINGS: 1
SUM OF ALL RESPONSES TO PHQ9 QUESTIONS 1 & 2: 2
SUM OF ALL RESPONSES TO PHQ QUESTIONS 1-9: 2
2. FEELING DOWN, DEPRESSED OR HOPELESS: 1

## 2020-04-09 NOTE — PROGRESS NOTES
Not on file    Intimate partner violence     Fear of current or ex partner: Not on file     Emotionally abused: Not on file     Physically abused: Not on file     Forced sexual activity: Not on file   Other Topics Concern    Not on file   Social History Narrative    Past Psychiatric History         She has been admitted to inpatient care too many to count times, mostly for depression, suicide attempt and mariam. She overdosed 4 times. First overdose happened in her 25s, second overdose happened in year 2000. Denied any cutting behavior. She had been followed up by Dr. Thomas Freedman  Per the patient, she tried a lot of different medications for her mental illness including Prozac, ZOLOFT but ZOLOFT made    her more suicidal.  She tried [facility], which made her manic. She also tried [facility], she was on LITHIUM not very long but she was on DEPAKOTE, two episodes, each episode lasted about a year. DEPAKOTE helped her in the beginning but did not help later on. She overdosed on Depakote once. She was also tried on Neurontin, Trileptal, Seroquel, risperidone, Zyprexa, Geodon, Abilify, Latuda, and Thorazine. She felt Valeda Cobia was helpful during an admission earlier this year. Her most recent hospitalization here was in July                Outpatient  provider(s):  Dr. Thomas Freedman at [facility]                Medications tried: She is obviously tried many things, just from looking at the list that she has given as \"allergies\"                Diagnoses: Bipolar 1, from the chart as it is obvious she has had multiple episodes of psychosis                Previous SI/SA: Yes        patient denied any history of seizures, denied any history of head injury,    denied any history of surgery. She has had tonsillectomy, and a D and C.         Social History: 3/6/20    Born/Raised: [address] (she feels that her parents are controlling and know more about her than they need to know), she describes her childhood as somewhat happy edge, worrying about her son being gone a lot, worrying about meal preparation, worrying about household chores, trouble relaxing, becoming irritable or annoyed      (Generalized anxiety: where, when, who, how long, how frequent)    . Panic Disorder symptoms: negative      (Palpitations, racing heart beat, sweating, sense of impending doom, fear of recurrence, shortness of breath)    . OCD symptoms:  positive for washes her hands a lot      (checking, cleaning, organizing, rituals, hang-ups, obsessive thoughts, counting, rational vs. Irrational beliefs)    . PTSD symptoms:  positive for hx of flashbacks (none recent)      (nightmares, flashbacks, startle response, avoidance)    . Social anxiety symptoms:  positive for not liking to go into [address] (she says she is doing better since discharge from inpatient in February, 2020)    . Simple phobias: positive for spiders      (heights, planes, spiders, etc.)    . Psychosis: negative      (hallucinations, auditory, visual, tactile, olfactory)    . Paranoia: negative    . Delusions:  negative      (TV, radio, thought broadcasting, mind control, referential thinking)      (persecutory delusion - e.g., believing one is being followed and harassed by gangs)      (grandiose delusion - e.g., believing one is a billionaire  who owns casinos around the world)      (erotomanic delusion - e.g., believing a famous  is in love with them)       (somatic delusion - e.g., believing one's sinuses have been infested by worms)      (delusions of reference - e.g., believing dialogue on a TV program is directed specifically towards the patient)      (delusions of control - e.g., believing one's thoughts and movements are controlled by planetary overlords)    . Patient's perception: negative      (Spiritual or cultural context of symptoms, reality testing)    .     ADHD symptoms: positive for being on Vyvanse, Dr. Sai Dominguez told her that she needed to be on

## 2020-04-15 ENCOUNTER — TELEPHONE (OUTPATIENT)
Dept: PSYCHIATRY | Age: 55
End: 2020-04-15

## 2020-04-15 DIAGNOSIS — Z79.899 HIGH RISK MEDICATION USE: ICD-10-CM

## 2020-04-15 LAB — LITHIUM LEVEL: 0.6 MMOL/L (ref 0.6–1.2)

## 2020-04-16 ENCOUNTER — VIRTUAL VISIT (OUTPATIENT)
Dept: PSYCHIATRY | Age: 55
End: 2020-04-16
Payer: MEDICAID

## 2020-04-16 PROCEDURE — 98967 PH1 ASSMT&MGMT NQHP 11-20: CPT | Performed by: SOCIAL WORKER

## 2020-04-16 NOTE — PROGRESS NOTES
is feeling \"empty nest syndrome\" for her son not coming home. Pt asked to be called again next week \"to make sure I'm fine. \"    Pt denies Suicidal Ideations, Homicidal Ideation, Auditory Hallucinations, Visual Hallucinations, Tactical Hallucinations.       MSE:    Sounded   alert, cooperative, mild distress  Appetite normal  Sleep disturbance No  Fatigue Yes  Loss of pleasure Yes  Impulsive behavior No  Speech    normal rate and normal volume  Mood    Anxious  Depressed    Thought Content    cognitive distortions and all or nothing thinking  Thought Process    circumstantial  Associations    logical connections  Insight    Fair  Judgment    Impaired  Orientation    oriented to person, place, time, and general circumstances  Memory    recent and remote memory intact  Attention/Concentration    intact  Morbid ideation No  Suicide Assessment    no suicidal ideation      History:  Social History     Socioeconomic History    Marital status:      Spouse name: Not on file    Number of children: Not on file    Years of education: Not on file    Highest education level: Not on file   Occupational History    Not on file   Social Needs    Financial resource strain: Not on file    Food insecurity     Worry: Not on file     Inability: Not on file    Transportation needs     Medical: Not on file     Non-medical: Not on file   Tobacco Use    Smoking status: Never Smoker    Smokeless tobacco: Never Used   Substance and Sexual Activity    Alcohol use: No    Drug use: No    Sexual activity: Not on file   Lifestyle    Physical activity     Days per week: Not on file     Minutes per session: Not on file    Stress: Not on file   Relationships    Social connections     Talks on phone: Not on file     Gets together: Not on file     Attends Mandaen service: Not on file     Active member of club or organization: Not on file     Attends meetings of clubs or organizations: Not on file     Relationship status: Not on anxious or on edge, worrying about her son being gone a lot, worrying about meal preparation, worrying about household chores, trouble relaxing, becoming irritable or annoyed      (Generalized anxiety: where, when, who, how long, how frequent)    . Panic Disorder symptoms: negative      (Palpitations, racing heart beat, sweating, sense of impending doom, fear of recurrence, shortness of breath)    . OCD symptoms:  positive for washes her hands a lot      (checking, cleaning, organizing, rituals, hang-ups, obsessive thoughts, counting, rational vs. Irrational beliefs)    . PTSD symptoms:  positive for hx of flashbacks (none recent)      (nightmares, flashbacks, startle response, avoidance)    . Social anxiety symptoms:  positive for not liking to go into [address] (she says she is doing better since discharge from inpatient in February, 2020)    . Simple phobias: positive for spiders      (heights, planes, spiders, etc.)    . Psychosis: negative      (hallucinations, auditory, visual, tactile, olfactory)    . Paranoia: negative    . Delusions:  negative      (TV, radio, thought broadcasting, mind control, referential thinking)      (persecutory delusion - e.g., believing one is being followed and harassed by gangs)      (grandiose delusion - e.g., believing one is a billionaire  who owns casinos around the world)      (erotomanic delusion - e.g., believing a famous  is in love with them)       (somatic delusion - e.g., believing one's sinuses have been infested by worms)      (delusions of reference - e.g., believing dialogue on a TV program is directed specifically towards the patient)      (delusions of control - e.g., believing one's thoughts and movements are controlled by planetary overlords)    . Patient's perception: negative      (Spiritual or cultural context of symptoms, reality testing)    .     ADHD symptoms: positive for being on Vyvanse, Dr. Misa Rios told her that she needed to be on Vyvanse (she describes that she took a simple test in his office), they took her off of it because of interactions with other medications      (able to focus and concentrate, scattered thoughts, disorganized thoughts)    . Eating Disorder symptoms:  negative      (binging, purging, excessive exercising)       TOBACCO:   reports that she has never smoked. She has never used smokeless tobacco.  ETOH:   reports no history of alcohol use. Family History:   No family history on file. Diagnosis:    Bipolar I disorder;       Diagnosis Date    Chest pain 2/8/2012    Depression     Hypoglycemia     Schizoaffective disorder ([facility] [address] 2/8/2012    Suicide attempt [facility])      Problems with primary support group, Problems related to the social environment and Economic problems    Plan:  1. Continue medication management  2. CBT to target cognitive distortions  3.  Discuss therapeutic goals    Pt interventions:  Trained in strategies for increasing balanced thinking and Supportive techniques      Miles Martinez MSW, LCSW

## 2020-04-17 ENCOUNTER — VIRTUAL VISIT (OUTPATIENT)
Dept: PSYCHIATRY | Age: 55
End: 2020-04-17
Payer: MEDICAID

## 2020-04-17 PROCEDURE — 99443 PR PHYS/QHP TELEPHONE EVALUATION 21-30 MIN: CPT | Performed by: NURSE PRACTITIONER

## 2020-04-17 NOTE — PROGRESS NOTES
this visit.       Review of Systems - 14 point review:  Negative except being treated for:  hypoglycemic (needs to eat snacks), depression, anxiety, insomnia      Constitutional: (fevers, chills, night sweats, wt loss/gain, change in appetite, fatigue, somnolence)    HEENT: (ear pain or discharge, hearing loss, ear ringing, sinus pressure, nosebleed, nasal discharge, sore throat, oral sores, tooth pain, bleeding gums, hoarse voice, neck pain)      Cardiovascular: (HTN, chest pain, elevated cholesterol/lipids, palpitations, leg swelling, leg pain with walking)    Respiratory: (cough, wheezing, snoring, SOB with activity (dyspnea), SOB while lying flat (orthopnea), awakening with severe SOB (paroxysmal nocturnal dyspnea))    Gastrointestinal: (NVD, constipation, abdominal pain, bright red stools, black tarry stools, stool incontinence)     Genitourinary:  (pelvic pain, burning or frequency of urination, urinary urgency, blood in urine incomplete bladder emptying, urinary incontinence, STD; MEN: testicular pain or swelling, erectile dysfunction; WOMEN: LMP, heavy menstrual bleeding (menorrhagia), irregular periods, postmenopausal bleeding, menstrual pain (dymenorrhea, vaginal discharge)    Musculoskeletal: (bone pain/fracture, joint pain or swelling, musle pain)    Integumentary: (rashes, acne, non-healing sores, itching, breast lumps, breast pain, nipple discharge, hair loss)    Neurologic: (HA, muscle weakness, paresthesias (numbness, coldness, crawling or prickling), memory loss, seizure, dizziness)    Psychiatric:  (anxiety, sadness, irritability/anger, insomnia, suicidality)    Endocrine: (heat or cold intolerance, excessive thirst (polydipsia), excessive hunger (polyphagia))    Immune/Allergic: (hives, seasonal or environmental allergies, HIV exposure)    Hematologic/Lymphatic: (lymph node enlargement, easy bleeding or bruising)    History obtained via chart review and patient    PCP is TING Delvalle CNP February, 2020 suicide attempt, went to the crisis center in [address] [place] [address])    . INPATIENT HOSPITALIZATIONS:    yes - multiple times, suicide attempts, depression, estela, has overdosed 4 times, the most recent 2/21/20     . DRUG REHABILITATION:    no    .    PSYCHIATRIC REVIEW OF SYSTEMS, 3/6/20        Mood Disorder Questionnaire, +8, Question 2: yes, Question 3: moderate    . Mood:  positive for little interest or pleasure in doing things, feeling down, sleeping too much, poor appetite and overeating, feeling bad about herself, trouble concentrating, denies suicidal thoughts today      (Depression: sadness, tearfulness, sleep, appetite, energy, concentration, sexual function, guilt, psychomotor agitation or slowing, interest, suicidality)    . Estela: positive for elevated mood, felt more self confident than usual, hyperverbal, racing thoughts, easily distracted, more energy, hypersexual, overspending (she reports that these symptoms last 3-4 days at a time)      (impulsivity, grandiosity, recklessness, excessive energy, decreased need for sleep, increased spending beyond means, hyperverbal, grandiose, racing thoughts, hypersexuality)    . Other: positive for irritability and anger (but holds it in), finds it helpful to go on walks, talk to God, journal and tear it up      (Irritability, lability, anger)    . Anxiety:  positive for feeling nervous, anxious or on edge, worrying about her son being gone a lot, worrying about meal preparation, worrying about household chores, trouble relaxing, becoming irritable or annoyed      (Generalized anxiety: where, when, who, how long, how frequent)    . Panic Disorder symptoms: negative      (Palpitations, racing heart beat, sweating, sense of impending doom, fear of recurrence, shortness of breath)    .     OCD symptoms:  positive for washes her hands a lot      (checking, cleaning, organizing, rituals, hang-ups, obsessive thoughts, counting, rational vs.

## 2020-04-22 ENCOUNTER — VIRTUAL VISIT (OUTPATIENT)
Dept: PSYCHIATRY | Age: 55
End: 2020-04-22
Payer: MEDICAID

## 2020-04-22 PROCEDURE — 98968 PH1 ASSMT&MGMT NQHP 21-30: CPT | Performed by: SOCIAL WORKER
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
## 2020-04-22 NOTE — PATIENT INSTRUCTIONS
bathroom, if available. Animals: You should restrict contact with pets and other animals while you are sick with COVID-19, just like you would around other people. Although there have not been reports of pets or other animals becoming sick with COVID-19, it is still recommended that people sick with COVID-19 limit contact with animals until more information is known about the virus. When possible, have another member of your household care for your animals while you are sick. If you are sick with COVID-19, avoid contact with your pet, including petting, snuggling, being kissed or licked, and sharing food. If you must care for your pet or be around animals while you are sick, wash your hands before and after you interact with pets and wear a facemask. Call ahead before visiting your doctor  If you have a medical appointment, call the healthcare provider and tell them that you have or may have COVID-19. This will help the healthcare providers office take steps to keep other people from getting infected or exposed. Wear a facemask  You should wear a facemask when you are around other people (e.g., sharing a room or vehicle) or pets and before you enter a healthcare providers office. If you are not able to wear a facemask (for example, because it causes trouble breathing), then people who live with you should not stay in the same room with you, or they should wear a facemask if they enter your room. Cover your coughs and sneezes  Cover your mouth and nose with a tissue when you cough or sneeze. Throw used tissues in a lined trash can. Immediately wash your hands with soap and water for at least 20 seconds or, if soap and water are not available, clean your hands with an alcohol-based hand  that contains at least 60% alcohol.   Clean your hands often  Wash your hands often with soap and water for at least 20 seconds, especially after blowing your nose, coughing, or sneezing; going to the bathroom; and have a medical emergency and need to call 911, notify the dispatch personnel that you have, or are being evaluated for COVID-19. If possible, put on a facemask before emergency medical services arrive. Discontinuing home isolation  Patients with confirmed COVID-19 should remain under home isolation precautions until the risk of secondary transmission to others is thought to be low. The decision to discontinue home isolation precautions should be made on a case-by-case basis, in consultation with healthcare providers and state and local health departments. 1) Routines: Disruptions to routines/schedules can increase anxiety and feelings of being out of control. Create a schedule that you can follow to help bring direction to your day. It does not have to be strict or confined to specific times. Just keep a regular flow to the day (e.g., eat breakfast, read a book, take a walk, each lunch, watch a TV show). 2) Keep moving: When there are disruptions to our routines (e.g., going to the gym) and our work/home demands change, it can be difficult to find the time or figure out how to keep our body active. Exercise releases serotonin and other chemicals that positively affect your mood. Therefore, it is very important to try to exercise in times of increased stress. Aim to get in at least 30 minutes a day of physical exercise. Here are some free home workout options: Doyogawithme.[email or web address], [email or web address]. com (kid focused), [employer].     3) Focus on what is in your control: With so many mandatory closings and decisions being made without your opinion, people can begin to feel hopeless and like everything is out of their control. Some people may feel upset at choices family members are making during this time. We can't control other people's behavior or *some* aspects of our environment. However, there are many parts of our day that we can control and it helps to focus on those.  If you are worried about the spread

## 2020-04-28 ENCOUNTER — TELEPHONE (OUTPATIENT)
Dept: PSYCHIATRY | Age: 55
End: 2020-04-28

## 2020-04-28 NOTE — TELEPHONE ENCOUNTER
Received fax from [facility] approving the PA for the Latuda 20 mg. Pt and pharmacy made aware.

## 2020-04-30 ENCOUNTER — VIRTUAL VISIT (OUTPATIENT)
Dept: PSYCHIATRY | Age: 55
End: 2020-04-30
Payer: MEDICAID

## 2020-04-30 PROCEDURE — 98968 PH1 ASSMT&MGMT NQHP 21-30: CPT | Performed by: SOCIAL WORKER
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
## 2020-04-30 NOTE — PATIENT INSTRUCTIONS
Advance Care Planning  People with COVID-19 may have no symptoms, mild symptoms, such as fever, cough, and shortness of breath or they may have more severe illness, developing severe and fatal pneumonia. As a result, Advance Care Planning with attention to naming a health care decision maker (someone you trust to make healthcare decisions for you if you could not speak for yourself) and sharing other health care preferences is important BEFORE a possible health crisis. Please contact your Primary Care Provider to discuss Advance Care Planning. Preventing the Spread of Coronavirus Disease 2019 in Homes and Residential Communities  For the most recent information go to [email or web address]    Prevention steps for People with confirmed or suspected COVID-19 (including persons under investigation) who do not need to be hospitalized  and   People with confirmed COVID-19 who were hospitalized and determined to be medically stable to go home    Your healthcare provider and public health staff will evaluate whether you can be cared for at home. If it is determined that you do not need to be hospitalized and can be isolated at home, you will be monitored by staff from your local or state health department. You should follow the prevention steps below until a healthcare provider or local or state health department says you can return to your normal activities. Stay home except to get medical care  People who are mildly ill with COVID-19 are able to isolate at home during their illness. You should restrict activities outside your home, except for getting medical care. Do not go to work, school, or public areas. Avoid using public transportation, ride-sharing, or taxis. Separate yourself from other people and animals in your home  People: As much as possible, you should stay in a specific room and away from other people in your home.  Also, you should use a separate bathroom, if available. Animals: You should restrict contact with pets and other animals while you are sick with COVID-19, just like you would around other people. Although there have not been reports of pets or other animals becoming sick with COVID-19, it is still recommended that people sick with COVID-19 limit contact with animals until more information is known about the virus. When possible, have another member of your household care for your animals while you are sick. If you are sick with COVID-19, avoid contact with your pet, including petting, snuggling, being kissed or licked, and sharing food. If you must care for your pet or be around animals while you are sick, wash your hands before and after you interact with pets and wear a facemask. Call ahead before visiting your doctor  If you have a medical appointment, call the healthcare provider and tell them that you have or may have COVID-19. This will help the healthcare providers office take steps to keep other people from getting infected or exposed. Wear a facemask  You should wear a facemask when you are around other people (e.g., sharing a room or vehicle) or pets and before you enter a healthcare providers office. If you are not able to wear a facemask (for example, because it causes trouble breathing), then people who live with you should not stay in the same room with you, or they should wear a facemask if they enter your room. Cover your coughs and sneezes  Cover your mouth and nose with a tissue when you cough or sneeze. Throw used tissues in a lined trash can. Immediately wash your hands with soap and water for at least 20 seconds or, if soap and water are not available, clean your hands with an alcohol-based hand  that contains at least 60% alcohol.   Clean your hands often  Wash your hands often with soap and water for at least 20 seconds, especially after blowing your nose, coughing, or sneezing; going to the bathroom; and of germs, you can control washing your hands (appropriately) and adhering to social distancing. If you feel isolated, you can control calling a friend to have a conversation or getting outside to safely connect with nature. 4) Limit media exposure: It is helpful to stay informed but there is a fine line between being informed and being \"over informed\". If you notice that your news consumption increased and that you are having a hard time staying away from reading articles posted on social media or from watching extended coverage on the news, it can be helpful to step away. Try limiting yourself to one 30 minute increment of the news or staying away for an entire day. You can even limit your libertad usage on your phone to put a physical boundary in helping you follow through on this goal.     5) Go outside: Even introverts struggle when they feel like they are being told to stay in their home versus feeling as if they are choosing to do so. Find ways to get fresh air while maintaining social distancing. Think about taking a mindful walk outside or walk with a goal in mind. For example, you can set out with the goal to find as many birds nests as you can while taking your walk. Having a goal can help your mind from wandering to your worries. 6) Be creative with connection: Many people feel isolated during this time. If you do have immediate family at home, think about including a fun activity into your daily routine that will give you quality interaction (e.g., a game or puzzle). For broader social circles, consider getting creative with technology--Facetime with friends, download a social connection libertad like Words with Friends to engage with your community in a new and fun way, or get involved in online myrna. If you aren't comfortable with technology, think about writing letters to friends and family and starting a \"pen-pal\" chain. 7) Be aware of thought traps: Times of crisis worsen anxious thoughts.  Be

## 2020-05-06 ENCOUNTER — VIRTUAL VISIT (OUTPATIENT)
Dept: PSYCHIATRY | Age: 55
End: 2020-05-06
Payer: MEDICAID

## 2020-05-06 PROCEDURE — 90837 PSYTX W PT 60 MINUTES: CPT | Performed by: SOCIAL WORKER

## 2020-05-06 NOTE — PATIENT INSTRUCTIONS
before eating or preparing food. If soap and water are not readily available, use an alcohol-based hand  with at least 60% alcohol, covering all surfaces of your hands and rubbing them together until they feel dry. Soap and water are the best option if hands are visibly dirty. Avoid touching your eyes, nose, and mouth with unwashed hands. Avoid sharing personal household items  You should not share dishes, drinking glasses, cups, eating utensils, towels, or bedding with other people or pets in your home. After using these items, they should be washed thoroughly with soap and water. Clean all high-touch surfaces everyday  High touch surfaces include counters, tabletops, doorknobs, bathroom fixtures, toilets, phones, keyboards, tablets, and bedside tables. Also, clean any surfaces that may have blood, stool, or body fluids on them. Use a household cleaning spray or wipe, according to the label instructions. Labels contain instructions for safe and effective use of the cleaning product including precautions you should take when applying the product, such as wearing gloves and making sure you have good ventilation during use of the product. Monitor your symptoms  Seek prompt medical attention if your illness is worsening (e.g., difficulty breathing). Before seeking care, call your healthcare provider and tell them that you have, or are being evaluated for, COVID-19. Put on a facemask before you enter the facility. These steps will help the healthcare providers office to keep other people in the office or waiting room from getting infected or exposed. Ask your healthcare provider to call the local or state health department. Persons who are placed under active monitoring or facilitated self-monitoring should follow instructions provided by their local health department or occupational health professionals, as appropriate. When working with your local health department check their available hours.   If you have a medical emergency and need to call 911, notify the dispatch personnel that you have, or are being evaluated for COVID-19. If possible, put on a facemask before emergency medical services arrive. Discontinuing home isolation  Patients with confirmed COVID-19 should remain under home isolation precautions until the risk of secondary transmission to others is thought to be low. The decision to discontinue home isolation precautions should be made on a case-by-case basis, in consultation with healthcare providers and state and local health departments. 1) Routines: Disruptions to routines/schedules can increase anxiety and feelings of being out of control. Create a schedule that you can follow to help bring direction to your day. It does not have to be strict or confined to specific times. Just keep a regular flow to the day (e.g., eat breakfast, read a book, take a walk, each lunch, watch a TV show). 2) Keep moving: When there are disruptions to our routines (e.g., going to the gym) and our work/home demands change, it can be difficult to find the time or figure out how to keep our body active. Exercise releases serotonin and other chemicals that positively affect your mood. Therefore, it is very important to try to exercise in times of increased stress. Aim to get in at least 30 minutes a day of physical exercise. Here are some free home workout options: D[employer]og[employer].com, Go[employer]oodle. com (kid focused), [employer].     3) Focus on what is in your control: With so many mandatory closings and decisions being made without your opinion, people can begin to feel hopeless and like everything is out of their control. Some people may feel upset at choices family members are making during this time. We can't control other people's behavior or *some* aspects of our environment. However, there are many parts of our day that we can control and it helps to focus on those.  If you are worried about the spread intermittent, such as insurance payments, youll get the most accurate financial picture if you calculate an average for six months to a year. Add up everything you spent for the last six to 12 months and then divide by the amount of months, which will give you your average monthly expenses. Remember that being thorough when you add up expenses is important in creating a realistic budget. A forgotten bill really throws a wrench into your savings plan. When calculating your expenses, also factor in unexpected bills, such as unplanned car repairs. A good rule of thumb is to add an extra 10 percent to 15 percent. So if youve determined that you spend $1,500 a month, add $150 to $225.  2. Determine your income  Once youve figured out how much money you need to stay afloat financially each month, its time to determine your actual income. Besides your regular salary, get an accurate picture by adding in any extra funds that come your way throughout the year, such as cash gifts, sale of items online or via garage sales, and dont forget other income sources like [place], child support, interest, dividends and rental income. 3. Set savings and debt payoff goals  In order to determine realistic savings and debt payoff goals, you must find out if you have a budget shortfall or overage. Do this by subtracting your monthly expenses from your income. If you determine youre making more money than youre spending, congratulations. This amount can be earmarked for savings and to pay off debt. But if you determine youre spending more than youre making, its time to do some cutting so you have something to save and dont go further into debt. The best way to figure out where you can cut from your expenses is to track your spending and record every expense for a month. Seemingly insignificant items such as a cup of coffee add up over time.  For instance, even if you spend just $5 a week on snacks, that adds up to $260 a year, either. Many cities have carpool and [id number] programs to join. Comparison shop for food  Treat food shopping like you would any other shopping trip and hunt for bargains and the cheapest prices. Use coupons and buy in bulk if you have the room to store the food. Downsize your phone service  Unless you have a need for it, drop the land line and go completely cellular. Another option is to bundle the land line service with another service like cable TV and internet. Make eating out a treat  Sure, its fun to go out to eat dinner, but restaurant bills can be a real drain on the monthly finances. Even cutting down one visit to a restaurant a week will help save money -- if a dinner for two runs about $80, by months end thats a potential $320 saved. Pay in cash  Leave the credit and debit cards at home and use cash instead. The quickest way to avoid buying something is not to have the ability to pay for it. Using cash also helps you stick to a budget because once the cash is gone, youre done spending. Buy in the off-season  Shop for expensive items when theyre cheaper -- in the off-season. So,, if you can help it, dont buy an air conditioner during the hottest days of summer. Refinance your house and consolidate debt  Take advantage of interest rates hovering at historic lows to pay off high-interest debt. Burton Grit on to that car  If the car is paid off, runs and suits your needs, why saddle yourself with a new car and loan? Hold off buying a new car for a couple of years and save the money you would have spent on a car loan. The numbers show people are hanging on to their vehicles longer -- the average vehicle on [place] is 9.7 years old.   [email or web address]
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
## 2020-05-08 ENCOUNTER — TELEPHONE (OUTPATIENT)
Dept: PSYCHIATRY | Age: 55
End: 2020-05-08

## 2020-05-08 NOTE — TELEPHONE ENCOUNTER
20 minute phone call   Pt reported stressor yesterday of her adult son refusing to get his NTI work done for [employer].

## 2020-05-13 ENCOUNTER — VIRTUAL VISIT (OUTPATIENT)
Dept: PSYCHIATRY | Age: 55
End: 2020-05-13
Payer: MEDICAID

## 2020-05-13 PROCEDURE — 98968 PH1 ASSMT&MGMT NQHP 21-30: CPT | Performed by: SOCIAL WORKER
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
## 2020-05-13 NOTE — PATIENT INSTRUCTIONS
20 Common Things to Include in a Budget    If youre making a budget, congrats! For many people, having more money seriously adds to their freedom -- and budgeting is a great way of giving yourself as much financial freedom as possible in the future, as well as improving your financial situation in the present. Youve probably practiced budgeting for groceries, rent and other housing costs, as well as other big expenses when [employer] a budget (its a good idea to abide by the 50/20/30 budgeting rule or use this online budget calculator), but there are plenty of other small budget items that are all-too-easy to forget about. Here are 20 common things to include in a budget:   Rent    Groceries    Daily Incidentals    Irregular Expenses and Emergency [address] Work Wardrobe and Upkeep    Subscriptions    Guests    Travel Expenses    [address] Account Fees    Parking   [employer]    Entertainment    [address] Contributions    [employer] Dues  Some of these 20 budget items might not apply to you, but they are all things that are frequently overlooked by those who are budgeting. 1. Rent  The first and biggest fixed expense to consider is your rent or mortgage payment. Its such a big item, it might even be easy to forget! But be sure that you are allocating a portion of your monthly income to paying rent and other associated living expenses, like water, electricity, and heating or air conditioning. 2. Food and [address] can be a pretty big expense if you dont budget wisely. If youve moved out and have a new job, you might get carried away going to new restaurants and ordering appetizers, dinner, and drinks on a regular basis. Dont fall into this temptation; instead, add food costs to your budget, and set aside a fixed amount for eating out.  The majority of your budget should be put toward groceries, which will save you money long-term. 3. Daily Incidentals  It might not seem like youre spending a lot of money on your daily latte from your favorite cafe, or a drink after work. But these costs can add up substantially over the course of a year. You should try and total these costs and include them in your budget. 4. Irregular Expenses and Emergency Fund  Its difficult to budget for one-off expenses because you wont know about them in advance, but you should always have some money set aside for them. If you learn about an impending expense (like a wedding) you wont be caught off guard. Irregular expenses can sometimes shred your monthly budget, so try and set aside money for them even if its only a one-time thing. 5. Household Maintenance  Household maintenance is an inevitable living expense. If you hire  to clean your apartment once a month, or take your car to the carwa, be sure to include these costs in your budget. Household , repairs or replacement for damaged appliances, and furniture cleaning can all be expensive as well, and you should remember to budget for these. 6. Work Wardrobe and Upkeep  Does your workplace require that you wear a suit and nice shoes? Then budget for the cost of replacement items as they wear out and also budget for the cost of dry cleaning and shoe repair. A solid work wardrobe may essential if youre going to continue making a good income. 7. Subscriptions & Data  Subscriptions to [employer] services, streaming services, and online publications are frequently overlooked in household budgets, so be sure to include them if you have them. Remember that you should also budget your phone and data usage to make sure your plan isnt too expensive for your means. 8. Guests  Do you have family or friends that visit you each summer or for the holidays?  If so, then youre probably going to spend extra money on groceries, laundry, and money is no fun if youre always stuck at home. And always remember that the more you cut back in other areas, the more youll be able to allocate to your entertainment budget. [id number] for birthdays! Even if you prefer your birthday to be low-key, you might want to dish out some money on a cake or a nice bottle of wine, and you should always keep some money in the budget for friends birthdays. If you have children, youll definitely want to budget to throw birthday parties. (You can use any leftover for savings, or create a back to school budget!)    18. Holiday Gifts  The holidays can be taxing because of the huge financial strain that gift-giving has. But you can make the holidays a whole lot easier on yourself and your finances if you practice holiday season budgeting. Estimate how much you typically spend on gifts around the holidays, and then divide by 12: thats how much money youll want to put away each month. Heres a pro tip for you: oftentimes, memories are far more worth the expense than a gift is. If you dont have young children, you might consider taking your family on a short vacation rather than spending an exorbitant amount of money on gifts. You could spend a huge deal less, youll cut down on all the time you spend holiday shopping, and youll create memories with your family that are far more valuable than material goods. 19. Charitable Contributions  A donation to a favorite alecia is a worthy expense, and its an item that you should definitely include when youre planning out your budget if youre altruistically inclined. Make a list of your favorite charitable or Jew organizations you may want to donate to on a monthly or annual basis. You might also want to leave room for unexpected charitable causes, such as fundraisers for injury-stricken people.   21. [employer] might not pay much attention to this budget item if your dues come out of your wages, but

## 2020-05-20 ENCOUNTER — VIRTUAL VISIT (OUTPATIENT)
Dept: PSYCHIATRY | Age: 55
End: 2020-05-20
Payer: MEDICAID

## 2020-05-20 PROCEDURE — 98968 PH1 ASSMT&MGMT NQHP 21-30: CPT | Performed by: SOCIAL WORKER
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
## 2020-05-22 ENCOUNTER — TELEPHONE (OUTPATIENT)
Dept: PSYCHIATRY | Age: 55
End: 2020-05-22

## 2020-05-22 ENCOUNTER — VIRTUAL VISIT (OUTPATIENT)
Dept: PSYCHIATRY | Age: 55
End: 2020-05-22
Payer: MEDICAID

## 2020-05-22 PROCEDURE — 90832 PSYTX W PT 30 MINUTES: CPT | Performed by: SOCIAL WORKER

## 2020-05-27 ENCOUNTER — VIRTUAL VISIT (OUTPATIENT)
Dept: PSYCHIATRY | Age: 55
End: 2020-05-27
Payer: MEDICAID

## 2020-05-27 PROCEDURE — 98968 PH1 ASSMT&MGMT NQHP 21-30: CPT | Performed by: SOCIAL WORKER
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
## 2020-06-10 ENCOUNTER — VIRTUAL VISIT (OUTPATIENT)
Dept: PSYCHIATRY | Age: 55
End: 2020-06-10
Payer: MEDICAID

## 2020-06-10 PROCEDURE — 90832 PSYTX W PT 30 MINUTES: CPT | Performed by: SOCIAL WORKER

## 2020-06-10 NOTE — PATIENT INSTRUCTIONS
Watch 2 videos:  #1 - Ayaan Diaz - Stop It    [email or web address]  The female is the record in our head that plays over and over  Akanksha Goldstein is what we are going to say to the record          #2 - Karishma's Daily Affirmation    [email or web address] is thought stopping and then we replace it with Kel Rooney                The Best Possible Self  The Best Possible Self (BPS) exercise can be used to increase optimism. The BPS requires people to envision themselves in an imaginary future in which everything has turned out in the most optimal way. Over the past years, writing about and imagining a BPS has repeatedly been demonstrated to increase peoples mood and well-being Vick Nielsen, 2001; Indy Cordobaing al., 2010; Vernon Whitt, 2006). Bossman Thayer et al. (2010) provided evidence that writing about and imagining a BPS can also increase optimism in terms of expecting favorable outcomes. This effect was independent of the effect on mood that was simultaneously increased by the manipulation. Goal  The BPS exercise can be used to increase optimism in terms of expecting favorable outcomes (see for instance Nirmala et al., 2011). Advice  While in most cases the exercise is used in a written form, it is also possible to ask clients to make drawings of their best possible self. The most powerful way to use the exercise is by instructing clients to visualize their best possible self on a daily basis. Tool Description  1. Set a timer or stopwatch for 10 minutes, during this time you are to think about your best possible future self and to write it down on paper. 2. Imagine your life the way you always imagined it would be like, your best possible self. Picture that you have performed to the best of your abilities and you had achieved the things you wanted to in life.   3. While writing, dont worry about grammar or punctuation just focus on writing all your thoughts and emotions in an expressive way. You may want to have several sheets of paper for this exercise. 4. Reflection: after completing the initial exercise, you must reflect on your feelings and answer. Think about the following questions: What effects did this exercise have?  Does this exercise affect you more emotionally or does it affect your current self-image?  Did it motivate or inspire you?  Does it make you want to make changes?  How did this exercise affect you overall?
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
## 2020-06-11 ENCOUNTER — TELEPHONE (OUTPATIENT)
Dept: PSYCHIATRY | Age: 55
End: 2020-06-11

## 2020-06-11 ENCOUNTER — OFFICE VISIT (OUTPATIENT)
Dept: PSYCHIATRY | Age: 55
End: 2020-06-11
Payer: MEDICAID

## 2020-06-11 VITALS
BODY MASS INDEX: 38.41 KG/M2 | DIASTOLIC BLOOD PRESSURE: 82 MMHG | SYSTOLIC BLOOD PRESSURE: 125 MMHG | TEMPERATURE: 98 F | OXYGEN SATURATION: 94 % | HEIGHT: 64 IN | WEIGHT: 225 LBS | HEART RATE: 74 BPM

## 2020-06-11 PROCEDURE — 1036F TOBACCO NON-USER: CPT | Performed by: NURSE PRACTITIONER

## 2020-06-11 PROCEDURE — 3017F COLORECTAL CA SCREEN DOC REV: CPT | Performed by: NURSE PRACTITIONER

## 2020-06-11 PROCEDURE — G8417 CALC BMI ABV UP PARAM F/U: HCPCS | Performed by: NURSE PRACTITIONER

## 2020-06-11 PROCEDURE — G8427 DOCREV CUR MEDS BY ELIG CLIN: HCPCS | Performed by: NURSE PRACTITIONER

## 2020-06-11 PROCEDURE — 99214 OFFICE O/P EST MOD 30 MIN: CPT | Performed by: NURSE PRACTITIONER

## 2020-06-11 RX ORDER — CLONIDINE HYDROCHLORIDE 0.1 MG/1
0.1 TABLET ORAL NIGHTLY
Qty: 30 TABLET | Refills: 1 | Status: SHIPPED | OUTPATIENT
Start: 2020-06-11 | End: 2020-07-17 | Stop reason: SDUPTHER

## 2020-06-11 NOTE — TELEPHONE ENCOUNTER
Pt called adonay that she was having some \"trouble\". Pt went on to say that she was feeling hopeless and having some depression\". She denied any suicidal thoughts-denied she was at risk to harm herself. \"No I'm not suicidal, just don't want to get to that point\". Pt stated after speaking with the therapist yesterday she started working on cleaning her couch off. Pt says she has been trying to get her house clean since March. Pt stated that her couch had a lot of papers on it. Pt stated when she told her mother she was trying to clean the couch off she said something negative. \"Now have lost my confidence\". Sanket Liu TING given the above info and said she could have an appt at 3:30 pm today-would prefer in person but would do per phone if needed. Pt says she would come in for appt at 3:30 pm today and would drive herself due to did not want to be around her mother right now. Pt aware that appt could be done by phone, but stated that she was ok to drive here-GAY Romero made aware and agreeable for pt to drive self to appt.
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
## 2020-06-11 NOTE — PATIENT INSTRUCTIONS
sleepy can worsen this effect. Ask your doctor before taking clonidine with a sleeping pill, narcotic pain medicine, muscle relaxer, or medicine for anxiety, depression, or seizures. Tell your doctor about all your current medicines and any you start or stop using, especially:  · other heart or blood pressure medications;  · an antidepressant; or  · any other medicine that contains clonidine. This list is not complete. Other drugs may interact with clonidine, including prescription and over-the-counter medicines, vitamins, and herbal products. Not all possible interactions are listed in this medication guide. Where can I get more information? Your pharmacist can provide more information about clonidine. Remember, keep this and all other medicines out of the reach of children, never share your medicines with others, and use this medication only for the indication prescribed. Every effort has been made to ensure that the information provided by Erlinda Velazquez [address] is accurate, up-to-date, and complete, but no guarantee is made to that effect. Drug information contained herein may be time sensitive. [facility] information has been compiled for use by healthcare practitioners and consumers in the [place] and therefore [employer] does not warrant that uses outside of the [place] are appropriate, unless specifically indicated otherwise. [facility] drug information does not endorse drugs, diagnose patients or recommend therapy. [facility] drug information is an informational resource designed to assist licensed healthcare practitioners in caring for their patients and/or to serve consumers viewing this service as a supplement to, and not a substitute for, the expertise, skill, knowledge and judgment of healthcare practitioners.  The absence of a warning for a given drug or drug combination in no way should be construed to indicate that the drug or drug combination is safe, effective or appropriate for any given patient. [facility] does not assume any responsibility for any aspect of healthcare administered with the aid of information [facility] provides. The information contained herein is not intended to cover all possible uses, directions, precautions, warnings, drug interactions, allergic reactions, or adverse effects. If you have questions about the drugs you are taking, check with your doctor, nurse or pharmacist.  Copyright 7822-8050 [address]. Version: 9.01. Revision date: 3/23/2016. Care instructions adapted under license by [facility] ([facility]). If you have questions about a medical condition or this instruction, always ask your healthcare professional. [place] [address] any warranty or liability for your use of this information.

## 2020-06-12 ENCOUNTER — TELEPHONE (OUTPATIENT)
Dept: PSYCHIATRY | Age: 55
End: 2020-06-12

## 2020-06-12 NOTE — TELEPHONE ENCOUNTER
Contacted patient to follow up on overdue fit kit. Left message requesting a call back.    Pt called and said she wish not to have her sleep study done. So the sleep study that was scheduled on 07/17/2020 has been canceled. Pt said she would talk more about this with provider at her next appointment.

## 2020-06-17 ENCOUNTER — VIRTUAL VISIT (OUTPATIENT)
Dept: PSYCHIATRY | Age: 55
End: 2020-06-17
Payer: MEDICAID

## 2020-06-17 PROCEDURE — 98968 PH1 ASSMT&MGMT NQHP 21-30: CPT | Performed by: SOCIAL WORKER

## 2020-06-17 NOTE — PATIENT INSTRUCTIONS
Sleep Hygiene Guidelines    Good dental hygiene is important in determining the health of your teeth and gums. We all know we are supposed to brush and floss regularly. Those who do so are more likely to have strong, healthy gums and less cavities. Similarly, good sleep hygiene is important in determining the quality and quantity of your sleep. Below are guidelines for good sleep hygiene practices. Review these guidelines and evaluate how well you practice good sleep hygiene. Caffeine:  Avoid Caffeine 6-8 Hours Before Bedtime       Caffeine disturbs sleep, even in people who do not think they experience a stimulation effect. Individuals with insomnia are often more sensitive to mild stimulants than are normal sleepers. Caffeine is found in items such as coffee, tea, soda, chocolate, and many over-the-counter medications (e.g., Excedrin). Thus, drinking caffeinated beverages should be avoided near bedtime and during the night. You might consider a trial period of no caffeine if you tend to be sensitive to its effects. Nicotine:  Avoid Nicotine Before Bedtime       Although some smokers claim that smoking helps them relax, but nicotine is a stimulant. The initial relaxing effects occur with the initial entry of the nicotine, but as the nicotine builds in the system it produces an effect similar to caffeine. Thus, smoking, dipping, or chewing tobacco should be avoided near bedtime and during the night. Dont smoke to get yourself back to sleep. Alcohol:  Avoid Alcohol After Dinner       Alcohol often promotes the onset of sleep, but as alcohol is metabolized sleep becomes disturbed and fragmented. Thus, a large amount of alcohol is a poor sleep aid and should not be used as such. Limit alcohol use to small quantities to moderate quantities.     Sleeping Pills:  Sleep Medications are Effective Only Temporarily       Scientists have shown that sleep medications lose their effectiveness in about 2 - Those  That Apply  _____ Avoid Caffeine 6-8 Hours Before Bedtime. I will not have caffeine after ________ PM.    ____ Avoid Nicotine Before Bedtime. I will not have a cigarette after _________ PM.    ______  Limit Alcohol Use. I will not have more than _______ drinks in the evening.    ______ Avoid Use of Sleeping Pills. (If you are currently using them regularly, all changes should be   medical supervised by your medical provider). ______ Do Exercise Regularly, But Not Within 2 Hours of Bedtime. I ________________ for ____   minutes, on the following days ____________________________________________________    ______ Ensure your Bedroom is a Comfortable Temperature, Quiet, and Dark and Your   Mattress and Pillow are good. I will make the  following changes to my bedroom   ____________________________________________________________________________    ______ Do Take a Hot Bath 1-2 Hours Prior to Bedtime. I will take a hot bath about ______ PM.    ______ Eat a Light Snack at Bedtime but Avoid Large or Problematic Foods. I will eat     __________________  or _____________________ or __________________ before bed.    ______ Avoid Naps. I try not to nap, if I must, I will limit it to _______ minutes, about 8 hours after I   awoke and will use alarm to limit my nap time. ______ Limit Time In Bed. I have been sleeping on average ______ hours per night, therefore I will   limit my time in bed to _____ hours (the same number). If Im not asleep in about 15 to 20   minutes I will get up and not return to bed until Im sleepy.    ______ Stay on a Regular Sleep Schedule  I will get up at _______ AM, 7 days a week, no matter   how poorly I slept that night.
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
## 2020-06-19 ENCOUNTER — OFFICE VISIT (OUTPATIENT)
Dept: PSYCHIATRY | Age: 55
End: 2020-06-19
Payer: MEDICAID

## 2020-06-19 VITALS
BODY MASS INDEX: 38.58 KG/M2 | HEIGHT: 64 IN | HEART RATE: 70 BPM | DIASTOLIC BLOOD PRESSURE: 79 MMHG | TEMPERATURE: 98.1 F | WEIGHT: 226 LBS | OXYGEN SATURATION: 94 % | SYSTOLIC BLOOD PRESSURE: 118 MMHG

## 2020-06-19 PROCEDURE — 1036F TOBACCO NON-USER: CPT | Performed by: NURSE PRACTITIONER

## 2020-06-19 PROCEDURE — G8417 CALC BMI ABV UP PARAM F/U: HCPCS | Performed by: NURSE PRACTITIONER

## 2020-06-19 PROCEDURE — 99214 OFFICE O/P EST MOD 30 MIN: CPT | Performed by: NURSE PRACTITIONER

## 2020-06-19 PROCEDURE — G8427 DOCREV CUR MEDS BY ELIG CLIN: HCPCS | Performed by: NURSE PRACTITIONER

## 2020-06-19 PROCEDURE — 3017F COLORECTAL CA SCREEN DOC REV: CPT | Performed by: NURSE PRACTITIONER

## 2020-06-19 NOTE — PROGRESS NOTES
most recent 2/21/20     . DRUG REHABILITATION:    no    .    PSYCHIATRIC REVIEW OF SYSTEMS, 3/6/20    . Mood Disorder Questionnaire, +8, Question 2: yes, Question 3: moderate    . Mood:  positive for little interest or pleasure in doing things, feeling down, sleeping too much, poor appetite and overeating, feeling bad about herself, trouble concentrating, denies suicidal thoughts today      (Depression: sadness, tearfulness, sleep, appetite, energy, concentration, sexual function, guilt, psychomotor agitation or slowing, interest, suicidality)    . Estela: positive for elevated mood, felt more self confident than usual, hyperverbal, racing thoughts, easily distracted, more energy, hypersexual, overspending (she reports that these symptoms last 3-4 days at a time)      (impulsivity, grandiosity, recklessness, excessive energy, decreased need for sleep, increased spending beyond means, hyperverbal, grandiose, racing thoughts, hypersexuality)    . Other: positive for irritability and anger (but holds it in), finds it helpful to go on walks, talk to God, journal and tear it up      (Irritability, lability, anger)    . Anxiety:  positive for feeling nervous, anxious or on edge, worrying about her son being gone a lot, worrying about meal preparation, worrying about household chores, trouble relaxing, becoming irritable or annoyed      (Generalized anxiety: where, when, who, how long, how frequent)    . Panic Disorder symptoms: negative      (Palpitations, racing heart beat, sweating, sense of impending doom, fear of recurrence, shortness of breath)    . OCD symptoms:  positive for washes her hands a lot      (checking, cleaning, organizing, rituals, hang-ups, obsessive thoughts, counting, rational vs. Irrational beliefs)    . PTSD symptoms:  positive for hx of flashbacks (none recent)      (nightmares, flashbacks, startle response, avoidance)    .     Social anxiety symptoms:  positive for not moderate    Assessment:   1. Bipolar affective disorder, depressed, moderate ([facility] [address]    2. SALTY (generalized anxiety disorder)    3. Hoarding disorder with good or fair insight    4. Insomnia due to other mental disorder         R/O Sleep Apnea    Plan:  Continue:  Lithium, 300mg, capsule, take 1 capsule in the morning and 2 capsules at bedtime  Lurasidone (Latuda), 20mg, evening meal (at least 350 calories)  Clonidine, 0.1mg, nightly    Continue therapy with VIRGINIA Espinoza    Follow up with the sleep study 7/17/20    Follow up: Return in about 4 weeks (around 7/17/2020). 1. The risks, benefits, side effects, indications, contraindications, and adverse effects of the medications have been discussed. Yes.  2. The pt has verbalized understanding and has capacity to give informed consent. 3. [facility] according to [facility] regulations. 4. Supportive therapy offered. 5. Follow up: Return in about 4 weeks (around 7/17/2020). 6. The patient has been advised to call with any problems. 7. Controlled substance Treatment Plan: none. 8. The above listed medications have been continued, modifications in meds and other orders/labs as follows: No orders of the defined types were placed in this encounter. No orders of the defined types were placed in this encounter. 9. Additional comments:Patient came in today to discuss her sleep study. She called the sleep center twice and cancelled the appt and the sleep center called this office to find out whether to reschedule her or not. Discussed the study with her. She states that she didn't want people coming into her house (she is embarrassed about her house because she has a hoarding problem that she is working on). This provider explained to her that no one would be coming into her house.  This provider did encourage her to take someone with her when picking up the equipment so that when they give instructions about how to use the equipment, there

## 2020-06-23 ENCOUNTER — TELEPHONE (OUTPATIENT)
Dept: PSYCHIATRY | Age: 55
End: 2020-06-23

## 2020-06-24 ENCOUNTER — VIRTUAL VISIT (OUTPATIENT)
Dept: PSYCHIATRY | Age: 55
End: 2020-06-24
Payer: MEDICAID

## 2020-06-24 PROCEDURE — 90837 PSYTX W PT 60 MINUTES: CPT | Performed by: SOCIAL WORKER

## 2020-06-24 NOTE — PATIENT INSTRUCTIONS
your mouth closed but relaxed. Your breaths in and out should be equal in duration, but as short as possible. This is a noisy breathing exercise. - Try for three in-and-out breath cycles per second. This produces a quick movement of the diaphragm, suggesting a madi. Breathe normally after each cycle. - Do not do for more than 15 seconds on your first try. Each time you practice the Stimulating Breath, you can increase your time by five seconds or so, until you reach a full minute. If done properly, you may feel invigorated, comparable to the heightened awareness you feel after a good workout. You should feel the effort at the back of the neck, the diaphragm, the chest and the abdomen. Try this breathing exercise the next time you need an energy boost and feel yourself reaching for a cup of coffee. Exercise 2:  The 4-7-8 (or Relaxing Breath) Exercise  This exercise is utterly simple, takes almost no time, requires no equipment and can be done anywhere. Although you can do the exercise in any position, sit with your back straight while learning the exercise. Place the tip of your tongue against the ridge of tissue just behind your upper front teeth, and keep it there through the entire exercise. You will be exhaling through your mouth around your tongue; try pursing your lips slightly if this seems awkward.  Exhale completely through your mouth, making a whoosh sound.  Close your mouth and inhale quietly through your nose to a mental count of four.  Hold your breath for a count of seven.  Exhale completely through your mouth, making a whoosh sound to a count of eight.  This is one breath. Now inhale again and repeat the cycle three more times for a total of four breaths. Note that you always inhale quietly through your nose and exhale audibly through your mouth. The tip of your tongue stays in position the whole time. Exhalation takes twice as long as inhalation.  The absolute time you spend on each phase is not important; the ratio of 4:7:8 is important. If you have trouble holding your breath, speed the exercise up but keep to the ratio of 4:7:8 for the three phases. With practice you can slow it all down and get used to inhaling and exhaling more and more deeply. This exercise is a natural tranquilizer for the nervous system. Unlike tranquilizing drugs, which are often effective when you first take them but then lose their power over time, this exercise is subtle when you first try it but gains in power with repetition and practice. Do it at least twice a day. You cannot do it too frequently. Do NOT do more than 4 breaths at one time for the first month of practice. Later, if you wish, you can extend it to eight breaths. If you feel a little lightheaded when you first breathe this way, do not be concerned; it will pass. Once you develop this technique by practicing it every day, it will be a very useful tool that you will always have with you. Use it whenever anything upsetting happens - before you react. Use it whenever you are aware of internal tension. Use it to help you fall asleep. This exercise cannot be recommended too highly. Everyone can benefit from it. Exercise 3: Meditation exercise  Breath Counting  If you want to get a feel for this challenging work, try your hand at breath counting, a deceptively simple technique. Sit in a comfortable position with the spine straight and head inclined slightly forward. Gently close your eyes and take a few deep breaths. Then let the breath come naturally without trying to influence it. Ideally it will be quiet and slow, but depth and rhythm may vary.  To begin the exercise, count \"one\" to yourself as you exhale.  The next time you exhale, count \"two,\" and so on up to Chet. \"   Then begin a new cycle, counting \"one\" on the next exhalation. Never count higher than \"five,\" and count only when you exhale.  You will know your attention has wandered when you find yourself up to \"eight,\" \"12,\" even \"19. \"  Try to do 10 minutes of this form of meditation. Deep Breathing       What Is Deep Breathing? Deep breathing involves using your diaphragm muscle to help bring about a state of physiological relaxation. The diaphragm is a large muscle that rests across the bottom of your rib cage. When you inhale, the diaphragm muscle drops, opening up space so air can come in. When watching someone do this it looks like your stomach is filling with air. This type of breathing helps activate the part of your nervous system that controls relaxation. It can lead to decreased heart rate, blood pressure, decreased muscle tension, and overall feelings of relaxation. Why Be Concerned With How Im Breathing?  To increase your awareness of the role that breathing plays in increased physical tension and in contributing to increasing your bodys stress response.  To lower your level of stress-related arousal and tension.  To give you a method of taking calm, relaxing breaths to break the cycle of increasing arousal during stressful situations. What Is the Best Way To Use Deep Breathing Exercises?  Use deep breathing frequently.  Take deep breaths at the first signs of stress, anxiety, physical tension, or other symptoms.  Schedule time for relaxation. My scheduled time for deep breathing will be ____________________________________________________________________________________________________.
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
## 2020-07-17 ENCOUNTER — HOSPITAL ENCOUNTER (OUTPATIENT)
Dept: SLEEP CENTER | Age: 55
Discharge: HOME | End: 2020-07-19
Payer: MEDICAID

## 2020-07-17 ENCOUNTER — OFFICE VISIT (OUTPATIENT)
Dept: PSYCHIATRY | Age: 55
End: 2020-07-17
Payer: MEDICAID

## 2020-07-17 VITALS
HEART RATE: 64 BPM | TEMPERATURE: 97.5 F | OXYGEN SATURATION: 97 % | SYSTOLIC BLOOD PRESSURE: 115 MMHG | BODY MASS INDEX: 38.62 KG/M2 | DIASTOLIC BLOOD PRESSURE: 77 MMHG | WEIGHT: 225 LBS

## 2020-07-17 PROCEDURE — 99214 OFFICE O/P EST MOD 30 MIN: CPT | Performed by: NURSE PRACTITIONER

## 2020-07-17 PROCEDURE — G8427 DOCREV CUR MEDS BY ELIG CLIN: HCPCS | Performed by: NURSE PRACTITIONER

## 2020-07-17 PROCEDURE — 3017F COLORECTAL CA SCREEN DOC REV: CPT | Performed by: NURSE PRACTITIONER

## 2020-07-17 PROCEDURE — G8417 CALC BMI ABV UP PARAM F/U: HCPCS | Performed by: NURSE PRACTITIONER

## 2020-07-17 PROCEDURE — 1036F TOBACCO NON-USER: CPT | Performed by: NURSE PRACTITIONER

## 2020-07-17 PROCEDURE — G0399 HOME SLEEP TEST/TYPE 3 PORTA: HCPCS

## 2020-07-17 RX ORDER — CLONIDINE HYDROCHLORIDE 0.1 MG/1
0.1 TABLET ORAL NIGHTLY
Qty: 90 TABLET | Refills: 1 | Status: SHIPPED | OUTPATIENT
Start: 2020-07-17
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
## 2020-07-17 NOTE — PROGRESS NOTES
Pt in the office to  HST monitor. Pt was educated on how to use equipment properly and instructed to wear for 2 nights and to return on Monday. Pt states she understood. Flonase daily   Follow up with PMD

## 2020-07-17 NOTE — PATIENT INSTRUCTIONS
Plan:  Continue:  Lithium, 300mg, capsule, take 1 capsule in the morning and 2 capsules at bedtime  Lurasidone (Latuda), 20mg, evening meal (at least 350 calories)  Clonidine, 0.1mg, nightly    Continue therapy with VIRGINIA Espinoza    Follow up with the sleep study 7/17/20    Follow up: Return in about 4 weeks (around 8/14/2020).

## 2020-07-18 PROCEDURE — G0399 HOME SLEEP TEST/TYPE 3 PORTA: HCPCS

## 2020-07-20 ENCOUNTER — HOSPITAL ENCOUNTER (INPATIENT)
Age: 55
LOS: 4 days | Discharge: HOME | DRG: 885 | End: 2020-07-24
Attending: EMERGENCY MEDICINE | Admitting: PSYCHIATRY & NEUROLOGY
Payer: MEDICAID

## 2020-07-20 ENCOUNTER — VIRTUAL VISIT (OUTPATIENT)
Dept: PSYCHIATRY | Age: 55
End: 2020-07-20
Payer: MEDICAID

## 2020-07-20 LAB
ACETAMINOPHEN LEVEL: <15 UG/ML
ALBUMIN SERPL-MCNC: 4.7 G/DL (ref 3.5–5.2)
ALP BLD-CCNC: 82 U/L (ref 35–104)
ALT SERPL-CCNC: 17 U/L (ref 5–33)
AMPHETAMINE SCREEN, URINE: NEGATIVE
ANION GAP SERPL CALCULATED.3IONS-SCNC: 11 MMOL/L (ref 7–19)
AST SERPL-CCNC: 17 U/L (ref 5–32)
BARBITURATE SCREEN URINE: NEGATIVE
BASOPHILS ABSOLUTE: 0.1 K/UL (ref 0–0.2)
BASOPHILS RELATIVE PERCENT: 0.8 % (ref 0–1)
BENZODIAZEPINE SCREEN, URINE: NEGATIVE
BILIRUB SERPL-MCNC: 0.4 MG/DL (ref 0.2–1.2)
BUN BLDV-MCNC: 10 MG/DL (ref 6–20)
CALCIUM SERPL-MCNC: 10.6 MG/DL (ref 8.6–10)
CANNABINOID SCREEN URINE: NEGATIVE
CHLORIDE BLD-SCNC: 105 MMOL/L (ref 98–111)
CO2: 26 MMOL/L (ref 22–29)
COCAINE METABOLITE SCREEN URINE: NEGATIVE
CREAT SERPL-MCNC: 0.6 MG/DL (ref 0.5–0.9)
EOSINOPHILS ABSOLUTE: 0.1 K/UL (ref 0–0.6)
EOSINOPHILS RELATIVE PERCENT: 0.5 % (ref 0–5)
ETHANOL: <10 MG/DL (ref 0–0.08)
GFR AFRICAN AMERICAN: >59
GFR NON-AFRICAN AMERICAN: >60
GLUCOSE BLD-MCNC: 105 MG/DL (ref 74–109)
HCT VFR BLD CALC: 47.1 % (ref 37–47)
HEMOGLOBIN: 15.1 G/DL (ref 12–16)
IMMATURE GRANULOCYTES #: 0.1 K/UL
INR BLD: 0.93 (ref 0.88–1.18)
LITHIUM LEVEL: 0.7 MMOL/L (ref 0.6–1.2)
LYMPHOCYTES ABSOLUTE: 1.7 K/UL (ref 1.1–4.5)
LYMPHOCYTES RELATIVE PERCENT: 13.6 % (ref 20–40)
Lab: NORMAL
MCH RBC QN AUTO: 31.7 PG (ref 27–31)
MCHC RBC AUTO-ENTMCNC: 32.1 G/DL (ref 33–37)
MCV RBC AUTO: 98.9 FL (ref 81–99)
MONOCYTES ABSOLUTE: 0.8 K/UL (ref 0–0.9)
MONOCYTES RELATIVE PERCENT: 6.4 % (ref 0–10)
NEUTROPHILS ABSOLUTE: 10 K/UL (ref 1.5–7.5)
NEUTROPHILS RELATIVE PERCENT: 78.2 % (ref 50–65)
OPIATE SCREEN URINE: NEGATIVE
PDW BLD-RTO: 13 % (ref 11.5–14.5)
PLATELET # BLD: 296 K/UL (ref 130–400)
PMV BLD AUTO: 10.1 FL (ref 9.4–12.3)
POTASSIUM REFLEX MAGNESIUM: 4.3 MMOL/L (ref 3.5–5)
PROTHROMBIN TIME: 12.3 SEC (ref 12–14.6)
RBC # BLD: 4.76 M/UL (ref 4.2–5.4)
SALICYLATE, SERUM: <3 MG/DL (ref 3–10)
SARS-COV-2, NAAT: NOT DETECTED
SODIUM BLD-SCNC: 142 MMOL/L (ref 136–145)
TOTAL PROTEIN: 7.8 G/DL (ref 6.6–8.7)
WBC # BLD: 12.8 K/UL (ref 4.8–10.8)

## 2020-07-20 PROCEDURE — 1240000000 HC EMOTIONAL WELLNESS R&B

## 2020-07-20 PROCEDURE — 80178 ASSAY OF LITHIUM: CPT

## 2020-07-20 PROCEDURE — 98968 PH1 ASSMT&MGMT NQHP 21-30: CPT | Performed by: SOCIAL WORKER

## 2020-07-20 PROCEDURE — 6370000000 HC RX 637 (ALT 250 FOR IP): Performed by: PSYCHIATRY & NEUROLOGY

## 2020-07-20 PROCEDURE — 99285 EMERGENCY DEPT VISIT HI MDM: CPT

## 2020-07-20 PROCEDURE — 80053 COMPREHEN METABOLIC PANEL: CPT

## 2020-07-20 PROCEDURE — 80307 DRUG TEST PRSMV CHEM ANLYZR: CPT

## 2020-07-20 PROCEDURE — G0480 DRUG TEST DEF 1-7 CLASSES: HCPCS

## 2020-07-20 PROCEDURE — 36415 COLL VENOUS BLD VENIPUNCTURE: CPT

## 2020-07-20 PROCEDURE — U0002 COVID-19 LAB TEST NON-CDC: HCPCS

## 2020-07-20 PROCEDURE — 85025 COMPLETE CBC W/AUTO DIFF WBC: CPT

## 2020-07-20 PROCEDURE — 85610 PROTHROMBIN TIME: CPT

## 2020-07-20 RX ORDER — CLONIDINE HYDROCHLORIDE 0.1 MG/1
0.1 TABLET ORAL NIGHTLY
Status: DISCONTINUED | OUTPATIENT
Start: 2020-07-20 | End: 2020-07-24 | Stop reason: HOSPADM

## 2020-07-20 RX ORDER — LITHIUM CARBONATE 300 MG/1
300 CAPSULE ORAL DAILY
Status: DISCONTINUED | OUTPATIENT
Start: 2020-07-21 | End: 2020-07-21

## 2020-07-20 RX ORDER — POLYETHYLENE GLYCOL 3350 17 G/17G
17 POWDER, FOR SOLUTION ORAL DAILY PRN
Status: DISCONTINUED | OUTPATIENT
Start: 2020-07-20 | End: 2020-07-24 | Stop reason: HOSPADM

## 2020-07-20 RX ORDER — LITHIUM CARBONATE 300 MG/1
600 CAPSULE ORAL NIGHTLY
Status: DISCONTINUED | OUTPATIENT
Start: 2020-07-20 | End: 2020-07-24 | Stop reason: HOSPADM

## 2020-07-20 RX ORDER — ERGOCALCIFEROL 1.25 MG/1
50000 CAPSULE ORAL WEEKLY
Status: DISCONTINUED | OUTPATIENT
Start: 2020-07-27 | End: 2020-07-24 | Stop reason: HOSPADM

## 2020-07-20 RX ADMIN — CLONIDINE HYDROCHLORIDE 0.1 MG: 0.1 TABLET ORAL at 20:45

## 2020-07-20 RX ADMIN — LITHIUM CARBONATE 600 MG: 300 CAPSULE, GELATIN COATED ORAL at 20:45

## 2020-07-20 RX ADMIN — LURASIDONE HYDROCHLORIDE 20 MG: 40 TABLET, FILM COATED ORAL at 17:01

## 2020-07-20 SDOH — SOCIAL STABILITY: SOCIAL INSECURITY
WITHIN THE LAST YEAR, HAVE TO BEEN RAPED OR FORCED TO HAVE ANY KIND OF SEXUAL ACTIVITY BY YOUR PARTNER OR EX-PARTNER?: NO

## 2020-07-20 SDOH — SOCIAL STABILITY: SOCIAL NETWORK: HOW OFTEN DO YOU ATTEND CHURCH OR RELIGIOUS SERVICES?: MORE THAN 4 TIMES PER YEAR

## 2020-07-20 SDOH — ECONOMIC STABILITY: INCOME INSECURITY: IN THE LAST 12 MONTHS, WAS THERE A TIME WHEN YOU WERE NOT ABLE TO PAY THE MORTGAGE OR RENT ON TIME?: NO

## 2020-07-20 SDOH — SOCIAL STABILITY: SOCIAL INSECURITY: WITHIN THE LAST YEAR, HAVE YOU BEEN AFRAID OF YOUR PARTNER OR EX-PARTNER?: NO

## 2020-07-20 SDOH — ECONOMIC STABILITY: TRANSPORTATION INSECURITY
IN THE PAST 12 MONTHS, HAS LACK OF TRANSPORTATION KEPT YOU FROM MEETINGS, WORK, OR FROM GETTING THINGS NEEDED FOR DAILY LIVING?: NO

## 2020-07-20 SDOH — SOCIAL STABILITY: SOCIAL INSECURITY
WITHIN THE LAST YEAR, HAVE YOU BEEN KICKED, HIT, SLAPPED, OR OTHERWISE PHYSICALLY HURT BY YOUR PARTNER OR EX-PARTNER?: NO

## 2020-07-20 SDOH — ECONOMIC STABILITY: TRANSPORTATION INSECURITY
IN THE PAST 12 MONTHS, HAS THE LACK OF TRANSPORTATION KEPT YOU FROM MEDICAL APPOINTMENTS OR FROM GETTING MEDICATIONS?: NO

## 2020-07-20 SDOH — SOCIAL STABILITY: SOCIAL INSECURITY: WITHIN THE LAST YEAR, HAVE YOU BEEN HUMILIATED OR EMOTIONALLY ABUSED IN OTHER WAYS BY YOUR PARTNER OR EX-PARTNER?: NO

## 2020-07-20 SDOH — ECONOMIC STABILITY: FOOD INSECURITY: WITHIN THE PAST 12 MONTHS, YOU WORRIED THAT YOUR FOOD WOULD RUN OUT BEFORE YOU GOT MONEY TO BUY MORE.: SOMETIMES TRUE

## 2020-07-20 SDOH — SOCIAL STABILITY: SOCIAL NETWORK: ARE YOU MARRIED, WIDOWED, DIVORCED, SEPARATED, NEVER MARRIED, OR LIVING WITH A PARTNER?: DIVORCED

## 2020-07-20 SDOH — SOCIAL STABILITY: SOCIAL NETWORK: HOW OFTEN DO YOU ATTENT MEETINGS OF THE CLUB OR ORGANIZATION YOU BELONG TO?: MORE THAN 4 TIMES PER YEAR

## 2020-07-20 SDOH — ECONOMIC STABILITY: FOOD INSECURITY: WITHIN THE PAST 12 MONTHS, THE FOOD YOU BOUGHT JUST DIDN'T LAST AND YOU DIDN'T HAVE MONEY TO GET MORE.: SOMETIMES TRUE

## 2020-07-20 SDOH — SOCIAL STABILITY: SOCIAL NETWORK
IN A TYPICAL WEEK, HOW MANY TIMES DO YOU TALK ON THE PHONE WITH FAMILY, FRIENDS, OR NEIGHBORS?: MORE THAN THREE TIMES A WEEK

## 2020-07-20 SDOH — ECONOMIC STABILITY: HOUSING INSECURITY
IN THE LAST 12 MONTHS, WAS THERE A TIME WHEN YOU DID NOT HAVE A STEADY PLACE TO SLEEP OR SLEPT IN A SHELTER (INCLUDING NOW)?: NO

## 2020-07-20 SDOH — ECONOMIC STABILITY: HOUSING INSECURITY: IN THE LAST 12 MONTHS, HOW MANY PLACES HAVE YOU LIVED?: 1

## 2020-07-20 SDOH — HEALTH STABILITY: MENTAL HEALTH
STRESS IS WHEN SOMEONE FEELS TENSE, NERVOUS, ANXIOUS, OR CAN'T SLEEP AT NIGHT BECAUSE THEIR MIND IS TROUBLED. HOW STRESSED ARE YOU?: VERY MUCH

## 2020-07-20 SDOH — SOCIAL STABILITY: SOCIAL NETWORK: HOW OFTEN DO YOU GET TOGETHER WITH FRIENDS OR RELATIVES?: TWICE A WEEK

## 2020-07-20 SDOH — ECONOMIC STABILITY: INCOME INSECURITY: HOW HARD IS IT FOR YOU TO PAY FOR THE VERY BASICS LIKE FOOD, HOUSING, MEDICAL CARE, AND HEATING?: SOMEWHAT HARD

## 2020-07-20 SDOH — SOCIAL STABILITY: SOCIAL NETWORK
DO YOU BELONG TO ANY CLUBS OR ORGANIZATIONS SUCH AS CHURCH GROUPS UNIONS, FRATERNAL OR ATHLETIC GROUPS, OR SCHOOL GROUPS?: YES

## 2020-07-20 ASSESSMENT — LIFESTYLE VARIABLES: HISTORY_ALCOHOL_USE: NO

## 2020-07-20 ASSESSMENT — SLEEP AND FATIGUE QUESTIONNAIRES
DIFFICULTY STAYING ASLEEP: YES
DIFFICULTY ARISING: NO
RESTFUL SLEEP: NO
SLEEP PATTERN: RESTLESSNESS
DIFFICULTY FALLING ASLEEP: NO
AVERAGE NUMBER OF SLEEP HOURS: 6
DO YOU HAVE DIFFICULTY SLEEPING: YES
DO YOU USE A SLEEP AID: YES

## 2020-07-20 ASSESSMENT — PATIENT HEALTH QUESTIONNAIRE - PHQ9: SUM OF ALL RESPONSES TO PHQ QUESTIONS 1-9: 18

## 2020-07-20 NOTE — ED TRIAGE NOTES
PT presents to ED c/o suicidal ideations. PT states she tried to OD in February and is having thoughts of doing that again. PT denies hi.
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
## 2020-07-20 NOTE — ED PROVIDER NOTES
Attending Supervisory Note/Shared Visit    I have reviewed the mid-levels findings and agree. We have discussed the case and reviewed the diagnostic data together I am in agreement with the psychiatric evaluation they have accepted the patient to the behavioral health unit.   Deacherry Vazquez MD  Attending Emergency Physician        Yosef Frost MD  07/20/20 [id number]
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
## 2020-07-20 NOTE — PROGRESS NOTES
[facility] ADULT INITIAL INTAKE ASSESSMENT     7/20/20    Shahriar Doe ,a 48 y.o. female, presents to the ED for a psychiatric assessment. ED Arrival time: 1100  ED physician: Kendell MAGUIRE  [facility] Notification time: 12  [facility] Assessment start time: 1245  Psychiatrist call time: [phone] with Dr. Mackey Dakin    Patient is referred by: Placido Farrell    Reason for visit to ED - Presenting problem:     PT states reason for ED visit, \"Having suicidal thoughts with plan to overdose on medications\". Patient is calm and cooperative. Has flat affect. Spoke with Paloma Ferro at outpatient behavioral health office and informed her of suicidal thoughts with plan to overdose on medications and was sent to ER to be evaluated. Patient reports her boyfriend that she has been dating for a few months found out yesterday that he is having to move for a new job out of town and had increased her depression and started having suicidal thoughts this morning and she began thinking about taking her clonidine to harm herself. ER Provider reports: Chantelle Yu a 48 y.o. female who presents to the emergency department for evaluation of mental health problem. Pt tells me that she has history of bipolar disorder followed by [facility] outpatient mental health. She relates that she had telehealth visit today with her therapist. She relates that she was told to present here as she has been having thoughts of suicide. She tells me that she has had thoughts of taking overdose of home medications. She denies overdosing today. She tells me that she overdosed on medication in February. She denies hallucinations. She relates that she has had worsening depression anticipating her boyfriend possibly moving for a job. She denies fever as well as constitutional symptoms of illness.      Duration of symptoms: Couple days    Current Stressors: marital, boyfriend is moving for job    C-SSRS Completed: yes    SI:  admits to   Plan: yes to overdose on medications  Past SI attempts: yes   If yes, when and how many times: Feb 2020  Describe suicide attempts: overdose  HI: denies  If yes describe:   Delusions: denies  If yes describe:   Hallucinations: denies   If yes describe:   Risk of Harm to self: Self injurious/self mutilation behaviorsno   If yes explain:   Was it within the past 6 months: no   Risk of Harm to others: no   If yes explain:   Was it within the past 6 months: no   Trauma History:  Anxiety 1-10:  7  Explain if increased:   Depression 1-10:  8  Explain if increased:   Level of function outside hospital decreased: yes   If yes explain: \"Not cleaning or keeping my house like I should, I won't let people in there because of it\"      Psychiatric Hospitalizations: Yes   Where & When: [facility] 02/2020 and multiple admissions in past  Outpatient Psychiatric Treatment: [address] and [facility]    Family History:    Family history of mental illness: no   \"Depression\",\"Anxiety\",\"Bipolar\",\"Schizophrenia\",\"Borderline\",\"ADHD\"}  Family members with suicide attempt: no   If yes explain (attempted or completed):    Substance Abuse History:     SBIRT Completed: yes  Brief Intervention completed if needed:  (Yes/No)    Current ETOH LEVELS: < 10    ETOH Usage:     Amount drinking daily: denied    Date of last drink:   Longest period of sobriety:    Substance/Chemical Abuse/Recreational Drug History:  Substance used: denied  Date of last substance use: denied  Tobacco Use: no   History of rehab treatment: none  How many times in rehab:  Last time in rehab:  Family history of substance abuse: none    Opiates: It was discussed with pt they would not be receiving opiates unless they were within 3 days post surgery/acute injury. Patient voiced understanding and agreed.      Psychiatric Review Of Systems:     Recent Sleep changes: yes   Recent appetite changes: yes   Recent weight changes/Pounds gained (+) or lost (-): no      Medical History:     Medical Diagnosis/Issues: Hypoglycemia  CT today in ED:no  Use of 02 or CPAP: no  Ambulatory: yes  Independent or Need assistance with Self Care:     PCP: TING Severino - CNP     Current Medications:   Scheduled Meds: No current facility-administered medications for this encounter. Current Outpatient Medications:     lurasidone (LATUDA) 20 MG TABS tablet, Take 1 tablet by mouth daily, Disp: 30 tablet, Rfl: 3    cloNIDine (CATAPRES) 0.1 MG tablet, Take 1 tablet by mouth nightly, Disp: 90 tablet, Rfl: 1    lithium 300 MG capsule, TAKE ONE CAPSULE BY MOUTH EVERY MORNING AND TAKE TWO CAPSULES BY MOUTH EVERY NIGHT AT BEDTIME, Disp: 90 capsule, Rfl: 3    vitamin D (ERGOCALCIFEROL) 1.25 MG (17940 UT) CAPS capsule, Take 1 capsule by mouth once a week for 11 doses, Disp: 5 capsule, Rfl: 1     Mental Status Evaluation:     Appearance:  casually dressed   Behavior:  Restless & fidgety   Speech:  normal pitch and normal volume   Mood:  anxious and depressed   Affect:  mood-congruent   Thought Process:  circumstantial   Thought Content:  suicidal   Sensorium:  person, place, time/date and situation   Cognition:  impaired due to situation   Insight:  Poor insight and poor judgment       Collateral Information:     Name: Cheyenne Reyes  Relationship: Daughter  Phone Number: [phone]  Collateral: unable to reach, left voicemail    Current living arrangement:Lives alone  Current Support System: On disability  Employment: On disability    Disposition:     Choose one of the options below for disposition:     1. Decision to admit to :yes    If yes, which unit Adult or Geriatric Unit:  Geriatric  Is patient voluntary: yes  If no has a 72 hold been initiated:   Admission Diagnosis: Suicidal ideation    Does the patient have a guardian or Medical POA: no  Has the guardian been notified or Medical POA:       2. Decision to Discharge:   Does not meet criteria for acceptance to   unit due to: n/a    3.  Transferred:       Patient was transferred due to: n/a    Other follow up information provided:      Leslie Benavides, RN

## 2020-07-21 PROBLEM — F31.9 BIPOLAR DEPRESSION (HCC): Status: ACTIVE | Noted: 2020-07-21

## 2020-07-21 PROCEDURE — 6370000000 HC RX 637 (ALT 250 FOR IP): Performed by: FAMILY MEDICINE

## 2020-07-21 PROCEDURE — 1240000000 HC EMOTIONAL WELLNESS R&B

## 2020-07-21 PROCEDURE — 99223 1ST HOSP IP/OBS HIGH 75: CPT | Performed by: PSYCHIATRY & NEUROLOGY

## 2020-07-21 PROCEDURE — 6370000000 HC RX 637 (ALT 250 FOR IP): Performed by: PSYCHIATRY & NEUROLOGY

## 2020-07-21 RX ORDER — TRAZODONE HYDROCHLORIDE 50 MG/1
25 TABLET ORAL NIGHTLY PRN
Status: DISCONTINUED | OUTPATIENT
Start: 2020-07-21 | End: 2020-07-24 | Stop reason: HOSPADM

## 2020-07-21 RX ORDER — DOCUSATE SODIUM 100 MG/1
100 CAPSULE, LIQUID FILLED ORAL 2 TIMES DAILY
Status: DISCONTINUED | OUTPATIENT
Start: 2020-07-21 | End: 2020-07-24 | Stop reason: HOSPADM

## 2020-07-21 RX ORDER — HYDROXYZINE HYDROCHLORIDE 25 MG/1
25 TABLET, FILM COATED ORAL 3 TIMES DAILY PRN
Status: DISCONTINUED | OUTPATIENT
Start: 2020-07-21 | End: 2020-07-24 | Stop reason: HOSPADM

## 2020-07-21 RX ORDER — LANOLIN ALCOHOL/MO/W.PET/CERES
3 CREAM (GRAM) TOPICAL NIGHTLY
Status: DISCONTINUED | OUTPATIENT
Start: 2020-07-21 | End: 2020-07-24 | Stop reason: HOSPADM

## 2020-07-21 RX ADMIN — LITHIUM CARBONATE 600 MG: 300 CAPSULE, GELATIN COATED ORAL at 20:42

## 2020-07-21 RX ADMIN — CLONIDINE HYDROCHLORIDE 0.1 MG: 0.1 TABLET ORAL at 20:42

## 2020-07-21 RX ADMIN — DOCUSATE SODIUM 100 MG: 100 CAPSULE, LIQUID FILLED ORAL at 12:44

## 2020-07-21 RX ADMIN — LITHIUM CARBONATE 300 MG: 300 CAPSULE, GELATIN COATED ORAL at 07:38

## 2020-07-21 RX ADMIN — MELATONIN 3 MG: at 20:42

## 2020-07-21 RX ADMIN — HYDROXYZINE HYDROCHLORIDE 25 MG: 25 TABLET, FILM COATED ORAL at 21:13

## 2020-07-21 RX ADMIN — DOCUSATE SODIUM 100 MG: 100 CAPSULE, LIQUID FILLED ORAL at 20:42

## 2020-07-21 ASSESSMENT — PAIN SCALES - GENERAL: PAINLEVEL_OUTOF10: 0

## 2020-07-21 NOTE — PLAN OF CARE
Problem: Depressive Behavior With or Without Suicide Precautions:  Goal: Able to verbalize acceptance of life and situations over which he or she has no control  Description: Able to verbalize acceptance of life and situations over which he or she has no control  Outcome: Ongoing     Problem: Depressive Behavior With or Without Suicide Precautions:  Goal: Able to verbalize and/or display a decrease in depressive symptoms  Description: Able to verbalize and/or display a decrease in depressive symptoms  Outcome: Ongoing     Problem: Depressive Behavior With or Without Suicide Precautions:  Goal: Ability to disclose and discuss suicidal ideas will improve  Description: Ability to disclose and discuss suicidal ideas will improve  Outcome: Ongoing     Problem: Depressive Behavior With or Without Suicide Precautions:  Goal: Able to verbalize support systems  Description: Able to verbalize support systems  Outcome: Ongoing     Problem: Depressive Behavior With or Without Suicide Precautions:  Goal: Absence of self-harm  Description: Absence of self-harm  Outcome: Ongoing     Problem: Depressive Behavior With or Without Suicide Precautions:  Goal: Patient specific goal  Description: Patient specific goal  Outcome: Ongoing     Problem: Depressive Behavior With or Without Suicide Precautions:  Goal: Participates in care planning  Description: Participates in care planning  Outcome: Ongoing     Problem: Falls - Risk of:  Goal: Will remain free from falls  Description: Will remain free from falls  Outcome: Ongoing  Goal: Absence of physical injury  Description: Absence of physical injury  Outcome: Ongoing     Problem: Pain:  Description: Pain management should include both nonpharmacologic and pharmacologic interventions.   Goal: Pain level will decrease  Description: Pain level will decrease  Outcome: Ongoing  Goal: Control of acute pain  Description: Control of acute pain  Outcome: Ongoing  Goal: Control of chronic

## 2020-07-21 NOTE — PLAN OF CARE
Problem: Depressive Behavior With or Without Suicide Precautions:  Goal: Able to verbalize acceptance of life and situations over which he or she has no control  Description: Able to verbalize acceptance of life and situations over which he or she has no control  7/21/2020 1303 by Kyle Wisdom  Outcome: Ongoing  Note:                                                                     Group Therapy Note    Date: 7/21/2020  Start Time: 1000  End Time:  1130  Number of Participants: 5    Type of Group: Psychoeducation    Wellness Binder Information  Module Name:  Women's Issues  Session Number:  4    Group Goal for Pt: To raise awareness of how thoughts influence feelings    Notes:  Pt demonstrated improved awareness of how thoughts influence feelings by actively participating in group discussion.     Status After Intervention:  Unchanged    Participation Level: Interactive    Participation Quality: Attentive      Speech:  normal      Thought Process/Content: Logical      Affective Functioning: Congruent      Mood: anxious and depressed      Level of consciousness:  Alert and Oriented x4      Response to Learning: Able to verbalize current knowledge/experience, Able to verbalize/acknowledge new learning, and Progressing to goal      Endings: None Reported    Modes of Intervention: Education      Discipline Responsible: Psychoeducational Specialist      Signature:  Kyle Wisdom

## 2020-07-21 NOTE — H&P
Department of Psychiatry  Attending History and Physical        CHIEF COMPLAINT: \"I am doing better now\"    History obtained from: patient, chart    HISTORY OF PRESENT ILLNESS:    52-year-old white female with history of bipolar disorder and borderline personality disorder, admitted for suicidal ideation with a plan to overdose on clonidine. UDS negative, BAL less than 10. She is currently being followed at our outpatient clinic. She is maintained on lithium and Latuda. Lithium level 0.7 on admission. Patient presents with affective instability when seen today. She is calm and cooperative during the interview. Speech is nonpressured. Patient denies suicidal ideation at the moment. Claims she is doing much better today compared to yesterday. States her boyfriend told her over the weekend that he may have to move for work. She is unable to go with him. They have been dating since March. Feels that this was a major stressor which triggered suicidal thoughts. Describes having intrusive thoughts about overdosing on clonidine. \"I know I should not be reacting like that. I just could not help it. \"  She reports low mood. Denies mood swings and racing thoughts. Denies decreased need for sleep, however, sleep has been poor at home. She is currently waiting for the results of a sleep study. She denies auditory and visual hallucinations. Denies paranoia. Anxiety has been very high, especially social anxiety. States her leg starts to shake when she gets anxious. Also reports licking her lips and touching her teeth with her tongue. Asking if she has tardive dyskinesia. Patient was informed that she does not have tardive dyskinesia, and her symptoms are likely induced by anxiety. Patient also reports bilateral hand tremor, especially when she is eating. Worries that it may be caused by lithium.   Very mild tremor observed on exam.  Agreed to decrease her dose of lithium and increase Latuda to help with bipolar depression. Patient states she does not keep all her medications at home for safety reasons. States her parents usually keep half of her supply. Patient states doxepin makes her too groggy. She does not recall having an allergic reaction to trazodone. Agreed to try a lower dose tonight. PSYCHIATRIC HISTORY:    Diagnoses: Depression, anxiety, bipolar disorder  Suicide attempts/gestures: 3 times, not counting the current one, by overdose on different medications and using house cleaning liquid  Prior hospitalizations: Multiple to [facility] and [facility]  Medication trials: Depakote, lithium, Lamictal, risperidone, Abilify, trazodone, doxepin  Mental health contact:  clinic     SUBSTANCE USE HISTORY:  Denies alcohol and illicit drug use.  Denies tobacco use. Past Medical History:    Past Medical History:   Diagnosis Date    Chest pain 2/8/2012    Depression     Hypoglycemia     Schizoaffective disorder ([facility] [address] 2/8/2012    Suicide attempt [facility])        Past Surgical History:    Past Surgical History:   Procedure Laterality Date    DILATION AND CURETTAGE OF UTERUS  2017    LEE         Medications Prior to Admission:   Prior to Admission medications    Medication Sig Start Date End Date Taking? Authorizing Provider   lurasidone (LATUDA) 20 MG TABS tablet Take 1 tablet by mouth daily 7/17/20   Josef Sports, APRN - NP   cloNIDine (CATAPRES) 0.1 MG tablet Take 1 tablet by mouth nightly 7/17/20   Josef Sports, APRN - NP   lithium 300 MG capsule TAKE ONE CAPSULE BY MOUTH EVERY MORNING AND TAKE TWO CAPSULES BY MOUTH EVERY NIGHT AT BEDTIME 5/13/20   Josef Sports, APRN - NP   vitamin D (ERGOCALCIFEROL) 1.25 MG (14289 UT) CAPS capsule Take 1 capsule by mouth once a week for 11 doses 3/2/20 5/12/20  Elenita Alonzo MD       Allergies:  Seroquel [quetiapine fumarate]; Adderall [amphetamine-dextroamphetamine]; Amphetamines; Jitendra Foster [aspirin]; Codeine; Cogentin [benztropine];  Effexor [venlafaxine]; Estrogens; Geodon [ziprasidone hcl]; Hydrocodone; Lortab [hydrocodone-acetaminophen]; Macrolides and ketolides; Metoprolol; Neurontin [gabapentin]; Other; Pcn [penicillins]; Provera [medroxyprogesterone]; Prozac [fluoxetine hcl]; Tetracyclines & related; Trazodone and nefazodone; Trintellix [vortioxetine]; Wellbutrin [bupropion]; and Zoloft [sertraline hcl]    Social History:  Patient lives with her 25year-old son. Mother lives across the road. Patient is . She has 51-year-old twins, a boy and a girl. States her sister is supportive and she also has several friends. Family History:   No history of psychiatric illness or suicide attempts. REVIEW OF SYSTEMS:  General: No fevers, chills, night sweats, no recent weight loss or gain. Head: No headache, no migraine. Eyes: No recent visual changes. Ears: No recent hearing changes. Nose: No increased congestion or change in sense of smell. Throat: No sore throat, no trouble swallowing. Cardiovascular: No chest pain or palpitations, or dizziness. Respiratory: No cough, wheezes, congestion, or shortness of breath. Gastrointestinal: No abdominal pain, nausea or vomiting, no diarrhea or constipation. Musculo-skeletal: No edema, deformities, or loss of functions. Neurological: No loss of consciousness, abnormal sensations, or weakness. Bilateral hand tremor. Skin: No rash, abrasions or bruises. PHYSICAL EXAM:  On observation  GENERAL APPEARANCE: Stated age, in NAD. HEAD: Normocephalic, atraumatic. CHEST: No deformities. MUSCULOSKELTAL: No obvious deformities, clubbing, cyanosis or edema. NEUROLOGICAL: Alert, oriented x 3, CN II-XII grossly intact. No abnormal movements or tremors. Gait stable. SKIN: Warm, dry, intact, no rash, abrasions, bruises.     Vitals:  /87   Pulse 76   Temp 97.9 °F (36.6 °C) (Temporal)   Resp 18   Wt 222 lb 6 oz (100.9 kg)   SpO2 94%   BMI 38.17 kg/m²     Mental Status Examination: Appearance: Stated age, casually dressed, long hair, wears glasses. Behavior: Calm, cooperative. Speech: Normal in tone, volume, and quality. No slurring, dysarthria or pressured speech noted. Mood: \" Better now\"   Affect: Labile  Thought Process: Appears linear. Thought Content: Denies suicidal and homicidal ideation. No overt delusions or paranoia appreciated. Perceptions: Denies auditory or visual hallucinations at present time. Not responding to internal stimuli. Concentration: Intact. Orientation: to person, place, date, and situation. Language: Intact. Fund of information: Intact. Memory: Recent and remote appear intact. Neurovegitative: Fair appetite and poor sleep. Insight: Impaired. Judgment: Impaired. DATA:  Lab Results   Component Value Date    WBC 12.8 (H) 07/20/2020    HGB 15.1 07/20/2020    HCT 47.1 (H) 07/20/2020    MCV 98.9 07/20/2020     07/20/2020     Lab Results   Component Value Date     07/20/2020    K 4.3 07/20/2020     07/20/2020    CO2 26 07/20/2020    BUN 10 07/20/2020    CREATININE 0.6 07/20/2020    GLUCOSE 105 07/20/2020    CALCIUM 10.6 (H) 07/20/2020    PROT 7.8 07/20/2020    LABALBU 4.7 07/20/2020    BILITOT 0.4 07/20/2020    ALKPHOS 82 07/20/2020    AST 17 07/20/2020    ALT 17 07/20/2020    LABGLOM >60 07/20/2020    GFRAA >59 07/20/2020    GLOB 3.3 06/18/2016       ASSESSMENT AND PLAN:  DSM-5 DIAGNOSIS:   Impression:  Bipolar I disorder, most recent episode depressed, severe, without psychotic features  Borderline personality disorder    Pertinent medical issues  Rule out sleep apnea  Leukocytosis  Tremor    Patient presents with affective instability and impaired insight and judgment. She is meeting the criteria for inpatient psychiatric treatment. Plan:   -Admit to [facility] Unit and monitor on 15 minute checks. Suicide precautions.  Kaleb Rose reviewed.   -Gather collateral information from family with release.  -Medical monitoring to be performed by Dr. Zach Christian and associates. Check thyroid function and vitamin levels. -Acclimate to the unit. Provide supportive psychotherapy.  -Encourage participation in groups and therapeutic activities as appropriate. Work on coping skills. -Medications:    Lithium may be causing tremor. We will try to decrease the dose. Increase Latuda to help with bipolar depression. Melatonin and low-dose PRN trazodone for sleep.   Atarax for episodic anxiety.  -The risks, benefits, side effects, indications, contraindications, and adverse effects of the medications have been discussed.  -The patient has verbalized understanding and has capacity to give informed consent.  -SW help evaluate home environment and provide outpatient resources.  -Discuss with treatment team.

## 2020-07-21 NOTE — PROGRESS NOTES
SW met with treatment team to discuss pt's progress and setbacks. SW 2 was present. Pt was admitted, voluntary, for SI with a plan to OD on her medications, reports hx of Bipolar DO, hx of psychiatric treatment/multiple admissions, hx of SA by OD in Feb. 2020, identifies current stressor as relationship issues, denies HI/AVH. Since admission, pt reportedly was cooperative with admission process, alert/oriented x 4, slept fair last night, appetite is good, attends scheduled group activities, social with peers/staff, independent with ADL's, refused morning medications, prefers to take her medications at night, reports severe depression/anxiety, denies SI/HI/AVH, continue current treatment plan.

## 2020-07-21 NOTE — PROGRESS NOTES
SW completed psychosocial and CSSR-S on this date. Pt long and short term goals discussed. Pt voiced understanding. Treatment plan sheet signed. Pt verbalized understanding of the treatment plan. Pt participated in goals and objectives of the treatment plan. Pt completed safety plan with , pt received copy of plan, and original was placed into pt's chart. It was identified that pt will require outpatient follow up appointments at local [facility] behavioral health facility such as; [facility]. Pt validated need for appointments. Pt was also provided a handout of contact information for drug and alcohol treatment centers and other community support service such as [facility] and [employer]. In the last 6 months has the pt been danger to self: Yes  In the last 6 months has the pt been danger to others: No  Legal Guardian/POA: No      Provided pt with [employer] handout entitled \"Quitting Smoking,\" reviewed handout with pt addressing dangers of smoking, developing coping skills, and providing basic information about quitting. Patient received all components / Patient refused/declined practical counseling of tobacco practical counseling during the hospital stay.      Electronically signed by Joe Song on 7/21/2020 at 10:34 AM

## 2020-07-21 NOTE — PROGRESS NOTES
Admission Note      Reason for admission/Target Symptom: Patient admitted to [facility] due to pt reporting she had a telehealth visit with therapist and therapist told pt to come to ER for suicidal thoughts with plan to overdose on meds, pt reported she overdosed on her meds in Feb., pt reported worsening depression, denies HI/AVH. Diagnoses: Depression with suicidal ideation  UDS: Negative  BAL:  Negative <10    SW met with treatment team to discuss patient's treatment including care planning, discharge planning, and follow-up needs. Pt has been admitted to [facility]. Treatment team has identified patient's discharge needs as medication management and outpatient therapy/counseling. Pt confirmed  the need for ongoing treatment post inpatient stay. Pt was also provided a handout of contact information for drug and alcohol treatment centers and other community support service such as [facility], [facility], and [facility].     Electronically signed by Tiffanie Duran, [address] on 7/21/2020 at 8:30 AM

## 2020-07-21 NOTE — PLAN OF CARE
Problem: Depressive Behavior With or Without Suicide Precautions:  Goal: Able to verbalize acceptance of life and situations over which he or she has no control  Description: Able to verbalize acceptance of life and situations over which he or she has no control  7/21/2020 1547 by Keith Winston  Outcome: Ongoing  Note:                                                                     Group Therapy Note    Date: 7/21/2020  Start Time: 1430  End Time:  9677  Number of Participants: 5    Type of Group: Recovery    Wellness Binder Information  Module Name:  Women's Issues  Session Number:  1    Group Goal for Pt: To raise awareness of the effectiveness of assertive communication    Notes:  Pt demonstrated improved awareness of the effectiveness of assertive communication by actively participating in group discussion. Status After Intervention:  Unchanged    Participation Level:  Active Listener    Participation Quality: Appropriate and Attentive      Speech:  normal      Thought Process/Content: Logical      Affective Functioning: Congruent      Mood: anxious and depressed      Level of consciousness:  Alert and Oriented x4      Response to Learning: Able to verbalize current knowledge/experience, Able to verbalize/acknowledge new learning, and Progressing to goal      Endings: None Reported    Modes of Intervention: Education      Discipline Responsible: Psychoeducational Specialist      Signature:  Keith Winston

## 2020-07-21 NOTE — PROGRESS NOTES
Attempted to call pt's daughter Rashawn Peguero [facility] signed) [phone] to obtain collateral information. However, received voicemail and left contact information requesting call back.      Electronically signed by Karime Rojas, [address] on 7/21/2020 at 3:11 PM

## 2020-07-22 LAB
BASOPHILS ABSOLUTE: 0.1 K/UL (ref 0–0.2)
BASOPHILS RELATIVE PERCENT: 0.6 % (ref 0–1)
CHOLESTEROL, TOTAL: 149 MG/DL (ref 160–199)
EOSINOPHILS ABSOLUTE: 0.2 K/UL (ref 0–0.6)
EOSINOPHILS RELATIVE PERCENT: 2 % (ref 0–5)
HBA1C MFR BLD: 5.6 % (ref 4–6)
HCT VFR BLD CALC: 47.6 % (ref 37–47)
HDLC SERPL-MCNC: 60 MG/DL (ref 65–121)
HEMOGLOBIN: 14.4 G/DL (ref 12–16)
IMMATURE GRANULOCYTES #: 0.1 K/UL
LDL CHOLESTEROL CALCULATED: 60 MG/DL
LYMPHOCYTES ABSOLUTE: 2.1 K/UL (ref 1.1–4.5)
LYMPHOCYTES RELATIVE PERCENT: 17.8 % (ref 20–40)
MCH RBC QN AUTO: 31.3 PG (ref 27–31)
MCHC RBC AUTO-ENTMCNC: 30.3 G/DL (ref 33–37)
MCV RBC AUTO: 103.5 FL (ref 81–99)
MONOCYTES ABSOLUTE: 0.9 K/UL (ref 0–0.9)
MONOCYTES RELATIVE PERCENT: 7.2 % (ref 0–10)
NEUTROPHILS ABSOLUTE: 8.4 K/UL (ref 1.5–7.5)
NEUTROPHILS RELATIVE PERCENT: 71.7 % (ref 50–65)
PDW BLD-RTO: 13.2 % (ref 11.5–14.5)
PLATELET # BLD: 249 K/UL (ref 130–400)
PMV BLD AUTO: 10.3 FL (ref 9.4–12.3)
RBC # BLD: 4.6 M/UL (ref 4.2–5.4)
TRIGL SERPL-MCNC: 143 MG/DL (ref 0–149)
TSH REFLEX FT4: 2.11 UIU/ML (ref 0.35–5.5)
VITAMIN B-12: 466 PG/ML (ref 211–946)
VITAMIN D 25-HYDROXY: 45.3 NG/ML
WBC # BLD: 11.8 K/UL (ref 4.8–10.8)

## 2020-07-22 PROCEDURE — 99233 SBSQ HOSP IP/OBS HIGH 50: CPT | Performed by: PSYCHIATRY & NEUROLOGY

## 2020-07-22 PROCEDURE — 36415 COLL VENOUS BLD VENIPUNCTURE: CPT

## 2020-07-22 PROCEDURE — 82306 VITAMIN D 25 HYDROXY: CPT

## 2020-07-22 PROCEDURE — 6370000000 HC RX 637 (ALT 250 FOR IP): Performed by: PSYCHIATRY & NEUROLOGY

## 2020-07-22 PROCEDURE — 84443 ASSAY THYROID STIM HORMONE: CPT

## 2020-07-22 PROCEDURE — 6370000000 HC RX 637 (ALT 250 FOR IP): Performed by: FAMILY MEDICINE

## 2020-07-22 PROCEDURE — 1240000000 HC EMOTIONAL WELLNESS R&B

## 2020-07-22 PROCEDURE — 80061 LIPID PANEL: CPT

## 2020-07-22 PROCEDURE — 83036 HEMOGLOBIN GLYCOSYLATED A1C: CPT

## 2020-07-22 PROCEDURE — 82607 VITAMIN B-12: CPT

## 2020-07-22 PROCEDURE — 85025 COMPLETE CBC W/AUTO DIFF WBC: CPT

## 2020-07-22 RX ADMIN — DOCUSATE SODIUM 100 MG: 100 CAPSULE, LIQUID FILLED ORAL at 20:36

## 2020-07-22 RX ADMIN — MELATONIN 3 MG: at 20:36

## 2020-07-22 RX ADMIN — DOCUSATE SODIUM 100 MG: 100 CAPSULE, LIQUID FILLED ORAL at 08:15

## 2020-07-22 RX ADMIN — CLONIDINE HYDROCHLORIDE 0.1 MG: 0.1 TABLET ORAL at 20:36

## 2020-07-22 RX ADMIN — HYDROXYZINE HYDROCHLORIDE 25 MG: 25 TABLET, FILM COATED ORAL at 20:41

## 2020-07-22 RX ADMIN — LURASIDONE HYDROCHLORIDE 40 MG: 40 TABLET, FILM COATED ORAL at 08:15

## 2020-07-22 RX ADMIN — LITHIUM CARBONATE 600 MG: 300 CAPSULE, GELATIN COATED ORAL at 20:36

## 2020-07-22 RX ADMIN — HYDROXYZINE HYDROCHLORIDE 25 MG: 25 TABLET, FILM COATED ORAL at 09:42

## 2020-07-22 RX ADMIN — TRAZODONE HYDROCHLORIDE 25 MG: 50 TABLET ORAL at 20:36

## 2020-07-22 ASSESSMENT — PAIN SCALES - GENERAL: PAINLEVEL_OUTOF10: 0

## 2020-07-22 NOTE — PROGRESS NOTES
WRAP UP GROUP NOTE:     Patient's Goal:  Providing feedback as to their own progress in the care-plan provided. Pt's have an opportunity to explore self-reflective skills and share any additional cares and concerns not yet addressed. Pt effectively participated. Energy level:  Normal   Appetite:  Improving   Concentration:   Improving   Hallucinations:  None   Depression:  Blank   Anxiety:  Blank   How I worked today:  Tried a lot   What helps me sleep:  Read   Any questions/complaints/comments:  I am feeling nervous. Group/activities that helped me today were:  Part of one group and all of one.

## 2020-07-22 NOTE — PROGRESS NOTES
Collateral obtained from: pt's mom Irma Jett ([address] signed) [phone]    Immediate Stressors & Time Episode Began: Mom reported pt was staying in the house all the time and didn't want to go anywhere. Mom reported pt was worried about her diet all the time. Mom reported pt was drinking a lot of water and pt told her dr told her too due to her medications. However, mom reported she thinks pt was drinking way too much water. Mom reported pt is hung up on her routine. Diagnosis/Hx of compliance with meds: Mom reported bipolar and pt is compliant with meds. Tx Hx/Past hospitalizations: Mom reported [facility], [address] [place] [address] twice, and [facility] twice. Family hx of psychiatric issues: Mom reported there is on dad's side if family but she was not sure what the diagnosis was. Substance Abuse: Mom reported none. Pending Legal: Mom reported none. Safety Issues (Weapons? Hx of attempts): Mom reported no weapons in home and hx of suicidal attempt by overdosing on meds. Support system/Medication Managed by: The importance of medication management and locking extra medication in a secured location was explained and reccommended to collateral. Mom reported she keeps pt's meds and pt takes only a couple of days worth at a time to her house. Who does the pt live with: Mom reported pt and pt's son live together. Additional Info: Mom reported pt lives across street from her.      Electronically signed by Yolis Vanegas [facility] on 7/22/2020 at 2:43 PM

## 2020-07-22 NOTE — PLAN OF CARE
Problem: Depressive Behavior With or Without Suicide Precautions:  Goal: Able to verbalize acceptance of life and situations over which he or she has no control  Description: Able to verbalize acceptance of life and situations over which he or she has no control  7/22/2020 1110 by Marcus Davenport  Outcome: Ongoing  Note:                                                                     Group Therapy Note    Date: 7/22/2020  Start Time: 1000  End Time:  1045  Number of Participants: 6    Type of Group: Psychoeducation    Wellness Binder Information  Module Name:  Relapse Prevention  Session Number:  5    Group Goal for Pt: To improve knowledge of relapse prevention strategies    Notes:  Pt demonstrated improved knowledge of relapse prevention strategies by actively participating in group discussion.     Status After Intervention:  Unchanged    Participation Level: Minimal    Participation Quality: Appropriate and Attentive      Speech:  normal      Thought Process/Content: Logical      Affective Functioning: Congruent      Mood: anxious and depressed      Level of consciousness:  Alert and Oriented x4      Response to Learning: Able to verbalize current knowledge/experience, Able to verbalize/acknowledge new learning, and Progressing to goal      Endings: None Reported    Modes of Intervention: Education      Discipline Responsible: Psychoeducational Specialist      Signature:  Marcus Davenport

## 2020-07-22 NOTE — PLAN OF CARE
Problem: Depressive Behavior With or Without Suicide Precautions:  Goal: Able to verbalize acceptance of life and situations over which he or she has no control  Description: Able to verbalize acceptance of life and situations over which he or she has no control  7/22/2020 1537 by Ashley Mijares  Outcome: Ongoing  Note:                                                                     Group Therapy Note    Date: 7/22/2020  Start Time: 1430  End Time:  1530  Number of Participants: 4    Type of Group: Cognitive Skills    Wellness Binder Information  Module Name:  Stress  Session Number:  5    Group Goal for Pt: To raise awareness of the effectiveness of diversionary coping skills    Notes:  Pt demonstrated improved awareness of the effectiveness of diversionary coping skills by actively participating in group activity.     Status After Intervention:  Unchanged    Participation Level: Interactive    Participation Quality: Appropriate and Attentive      Speech:  normal      Thought Process/Content: Logical      Affective Functioning: Congruent      Mood: anxious and depressed      Level of consciousness:  Alert and Oriented x4      Response to Learning: Able to verbalize current knowledge/experience, Able to verbalize/acknowledge new learning, and Progressing to goal      Endings: None Reported    Modes of Intervention: Education      Discipline Responsible: Psychoeducational Specialist      Signature:  Ashley Mijares

## 2020-07-22 NOTE — PROGRESS NOTES
Pt spoke with SW about her phone call to boyfriend last night. Pt reported the phone call went well and that she told him the truth about why she was at hospital. Pt reported he accepted it well and reported he has been around it all of his life and he accepts her and loves her. Pt reported she was relieved that the phone call went well.      Electronically signed by Weston Sams, [address] on 7/22/2020 at 9:08 AM

## 2020-07-22 NOTE — PROGRESS NOTES
Group Therapy Note    Date: 7/22/2020  Start Time: 0800  End Time:   0830  Number of Participants: 7    Type of Group: [employer] Information  Module Name:    Session Number:     Patient's Goal:  Groups and socialization    Notes:  Participated and completed tasks of filling in menu and self survey    Status After Intervention:  Improved    Participation Level:  Active Listener    Participation Quality: Appropriate, Attentive and Sharing      Speech:  normal      Thought Process/Content: Linear      Affective Functioning: Flat      Mood: anxious and depressed      Level of consciousness:  Alert, Oriented x4 and Attentive      Response to Learning: Capable of insight      Endings: None Reported    Modes of Intervention: Education, Support and Socialization      Discipline Responsible: Registered Nurse      Signature:  Vazquez Lebron RN

## 2020-07-22 NOTE — PLAN OF CARE
Problem: Depressive Behavior With or Without Suicide Precautions:  Goal: Able to verbalize acceptance of life and situations over which he or she has no control  Description: Able to verbalize acceptance of life and situations over which he or she has no control  7/22/2020 0515 by Romana Serrano RN  Outcome: Ongoing  7/21/2020 1547 by Quirino Banda  Outcome: Ongoing  Note:                                                                     Group Therapy Note    Date: 7/21/2020  Start Time: 1430  End Time:  1530  Number of Participants: 5    Type of Group: Recovery    Wellness Binder Information  Module Name:  Women's Issues  Session Number:  1    Group Goal for Pt: To raise awareness of the effectiveness of assertive communication    Notes:  Pt demonstrated improved awareness of the effectiveness of assertive communication by actively participating in group discussion. Status After Intervention:  Unchanged    Participation Level:  Active Listener    Participation Quality: Appropriate and Attentive      Speech:  normal      Thought Process/Content: Logical      Affective Functioning: Congruent      Mood: anxious and depressed      Level of consciousness:  Alert and Oriented x4      Response to Learning: Able to verbalize current knowledge/experience, Able to verbalize/acknowledge new learning, and Progressing to goal      Endings: None Reported    Modes of Intervention: Education      Discipline Responsible: Psychoeducational Specialist      Signature:  Quirino Banda    Goal: Able to verbalize and/or display a decrease in depressive symptoms  Description: Able to verbalize and/or display a decrease in depressive symptoms  Outcome: Ongoing  Goal: Ability to disclose and discuss suicidal ideas will improve  Description: Ability to disclose and discuss suicidal ideas will improve  Outcome: Ongoing  Goal: Able to verbalize support systems  Description: Able to verbalize support systems  Outcome: Ongoing  Goal: Patient specific goal  Description: Patient specific goal  Outcome: Ongoing  Goal: Participates in care planning  Description: Participates in care planning  Outcome: Ongoing     Problem: Pain:  Goal: Pain level will decrease  Description: Pain level will decrease  Outcome: Ongoing  Goal: Control of acute pain  Description: Control of acute pain  Outcome: Ongoing  Goal: Control of chronic pain  Description: Control of chronic pain  Outcome: Ongoing     Problem: Health Behavior:  Goal: Ability to verbalize adaptive coping strategies to use when suicidal feelings occur will improve  Description: Ability to verbalize adaptive coping strategies to use when suicidal feelings occur will improve  Outcome: Ongoing  Goal: Ability to verbalize adaptive coping strategies to use when the urge to self-mutilate occurs will improve  Description: Ability to verbalize adaptive coping strategies to use when the urge to self-mutilate occurs will improve  Outcome: Ongoing     Problem: Safety:  Goal: Ability to contract for his/her safety will improve  Description: Ability to contract for his/her safety will improve  Outcome: Ongoing

## 2020-07-22 NOTE — PLAN OF CARE
Problem: Depressive Behavior With or Without Suicide Precautions:  Goal: Able to verbalize acceptance of life and situations over which he or she has no control  Description: Able to verbalize acceptance of life and situations over which he or she has no control  7/22/2020 0752 by Edith Monzon RN  Outcome: Ongoing  7/22/2020 0515 by Mk Ramirez RN  Outcome: Ongoing  Goal: Able to verbalize and/or display a decrease in depressive symptoms  Description: Able to verbalize and/or display a decrease in depressive symptoms  7/22/2020 0752 by Edith Monzon RN  Outcome: Ongoing  7/22/2020 0515 by Mk Ramirez RN  Outcome: Ongoing  Goal: Ability to disclose and discuss suicidal ideas will improve  Description: Ability to disclose and discuss suicidal ideas will improve  7/22/2020 0752 by Edith Monzon RN  Outcome: Ongoing  7/22/2020 0515 by Mk Ramirez RN  Outcome: Ongoing  Goal: Able to verbalize support systems  Description: Able to verbalize support systems  7/22/2020 0752 by Edith Monzon RN  Outcome: Ongoing  7/22/2020 0515 by Mk Ramirez RN  Outcome: Ongoing  Goal: Absence of self-harm  Description: Absence of self-harm  7/22/2020 0752 by Edith Monzon RN  Outcome: Ongoing  7/22/2020 0515 by Mk Ramirez RN  Outcome: Met This Shift  Goal: Patient specific goal  Description: Patient specific goal  7/22/2020 0400 by Edith Monzon RN  Outcome: Ongoing  7/22/2020 0515 by Mk Ramirez RN  Outcome: Ongoing  Goal: Participates in care planning  Description: Participates in care planning  7/22/2020 0752 by Edith Monzon RN  Outcome: Ongoing  7/22/2020 0515 by Mk Ramirez RN  Outcome: Ongoing     Problem: Falls - Risk of:  Goal: Will remain free from falls  Description: Will remain free from falls  7/22/2020 0752 by Edith Monzon RN  Outcome: Ongoing  7/22/2020 0515 by Mk Ramirez RN  Outcome: Met This Shift  Goal: Absence of physical injury  Description: Absence of physical injury  7/22/2020 1844 by Kimmy Campoverde RN  Outcome: Ongoing  7/22/2020 0515 by Ruel Celeste RN  Outcome: Met This Shift     Problem: Pain:  Goal: Pain level will decrease  Description: Pain level will decrease  7/22/2020 0752 by Kimmy Campoverde RN  Outcome: Ongoing  7/22/2020 0515 by Ruel Celeste RN  Outcome: Ongoing  Goal: Control of acute pain  Description: Control of acute pain  7/22/2020 0752 by Kimmy Campoverde RN  Outcome: Ongoing  7/22/2020 0515 by Ruel Celeste RN  Outcome: Ongoing  Goal: Control of chronic pain  Description: Control of chronic pain  7/22/2020 0752 by Kimmy Campoverde RN  Outcome: Ongoing  7/22/2020 0515 by Ruel Celeste RN  Outcome: Ongoing     Problem: Health Behavior:  Goal: Ability to verbalize adaptive coping strategies to use when suicidal feelings occur will improve  Description: Ability to verbalize adaptive coping strategies to use when suicidal feelings occur will improve  7/22/2020 0752 by Kimmy Campoverde RN  Outcome: Ongoing  7/22/2020 0515 by Ruel Celeste RN  Outcome: Ongoing  Goal: Ability to verbalize adaptive coping strategies to use when the urge to self-mutilate occurs will improve  Description: Ability to verbalize adaptive coping strategies to use when the urge to self-mutilate occurs will improve  7/22/2020 0752 by Kimmy Campoverde RN  Outcome: Ongoing  7/22/2020 0515 by Ruel Celeste RN  Outcome: Ongoing     Problem: Safety:  Goal: Ability to contract for his/her safety will improve  Description: Ability to contract for his/her safety will improve  7/22/2020 0752 by Kimmy Campoverde RN  Outcome: Ongoing  7/22/2020 0515 by Ruel Celeste RN  Outcome: Ongoing

## 2020-07-22 NOTE — PROGRESS NOTES
[facility] Daily Shift Assessment  Nursing Progress Note    Room: 0621/621-01 Name: Domi Messer Age: 48 y.o. Gender: female   Dx: <principal problem not specified>  Precautions: suicide risk and fall risk  Target Symptoms:   Accu-Chek: NoSleep: No,Sleep Quality Fair SI No AVH denies [address] [place] [address]  ADLs: Yes Speech: normal Depression: 7 Anxiety: 7   Participation LevelActive Listener and Interactive  Appetite: Good  Visitation: No COVID 19  Participation QualityAppropriate, Attentive and Sharing    Complaints:    Notes: Reports she feels \" weird\" from some of her last night's medication. She ask to rest in her room. She did attend goals/community group and filled out paperwork. Reports her mood as \"tired\". She worries about her medication most of the day. She did ask for Atarax once today. She talked on the phone for quite a while this afternoon. Signature:  Ward Torres RN

## 2020-07-22 NOTE — PROGRESS NOTES
I Daily Shift Assessment-Geriatric Unit  Nursing Progress Note          Room: [facility]/621-01   Name: Rex Kramer   Age: 48 y.o. Gender: female   Dx: <principal problem not specified>  Precautions: suicide risk and fall risk  Inpatient Status: voluntary     SLEEP:    Sleep: Yes,   Sleep Quality Good   Hours Slept: 6   Sleep Medications: Yes  PRN Sleep Meds: No       MEDICAL:      Other PRN Meds: Yes   Med Compliant: Yes   Accu-Chek: No   Oxygen/CPAP/BiPAP: No  CIWA/CINA: No   PAIN Assessment: none  Side Effects from medication: No      PSYCH:     SI denies suicidal ideation    HI Negative for homicidal ideation        AVH:Absent      Depression: 7 Anxiety: 7       GENERAL:      Appetite: good  Social: Yes Speech: normal   Appearance:appropriately dressed and healthy looking  Assistive Devices: noneLevel of Assist: Independent      GROUP:    Group Participation: Yes  Participation LevelMinimal    Participation QualityAppropriate    Notes:   Patient sat in the day area until after receiving medication and then went to bed. Patient requested the medication to use for washing her face, washed face and returned medication to nurse. Patient is focused on how and when she takes her medication and doesn't like that it is not given here like she takes it at home. Patient is compliant with taking medication. Patient resting in bed at this time with eyes closed. Will continue to monitor for safety.     Rubia Viveros RN

## 2020-07-22 NOTE — PROGRESS NOTES
pressured speech noted. Mood: \" Tired\"   Affect: Mood congruent. Thought Process: Appears linear. Thought Content: Denies suicidal and homicidal ideation. No overt delusions or paranoia appreciated. Perceptions: Denies auditory or visual hallucinations at present time. Not responding to internal stimuli. Concentration: Intact. Orientation: to person, place, date, and situation. Language: Intact. Fund of information: Intact. Memory: Recent and remote appear intact. Neurovegitative: Fair appetite and improved sleep. Insight: Improving. Judgment: Improving.     Data  Lab Results   Component Value Date    WBC 11.8 (H) 07/22/2020    HGB 14.4 07/22/2020    HCT 47.6 (H) 07/22/2020    .5 (H) 07/22/2020     07/22/2020      Lab Results   Component Value Date     07/20/2020    K 4.3 07/20/2020     07/20/2020    CO2 26 07/20/2020    BUN 10 07/20/2020    CREATININE 0.6 07/20/2020    GLUCOSE 105 07/20/2020    CALCIUM 10.6 (H) 07/20/2020    PROT 7.8 07/20/2020    LABALBU 4.7 07/20/2020    BILITOT 0.4 07/20/2020    ALKPHOS 82 07/20/2020    AST 17 07/20/2020    ALT 17 07/20/2020    LABGLOM >60 07/20/2020    GFRAA >59 07/20/2020    GLOB 3.3 06/18/2016       Medications    Current Facility-Administered Medications:     docusate sodium (COLACE) capsule 100 mg, 100 mg, Oral, BID, Paula Hansen MD, 100 mg at 07/22/20 0815    lurasidone (LATUDA) tablet 40 mg, 40 mg, Oral, Daily with breakfast, Cy Knox MD, 40 mg at 07/22/20 0815    melatonin tablet 3 mg, 3 mg, Oral, Nightly, Cy Knox MD, 3 mg at 07/21/20 2042    hydrOXYzine (ATARAX) tablet 25 mg, 25 mg, Oral, TID PRN, Cy Knox MD, 25 mg at 07/21/20 2113    traZODone (DESYREL) tablet 25 mg, 25 mg, Oral, Nightly PRN, Cy Knox MD    polyethylene glycol (GLYCOLAX) packet 17 g, 17 g, Oral, Daily PRN, Cy Knox MD    [START ON 7/27/2020] vitamin D (ERGOCALCIFEROL) capsule 50,000 Units, 50,000 Units, Oral, Weekly, Elver Crook MD    cloNIDine (CATAPRES) tablet 0.1 mg, 0.1 mg, Oral, Nightly, Elver Crook MD, 0.1 mg at 07/21/20 2042    lithium capsule 600 mg, 600 mg, Oral, Nightly, Elver Crook MD, 600 mg at 07/21/20 2042    clindamycin phosphate 1% solution, 10 mL, Topical, BID PRN, Elver Crook MD, 10 mL at 07/21/20 2118    benzoyl peroxide 5% topical wash, , Topical, TID PRN, Elver Crook MD      ASSESSMENT AND PLAN  DSM 5 DIAGNOSIS  Impression  Bipolar I disorder, most recent episode depressed, severe, without psychotic features   Insomnia unspecified  Borderline personality disorder     Pertinent medical issues  Rule out sleep apnea  Leukocytosis  Tremor    Some improvement in affect and insight. Continue to observe. Plan:   1. Psychiatric Medications:   Increase Latuda to help with depression starting tomorrow. Monitor for side effects. Monitor lithium level. The risks, benefits, side effects, indications, contraindications, alternatives and adverse effects of the medications have been discussed with patient. 2. Continue to provide supportive psychotherapy. Encourage socialization and participation in recreational activities. Work on coping skills. 3. Medical Issues:    Continue medical monitoring by Dr. Javy Iyer and associates. Monitor CBC. Mild leukocytosis may be due to lithium use. 4. Disposition:     to provide outpatient resources and facilitate disposition.      Amount of time spent with patient:      35 minutes with greater than 50 % of the time spent in counseling and collaboration of care

## 2020-07-22 NOTE — FLOWSHEET NOTE
07/21/20 1600   Encounter Summary   Services provided to: Patient   Referral/Consult From: Nurse;Patient   Support System Significant other;Parent; Children   Complexity of Encounter High   Length of Encounter 2 hours   Spiritual/Sabianism   Type Spiritual support   Assessment Approachable; Anxious; Coping;Helplessness; Anticipatory grief   Intervention Explored feelings, thoughts, concerns;Explored coping resources;Prayer   Outcome Expressed gratitude;Expressed feelings/needs/concerns; Less anxious, less agitated     Patient was very receptive to prayer spiritual support. Discussed relationship with God as well as her relationship in family setting and her social [place]. Indicated she didn't have friends due to COVID-19 situation. Her children are strong motivation for her to get better. Empowered and affirmed her identity and value in God's. Worked to increased ability to trust her doctor's medical intervention to be best help her.    Electronically signed by Ghassan Feliciano on 7/21/2020 at 8:20 PM

## 2020-07-23 PROBLEM — F42.3 HOARDING DISORDER: Chronic | Status: ACTIVE | Noted: 2020-07-23

## 2020-07-23 PROBLEM — F31.4 BIPOLAR AFFECTIVE DISORDER, DEPRESSED, SEVERE (HCC): Status: RESOLVED | Noted: 2018-12-22 | Resolved: 2020-07-23

## 2020-07-23 PROCEDURE — 99233 SBSQ HOSP IP/OBS HIGH 50: CPT | Performed by: PSYCHIATRY & NEUROLOGY

## 2020-07-23 PROCEDURE — 6370000000 HC RX 637 (ALT 250 FOR IP): Performed by: FAMILY MEDICINE

## 2020-07-23 PROCEDURE — 6370000000 HC RX 637 (ALT 250 FOR IP): Performed by: PSYCHIATRY & NEUROLOGY

## 2020-07-23 PROCEDURE — 1240000000 HC EMOTIONAL WELLNESS R&B

## 2020-07-23 RX ORDER — LITHIUM CARBONATE 600 MG/1
600 CAPSULE ORAL NIGHTLY
Qty: 30 CAPSULE | Refills: 1 | Status: ON HOLD
Start: 2020-07-23 | End: 2020-08-19 | Stop reason: HOSPADM

## 2020-07-23 RX ORDER — HYDROXYZINE HYDROCHLORIDE 10 MG/1
10 TABLET, FILM COATED ORAL 3 TIMES DAILY PRN
Qty: 30 TABLET | Refills: 1 | Status: SHIPPED | OUTPATIENT
Start: 2020-07-23 | End: 2020-08-13

## 2020-07-23 RX ADMIN — LURASIDONE HYDROCHLORIDE 60 MG: 40 TABLET, FILM COATED ORAL at 08:15

## 2020-07-23 RX ADMIN — DOCUSATE SODIUM 100 MG: 100 CAPSULE, LIQUID FILLED ORAL at 21:17

## 2020-07-23 RX ADMIN — DOCUSATE SODIUM 100 MG: 100 CAPSULE, LIQUID FILLED ORAL at 08:17

## 2020-07-23 RX ADMIN — LITHIUM CARBONATE 600 MG: 300 CAPSULE, GELATIN COATED ORAL at 21:17

## 2020-07-23 RX ADMIN — POLYETHYLENE GLYCOL 3350 17 G: 17 POWDER, FOR SOLUTION ORAL at 13:59

## 2020-07-23 RX ADMIN — MELATONIN 3 MG: at 21:16

## 2020-07-23 RX ADMIN — CLONIDINE HYDROCHLORIDE 0.1 MG: 0.1 TABLET ORAL at 21:17

## 2020-07-23 RX ADMIN — HYDROXYZINE HYDROCHLORIDE 25 MG: 25 TABLET, FILM COATED ORAL at 21:17

## 2020-07-23 ASSESSMENT — PAIN SCALES - GENERAL
PAINLEVEL_OUTOF10: 0
PAINLEVEL_OUTOF10: 0

## 2020-07-23 NOTE — PROGRESS NOTES
Progress Note  Debbie London  7/22/2020 10:50 PM  Subjective:   Admit Date:   7/20/2020      CC/ADMIT DX:       Interval History:   Reviewed overnight events and nursing notes. No new physical complaints. I have reviewed all labs/diagnostics from the last 24hrs. ROS:   I have done a 10 point ROS and all are negative, except what is mentioned in the HPI. DIET GENERAL;    Medications:      [START ON 7/23/2020] lurasidone  60 mg Oral Daily with breakfast    docusate sodium  100 mg Oral BID    melatonin  3 mg Oral Nightly    [START ON 7/27/2020] vitamin D  50,000 Units Oral Weekly    cloNIDine  0.1 mg Oral Nightly    lithium  600 mg Oral Nightly           Objective:   Vitals: /81   Pulse 76   Temp 98.7 °F (37.1 °C) (Temporal)   Resp 18   Wt 222 lb 2 oz (100.8 kg)   SpO2 97%   BMI 38.13 kg/m²  No intake or output data in the 24 hours ending 07/22/20 2250  General appearance: alert and cooperative with exam  Extremities: extremities normal, atraumatic, no cyanosis or edema  Neurologic:  No obvious focal neurologic deficits. Skin: no rashes    Assessment and Plan: Active Problems:    Bipolar depression ([address]    Borderline personality disorder ([address]    Insomnia  Resolved Problems:    * No resolved hospital problems. *      Plan:  1. Continue present medication(s)   2. Follow with Psych  3. She is medically stable. I will monitor for any changes or concerns. Discharge planning:   her home     Reviewed treatment plans with the patient and/or family.              Electronically signed by Baljinder Crain MD on 7/22/2020 at 10:50 PM

## 2020-07-23 NOTE — PLAN OF CARE
Problem: Depressive Behavior With or Without Suicide Precautions:  Goal: Able to verbalize acceptance of life and situations over which he or she has no control  Description: Able to verbalize acceptance of life and situations over which he or she has no control  Outcome: Ongoing  Note:                                                                     Group Therapy Note    Date: 7/23/2020  Start Time: 1100  End Time:  2379  Number of Participants: 6    Type of Group: Psychoeducation    Wellness Binder Information  Module Name:  Men's Issues  Session Number:  1    Group Goal for Pt: To improve knowledge of effective stress management techniques    Notes:  Pt demonstrated improved knowledge of effective stress management techniques by actively participating in group discussion.     Status After Intervention:  Unchanged    Participation Level: Interactive    Participation Quality: Attentive      Speech:  normal      Thought Process/Content: Logical      Affective Functioning: Congruent      Mood: anxious and depressed      Level of consciousness:  Alert and Oriented x4      Response to Learning: Able to verbalize current knowledge/experience, Able to verbalize/acknowledge new learning, and Progressing to goal      Endings: None Reported    Modes of Intervention: Education      Discipline Responsible: Psychoeducational Specialist      Signature:  Yakelin Page

## 2020-07-23 NOTE — PLAN OF CARE
Problem: Depressive Behavior With or Without Suicide Precautions:  Goal: Able to verbalize acceptance of life and situations over which he or she has no control  Description: Able to verbalize acceptance of life and situations over which he or she has no control  7/23/2020 1456 by Dorothea Young RN  Outcome: Ongoing  7/23/2020 1420 by Sera Riley  Outcome: Ongoing  Note:                                                                     Group Therapy Note    Date: 7/23/2020  Start Time: 1100  End Time:  0264  Number of Participants: 6    Type of Group: Psychoeducation    Wellness Binder Information  Module Name:  Men's Issues  Session Number:  1    Group Goal for Pt: To improve knowledge of effective stress management techniques    Notes:  Pt demonstrated improved knowledge of effective stress management techniques by actively participating in group discussion.     Status After Intervention:  Unchanged    Participation Level: Interactive    Participation Quality: Attentive      Speech:  normal      Thought Process/Content: Logical      Affective Functioning: Congruent      Mood: anxious and depressed      Level of consciousness:  Alert and Oriented x4      Response to Learning: Able to verbalize current knowledge/experience, Able to verbalize/acknowledge new learning, and Progressing to goal      Endings: None Reported    Modes of Intervention: Education      Discipline Responsible: Psychoeducational Specialist      Signature:  Sera Riley    Goal: Able to verbalize and/or display a decrease in depressive symptoms  Description: Able to verbalize and/or display a decrease in depressive symptoms  Outcome: Ongoing  Goal: Ability to disclose and discuss suicidal ideas will improve  Description: Ability to disclose and discuss suicidal ideas will improve  Outcome: Ongoing  Goal: Able to verbalize support systems  Description: Able to verbalize support systems  Outcome: Ongoing  Goal: Absence of self-harm  Description: Absence of self-harm  Outcome: Ongoing  Goal: Patient specific goal  Description: Patient specific goal  Outcome: Ongoing  Goal: Participates in care planning  Description: Participates in care planning  Outcome: Ongoing

## 2020-07-23 NOTE — PROGRESS NOTES
SW met with treatment team to discuss pt's progress and setbacks. SW 2 was present.   Pt reportedly slept well last night, with medications, appetite has improved, attends scheduled group activities, social with peers/staff, performs ADL's, compliant with medications, behavior has been pleasant/cooperative, anxious, reports moderate depression/anxiety, denies SI/HI/AVH, tentative discharge Friday, continue current treatment plan

## 2020-07-23 NOTE — PROGRESS NOTES
[facility] Daily Shift Assessment  Nursing Progress Note    Room: [facility]/621-01 Name: Linden Paniagua Age: 48 y.o. Ethnicity:  Gender: female   Dx: <principal problem not specified>  Precautions: suicide risk  CPAP: No Accu-Chek: No  MSE:  Status and Exam  Normal: No  Facial Expression: Sad, Worried  Affect: Congruent  Level of Consciousness: Alert  Mood:Normal: No  Mood: Depressed, Anxious, Helpless  Motor Activity:Normal: No  Motor Activity: Decreased  Interview Behavior: Cooperative  Preception: [place] to Person, [place] to Place, [place] to Situation, [place] to Time  Attention:Normal: No  Attention: Unable to Concentrate  Thought Processes: Circumstantial  Thought Content:Normal: No  Thought Content: Preoccupations, Obsessions  Hallucinations: None  Delusions: No  Memory:Normal: No  Memory: Poor Recent  Insight and Judgment: No  Insight and Judgment: Poor Insight  Present Suicidal Ideation: No  Present Homicidal Ideation: No  Sleep: Yes, Good, has difficulty falling asleep and has interrupted sleep Hours Slept: 8 Sched Sleep Meds: Yes PRN Sleep Meds: Yes Other PRN Meds: Yes Med Compliant: Yes Appetite: improved Percent Meals: 75% Social: Yes ADLs: Yes Speech: normal Depression: 7 Anxiety: 7    Sunday Avila RN    Pt in the dining area during this assessment. Pt is pleasant ,cooperative and anxious . Pt's anxiety is generalized and she is worried about her boyfriend moving away,he told her that he  Loved and supported her ,which she was worried about. Will continue to monitor.

## 2020-07-23 NOTE — H&P
HISTORY and PHYSICAL      CHIEF COMPLAINT:   Depression, SI    Reason for Admission:  Depression, SI    History Obtained From:  patient    HISTORY OF PRESENT ILLNESS:      The patient is a 48 y.o. female who is admitted to the [place] [address] unit with worsening mood issues. She has no c/o CP or SOA. No abdominal pain or N/V. She has c/o constipation. She is eating ok. No HA or weakness. No new pain issues. No fevers. Past Medical History:        Diagnosis Date    Chest pain 2/8/2012    Depression     Hypoglycemia     Schizoaffective disorder ([facility] 2/8/2012    Suicide attempt [facility])      Past Surgical History:        Procedure Laterality Date    DILATION AND CURETTAGE OF UTERUS  2017    LEE           Medications Prior to Admission:    Medications Prior to Admission: lurasidone (LATUDA) 20 MG TABS tablet, Take 1 tablet by mouth daily  cloNIDine (CATAPRES) 0.1 MG tablet, Take 1 tablet by mouth nightly  lithium 300 MG capsule, TAKE ONE CAPSULE BY MOUTH EVERY MORNING AND TAKE TWO CAPSULES BY MOUTH EVERY NIGHT AT BEDTIME  vitamin D (ERGOCALCIFEROL) 1.25 MG (49001 UT) CAPS capsule, Take 1 capsule by mouth once a week for 11 doses    Allergies:  Seroquel [quetiapine fumarate]; Adderall [amphetamine-dextroamphetamine]; Amphetamines; Cecillia Se [aspirin]; Codeine; Cogentin [benztropine]; Depakote [divalproex sodium]; Effexor [venlafaxine]; Estrogens; Geodon [ziprasidone hcl]; Hydrocodone; Lortab [hydrocodone-acetaminophen]; Macrolides and ketolides; Metoprolol; Neurontin [gabapentin]; Other; Pcn [penicillins]; Provera [medroxyprogesterone]; Prozac [fluoxetine hcl]; Tetracyclines & related; Trazodone and nefazodone; Trileptal [oxcarbazepine]; Trintellix [vortioxetine]; Valium [diazepam]; Vancomycin; Wellbutrin [bupropion]; Zoloft [sertraline hcl]; and Zyprexa [olanzapine]    Social History:   TOBACCO:   reports that she has never smoked.  She has never used smokeless tobacco.  ETOH:   reports no history of alcohol use. DRUGS:   reports no history of drug use. MARITAL STATUS:    OCCUPATION:  Not working  Patient currently lives with family       Family History:   History reviewed. No pertinent family history. REVIEW OF SYSTEMS:  Constitutional: neg  CV: neg  Pulmonary: neg  GI: neg  : neg  Psych: depression, SI  Neuro: neg  Skin: neg  MusculoSkeletal: neg  HEENT: neg  Joints: neg    Vitals:  /81   Pulse 76   Temp 98.7 °F (37.1 °C) (Temporal)   Resp 18   Wt 222 lb 2 oz (100.8 kg)   SpO2 97%   BMI 38.13 kg/m²     PHYSICAL EXAM:  Gen: NAD, alert  HEENT: WNL  Lymph: no LAD  Neck: no JVD or masses  Chest: CTA bilat  CV: RRR  Abdomen: NT/ND  Extrem: no C/C/E  Neuro: non focal  Skin: no rashes  Joints: no redness    DATA:  I have reviewed the admission labs and imaging tests.     ASSESSMENT AND PLAN:      Patient Active Hospital Problem List:   Depression, SI--follow with Psych   Leukocytosis--no s/s of infection            Velma Calhoun MD  10:59 PM 7/22/2020

## 2020-07-23 NOTE — PROGRESS NOTES
Intact. Fund of information: Intact. Memory: Recent and remote appear intact. Neurovegitative: Fair appetite and improved sleep. Insight: Improving. Judgment: Improving.     Data  Lab Results   Component Value Date    WBC 11.8 (H) 07/22/2020    HGB 14.4 07/22/2020    HCT 47.6 (H) 07/22/2020    .5 (H) 07/22/2020     07/22/2020      Lab Results   Component Value Date     07/20/2020    K 4.3 07/20/2020     07/20/2020    CO2 26 07/20/2020    BUN 10 07/20/2020    CREATININE 0.6 07/20/2020    GLUCOSE 105 07/20/2020    CALCIUM 10.6 (H) 07/20/2020    PROT 7.8 07/20/2020    LABALBU 4.7 07/20/2020    BILITOT 0.4 07/20/2020    ALKPHOS 82 07/20/2020    AST 17 07/20/2020    ALT 17 07/20/2020    LABGLOM >60 07/20/2020    GFRAA >59 07/20/2020    GLOB 3.3 06/18/2016       Medications    Current Facility-Administered Medications:     lurasidone (LATUDA) tablet 60 mg, 60 mg, Oral, Daily with breakfast, Gabo Piña MD, 60 mg at 07/23/20 0815    docusate sodium (COLACE) capsule 100 mg, 100 mg, Oral, BID, Zachery Kruse MD, 100 mg at 07/23/20 0817    melatonin tablet 3 mg, 3 mg, Oral, Nightly, Gabo Piña MD, 3 mg at 07/22/20 2036    hydrOXYzine (ATARAX) tablet 25 mg, 25 mg, Oral, TID PRN, Gabo Piña MD, 25 mg at 07/22/20 2041    traZODone (DESYREL) tablet 25 mg, 25 mg, Oral, Nightly PRN, Gabo Piña MD, 25 mg at 07/22/20 2036    polyethylene glycol (GLYCOLAX) packet 17 g, 17 g, Oral, Daily PRN, Gabo Piña MD    [START ON 7/27/2020] vitamin D (ERGOCALCIFEROL) capsule 50,000 Units, 50,000 Units, Oral, Weekly, Gabo Piña MD    cloNIDine (CATAPRES) tablet 0.1 mg, 0.1 mg, Oral, Nightly, Gabo Piña MD, 0.1 mg at 07/22/20 2036    lithium capsule 600 mg, 600 mg, Oral, Nightly, Gabo Piña MD, 600 mg at 07/22/20 2036    clindamycin phosphate 1% solution, 10 mL, Topical, BID PRN, Gabo Piña MD, 10 mL at 07/22/20 2035    benzoyl peroxide 5% topical wash, , Topical, TID PRN, Jason Mclaughlin MD      ASSESSMENT AND PLAN  DSM 5 DIAGNOSIS  Impression  Bipolar I disorder, most recent episode depressed, severe, without psychotic features   Insomnia unspecified  Borderline personality disorder     Pertinent medical issues  Rule out sleep apnea  Leukocytosis  Tremor    Patient continues to improve. Plan to discharge tomorrow if stable. Plan:   1. Psychiatric Medications:   Continue on current medication regimen. Monitor for side effects. Monitor lithium level. The risks, benefits, side effects, indications, contraindications, alternatives and adverse effects of the medications have been discussed with patient. 2. Continue to provide supportive psychotherapy. Encourage socialization and participation in recreational activities. Work on coping skills. 3. Medical Issues:    Continue medical monitoring by Dr. Darwin Floyd and associates. Monitor CBC. Mild leukocytosis may be due to lithium use. 4. Disposition:     to provide outpatient resources and facilitate disposition.      Amount of time spent with patient:      25 minutes with greater than 50 % of the time spent in counseling and collaboration of care

## 2020-07-23 NOTE — PLAN OF CARE
Problem: Depressive Behavior With or Without Suicide Precautions:  Goal: Able to verbalize acceptance of life and situations over which he or she has no control  Description: Able to verbalize acceptance of life and situations over which he or she has no control  7/23/2020 1537 by El Khan  Outcome: Ongoing  Note:                                                                     Group Therapy Note    Date: 7/23/2020  Start Time: 1430  End Time:  1515  Number of Participants: 4    Type of Group: Cognitive Skills    Wellness Binder Information  Module Name:  [address] [place] [address]  Session Number:  1    Group Goal for Pt: To improve knowledge of practical facts about depression    Notes:  Pt demonstrated improved knowledge of practical facts about depression by actively participating in group activity.     Status After Intervention:  Unchanged    Participation Level: Interactive    Participation Quality: Attentive      Speech:  normal      Thought Process/Content: Logical      Affective Functioning: Congruent      Mood: anxious and depressed      Level of consciousness:  Alert and Oriented x4      Response to Learning: Able to verbalize current knowledge/experience, Able to verbalize/acknowledge new learning, and Progressing to goal      Endings: None Reported    Modes of Intervention: Education      Discipline Responsible: Psychoeducational Specialist      Signature:  El Khan

## 2020-07-23 NOTE — BH NOTE
Group Note    Number of Participants in Group: 5  Number of Patients on Unit:6      Patient attended group:Yes  Reason for Absence:  Intervention for patient absence:        Type of Group:   Wrap-Up/Relaxation    Patient's Goal: See wrap up group sheet    Participation Level: Active listener          Patient Response to Learning: Yes    Patient's Behavior: Anxious, Withdrawn and Cooperative    Is Patient Social/Interacting:yes    Relaxation:   Televisionyes   Reading: yes   Game/Puzzle:No   Phone: Yes       Notes/Comments: Pt talked on the phone ,worried about her medications and their side effects,discussed with pt.       Please see patient's wrap up group sheet for patient's comments

## 2020-07-24 VITALS
BODY MASS INDEX: 38.13 KG/M2 | TEMPERATURE: 98.3 F | SYSTOLIC BLOOD PRESSURE: 127 MMHG | RESPIRATION RATE: 16 BRPM | OXYGEN SATURATION: 97 % | WEIGHT: 222.13 LBS | HEART RATE: 87 BPM | DIASTOLIC BLOOD PRESSURE: 87 MMHG

## 2020-07-24 LAB
BASOPHILS ABSOLUTE: 0.1 K/UL (ref 0–0.2)
BASOPHILS RELATIVE PERCENT: 0.7 % (ref 0–1)
EOSINOPHILS ABSOLUTE: 0.2 K/UL (ref 0–0.6)
EOSINOPHILS RELATIVE PERCENT: 2 % (ref 0–5)
HCT VFR BLD CALC: 45.7 % (ref 37–47)
HEMOGLOBIN: 14.6 G/DL (ref 12–16)
IMMATURE GRANULOCYTES #: 0.1 K/UL
LITHIUM LEVEL: 0.6 MMOL/L (ref 0.6–1.2)
LYMPHOCYTES ABSOLUTE: 1.9 K/UL (ref 1.1–4.5)
LYMPHOCYTES RELATIVE PERCENT: 17.9 % (ref 20–40)
MCH RBC QN AUTO: 31.2 PG (ref 27–31)
MCHC RBC AUTO-ENTMCNC: 31.9 G/DL (ref 33–37)
MCV RBC AUTO: 97.6 FL (ref 81–99)
MONOCYTES ABSOLUTE: 0.8 K/UL (ref 0–0.9)
MONOCYTES RELATIVE PERCENT: 7.5 % (ref 0–10)
NEUTROPHILS ABSOLUTE: 7.4 K/UL (ref 1.5–7.5)
NEUTROPHILS RELATIVE PERCENT: 71.2 % (ref 50–65)
PDW BLD-RTO: 13.2 % (ref 11.5–14.5)
PLATELET # BLD: 245 K/UL (ref 130–400)
PMV BLD AUTO: 10.6 FL (ref 9.4–12.3)
RBC # BLD: 4.68 M/UL (ref 4.2–5.4)
REASON FOR REJECTION: NORMAL
REJECTED TEST: NORMAL
WBC # BLD: 10.4 K/UL (ref 4.8–10.8)

## 2020-07-24 PROCEDURE — 5130000000 HC BRIDGE APPOINTMENT

## 2020-07-24 PROCEDURE — 6370000000 HC RX 637 (ALT 250 FOR IP): Performed by: PSYCHIATRY & NEUROLOGY

## 2020-07-24 PROCEDURE — 6370000000 HC RX 637 (ALT 250 FOR IP): Performed by: FAMILY MEDICINE

## 2020-07-24 PROCEDURE — 36415 COLL VENOUS BLD VENIPUNCTURE: CPT

## 2020-07-24 PROCEDURE — 80178 ASSAY OF LITHIUM: CPT

## 2020-07-24 PROCEDURE — 85025 COMPLETE CBC W/AUTO DIFF WBC: CPT

## 2020-07-24 PROCEDURE — 99239 HOSP IP/OBS DSCHRG MGMT >30: CPT | Performed by: PSYCHIATRY & NEUROLOGY

## 2020-07-24 RX ADMIN — DOCUSATE SODIUM 100 MG: 100 CAPSULE, LIQUID FILLED ORAL at 08:26

## 2020-07-24 RX ADMIN — LURASIDONE HYDROCHLORIDE 60 MG: 40 TABLET, FILM COATED ORAL at 08:25

## 2020-07-24 NOTE — PROGRESS NOTES
[facility] Daily Shift Assessment-Geriatric Unit  Nursing Progress Note          Room: [address] [place] [address]   Name: Román Hyatt   Age: 48 y.o. Gender: female   Dx: Bipolar depression ([address]  Precautions: suicide risk and fall risk  Inpatient Status: voluntary     SLEEP:    Sleep: Yes,   Sleep Quality Good   Hours Slept:    Sleep Medications: Yes;Melatonin 3mg  PRN Sleep Meds: No       MEDICAL:      Other PRN Meds: Yes ; atarax 25mg  Med Compliant: Yes   Accu-Chek: No   Oxygen/CPAP/BiPAP: No  CIWA/CINA: No   PAIN Assessment: denies  Side Effects from medication: none voiced      PSYCH:     SI denies suicidal ideation    HI Negative for homicidal ideation        AVH:denies      Depression: 7 Anxiety: 7       GENERAL:      Appetite: good  Social: Yes Speech: normal   Appearance:appropriately dressed  Assistive Devices: noneLevel of Assist: Independent      GROUP:    Group Participation: Yes  Participation LevelInteractive    Participation QualityAppropriate    Notes: Pt is calm and cooperative with her interview. Pt is social with staff and peers. Pt rates depression and anxiety both a 7/10. Atarax administered for anxiety per MAR. Pt asking many questions in regard to her discharge and relates some of her worry to this. Pt denies SI, HI, and AVH. Pt decided not to use the trazadone tonight for sleep because she believes that it is not a medication that she will be using at home and wanted to see how her sleep would be tonight without it. Pt has been resting quietly tonight since going to bed with no complaints. Monitoring for safety continues.            Electronically signed by Laila Howard RN on 7/24/20 at 1:09 AM CDT

## 2020-07-24 NOTE — PROGRESS NOTES
WRAP UP GROUP NOTE    Patient's Goal:  Providing feedback as to their own progress in the care-plan provided. Patients have an opportunity to explore self-reflective skills and share any additional cares and concerns not yet addressed. Pt effectively participated.     Energy Level:low, normal, \"12:00 got a shower\"  Appetite: good  Concentration: normal  Hallucinations:none  Depression:improved  Anxiety:improved  How I worked today:I achieved, tried a lot  What helps me sleep:\"medication\"  Any questions/complaints/comments:\"Will the Latuda make me sleepy\"    Group/activities that helped me today were: \"went to two groups\"    Electronically signed by Cherelle Peterson RN on 7/24/2020 at 12:57 AM

## 2020-07-24 NOTE — PLAN OF CARE
Problem: Depressive Behavior With or Without Suicide Precautions:  Goal: Able to verbalize acceptance of life and situations over which he or she has no control  Description: Able to verbalize acceptance of life and situations over which he or she has no control  7/23/2020 2116 by Meeta Hernandez RN  Outcome: Ongoing  7/23/2020 1537 by Yakelin Page  Outcome: Ongoing  Note:                                                                     Group Therapy Note    Date: 7/23/2020  Start Time: 1430  End Time:  5290  Number of Participants: 4    Type of Group: Cognitive Skills    Wellness Binder Information  Module Name:  [address] [place] [address]  Session Number:  1    Group Goal for Pt: To improve knowledge of practical facts about depression    Notes:  Pt demonstrated improved knowledge of practical facts about depression by actively participating in group activity. Status After Intervention:  Unchanged    Participation Level: Interactive    Participation Quality: Attentive      Speech:  normal      Thought Process/Content: Logical      Affective Functioning: Congruent      Mood: anxious and depressed      Level of consciousness:  Alert and Oriented x4      Response to Learning: Able to verbalize current knowledge/experience, Able to verbalize/acknowledge new learning, and Progressing to goal      Endings: None Reported    Modes of Intervention: Education      Discipline Responsible: Psychoeducational Specialist      Signature:  Yakelin Page    7/23/2020 1456 by Lam Lynn RN  Outcome: Ongoing  7/23/2020 1420 by Yakelin Page  Outcome: Ongoing  Note:                                                                     Group Therapy Note    Date: 7/23/2020  Start Time: 1100  End Time:  7502  Number of Participants: 6    Type of Group: Psychoeducation    Wellness Binder Information  Module Name:  Men's Issues  Session Number:  1    Group Goal for Pt:   To improve knowledge of effective stress management Hung Caballero RN  Outcome: Ongoing

## 2020-07-24 NOTE — PROGRESS NOTES
Discharge Note    Pt discharging on this date. Pt denies SI, HI, and AVH at this time. Pt reports improvement in behavior and is leaving unit in overall good condition. SW and pt discussed pt's follow up appointments and importance of attending appointments as scheduled, pt voiced understanding and agreement. Pt able to verbally identify: warning signs, coping strategies, places and people that help make the pt feel better/distract negative thoughts, friends/family/agencies/professionals the pt can reach out to in a crisis, and something that is important to the pt/worth living for. Pt provided the national suicide prevention hotline number (7-808-228-848-888-2901) as well as local community behavioral health [facility]) crisis number for emergencies (2-404.766.7689). Pt to follow up with [facility] for a therapy appointment on 7/31/2020 at 11:30 AM with Lolis Grant and a med management appointment on 8/7/2020 at 1:30 PM with Jennifer Cortez. SW offered to assist pt with transportation, pt reports having transportation. Referral to out patient tobacco cessation counseling treatment: Patient refused tobacco cessation counseling. Per research note, SW spoke with pt's mom Kari Esqueda about weapons in home. Mom reported no weapons in home. Pt denies use of substances. Referral refused/declined.      Electronically signed by Larry Ray, [address] on 7/24/2020 at 9:50 AM

## 2020-07-24 NOTE — PROGRESS NOTES
Yes  Motor Activity: Decreased  Interview Behavior: Cooperative  Preception: [place] to Time, [place] to Person, Carylon Merari to Place, [place] to Situation  Attention:Normal: Yes  Attention: Unable to Concentrate  Thought Processes: Circumstantial  Thought Content:Normal: Yes  Thought Content: Preoccupations  Hallucinations: None  Delusions: No  Memory:Normal: Yes  Memory: Poor Recent  Insight and Judgment: Yes  Insight and Judgment: Poor Insight, Poor Judgment  Present Suicidal Ideation: No  Present Homicidal Ideation: No

## 2020-07-24 NOTE — PLAN OF CARE
Group Therapy Note    Date: 7/24/2020  Start Time: 1000  End Time:  1100  Number of Participants: 5    Type of Group: Psychoeducation    Wellness Binder Information  Module Name:  Relapse Prevention  Session Number:  5    Group Goal for Pt: To improve knowledge of relapse prevention strategies    Notes:  Pt demonstrated improved knowledge of relapse prevention strategies by actively participating in group discussion. Status After Intervention:  Improved    Participation Level:  Active Listener and Interactive    Participation Quality: Appropriate and Attentive      Speech:  normal      Thought Process/Content: Logical      Affective Functioning: Congruent      Mood: anxious and depressed      Level of consciousness:  Alert and Oriented x4      Response to Learning: Able to verbalize current knowledge/experience, Able to verbalize/acknowledge new learning, and Progressing to goal      Endings: None Reported    Modes of Intervention: Education      Discipline Responsible: Psychoeducational Specialist      Signature:  Liz Yi

## 2020-07-24 NOTE — DISCHARGE SUMMARY
Discharge Summary     Patient ID:  Linden Paniagua  [id number]  39 y.o.  1965    Admit date: 7/20/2020  Discharge date: 7/24/2020    Admitting Physician: Carlos Shafer MD   Attending Physician: Carlos Shafer MD  Discharge Provider: Carlos Shafer MD     Admission Diagnoses:   Nick Katz, most recent episode depressed, severe, without psychotic features  Borderline personality disorder  Rule out sleep apnea  Leukocytosis  Tremor    Discharge Diagnoses:   Bipolar I disorder, most recent episode depressed, severe, without psychotic features   Insomnia unspecified  Borderline personality disorder    Admission Condition: poor    Discharged Condition: stable    Indication for Admission: suicidal ideation    CHIEF COMPLAINT: \"I am doing better now\"     History obtained from: patient, chart     HISTORY OF PRESENT ILLNESS:    80-year-old white female with history of bipolar disorder and borderline personality disorder, admitted for suicidal ideation with a plan to overdose on clonidine. UDS negative, BAL less than 10. She is currently being followed at our outpatient clinic. She is maintained on lithium and Latuda. Lithium level 0.7 on admission.     Patient presents with affective instability when seen today. She is calm and cooperative during the interview. Speech is nonpressured. Patient denies suicidal ideation at the moment. Claims she is doing much better today compared to yesterday. States her boyfriend told her over the weekend that he may have to move for work. She is unable to go with him. They have been dating since March. Feels that this was a major stressor which triggered suicidal thoughts. Describes having intrusive thoughts about overdosing on clonidine. \"I know I should not be reacting like that. I just could not help it. \"  She reports low mood. Denies mood swings and racing thoughts. Denies decreased need for sleep, however, sleep has been poor at home.   She is currently waiting for the results of a sleep study. She denies auditory and visual hallucinations. Denies paranoia. Anxiety has been very high, especially social anxiety. States her leg starts to shake when she gets anxious. Also reports licking her lips and touching her teeth with her tongue. Asking if she has tardive dyskinesia. Patient was informed that she does not have tardive dyskinesia, and her symptoms are likely induced by anxiety. Patient also reports bilateral hand tremor, especially when she is eating. Worries that it may be caused by lithium. Very mild tremor observed on exam.  Agreed to decrease her dose of lithium and increase Latuda to help with bipolar depression. Patient states she does not keep all her medications at home for safety reasons. States her parents usually keep half of her supply. Patient states doxepin makes her too groggy. She does not recall having an allergic reaction to trazodone. Agreed to try a lower dose tonight.     PSYCHIATRIC HISTORY:    Diagnoses: Depression, anxiety, bipolar disorder  Suicide attempts/gestures: 3 times, not counting the current one, by overdose on different medications and using house cleaning liquid  Prior hospitalizations: Multiple to [facility] and [facility]  Medication trials: Depakote, lithium, Lamictal, risperidone, Abilify, trazodone, doxepin  Mental health contact:  clinic     SUBSTANCE USE HISTORY:  Denies alcohol and illicit drug use.  Denies tobacco use. Hospital Course:  Patient was admitted to the behavioral health floor and was acclimated to the unit. She was placed on suicide precautions. Labs were reviewed and physical exam was completed by Dr. Los Burnham and associates. Home medications were reconciled. MAY was obtained and reviewed. Lithium dose was decreased due to concern for tremor. Irene Pall was increased to help with bipolar depression.   Patient received PRN Atarax for episodic anxiety and PRN trazodone for sleep. Lithium level was within normal limits at the time of discharge. Patient was compliant with her medications and denied side effects. Patient attended and participated in groups. All interactions with the peers and staff members were appropriate. Behaviorally, she was not a problem. Sleep and appetite improved. There was no evidence of suicidality. With the above-mentioned medications changes as well as psychotherapeutic interventions, patient reported considerable improvement in her condition and requested discharge home. She was future-oriented and looking forward to her outpatient psychiatric follow-up. It was felt that patient was at her baseline. There were no concerns about her safety as per collateral.  Patient has no access to guns at home. On 7/24/2020 it was therefore decided to discharge the patient, as it was felt that she received maximal benefit from her hospitalization. This patient is not suicidal, homicidal or psychotic at discharge. She does not present danger to self or others.     Number of antipsychotic medication prescribed at discharge: 1  IF MORE THAN ONE IS USED: NA    History of greater than 3 failed multiple monotherapy trials: NA  Monotherapy taper plan/ cross taper in progress: NA  Augmentation of Clozapine: NA    Referral to addiction treatment: patient refused    Prescription for Alcohol or Drug Disorder Medication: patient refused    Prescription for Tobacco Cessation medication: none    If no prescriptions for Tobacco Cessation must document why: non-smoker    Consults: Internal medicine    Significant Diagnostic Studies:   Lab Results   Component Value Date    WBC 10.4 07/24/2020    HGB 14.6 07/24/2020    HCT 45.7 07/24/2020    MCV 97.6 07/24/2020     07/24/2020     Lab Results   Component Value Date     07/20/2020    K 4.3 07/20/2020     07/20/2020    CO2 26 07/20/2020    BUN 10 07/20/2020    CREATININE 0.6 07/20/2020    GLUCOSE 105 07/20/2020    CALCIUM 10.6 (H) 07/20/2020    PROT 7.8 07/20/2020    LABALBU 4.7 07/20/2020    BILITOT 0.4 07/20/2020    ALKPHOS 82 07/20/2020    AST 17 07/20/2020    ALT 17 07/20/2020    LABGLOM >60 07/20/2020    GFRAA >59 07/20/2020    GLOB 3.3 06/18/2016         Lab Results   Component Value Date    LZBGYBXE89 367 07/22/2020     Lab Results   Component Value Date    VITD25 45.3 07/22/2020     Lab Results   Component Value Date    CHOL 149 (L) 07/22/2020    CHOL 152 (L) 02/22/2020    CHOL 130 (L) 12/22/2018     Lab Results   Component Value Date    TRIG 143 07/22/2020    TRIG 130 02/22/2020    TRIG 83 12/22/2018     Lab Results   Component Value Date    HDL 60 (L) 07/22/2020    HDL 52 (L) 02/22/2020    HDL 52 (L) 12/22/2018     Lab Results   Component Value Date    LDLCALC 60 07/22/2020    LDLCALC 74 02/22/2020    LDLCALC 61 12/22/2018     No results found for: LABVLDL, VLDL  No results found for: CHOLHDLRATIO  Lab Results   Component Value Date    LABA1C 5.6 07/22/2020     No results found for: EAG  Lab Results   Component Value Date    TSHFT4 2.11 07/22/2020    TSH 1.140 02/21/2020     Lab Results   Component Value Date    LITHIUM 0.60 07/24/2020     07/20/2020    BUN 10 07/20/2020    CREATININE 0.6 07/20/2020    TSH 1.140 02/21/2020    WBC 10.4 07/24/2020         Treatments: RN and SW    Mental status examination at the time of discharge:  Alert, Oriented X 4  Appearance:  Improved Hygiene, smiling  Speech with Regular Rate and Rhythm  Eye Contact:  Good  No Psychomotor Agitation/Retardation Noted  Attitude:  Cooperative  Mood:  \"Good, ready to go\"  Affective: Congruent, appropriate to the situation, with a normal range and intensity  Thought Processes:  Coherently communicated, logical and goal oriented  Thought Content:  No Suicidal Ideation, No Homicidal Ideation, No Auditory or Visual Hallucinations, NO Overt Delusions  Insight: Improved  Judgement: Improved  Memory is intact for both remote and recent  Intellectual Functioning:  Within the [address] of Knowledge:  Adequate  Attention and Concentration:  Adequate    Discharge Exam:  Please, see medical note    Disposition: home    Patient Instructions:   Current Discharge Medication List      START taking these medications    Details   hydrOXYzine (ATARAX) 10 MG tablet Take 1 tablet by mouth 3 times daily as needed for Anxiety  Qty: 30 tablet, Refills: 1         CONTINUE these medications which have CHANGED    Details   lithium 600 MG capsule Take 1 capsule by mouth nightly  Qty: 30 capsule, Refills: 1      lurasidone (LATUDA) 60 MG TABS tablet Take 1 tablet by mouth daily  Qty: 30 tablet, Refills: 1    Comments: Disregard the script that was just sent in for Abilify. CONTINUE these medications which have NOT CHANGED    Details   cloNIDine (CATAPRES) 0.1 MG tablet Take 1 tablet by mouth nightly  Qty: 90 tablet, Refills: 1      vitamin D (ERGOCALCIFEROL) 1.25 MG (29092 UT) CAPS capsule Take 1 capsule by mouth once a week for 11 doses  Qty: 5 capsule, Refills: 1             Activity: as tolerated  Diet: regular diet  Wound Care: none needed    Follow-up:   Follow up with TING Jimenez CNP in 2 week(s)   on follow up with PCP, please review labs/imaging done during this Hospital stay, and discuss any additional/repeat testing or treatment needed with your PCP  [address] [place] [address]   [phone]    [id number] [facility] Special Use Case with HILDA Albert Cera   Friday Jul 31, 2020 11:30 AM  [address] [facility]   [address] [place] [phone]            Go to [facility]   Friday Jul 31, 2020   Therapy appointment at 11:30 AM with Roosevelt Qureshi. Appointment will be completed by video/phone.  Please bring photo ID, social security card, insurance card, and an updated med list.  [facility] [address] [place] [address]   Phone:

## 2020-07-25 NOTE — PROGRESS NOTES
SW attempted to contact pt to follow-up with her after she discharged from the unit yesterday to see if she had any questions or concerns that needed to be addressed. NAINA called the contact number listed for the pt and it just rang multiple with no answer or way to leave a voicemail. NAINA has no other contact number to reach pt at this time.

## 2020-07-31 ENCOUNTER — VIRTUAL VISIT (OUTPATIENT)
Dept: PSYCHIATRY | Age: 55
End: 2020-07-31
Payer: MEDICAID

## 2020-07-31 ENCOUNTER — HOSPITAL ENCOUNTER (EMERGENCY)
Age: 55
Discharge: HOME | End: 2020-07-31
Attending: EMERGENCY MEDICINE
Payer: MEDICAID

## 2020-07-31 VITALS
OXYGEN SATURATION: 91 % | SYSTOLIC BLOOD PRESSURE: 126 MMHG | HEART RATE: 111 BPM | DIASTOLIC BLOOD PRESSURE: 85 MMHG | TEMPERATURE: 96.7 F | RESPIRATION RATE: 18 BRPM

## 2020-07-31 LAB
ACETAMINOPHEN LEVEL: <15 UG/ML
ALBUMIN SERPL-MCNC: 4.2 G/DL (ref 3.5–5.2)
ALP BLD-CCNC: 79 U/L (ref 35–104)
ALT SERPL-CCNC: 17 U/L (ref 5–33)
AMPHETAMINE SCREEN, URINE: NEGATIVE
ANION GAP SERPL CALCULATED.3IONS-SCNC: 12 MMOL/L (ref 7–19)
AST SERPL-CCNC: 12 U/L (ref 5–32)
BACTERIA: ABNORMAL /HPF
BARBITURATE SCREEN URINE: NEGATIVE
BENZODIAZEPINE SCREEN, URINE: NEGATIVE
BILIRUB SERPL-MCNC: 0.3 MG/DL (ref 0.2–1.2)
BILIRUBIN URINE: NEGATIVE
BLOOD, URINE: NEGATIVE
BUN BLDV-MCNC: 14 MG/DL (ref 6–20)
CALCIUM SERPL-MCNC: 10.1 MG/DL (ref 8.6–10)
CANNABINOID SCREEN URINE: NEGATIVE
CHLORIDE BLD-SCNC: 104 MMOL/L (ref 98–111)
CLARITY: ABNORMAL
CO2: 24 MMOL/L (ref 22–29)
COCAINE METABOLITE SCREEN URINE: NEGATIVE
COLOR: YELLOW
CREAT SERPL-MCNC: 0.7 MG/DL (ref 0.5–0.9)
CRYSTALS, UA: ABNORMAL /HPF
EPITHELIAL CELLS, UA: 9 /HPF (ref 0–5)
ETHANOL: <10 MG/DL (ref 0–0.08)
GFR AFRICAN AMERICAN: >59
GFR NON-AFRICAN AMERICAN: >60
GLUCOSE BLD-MCNC: 115 MG/DL (ref 70–99)
GLUCOSE BLD-MCNC: 120 MG/DL (ref 74–109)
GLUCOSE URINE: NEGATIVE MG/DL
HCT VFR BLD CALC: 44.6 % (ref 37–47)
HEMOGLOBIN: 14.5 G/DL (ref 12–16)
HYALINE CASTS: 0 /HPF (ref 0–8)
KETONES, URINE: NEGATIVE MG/DL
LEUKOCYTE ESTERASE, URINE: ABNORMAL
LITHIUM LEVEL: 0.5 MMOL/L (ref 0.6–1.2)
Lab: NORMAL
MCH RBC QN AUTO: 31.2 PG (ref 27–31)
MCHC RBC AUTO-ENTMCNC: 32.5 G/DL (ref 33–37)
MCV RBC AUTO: 95.9 FL (ref 81–99)
NITRITE, URINE: NEGATIVE
OPIATE SCREEN URINE: NEGATIVE
PDW BLD-RTO: 12.9 % (ref 11.5–14.5)
PERFORMED ON: ABNORMAL
PH UA: 6.5 (ref 5–8)
PLATELET # BLD: 280 K/UL (ref 130–400)
PMV BLD AUTO: 9.9 FL (ref 9.4–12.3)
POTASSIUM SERPL-SCNC: 3.9 MMOL/L (ref 3.5–5)
PROTEIN UA: NEGATIVE MG/DL
RBC # BLD: 4.65 M/UL (ref 4.2–5.4)
RBC UA: 1 /HPF (ref 0–4)
SALICYLATE, SERUM: <3 MG/DL (ref 3–10)
SODIUM BLD-SCNC: 140 MMOL/L (ref 136–145)
SPECIFIC GRAVITY UA: 1.01 (ref 1–1.03)
TOTAL PROTEIN: 7 G/DL (ref 6.6–8.7)
UROBILINOGEN, URINE: 0.2 E.U./DL
WBC # BLD: 11.2 K/UL (ref 4.8–10.8)
WBC UA: 13 /HPF (ref 0–5)

## 2020-07-31 PROCEDURE — 80178 ASSAY OF LITHIUM: CPT

## 2020-07-31 PROCEDURE — 36415 COLL VENOUS BLD VENIPUNCTURE: CPT

## 2020-07-31 PROCEDURE — G0480 DRUG TEST DEF 1-7 CLASSES: HCPCS

## 2020-07-31 PROCEDURE — 82947 ASSAY GLUCOSE BLOOD QUANT: CPT

## 2020-07-31 PROCEDURE — 80053 COMPREHEN METABOLIC PANEL: CPT

## 2020-07-31 PROCEDURE — 87086 URINE CULTURE/COLONY COUNT: CPT

## 2020-07-31 PROCEDURE — 98968 PH1 ASSMT&MGMT NQHP 21-30: CPT | Performed by: SOCIAL WORKER

## 2020-07-31 PROCEDURE — 99284 EMERGENCY DEPT VISIT MOD MDM: CPT

## 2020-07-31 PROCEDURE — 80307 DRUG TEST PRSMV CHEM ANLYZR: CPT

## 2020-07-31 PROCEDURE — 85027 COMPLETE CBC AUTOMATED: CPT

## 2020-07-31 PROCEDURE — 81001 URINALYSIS AUTO W/SCOPE: CPT

## 2020-07-31 ASSESSMENT — ENCOUNTER SYMPTOMS
SHORTNESS OF BREATH: 0
DIARRHEA: 0
VOMITING: 0
EYE PAIN: 0
ABDOMINAL PAIN: 0

## 2020-07-31 NOTE — PROGRESS NOTES
Pt was seen in ER Bed 16 by Medardo Vivar and myself. Pt was alert and oriented, calm and cooperative. Pt came to ER today voicing concerns of not being able to function at home since she was discharged from the [facility] Unit on 7/24. Pt says all she does is stay at home in bed most of the days. Pt stated she has been having negative thoughts when she sits home. She is taking her medication as prescribed and she is following up with her appointments. Medardo Vivar has recommended she start  Intensive Outpatient Therapy. This will help her get out of the house and go to therapy, which she will greatly benefit from. He spoke to our , Jamaal Herrera and she will work on setting that up on Monday and she will call the patient at home with that information. Encouraged the patient to keep her other appointments that were made and keep taking her medications as ordered.  explained to the patient that she has to believe in herself and want to get better. She has to use her coping skills that she learned, medication is not the only answer. I encouraged the patient to get up, shower and get out of the house everyday. Even if it was going to her parents across the street or going for a walk, it's better than isolating in her room. She said she goes to visit her boyfriend a few times a week but since he is moving this is something she is having to deal with also. Pt was in good spirits and thanked  for being honest with her and coming down to see her. Medardo Vivar will call Corky Clay LCSW who the pt had a therapy appointment with today and referred her to ER, to inform her of the plan moving forward. Pt was discharged in no distress from the ER.
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
## 2020-08-02 LAB — URINE CULTURE, ROUTINE: NORMAL

## 2020-08-03 ENCOUNTER — TELEPHONE (OUTPATIENT)
Dept: PSYCHIATRY | Age: 55
End: 2020-08-03

## 2020-08-03 NOTE — PROGRESS NOTES
Per  request, NAINA placed call to [facility] program to set up an appointment for pt. It was reported that they would call pt and give her an appointment date/time for zoom. All appointments are completed over zoom. NAINA attempted to call pt at home and at pt's parents house to inform her of above information. However, received voicemail on both numbers.      Electronically signed by Nicolas Leone, [address] on 8/3/2020 at 9:41 AM

## 2020-08-05 ENCOUNTER — VIRTUAL VISIT (OUTPATIENT)
Dept: PSYCHIATRY | Age: 55
End: 2020-08-05
Payer: MEDICAID

## 2020-08-05 PROCEDURE — 98968 PH1 ASSMT&MGMT NQHP 21-30: CPT | Performed by: SOCIAL WORKER

## 2020-08-05 NOTE — PATIENT INSTRUCTIONS
How to Keep a [employer] Every Day of the Week  July 20, 2018   BHAVESHJackson LIZETTJackson [facility]    Keeping your home tidy might seem like an impossible task--but it doesnt have to be. We put together some of the best tips so you and your family can learn how to keep a house clean every day of the week. The average American spends one hour a day cleaning their house, according to the [employer]. Between work, errands, parenthood and the chaos of everyday life, learning how to keep a house clean can be one of the toughest lessons we learn. While it sometimes seems impossible to manage everything on your plate, there are some quick and easy tips to help you get your house (and your life) in order. Read on for everything you need to know about how to keep a house clean. How to Keep a House Clean Tip #1: Put everything away after use. This might seem like an obvious tip, but failing to put away your belongings is the main culprit of untidiness. As you move from one room to another, do a quick scan to see if theres anything that you can take with you. Go out of your way to make sure that anything you wear, use or move ends up where it belongs. Its easy to leave a pair of shoes near the front door, a few shirts on your bed and some dishes in the sink until tomorrow. Remember, those tiny piles can quickly turn into big messes. If youre lacking space, consider some DIY closet organization ideas like tension rods and shower curtain hooks. Use over-the-door organizers to extend existing spaces in your bedrooms, bathrooms and reyes closets. Shahid your smaller belongings with decorative baskets and organize your paper items with a . And remember: it takes much less time to put away your belongings each time than to frantically run around looking for them when you need them again! How to Keep a House Clean Tip #2: Do one room at a time.   The easiest way to keep your entire house clean is to tackle one room at a time. By splitting up the cleaning by room (and by day), you can accomplish a little bit at a time instead of tackling every task at once. Heres how to keep a house clean by focusing on one room at a time:  Bathroom  From toothpaste dribbles and mirror stains to wet floors and shower grime, the bathroom will endure a variety of messes throughout the week. Because we constantly use it each day, its easiest to clean up after yourself instead of waiting a few weeks later for a bigger mess. Areas to focus on for a clean bathroom:   Clean the sink    Scrub the shower, tub and toilet    Remove mirror spots    Mop the floors (dont forget those corners)  Looking for a natural, easy-to-make  for your home? DIY  are a cost-effective way to keep your house clean. Try this simple DIY  recipe:   2 cups water    ¼ cup baking soda    2 tsp. dishwashing liquid    3 tbsp. white vinegar    10 drops essential oil  After you use the sink or step out of the shower, take your natural  and spray down the surface. Run the water again to wash all the  off. Voilà--youve prolonged your time until you have to do a deep clean in your shower! The essential oil will also keep the room smelling fresh for several days. For best results, ask everyone in your household to make this a habit. Bedroom  The key to keeping bedrooms clean is all about storage. If you have proper places for all of your clothes and accessories, youre more likely to keep your personal space clean. If youve noticed that you dont seem to have enough space, invest in storage containers or baskets that can fit under your bed. Having a complete change of linens under your bed also makes it easier to change them while your other set is being washed. Picking the right nightstand helps to keep your room more organized as well. Store Toro Erlinda, books and magazines in the drawers.  If its large enough, it can double as a your clean home. Hidden storage. If you dont have a home for items like toys, books and games, vertical shelving can help display your things in an organized way. Storage ottomans are also perfect for minimizing clutter and storing things out of sight. How to Keep a [employer] Tip #3: Stick to a schedule. Its one thing to give each room a thorough clean, but how do you make sure your house stays clutter-free every day of the week? Believe it or not, easily: a cleaning schedule. Create a list to keep on your fridge, your wall or your desk to help keep you and your household organized. Your schedule should include not just what needs to be done, but when it needs to be done. By splitting your small household tasks into daily, weekly and even monthly routines, you can keep your house in perfect shape year round. How to Keep a [employer] Daily  Most people wait until their house is messy to start cleaning. The trick is to put in a small effort every day to keep your house as tidy as possible. These daily tasks are small but effective reminders of how to keep a house clean. Six easy ways to go to sleep with a tidy home each night:  Make the bed. The best way to start your day is by making your bed. Simply making your bed each day will have a mariam effect, allowing you to keep everything else neat and tidy. Clean as you cook. As you learn how to keep a house clean, pay close attention to clutter in the kitchen. Throw out scraps and empty packages as you use them. Wash pots and utensils while dinner roasts in the oven. Make sure dishes are put away before sitting down after meals. Cleaning as you go saves time and keeps your kitchen in tip-top shape. Grab as you go. Make it a mission to minimize clutter by picking up your belongings whenever you leave a room. Bring a pair of shoes with you when you go upstairs,  the coffee cup on the counter and take dirty laundry with you on your trip downstairs. their house is a mess. By spending 10-15 minutes on a nightly power cleanup, you can prevent piles of clutter from forming in your home. If you can involve your family members, the more the merrier! Put on a timer, have some fun with it and focus on your top cleaning priorities. Focus on the items that pose the biggest mess (whether its your kitchen, kids bedroom or foyer) first. Then chip away at the areas that see the most traffic. Here are some ham areas to keep in mind:   Shoes in the entryway   [employer] in the sink    Items on the bathroom counter    Coffee table clutter    Toys on the living room floor  Think youve mastered how to keep a house clean? Figuring out how to keep a house clean may seem like one of lifes many mysteries. With a little time and effort, you can easily create great cleaning habits to keep your nest tidy year round. How do you keep your house clean? What are some of your most useful tips? Let us know in the comments below!
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
## 2020-08-07 ENCOUNTER — VIRTUAL VISIT (OUTPATIENT)
Dept: PSYCHIATRY | Age: 55
End: 2020-08-07
Payer: MEDICAID

## 2020-08-07 PROCEDURE — 99443 PR PHYS/QHP TELEPHONE EVALUATION 21-30 MIN: CPT | Performed by: NURSE PRACTITIONER
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
## 2020-08-07 NOTE — PATIENT INSTRUCTIONS
Plan:  Continue:  Lithium, 600mg, nightly  Lurasidone (Latuda), 60mg, evening meal (at least 350 calories)  Clonidine, 0.1mg, nightly  Hydroxyzine, 10mg, up to 3 times daily as needed for anxiety    Vitamin D, 5000 units, once weekly (Vit D level is at 45.3, normal)    Take your medication to your parents house and go over to their house at dinnertime to get your evening meds. Continue therapy with VIRGINIA Espinoza    Follow up: Return in about 2 weeks (around 8/21/2020).

## 2020-08-12 ENCOUNTER — TELEPHONE (OUTPATIENT)
Dept: PSYCHIATRY | Age: 55
End: 2020-08-12

## 2020-08-13 ENCOUNTER — HOSPITAL ENCOUNTER (INPATIENT)
Age: 55
LOS: 6 days | Discharge: HOME | DRG: 885 | End: 2020-08-19
Attending: EMERGENCY MEDICINE | Admitting: PSYCHIATRY & NEUROLOGY
Payer: MEDICAID

## 2020-08-13 ENCOUNTER — TELEPHONE (OUTPATIENT)
Dept: PSYCHIATRY | Age: 55
End: 2020-08-13

## 2020-08-13 LAB
ALBUMIN SERPL-MCNC: 4.3 G/DL (ref 3.5–5.2)
ALP BLD-CCNC: 82 U/L (ref 35–104)
ALT SERPL-CCNC: 20 U/L (ref 5–33)
AMPHETAMINE SCREEN, URINE: NEGATIVE
ANION GAP SERPL CALCULATED.3IONS-SCNC: 10 MMOL/L (ref 7–19)
AST SERPL-CCNC: 15 U/L (ref 5–32)
BACTERIA: ABNORMAL /HPF
BARBITURATE SCREEN URINE: NEGATIVE
BASOPHILS ABSOLUTE: 0.1 K/UL (ref 0–0.2)
BASOPHILS RELATIVE PERCENT: 0.6 % (ref 0–1)
BENZODIAZEPINE SCREEN, URINE: NEGATIVE
BILIRUB SERPL-MCNC: 0.3 MG/DL (ref 0.2–1.2)
BILIRUBIN URINE: NEGATIVE
BLOOD, URINE: ABNORMAL
BUN BLDV-MCNC: 13 MG/DL (ref 6–20)
CALCIUM SERPL-MCNC: 9.5 MG/DL (ref 8.6–10)
CANNABINOID SCREEN URINE: NEGATIVE
CHLORIDE BLD-SCNC: 110 MMOL/L (ref 98–111)
CLARITY: ABNORMAL
CO2: 23 MMOL/L (ref 22–29)
COCAINE METABOLITE SCREEN URINE: NEGATIVE
COLOR: YELLOW
CREAT SERPL-MCNC: 0.7 MG/DL (ref 0.5–0.9)
EOSINOPHILS ABSOLUTE: 0.1 K/UL (ref 0–0.6)
EOSINOPHILS RELATIVE PERCENT: 1.1 % (ref 0–5)
EPITHELIAL CELLS, UA: ABNORMAL /HPF
ETHANOL: <10 MG/DL (ref 0–0.08)
GFR AFRICAN AMERICAN: >59
GFR NON-AFRICAN AMERICAN: >60
GLUCOSE BLD-MCNC: 130 MG/DL (ref 74–109)
GLUCOSE URINE: NEGATIVE MG/DL
HCT VFR BLD CALC: 43.9 % (ref 37–47)
HEMOGLOBIN: 14.5 G/DL (ref 12–16)
IMMATURE GRANULOCYTES #: 0.1 K/UL
KETONES, URINE: NEGATIVE MG/DL
LEUKOCYTE ESTERASE, URINE: ABNORMAL
LITHIUM LEVEL: 0.3 MMOL/L (ref 0.6–1.2)
LYMPHOCYTES ABSOLUTE: 1.9 K/UL (ref 1.1–4.5)
LYMPHOCYTES RELATIVE PERCENT: 18.6 % (ref 20–40)
Lab: NORMAL
MCH RBC QN AUTO: 30.9 PG (ref 27–31)
MCHC RBC AUTO-ENTMCNC: 33 G/DL (ref 33–37)
MCV RBC AUTO: 93.4 FL (ref 81–99)
MONOCYTES ABSOLUTE: 0.7 K/UL (ref 0–0.9)
MONOCYTES RELATIVE PERCENT: 7 % (ref 0–10)
NEUTROPHILS ABSOLUTE: 7.4 K/UL (ref 1.5–7.5)
NEUTROPHILS RELATIVE PERCENT: 72.2 % (ref 50–65)
NITRITE, URINE: NEGATIVE
OPIATE SCREEN URINE: NEGATIVE
PDW BLD-RTO: 12.8 % (ref 11.5–14.5)
PH UA: 5.5 (ref 5–8)
PLATELET # BLD: 294 K/UL (ref 130–400)
PMV BLD AUTO: 9.8 FL (ref 9.4–12.3)
POTASSIUM REFLEX MAGNESIUM: 4 MMOL/L (ref 3.5–5)
PROTEIN UA: ABNORMAL MG/DL
RBC # BLD: 4.7 M/UL (ref 4.2–5.4)
RBC UA: ABNORMAL /HPF (ref 0–2)
SODIUM BLD-SCNC: 143 MMOL/L (ref 136–145)
SPECIFIC GRAVITY UA: 1.02 (ref 1–1.03)
TOTAL PROTEIN: 7.1 G/DL (ref 6.6–8.7)
UROBILINOGEN, URINE: 0.2 E.U./DL
WBC # BLD: 10.2 K/UL (ref 4.8–10.8)
WBC UA: ABNORMAL /HPF (ref 0–5)

## 2020-08-13 PROCEDURE — 80178 ASSAY OF LITHIUM: CPT

## 2020-08-13 PROCEDURE — 85025 COMPLETE CBC W/AUTO DIFF WBC: CPT

## 2020-08-13 PROCEDURE — 1240000000 HC EMOTIONAL WELLNESS R&B

## 2020-08-13 PROCEDURE — 80053 COMPREHEN METABOLIC PANEL: CPT

## 2020-08-13 PROCEDURE — G0480 DRUG TEST DEF 1-7 CLASSES: HCPCS

## 2020-08-13 PROCEDURE — 6370000000 HC RX 637 (ALT 250 FOR IP): Performed by: PSYCHIATRY & NEUROLOGY

## 2020-08-13 PROCEDURE — 87086 URINE CULTURE/COLONY COUNT: CPT

## 2020-08-13 PROCEDURE — 99285 EMERGENCY DEPT VISIT HI MDM: CPT

## 2020-08-13 PROCEDURE — 80307 DRUG TEST PRSMV CHEM ANLYZR: CPT

## 2020-08-13 PROCEDURE — 81001 URINALYSIS AUTO W/SCOPE: CPT

## 2020-08-13 PROCEDURE — 36415 COLL VENOUS BLD VENIPUNCTURE: CPT

## 2020-08-13 PROCEDURE — 6370000000 HC RX 637 (ALT 250 FOR IP): Performed by: EMERGENCY MEDICINE

## 2020-08-13 RX ORDER — ACETAMINOPHEN 325 MG/1
650 TABLET ORAL EVERY 4 HOURS PRN
Status: DISCONTINUED | OUTPATIENT
Start: 2020-08-13 | End: 2020-08-19 | Stop reason: HOSPADM

## 2020-08-13 RX ORDER — CLONIDINE HYDROCHLORIDE 0.1 MG/1
0.1 TABLET ORAL NIGHTLY
Status: DISCONTINUED | OUTPATIENT
Start: 2020-08-13 | End: 2020-08-19 | Stop reason: HOSPADM

## 2020-08-13 RX ORDER — NITROFURANTOIN 25; 75 MG/1; MG/1
100 CAPSULE ORAL ONCE
Status: COMPLETED | OUTPATIENT
Start: 2020-08-13 | End: 2020-08-13

## 2020-08-13 RX ORDER — LITHIUM CARBONATE 300 MG/1
600 CAPSULE ORAL NIGHTLY
Status: DISCONTINUED | OUTPATIENT
Start: 2020-08-13 | End: 2020-08-19 | Stop reason: HOSPADM

## 2020-08-13 RX ORDER — ERGOCALCIFEROL 1.25 MG/1
50000 CAPSULE ORAL WEEKLY
Status: DISCONTINUED | OUTPATIENT
Start: 2020-08-17 | End: 2020-08-15

## 2020-08-13 RX ORDER — POLYETHYLENE GLYCOL 3350 17 G/17G
17 POWDER, FOR SOLUTION ORAL DAILY PRN
Status: DISCONTINUED | OUTPATIENT
Start: 2020-08-13 | End: 2020-08-13 | Stop reason: SDUPTHER

## 2020-08-13 RX ORDER — POLYETHYLENE GLYCOL 3350 17 G/17G
17 POWDER, FOR SOLUTION ORAL DAILY PRN
Status: DISCONTINUED | OUTPATIENT
Start: 2020-08-13 | End: 2020-08-19 | Stop reason: HOSPADM

## 2020-08-13 RX ADMIN — CLONIDINE HYDROCHLORIDE 0.1 MG: 0.1 TABLET ORAL at 20:52

## 2020-08-13 RX ADMIN — LURASIDONE HYDROCHLORIDE 60 MG: 40 TABLET, FILM COATED ORAL at 20:52

## 2020-08-13 RX ADMIN — NITROFURANTOIN MONOHYDRATE/MACROCRYSTALLINE 100 MG: 25; 75 CAPSULE ORAL at 17:33

## 2020-08-13 RX ADMIN — LITHIUM CARBONATE 600 MG: 300 CAPSULE ORAL at 20:52

## 2020-08-13 ASSESSMENT — SLEEP AND FATIGUE QUESTIONNAIRES
AVERAGE NUMBER OF SLEEP HOURS: 6
RESTFUL SLEEP: NO
DO YOU USE A SLEEP AID: YES
SLEEP PATTERN: RESTLESSNESS
DIFFICULTY FALLING ASLEEP: NO
DO YOU HAVE DIFFICULTY SLEEPING: YES
DIFFICULTY ARISING: NO
DIFFICULTY STAYING ASLEEP: YES

## 2020-08-13 ASSESSMENT — PATIENT HEALTH QUESTIONNAIRE - PHQ9: SUM OF ALL RESPONSES TO PHQ QUESTIONS 1-9: 14

## 2020-08-13 ASSESSMENT — LIFESTYLE VARIABLES: HISTORY_ALCOHOL_USE: NO

## 2020-08-13 NOTE — TELEPHONE ENCOUNTER
Pt called at 1450 stating that she was having thoughts to overdose. She stated that her meds were at her parents house \"but I don't know what to do\". Pt reported that she was feeling worse than the last time she went to ER. Pt's parents were with her. Spoke with her mother who stated that the pt looked scared and had to go somewhere for help. Mother and pt declined need for ambulance to get her to ER at [facility] Mother stated that they (the parents) felt comfortable taking her to ER. Pt stated that she would be safe going to ER with her parents. Mother stated that they would stay with the pt and would have her to the ER in about 30 min. Spoke with Andrez RN Intake beh health and gave her the info above. Also spoke with Antonia Riddle RN charge in [place] him the above info and need for pt to be evaluated for inpt [facility] health admit/intake eval.  A Toshia Valerio mgr made aware of the above info and reviewed it with Dr Ples Hatchet. Gaurav MAGUIRE given update with the above info.
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
## 2020-08-13 NOTE — TELEPHONE ENCOUNTER
Tiny Wilson RN called to report that patient was suicidal with a plan to overdose. Informed Tiny that patient would need to have evaluation in the emergency room. Called Dr. Leigh Ann Olivier to let him know that patient was suicidal with a plan to overdose and would be brought to the ED for evaluation. I notified Star Alcantara [facility] AN AFFILIATE OF [facility] Emergency Room nurse that patient would be coming in for an evaluation and that patient was suicidal with a plan to overdose.

## 2020-08-13 NOTE — ED NOTES
Bed: 17  Expected date:   Expected time:   Means of arrival:   Comments:  [facility] Triage      Andrea Hernandez RN  08/13/20 0554
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
## 2020-08-13 NOTE — ED NOTES
Pt dressed down, all belongings to security. Pt provided urine and blood sample. Pt states \"I had a snack prior to arriving, just to let you know, but I will need something for dinner in a bit\". Pt is calm at this time.         Juan Vernon RN  08/13/20 0537

## 2020-08-14 PROBLEM — F31.9 AFFECTIVE PSYCHOSIS, BIPOLAR (HCC): Status: ACTIVE | Noted: 2020-08-14

## 2020-08-14 PROCEDURE — 6370000000 HC RX 637 (ALT 250 FOR IP): Performed by: PSYCHIATRY & NEUROLOGY

## 2020-08-14 PROCEDURE — 99223 1ST HOSP IP/OBS HIGH 75: CPT | Performed by: PSYCHIATRY & NEUROLOGY

## 2020-08-14 PROCEDURE — 1240000000 HC EMOTIONAL WELLNESS R&B

## 2020-08-14 RX ORDER — CLINDAMYCIN PHOSPHATE 11.9 MG/ML
SOLUTION TOPICAL 2 TIMES DAILY
COMMUNITY
End: 2021-04-20 | Stop reason: ALTCHOICE

## 2020-08-14 RX ORDER — BENZOYL PEROXIDE 50 MG/ML
LIQUID TOPICAL 2 TIMES DAILY
COMMUNITY
End: 2021-04-20 | Stop reason: ALTCHOICE

## 2020-08-14 RX ORDER — LITHIUM CARBONATE 300 MG/1
300 CAPSULE ORAL
Status: DISCONTINUED | OUTPATIENT
Start: 2020-08-14 | End: 2020-08-19 | Stop reason: HOSPADM

## 2020-08-14 RX ORDER — CLINDAMYCIN PHOSPHATE 11.9 MG/ML
SOLUTION TOPICAL 2 TIMES DAILY
Status: DISCONTINUED | OUTPATIENT
Start: 2020-08-14 | End: 2020-08-19 | Stop reason: HOSPADM

## 2020-08-14 RX ADMIN — LURASIDONE HYDROCHLORIDE 60 MG: 40 TABLET, FILM COATED ORAL at 17:00

## 2020-08-14 RX ADMIN — CLONIDINE HYDROCHLORIDE 0.1 MG: 0.1 TABLET ORAL at 20:36

## 2020-08-14 RX ADMIN — LITHIUM CARBONATE 600 MG: 300 CAPSULE ORAL at 20:36

## 2020-08-14 RX ADMIN — LITHIUM CARBONATE 300 MG: 300 CAPSULE ORAL at 13:13

## 2020-08-14 RX ADMIN — CLINDAMYCIN PHOSPHATE: 11.9 SOLUTION TOPICAL at 20:48

## 2020-08-14 ASSESSMENT — PAIN SCALES - GENERAL: PAINLEVEL_OUTOF10: 0

## 2020-08-14 NOTE — PROGRESS NOTES
[facility] Admission From ED  Nursing Admission Note    Admission Type: Voluntary    Reason for Admission: Having thoughts to commit suicide on my clonidine because I was frustrated over my phone    Patient Active Problem List   Diagnosis    Bipolar depression ([address]    Borderline personality disorder ([facility]    Insomnia    Hoarding disorder       Pt admitted from Dr. Arron girard in ED to [address] [facility] room 06/617-01. Arrived on unit via [facility] with nursing and . Pt appropriately attired in paper scrubs. Body assessment completed by Leesa Goss RN with no contraband discovered. All tubes, lines, and drains were appropriately discontinued by ED staff prior to pt transfer to [facility]. Pt belongings and valuables inventoried and cataloged, stored per policy. Pt oriented to surroundings, program expectations, and copy pt rights given. Received admit packet: [facility], Visitation Info, Fall Prevention, Restraints Info. Consents reviewed, signed Pt Rights, Handbook Acceptance, Visit/Call Acceptance, PHI Release, Social Info Release, and Treatment Agreement. Pt verbalizes understanding. Pt is a smoker? no Pt offered Nicotine patch no  Pt refused Nicotine patch? no     Identifies stressors. Financial with her phone.     Status and Exam  Normal: No  Facial Expression: Euphoric, Worried  Affect: Congruent  Level of Consciousness: Alert  Mood:Normal: No  Mood: Anxious, Depressed  Motor Activity:Normal: Yes  Interview Behavior: Cooperative  Preception: [place] to Person, Scharlene Huger to Time, [place] to Place, [place] to Situation  Attention:Normal: No  Attention: Unable to Concentrate  Thought Processes: Circumstantial  Thought Content:Normal: No  Thought Content: Preoccupations  Hallucinations: None, Other (Comment)(denies)  Delusions: No  Memory:Normal: No  Memory: Poor Recent  Insight and Judgment: No  Insight and Judgment: Poor Judgment, Poor Insight  Present Suicidal Ideation: Yes  Present Homicidal Ideation: No    C/o:    Notes:    Electronically signed by Fredy Jimenez RN on 8/14/2020 at 12:43 AM

## 2020-08-14 NOTE — PROGRESS NOTES
Pt up this morning, requesting items from staff and interacting with peers. Pt reports that she slept poorly and was waking up on and off throughout the night. Pt reports that her concentration is \"good\". Pt rated depression and anxiety \"8\" and states that those are the same. Pt denied hallucinations. Pt reports having passive thoughts of suicide, \"I just think what it would be like to not wake up. \" Pt denied plan or intent and reported, \"that's better than it was yesterday because I had a plan to overdose on my medications then, but I'm not thinking like that now. \" Encouraged pt to notify staff if thoughts escalate or worsen and pt agreed. No distress noted. Will continue to monitor.

## 2020-08-14 NOTE — PLAN OF CARE
Problem: Discharge Planning:  Goal: Discharged to appropriate level of care  Description: Discharged to appropriate level of care  8/14/2020 0909 by Tre Maloney RN  Outcome: Ongoing  8/13/2020 2353 by Jana Snow RN  Outcome: Ongoing     Problem: Health Maintenance - Impaired:  Goal: Ability to perform activities of daily living will improve  Description: Ability to perform activities of daily living will improve  8/14/2020 0909 by Tre Maloney RN  Outcome: Ongoing  8/13/2020 2353 by Jana Snow RN  Outcome: Ongoing  Goal: Able to sleep without medication for appropriate length of time  Description: Able to sleep without medication for appropriate length of time  8/14/2020 0909 by Tre Maloney RN  Outcome: Ongoing  8/13/2020 2353 by Jana Snow RN  Outcome: Ongoing  Goal: Maintenance of adequate nutrition will improve  Description: Maintenance of adequate nutrition will improve  8/14/2020 0909 by Tre Maloney RN  Outcome: Ongoing  8/13/2020 2353 by Jana Snow RN  Outcome: Ongoing     Problem: Mood - Altered:  Goal: Mood stable  Description: Mood stable  8/14/2020 0909 by Tre Maloney RN  Outcome: Ongoing  8/13/2020 2353 by Jana Snow RN  Outcome: Ongoing     Problem: Self-Esteem - Low:  Goal: Demonstrates positive self-esteem  Description: Demonstrates positive self-esteem  8/14/2020 0909 by Tre Maloney RN  Outcome: Ongoing  8/13/2020 2353 by Jana Snow RN  Outcome: Ongoing     Problem: Cerebrospinal Fluid Leakage - Risk Of:  Goal: Demonstration of organized thought processes  Description: Demonstration of organized thought processes  8/14/2020 0909 by Tre Maloney RN  Outcome: Ongoing  8/13/2020 2353 by Jana Snow RN  Outcome: Ongoing     Problem: Violence - Risk of, Self/Other-Directed:  Goal: Knowledge of developmental care interventions  Description: Absence of violence  8/14/2020 0909 by Tre Maloney RN  Outcome: Ongoing  8/13/2020 2353 by Jana Snow RN  Outcome: Ongoing     Problem: Falls - Risk of:  Goal: Will remain free from falls  Description: Will remain free from falls  8/14/2020 0909 by Brandi Canela RN  Outcome: Ongoing  8/13/2020 2353 by Gabo Mora RN  Outcome: Ongoing  Goal: Absence of physical injury  Description: Absence of physical injury  8/14/2020 0909 by Brandi Canela RN  Outcome: Ongoing  8/13/2020 2353 by Gabo Mora RN  Outcome: Ongoing     Problem: Anxiety:  Goal: Level of anxiety will decrease  Description: Level of anxiety will decrease  8/14/2020 0909 by Brandi Canela RN  Outcome: Ongoing  8/13/2020 2353 by Gabo Mora RN  Outcome: Ongoing     Problem:  Activity:  Goal: Physical symptoms of sleep deprivation will improve  Description: Physical symptoms of sleep deprivation will improve  8/14/2020 0909 by Brandi Canela RN  Outcome: Ongoing  8/13/2020 2353 by Gabo Mora RN  Outcome: Ongoing  Goal: Sleeping patterns will improve  Description: Sleeping patterns will improve  8/14/2020 0909 by Brandi Canela RN  Outcome: Ongoing  8/13/2020 2353 by Gabo Mora RN  Outcome: Ongoing     Problem: Pain:  Goal: Pain level will decrease  Description: Pain level will decrease  8/14/2020 0909 by Brandi Canela RN  Outcome: Ongoing  8/13/2020 2353 by Gabo Mora RN  Outcome: Ongoing  Goal: Control of acute pain  Description: Control of acute pain  8/14/2020 0909 by Brandi Canela RN  Outcome: Ongoing  8/13/2020 2353 by Gabo Mora RN  Outcome: Ongoing  Goal: Control of chronic pain  Description: Control of chronic pain  8/14/2020 0909 by Brandi Canela RN  Outcome: Ongoing  8/13/2020 2353 by Gabo Mora RN  Outcome: Ongoing     Problem: Health Behavior:  Goal: Ability to verbalize adaptive coping strategies to use when suicidal feelings occur will improve  Description: Ability to verbalize adaptive coping strategies to use when suicidal feelings occur will improve  8/14/2020 0909 by Brandi Canela RN  Outcome: Ongoing  8/13/2020 2353 by Carole Hinsno RN  Outcome: Ongoing  Goal: Ability to verbalize adaptive coping strategies to use when the urge to self-mutilate occurs will improve  Description: Ability to verbalize adaptive coping strategies to use when the urge to self-mutilate occurs will improve  8/14/2020 0909 by Enedina Smith RN  Outcome: Ongoing  8/13/2020 2353 by Carole Hinson RN  Outcome: Ongoing     Problem: Safety:  Goal: Ability to contract for his/her safety will improve  Description: Ability to contract for his/her safety will improve  8/14/2020 0909 by Enedina Smith RN  Outcome: Ongoing  8/13/2020 2353 by Carole Hinson RN  Outcome: Ongoing

## 2020-08-14 NOTE — PLAN OF CARE
Problem: Discharge Planning:  Goal: Discharged to appropriate level of care  Description: Discharged to appropriate level of care  Outcome: Ongoing     Problem: Health Maintenance - Impaired:  Goal: Ability to perform activities of daily living will improve  Description: Ability to perform activities of daily living will improve  Outcome: Ongoing  Goal: Able to sleep without medication for appropriate length of time  Description: Able to sleep without medication for appropriate length of time  Outcome: Ongoing  Goal: Maintenance of adequate nutrition will improve  Description: Maintenance of adequate nutrition will improve  Outcome: Ongoing     Problem: Mood - Altered:  Goal: Mood stable  Description: Mood stable  Outcome: Ongoing     Problem: Self-Esteem - Low:  Goal: Demonstrates positive self-esteem  Description: Demonstrates positive self-esteem  Outcome: Ongoing     Problem: Violence - Risk of, Self/Other-Directed:  Goal: Knowledge of developmental care interventions  Description: Absence of violence  Outcome: Ongoing     Problem: Falls - Risk of:  Goal: Will remain free from falls  Description: Will remain free from falls  Outcome: Ongoing  Goal: Absence of physical injury  Description: Absence of physical injury  Outcome: Ongoing     Problem: Anxiety:  Goal: Level of anxiety will decrease  Description: Level of anxiety will decrease  Outcome: Ongoing     Problem:  Activity:  Goal: Physical symptoms of sleep deprivation will improve  Description: Physical symptoms of sleep deprivation will improve  Outcome: Ongoing  Goal: Sleeping patterns will improve  Description: Sleeping patterns will improve  Outcome: Ongoing     Problem: Pain:  Goal: Pain level will decrease  Description: Pain level will decrease  Outcome: Ongoing  Goal: Control of acute pain  Description: Control of acute pain  Outcome: Ongoing  Goal: Control of chronic pain  Description: Control of chronic pain  Outcome: Ongoing     Problem: Health Behavior:  Goal: Ability to verbalize adaptive coping strategies to use when suicidal feelings occur will improve  Description: Ability to verbalize adaptive coping strategies to use when suicidal feelings occur will improve  Outcome: Ongoing  Goal: Ability to verbalize adaptive coping strategies to use when the urge to self-mutilate occurs will improve  Description: Ability to verbalize adaptive coping strategies to use when the urge to self-mutilate occurs will improve  Outcome: Ongoing     Problem: Safety:  Goal: Ability to contract for his/her safety will improve  Description: Ability to contract for his/her safety will improve  Outcome: Ongoing

## 2020-08-14 NOTE — FLOWSHEET NOTE
08/14/20 1133   Encounter Summary   Services provided to: Patient   Referral/Consult From: Patient  (Consult:  Pt request)   Complexity of Encounter Moderate   Length of Encounter 30 minutes   Spiritual/Jew   Type Spiritual struggle   Assessment Helplessness; Concerns with suffering  (Pt said \"Hard to forgive myself. \" )   Intervention Active listening;Empowerment  (Exercise on gesture of clinging, resisting, emptying)   Outcome Encouraged;New perspective     S:  Pt said \"Forgive myself\" is harder that \"Jose M forgiving me. \"  Duane Banana - she missed going to [facility]. O: Pt sitting just close enough with the ; listening and following well the exercise - gesture of clinging, resisting and emptying. A:  Pt expressed having difficulty resisting and letting go. Seems hopeful about doing the exercise along with reading the bible and prayer. Appreciative. P:  Spiritual support provided. No follow up plan.     Electronically signed by Bernadette Morales MA [facility] on 8/14/2020 at 11:42 AM

## 2020-08-14 NOTE — PLAN OF CARE
Problem: Health Behavior:  Goal: Ability to verbalize adaptive coping strategies to use when suicidal feelings occur will improve  Description: Ability to verbalize adaptive coping strategies to use when suicidal feelings occur will improve  8/14/2020 1109 by Pretty Calderon  Outcome: Ongoing  Note:                                                                     Group Therapy Note    Date: 8/14/2020  Start Time: 1000  End Time:  1100  Number of Participants: 2    Type of Group: Psychoeducation    Wellness Binder Information  Module Name:  Men's Issues  Session Number:  1    Group Goal for Pt: To improve knowledge of effective stress management techniques    Notes:  Pt demonstrated improved knowledge of effective stress management techniques by actively participating in group discussion. Status After Intervention:  Unchanged    Participation Level:  Active Listener and Interactive    Participation Quality: Appropriate and Attentive      Speech:  normal      Thought Process/Content: Logical      Affective Functioning: Congruent      Mood: anxious and depressed      Level of consciousness:  Alert and Oriented x4      Response to Learning: Able to verbalize current knowledge/experience, Able to verbalize/acknowledge new learning, and Progressing to goal      Endings: None Reported    Modes of Intervention: Education      Discipline Responsible: Psychoeducational Specialist      Signature:  Pretty Calderon

## 2020-08-14 NOTE — H&P
Department of Psychiatry  Geriatrics  Attending History and Physical        CHIEF COMPLAINT: Suicidal ideations    Reason for Admission to Psychiatric Unit:  Threat to self requiring 24 hour professional observation    The patient requires intensive 24 level of care for the following reasons:  the need for patient safety    HISTORY OF PRESENT ILLNESS:         The primary source(s) of information include(s):  Patient        The patient is a 48 y.o. female with previous psychiatric history of depression, anxiety disorder and bipolar disorder, who has been admitted to psychiatric unit secondary to suicidal ideation. At time of admission, UDS is negative, blood alcohol level is less than 10, lithium level is 0.30, which is below therapeutic level. Patient has been seen in treatment team room with presence of the patient's nurse. She reported that she was doing relatively well after last discharge 3 ago from the hospital, however, she retirned to ER 2 weeks ago with suicidal ideations. At that time, I personally evaluated patient in the emergency department and it seems that patient was not able to build or use her coping skills to deal with her home stressors. Patient has been sent home and follow-up appointment for intensive outpatient therapy was scheduled, but patient did not attend any intensive outpatient therapy sessions. Patient denies having any recent life stresses. However, she reported that yesterday suddenly she started to have suicidal ideations with plan to overdose of medications, and her mother brought patient to the hospital for evaluation. Today during the interview, patient stated that she feels \"fine\", endorses mild feeling of depression and anxiety, reported good appetite and good quality of sleep with prescribed psychotropic medications. She denies recent mood swings or racing thoughts. Patient continues to endorse current active suicidal ideations, denies having any plans.   She denies any homicidal ideations. Patient denies auditory and visual hallucinations. Patient reported that she was compliant with prescribed psychotropic medications, denies having any side effects. PSYCHIATRIC HISTORY:  Diagnoses: Depression, anxiety disorder, bipolar disorder  Suicide attempts/gestures: 4 times by overdose of different medications or drinking housecleaning liquid  Prior hospitalizations: Multiple to [facility] and [facility] with last admission to our hospital 3 weeks ago. Medication trials: Depakote, lithium, Lamictal, risperidone, Abilify, trazodone, doxepin  Mental health contact: [facility] psychiatric outpatient clinic    Past Medical History:        Diagnosis Date    Chest pain 2/8/2012    Depression     Hypoglycemia     Schizoaffective disorder ([facility] [address] 2/8/2012    Suicide attempt [facility])      Past Surgical History:        Procedure Laterality Date    DILATION AND CURETTAGE OF UTERUS  2017    LEEP       Medications Prior to Admission:   Medications Prior to Admission: lithium 600 MG capsule, Take 1 capsule by mouth nightly  lurasidone (LATUDA) 60 MG TABS tablet, Take 1 tablet by mouth daily  cloNIDine (CATAPRES) 0.1 MG tablet, Take 1 tablet by mouth nightly  vitamin D (ERGOCALCIFEROL) 1.25 MG (01724 UT) CAPS capsule, Take 1 capsule by mouth once a week for 11 doses    Allergies:  Seroquel [quetiapine fumarate]; Adderall [amphetamine-dextroamphetamine]; Amphetamines; Marielle Pile [aspirin]; Codeine; Cogentin [benztropine]; Depakote [divalproex sodium]; Effexor [venlafaxine]; Estrogens; Geodon [ziprasidone hcl]; Hydrocodone; Lortab [hydrocodone-acetaminophen]; Macrolides and ketolides; Metoprolol; Neurontin [gabapentin]; Other; Pcn [penicillins]; Provera [medroxyprogesterone]; Prozac [fluoxetine hcl]; Tetracyclines & related; Trazodone and nefazodone; Trileptal [oxcarbazepine]; Trintellix [vortioxetine]; Valium [diazepam]; Vancomycin; Wellbutrin [bupropion];  Zoloft [sertraline hcl]; and Zyprexa [olanzapine]    Social History:  Patient reported that she lives with her 25years old son. She has 35years old twins, boy and girl. Patient's mother lives across the street. Patient denies history of smoking tobacco, drinking alcohol or using any illicit drugs. Family History:   No family history on file. Psychiatric Family History  Patient denies family history of mental health illnesses. She denies family history of completed or attempted suicide. REVIEW OF SYSTEMS:  General: No fevers, chills, night sweats, no recent weight loss or gain. Head: No headache, no migraine. Eyes: No recent visual changes. Ears: No recent hearing changes. Nose: No increased congestion or change in sense of smell. Throat: No sore throat, no trouble swallowing. Cardiovascular: No chest pain or palpitations, or dizziness. Respiratory: No cough, wheezes, congestion, or shortness of breath. Gastrointestinal: No abdominal pain, nausea or vomiting, no diarrhea or constipation. Musculo-skeletal: No edema, deformities, or loss of functions. Neurological: No loss of consciousness, abnormal sensations, or weakness. Skin: No rash, abrasions or bruises. PHYSICAL EXAM:    Vitals:  /70   Pulse 70   Temp 98.1 °F (36.7 °C) (Temporal)   Resp 16   Ht 5' 4\" (1.626 m)   Wt 219 lb 6.4 oz (99.5 kg)   SpO2 96%   BMI 37.66 kg/m²     Mental Status Examination:   Appearance: Appropriately groomed and in hospital attire. Made intermittent eye contact. Behavior: Slightly withdrawn, calm, cooperative with interview. No psychomotor retardation or agitation appreciated. Gait unremarkable. Speech: Normal in tone, volume, and quality. No pressured speech noted  Mood: \"I feel fine\"   Affect: Mood congruent. Range is flat and restricted. Thought Process: Appears linear and goal oriented. Thought Content: Patient does have current active suicidal ideations.   She does not have any suicidal plans or homicidal ideations. No overt delusions or paranoia appreciated. Perceptions: Seems patient does not have any auditory or visual hallucinations at present time. Patient did not appear to be responding to internal stimuli. No overt psychosis. Executive Functions: Appear mildly impaired. Concentration: Decreased. Reasoning: Appears impaired based on interaction from interview   Orientation: to person, place, date, and situation. Alert. Language: Intact. Fund of information: Intact. Memory: recent and remote appear intact. Impulsivity: Poor  Neurovegitative: Fair appetite, fair sleep  Insight: Poor  Judgment: Poor    Physical Exam:  GENERAL APPEARANCE: 48y.o. year-old female in NAD   HEAD: Normocephalic, atraumatic. THROAT: No erythema, exudates, lesions. No tongue laceration. CARDIOVASCULAR: PMI nondisplaced. Regular rhythm and rate. Normal S1 and S2.  PULMONARY: Clear to auscultation bilaterally, no tenderness to palpation. ABDOMEN: Soft, obese, nontender, nondistended. MUSCULOSKELTAL: No obvious deformities, clubbing, cyanosis or edema, no spinous process or paraspinous tenderness, normal ROM, normal gait, distal pulses intact symmetric 2+ bilaterally. NEUROLOGICAL: Alert, oriented x 4, CN II-XII grossly intact, motor strength 3-4/5 all muscle groups, DTR 1+ intact and symmetric, sensation intact to sharp and dull. No abnormal movements or tremors.    SKIN: Warm, dry, intact, no rash, abrasions or bruises    DATA:  Labs:    CBC:   Lab Results   Component Value Date    WBC 10.2 08/13/2020    RBC 4.70 08/13/2020    HGB 14.5 08/13/2020    HCT 43.9 08/13/2020    HCT 43.2 05/04/2012    MCV 93.4 08/13/2020    MCH 30.9 08/13/2020    MCHC 33.0 08/13/2020    RDW 12.8 08/13/2020     08/13/2020     05/04/2012    MPV 9.8 08/13/2020     CMP:    Lab Results   Component Value Date     08/13/2020     05/04/2012    K 4.0 08/13/2020    K 4.7 05/04/2012     08/13/2020     05/04/2012    CO2 23 08/13/2020    BUN 13 08/13/2020    CREATININE 0.7 08/13/2020    CREATININE 0.9 05/04/2012    GFRAA >59 08/13/2020    LABGLOM >60 08/13/2020    GLUCOSE 130 08/13/2020    PROT 7.1 08/13/2020    PROT 7.2 06/22/2012    LABALBU 4.3 08/13/2020    LABALBU 4.7 05/04/2012    CALCIUM 9.5 08/13/2020    BILITOT 0.3 08/13/2020    ALKPHOS 82 08/13/2020    ALKPHOS 98 05/04/2012    AST 15 08/13/2020    ALT 20 08/13/2020       Lithium level-0.30      DSM 5 DIAGNOSIS:  Bipolar disorder, type I, most recent episode depressed, severe, without psychotic features  Borderline personality disorder  Dependent personality disorder  Suicidal ideations    Medical conditions pertinent to the patient's mental health:  Rule out sleep apnea  Urinary tract infection    Plan:   -Admit to [facility] adult Unit and monitor on 15 minute checks  -[facility] [place] reviewed. -Gather collateral information from family with release  -Medical monitoring to be performed by Dr. Yuniel Vo and associates  -Acclimate to the unit. -Encourage participation in groups and therapeutic activities as appropriate.  -Medications: Will restart patient's home medications at previously prescribed and recommended dose. Will increase dose of the lithium from total daily dose of 600 mg at bedtime to total daily dose of 900 mg a day, divided by 300 mg with breakfast and 600 mg with lunch.     Dr. Yuniel Vo will adjust antibiotic for treatment of urinary tract infection.  -The risks, benefits, side effects, indications, contraindications, and adverse effects of the medications have been discussed.  -The patient has verbalized understanding and has capacity to give informed consent.  -SW help evaluating home environment.   -Discuss with treatment team.

## 2020-08-14 NOTE — PROGRESS NOTES
Admission Note      Reason for admission/Target Symptom: Patient admitted to [facility] due to PT states reason for ED visit, \"I was going to overdose on my catapres while at my mom and dad's today. On August 7, Shu Williamson told me to take my medications over to my parents house so I wouldn't overdose on them. My parents live across the street and when I went over there today I had a plan to overdose. \"  Diagnoses: Depression NOS  UDS: Neg  BAL:  Neg    SW met with treatment team to discuss patient's treatment including care planning, discharge planning, and follow-up needs. Pt has been admitted to [facility]. Treatment team has identified patient's discharge needs as medication management and outpatient therapy/counseling. Pt confirmed  the need for ongoing treatment post inpatient stay. Pt was also provided a handout of contact information for drug and alcohol treatment centers and other community support service such as [facility], AA, and [facility].

## 2020-08-15 LAB
BACTERIA: ABNORMAL /HPF
BILIRUBIN URINE: NEGATIVE
BLOOD, URINE: NEGATIVE
CLARITY: ABNORMAL
COLOR: YELLOW
CRYSTALS, UA: ABNORMAL /HPF
EPITHELIAL CELLS, UA: 8 /HPF (ref 0–5)
FOLATE: >20 NG/ML (ref 4.8–37.3)
GLUCOSE URINE: NEGATIVE MG/DL
HYALINE CASTS: 2 /HPF (ref 0–8)
KETONES, URINE: NEGATIVE MG/DL
LEUKOCYTE ESTERASE, URINE: ABNORMAL
NITRITE, URINE: NEGATIVE
PH UA: 6 (ref 5–8)
PROTEIN UA: NEGATIVE MG/DL
RBC UA: 2 /HPF (ref 0–4)
SPECIFIC GRAVITY UA: 1.02 (ref 1–1.03)
URINE CULTURE, ROUTINE: NORMAL
UROBILINOGEN, URINE: 0.2 E.U./DL
WBC UA: 12 /HPF (ref 0–5)

## 2020-08-15 PROCEDURE — 36415 COLL VENOUS BLD VENIPUNCTURE: CPT

## 2020-08-15 PROCEDURE — 82746 ASSAY OF FOLIC ACID SERUM: CPT

## 2020-08-15 PROCEDURE — 1240000000 HC EMOTIONAL WELLNESS R&B

## 2020-08-15 PROCEDURE — 81001 URINALYSIS AUTO W/SCOPE: CPT

## 2020-08-15 PROCEDURE — 6370000000 HC RX 637 (ALT 250 FOR IP): Performed by: PSYCHIATRY & NEUROLOGY

## 2020-08-15 PROCEDURE — 99233 SBSQ HOSP IP/OBS HIGH 50: CPT | Performed by: PSYCHIATRY & NEUROLOGY

## 2020-08-15 RX ADMIN — LURASIDONE HYDROCHLORIDE 60 MG: 40 TABLET, FILM COATED ORAL at 17:04

## 2020-08-15 RX ADMIN — CLINDAMYCIN PHOSPHATE: 11.9 SOLUTION TOPICAL at 08:52

## 2020-08-15 RX ADMIN — CLINDAMYCIN PHOSPHATE: 11.9 SOLUTION TOPICAL at 20:30

## 2020-08-15 RX ADMIN — LITHIUM CARBONATE 600 MG: 300 CAPSULE ORAL at 20:28

## 2020-08-15 RX ADMIN — LITHIUM CARBONATE 300 MG: 300 CAPSULE ORAL at 08:38

## 2020-08-15 RX ADMIN — CLONIDINE HYDROCHLORIDE 0.1 MG: 0.1 TABLET ORAL at 20:28

## 2020-08-15 ASSESSMENT — PAIN SCALES - GENERAL
PAINLEVEL_OUTOF10: 0

## 2020-08-15 NOTE — PROGRESS NOTES
Department of Psychiatry  Attending Progress Note      SUBJECTIVE:    The patient is a 48 y.o. female with previous psychiatric history of depression, anxiety disorder and bipolar disorder, who has been admitted to psychiatric unit secondary to suicidal ideation. At time of admission, UDS is negative, blood alcohol level is less than 10, lithium level is 0.30, which is below therapeutic level. Patient has been seen in treatment team room. She reported that her condition significantly improved during this hospital stay and her mood is \"better\" today. Patient stated that she followed our recommendations and made post- discharge plan with her daily activities to keep herself \"busy\" and occupied, instead of lying down on the couch. She reported good appetite and good quality of sleep, stated that she did not experience significant difficulties to fall asleep or stay asleep. Patient is compliant with currently prescribed medications and denies any side effects. She reported mild feeling of depression and anxiety, and rated both of them, as 3-4 out of 10, with 10 being the worst.  Patient attends group activities in the unit and participates since them. She became more social with medical staff and other patients in the unit. She performed her ADLs today. Behaviorally, patient became more stable and appropriate. She denies current active suicidal or homicidal ideations, denies any plans. Also, patient denies any auditory and visual hallucinations. OBJECTIVE    Physical  VITALS:  /78   Pulse 91   Temp 98.3 °F (36.8 °C) (Temporal)   Resp 20   Ht 5' 4\" (1.626 m)   Wt 219 lb 6.4 oz (99.5 kg)   SpO2 97%   BMI 37.66 kg/m²   TEMPERATURE:  Current - Temp: 98.3 °F (36.8 °C);  Max - Temp  Av.7 °F (36.5 °C)  Min: 97.1 °F (36.2 °C)  Max: 98.3 °F (36.8 °C)  RESPIRATIONS RANGE: Resp  Av  Min: 18  Max: 20  PULSE RANGE: Pulse  Av.5  Min: 80  Max: 91  BLOOD PRESSURE RANGE:  Systolic (07LAJ), Av , Min:104 , WDX:193   ; Diastolic (45AZF), CWR:87, Min:78, Max:95    PULSE OXIMETRY RANGE: SpO2  Av.5 %  Min: 96 %  Max: 97 %    Review of Systems: 14 point review of systems is negative    Psychological ROS: Positive for mild anxiety    Mental Status Examination:    Appearance: Appropriately groomed, wearing casual civilian clothes. Made good eye contact. Behavior: Slightly anxious, cooperative with interview, socially appropriate. No psychomotor retardation or agitation appreciated. Gait unremarkable. Speech: Normal in tone, volume, and quality. Mood: \"better\"   Affect: Mood congruent. Range is full. Thought Process: Appears linear and goal oriented. Thought Content: Patient does not have any current active suicidal and homicidal ideations. No overt delusions or paranoia appreciated. Perceptions: Seems patient does not have any auditory or visual hallucinations at present time. Patient did not appear to be responding to internal stimuli. No overt psychosis. Orientation: to person, place, date, and situation. Alert. Language: Intact. Fund of information: Intact. Memory: recent and remote appear intact. Impulsivity: Improved.    Neurovegitative: Fair appetite, fair sleep  Insight: Improved  Judgment: Improved      Data  Lab Results   Component Value Date    TSHFT4 2.11 2020    TSH 1.140 2020     Lab Results   Component Value Date    VXNRFGBM31 371 2020     Lab Results   Component Value Date    VITD25 45.3 2020       Medications  Current Facility-Administered Medications: clindamycin (CLEOCIN T) 1 % external solution, , Topical, BID  benzoyl peroxide 5 % external liquid, , Topical, Daily  lithium capsule 300 mg, 300 mg, Oral, Daily with breakfast  acetaminophen (TYLENOL) tablet 650 mg, 650 mg, Oral, Q4H PRN  polyethylene glycol (GLYCOLAX) packet 17 g, 17 g, Oral, Daily PRN  lurasidone (LATUDA) tablet 60 mg, 60 mg, Oral, QPM  lithium capsule 600 mg, 600 mg,

## 2020-08-15 NOTE — PROGRESS NOTES
I Daily Shift Assessment-Geriatric Unit  Nursing Progress Note          Room: [facility]617-01   Name: Shahriar Doe   Age: 48 y.o. Gender: female   Dx: Affective psychosis bipolar.   Precautions: suicide risk and fall risk  Inpatient Status: voluntary     SLEEP:    Sleep: No,   Sleep Quality Poor   Hours Slept: 4   Sleep Medications: Yes  PRN Sleep Meds: Yes       MEDICAL:      Other PRN Meds: Yes   Med Compliant: Yes   Accu-Chek: No   Oxygen/CPAP/BiPAP: No  CIWA/CINA: No   PAIN Assessment: none  Side Effects from medication: No      PSYCH:     SI denies suicidal ideation    HI Negative for homicidal ideation        AVH:Absent      Depression: 7 Anxiety: 7       GENERAL:      Appetite: good  Social: Yes Speech: normal   Appearance:appropriately dressed, appropriately groomed and healthy looking  Assistive Devices: noneLevel of Assist: Independent      GROUP:    Group Participation: Yes  Participation LevelMinimal    Participation QualityAttentive    Notes:

## 2020-08-15 NOTE — PROGRESS NOTES
[facility] Daily Shift Assessment-Geriatric Unit  Nursing Progress Note          Room: [facility]617-01   Name: Venice Lehman   Age: 48 y.o. Gender: female   Dx: <principal problem not specified>  Precautions: suicide risk and fall risk  Inpatient Status: voluntary     SLEEP:    Sleep: Yes,   Sleep Quality poor  Hours Slept:    Sleep Medications: Yes Clonidine 0.1mg  PRN Sleep Meds: No       MEDICAL:      Other PRN Meds: Yes   Med Compliant: Yes   Accu-Chek: No   Oxygen/CPAP/BiPAP: No  CIWA/CINA: No   PAIN Assessment: none, LATER IN NIGHT, PT COMPLAINED OF SOARNESS TO RIGHT FOOT, DID NOT RATE PAIN  Side Effects from medication: No      PSYCH:     SI denies suicidal ideation    HI Negative for homicidal ideation        AVH:Absent      Depression: 7 Anxiety: IMPROVING, on and off,  6       GENERAL:      Appetite: improved  Social: Yes and MODERATELY WITH STAFF ONLY, AVOIDED PEERSSpeech: normal and hesitant   Appearance:appropriately dressed  Assistive Devices: noneLevel of Assist: Independent      GROUP:    Group Participation: Yes  Participation LevelActive Listener    Participation QualityAppropriate    Notes: pt is in scrubs, guarded, pacing in front of nurse station, pt obsessive r/t personal home medications to be retagged from pharmacy. Pt continuously restating her steps of how she is going to spend this evenings and detailed items of how she was going to spend the evening, \"Im going to shower, eat snack and take my night medications pt at time of assessment denies SI ideations, denies HI and AVH, pt affect labile, behavior is cooperative, pt is med compliant, perform ADL, showered, and attended group wrap up group, pt stated depression 7 and anxiety on and off, rated 6. Will continue to monitor for safety.  Pt has poor sleep this night, pt up and down and hardly slept      Electronically signed by Fran Kern RN on 8/15/20 at 12:18 AM CDT

## 2020-08-15 NOTE — H&P
HISTORY and PHYSICAL      CHIEF COMPLAINT:   SI    Reason for Admission:  SI    History Obtained From:  patient    HISTORY OF PRESENT ILLNESS:      The patient is a 48 y.o. female who is admitted to the [place] [address] unit with worsening mood issues. She has no new medical complaints. She has no c/o CP or SOA. She denies abdominal pain or N/V. She has c/o chronic constipation. No HA or weakness. No new pain issues. No fevers. Past Medical History:        Diagnosis Date    Chest pain 2/8/2012    Depression     Hypoglycemia     Schizoaffective disorder ([facility] [address] 2/8/2012    Suicide attempt [facility])      Past Surgical History:        Procedure Laterality Date    DILATION AND CURETTAGE OF UTERUS  2017    LEEP           Medications Prior to Admission:    Medications Prior to Admission: benzoyl peroxide (BENZOYL PEROXIDE) 5 % external liquid, Apply topically 2 times daily Apply topically 2 times daily to face and groin area  clindamycin (CLEOCIN T) 1 % external solution, Apply topically 2 times daily Apply topically 2 times daily to groin, face and arms twice a day  lithium 600 MG capsule, Take 1 capsule by mouth nightly  lurasidone (LATUDA) 60 MG TABS tablet, Take 1 tablet by mouth daily (Patient taking differently: Take by mouth daily )  cloNIDine (CATAPRES) 0.1 MG tablet, Take 1 tablet by mouth nightly  vitamin D (ERGOCALCIFEROL) 1.25 MG (13520 UT) CAPS capsule, Take 1 capsule by mouth once a week for 11 doses    Allergies:  Seroquel [quetiapine fumarate]; Adderall [amphetamine-dextroamphetamine]; Amphetamines; Emmit Moles [aspirin]; Codeine; Cogentin [benztropine]; Depakote [divalproex sodium]; Effexor [venlafaxine]; Estrogens; Geodon [ziprasidone hcl]; Hydrocodone; Lortab [hydrocodone-acetaminophen]; Macrolides and ketolides; Metoprolol; Neurontin [gabapentin]; Other; Pcn [penicillins]; Provera [medroxyprogesterone]; Prozac [fluoxetine hcl];  Tetracyclines & related;

## 2020-08-15 NOTE — PLAN OF CARE
Problem: Discharge Planning:  Goal: Discharged to appropriate level of care  Description: Discharged to appropriate level of care  Outcome: Ongoing     Problem: Health Maintenance - Impaired:  Goal: Ability to perform activities of daily living will improve  Description: Ability to perform activities of daily living will improve  Outcome: Ongoing  Goal: Able to sleep without medication for appropriate length of time  Description: Able to sleep without medication for appropriate length of time  Outcome: Ongoing  Goal: Maintenance of adequate nutrition will improve  Description: Maintenance of adequate nutrition will improve  Outcome: Ongoing     Problem: Mood - Altered:  Goal: Mood stable  Description: Mood stable  Outcome: Ongoing     Problem: Self-Esteem - Low:  Goal: Demonstrates positive self-esteem  Description: Demonstrates positive self-esteem  Outcome: Ongoing     Problem: Cerebrospinal Fluid Leakage - Risk Of:  Goal: Demonstration of organized thought processes  Description: Demonstration of organized thought processes  Outcome: Ongoing     Problem: Violence - Risk of, Self/Other-Directed:  Goal: Knowledge of developmental care interventions  Description: Absence of violence  Outcome: Ongoing     Problem: Falls - Risk of:  Goal: Will remain free from falls  Description: Will remain free from falls  Outcome: Met This Shift  Goal: Absence of physical injury  Description: Absence of physical injury  Outcome: Met This Shift     Problem: Anxiety:  Goal: Level of anxiety will decrease  Description: Level of anxiety will decrease  Outcome: Ongoing     Problem:  Activity:  Goal: Physical symptoms of sleep deprivation will improve  Description: Physical symptoms of sleep deprivation will improve  Outcome: Ongoing  Goal: Sleeping patterns will improve  Description: Sleeping patterns will improve  Outcome: Ongoing     Problem: Pain:  Goal: Pain level will decrease  Description: Pain level will decrease  Outcome: Safety:  Goal: Ability to contract for his/her safety will improve  Description: Ability to contract for his/her safety will improve  Outcome: Met This Shift

## 2020-08-15 NOTE — PROGRESS NOTES
WRAP UP GROUP NOTE:     Patient's Goal:  Providing feedback as to their own progress in the care-plan provided. Pt's have an opportunity to explore self-reflective skills and share any additional cares and concerns not yet addressed. Pt effectively participated.      Energy level:NORMAL  Appetite:IMPROVING, NORMAL  Concentration: IMPROVING  Hallucinations:NONE  Depression:IMPROVED  Anxiety:ON AND OFF  How I worked today:WORKED HARD  What helps me sleep:READ, PRAY  Any questions/complaints/comments:'WHEN DO I SEE A DIETICIAN\"    ILENE -NURSE TALKED WITJ ME ABOUT MAKING GOALS AND THINGS ON MY MIND    SEEING DOCTOR: HE TOLD ME THE TRUTH    GROUP AT 1000 AM WITH COPING SKILLS    MAKING GOALS FOR EVERYDAY AND GETTING SHOWER

## 2020-08-15 NOTE — PROGRESS NOTES
Requirement Note     SW met with pt to complete Psychosocial and CSSR-S on this date. Patients long and short term goals discussed. Pt voiced understanding. Treatment plan sheet signed. Pt verbalized understanding of the treatment plan. Pt participated in goals and objectives of the treatment plan. Pt completed safety plan with , pt received copy of plan, and original was placed into pt's chart. In the last 6 months has the pt been a danger to self: YES  In the last 6 months has the pt been a danger to others: NO  Legal Guardian/POA: NO     Provided pt with [employer] handout entitled \"Quitting Smoking. \"  Reviewed handout with pt addressing dangers of smoking, developing coping skills, and providing basic information about quitting. Patient received all components / Patient refused/declined practical counseling of tobacco practical counseling during the hospital stay.

## 2020-08-15 NOTE — PROGRESS NOTES
Group Therapy Note    Date: 08/15/20  Start Time: 0830  End Time:  0930    Number of Participants: 5    Type of Group: community/unit rules    Patient's Goal:  Becoming more independent    Notes:  Pt calm and cooperative during group    Status After Intervention:  Improved    Participation Level:  Active Listener    Participation Quality: Appropriate    Speech:  pressured    Thought Process/Content: Logical    Affective Functioning: Congruent    Mood: anxious    Level of consciousness:  Alert and Oriented x4    Response to Learning: Able to verbalize current knowledge/experience, Able to verbalize/acknowledge new learning, Able to retain information, Capable of insight and Able to change behavior    Modes of Intervention: Education and Support    Discipline Responsible: Registered Nurse    Signature:  Norma Almanza RN

## 2020-08-15 NOTE — BH NOTE
Group Note  Date: 8/15/2020  Start Time: 14:00  End Time:  15:00  Number of Participants in Group: 5  Number of Patients on Unit: 5  Reason for Absence: 0  Intervention for patient absence: n/a       Type of Group:  Community __    [place] __    Psychoeducational _x_  Spirituality__                               Cognitive Skills__  Recovery __  Wellness __  Recreation __                                            Wrap-Up/Relaxation __       Problem:  Depression/ Bipolar discussion, medications, interventions, education. Goal for Group:  PT's will be able to voice understanding of depression and bipolar as well as medications use for these diagnosis, also verbalize understanding of medication education and how to be informed about their medications dosage, usage, side effects, not taking other peoples medications, street drugs, ect. Staff Intervention:      Participation Level:  High ___   Medium __x_   Low____     Patient Behavior:  Quiet __  Attentive _x_  Disruptive ___ Poor Eye-Contact ___                                  Good Eye-Contact___  Insightful ___  Negative ____                                 Distracted__  Defensive ___  Agitated___  Cooperative _x__                                 Needed prompted__  Monopolizing ___ Oppositional___       Mental Status/Mood: Depressed_x_  Tearful__  Manic__  Elevated ___ Flat_x__                                      Blunted __  Grandiose___  Angry__  Labile ___                                      Congruent___  Incongruent___ Sad __x_ Hallucinations ___                                      Delusions___  Anxious ____       Patient Response to Learning: Pt. Participated in group discussion with insight. Notes/Comments:            Discipline Responsible: Nursing     Signature (with credentials):  Danie Sinclair.

## 2020-08-16 PROCEDURE — 6370000000 HC RX 637 (ALT 250 FOR IP): Performed by: PSYCHIATRY & NEUROLOGY

## 2020-08-16 PROCEDURE — 1240000000 HC EMOTIONAL WELLNESS R&B

## 2020-08-16 PROCEDURE — 6370000000 HC RX 637 (ALT 250 FOR IP): Performed by: NURSE PRACTITIONER

## 2020-08-16 RX ORDER — LANOLIN ALCOHOL/MO/W.PET/CERES
3 CREAM (GRAM) TOPICAL
Status: DISCONTINUED | OUTPATIENT
Start: 2020-08-16 | End: 2020-08-19 | Stop reason: HOSPADM

## 2020-08-16 RX ORDER — LANOLIN ALCOHOL/MO/W.PET/CERES
3 CREAM (GRAM) TOPICAL NIGHTLY
Status: DISCONTINUED | OUTPATIENT
Start: 2020-08-16 | End: 2020-08-16

## 2020-08-16 RX ADMIN — LITHIUM CARBONATE 600 MG: 300 CAPSULE ORAL at 20:27

## 2020-08-16 RX ADMIN — LITHIUM CARBONATE 300 MG: 300 CAPSULE ORAL at 08:05

## 2020-08-16 RX ADMIN — CLINDAMYCIN PHOSPHATE: 11.9 SOLUTION TOPICAL at 08:22

## 2020-08-16 RX ADMIN — CLINDAMYCIN PHOSPHATE: 11.9 SOLUTION TOPICAL at 20:29

## 2020-08-16 RX ADMIN — TRIAMCINOLONE ACETONIDE: 1 OINTMENT TOPICAL at 20:29

## 2020-08-16 RX ADMIN — LURASIDONE HYDROCHLORIDE 60 MG: 40 TABLET, FILM COATED ORAL at 17:02

## 2020-08-16 RX ADMIN — CLONIDINE HYDROCHLORIDE 0.1 MG: 0.1 TABLET ORAL at 20:27

## 2020-08-16 RX ADMIN — POLYETHYLENE GLYCOL 3350 17 G: 17 POWDER, FOR SOLUTION ORAL at 07:06

## 2020-08-16 RX ADMIN — Medication 3 MG: at 18:56

## 2020-08-16 ASSESSMENT — PAIN SCALES - GENERAL
PAINLEVEL_OUTOF10: 0
PAINLEVEL_OUTOF10: 0

## 2020-08-16 NOTE — PROGRESS NOTES
[facility] Daily Shift Assessment-Geriatric Unit  Nursing Progress Note          Room: [facility]/617-01   Name: Shady Hickman   Age: 48 y.o.    Gender: female   Dx: Affective psychosis, bipolar  Precautions: suicide risk and fall risk  Inpatient Status: voluntary     SLEEP:    Sleep: No,   Sleep Quality Fair   Hours Slept: 6   Sleep Medications: Yes  PRN Sleep Meds: Yes       MEDICAL:      Other PRN Meds: Yes   Med Compliant: Yes   Accu-Chek: No   Oxygen/CPAP/BiPAP: No  CIWA/CINA: No   PAIN Assessment: none  Side Effects from medication: No      PSYCH:     SI denies suicidal ideation    HI Negative for homicidal ideation        AVH:Absent      Depression: 6 Anxiety: 6       GENERAL:      Appetite: good  Social: Yes Speech: normal   Appearance:appropriately dressed, disheveled and healthy looking  Assistive Devices: noneLevel of Assist: Supervision      GROUP:    Group Participation: Yes  Participation LevelMinimal    Participation QualityAttentive    Notes:

## 2020-08-16 NOTE — PROGRESS NOTES
Group Therapy Note    Date: 08/16/20  Start Time: 0830  End Time:  0930    Number of Participants: 5    Type of Group: community/unit rules    Patient's Goal:  \"being more independent\"    Notes:  Pt calm and cooperative during group    Status After Intervention:  Improved    Participation Level:  Active Listener    Participation Quality: Appropriate    Speech:  normal    Thought Process/Content: Logical    Affective Functioning: Congruent    Mood: bright    Level of consciousness:  Alert    Response to Learning: Able to verbalize current knowledge/experience, Able to verbalize/acknowledge new learning and Able to retain information    Modes of Intervention: Education and Support    Discipline Responsible: Registered Nurse    Signature:  Tone Banks RN

## 2020-08-16 NOTE — PLAN OF CARE
Problem: Discharge Planning:  Goal: Discharged to appropriate level of care  Description: Discharged to appropriate level of care  Outcome: Ongoing     Problem: Health Maintenance - Impaired:  Goal: Ability to perform activities of daily living will improve  Description: Ability to perform activities of daily living will improve  Outcome: Ongoing  Goal: Able to sleep without medication for appropriate length of time  Description: Able to sleep without medication for appropriate length of time  Outcome: Ongoing  Goal: Maintenance of adequate nutrition will improve  Description: Maintenance of adequate nutrition will improve  Outcome: Ongoing     Problem: Mood - Altered:  Goal: Mood stable  Description: Mood stable  Outcome: Ongoing     Problem: Self-Esteem - Low:  Goal: Demonstrates positive self-esteem  Description: Demonstrates positive self-esteem  Outcome: Ongoing     Problem: Cerebrospinal Fluid Leakage - Risk Of:  Goal: Demonstration of organized thought processes  Description: Demonstration of organized thought processes  Outcome: Ongoing     Problem: Violence - Risk of, Self/Other-Directed:  Goal: Knowledge of developmental care interventions  Description: Absence of violence  Outcome: Ongoing     Problem: Falls - Risk of:  Goal: Will remain free from falls  Description: Will remain free from falls  Outcome: Met This Shift  Goal: Absence of physical injury  Description: Absence of physical injury  Outcome: Met This Shift     Problem: Anxiety:  Goal: Level of anxiety will decrease  Description: Level of anxiety will decrease  Outcome: Not Met This Shift     Problem:  Activity:  Goal: Physical symptoms of sleep deprivation will improve  Description: Physical symptoms of sleep deprivation will improve  Outcome: Ongoing  Goal: Sleeping patterns will improve  Description: Sleeping patterns will improve  Outcome: Ongoing     Problem: Pain:  Goal: Pain level will decrease  Description: Pain level will decrease  Outcome: Ongoing  Goal: Control of acute pain  Description: Control of acute pain  Outcome: Ongoing  Goal: Control of chronic pain  Description: Control of chronic pain  Outcome: Ongoing     Problem: Health Behavior:  Goal: Ability to verbalize adaptive coping strategies to use when suicidal feelings occur will improve  Description: Ability to verbalize adaptive coping strategies to use when suicidal feelings occur will improve  Outcome: Ongoing  Goal: Ability to verbalize adaptive coping strategies to use when the urge to self-mutilate occurs will improve  Description: Ability to verbalize adaptive coping strategies to use when the urge to self-mutilate occurs will improve  Outcome: Ongoing     Problem: Safety:  Goal: Ability to contract for his/her safety will improve  Description: Ability to contract for his/her safety will improve  Outcome: Ongoing

## 2020-08-16 NOTE — PROGRESS NOTES
[facility] Daily Shift Assessment-Geriatric Unit  Nursing Progress Note          Room: 0617/617-01   Name: Cristian Caldwell   Age: 48 y.o. Gender: female   Dx: <principal problem not specified>  Precautions: suicide risk and fall risk  Inpatient Status: voluntary     SLEEP:    Sleep: No, last night  Sleep Quality Poor   Hours Slept:   Sleep Medications: Yes and Clonidine 0.1mg  PRN Sleep Meds: No       MEDICAL:      Other PRN Meds: No   Med Compliant: Yes   Accu-Chek: No   Oxygen/CPAP/BiPAP: No  CIWA/CINA: No   PAIN Assessment: none  Side Effects from medication: No      PSYCH:     SI denies suicidal ideation    HI Negative for homicidal ideation        AVH:Absent      Depression: improved Anxiety: on and off, improved       GENERAL:      Appetite: good  Social: Yes Speech: normal   Appearance:appropriately dressed  Assistive Devices: noneLevel of Assist: Independent      GROUP:    Group Participation: Yes  Participation LevelActive Listener    Participation QualityAppropriate    Notes: pt in day area, dressed casual, pt stated that she had a better day, attend groups, performed ADL, goal oriented, wrote down goals to be performed today, pt obsessessed with daily goals and when she should perform them, pt questions choices and decisions and asks nurses for approval. Pt stated that depression and anxiety improved, pt appears anxiouis interm this evening, pt is more social with peers and staff, med compliant. Pt denies SI,HI and AVH Affect labile and behavior cooperative. Will continue to monitor for safety.            Electronically signed by Hafsa Chowdary RN on 8/15/20 at 11:16 PM CDT

## 2020-08-16 NOTE — PROGRESS NOTES
S: Reports that she has had a circular area to right lateral ankle for months. States a times it itches. Denies any injury of being bit by anything. She has never seen a provider for the problem. O:  A&O x 3. Lungs clear bilaterally. Heart:  S1 & S2 RRR. Skin:  Right lateral ankle has small patch of scaly lesion. No redness or signs of infection. P:  She is started on triamcinolone ointment.

## 2020-08-16 NOTE — PROGRESS NOTES
WRAP UP GROUP NOTE:     Patient's Goal:  Providing feedback as to their own progress in the care-plan provided. Pt's have an opportunity to explore self-reflective skills and share any additional cares and concerns not yet addressed. Pt effectively participated.      Energy level:NORMAL  Appetite:GOOD  Concentration: IMPROVING  Hallucinations:NONE  Depression:IMPROVED  Anxiety:IMPROVED, SINCE MORNING, I WAS ANXIOUS THIS MORNING  How I worked today:WORKED HARD  What helps me sleep:PRAY, REMEMBER WHAT TRUDYJAYA THAKUR SAID TODAY  Any questions/complaints/comments:BLANK    GROUP ABOUT BIPOLAR DEPRESSION

## 2020-08-17 PROCEDURE — 6370000000 HC RX 637 (ALT 250 FOR IP): Performed by: PSYCHIATRY & NEUROLOGY

## 2020-08-17 PROCEDURE — 1240000000 HC EMOTIONAL WELLNESS R&B

## 2020-08-17 PROCEDURE — 99233 SBSQ HOSP IP/OBS HIGH 50: CPT | Performed by: PSYCHIATRY & NEUROLOGY

## 2020-08-17 RX ADMIN — Medication 3 MG: at 21:35

## 2020-08-17 RX ADMIN — CLONIDINE HYDROCHLORIDE 0.1 MG: 0.1 TABLET ORAL at 21:35

## 2020-08-17 RX ADMIN — TRIAMCINOLONE ACETONIDE: 1 OINTMENT TOPICAL at 21:34

## 2020-08-17 RX ADMIN — CLINDAMYCIN PHOSPHATE: 11.9 SOLUTION TOPICAL at 08:03

## 2020-08-17 RX ADMIN — LITHIUM CARBONATE 300 MG: 300 CAPSULE ORAL at 08:01

## 2020-08-17 RX ADMIN — POLYETHYLENE GLYCOL 3350 17 G: 17 POWDER, FOR SOLUTION ORAL at 06:16

## 2020-08-17 RX ADMIN — LITHIUM CARBONATE 600 MG: 300 CAPSULE ORAL at 21:34

## 2020-08-17 RX ADMIN — LURASIDONE HYDROCHLORIDE 60 MG: 40 TABLET, FILM COATED ORAL at 17:24

## 2020-08-17 RX ADMIN — TRIAMCINOLONE ACETONIDE: 1 OINTMENT TOPICAL at 08:03

## 2020-08-17 RX ADMIN — CLINDAMYCIN PHOSPHATE: 11.9 SOLUTION TOPICAL at 21:34

## 2020-08-17 ASSESSMENT — PAIN SCALES - GENERAL: PAINLEVEL_OUTOF10: 0

## 2020-08-17 NOTE — PROGRESS NOTES
SW met with treatment team to discuss pt's progress and setbacks. SW 2 was present. Pt reportedly slept fair last night, with medications, appetite is good, attends scheduled group activities, social with peers/staff, performs ADL's, compliant with medications, behavior has been calm/cooperative, reports moderate depression, severe anxiety, denies SI/HI/AVH, tentative discharge Tuesday, continue current treatment plan.

## 2020-08-17 NOTE — PROGRESS NOTES
[facility] Daily Shift Assessment-Geriatric Unit  Nursing Progress Note          Room: [facility]-   Name: Linden Paniagua   Age: 48 y.o. Gender: female   Dx: <principal problem not specified>  Precautions: suicide risk and fall risk  Inpatient Status: voluntary     SLEEP:    Sleep: Yes,   Sleep Quality Fair   Hours Slept:    Sleep Medications: Yes Melatonin 3 mg give early at 1836 and Clonidine 0.1 mg at 2030  PRN Sleep Meds: No       MEDICAL:      Other PRN Meds: No   Med Compliant: Yes   Accu-Chek: No   Oxygen/CPAP/BiPAP: No  CIWA/CINA: No   PAIN Assessment: none  Side Effects from medication: No      PSYCH:     SI denies suicidal ideation    HI Negative for homicidal ideation        AVH:Absent      Depression: 5 Anxiety: 8   Depression and anxiety  - better      GENERAL:      Appetite: good  Social: Yes and with staff, minimal with peers Speech: normal   Appearance:appropriately dressed  Assistive Devices: noneLevel of Assist: Independent      GROUP:    Group Participation: Yes  Participation LevelActive Listener    Participation QualityAppropriate    Notes:   Pt out in day area, and tv area with other peers, no very talkative, presence with peers, pt also in room journaling lists, pt focused on goals and things she needs to do. Pt states that she needs totalk to Dietician in morning and obsessing about medications and blood lab values,. Pt denies SI,HI and AVH, pt states depression and anxiety are better, pt ,med compliant, attends groups, more interactive with staff and peers, affect labile and behavior is calm, cooperative and obssessive on and off. Will continue to monitor for safety. Pt received Melatonin 3mg  Earlier at [facility]. Pt tired this evening and received 9pm meds, pt slept fair, with early rising at 0145 and interm sleep, the remaining night.          Electronically signed by Johney Meckel, RN on 8/16/20 at 11:09 PM CDT

## 2020-08-17 NOTE — PROGRESS NOTES
WRAP UP GROUP NOTE:     Patient's Goal:  Providing feedback as to their own progress in the care-plan provided. Pt's have an opportunity to explore self-reflective skills and share any additional cares and concerns not yet addressed. Pt effectively participated.      Energy level:HYPER  Appetite:GOOD  Concentration: IMPROVING  Hallucinations:NONE  Depression:SAME  Anxiety:CONSTANT  How I worked today:TRIED A LOT, HAD TO KEEP ON LETTING THING GO  What helps me sleep:relaxation exercise, read, journal  Any questions/complaints/comments:I HOPE MELATONIN WILL HELP ME SLEEP AND REST BE UP AND DOWN SINCE 2 AM

## 2020-08-17 NOTE — PROGRESS NOTES
Called and left voicemail for patient's mother to return call to obtain collateral information.     Electronically signed by Malachi Seals RN on 8/17/2020 at 1:25 PM

## 2020-08-17 NOTE — PLAN OF CARE
Problem: Discharge Planning:  Goal: Discharged to appropriate level of care  Description: Discharged to appropriate level of care  Outcome: Ongoing     Problem: Health Maintenance - Impaired:  Goal: Ability to perform activities of daily living will improve  Description: Ability to perform activities of daily living will improve  Outcome: Ongoing  Goal: Able to sleep without medication for appropriate length of time  Description: Able to sleep without medication for appropriate length of time  Outcome: Ongoing  Goal: Maintenance of adequate nutrition will improve  Description: Maintenance of adequate nutrition will improve  Outcome: Ongoing     Problem: Mood - Altered:  Goal: Mood stable  Description: Mood stable  Outcome: Ongoing     Problem: Self-Esteem - Low:  Goal: Demonstrates positive self-esteem  Description: Demonstrates positive self-esteem  Outcome: Ongoing     Problem: Cerebrospinal Fluid Leakage - Risk Of:  Goal: Demonstration of organized thought processes  Description: Demonstration of organized thought processes  Outcome: Ongoing     Problem: Violence - Risk of, Self/Other-Directed:  Goal: Knowledge of developmental care interventions  Description: Absence of violence  Outcome: Ongoing     Problem: Falls - Risk of:  Goal: Will remain free from falls  Description: Will remain free from falls  Outcome: Met This Shift  Goal: Absence of physical injury  Description: Absence of physical injury  Outcome: Met This Shift     Problem: Anxiety:  Goal: Level of anxiety will decrease  Description: Level of anxiety will decrease  Outcome: Ongoing     Problem:  Activity:  Goal: Physical symptoms of sleep deprivation will improve  Description: Physical symptoms of sleep deprivation will improve  Outcome: Ongoing  Goal: Sleeping patterns will improve  Description: Sleeping patterns will improve  Outcome: Ongoing     Problem: Pain:  Goal: Pain level will decrease  Description: Pain level will decrease  Outcome: Ongoing  Goal: Control of acute pain  Description: Control of acute pain  Outcome: Ongoing  Goal: Control of chronic pain  Description: Control of chronic pain  Outcome: Ongoing     Problem: Health Behavior:  Goal: Ability to verbalize adaptive coping strategies to use when suicidal feelings occur will improve  Description: Ability to verbalize adaptive coping strategies to use when suicidal feelings occur will improve  Outcome: Ongoing  Goal: Ability to verbalize adaptive coping strategies to use when the urge to self-mutilate occurs will improve  Description: Ability to verbalize adaptive coping strategies to use when the urge to self-mutilate occurs will improve  Outcome: Ongoing     Problem: Safety:  Goal: Ability to contract for his/her safety will improve  Description: Ability to contract for his/her safety will improve  Outcome: Met This Shift

## 2020-08-17 NOTE — PROGRESS NOTES
Department of Psychiatry  Attending Progress Note - Geriatric      SUBJECTIVE:    The patient is a 48 y. o. female with previous psychiatric history of depression, anxiety disorder and bipolar disorder, who has been admitted to psychiatric unit secondary to suicidal ideation. Patient has been seen in treatment team room. She reported that her condition mildly improved during this hospital stay and her mood is still \"a little anxious\" today. She endorses good appetite and improved quality of sleep, stated that she did not experience any difficulties to fall asleep or stay asleep. Patient reported that today she woke up at 4:30 in the morning. Patient is compliant with currently prescribed medications and denies any side effects. She continues to report mild feeling of depression and anxiety, and rated both of them as 4-5 out of 10, with 10 being the worst.  Patient denies any other affective symptomatology. She attends group activities in the unit and participates in them. Patient became more social with medical staff and other patients in the unit. She performs her ADLs daily. She denies current active suicidal or homicidal ideations, denies any plans. Also, she denies any auditory and visual hallucinations. OBJECTIVE    Physical  VITALS:  /82   Pulse 88   Temp 98.1 °F (36.7 °C) (Temporal)   Resp 17   Ht 5' 4\" (1.626 m)   Wt 219 lb 6.4 oz (99.5 kg)   SpO2 94%   BMI 37.66 kg/m²   TEMPERATURE:  Current - Temp: 98.1 °F (36.7 °C);  Max - Temp  Av.4 °F (36.9 °C)  Min: 98.1 °F (36.7 °C)  Max: 98.6 °F (37 °C)  RESPIRATIONS RANGE: Resp  Av.5  Min: 17  Max: 20  PULSE RANGE: Pulse  Av  Min: 70  Max: 88  BLOOD PRESSURE RANGE:  Systolic (79NAO), PCZ:775 , Min:110 , JBQ:807   ; Diastolic (26PCM), BIU:60, Min:79, Max:82    PULSE OXIMETRY RANGE: SpO2  Av.5 %  Min: 91 %  Max: 94 %    Review of Systems: 14 point review of systems is negative    Psychological ROS: Positive for mild depression and mild anxiety    Mental Status Examination:  Appearance: Appropriately groomed, wearing casual civilian clothes. Made good eye contact. Behavior: Calm, cooperative, friendly, and socially appropriate. No psychomotor retardation/agitation appreciated. Gait unremarkable. Speech: Normal in tone, volume, and quality. Mood: \"Angry, anxious\"   Affect: Mood congruent. Range is restricted. Thought Process: Appears linear and goal oriented. Thought Content: Patient does not have any current active suicidal and homicidal ideations. No overt delusions or paranoia appreciated. Perceptions: Seems patient does not have any auditory or visual hallucinations at present time. Patient did not appear to be responding to internal stimuli. No overt psychosis. Orientation: to person, place, date, and situation. Alert. Language: Intact. Fund of information: Intact. Memory: recent and remote appear intact.    Impulsivity: Fair  Neurovegitative: Fair appetite, improved sleep  Insight: Limited  Judgment: Limited      Data  Labs:    CBC:   Lab Results   Component Value Date    WBC 10.2 08/13/2020    RBC 4.70 08/13/2020    HGB 14.5 08/13/2020    HCT 43.9 08/13/2020    HCT 43.2 05/04/2012    MCV 93.4 08/13/2020    MCH 30.9 08/13/2020    MCHC 33.0 08/13/2020    RDW 12.8 08/13/2020     08/13/2020     05/04/2012    MPV 9.8 08/13/2020     CMP:    Lab Results   Component Value Date     08/13/2020     05/04/2012    K 4.0 08/13/2020    K 4.7 05/04/2012     08/13/2020     05/04/2012    CO2 23 08/13/2020    BUN 13 08/13/2020    CREATININE 0.7 08/13/2020    CREATININE 0.9 05/04/2012    GFRAA >59 08/13/2020    LABGLOM >60 08/13/2020    GLUCOSE 130 08/13/2020    PROT 7.1 08/13/2020    PROT 7.2 06/22/2012    LABALBU 4.3 08/13/2020    LABALBU 4.7 05/04/2012    CALCIUM 9.5 08/13/2020    BILITOT 0.3 08/13/2020    ALKPHOS 82 08/13/2020    ALKPHOS 98 05/04/2012    AST 15 08/13/2020    ALT 20 08/13/2020       Medications  Current Facility-Administered Medications: triamcinolone (KENALOG) 0.1 % ointment, , Topical, BID  melatonin tablet 3 mg, 3 mg, Oral, Daily  clindamycin (CLEOCIN T) 1 % external solution, , Topical, BID  benzoyl peroxide 5 % external liquid, , Topical, Daily  lithium capsule 300 mg, 300 mg, Oral, Daily with breakfast  acetaminophen (TYLENOL) tablet 650 mg, 650 mg, Oral, Q4H PRN  polyethylene glycol (GLYCOLAX) packet 17 g, 17 g, Oral, Daily PRN  lurasidone (LATUDA) tablet 60 mg, 60 mg, Oral, QPM  lithium capsule 600 mg, 600 mg, Oral, Nightly  cloNIDine (CATAPRES) tablet 0.1 mg, 0.1 mg, Oral, Nightly    ASSESSMENT AND PLAN    DSM 5 DIAGNOSIS:  Bipolar disorder, type I, most recent episode depressed, severe, without psychotic features  Borderline personality disorder  Dependent personality disorder  Suicidal ideations     Medical conditions pertinent to the patient's mental health:  Rule out sleep apnea  Urinary tract infection        Plan:   1. Psychiatric Medications:   Continue current psychotropic medications as recommended.   Patient denies side effect of currently prescribed medications. No changes with dose of prescribed psychotropic medications today.     2. Medical Issues:    Continue medical monitoring by Dr. Sebastián Gu and associates.       3.  Disposition:    Discharge patient home tomorrow on 08/18/2020     Amount of time spent with patient:    35 minutes with greater than 50% of the time spent in counseling and collaboration of care

## 2020-08-17 NOTE — CONSULTS
Nutrition Education    · Verbally reviewed information with patient. · Educated on meal planning and grocery shopping strategies to aid in desired wt loss and blood sugar control. · Written educational materials provided.     Electronically signed by Lucy Thrasher MS, RD, LD on 8/17/20 at 2:21 PM CDT    Contact: [phone]

## 2020-08-17 NOTE — PLAN OF CARE
Problem: Health Behavior:  Goal: Ability to verbalize adaptive coping strategies to use when suicidal feelings occur will improve  Description: Ability to verbalize adaptive coping strategies to use when suicidal feelings occur will improve  8/17/2020 1539 by Selene Lepe  Outcome: Ongoing  Note:                                                                     Group Therapy Note    Date: 8/17/2020  Start Time: 1430  End Time:  2727  Number of Participants: 4    Type of Group: Recovery    Wellness Binder Information  Module Name:  [address] [place] [address]  Session Number:  1    Group Goal for Pt: To improve knowledge of practical facts about depression    Notes:  Pt demonstrated improved knowledge of practical facts about depression by actively participating in group activity. Status After Intervention:  Improved    Participation Level:  Active Listener and Interactive    Participation Quality: Appropriate and Attentive      Speech:  normal      Thought Process/Content: Logical      Affective Functioning: Congruent      Mood: anxious and depressed      Level of consciousness:  Alert and Oriented x4      Response to Learning: Able to verbalize current knowledge/experience, Able to verbalize/acknowledge new learning, and Progressing to goal      Endings: None Reported    Modes of Intervention: Education      Discipline Responsible: Psychoeducational Specialist      Signature:  Selene Lepe

## 2020-08-18 PROCEDURE — 6370000000 HC RX 637 (ALT 250 FOR IP): Performed by: PSYCHIATRY & NEUROLOGY

## 2020-08-18 PROCEDURE — 99233 SBSQ HOSP IP/OBS HIGH 50: CPT | Performed by: PSYCHIATRY & NEUROLOGY

## 2020-08-18 PROCEDURE — 1240000000 HC EMOTIONAL WELLNESS R&B

## 2020-08-18 RX ADMIN — LURASIDONE HYDROCHLORIDE 60 MG: 40 TABLET, FILM COATED ORAL at 17:04

## 2020-08-18 RX ADMIN — CLONIDINE HYDROCHLORIDE 0.1 MG: 0.1 TABLET ORAL at 21:02

## 2020-08-18 RX ADMIN — CLINDAMYCIN PHOSPHATE: 11.9 SOLUTION TOPICAL at 08:37

## 2020-08-18 RX ADMIN — CLINDAMYCIN PHOSPHATE: 11.9 SOLUTION TOPICAL at 21:00

## 2020-08-18 RX ADMIN — TRIAMCINOLONE ACETONIDE: 1 OINTMENT TOPICAL at 21:00

## 2020-08-18 RX ADMIN — POLYETHYLENE GLYCOL 3350 17 G: 17 POWDER, FOR SOLUTION ORAL at 06:42

## 2020-08-18 RX ADMIN — LITHIUM CARBONATE 300 MG: 300 CAPSULE ORAL at 08:37

## 2020-08-18 RX ADMIN — TRIAMCINOLONE ACETONIDE: 1 OINTMENT TOPICAL at 08:37

## 2020-08-18 RX ADMIN — LITHIUM CARBONATE 600 MG: 300 CAPSULE ORAL at 21:01

## 2020-08-18 RX ADMIN — Medication 3 MG: at 21:04

## 2020-08-18 ASSESSMENT — PAIN SCALES - GENERAL: PAINLEVEL_OUTOF10: 0

## 2020-08-18 NOTE — PROGRESS NOTES
Progress Note  Dorian Raines  8/17/2020 10:55 PM  Subjective:   Admit Date:   8/13/2020      CC/ADMIT DX:       Interval History:   Reviewed overnight events and nursing notes. No new physical complaints. I have reviewed all labs/diagnostics from the last 24hrs. ROS:   I have done a 10 point ROS and all are negative, except what is mentioned in the HPI. DIET GENERAL;    Medications:      triamcinolone   Topical BID    melatonin  3 mg Oral Daily    clindamycin   Topical BID    benzoyl peroxide   Topical Daily    lithium  300 mg Oral Daily with breakfast    lurasidone  60 mg Oral QPM    lithium  600 mg Oral Nightly    cloNIDine  0.1 mg Oral Nightly           Objective:   Vitals: /88   Pulse 72   Temp 97.6 °F (36.4 °C) (Temporal)   Resp 16   Ht 5' 4\" (1.626 m)   Wt 219 lb 6.4 oz (99.5 kg)   SpO2 96%   BMI 37.66 kg/m²  No intake or output data in the 24 hours ending 08/17/20 0335  General appearance: alert and cooperative with exam  Extremities: extremities normal, atraumatic, no cyanosis or edema  Neurologic:  No obvious focal neurologic deficits. Skin: no rashes    Assessment and Plan: Active Problems:    Affective psychosis, bipolar ([address]  Resolved Problems:    * No resolved hospital problems. *      Plan:  1. Continue present medication(s)   2. Follow with Psych  3. She is medically stable. I will monitor for any changes or concerns. Discharge planning:   her home     Reviewed treatment plans with the patient and/or family.              Electronically signed by Yoana Perera MD on 8/17/2020 at 10:55 PM

## 2020-08-18 NOTE — PLAN OF CARE
Problem: Falls - Risk of:  Goal: Will remain free from falls  Description: Will remain free from falls  Outcome: Met This Shift  Goal: Absence of physical injury  Description: Absence of physical injury  Outcome: Met This Shift     Problem: Discharge Planning:  Goal: Discharged to appropriate level of care  Description: Discharged to appropriate level of care  Outcome: Ongoing     Problem: Health Maintenance - Impaired:  Goal: Ability to perform activities of daily living will improve  Description: Ability to perform activities of daily living will improve  Outcome: Ongoing  Goal: Able to sleep without medication for appropriate length of time  Description: Able to sleep without medication for appropriate length of time  Outcome: Ongoing  Goal: Maintenance of adequate nutrition will improve  Description: Maintenance of adequate nutrition will improve  Outcome: Ongoing     Problem: Mood - Altered:  Goal: Mood stable  Description: Mood stable  Outcome: Ongoing     Problem: Self-Esteem - Low:  Goal: Demonstrates positive self-esteem  Description: Demonstrates positive self-esteem  Outcome: Ongoing     Problem: Cerebrospinal Fluid Leakage - Risk Of:  Goal: Demonstration of organized thought processes  Description: Demonstration of organized thought processes  Outcome: Ongoing     Problem: Violence - Risk of, Self/Other-Directed:  Goal: Knowledge of developmental care interventions  Description: Absence of violence  Outcome: Ongoing     Problem: Anxiety:  Goal: Level of anxiety will decrease  Description: Level of anxiety will decrease  Outcome: Ongoing     Problem:  Activity:  Goal: Physical symptoms of sleep deprivation will improve  Description: Physical symptoms of sleep deprivation will improve  Outcome: Ongoing  Goal: Sleeping patterns will improve  Description: Sleeping patterns will improve  Outcome: Ongoing     Problem: Pain:  Goal: Pain level will decrease  Description: Pain level will decrease  Outcome: Ongoing  Goal: Control of acute pain  Description: Control of acute pain  Outcome: Ongoing  Goal: Control of chronic pain  Description: Control of chronic pain  Outcome: Ongoing     Problem: Health Behavior:  Goal: Ability to verbalize adaptive coping strategies to use when suicidal feelings occur will improve  Description: Ability to verbalize adaptive coping strategies to use when suicidal feelings occur will improve  8/18/2020 0516 by Ruel Celeste RN  Outcome: Ongoing  8/17/2020 1539 by Ching Amado  Outcome: Ongoing  Note:                                                                     Group Therapy Note    Date: 8/17/2020  Start Time: 1430  End Time:  3251  Number of Participants: 4    Type of Group: Recovery    Wellness Binder Information  Module Name:  [address] [place] [address]  Session Number:  1    Group Goal for Pt: To improve knowledge of practical facts about depression    Notes:  Pt demonstrated improved knowledge of practical facts about depression by actively participating in group activity. Status After Intervention:  Improved    Participation Level:  Active Listener and Interactive    Participation Quality: Appropriate and Attentive      Speech:  normal      Thought Process/Content: Logical      Affective Functioning: Congruent      Mood: anxious and depressed      Level of consciousness:  Alert and Oriented x4      Response to Learning: Able to verbalize current knowledge/experience, Able to verbalize/acknowledge new learning, and Progressing to goal      Endings: None Reported    Modes of Intervention: Education      Discipline Responsible: Psychoeducational Specialist      Signature:  Ching Amado    Goal: Ability to verbalize adaptive coping strategies to use when the urge to self-mutilate occurs will improve  Description: Ability to verbalize adaptive coping strategies to use when the urge to self-mutilate occurs will improve  Outcome: Ongoing     Problem: Safety:  Goal: Ability to

## 2020-08-18 NOTE — PROGRESS NOTES
Department of Psychiatry  Attending Progress Note - Geriatric      SUBJECTIVE:    The patient is a 48 y. o. female with previous psychiatric history of depression, anxiety disorder and bipolar disorder, who has been admitted to psychiatric unit secondary to suicidal ideation. Patient has been seen in my office. She reported that she is doing very well today and her mood is still \"a little anxious\" because of anticipated discharge home. Patient endorses good appetite and good sleep during the last night, stated that she slept all night without any issues. She is compliant with currently prescribed medications and denies any side effect. Patient denies any feeling of depression today, endorses mild feeling of anxiety, and rated level of anxiety as 4 out of 10, with 10 being the worst.  Patient attends group activities in the unit and participates in them. She is social with medical staff and other patients in the unit. Behaviorally, patient became more stable and appropriate. She performed her ADLs today. Patient denies current active suicidal or homicidal ideations, she denies any plans. Also, she denies any auditory visual hallucinations. OBJECTIVE    Physical  VITALS:  /80   Pulse 92   Temp 98.9 °F (37.2 °C) (Temporal)   Resp 18   Ht 5' 4\" (1.626 m)   Wt 219 lb 6.4 oz (99.5 kg)   SpO2 94%   BMI 37.66 kg/m²   TEMPERATURE:  Current - Temp: 98.9 °F (37.2 °C);  Max - Temp  Av.3 °F (36.8 °C)  Min: 97.6 °F (36.4 °C)  Max: 98.9 °F (37.2 °C)  RESPIRATIONS RANGE: Resp  Av  Min: 16  Max: 18  PULSE RANGE: Pulse  Av  Min: 72  Max: 92  BLOOD PRESSURE RANGE:  Systolic (23LLV), MODESTA:462 , Min:110 , NHH:483   ; Diastolic (49ZZI), SZI:93, Min:80, Max:88    PULSE OXIMETRY RANGE: SpO2  Av %  Min: 94 %  Max: 96 %    Review of Systems: 14 point review of systems is negative    Psychological ROS: Positive for anxiety    Mental Status Examination:    Appearance: Appropriately groomed, wearing casual civilian clothes. Made good eye contact. Behavior: Calm, cooperative, socially appropriate. No psychomotor retardation or agitation appreciated. Gait unremarkable. Speech: Normal in tone, volume, and quality. Mood: \"Little anxious\"   Affect: Mood congruent. Range is restricted. Thought Process: Appears linear and goal oriented. Thought Content: Patient does not have any current active suicidal and homicidal ideations. No overt delusions or paranoia appreciated. Perceptions: Seems patient does not have any auditory or visual hallucinations at present time. Patient did not appear to be responding to internal stimuli. No overt psychosis. Orientation: to person, place, date, and situation. Alert. Language: Intact. Fund of information: Intact. Memory: recent and remote appear intact. Impulsivity: Fair  Neurovegitative: Fair appetite, good sleep  Insight: Limited. Judgment:  Limited.     Data  No new lab test results to review    Medications  Current Facility-Administered Medications: triamcinolone (KENALOG) 0.1 % ointment, , Topical, BID  melatonin tablet 3 mg, 3 mg, Oral, Daily  clindamycin (CLEOCIN T) 1 % external solution, , Topical, BID  benzoyl peroxide 5 % external liquid, , Topical, Daily  lithium capsule 300 mg, 300 mg, Oral, Daily with breakfast  acetaminophen (TYLENOL) tablet 650 mg, 650 mg, Oral, Q4H PRN  polyethylene glycol (GLYCOLAX) packet 17 g, 17 g, Oral, Daily PRN  lurasidone (LATUDA) tablet 60 mg, 60 mg, Oral, QPM  lithium capsule 600 mg, 600 mg, Oral, Nightly  cloNIDine (CATAPRES) tablet 0.1 mg, 0.1 mg, Oral, Nightly    ASSESSMENT AND PLAN    DSM 5 DIAGNOSIS:  Bipolar disorder, type I, most recent episode depressed, severe, without psychotic features  Borderline personality disorder  Dependent personality disorder  Suicidal ideations     Medical conditions pertinent to the patient's mental health:  Rule out sleep apnea  Urinary tract infection        Plan:   1. Psychiatric Medications:   Continue current psychotropic medications as recommended.  Patient denies side effect of currently prescribed medications. No changes with dose of prescribed psychotropic medications today.     2. Medical Issues:    Continue medical monitoring by Dr. Malick Mckeon and associates.       3.  Disposition:    Discharge patient home after IOP appointment will be scheduled for the patient.     Amount of time spent with patient:    35 minutes with greater than 50% of the time spent in counseling and collaboration of care

## 2020-08-18 NOTE — PROGRESS NOTES
I Daily Shift Assessment-Geriatric Unit  Nursing Progress Note          Room: [facility]/617-01   Name: Kwasi Waite   Age: 48 y.o. Gender: female   Dx: <principal problem not specified>  Precautions: suicide risk and fall risk  Inpatient Status: voluntary     SLEEP:    Sleep: Yes,   Sleep Quality Fair   Hours Slept: 6   Sleep Medications: Yes  PRN Sleep Meds: No       MEDICAL:      Other PRN Meds: No   Med Compliant: Yes   Accu-Chek: No   Oxygen/CPAP/BiPAP: No  CIWA/CINA: No   PAIN Assessment: none  Side Effects from medication: No      PSYCH:     SI denies suicidal ideation    HI Negative for homicidal ideation        AVH:Absent      Depression: 3 Anxiety: 3       GENERAL:      Appetite: good  Social: Yes Speech: normal   Appearance:appropriately dressed and healthy looking  Assistive Devices: noneLevel of Assist: Independent      GROUP:    Group Participation: Yes  Participation LevelMinimal    Participation QualityAppropriate    Notes:   Patient resting in bed at this time with eyes closed. Patient has been up one time and came out for something to drink and a snack. Patient voiced concern that the closest place she could get IOP services was in [address] [facility]. Stated she hoped they could find something closer. Will continue to monitor for safety.       Brigido Barton RN

## 2020-08-18 NOTE — PROGRESS NOTES
WRAP UP GROUP NOTE:     Patient's Goal:  Providing feedback as to their own progress in the care-plan provided. Pt's have an opportunity to explore self-reflective skills and share any additional cares and concerns not yet addressed. Pt effectively participated. Energy level:  Normal   Appetite:  Good   Concentration:   Improving   Hallucinations:  None   Depression:  Improved   Anxiety:  On/off   How I worked today:  I achieved(2 groups)-tried a lot  What helps me sleep:  Try a relaxation exercise, read  Any questions/complaints/comments:  No   Group/activities that helped me today were:  Seeing the doctor; One-to-one with staff.

## 2020-08-18 NOTE — PLAN OF CARE
Ongoing     Problem: Falls - Risk of:  Goal: Will remain free from falls  Description: Will remain free from falls  8/18/2020 1255 by Svetlana Chen RN  Outcome: Ongoing  8/18/2020 0516 by Garry Hunt RN  Outcome: Met This Shift  Goal: Absence of physical injury  Description: Absence of physical injury  8/18/2020 1255 by Svetlana Chen RN  Outcome: Ongoing  8/18/2020 0516 by Garry Hunt RN  Outcome: Met This Shift     Problem: Anxiety:  Goal: Level of anxiety will decrease  Description: Level of anxiety will decrease  8/18/2020 1255 by Svetlana Chen RN  Outcome: Ongoing  8/18/2020 0516 by Garry Hunt RN  Outcome: Ongoing     Problem: Activity:  Goal: Physical symptoms of sleep deprivation will improve  Description: Physical symptoms of sleep deprivation will improve  8/18/2020 1255 by Svetlana Chen RN  Outcome: Ongoing  8/18/2020 0516 by Garry Hunt RN  Outcome: Ongoing  Goal: Sleeping patterns will improve  Description: Sleeping patterns will improve  8/18/2020 1255 by Svetlana Chen RN  Outcome: Ongoing  8/18/2020 0516 by Garry Hunt RN  Outcome: Ongoing     Problem: Pain:  Description: Pain management should include both nonpharmacologic and pharmacologic interventions.   Goal: Pain level will decrease  Description: Pain level will decrease  8/18/2020 1255 by Svetlana Chen RN  Outcome: Ongoing  8/18/2020 0516 by Garry Hunt RN  Outcome: Ongoing  Goal: Control of acute pain  Description: Control of acute pain  8/18/2020 1255 by Svetlana Chen RN  Outcome: Ongoing  8/18/2020 0516 by Garry Hunt RN  Outcome: Ongoing  Goal: Control of chronic pain  Description: Control of chronic pain  8/18/2020 1255 by Svetlana Chen RN  Outcome: Ongoing  8/18/2020 0516 by Garry Hunt RN  Outcome: Ongoing     Problem: Health Behavior:  Goal: Ability to verbalize adaptive coping strategies to use when suicidal feelings occur will improve  Description: Ability to verbalize adaptive coping strategies to use when

## 2020-08-18 NOTE — PROGRESS NOTES
SW met with treatment team to discuss pt's progress and setbacks. SW 2 was present. Pt reportedly slept fair last night, with medications, appetite is good, attends scheduled group activities, social with peers/staff, performs ADL's, compliant with medications, behavior has been cooperative, reports mild depression/anxiety, denies SI/HI/AVH, continue current treatment plan.

## 2020-08-19 VITALS
HEART RATE: 80 BPM | SYSTOLIC BLOOD PRESSURE: 112 MMHG | HEIGHT: 64 IN | BODY MASS INDEX: 37.46 KG/M2 | WEIGHT: 219.4 LBS | DIASTOLIC BLOOD PRESSURE: 68 MMHG | RESPIRATION RATE: 18 BRPM | OXYGEN SATURATION: 95 % | TEMPERATURE: 98.9 F

## 2020-08-19 PROCEDURE — 6370000000 HC RX 637 (ALT 250 FOR IP): Performed by: PSYCHIATRY & NEUROLOGY

## 2020-08-19 PROCEDURE — 99239 HOSP IP/OBS DSCHRG MGMT >30: CPT | Performed by: PSYCHIATRY & NEUROLOGY

## 2020-08-19 RX ORDER — LITHIUM CARBONATE 300 MG/1
300 CAPSULE ORAL
Qty: 30 CAPSULE | Refills: 1 | Status: ON HOLD | OUTPATIENT
Start: 2020-08-20 | End: 2021-06-27 | Stop reason: SDUPTHER

## 2020-08-19 RX ORDER — LITHIUM CARBONATE 600 MG/1
600 CAPSULE ORAL NIGHTLY
Qty: 30 CAPSULE | Refills: 1 | Status: SHIPPED | OUTPATIENT
Start: 2020-08-19 | End: 2021-06-25

## 2020-08-19 RX ORDER — LANOLIN ALCOHOL/MO/W.PET/CERES
3 CREAM (GRAM) TOPICAL NIGHTLY
Qty: 30 TABLET | Refills: 1 | Status: SHIPPED | OUTPATIENT
Start: 2020-08-19

## 2020-08-19 RX ADMIN — TRIAMCINOLONE ACETONIDE: 1 OINTMENT TOPICAL at 08:17

## 2020-08-19 RX ADMIN — LITHIUM CARBONATE 300 MG: 300 CAPSULE ORAL at 08:17

## 2020-08-19 RX ADMIN — CLINDAMYCIN PHOSPHATE: 11.9 SOLUTION TOPICAL at 08:17

## 2020-08-19 RX ADMIN — POLYETHYLENE GLYCOL 3350 17 G: 17 POWDER, FOR SOLUTION ORAL at 06:32

## 2020-08-19 ASSESSMENT — PAIN SCALES - GENERAL: PAINLEVEL_OUTOF10: 0

## 2020-08-19 NOTE — PROGRESS NOTES
WRAP UP GROUP NOTE:     Patient's Goal:  Providing feedback as to their own progress in the care-plan provided. Pt's have an opportunity to explore self-reflective skills and share any additional cares and concerns not yet addressed. Pt effectively participated.      Energy level:NORMAL  Appetite:GOOD  Concentration: IMPROVING  Hallucinations:NONE  Depression:SAME, A LITTLE WORSE  Anxiety:ON AND OFF  How I worked today:TRIED A LOT  What helps me sleep:READ  Any questions/complaints/comments:CONCERED ABOUT BEING AWAY FROM FAMILY, PETS, AND BOYFRIEND FOR A LONG TIME

## 2020-08-19 NOTE — DISCHARGE SUMMARY
Discharge Summary     Patient ID:  Shelbie Suresh  [id number]  75 y.o.  1965    Admit date: 8/13/2020  Discharge date: 8/19/2020    Admitting Physician: Irineo Metz MD   Attending Physician: Irineo Metz MD  Discharge Provider: Chelo Marks     Admission Diagnoses: Bipolar disorder, current episode depressed, severe, without psychotic features [facility]) [F31.4]    Discharge Diagnoses: DSM 5 DIAGNOSIS:  Bipolar disorder, type I, most recent episode depressed, severe, without psychotic features  Borderline personality disorder  Dependent personality disorder  Suicidal ideations     Medical conditions pertinent to the patient's mental health:  Rule out sleep apnea    Admission Condition: poor    Discharged Condition: stable    Indication for Admission: Suicidal ideations    HPI:   The patient is a 48 y.o. female with previous psychiatric history of depression, anxiety disorder and bipolar disorder, who has been admitted to psychiatric unit secondary to suicidal ideation. At time of admission, UDS is negative, blood alcohol level is less than 10, lithium level is 0.30, which is below therapeutic level.     Patient has been seen in treatment team room with presence of the patient's nurse. She reported that she was doing relatively well after last discharge 3 ago from the hospital, however, she returned to ER 2 weeks ago with suicidal ideations. At that time, I personally evaluated patient in the emergency department and it seems that patient was not able to build or use her coping skills to deal with her home stressors. Patient has been sent home and follow-up appointment for intensive outpatient therapy was scheduled, but patient did not attend any intensive outpatient therapy sessions. Patient denies having any recent life stresses.   However, she reported that yesterday suddenly she started to have suicidal ideations with plan to overdose of medications, and her mother brought patient to the hospital for results. Contacted to sleep lab to get results of the sleep study, however, we were notified that the sleep study was not interpretated yet. Patient attended and participated in groups. The patient did interact well with other patients and staff on the unit. Behaviorally she was not a problem. Patient was compliant with her medications. Patient was sleeping through the night. This patient is not suicidal, homicidal or psychotic at discharge. She does not present a danger to self or others. With the above mentioned medications changes as well as psychotherapeutic interventions, the patient reported considerable improvement in her condition. On 08/19/2020 it was therefore decided to discharge the patient, as it was felt that she received maximal benefit from her hospitalization.       Number of antipsychotic medication prescribed at discharge: One, Latuda  IF MORE THAN ONE IS USED: NA    History of greater than 3 failed multiple monotherapy trials: NA  Monotherapy taper plan/ cross taper in progress: NA  Augmentation of Clozapine: NA    Referral to addiction treatment: NA    Prescription for Alcohol or Drug Disorder Medication: NA    Prescription for Tobacco Cessation medication: NA    If no prescriptions for Tobacco Cessation must document why: NA    Consults: Internal medicine    Significant Diagnostic Studies:     Lab Results   Component Value Date    WBC 10.2 08/13/2020    HGB 14.5 08/13/2020    HCT 43.9 08/13/2020    MCV 93.4 08/13/2020     08/13/2020     Lab Results   Component Value Date     08/13/2020    K 4.0 08/13/2020     08/13/2020    CO2 23 08/13/2020    BUN 13 08/13/2020    CREATININE 0.7 08/13/2020    GLUCOSE 130 (H) 08/13/2020    CALCIUM 9.5 08/13/2020    PROT 7.1 08/13/2020    LABALBU 4.3 08/13/2020    BILITOT 0.3 08/13/2020    ALKPHOS 82 08/13/2020    AST 15 08/13/2020    ALT 20 08/13/2020    LABGLOM >60 08/13/2020    GFRAA >59 08/13/2020    GLOB 3.3 06/18/2016       Lab Results Component Value Date    TSHFT4 2.11 07/22/2020    TSH 1.140 02/21/2020     Lab Results   Component Value Date    REXOPFXQ57 315 07/22/2020     Lab Results   Component Value Date    VITD25 45.3 07/22/2020       Treatments: therapies: RN and NAINA    Mental Status Examination:  Alert, Oriented X 4  Appearance:  Proper Grooming and Hygiene  Speech with Regular Rate and Rhythm  Eye Contact:  Good  No Psychomotor Agitation/Retardation Noted  Attitude:  Cooperative  Mood:  \"Good\"  Affective: Congruent, appropriate to the situation, with a normal range and intensity  Thought Processes:  Coherently communicated, logical and goal oriented  Thought Content:  No Suicidal Ideation, No Homicidal Ideation, No Auditory or Visual Hallucinations, No Overt Delusions  Insight: Limited  Judgement: Limited  Memory is intact for both remote and recent  Intellectual Functioning:  Within the [address] of Knowledge:  Adequate  Attention and Concentration:  Adequate      Discharge Exam:  Please, see medical note    Disposition: home    Patient Instructions:   Current Discharge Medication List      START taking these medications    Details   melatonin 3 MG TABS tablet Take 1 tablet by mouth nightly  Qty: 30 tablet, Refills: 1      triamcinolone (KENALOG) 0.1 % ointment Apply topically 2 times daily. Qty: 1 Tube, Refills: 0         CONTINUE these medications which have CHANGED    Details   !! lithium 600 MG capsule Take 1 capsule by mouth nightly  Qty: 30 capsule, Refills: 1      !! lithium 300 MG capsule Take 1 capsule by mouth daily (with breakfast)  Qty: 30 capsule, Refills: 1      lurasidone (LATUDA) 60 MG TABS tablet Take 1 tablet by mouth every evening  Qty: 30 tablet, Refills: 1       !! - Potential duplicate medications found. Please discuss with provider.       CONTINUE these medications which have NOT CHANGED    Details   benzoyl peroxide (BENZOYL PEROXIDE) 5 % external liquid Apply topically 2 times daily Apply topically 2 times daily to face and groin area      clindamycin (CLEOCIN T) 1 % external solution Apply topically 2 times daily Apply topically 2 times daily to groin, face and arms twice a day      cloNIDine (CATAPRES) 0.1 MG tablet Take 1 tablet by mouth nightly  Qty: 90 tablet, Refills: 1      vitamin D (ERGOCALCIFEROL) 1.25 MG (25909 UT) CAPS capsule Take 1 capsule by mouth once a week for 11 doses  Qty: 5 capsule, Refills: 1         STOP taking these medications       hydrOXYzine (ATARAX) 10 MG tablet Comments:   Reason for Stopping:             Activity: activity as tolerated  Diet: regular diet  Wound Care: as directed    [facility]     Time worked: More than 31 minutes    Participation: good    Electronically signed by Lencho Rea MD on 8/19/2020 at 12:26 PM

## 2020-08-19 NOTE — PLAN OF CARE
Group Therapy Note    Date: 8/19/2020  Start Time: 1100  End Time:  3856  Number of Participants: 5    Type of Group: Psychoeducation    Wellness Binder Information  Module Name:  Relapse Prevention  Session Number:  5    Group Goal for Pt: To improve knowledge of relapse prevention strategies    Notes:  Pt demonstrated improved knowledge of relapse prevention strategies by actively participating in group discussion. Status After Intervention:  Unchanged    Participation Level:  Active Listener and Interactive    Participation Quality: Appropriate and Attentive      Speech:  normal      Thought Process/Content: Logical      Affective Functioning: Congruent      Mood: anxious and depressed      Level of consciousness:  Alert and Oriented x4      Response to Learning: Able to verbalize current knowledge/experience, Able to verbalize/acknowledge new learning, and Progressing to goal      Endings: None Reported    Modes of Intervention: Education      Discipline Responsible: Psychoeducational Specialist      Signature:  Kyle Wisdom

## 2020-08-19 NOTE — DISCHARGE INSTR - COC
Continuity of Care Form  Medications:   Medication Summary Provided. I understand that I should take only the medications on my list.   --why and when I need to take each medication. --which side effects to watch for.   --that I should carry my medication information at all times in case of emergency    Situations. --I will take all medications until discontinued by physician. I will take all my medications to follow up appointments. --check with my physician or pharmacist before taking any new medication, over    the counter product or drink alcohol.   --ask about food, drug and dietary supplement interactions. --discard old lists and update records with medication providers. Behavior Health Follow Up:  Original Referral Source: ED  Discharge Diagnosis:   Patient Active Problem List   Diagnosis    [facility] ([facility] [address]    Borderline personality disorder ([facility] [address]    Insomnia    Hoarding disorder    Affective psychosis, bipolar ([facility] [address]     Recomendations for Level of Care: Follow Up  Patient Status at Discharge: Stable  My Hospital  was: [facility]  Aftercare plan faxed:    Faxed by: Social Work staff   Date: 20   Time: ***  Prescriptions sent with pt.     General Information:   Questions regarding your bill: Call [phone]   Suicide Hotline (Rescue Crisis) 9-755.201.5687   To obtain results of pending studies call Medical Records at: [phone]   For emergencies and 24 hour/7 days a week contact information: [phone]

## 2020-08-19 NOTE — DISCHARGE INSTR - DIET
 Good nutrition is important when healing from an illness, injury, or surgery. Follow any nutrition recommendations given to you during your hospital stay.  If you were given an oral nutrition supplement while in the hospital, continue to take this supplement at home. You can take it with meals, in-between meals, and/or before bedtime. These supplements can be purchased at most local grocery stores, pharmacies, and chain super-stores.  If you have any questions about your diet or nutrition, call the hospital and ask for the dietitian. Learning About Dietary Guidelines  What are the Dietary Guidelines for Americans? Dietary Guidelines for Americans provide tips for eating well and staying healthy. This helps reduce the risk for long-term (chronic) diseases. These adult guidelines from the [place] recommend that you:  · Eat lots of fruits, vegetables, whole grains, and low-fat or nonfat dairy products. · Try to balance your eating with your activity. This helps you stay at a healthy weight. · Drink alcohol in moderation, if at all. · Limit foods high in salt, saturated fat, trans fat, and added sugar. What is MyPlate? MyPlate is the U.[place]. government's food guide. It can help you make your own well-balanced eating plan. A balanced eating plan means that you eat enough, but not too much, and that your food gives you the nutrients you need to stay healthy. MyPlate focuses on eating plenty of whole grains, fruits, and vegetables, and on limiting fat and sugar. It is available online at www. ChooseMyPlate.gov. How can you get started? If you're trying to eat healthier, you can slowly change your eating habits over time. You don't have to make big changes all at once. Start by adding one or two healthy foods to your meals each day. Grains  Choose whole-grain breads and cereals and whole-wheat pasta and whole-grain crackers. Vegetables  Eat a variety of vegetables every day.  They have lots of nutrients and are part of a heart-healthy diet. Fruits  Eat a variety of fruits every day. Fruits contain lots of nutrients. Choose fresh fruit instead of fruit juice. Protein foods  Choose fish and lean poultry more often. Eat red meat and fried meats less often. Dried beans, tofu, and nuts are also good sources of protein. Dairy  Choose low-fat or fat-free products from this food group. If you have problems digesting milk, try eating cheese or yogurt instead. Fats and oils  Limit fats and oils if you're trying to cut calories. Choose healthy fats when you cook. These include canola oil and olive oil. Where can you learn more? Go to https://Tradeos.Bonush. org and sign in to your [employer] account. Enter P120 in the Apollidon box to learn more about \"Learning About Dietary Guidelines. \"     If you do not have an account, please click on the \"Sign Up Now\" link. Current as of: August 22, 2019               Content Version: 12.5  © 3915-8118 Healthwise, Incorporated. Care instructions adapted under license by [facility] ([facility]). If you have questions about a medical condition or this instruction, always ask your healthcare professional. [address] any warranty or liability for your use of this information.

## 2020-08-19 NOTE — PROGRESS NOTES
Large Joint Injection/Arthocentesis: R greater trochanteric bursa    Date/Time: 8/19/2020 4:42 PM  Performed by: Kota Mae  Authorized by: Castillo Heller MD     Indications:  Pain  Needle Size:  22 G  Guidance: landmark guided    Approach:  Lateral  Location:  Hip      Site:  R greater trochanteric bursa  Medications:  80 mg methylPREDNISolone 80 MG/ML; 2 mL lidocaine (PF) 1 %  Consent Given by:  Patient  Timeout: timeout called immediately prior to procedure    Prep: patient was prepped and draped in usual sterile fashion           Patient is calm and cooperative with staff and peers. Patient has fair appetite, not social, participated in group, med compliant,  ADLs performed. Patient rated depression 6/10 and anxiety  5/10 but denies SI, HI, and AVH. Will continue to monitor.

## 2020-08-19 NOTE — PROGRESS NOTES
Progress Note  Lili Masterson  8/18/2020 10:17 PM  Subjective:   Admit Date:   8/13/2020      CC/ADMIT DX:       Interval History:   Reviewed overnight events and nursing notes. She has c/o constipation at times. I have reviewed all labs/diagnostics from the last 24hrs. ROS:   I have done a 10 point ROS and all are negative, except what is mentioned in the HPI. DIET GENERAL;    Medications:      triamcinolone   Topical BID    melatonin  3 mg Oral Daily    clindamycin   Topical BID    benzoyl peroxide   Topical Daily    lithium  300 mg Oral Daily with breakfast    lurasidone  60 mg Oral QPM    lithium  600 mg Oral Nightly    cloNIDine  0.1 mg Oral Nightly           Objective:   Vitals: BP (!) 134/95   Pulse 73   Temp 99 °F (37.2 °C) (Temporal)   Resp 20   Ht 5' 4\" (1.626 m)   Wt 219 lb 6.4 oz (99.5 kg)   SpO2 93%   BMI 37.66 kg/m²  No intake or output data in the 24 hours ending 08/18/20 2217  General appearance: alert and cooperative with exam  Extremities: extremities normal, atraumatic, no cyanosis or edema  Neurologic:  No obvious focal neurologic deficits. Skin: no rashes    Assessment and Plan: Active Problems:    Affective psychosis, bipolar ([facility] [address]  Resolved Problems:    * No resolved hospital problems. *    Constipation    Plan:  1. Continue present medication(s)   2. Follow with Psych  3. Treat Constipation      Discharge planning:   her home     Reviewed treatment plans with the patient and/or family.              Electronically signed by Berenice Perez MD on 8/18/2020 at 10:17 PM

## 2020-08-19 NOTE — PROGRESS NOTES
[facility] Daily Shift Assessment-Geriatric Unit  Nursing Progress Note          Room: [facility]617-   Name: Gill Ortiz   Age: 48 y.o. Gender: female   Dx: <principal problem not specified>  Precautions: suicide risk and fall risk  Inpatient Status: voluntary     SLEEP:    Sleep: Yes,   Sleep Quality interm sleep, awakens through the night   Hours Slept:    Sleep Medications: Yes and melatonin and catapres 0.1mg  PRN Sleep Meds: No       MEDICAL:      Other PRN Meds: No   Med Compliant: Yes   Accu-Chek: No   Oxygen/CPAP/BiPAP: No  CIWA/CINA: No   PAIN Assessment: none  Side Effects from medication: No      PSYCH:     SI denies suicidal ideation    HI Negative for homicidal ideation        AVH:Absent      Depression: 7/8 a little worse Anxiety: 7, on and off, worried about going to treatment center away from family. GENERAL:      Appetite: good  Social: Yes and with staff only Speech: normal and hesitant   Appearance:appropriately dressed  Assistive Devices: noneLevel of Assist: Independent      GROUP:    Group Participation: Yes  Participation LevelMinimal    Participation QualityAppropriate    Notes: pt is in day area, dressed casual, pt is anxious, increased to 7/8 and pt states that she is worried about going to treatment center away from [place] area for a long period of time leaving and way from family and boyfriend. Pt obsess about daily tasks, and \"what to do next\" judgement poor, asks staff \"what should I do\" continuously. Staff reassured pt and pt calmed. Pt re-directed to word finds to occupy thought and mind. Pt was successful. Pt stated that she met her goal and did not go to room and lay down when she felt stressed. Pt performed ADL, attends group, appetite good and is med compliant, pt denies SI,HI and AVH. Affect labile and behavior is calm and cooperative. Will continue to monitor for safety.            Electronically signed by Mukul Sim RN on 8/18/20 at 10:49 PM CDT

## 2020-08-19 NOTE — DISCHARGE INSTR - ACTIVITY
Learning About Coronavirus [phone])  Coronavirus [phone]): Overview  What is coronavirus (COVID-19)? The coronavirus disease (COVID-19) is caused by a virus. It is an illness that was first found in [place], [place], in December 2019. It has since spread worldwide. The virus can cause fever, cough, and trouble breathing. In severe cases, it can cause pneumonia and make it hard to breathe without help. It can cause death. Coronaviruses are a large group of viruses. They cause the common cold. They also cause more serious illnesses like Middle East respiratory syndrome (MERS) and severe acute respiratory syndrome (SARS). COVID-19 is caused by a novel coronavirus. That means it's a new type that has not been seen in people before. This virus spreads person-to-person through droplets from coughing and sneezing. It can also spread when you are close to someone who is infected. And it can spread when you touch something that has the virus on it, such as a doorknob or a tabletop. What can you do to protect yourself from coronavirus (COVID-19)? The best way to protect yourself from getting sick is to:  · Avoid areas where there is an outbreak. · Avoid contact with people who may be infected. · Wash your hands often with soap or alcohol-based hand sanitizers. · Avoid crowds and try to stay at least 6 feet away from other people. · Wash your hands often, especially after you cough or sneeze. Use soap and water, and scrub for at least 20 seconds. If soap and water aren't available, use an alcohol-based hand . · Avoid touching your mouth, nose, and eyes. What can you do to avoid spreading the virus to others? To help avoid spreading the virus to others:  · Cover your mouth with a tissue when you cough or sneeze. Then throw the tissue in the trash. · Use a disinfectant to clean things that you touch often. · Wear a cloth face cover if you have to go to public areas.   · Stay home if you are sick or have been exposed to the virus. Don't go to school, work, or public areas. And don't use public transportation, ride-shares, or taxis unless you have no choice. · If you are sick:  ? Leave your home only if you need to get medical care. But call the doctor's office first so they know you're coming. And wear a face cover. ? Wear the face cover whenever you're around other people. It can help stop the spread of the virus when you cough or sneeze. ? Clean and disinfect your home every day. Use household  and disinfectant wipes or sprays. Take special care to clean things that you grab with your hands. These include doorknobs, remote controls, phones, and handles on your refrigerator and microwave. And don't forget countertops, tabletops, bathrooms, and computer keyboards. When to call for help  [id number] anytime you think you may need emergency care. For example, call if:  · You have severe trouble breathing. (You can't talk at all.)  · You have constant chest pain or pressure. · You are severely dizzy or lightheaded. · You are confused or can't think clearly. · Your face and lips have a blue color. · You pass out (lose consciousness) or are very hard to wake up. Call your doctor now if you develop symptoms such as:  · Shortness of breath. · Fever. · Cough. If you need to get care, call ahead to the doctor's office for instructions before you go. Make sure you wear a face cover to prevent exposing other people to the virus. Where can you get the latest information? The following health organizations are tracking and studying this virus. Their websites contain the most up-to-date information. [facility] also learn what to do if you think you may have been exposed to the virus. · U.S. Centers for Disease Control and Prevention (CDC): The CDC provides updated news about the disease and travel advice. The website also tells you how to prevent the spread of infection.  www.cdc.gov  · [address] for you to do routine tasks. He or she may also want to know if you have changed the way you do a task because of a health problem. He or she may watch how you:  · Walk back and forth. · Keep your balance while you stand or walk. · Move from sitting to standing or from a bed to a chair. · Button or unbutton a shirt or sweater. · Remove and put on your shoes. It's normal to feel a little worried or anxious if you find you can't do all the things you used to be able to do. Talking with your doctor about ADLs isn't a test that you either pass or fail. It's just a way to get more information about your health and safety. Follow-up care is a key part of your treatment and safety. Be sure to make and go to all appointments, and call your doctor if you are having problems. It's also a good idea to know your test results and keep a list of the medicines you take. Where can you learn more? Go to https://[facility] [facility] [employer]SandLinks. org and sign in to your [employer] account. Enter K781 in the [facility] box to learn more about \"Learning About Activities of Daily Living. \"     If you do not have an account, please click on the \"Sign Up Now\" link. Current as of: March 2, 2020               Content Version: 12.5  © 5272-1498 Healthwise, Incorporated. Care instructions adapted under license by [facility] ([facility]). If you have questions about a medical condition or this instruction, always ask your healthcare professional. [place] [address] any warranty or liability for your use of this information.

## 2020-08-19 NOTE — PLAN OF CARE
Problem: Discharge Planning:  Goal: Discharged to appropriate level of care  Description: Discharged to appropriate level of care  8/18/2020 2243 by Fran Kern RN  Outcome: Ongoing  8/18/2020 1255 by Guzman Boyce RN  Outcome: Ongoing     Problem: Health Maintenance - Impaired:  Goal: Ability to perform activities of daily living will improve  Description: Ability to perform activities of daily living will improve  8/18/2020 2243 by Fran Kern RN  Outcome: Ongoing  8/18/2020 1255 by Guzman Boyce RN  Outcome: Ongoing  Goal: Able to sleep without medication for appropriate length of time  Description: Able to sleep without medication for appropriate length of time  8/18/2020 2243 by Fran Kern RN  Outcome: Ongoing  8/18/2020 1255 by Guzman Boyce RN  Outcome: Ongoing  Goal: Maintenance of adequate nutrition will improve  Description: Maintenance of adequate nutrition will improve  8/18/2020 2243 by Fran Kern RN  Outcome: Ongoing  8/18/2020 1255 by Guzamn Boyce RN  Outcome: Ongoing     Problem: Mood - Altered:  Goal: Mood stable  Description: Mood stable  8/18/2020 2243 by Fran Kern RN  Outcome: Ongoing  8/18/2020 1255 by Guzman Boyce RN  Outcome: Ongoing     Problem: Self-Esteem - Low:  Goal: Demonstrates positive self-esteem  Description: Demonstrates positive self-esteem  8/18/2020 2243 by Fran Kern RN  Outcome: Ongoing  8/18/2020 1255 by Guzman Boyce RN  Outcome: Ongoing     Problem: Cerebrospinal Fluid Leakage - Risk Of:  Goal: Demonstration of organized thought processes  Description: Demonstration of organized thought processes  8/18/2020 2243 by Fran Kern RN  Outcome: Ongoing  8/18/2020 1255 by Guzman Boyce RN  Outcome: Ongoing     Problem: Violence - Risk of, Self/Other-Directed:  Goal: Knowledge of developmental care interventions  Description: Absence of violence  8/18/2020 2243 by Fran Kern RN  Outcome: Ongoing  8/18/2020 1255 by Kel Goss Franca Woodard RN  Outcome: Ongoing     Problem: Falls - Risk of:  Goal: Will remain free from falls  Description: Will remain free from falls  8/18/2020 2243 by Juan Cisneros RN  Outcome: Met This Shift  8/18/2020 1255 by Jules Ramirez RN  Outcome: Ongoing  Goal: Absence of physical injury  Description: Absence of physical injury  8/18/2020 2243 by Juan Cisneros RN  Outcome: Met This Shift  8/18/2020 1255 by Jules Ramirez RN  Outcome: Ongoing     Problem: Anxiety:  Goal: Level of anxiety will decrease  Description: Level of anxiety will decrease  8/18/2020 2243 by Juan Cisneros RN  Outcome: Ongoing  8/18/2020 1255 by Jules Ramirez RN  Outcome: Ongoing     Problem:  Activity:  Goal: Physical symptoms of sleep deprivation will improve  Description: Physical symptoms of sleep deprivation will improve  8/18/2020 2243 by Juan Cisneros RN  Outcome: Ongoing  8/18/2020 1255 by Jules Ramirez RN  Outcome: Ongoing  Goal: Sleeping patterns will improve  Description: Sleeping patterns will improve  8/18/2020 2243 by Juan Cisneros RN  Outcome: Ongoing  8/18/2020 1255 by Jules Ramirez RN  Outcome: Ongoing     Problem: Pain:  Goal: Pain level will decrease  Description: Pain level will decrease  8/18/2020 2243 by Juan Cisneros RN  Outcome: Ongoing  8/18/2020 1255 by Jules Ramirez RN  Outcome: Ongoing  Goal: Control of acute pain  Description: Control of acute pain  8/18/2020 2243 by Juan Cisneros RN  Outcome: Ongoing  8/18/2020 1255 by Jules Ramirez RN  Outcome: Ongoing  Goal: Control of chronic pain  Description: Control of chronic pain  8/18/2020 2243 by Juan Cisneros RN  Outcome: Ongoing  8/18/2020 1255 by Jules Ramirez RN  Outcome: Ongoing     Problem: Health Behavior:  Goal: Ability to verbalize adaptive coping strategies to use when suicidal feelings occur will improve  Description: Ability to verbalize adaptive coping strategies to use when suicidal feelings occur will improve  8/18/2020 2243 by Mustapha Rowland

## 2020-08-20 PROCEDURE — 95806 SLEEP STUDY UNATT&RESP EFFT: CPT | Performed by: PSYCHIATRY & NEUROLOGY

## 2020-09-01 PROCEDURE — 95806 SLEEP STUDY UNATT&RESP EFFT: CPT | Performed by: PSYCHIATRY & NEUROLOGY

## 2020-09-09 ENCOUNTER — TELEPHONE (OUTPATIENT)
Dept: PSYCHIATRY | Age: 55
End: 2020-09-09

## 2020-09-09 NOTE — TELEPHONE ENCOUNTER
Reviewed patients discharge appointments. Patient had the following discharge appointments at [facility]. [address] [place]; Intake/therapy appoinment with [place] 11:00am    [address] [place] 8/27/2020; Medication management appointment with Simran Zhao at [facility] Dr. Logan Ring to let him know the patient did not meet criteria for the ACT program. Ladi Muse know that the patient did have follow up appointments scheduled at [facility] for medication management and therapy. I asked if there were any other follow up appointments he wanted made for the patient. Dr. Logan Ring stated no additional follow up appointments were needed. During hospital stay Social Work staff tried to find IOP for patient and was unable to find a [address] [place] [address] IOP program close to patients home.

## 2020-09-09 NOTE — TELEPHONE ENCOUNTER
Received call from [address] [place] [address] team informing that after meeting with the patient, they were not accepting her to their program. Recommendation was for pt to attend an intensive outpatient services or traditional. It was explained that pt did not \"need the ACT type of services\" and did not \"exactly meet criteria. \"  This writer called [address] [place] [address] inpatient unit and provided update to social work staff; Hortencia Allison as Candelaria was not in office today. This writer also contacted manager, Marisa Brown, to provide update, understanding was voiced and Barajas informed that she would talk with Dr. Aida Arambula to provide update and checking to see that additional follow up appointments were made with [address] [place] [address] with therapist and medication management.

## 2020-09-15 ENCOUNTER — HOSPITAL ENCOUNTER (OUTPATIENT)
Dept: SLEEP CENTER | Age: 55
Discharge: HOME | End: 2020-09-17
Payer: MEDICAID

## 2020-09-15 PROCEDURE — 95810 POLYSOM 6/> YRS 4/> PARAM: CPT

## 2020-09-15 PROCEDURE — 95810 POLYSOM 6/> YRS 4/> PARAM: CPT | Performed by: PSYCHIATRY & NEUROLOGY

## 2020-09-22 ENCOUNTER — TELEPHONE (OUTPATIENT)
Dept: PSYCHIATRY | Age: 55
End: 2020-09-22

## 2020-11-24 ENCOUNTER — HOSPITAL ENCOUNTER (OUTPATIENT)
Dept: SLEEP CENTER | Age: 55
Discharge: HOME | End: 2020-11-26
Payer: MEDICAID

## 2020-11-24 PROCEDURE — 95811 POLYSOM 6/>YRS CPAP 4/> PARM: CPT

## 2020-11-24 PROCEDURE — 95811 POLYSOM 6/>YRS CPAP 4/> PARM: CPT | Performed by: PSYCHIATRY & NEUROLOGY

## 2020-11-25 NOTE — PROGRESS NOTES
Critical access hospital  [address]  Phone [phone] Fax [phone]     Sleep Study Technician Review    Patient Name:  Aicha Au  :   1965  Referring Provider: Garfield Bose MD    Brief History:  Aicha Au is a 48 y.o. female with a history of Bipolar, Anxiety, Depression and Schizoaffective disorder who has returned for a titration study. Height: 5'4\"  Weight:  225 lbs  BMI:     38.62  Neck Circ:       15\"  MALLAMPATI: Type 2  ESS:             Type of Study: Titration  Time Stage Position Snore Hypopnea Obs Apnea Rayo Apnea PAP O2   2200 1 Supine No No No No Cpap 4 RA   2300 2 Supine No No No No Cpap 4 RA   2400 2 Supine No No No No Cpap 6 RA   0100 2 Supine No No No No Cpap 8 RA   0200 2 Supine No No No No Cpap 8 RA   0300 2 Supine No No No No Cpap 8 RA   0400 Awake Supine No No No No Cpap 8 RA                           Summary: Pt tolerated mask well. DME: [employer]     Final PAP settings: Cpap 8  Mask Type: Full Face  Mask: Vitera   Mask Size: Small    The study was reviewed briefly with Aicha Au. She will be notified of the formal results and recommendations after the study is scored and interpreted. The report will be sent to his referring provider.     Technician: YAZMIN Rose

## 2020-12-22 ENCOUNTER — OFFICE VISIT (OUTPATIENT)
Dept: OBSTETRICS AND GYNECOLOGY | Facility: CLINIC | Age: 55
End: 2020-12-22

## 2020-12-22 VITALS
DIASTOLIC BLOOD PRESSURE: 80 MMHG | WEIGHT: 236 LBS | HEIGHT: 64 IN | SYSTOLIC BLOOD PRESSURE: 112 MMHG | BODY MASS INDEX: 40.29 KG/M2

## 2020-12-22 DIAGNOSIS — N89.8 VAGINAL DISCHARGE: ICD-10-CM

## 2020-12-22 DIAGNOSIS — Z01.419 WELL WOMAN EXAM WITH ROUTINE GYNECOLOGICAL EXAM: Primary | ICD-10-CM

## 2020-12-22 DIAGNOSIS — Z12.31 ENCOUNTER FOR SCREENING MAMMOGRAM FOR BREAST CANCER: ICD-10-CM

## 2020-12-22 DIAGNOSIS — E55.9 VITAMIN D DEFICIENCY: ICD-10-CM

## 2020-12-22 DIAGNOSIS — Z11.3 SCREENING EXAMINATION FOR STD (SEXUALLY TRANSMITTED DISEASE): ICD-10-CM

## 2020-12-22 PROCEDURE — 87661 TRICHOMONAS VAGINALIS AMPLIF: CPT | Performed by: NURSE PRACTITIONER

## 2020-12-22 PROCEDURE — 87491 CHLMYD TRACH DNA AMP PROBE: CPT | Performed by: NURSE PRACTITIONER

## 2020-12-22 PROCEDURE — 87624 HPV HI-RISK TYP POOLED RSLT: CPT | Performed by: NURSE PRACTITIONER

## 2020-12-22 PROCEDURE — 87591 N.GONORRHOEAE DNA AMP PROB: CPT | Performed by: NURSE PRACTITIONER

## 2020-12-22 PROCEDURE — G0123 SCREEN CERV/VAG THIN LAYER: HCPCS

## 2020-12-22 PROCEDURE — 99396 PREV VISIT EST AGE 40-64: CPT | Performed by: NURSE PRACTITIONER

## 2020-12-22 RX ORDER — LITHIUM CARBONATE 300 MG/1
CAPSULE ORAL
COMMUNITY
Start: 2020-11-12 | End: 2022-06-13

## 2020-12-22 RX ORDER — CLONIDINE HYDROCHLORIDE 0.1 MG/1
TABLET ORAL
COMMUNITY
Start: 2020-11-12

## 2020-12-22 RX ORDER — SULFAMETHOXAZOLE AND TRIMETHOPRIM 800; 160 MG/1; MG/1
TABLET ORAL
COMMUNITY
Start: 2020-12-07 | End: 2021-01-26

## 2020-12-22 RX ORDER — ERGOCALCIFEROL 1.25 MG/1
CAPSULE ORAL
COMMUNITY
Start: 2020-12-01 | End: 2020-12-28

## 2020-12-22 RX ORDER — FLUCONAZOLE 150 MG/1
TABLET ORAL
COMMUNITY
Start: 2020-12-14 | End: 2021-01-26

## 2020-12-22 RX ORDER — LURASIDONE HYDROCHLORIDE 80 MG/1
TABLET, FILM COATED ORAL
COMMUNITY
Start: 2020-12-08

## 2020-12-22 RX ORDER — LITHIUM CARBONATE 600 MG/1
CAPSULE ORAL
COMMUNITY
Start: 2020-11-12 | End: 2022-06-13 | Stop reason: SDUPTHER

## 2020-12-22 NOTE — PATIENT INSTRUCTIONS
Kegel Exercises    Kegel exercises can help strengthen your pelvic floor muscles. The pelvic floor is a group of muscles that support your rectum, small intestine, and bladder. In females, pelvic floor muscles also help support the womb (uterus). These muscles help you control the flow of urine and stool.  Kegel exercises are painless and simple, and they do not require any equipment. Your provider may suggest Kegel exercises to:  · Improve bladder and bowel control.  · Improve sexual response.  · Improve weak pelvic floor muscles after surgery to remove the uterus (hysterectomy) or pregnancy (females).  · Improve weak pelvic floor muscles after prostate gland removal or surgery (males).  Kegel exercises involve squeezing your pelvic floor muscles, which are the same muscles you squeeze when you try to stop the flow of urine or keep from passing gas. The exercises can be done while sitting, standing, or lying down, but it is best to vary your position.  Exercises  How to do Kegel exercises:  1. Squeeze your pelvic floor muscles tight. You should feel a tight lift in your rectal area. If you are a female, you should also feel a tightness in your vaginal area. Keep your stomach, buttocks, and legs relaxed.  2. Hold the muscles tight for up to 10 seconds.  3. Breathe normally.  4. Relax your muscles.  5. Repeat as told by your health care provider.  Repeat this exercise daily as told by your health care provider. Continue to do this exercise for at least 4-6 weeks, or for as long as told by your health care provider.  You may be referred to a physical therapist who can help you learn more about how to do Kegel exercises.  Depending on your condition, your health care provider may recommend:  · Varying how long you squeeze your muscles.  · Doing several sets of exercises every day.  · Doing exercises for several weeks.  · Making Kegel exercises a part of your regular exercise routine.  This information is not intended  "to replace advice given to you by your health care provider. Make sure you discuss any questions you have with your health care provider.  Document Revised: 08/07/2019 Document Reviewed: 08/07/2019  Elsevier Patient Education © 2020 [employer]  BMI for Adults  What is BMI?  Body mass index (BMI) is a number that is calculated from a person's weight and height. BMI can help estimate how much of a person's weight is composed of fat. BMI does not measure body fat directly. Rather, it is an alternative to procedures that directly measure body fat, which can be difficult and expensive.  BMI can help identify people who may be at higher risk for certain medical problems.  What are BMI measurements used for?  BMI is used as a screening tool to identify possible weight problems. It helps determine whether a person is obese, overweight, a healthy weight, or underweight.  BMI is useful for:  · Identifying a weight problem that may be related to a medical condition or may increase the risk for medical problems.  · Promoting changes, such as changes in diet and exercise, to help reach a healthy weight. BMI screening can be repeated to see if these changes are working.  How is BMI calculated?  BMI involves measuring your weight in relation to your height. Both height and weight are measured, and the BMI is calculated from those numbers. This can be done either in English (U.S.) or metric measurements. Note that charts and online BMI calculators are available to help you find your BMI quickly and easily without having to do these calculations yourself.  To calculate your BMI in English (U.S.) measurements:    1. Measure your weight in pounds (lb).  2. Multiply the number of pounds by 703.  ? For example, for a person who weighs 180 lb, multiply that number by 703, which equals 126,540.  3. Measure your height in inches. Then multiply that number by itself to get a measurement called \"inches squared.\"  ? For example, for a " "person who is 70 inches tall, the \"inches squared\" measurement is 70 inches x 70 inches, which equals 4,900 inches squared.  4. Divide the total from step 2 (number of lb x 703) by the total from step 3 (inches squared): 126,540 ÷ 4,900 = 25.8. This is your BMI.  To calculate your BMI in metric measurements:  1. Measure your weight in kilograms (kg).  2. Measure your height in meters (m). Then multiply that number by itself to get a measurement called \"meters squared.\"  ? For example, for a person who is 1.75 m tall, the \"meters squared\" measurement is 1.75 m x 1.75 m, which is equal to 3.1 meters squared.  3. Divide the number of kilograms (your weight) by the meters squared number. In this example: 70 ÷ 3.1 = 22.6. This is your BMI.  What do the results mean?  BMI charts are used to identify whether you are underweight, normal weight, overweight, or obese. The following guidelines will be used:  · Underweight: BMI less than 18.5.  · Normal weight: BMI between 18.5 and 24.9.  · Overweight: BMI between 25 and 29.9.  · Obese: BMI of 30 or above.  Keep these notes in mind:  · Weight includes both fat and muscle, so someone with a muscular build, such as an athlete, may have a BMI that is higher than 24.9. In cases like these, BMI is not an accurate measure of body fat.  · To determine if excess body fat is the cause of a BMI of 25 or higher, further assessments may need to be done by a health care provider.  · BMI is usually interpreted in the same way for men and women.  Where to find more information  For more information about BMI, including tools to quickly calculate your BMI, go to these websites:  · Centers for Disease Control and Prevention: www.cdc.gov  · American Heart Association: www.heart.org  · National Heart, Lung, and Blood [place]: www.nhlbi.nih.gov  Summary  · Body mass index (BMI) is a number that is calculated from a person's weight and height.  · BMI may help estimate how much of a person's " weight is composed of fat. BMI can help identify those who may be at higher risk for certain medical problems.  · BMI can be measured using English measurements or metric measurements.  · BMI charts are used to identify whether you are underweight, normal weight, overweight, or obese.  This information is not intended to replace advice given to you by your health care provider. Make sure you discuss any questions you have with your health care provider.  Document Revised: 09/09/2020 Document Reviewed: 07/17/2020  Elsevier Patient Education © 2020 Elsevier Inc.

## 2020-12-22 NOTE — PROGRESS NOTES
"Subjective     Edith Stephens is a 55 y.o. female    Patient is here today for yearly checkup.  She request STD screening.  She also complains of urinary incontinence at times.    Gynecologic Exam  The patient's primary symptoms include vaginal discharge. The patient's pertinent negatives include no pelvic pain or vaginal bleeding. The patient is experiencing no pain. Associated symptoms include frequency. Pertinent negatives include no abdominal pain, anorexia, back pain, chills, constipation, diarrhea, discolored urine, dysuria, fever, flank pain, headaches, hematuria, joint pain, joint swelling, nausea, painful intercourse, rash, sore throat, urgency or vomiting. The symptoms are aggravated by heavy lifting. She has tried nothing for the symptoms. She is sexually active. She is postmenopausal.         /80   Ht 162.6 cm (64.02\")   Wt 107 kg (236 lb)   LMP 12/13/2015 (Approximate)   BMI 40.49 kg/m²     Outpatient Encounter Medications as of 12/22/2020   Medication Sig Dispense Refill   • BENZOYL PEROXIDE 5 % external wash   3   • clindamycin (CLEOCIN T) 1 % external solution   3   • cloNIDine (CATAPRES) 0.1 MG tablet      • fluconazole (DIFLUCAN) 150 MG tablet      • Latuda 80 MG tablet tablet      • lithium 600 MG capsule      • lithium carbonate 300 MG capsule      • Melatonin 3 MG tablet Take 2 tablets by mouth At Night As Needed for Sleep.     • Multiple Vitamins-Minerals (CENTRUM SILVER PO) Take 1 tablet by mouth Daily.     • sulfamethoxazole-trimethoprim (BACTRIM DS,SEPTRA DS) 800-160 MG per tablet      • vitamin D (ERGOCALCIFEROL) 1.25 MG (45692 UT) capsule capsule      • [DISCONTINUED] ARIPiprazole (ABILIFY) 30 MG tablet   0   • [DISCONTINUED] clindamycin (CLINDAGEL) 1 % gel clindamycin 1 % topical gel     • [DISCONTINUED] divalproex (DEPAKOTE) 250 MG DR tablet Take 250 mg by mouth 3 (Three) Times a Day.     • [DISCONTINUED] divalproex (DEPAKOTE) 500 MG DR tablet Take 500 mg by mouth 3 (Three) " Times a Day.     • [DISCONTINUED] ibuprofen (ADVIL,MOTRIN) 200 MG tablet Take 200 mg by mouth Every 8 (Eight) Hours As Needed for Mild Pain (1-3).     • [DISCONTINUED] Influenza Vac Typ A&B Surf Ant suspension injection Fluvirin 4252-1905 45 mcg (15 mcg x 3)/0.5 mL intramuscular suspension     • [DISCONTINUED] lamoTRIgine (LaMICtal) 25 MG tablet 100 mg.  2   • [DISCONTINUED] Multiple Minerals-Vitamins (CALCIUM-MAGNESIUM-ZINC-D3 PO) Take 3 tablets by mouth Daily.     • [DISCONTINUED] nystatin (MYCOSTATIN) 046791 UNIT/GM powder Apply  topically to the appropriate area as directed 4 (Four) Times a Day. USES UNDER BREAST 1 each 3   • [DISCONTINUED] risperiDONE (risperDAL) 1 MG tablet Take 1 mg by mouth 2 (Two) Times a Day.       No facility-administered encounter medications on file as of 12/22/2020.        Surgical History  Past Surgical History:   Procedure Laterality Date   • CERVICAL BIOPSY  W/ LOOP ELECTRODE EXCISION     • D&C HYSTEROSCOPY N/A 5/30/2017    Procedure: DILATATION AND CURETTAGE HYSTEROSCOPY;  Surgeon: Citlali Arita MD;  Location: [facility] OR;  Service:    • OTHER SURGICAL HISTORY      cyst removed from foot as a child       Family History  Family History   Problem Relation Age of Onset   • Hypertension Father    • Coronary artery disease Paternal Grandfather    • Coronary artery disease Paternal Grandmother    • Diabetes Paternal Grandmother    • Kidney cancer Maternal Grandfather    • Thyroid disease Mother    • No Known Problems Sister    • Breast cancer Neg Hx    • Ovarian cancer Neg Hx    • Uterine cancer Neg Hx    • Colon cancer Neg Hx    • Melanoma Neg Hx    • Prostate cancer Neg Hx        The following portions of the patient's history were reviewed and updated as appropriate: allergies, current medications, past family history, past medical history, past social history, past surgical history and problem list.    Review of Systems   Constitutional: Negative for activity change, appetite  change, chills, diaphoresis, fatigue, fever, unexpected weight gain and unexpected weight loss.   HENT: Negative for congestion, dental problem, drooling, ear discharge, ear pain, facial swelling, hearing loss, mouth sores, nosebleeds, postnasal drip, rhinorrhea, sinus pressure, sneezing, sore throat, swollen glands, tinnitus, trouble swallowing and voice change.    Eyes: Negative for blurred vision, double vision, photophobia, pain, discharge, redness, itching and visual disturbance.   Respiratory: Negative for apnea, cough, choking, chest tightness, shortness of breath, wheezing and stridor.    Cardiovascular: Negative for chest pain, palpitations and leg swelling.   Gastrointestinal: Negative for abdominal distention, abdominal pain, anal bleeding, anorexia, blood in stool, constipation, diarrhea, nausea, rectal pain, vomiting, GERD and indigestion.   Endocrine: Negative for cold intolerance, heat intolerance, polydipsia, polyphagia and polyuria.   Genitourinary: Positive for frequency, urinary incontinence and vaginal discharge. Negative for amenorrhea, breast discharge, breast lump, breast pain, decreased libido, decreased urine volume, difficulty urinating, dyspareunia, dysuria, flank pain, genital sores, hematuria, menstrual problem, pelvic pain, pelvic pressure, urgency, vaginal bleeding and vaginal pain.   Musculoskeletal: Negative for arthralgias, back pain, gait problem, joint pain, joint swelling, myalgias, neck pain, neck stiffness and bursitis.   Skin: Negative for color change, dry skin and rash.   Allergic/Immunologic: Negative for environmental allergies, food allergies and immunocompromised state.   Neurological: Negative for dizziness, tremors, seizures, syncope, facial asymmetry, speech difficulty, weakness, light-headedness, numbness, headache, memory problem and confusion.   Hematological: Negative for adenopathy. Does not bruise/bleed easily.   Psychiatric/Behavioral: Negative for agitation,  behavioral problems, decreased concentration, dysphoric mood, hallucinations, self-injury, sleep disturbance, suicidal ideas, negative for hyperactivity, depressed mood and stress. The patient is not nervous/anxious.        Objective   Physical Exam  Vitals signs and nursing note reviewed. Exam conducted with a chaperone present.   Constitutional:       General: She is not in acute distress.     Appearance: She is well-developed. She is not diaphoretic.   HENT:      Head: Normocephalic.      Right Ear: External ear normal.      Left Ear: External ear normal.      Nose: Nose normal.   Eyes:      General: No scleral icterus.        Right eye: No discharge.         Left eye: No discharge.      Conjunctiva/sclera: Conjunctivae normal.   Neck:      Musculoskeletal: Normal range of motion and neck supple.      Thyroid: No thyromegaly.      Vascular: No carotid bruit.      Trachea: No tracheal deviation.   Cardiovascular:      Rate and Rhythm: Normal rate and regular rhythm.      Heart sounds: Normal heart sounds. No murmur.   Pulmonary:      Effort: Pulmonary effort is normal. No respiratory distress.      Breath sounds: Normal breath sounds. No wheezing.   Chest:      Breasts: Breasts are symmetrical.         Right: No inverted nipple, mass, nipple discharge, skin change or tenderness.         Left: No inverted nipple, mass, nipple discharge, skin change or tenderness.   Abdominal:      General: There is no distension.      Palpations: Abdomen is soft. There is no mass.      Tenderness: There is no abdominal tenderness. There is no guarding.      Hernia: No hernia is present. There is no hernia in the left inguinal area or right inguinal area.   Genitourinary:     General: Normal vulva.      Exam position: Lithotomy position.      Labia:         Right: No rash, tenderness, lesion or injury.         Left: No rash, tenderness, lesion or injury.       Vagina: No signs of injury and foreign body. Vaginal discharge present.  No erythema, tenderness or bleeding.      Cervix: Normal.      Uterus: Normal. Not enlarged, not fixed and not tender.       Adnexa: Right adnexa normal and left adnexa normal.        Right: No mass, tenderness or fullness.          Left: No mass, tenderness or fullness.        Rectum: Normal. No mass.      Comments:   BSU normal  Urethral meatus  Normal  Perineum  Normal  Musculoskeletal: Normal range of motion.         General: No tenderness.   Lymphadenopathy:      Head:      Right side of head: No submental, submandibular, tonsillar, preauricular, posterior auricular or occipital adenopathy.      Left side of head: No submental, submandibular, tonsillar, preauricular, posterior auricular or occipital adenopathy.      Cervical: No cervical adenopathy.      Right cervical: No superficial, deep or posterior cervical adenopathy.     Left cervical: No superficial, deep or posterior cervical adenopathy.      Lower Body: No right inguinal adenopathy. No left inguinal adenopathy.   Skin:     General: Skin is warm and dry.      Findings: No bruising, erythema or rash.   Neurological:      Mental Status: She is alert and oriented to person, place, and time.      Coordination: Coordination normal.   Psychiatric:         Mood and Affect: Mood normal.         Behavior: Behavior normal.         Thought Content: Thought content normal.         Judgment: Judgment normal.         Assessment/Plan   Diagnoses and all orders for this visit:    1. Well woman exam with routine gynecological exam (Primary)  Normal GYN exam. Will have lab work at PCP. Encouraged SBE, pt is aware how to do self breast exam and the importance of same. Discussed weight management and importance of maintaining a healthy weight. Discussed Vitamin D intake and the importance of adequate vitamin D for both Bone Health and a healthy immune system.  Discussed Daily exercise and the importance of same, in regards to a healthy heart as well as helping to maintain  her weight and improving her mental health.  BMI 40.5.  Colonoscopy is up to date.  Mammogram will be scheduled at [facility] per patient request.  Pap smear is done per ASCCP guidelines.  -     Liquid-based Pap Smear, Screening    2. Encounter for screening mammogram for breast cancer  Comments:  Patient wishes to have mammogram at [facility].  She is given an order today.  Orders:  -     Mammo Screening Digital Tomosynthesis Bilateral With CAD; Future    3. Vitamin D deficiency  Comments:  Patient will have labs drawn today.  Orders:  -     Vitamin D 25 Hydroxy    4. Screening examination for STD (sexually transmitted disease)  Comments:  Patient had a sexual encounter in the spring.  She would like STD screening done as that relationship has now ended.  Orders:  -     RPR  -     HIV-1 / O / 2 Ag / Antibody 4th Generation  -     HSV 1 & 2 - Specific Antibody, IgG  -     Hepatitis Panel, Acute  -     HSV 1 & 2 IgM, Antibodies, Indirect  -     Liquid-based Pap Smear, Screening    5. Vaginal discharge  Comments:  Small amount of vaginal discharge.  BV and STD panel are added to Pap.  Orders:  -     Liquid-based Pap Smear, Screening         Patient's Body mass index is 40.49 kg/m². BMI is above normal parameters. Recommendations include: educational material, exercise counseling and nutrition counseling.      Kimberly Daley, APRN  12/22/2020

## 2020-12-23 ENCOUNTER — TELEPHONE (OUTPATIENT)
Dept: OBSTETRICS AND GYNECOLOGY | Facility: CLINIC | Age: 55
End: 2020-12-23

## 2020-12-23 LAB
25(OH)D3+25(OH)D2 SERPL-MCNC: 39.7 NG/ML (ref 30–100)
C TRACH RRNA CVX QL NAA+PROBE: NOT DETECTED
HAV IGM SERPL QL IA: NEGATIVE
HBV CORE IGM SERPL QL IA: NEGATIVE
HBV SURFACE AG SERPL QL IA: NEGATIVE
HCV AB S/CO SERPL IA: <0.1 S/CO RATIO (ref 0–0.9)
HIV 1+2 AB+HIV1 P24 AG SERPL QL IA: NON REACTIVE
HSV1 IGG SER IA-ACNC: 9.1 INDEX (ref 0–0.9)
HSV1 IGM TITR SER IF: NORMAL TITER
HSV2 IGG SER IA-ACNC: 0.94 INDEX (ref 0–0.9)
HSV2 IGG SERPL QL IA: NEGATIVE
HSV2 IGM TITR SER IF: NORMAL TITER
N GONORRHOEA RRNA SPEC QL NAA+PROBE: NOT DETECTED
RPR SER QL: NON REACTIVE
TRICHOMONAS VAGINALIS PCR: NOT DETECTED

## 2020-12-24 LAB — HPV I/H RISK 4 DNA CVX QL PROBE+SIG AMP: NOT DETECTED

## 2020-12-27 NOTE — PROGRESS NOTES
[place] [address]  [address]  Phone [phone] Fax [phone]     Patient Name: Mario Sebastian 2020  : 1965  Age: 48 y.o.   [facility]  [address] [place], [place] [address]       Patient Phone: [phone] (home)     REFERRAL  Referred to: DME provider of patient's choice  Mario Sebastian is referred for the following:    DME Equipment HPCPS Code Setting   CPAP device with flex or comparable pressure relief per comfort  8cm   Heated Humidifier  Patient Choice       Replinishible PAP Supplies, 1 year supply  Item HPCPS Code Frequency   Mask of choice  or  1 per 3 months   Nasal Mask cushion/pillows  or  2 per 30 days   Full Face Mask Interface  1 per 30 days   Headgear  1 per 6 months   Tubing, length of choice  or  1 per 3 months   Water Chamber  1 per 6 months   Chinstrap  1 per 6 months   Disposable Filters  2 per 30 days   Reusable Filters  1 per 6 months     Diagnoses:  Obstructive sleep apnea (G47.33)  Length of Need: Lifetime, 99    Ordering Provider: MIKAL Townsend: [id number]         Signature:       Date: 2020      Electronically Signed by Manuel Adame M.D.

## 2020-12-28 RX ORDER — ERGOCALCIFEROL 1.25 MG/1
50000 CAPSULE ORAL WEEKLY
Qty: 5 CAPSULE | Refills: 3 | Status: SHIPPED | OUTPATIENT
Start: 2020-12-28 | End: 2021-01-14 | Stop reason: SDUPTHER

## 2020-12-30 DIAGNOSIS — Z12.31 ENCOUNTER FOR SCREENING MAMMOGRAM FOR BREAST CANCER: ICD-10-CM

## 2021-01-04 RX ORDER — AZITHROMYCIN 250 MG/1
TABLET, FILM COATED ORAL
Qty: 6 TABLET | Refills: 0 | Status: SHIPPED | OUTPATIENT
Start: 2021-01-04 | End: 2021-01-26

## 2021-01-04 RX ORDER — METRONIDAZOLE 500 MG/1
500 TABLET ORAL 2 TIMES DAILY
Qty: 14 TABLET | Refills: 0 | Status: SHIPPED | OUTPATIENT
Start: 2021-01-04 | End: 2021-01-11

## 2021-01-06 ENCOUNTER — TELEPHONE (OUTPATIENT)
Dept: OBSTETRICS AND GYNECOLOGY | Facility: CLINIC | Age: 56
End: 2021-01-06

## 2021-01-06 NOTE — TELEPHONE ENCOUNTER
She does not have my symptoms down as one of her allergies.  She is allergic to doxycycline so that is my only other choice.  She does not have to take it but she will continue to have her infection.  It was for mycoplasma.

## 2021-01-06 NOTE — TELEPHONE ENCOUNTER
"Patient states \"she is allergic to all mycin antibiotics\". Azithromycin was sent in to treat BV.   "

## 2021-01-07 NOTE — TELEPHONE ENCOUNTER
Patient states she had the reaction as a teenager and is unsure of what her actual reaction to the medication is. Patient states she is certain it does not cause anaphylaxis. Patient is encouraged that if the takes the medication to have Benadryl or some sort of allergy medication on hand to possibly treat any allergic reaction she may have. Patient voices understanding.

## 2021-01-14 DIAGNOSIS — E55.9 VITAMIN D DEFICIENCY: Primary | ICD-10-CM

## 2021-01-14 RX ORDER — ERGOCALCIFEROL 1.25 MG/1
50000 CAPSULE ORAL 2 TIMES WEEKLY
Qty: 24 CAPSULE | Refills: 3 | Status: SHIPPED | OUTPATIENT
Start: 2021-01-14 | End: 2021-04-14

## 2021-01-26 ENCOUNTER — OFFICE VISIT (OUTPATIENT)
Dept: OBSTETRICS AND GYNECOLOGY | Facility: CLINIC | Age: 56
End: 2021-01-26

## 2021-01-26 VITALS
DIASTOLIC BLOOD PRESSURE: 86 MMHG | SYSTOLIC BLOOD PRESSURE: 126 MMHG | WEIGHT: 238 LBS | BODY MASS INDEX: 40.63 KG/M2 | HEIGHT: 64 IN

## 2021-01-26 DIAGNOSIS — A60.04 HERPES, VULVAR: ICD-10-CM

## 2021-01-26 DIAGNOSIS — N89.8 VAGINAL DISCHARGE: Primary | ICD-10-CM

## 2021-01-26 PROCEDURE — 87481 CANDIDA DNA AMP PROBE: CPT | Performed by: NURSE PRACTITIONER

## 2021-01-26 PROCEDURE — 87798 DETECT AGENT NOS DNA AMP: CPT | Performed by: NURSE PRACTITIONER

## 2021-01-26 PROCEDURE — 87563 M. GENITALIUM AMP PROBE: CPT | Performed by: NURSE PRACTITIONER

## 2021-01-26 PROCEDURE — 87661 TRICHOMONAS VAGINALIS AMPLIF: CPT | Performed by: NURSE PRACTITIONER

## 2021-01-26 PROCEDURE — 87512 GARDNER VAG DNA QUANT: CPT | Performed by: NURSE PRACTITIONER

## 2021-01-26 PROCEDURE — 99214 OFFICE O/P EST MOD 30 MIN: CPT | Performed by: NURSE PRACTITIONER

## 2021-01-26 RX ORDER — VALACYCLOVIR HYDROCHLORIDE 500 MG/1
500 TABLET, FILM COATED ORAL DAILY
Qty: 30 TABLET | Refills: 12 | Status: SHIPPED | OUTPATIENT
Start: 2021-01-26 | End: 2021-12-23 | Stop reason: SDUPTHER

## 2021-01-26 RX ORDER — SODIUM FLUORIDE 1.1 G/100G
GEL, DENTIFRICE ORAL
COMMUNITY
Start: 2020-11-06

## 2021-01-26 NOTE — PROGRESS NOTES
"Subjective     Edith Stephens is a 55 y.o. female    Patient is here today to discuss herpes finding on her STD screening.  She would also like another BV panel if she feels she might still have infection.    Vaginal Discharge  The patient's primary symptoms include vaginal discharge. The patient's pertinent negatives include no pelvic pain or vaginal bleeding. The patient is experiencing no pain. Pertinent negatives include no abdominal pain, anorexia, back pain, chills, constipation, diarrhea, discolored urine, dysuria, fever, flank pain, frequency, headaches, hematuria, joint pain, joint swelling, nausea, painful intercourse, rash, sore throat, urgency or vomiting. The vaginal discharge was yellow. There has been no bleeding. She has not been passing clots. She has not been passing tissue. She is sexually active. She is postmenopausal.         /86   Ht 162.6 cm (64.02\")   Wt 108 kg (238 lb)   LMP 12/13/2015 (Approximate)   BMI 40.83 kg/m²     Outpatient Encounter Medications as of 1/26/2021   Medication Sig Dispense Refill   • BENZOYL PEROXIDE 5 % external wash   3   • clindamycin (CLEOCIN T) 1 % external solution   3   • cloNIDine (CATAPRES) 0.1 MG tablet      • Denta 5000 Plus 1.1 % cream      • Latuda 80 MG tablet tablet      • lithium 600 MG capsule      • lithium carbonate 300 MG capsule      • Melatonin 3 MG tablet Take 2 tablets by mouth At Night As Needed for Sleep.     • Multiple Vitamins-Minerals (CENTRUM SILVER PO) Take 1 tablet by mouth Daily.     • vitamin D (ERGOCALCIFEROL) 1.25 MG (05617 UT) capsule capsule Take 1 capsule by mouth 2 (Two) Times a Week for 90 days. 24 capsule 3   • valACYclovir (Valtrex) 500 MG tablet Take 1 tablet by mouth Daily. 30 tablet 12   • [DISCONTINUED] azithromycin (Zithromax Z-Chip) 250 MG tablet Take 2 tablets the first day, then 1 tablet daily for 4 days. 6 tablet 0   • [DISCONTINUED] fluconazole (DIFLUCAN) 150 MG tablet      • [DISCONTINUED] " sulfamethoxazole-trimethoprim (BACTRIM DS,SEPTRA DS) 800-160 MG per tablet        No facility-administered encounter medications on file as of 1/26/2021.        Surgical History  Past Surgical History:   Procedure Laterality Date   • CERVICAL BIOPSY  W/ LOOP ELECTRODE EXCISION     • D&C HYSTEROSCOPY N/A 5/30/2017    Procedure: DILATATION AND CURETTAGE HYSTEROSCOPY;  Surgeon: Citlali Arita MD;  Location: [facility];  Service:    • OTHER SURGICAL HISTORY      cyst removed from foot as a child       Family History  Family History   Problem Relation Age of Onset   • Hypertension Father    • Coronary artery disease Paternal Grandfather    • Coronary artery disease Paternal Grandmother    • Diabetes Paternal Grandmother    • Kidney cancer Maternal Grandfather    • Thyroid disease Mother    • No Known Problems Sister    • Breast cancer Neg Hx    • Ovarian cancer Neg Hx    • Uterine cancer Neg Hx    • Colon cancer Neg Hx    • Melanoma Neg Hx    • Prostate cancer Neg Hx        The following portions of the patient's history were reviewed and updated as appropriate: allergies, current medications, past family history, past medical history, past social history, past surgical history and problem list.    Review of Systems   Constitutional: Negative for activity change, appetite change, chills, diaphoresis, fatigue, fever, unexpected weight gain and unexpected weight loss.   HENT: Negative for congestion, dental problem, drooling, ear discharge, ear pain, facial swelling, hearing loss, mouth sores, nosebleeds, postnasal drip, rhinorrhea, sinus pressure, sneezing, sore throat, swollen glands, tinnitus, trouble swallowing and voice change.    Eyes: Negative for blurred vision, double vision, photophobia, pain, discharge, redness, itching and visual disturbance.   Respiratory: Negative for apnea, cough, choking, chest tightness, shortness of breath, wheezing and stridor.    Cardiovascular: Negative for chest pain, palpitations  and leg swelling.   Gastrointestinal: Negative for abdominal distention, abdominal pain, anal bleeding, anorexia, blood in stool, constipation, diarrhea, nausea, rectal pain, vomiting, GERD and indigestion.   Endocrine: Negative for cold intolerance, heat intolerance, polydipsia, polyphagia and polyuria.   Genitourinary: Positive for vaginal discharge. Negative for amenorrhea, breast discharge, breast lump, breast pain, decreased libido, decreased urine volume, difficulty urinating, dyspareunia, dysuria, flank pain, frequency, genital sores, hematuria, menstrual problem, pelvic pain, pelvic pressure, urgency, urinary incontinence, vaginal bleeding and vaginal pain.   Musculoskeletal: Negative for arthralgias, back pain, gait problem, joint pain, joint swelling, myalgias, neck pain, neck stiffness and bursitis.   Skin: Negative for color change, dry skin and rash.   Allergic/Immunologic: Negative for environmental allergies, food allergies and immunocompromised state.   Neurological: Negative for dizziness, tremors, seizures, syncope, facial asymmetry, speech difficulty, weakness, light-headedness, numbness, headache, memory problem and confusion.   Hematological: Negative for adenopathy. Does not bruise/bleed easily.   Psychiatric/Behavioral: Negative for agitation, behavioral problems, decreased concentration, dysphoric mood, hallucinations, self-injury, sleep disturbance, suicidal ideas, negative for hyperactivity, depressed mood and stress. The patient is not nervous/anxious.        Objective   Physical Exam  Vitals signs and nursing note reviewed. Exam conducted with a chaperone present.   Constitutional:       Appearance: She is well-developed.   HENT:      Head: Normocephalic and atraumatic.   Neck:      Musculoskeletal: Normal range of motion.   Pulmonary:      Effort: Pulmonary effort is normal.   Abdominal:      General: Bowel sounds are normal. There is no distension.      Palpations: Abdomen is soft.  There is no mass.      Tenderness: There is no abdominal tenderness. There is no guarding or rebound.      Hernia: There is no hernia in the left inguinal area or right inguinal area.   Genitourinary:     General: Normal vulva.      Exam position: Lithotomy position.      Labia:         Right: Tenderness present. No rash, lesion or injury.         Left: Tenderness present. No rash, lesion or injury.       Vagina: No signs of injury and foreign body. Vaginal discharge, erythema and tenderness present. No bleeding.      Cervix: Discharge present. No cervical motion tenderness or friability.      Uterus: Normal.       Adnexa:         Right: Tenderness present. No mass or fullness.          Left: Tenderness present. No mass or fullness.     Lymphadenopathy:      Lower Body: No right inguinal adenopathy. No left inguinal adenopathy.   Skin:     General: Skin is warm and dry.   Neurological:      Mental Status: She is alert and oriented to person, place, and time.   Psychiatric:         Mood and Affect: Mood normal.         Behavior: Behavior normal.         Thought Content: Thought content normal.         Judgment: Judgment normal.         Assessment/Plan   Diagnoses and all orders for this visit:    1. Vaginal discharge (Primary)  Comments:  Patient was here approximately a month ago and had BV and mycoplasma on BV panel.  She was treated with Flagyl and azithromycin.  She is not sure it has resolved.  BV panel is done today.  Orders:  -     Gynecologic Fluid, Supplemental Testing    2. Herpes, vulvar  Comments:  Patient was found to have herpes through her STD screening.  She has many questions today.  We spent approximately 15 minutes discussing herpes and prevention of transmission.  I wanted to print her out a pamphlet regarding herpes but she does not want me to in case someone found it.  She does not want you to know that she has herpes.  We went over this multiple times.  Orders:  -     valACYclovir (Valtrex) 500  MG tablet; Take 1 tablet by mouth Daily.  Dispense: 30 tablet; Refill: 12         Patient's Body mass index is 40.83 kg/m². BMI is above normal parameters. Recommendations include: educational material, exercise counseling and nutrition counseling.      Kimberly Daley, APRN  1/26/2021

## 2021-02-01 LAB
LAB AP CASE REPORT: NORMAL
Lab: NORMAL
TRICHOMONAS VAGINALIS PCR: NOT DETECTED

## 2021-02-01 RX ORDER — AZITHROMYCIN 250 MG/1
TABLET, FILM COATED ORAL
Qty: 6 TABLET | Refills: 0 | Status: SHIPPED | OUTPATIENT
Start: 2021-02-01 | End: 2021-09-16

## 2021-02-01 RX ORDER — METRONIDAZOLE 500 MG/1
500 TABLET ORAL 2 TIMES DAILY
Qty: 14 TABLET | Refills: 0 | Status: SHIPPED | OUTPATIENT
Start: 2021-02-01 | End: 2021-02-08

## 2021-02-02 ENCOUNTER — TELEPHONE (OUTPATIENT)
Dept: OBSTETRICS AND GYNECOLOGY | Facility: CLINIC | Age: 56
End: 2021-02-02

## 2021-02-02 NOTE — TELEPHONE ENCOUNTER
PT called and wants to know if you want her to follow up in 2 weeks following taking the medication please advise

## 2021-02-23 ENCOUNTER — TELEPHONE (OUTPATIENT)
Dept: NEUROLOGY | Age: 56
End: 2021-02-23

## 2021-02-23 NOTE — TELEPHONE ENCOUNTER
Called patient about an appointment with Tawny Ashley, left vice mail on patient voice mail about the appointment.

## 2021-02-24 ENCOUNTER — HOSPITAL ENCOUNTER (INPATIENT)
Age: 56
LOS: 4 days | Discharge: HOME | DRG: 885 | End: 2021-02-28
Attending: EMERGENCY MEDICINE | Admitting: PSYCHIATRY & NEUROLOGY
Payer: MEDICAID

## 2021-02-24 DIAGNOSIS — R45.851 DEPRESSION WITH SUICIDAL IDEATION: Primary | ICD-10-CM

## 2021-02-24 DIAGNOSIS — F32.A DEPRESSION WITH SUICIDAL IDEATION: Primary | ICD-10-CM

## 2021-02-24 LAB
ACETAMINOPHEN LEVEL: <15 UG/ML
ALBUMIN SERPL-MCNC: 4.5 G/DL (ref 3.5–5.2)
ALP BLD-CCNC: 98 U/L (ref 35–104)
ALT SERPL-CCNC: 24 U/L (ref 5–33)
AMPHETAMINE SCREEN, URINE: NEGATIVE
ANION GAP SERPL CALCULATED.3IONS-SCNC: 12 MMOL/L (ref 7–19)
AST SERPL-CCNC: 19 U/L (ref 5–32)
BARBITURATE SCREEN URINE: NEGATIVE
BASOPHILS ABSOLUTE: 0.1 K/UL (ref 0–0.2)
BASOPHILS RELATIVE PERCENT: 0.6 % (ref 0–1)
BENZODIAZEPINE SCREEN, URINE: NEGATIVE
BILIRUB SERPL-MCNC: <0.2 MG/DL (ref 0.2–1.2)
BUN BLDV-MCNC: 14 MG/DL (ref 6–20)
CALCIUM SERPL-MCNC: 10 MG/DL (ref 8.6–10)
CANNABINOID SCREEN URINE: NEGATIVE
CHLORIDE BLD-SCNC: 105 MMOL/L (ref 98–111)
CO2: 23 MMOL/L (ref 22–29)
COCAINE METABOLITE SCREEN URINE: NEGATIVE
CREAT SERPL-MCNC: 0.8 MG/DL (ref 0.5–0.9)
EOSINOPHILS ABSOLUTE: 0.2 K/UL (ref 0–0.6)
EOSINOPHILS RELATIVE PERCENT: 1.9 % (ref 0–5)
ETHANOL: <10 MG/DL (ref 0–0.08)
GFR AFRICAN AMERICAN: >59
GFR NON-AFRICAN AMERICAN: >60
GLUCOSE BLD-MCNC: 134 MG/DL (ref 74–109)
HCT VFR BLD CALC: 43.2 % (ref 37–47)
HEMOGLOBIN: 13.9 G/DL (ref 12–16)
IMMATURE GRANULOCYTES #: 0.1 K/UL
LITHIUM LEVEL: 0.6 MMOL/L (ref 0.6–1.2)
LITHIUM LEVEL: 0.7 MMOL/L (ref 0.6–1.2)
LYMPHOCYTES ABSOLUTE: 1.7 K/UL (ref 1.1–4.5)
LYMPHOCYTES RELATIVE PERCENT: 13.4 % (ref 20–40)
Lab: NORMAL
MCH RBC QN AUTO: 31 PG (ref 27–31)
MCHC RBC AUTO-ENTMCNC: 32.2 G/DL (ref 33–37)
MCV RBC AUTO: 96.2 FL (ref 81–99)
MONOCYTES ABSOLUTE: 0.8 K/UL (ref 0–0.9)
MONOCYTES RELATIVE PERCENT: 6.8 % (ref 0–10)
NEUTROPHILS ABSOLUTE: 9.4 K/UL (ref 1.5–7.5)
NEUTROPHILS RELATIVE PERCENT: 76.6 % (ref 50–65)
OPIATE SCREEN URINE: NEGATIVE
PDW BLD-RTO: 13 % (ref 11.5–14.5)
PLATELET # BLD: 315 K/UL (ref 130–400)
PMV BLD AUTO: 9.7 FL (ref 9.4–12.3)
POTASSIUM SERPL-SCNC: 4 MMOL/L (ref 3.5–5)
RBC # BLD: 4.49 M/UL (ref 4.2–5.4)
SALICYLATE, SERUM: <3 MG/DL (ref 3–10)
SARS-COV-2, NAAT: NOT DETECTED
SODIUM BLD-SCNC: 140 MMOL/L (ref 136–145)
T4 FREE: 1.06 NG/DL (ref 0.93–1.7)
TOTAL PROTEIN: 7.4 G/DL (ref 6.6–8.7)
TSH SERPL DL<=0.05 MIU/L-ACNC: 1.17 UIU/ML (ref 0.27–4.2)
WBC # BLD: 12.3 K/UL (ref 4.8–10.8)

## 2021-02-24 PROCEDURE — 80143 DRUG ASSAY ACETAMINOPHEN: CPT

## 2021-02-24 PROCEDURE — 84443 ASSAY THYROID STIM HORMONE: CPT

## 2021-02-24 PROCEDURE — 84439 ASSAY OF FREE THYROXINE: CPT

## 2021-02-24 PROCEDURE — 87635 SARS-COV-2 COVID-19 AMP PRB: CPT

## 2021-02-24 PROCEDURE — 80179 DRUG ASSAY SALICYLATE: CPT

## 2021-02-24 PROCEDURE — 82077 ASSAY SPEC XCP UR&BREATH IA: CPT

## 2021-02-24 PROCEDURE — 85025 COMPLETE CBC W/AUTO DIFF WBC: CPT

## 2021-02-24 PROCEDURE — 80053 COMPREHEN METABOLIC PANEL: CPT

## 2021-02-24 PROCEDURE — 99285 EMERGENCY DEPT VISIT HI MDM: CPT

## 2021-02-24 PROCEDURE — 1240000000 HC EMOTIONAL WELLNESS R&B

## 2021-02-24 PROCEDURE — 80307 DRUG TEST PRSMV CHEM ANLYZR: CPT

## 2021-02-24 PROCEDURE — 6370000000 HC RX 637 (ALT 250 FOR IP): Performed by: PSYCHIATRY & NEUROLOGY

## 2021-02-24 PROCEDURE — 80178 ASSAY OF LITHIUM: CPT

## 2021-02-24 PROCEDURE — 36415 COLL VENOUS BLD VENIPUNCTURE: CPT

## 2021-02-24 RX ORDER — MAGNESIUM HYDROXIDE/ALUMINUM HYDROXICE/SIMETHICONE 120; 1200; 1200 MG/30ML; MG/30ML; MG/30ML
30 SUSPENSION ORAL EVERY 6 HOURS PRN
Status: DISCONTINUED | OUTPATIENT
Start: 2021-02-24 | End: 2021-02-28 | Stop reason: HOSPADM

## 2021-02-24 RX ORDER — HYDROXYZINE HYDROCHLORIDE 25 MG/1
50 TABLET, FILM COATED ORAL 3 TIMES DAILY PRN
Status: DISCONTINUED | OUTPATIENT
Start: 2021-02-24 | End: 2021-02-26

## 2021-02-24 RX ORDER — MECOBALAMIN 5000 MCG
5 TABLET,DISINTEGRATING ORAL NIGHTLY
Status: DISCONTINUED | OUTPATIENT
Start: 2021-02-24 | End: 2021-02-28 | Stop reason: HOSPADM

## 2021-02-24 RX ORDER — CLONIDINE HYDROCHLORIDE 0.1 MG/1
0.1 TABLET ORAL NIGHTLY
Status: DISCONTINUED | OUTPATIENT
Start: 2021-02-24 | End: 2021-02-28 | Stop reason: HOSPADM

## 2021-02-24 RX ORDER — POLYETHYLENE GLYCOL 3350 17 G/17G
17 POWDER, FOR SOLUTION ORAL DAILY PRN
Status: DISCONTINUED | OUTPATIENT
Start: 2021-02-24 | End: 2021-02-28 | Stop reason: HOSPADM

## 2021-02-24 RX ORDER — ERGOCALCIFEROL 1.25 MG/1
50000 CAPSULE ORAL
Status: DISCONTINUED | OUTPATIENT
Start: 2021-02-28 | End: 2021-02-28 | Stop reason: HOSPADM

## 2021-02-24 RX ORDER — VALACYCLOVIR HYDROCHLORIDE 500 MG/1
500 TABLET, FILM COATED ORAL DAILY
COMMUNITY
End: 2021-04-20 | Stop reason: ALTCHOICE

## 2021-02-24 RX ORDER — LITHIUM CARBONATE 300 MG/1
600 CAPSULE ORAL NIGHTLY
Status: DISCONTINUED | OUTPATIENT
Start: 2021-02-24 | End: 2021-02-28 | Stop reason: HOSPADM

## 2021-02-24 RX ORDER — LITHIUM CARBONATE 300 MG/1
300 CAPSULE ORAL DAILY
Status: DISCONTINUED | OUTPATIENT
Start: 2021-02-25 | End: 2021-02-28 | Stop reason: HOSPADM

## 2021-02-24 RX ADMIN — LITHIUM CARBONATE 600 MG: 300 CAPSULE, GELATIN COATED ORAL at 21:59

## 2021-02-24 RX ADMIN — CLONIDINE HYDROCHLORIDE 0.1 MG: 0.1 TABLET ORAL at 21:58

## 2021-02-24 RX ADMIN — Medication 5 MG: at 21:58

## 2021-02-24 ASSESSMENT — ENCOUNTER SYMPTOMS
COUGH: 0
BACK PAIN: 0
SORE THROAT: 0
DIARRHEA: 0
VOMITING: 0
NAUSEA: 0
SHORTNESS OF BREATH: 0
RHINORRHEA: 0
ABDOMINAL PAIN: 0

## 2021-02-24 ASSESSMENT — PAIN SCALES - GENERAL: PAINLEVEL_OUTOF10: 0

## 2021-02-24 ASSESSMENT — PATIENT HEALTH QUESTIONNAIRE - PHQ9: SUM OF ALL RESPONSES TO PHQ QUESTIONS 1-9: 18
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
## 2021-02-24 NOTE — PROGRESS NOTES
`[facility]  Admission Note     Admission Type:   Admission Type: Voluntary    Reason for admission:  Reason for Admission: Hafsa Harris is a 48year old female admitted from the emergency department for suicidal thoughts. She reports that she has noticed an increase in suicidal thoughts over the past several days with thoughts of taking her medications and overdosing. She has a history of overdosing last year. She also reports that she has had several losses over the last few months, including an ex-. She reports being afraid of her thoughts and does not want to \"put my family through all that again. \" Pt also reports that she was diagnosed with an STD in December and is afraid that her parents will find out. She reports that the diagnosis has caused her stress and increased anxiety.     PATIENT STRENGTHS:  Strengths: Communication, Motivated    Patient Strengths and Limitations:  Limitations: Difficulty problem solving/relies on others to help solve problems    Addictive Behavior:        Medical Problems:   Past Medical History:   Diagnosis Date    Chest pain 2/8/2012    Depression     Hypoglycemia     Schizoaffective disorder ([facility] [address] 2/8/2012    Suicide attempt ([facility]        Status EXAM:  Status and Exam  Normal: No  Facial Expression: Flat  Affect: Congruent  Level of Consciousness: Alert  Mood:Normal: No  Mood: Anxious  Motor Activity:Normal: No  Motor Activity: Decreased  Interview Behavior: Cooperative  Preception: [place] to Person, Anali Needs to Time, [place] to Place, [place] to Situation  Attention:Normal: No  Attention: Distractible  Thought Processes: Circumstantial  Thought Content:Normal: No  Thought Content: Preoccupations  Hallucinations: None  Delusions: No  Memory:Normal: Yes  Insight and Judgment: No  Insight and Judgment: Poor Judgment, Poor Insight  Present Suicidal Ideation: Yes(passive thoughts, no plan at this time)  Present Homicidal Ideation: No    Tobacco Screening:  Practical Counseling, on admission, umu X, if applicable and completed (first 3 are required if patient doesn't refuse):            ( )  Recognizing danger situations (included triggers and roadblocks)                    ( )  Coping skills (new ways to manage stress, exercise, relaxation techniques, changing routine, distraction)                                                           ( )  Basic information about quitting (benefits of quitting, techniques in how to quit, available resources  ( ) Referral for counseling faxed to [facility]                                           ( ) Patient refused counseling  (x ) Patient has not smoked in the last 30 days    Metabolic Screening:    Lab Results   Component Value Date    LABA1C 5.6 07/22/2020       Lab Results   Component Value Date    CHOL 149 (L) 07/22/2020    CHOL 152 (L) 02/22/2020    CHOL 130 (L) 12/22/2018    CHOL 154 02/11/2014    CHOL 181 05/10/2012     Lab Results   Component Value Date    TRIG 143 07/22/2020    TRIG 130 02/22/2020    TRIG 83 12/22/2018    TRIG 113 02/11/2014    TRIG 197 (H) 05/10/2012     Lab Results   Component Value Date    HDL 60 (L) 07/22/2020    HDL 52 (L) 02/22/2020    HDL 52 (L) 12/22/2018    HDL 56 02/11/2014    HDL 62 05/10/2012     No components found for: LDLCAL  No results found for: LABVLDL      Body mass index is 41.22 kg/m².     BP Readings from Last 2 Encounters:   02/24/21 130/87   08/19/20 112/68                                   Erinn Lopez RN

## 2021-02-24 NOTE — PROGRESS NOTES
Admission Note      Reason for admission/Target Symptom: Patient admitted to [facility] due to female who presents to the emergency department for mental health evaluation. Patient states that there have been some recent deaths in her family which has caused her to have worsening depression and recently suicidal thoughts. She states last night she began to have a plan of potentially overdosing on her medications. She denies any ingestions but she has overdosed in the past.  She denies any homicidal thoughts hallucinations or paranoia. Diagnoses: Depression NOS  UDS: Neg  BAL:  Neg    SW met with treatment team to discuss patient's treatment including care planning, discharge planning, and follow-up needs. Pt has been admitted to [facility]. Treatment team has identified patient's discharge needs as medication management and outpatient therapy/counseling. Pt confirmed  the need for ongoing treatment post inpatient stay. Pt was also provided a handout of contact information for drug and alcohol treatment centers and other community support service such as [facility], AA, and [facility].

## 2021-02-24 NOTE — ED NOTES
Pt states she has been thinking of killing herself with a drug overdose. She states her parents keep her medication locked up and yesterday they forgot to lock them up and she started thinking about it and today they weren't locked up again and she can't stop thinking about it.        Marino Castañeda  02/24/21 4013

## 2021-02-25 PROBLEM — F31.9 BIPOLAR DISORDER WITH DEPRESSION (HCC): Status: ACTIVE | Noted: 2021-02-25

## 2021-02-25 PROCEDURE — 6370000000 HC RX 637 (ALT 250 FOR IP): Performed by: PSYCHIATRY & NEUROLOGY

## 2021-02-25 PROCEDURE — 99223 1ST HOSP IP/OBS HIGH 75: CPT | Performed by: PSYCHIATRY & NEUROLOGY

## 2021-02-25 PROCEDURE — 1240000000 HC EMOTIONAL WELLNESS R&B

## 2021-02-25 RX ADMIN — LITHIUM CARBONATE 600 MG: 300 CAPSULE, GELATIN COATED ORAL at 20:57

## 2021-02-25 RX ADMIN — CLONIDINE HYDROCHLORIDE 0.1 MG: 0.1 TABLET ORAL at 20:57

## 2021-02-25 RX ADMIN — LITHIUM CARBONATE 300 MG: 300 CAPSULE, GELATIN COATED ORAL at 08:27

## 2021-02-25 RX ADMIN — Medication 5 MG: at 20:57

## 2021-02-25 NOTE — PLAN OF CARE
Problem: Depressive Behavior With or Without Suicide Precautions:  Goal: Able to verbalize acceptance of life and situations over which he or she has no control  Description: Able to verbalize acceptance of life and situations over which he or she has no control  2/25/2021 1326 by Vy Cameron  Outcome: Ongoing  Note:                                                                     Group Therapy Note    Date: 2/25/2021  Start Time: 1100  End Time:  1130  Number of Participants: 7    Type of Group: Cognitive Skills    Wellness Binder Information  Module Name:  Relapse Prevention  Session Number:  5    Group Goal for Pt: To improve knowledge of relapse prevention strategies    Notes:  Pt demonstrated improved knowledge of relapse prevention strategies by actively participating in group discussion. Status After Intervention:  Unchanged    Participation Level:  Active Listener and Interactive    Participation Quality: Appropriate and Attentive      Speech:  normal      Thought Process/Content: Logical      Affective Functioning: Congruent      Mood: anxious and depressed      Level of consciousness:  Alert and Oriented x4      Response to Learning: Able to verbalize current knowledge/experience, Able to verbalize/acknowledge new learning, and Progressing to goal      Endings: None Reported    Modes of Intervention: Education      Discipline Responsible: Psychoeducational Specialist      Signature:  Vy Cameron

## 2021-02-25 NOTE — H&P
Department of Psychiatry  Attending History and Physical        CHIEF COMPLAINT:  \"I'm a little better today\"    History obtained from: patient, chart    HISTORY OF PRESENT ILLNESS:    52-year-old white female with history of Bipolar disorder and Borderline personality disorder, SANDY (on CPAP), admitted for suicidal ideation with a plan to overdose. UDS negative, BAL less than 10. Patient has been calm and cooperative so far. Patient presents with labile affect when seen today. States she has been depressed in the setting of social stressors. Patient lost her ex-, friend, and her cousin's daughter this month. She has been struggling with her grief. 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 Patient is worried that this will pose problems in her future relationships. States typically her parents keep her medications locked so that she will not overdose. States father left the box open by mistake yeaterday and patient started thinking about overdose. \"I thought I'd come here before somethin happens. \"  We processed her feelings. Supportive psychotherapy provided. Patient states she is doing better today overall. Still having some suicidal thoughts, however. Patient was functioning poorly at home and struggled with grooming herself. She recently asked her outpatient provider to adjust her medications, however, her provider thought she should stay on the same regimen. We discussed the use of coping skills. Encouraged patient to attend group therapy sessions while on the unit. Patient states she has been seeing her outpatient therapist on a weekly basis.     PSYCHIATRIC HISTORY:    Diagnoses: bipolar disorder, BPD  Suicide attempts/gestures: 4 times by overdose on different medications and using house cleaning liquid  Prior hospitalizations: Multiple to [facility] and [facility]  Medication trials: Depakote, fumarate], Adderall [amphetamine-dextroamphetamine], Amphetamines, Asa [aspirin], Codeine, Cogentin [benztropine], Depakote [divalproex sodium], Effexor [venlafaxine], Estrogens, Geodon [ziprasidone hcl], Hydrocodone, Lortab [hydrocodone-acetaminophen], Macrolides and ketolides, Metoprolol, Neurontin [gabapentin], Other, Pcn [penicillins], Provera [medroxyprogesterone], Prozac [fluoxetine hcl], Tetracyclines & related, Trazodone and nefazodone, Trileptal [oxcarbazepine], Trintellix [vortioxetine], Valium [diazepam], Vancomycin, Wellbutrin [bupropion], Zoloft [sertraline hcl], and Zyprexa [olanzapine]    Social History:  Patient lives with her 25years old son. She also has 35years old twins, boy and girl. Patient's parents live across the street. Family History:   History reviewed. No pertinent family history. No history of psychiatric illness or suicide attempts. REVIEW OF SYSTEMS:  General: No fevers, chills, night sweats, no recent weight loss or gain. Head: No headache, no migraine. Eyes: No recent visual changes. Ears: No recent hearing changes. Nose: No increased congestion or change in sense of smell. Throat: No sore throat, no trouble swallowing. Cardiovascular: No chest pain or palpitations, or dizziness. Respiratory: No cough, wheezes, congestion, or shortness of breath. Gastrointestinal: No abdominal pain, nausea or vomiting, no diarrhea or constipation. Musculo-skeletal: No edema, deformities, or loss of functions. Neurological: No loss of consciousness, abnormal sensations, or weakness. Skin: No rash, abrasions or bruises. PHYSICAL EXAM:  On observation  GENERAL APPEARANCE: Stated age, in NAD. HEAD: Normocephalic, atraumatic. CHEST: No deformities. MUSCULOSKELTAL: No obvious deformities, clubbing, cyanosis or edema. NEUROLOGICAL: Alert, oriented x 3, CN II-XII grossly intact. No abnormal movements or tremors. Gait stable.   SKIN: Warm, dry, intact, no rash, abrasions, Unit and monitor on 15 minute checks. Suicide precautions.  Sebastián Martin reviewed. -Gather collateral information from family with release.  -Medical monitoring to be performed by Dr. Burnie Osler and associates. Order routine labs. SANDY - may use her own CPAP. -Acclimate to the unit. Provide supportive psychotherapy.  -Encourage participation in groups and therapeutic activities as appropriate. Work on coping skills. -Medications:    Continue on lithium and Latuda for mood stabilization. Melatonin for sleep.   As needed Atarax for episodic anxiety.    -The risks, benefits, side effects, indications, contraindications, and adverse effects of the medications have been discussed.  -The patient has verbalized understanding and has capacity to give informed consent.  -SW help evaluate home environment and provide outpatient resources.  -Discuss with treatment team.

## 2021-02-25 NOTE — PROGRESS NOTES
SAFETY PLAN    A suicide Safety Plan is a document that supports someone when they are having thoughts of suicide. Warning Signs that indicate a suicidal crisis may be developing: What (situations, thoughts, feelings, body sensations, behaviors, etc.) do you experience that lets you know you are beginning to think about suicide? 1. Neglecting my showers  2. Staying in the bed a lot and not wanting to go shopping  3. Not wanting to care for pets    Internal Coping Strategies:  What things can I do (relaxation techniques, hobbies, physical activities, etc.) to take my mind off my problems without contacting another person? 1. Walking   2. Watching [address] to [employer]  3. Work on my [employer] work rug    People and social settings that provide distraction: Who can I call or where can I go to distract me? 1. Name: Sergio Manzano my son  Phone: [phone]  2. Name: Boni Valerio my daughter  Phone: [phone]   3. Place: [facility]            4. Place: [address] [place] [address] with [facility] whom I can ask for help: Who can I call when I need help - for example, friends, family, clergy, someone else? 1. Name: Stephanie Romero                Phone: [phone]  2. Name: Ben Drake and Jaime Kinga parents  Phone: [phone]  3. Name: Luigi Smith  Phone: [phone]    Professionals or [address] [place] [address] I can contact during a crisis: Who can I call for help - for example, my doctor, my psychiatrist, my psychologist, a mental health provider, a suicide hotline? 1. Clinician Name: [id number] LYNNETTE Fernandes   Phone: [phone]      Clinician Pager or Emergency Contact #: [phone]    2. Clinician Name: [address] [place] [address]   Phone: [phone]      Clinician Pager or Emergency Contact #: [phone]    3. Suicide Prevention Lifeline: 5-325-391-TALK (5587)    4.  Local Behavioral Health Emergency Services : [facility]      Emergency Services Address: [address] [facility]  Emergency Services Phone: [phone]    Making the environment safe: How can I make my environment (house/apartment/living space) safer? For example, can I remove guns, medications, and other items? 1. Remove all guns and weapons from the home. 2. Discard any extra medications in the home.

## 2021-02-25 NOTE — SUICIDE SAFETY PLAN
SAFETY PLAN    A suicide Safety Plan is a document that supports someone when they are having thoughts of suicide. Warning Signs that indicate a suicidal crisis may be developing: What (situations, thoughts, feelings, body sensations, behaviors, etc.) do you experience that lets you know you are beginning to think about suicide? 1. Neglecting my showers  2. Staying in the bed a lot and not wanting to go shopping  3. Not wanting to care for pets    Internal Coping Strategies:  What things can I do (relaxation techniques, hobbies, physical activities, etc.) to take my mind off my problems without contacting another person? 1. Walking  2. [facility]  3. Work on my [employer] and social settings that provide distraction: Who can I call or where can I go to distract me? 1. Name: Mary Anne Monsivais my son  Phone: [phone]  2. Name: Rick Barboza my daughter  Phone: [phone]   3. Place: [address] [place] [address],  [address]           4. Place: [address] [place] [address] with [employer] whom I can ask for help: Who can I call when I need help - for example, friends, family, clergy, someone else? 1. Name: Yenny Tobias               Phone: [phone]  2. Name: Dulce Hurtado  Phone: [phone]  3. Name: Eladio pineda  Phone: [phone]    Professionals or [address] [place] [address] I can contact during a crisis: Who can I call for help - for example, my doctor, my psychiatrist, my psychologist, a mental health provider, a suicide hotline? 1. Clinician Name: [id number] LYNNETTE Fernandes   Phone: [phone]      Clinician Pager or Emergency Contact #: [phone]    2. Clinician Name: [address] [place] [address]   Phone: [phone]      Clinician Pager or Emergency Contact #: [phone]    3. Suicide Prevention Lifeline: 4-771-326-TALK (5812)    4. Local Behavioral Health Emergency Services: [address] Emergency Department  Emergency Services Address: [place] [address]  [address] [place] [address] [address] Phone [phone]  Making the environment safe: How can I make my environment (house/apartment/living space) safer? For example, can I remove guns, medications, and other items? 1. Remove weapons from the home  2.  Remove extra medications from home

## 2021-02-25 NOTE — PROGRESS NOTES
Behavioral Services  Medicare Certification Upon Admission    I certify that this patient's inpatient psychiatric hospital admission is medically necessary for:    [x] (1) Treatment which could reasonably be expected to improve this patient's condition,       [] (2) Or for diagnostic study;     AND     [x](2) The inpatient psychiatric services are provided while the individual is under the care of a physician and are included in the individualized plan of care.     Estimated length of stay/service 5 days to 7 weeks    Plan for post-hospital care TBA    Electronically signed by Angelita Manzo MD on 2/25/2021 at 1:28 PM

## 2021-02-25 NOTE — PROGRESS NOTES
SAFETY PLAN    A suicide Safety Plan is a document that supports someone when they are having thoughts of suicide. Warning Signs that indicate a suicidal crisis may be developing: What (situations, thoughts, feelings, body sensations, behaviors, etc.) do you experience that lets you know you are beginning to think about suicide? 1. Neglecting my showers  2. Staying in the bed a lot and not wanting to go shopping  3. Not wanting to care for my pets    Internal Coping Strategies:  What things can I do (relaxation techniques, hobbies, physical activities, etc.) to take my mind off my problems without contacting another person? 1. Walking  2. Watching highway to [employer]  3. Work on my [employer] and social settings that provide distraction: Who can I call or where can I go to distract me? 1. Name: Sue Gregg my son  Phone: [phone]  2. Name: Tracy Nava daughter  Phone: [phone]   3. Place: [facility]            4. Place: [facility] with [facility] whom I can ask for help: Who can I call when I need help - for example, friends, family, clergy, someone else? 1. Name: Janny Oliver               Phone: [phone]  2. Name: Silvano Casiano and Shemar sanz  Phone: [phone]  3. Name: Cisco Leal  Phone: [phone]    Professionals or [address] [place] [address] I can contact during a crisis: Who can I call for help - for example, my doctor, my psychiatrist, my psychologist, a mental health provider, a suicide hotline? 1. Clinician Name: [id number] LYNNETTE Fernandes   Phone: [phone]      Clinician Pager or Emergency Contact #: [phone]    2. Clinician Name: [address] [place] [address]   Phone: [phone]      Clinician Pager or Emergency Contact #: [phone]    3. Suicide Prevention Lifeline: 9-020-179-TALK (5740)    4. Local Behavioral Health Emergency Services : [address] Emergency Department      Emergency Services Address: [address] [place] [address]

## 2021-02-25 NOTE — FLOWSHEET NOTE
02/25/21 1500   Encounter Summary   Services provided to: Patient   Referral/Consult From: Nurse   Support System Parent; Children   Complexity of Encounter Moderate   Length of Encounter 1 hour   Spiritual/Episcopal   Type Spiritual struggle   Assessment Anxious; Coping   Intervention Active listening;Explored feelings, thoughts, concerns;Prayer;Sustaining presence/ Ministry of presence; Discussed relationship with God;Discussed belief system/Yazdanism practices/nadia   Outcome Expressed gratitude;Expressed feelings/needs/concerns     Tonja Parrish is \"feeling better\" today and reported no suicidal thought. Provided a Bible and focused on 3 passages of her favor verses. Encouraged her and she prayed.    Electronically signed by David Cadet on 2/25/2021 at 5:46 PM

## 2021-02-25 NOTE — PLAN OF CARE
Problem: Depressive Behavior With or Without Suicide Precautions:  Goal: Able to verbalize acceptance of life and situations over which he or she has no control  Description: Able to verbalize acceptance of life and situations over which he or she has no control  2/25/2021 0944 by Yael Wang  Outcome: Ongoing  Note:                                                                     Group Therapy Note    Date: 2/25/2021  Start Time: 0900  End Time:  0930  Number of Participants: 7    Type of Group: Psychoeducation    Wellness Binder Information  Module Name:  Reducing Relapse  Session Number:  5    Group Goal for Pt: To improve knowledge of strategies to reduce relapse    Notes:  Pt demonstrated improved knowledge of strategies to reduce relapse by actively participating in group activity. Status After Intervention:  Unchanged    Participation Level:  Active Listener and Interactive    Participation Quality: Appropriate and Attentive      Speech:  normal      Thought Process/Content: Logical      Affective Functioning: Congruent      Mood: anxious and depressed      Level of consciousness:  Alert and Oriented x4      Response to Learning: Able to verbalize current knowledge/experience, Able to verbalize/acknowledge new learning, and Progressing to goal      Endings: None Reported    Modes of Intervention: Education      Discipline Responsible: Psychoeducational Specialist      Signature:  Yael Wang

## 2021-02-25 NOTE — PLAN OF CARE
Group Therapy Note    Date: 2/25/2021  Start Time: 3566  End Time:  6533  Number of Participants: 4    Type of Group: Recreational    Wellness Binder Information  Module Name:  Stress  Session Number:  5    Group Goal for Pt: To raise awareness of the effectiveness of diversionary activity    Notes:  Pt did not attend group activity. Pt was invited/encouraged. Status After Intervention:      Participation Level:     Participation Quality:       Speech:         Thought Process/Content:       Affective Functioning:       Mood:       Level of consciousness:        Response to Learning:       Endings:     Modes of Intervention:       Discipline Responsible:       Signature:  Deshawn Lund

## 2021-02-25 NOTE — FLOWSHEET NOTE
02/24/21 1930   Encounter Summary   Services provided to: Patient   Referral/Consult From: Nurse   Support System Parent; Children   Complexity of Encounter Moderate   Length of Encounter 1 hour   Crisis   Type Emotional distress   Assessment Grieving; Anxious; Spiritual struggle;Coping   Intervention Active listening;Explored feelings, thoughts, concerns;Prayer;Sustaining presence/ Ministry of presence   Outcome Expressed gratitude     Receive consults for emotional distress. Grief care for resent 3 deaths in the family including a close friend of hers. Empowered her to overcome sadness and hopeless with motional, spiritual support and prayer. Pt requested a return visit.  Electronically signed by Shaneka Castro on 2/25/2021 at 5:35 PM Electronically signed by Shaneka Castro on 2/25/2021 at 5:35 PM

## 2021-02-25 NOTE — PLAN OF CARE
Problem: Depressive Behavior With or Without Suicide Precautions:  Goal: Able to verbalize acceptance of life and situations over which he or she has no control  Description: Able to verbalize acceptance of life and situations over which he or she has no control  Outcome: Ongoing  Goal: Able to verbalize and/or display a decrease in depressive symptoms  Description: Able to verbalize and/or display a decrease in depressive symptoms  Outcome: Ongoing  Goal: Ability to disclose and discuss suicidal ideas will improve  Description: Ability to disclose and discuss suicidal ideas will improve  Outcome: Ongoing  Goal: Able to verbalize support systems  Description: Able to verbalize support systems  Outcome: Ongoing  Goal: Absence of self-harm  Description: Absence of self-harm  Outcome: Ongoing  Goal: Patient specific goal  Description: Patient specific goal  Outcome: Ongoing  Goal: Participates in care planning  Description: Participates in care planning  Outcome: Ongoing     Problem: Pain:  Goal: Pain level will decrease  Description: Pain level will decrease  Outcome: Ongoing  Goal: Control of acute pain  Description: Control of acute pain  Outcome: Ongoing  Goal: Control of chronic pain  Description: Control of chronic pain  Outcome: Ongoing     Problem: Falls - Risk of:  Goal: Will remain free from falls  Description: Will remain free from falls  Outcome: Ongoing  Goal: Absence of physical injury  Description: Absence of physical injury  Outcome: Ongoing     Problem: Health Maintenance - Impaired:  Goal: Ability to perform activities of daily living will improve  Description: Ability to perform activities of daily living will improve  Outcome: Ongoing  Goal: Able to sleep without medication for appropriate length of time  Description: Able to sleep without medication for appropriate length of time  Outcome: Ongoing  Goal: Maintenance of adequate nutrition will improve  Description: Maintenance of adequate nutrition will improve  Outcome: Ongoing     Problem: Mood - Altered:  Goal: Mood stable  Description: Mood stable  Outcome: Ongoing

## 2021-02-25 NOTE — PLAN OF CARE
Problem: Depressive Behavior With or Without Suicide Precautions:  Goal: Able to verbalize acceptance of life and situations over which he or she has no control  Description: Able to verbalize acceptance of life and situations over which he or she has no control  2/25/2021 0903 by Carlyle Pierson RN  Outcome: Ongoing  2/25/2021 0143 by Danuta Keyes RN  Outcome: Ongoing  Goal: Able to verbalize and/or display a decrease in depressive symptoms  Description: Able to verbalize and/or display a decrease in depressive symptoms  2/25/2021 0903 by Carlyle Pierson RN  Outcome: Ongoing  2/25/2021 0143 by Danuta Keyes RN  Outcome: Ongoing  Goal: Ability to disclose and discuss suicidal ideas will improve  Description: Ability to disclose and discuss suicidal ideas will improve  2/25/2021 0903 by Carlyle Pierson RN  Outcome: Ongoing  2/25/2021 0143 by Danuta Keyes RN  Outcome: Ongoing  Goal: Able to verbalize support systems  Description: Able to verbalize support systems  2/25/2021 0903 by Carlyle Pierson RN  Outcome: Ongoing  2/25/2021 0143 by Danuta Keyes RN  Outcome: Ongoing  Goal: Absence of self-harm  Description: Absence of self-harm  2/25/2021 0903 by Carlyle Pierson RN  Outcome: Ongoing  2/25/2021 0143 by Danuta Keyes RN  Outcome: Ongoing  Goal: Patient specific goal  Description: Patient specific goal  2/25/2021 0903 by Carlyle Pierson RN  Outcome: Ongoing  2/25/2021 0143 by Danuta Keyes RN  Outcome: Ongoing  Goal: Participates in care planning  Description: Participates in care planning  2/25/2021 0903 by Carlyle Pierson RN  Outcome: Ongoing  2/25/2021 0143 by Danuta Keyes RN  Outcome: Ongoing     Problem: Pain:  Goal: Pain level will decrease  Description: Pain level will decrease  2/25/2021 0903 by Carlyle Pierson RN  Outcome: Ongoing  2/25/2021 0143 by Danuta Keyes RN  Outcome: Ongoing  Goal: Control of acute pain  Description: Control of acute pain  2/25/2021 0903 by Carlyle Pierson RN  Outcome: Ongoing  2/25/2021 0143 by Ozzy Perez RN  Outcome: Ongoing  Goal: Control of chronic pain  Description: Control of chronic pain  2/25/2021 0903 by Tereso Schilder, RN  Outcome: Ongoing  2/25/2021 0143 by Ozzy Perez RN  Outcome: Ongoing     Problem: Falls - Risk of:  Goal: Will remain free from falls  Description: Will remain free from falls  2/25/2021 0903 by Tereso Schilder, RN  Outcome: Ongoing  2/25/2021 0143 by Ozzy Perez RN  Outcome: Ongoing  Goal: Absence of physical injury  Description: Absence of physical injury  2/25/2021 0903 by Tereso Schilder, RN  Outcome: Ongoing  2/25/2021 0143 by Ozzy Perez RN  Outcome: Ongoing     Problem: Health Maintenance - Impaired:  Goal: Ability to perform activities of daily living will improve  Description: Ability to perform activities of daily living will improve  2/25/2021 0903 by Tereso Schilder, RN  Outcome: Ongoing  2/25/2021 0143 by Ozzy Perez RN  Outcome: Ongoing  Goal: Able to sleep without medication for appropriate length of time  Description: Able to sleep without medication for appropriate length of time  2/25/2021 0903 by Tereso Schilder, RN  Outcome: Ongoing  2/25/2021 0143 by Ozzy Perze RN  Outcome: Ongoing  Goal: Maintenance of adequate nutrition will improve  Description: Maintenance of adequate nutrition will improve  2/25/2021 0903 by Tereso Schilder, RN  Outcome: Ongoing  2/25/2021 0143 by Ozzy Perez RN  Outcome: Ongoing     Problem: Mood - Altered:  Goal: Mood stable  Description: Mood stable  2/25/2021 0903 by Tereso Schilder, RN  Outcome: Ongoing  2/25/2021 0143 by Ozzy Perez RN  Outcome: Ongoing

## 2021-02-25 NOTE — PROGRESS NOTES
[facility] Daily Shift Assessment-Geriatric Unit  Nursing Progress Note          Room: [facility]– [place]-   Name: Roula Ying   Age: 48 y.o. Gender: female   Dx: <principal problem not specified>  Precautions: suicide risk and fall risk  Inpatient Status: voluntary     SLEEP:    Sleep: Yes,   Sleep Quality Good   Hours Slept:    Sleep Medications: Yes; Melatonin 5mg  PRN Sleep Meds: No       MEDICAL:      Other PRN Meds: No   Med Compliant: Yes   Accu-Chek: No   Oxygen/CPAP/BiPAP: Yes, C-Pap  CIWA/CINA: No   PAIN Assessment: denies  Side Effects from medication: none voiced    Is Patient experiencing any respiratory symptoms (headache, fever, body aches, cough. Vicky Heman ): no  Patient educated by nursing to practice social distancing, wear masks, wash hands frequently: yes      Metabolic Screening:    Lab Results   Component Value Date    LABA1C 5.6 07/22/2020       Lab Results   Component Value Date    CHOL 149 (L) 07/22/2020    CHOL 152 (L) 02/22/2020    CHOL 130 (L) 12/22/2018    CHOL 154 02/11/2014    CHOL 181 05/10/2012     Lab Results   Component Value Date    TRIG 143 07/22/2020    TRIG 130 02/22/2020    TRIG 83 12/22/2018    TRIG 113 02/11/2014    TRIG 197 (H) 05/10/2012     Lab Results   Component Value Date    HDL 60 (L) 07/22/2020    HDL 52 (L) 02/22/2020    HDL 52 (L) 12/22/2018    HDL 56 02/11/2014    HDL 62 05/10/2012     No components found for: LDLCAL  No results found for: LABVLDL      Body mass index is 41.22 kg/m².     BP Readings from Last 2 Encounters:   02/24/21 (!) 129/90   08/19/20 112/68         PSYCH:     SI passive    HI Negative for homicidal ideation        AVH:denies      Depression: \"worse\" Anxiety:\"on/off\"       GENERAL:      Appetite: good  Social: Yes Speech: normal   Appearance:appropriately dressed and healthy looking  Assistive Devices: noneLevel of Assist: Independent      GROUP:    Group Participation: Yes  Participation LevelMinimal    Participation QualityAppropriate    Notes: Pt has been calm and cooperative tonight since arriving on the unit. Pt has attempted to be social. She attempts to introduce herself to other patients. Pt reports worsening depression due to recent deaths of close friends/family. Pt states her anxiety is on/off. Pt denies HI and AVH but still reports some fleeting thoughts of SI; denies an active plan tonight. Pt room was relocated due to need for eyesight while wearing her CPAP. Pt has been resting well so far this shift with her door open and LOS monitoring in place. No noted S/S of COVID-19. Education continues for infection control. Monitoring for safety continues as ordered.      Electronically signed by Juany Park RN on 2/25/2021 at 2:49 AM

## 2021-02-25 NOTE — PROGRESS NOTES
SW met with treatment team to discuss pt's progress and setbacks. SW 2 was present. Pt was admitted, voluntary, for depression, SI with a plan to OD on her medications, UDS was negative, has hx of psychiatric admissions/treatment, hx of SA by OD, identifies current stressors as the recent deaths of family members, denies HI/AVH. Since admission, pt reportedly was cooperative with admission process, alert/oriented x 4, slept well last night, with medications, appetite is good, attends scheduled group activities, social with peers/staff, performs ADL's, compliant with medications, reports her depression is \"worse\", anxiety is \"on/off\", endorses SI without a plan, denies HI/AVH, continue current treatment plan.

## 2021-02-26 LAB
HBA1C MFR BLD: 5.7 % (ref 4–6)
VITAMIN B-12: 471 PG/ML (ref 211–946)
VITAMIN D 25-HYDROXY: 42.2 NG/ML

## 2021-02-26 PROCEDURE — 6370000000 HC RX 637 (ALT 250 FOR IP): Performed by: PSYCHIATRY & NEUROLOGY

## 2021-02-26 PROCEDURE — 82607 VITAMIN B-12: CPT

## 2021-02-26 PROCEDURE — 1240000000 HC EMOTIONAL WELLNESS R&B

## 2021-02-26 PROCEDURE — 99233 SBSQ HOSP IP/OBS HIGH 50: CPT | Performed by: PSYCHIATRY & NEUROLOGY

## 2021-02-26 PROCEDURE — 36415 COLL VENOUS BLD VENIPUNCTURE: CPT

## 2021-02-26 PROCEDURE — 82306 VITAMIN D 25 HYDROXY: CPT

## 2021-02-26 PROCEDURE — 83036 HEMOGLOBIN GLYCOSYLATED A1C: CPT

## 2021-02-26 RX ORDER — HYDROXYZINE HYDROCHLORIDE 25 MG/1
25 TABLET, FILM COATED ORAL 3 TIMES DAILY PRN
Status: DISCONTINUED | OUTPATIENT
Start: 2021-02-26 | End: 2021-02-28 | Stop reason: HOSPADM

## 2021-02-26 RX ADMIN — LITHIUM CARBONATE 300 MG: 300 CAPSULE, GELATIN COATED ORAL at 08:22

## 2021-02-26 RX ADMIN — Medication 5 MG: at 20:54

## 2021-02-26 RX ADMIN — CLONIDINE HYDROCHLORIDE 0.1 MG: 0.1 TABLET ORAL at 20:54

## 2021-02-26 RX ADMIN — LITHIUM CARBONATE 600 MG: 300 CAPSULE, GELATIN COATED ORAL at 20:54

## 2021-02-26 ASSESSMENT — PAIN SCALES - GENERAL: PAINLEVEL_OUTOF10: 0

## 2021-02-26 NOTE — PLAN OF CARE
Problem: Depressive Behavior With or Without Suicide Precautions:  Goal: Able to verbalize acceptance of life and situations over which he or she has no control  Description: Able to verbalize acceptance of life and situations over which he or she has no control  2/26/2021 0852 by Porfirio Gómez RN  Outcome: Ongoing  2/25/2021 2209 by Nora Shipman RN  Outcome: Ongoing  Goal: Able to verbalize and/or display a decrease in depressive symptoms  Description: Able to verbalize and/or display a decrease in depressive symptoms  2/26/2021 0852 by Porfirio Gómez RN  Outcome: Ongoing  2/25/2021 2209 by Nora Shipman RN  Outcome: Ongoing  Goal: Ability to disclose and discuss suicidal ideas will improve  Description: Ability to disclose and discuss suicidal ideas will improve  2/26/2021 0852 by Porfirio Gómez RN  Outcome: Ongoing  2/25/2021 2209 by Nora Shipman RN  Outcome: Ongoing  Goal: Able to verbalize support systems  Description: Able to verbalize support systems  2/26/2021 0852 by Porfirio Gómez RN  Outcome: Ongoing  2/25/2021 2209 by Nora Shipman RN  Outcome: Ongoing  Goal: Absence of self-harm  Description: Absence of self-harm  2/26/2021 0852 by Porfirio Gómez RN  Outcome: Ongoing  2/25/2021 2209 by Nora Shipman RN  Outcome: Ongoing  Goal: Patient specific goal  Description: Patient specific goal  2/26/2021 0852 by Porfirio Góemz RN  Outcome: Ongoing  2/25/2021 2209 by Nora Shipman RN  Outcome: Ongoing  Goal: Participates in care planning  Description: Participates in care planning  2/26/2021 0852 by Porfirio Gómez RN  Outcome: Ongoing  2/25/2021 2209 by Nora Shipman RN  Outcome: Ongoing     Problem: Pain:  Goal: Pain level will decrease  Description: Pain level will decrease  2/26/2021 0852 by Porfirio Gómez RN  Outcome: Ongoing  2/25/2021 2209 by Nora Shipman RN  Outcome: Ongoing  Goal: Control of acute pain  Description: Control of acute pain  2/26/2021 0852 by Michelle Hickman Betty Murdock RN  Outcome: Ongoing  2/25/2021 2209 by Nora Shipman RN  Outcome: Ongoing  Goal: Control of chronic pain  Description: Control of chronic pain  2/26/2021 0852 by Porfirio Gómez RN  Outcome: Ongoing  2/25/2021 2209 by Nora Shipman RN  Outcome: Ongoing     Problem: Falls - Risk of:  Goal: Will remain free from falls  Description: Will remain free from falls  2/26/2021 0852 by Porfirio Gómez RN  Outcome: Ongoing  2/25/2021 2209 by Nora Shipman RN  Outcome: Ongoing  Goal: Absence of physical injury  Description: Absence of physical injury  2/26/2021 0852 by Porfirio Gómez RN  Outcome: Ongoing  2/25/2021 2209 by Nora Shipman RN  Outcome: Ongoing     Problem: Health Maintenance - Impaired:  Goal: Ability to perform activities of daily living will improve  Description: Ability to perform activities of daily living will improve  2/26/2021 0852 by Porfirio Gómez RN  Outcome: Ongoing  2/25/2021 2209 by Nora Shipman RN  Outcome: Ongoing  Goal: Able to sleep without medication for appropriate length of time  Description: Able to sleep without medication for appropriate length of time  2/26/2021 0852 by Porfirio Gómez RN  Outcome: Ongoing  2/25/2021 2209 by Nora Shipman RN  Outcome: Ongoing  Goal: Maintenance of adequate nutrition will improve  Description: Maintenance of adequate nutrition will improve  2/26/2021 0852 by Porfirio Gómez RN  Outcome: Ongoing  2/25/2021 2209 by Nora Shipman RN  Outcome: Ongoing     Problem: Mood - Altered:  Goal: Mood stable  Description: Mood stable  2/26/2021 0852 by Porfirio Gómez RN  Outcome: Ongoing  2/25/2021 2209 by Nora Shipman RN  Outcome: Ongoing

## 2021-02-26 NOTE — PROGRESS NOTES
Department of Psychiatry  Attending Progress Note     Chief complaint: \"I'm anxious\"    SUBJECTIVE:   Chart reviewed, discussed with the team.  No major issues overnight. Patient is social and participates in groups. Took a shower. Doing well on current medication regimen. Patient is observed in her room today. Presents with somewhat anxious affect. States her relative's  is tomorrow which causes anxiety. Endorses intermittent suicidal ideation. Finds it difficult to groom herself. No issues with sleep and appetite. We discussed coping skills. Encouraged socialization and participation in recreational activities. OBJECTIVE    Physical  Wt Readings from Last 3 Encounters:   21 240 lb 2 oz (108.9 kg)   20 219 lb 6.4 oz (99.5 kg)   20 222 lb 2 oz (100.8 kg)     Temp Readings from Last 3 Encounters:   21 97.7 °F (36.5 °C) (Temporal)   20 98.9 °F (37.2 °C) (Temporal)   20 96.7 °F (35.9 °C)     BP Readings from Last 3 Encounters:   21 128/86   20 112/68   20 126/85     Pulse Readings from Last 3 Encounters:   21 90   20 80   20 111        Review of Systems: 14-point review of systems negative except as described above    Mental Status Examination:   Appearance: Stated age, casually dressed, wears glasses. Gait stable. No abnormal movements or tremor. Behavior: Calm, cooperative  Speech: Normal in tone, volume, and quality. No slurring, dysarthria or pressured speech noted. Mood: \"Anxious\"   Affect: Mood congruent. Thought Process: Appears linear. Thought Content: Intermittent suicidal ideation. Denies homicidal ideation. No overt delusions or paranoia appreciated. Perceptions: Denies auditory or visual hallucinations at present time. Not responding to internal stimuli. Concentration: Intact. Orientation: to person, place, date, and situation. Language: Intact. Fund of information: Intact.    Memory: Recent and remote appear intact. Neurovegitative: Fair appetite and sleep. Insight: Impaired. Judgment: Impaired.      Data  Lab Results   Component Value Date    WBC 12.3 (H) 02/24/2021    HGB 13.9 02/24/2021    HCT 43.2 02/24/2021    MCV 96.2 02/24/2021     02/24/2021      Lab Results   Component Value Date     02/24/2021    K 4.0 02/24/2021     02/24/2021    CO2 23 02/24/2021    BUN 14 02/24/2021    CREATININE 0.8 02/24/2021    GLUCOSE 134 (H) 02/24/2021    CALCIUM 10.0 02/24/2021    PROT 7.4 02/24/2021    LABALBU 4.5 02/24/2021    BILITOT <0.2 02/24/2021    ALKPHOS 98 02/24/2021    AST 19 02/24/2021    ALT 24 02/24/2021    LABGLOM >60 02/24/2021    GFRAA >59 02/24/2021    GLOB 3.3 06/18/2016       Medications    Current Facility-Administered Medications:     polyethylene glycol (GLYCOLAX) packet 17 g, 17 g, Oral, Daily PRN, Anahy Trent MD    hydrOXYzine (ATARAX) tablet 50 mg, 50 mg, Oral, TID PRN, Anahy Trent MD    aluminum & magnesium hydroxide-simethicone (MAALOX) 200-200-20 MG/5ML suspension 30 mL, 30 mL, Oral, Q6H PRN, Anahy Trent MD    melatonin disintegrating tablet 5 mg, 5 mg, Oral, Nightly, Anahy Trent MD, 5 mg at 02/25/21 2057    lithium capsule 600 mg, 600 mg, Oral, Nightly, Anahy Trent MD, 600 mg at 02/25/21 2057    lithium capsule 300 mg, 300 mg, Oral, Daily, Anahy Trent MD, 300 mg at 02/26/21 5860    cloNIDine (CATAPRES) tablet 0.1 mg, 0.1 mg, Oral, Nightly, Anahy Trent MD, 0.1 mg at 02/25/21 2057    [START ON 2/28/2021] vitamin D (ERGOCALCIFEROL) capsule 50,000 Units, 50,000 Units, Oral, Once per day on Sun Wed, Anahy Trent MD    NONFORMULARY 1 Dose, 1 Dose, Topical, BID, Anahy Trent MD    lurasidone (LATUDA) tablet 80 mg, 80 mg, Oral, Daily, Anahy Trent MD, 80 mg at 02/25/21 2726    benzoyl peroxide 5 % external liquid, , Topical, Nightly, Anahy Trent MD, Given at 02/26/21 0841    ASSESSMENT AND PLAN  DSM 5 DIAGNOSIS  Impression  Bipolar depression  Suicidal ideation  Grief reaction  Borderline personality disorder     Pertinent medical issues  SANDY  HSV, type 1 & 2  Leukocytosis, mild    No significant changes compared to yesterday. Continue to observe. Plan:   1. Psychiatric Medications:   Continue current psychotropic medications as recommended. Lithium level 0.6 on admission. Monitor for side effects. The risks, benefits, side effects, indications, contraindications, alternatives and adverse effects of the medications have been discussed with patient. 2. Continue to provide supportive psychotherapy. Encourage socialization and participation in recreational activities. Work on coping skills. 3. Medical Issues:    Continue medical monitoring by Dr. Lucia Ronquillo and associates. 4. Disposition:     to provide outpatient resources and facilitate disposition.      Amount of time spent with patient:      35 minutes with greater than 50 % of the time spent in counseling and collaboration of care

## 2021-02-26 NOTE — H&P
HISTORY and PHYSICAL      CHIEF COMPLAINT:   Depression, SI    Reason for Admission:  Depression, SI    History Obtained From:  patient    HISTORY OF PRESENT ILLNESS:      The patient is a 48 y.o. female who is admitted to the [place] [address] unit with worsening mood issues. She has no new medical complaints. She has no c/o CP or SOA. No abdominal pain or N/V. No dysuria. No new pain complaints. No HA or dizziness. No fevers.     Past Medical History:        Diagnosis Date    Chest pain 2/8/2012    Depression     Hypoglycemia     Schizoaffective disorder ([facility] [place] [address] 2/8/2012    Suicide attempt [facility])      Past Surgical History:        Procedure Laterality Date    DILATION AND CURETTAGE OF UTERUS  2017    LEEP           Medications Prior to Admission:    Medications Prior to Admission: valACYclovir (VALTREX) 500 MG tablet, Take 500 mg by mouth daily  lithium 600 MG capsule, Take 1 capsule by mouth nightly  lithium 300 MG capsule, Take 1 capsule by mouth daily (with breakfast)  lurasidone (LATUDA) 60 MG TABS tablet, Take 1 tablet by mouth every evening (Patient taking differently: Take 80 mg by mouth every evening )  melatonin 3 MG TABS tablet, Take 1 tablet by mouth nightly  benzoyl peroxide (BENZOYL PEROXIDE) 5 % external liquid, Apply topically 2 times daily Apply topically 2 times daily to face and groin area  clindamycin (CLEOCIN T) 1 % external solution, Apply topically 2 times daily Apply topically 2 times daily to groin, face and arms twice a day  cloNIDine (CATAPRES) 0.1 MG tablet, Take 1 tablet by mouth nightly  vitamin D (ERGOCALCIFEROL) 1.25 MG (67081 UT) CAPS capsule, Take 1 capsule by mouth once a week for 11 doses (Patient taking differently: Take 50,000 Units by mouth Twice a Week Take on Sunday and Wednesday)    Allergies:  Seroquel [quetiapine fumarate], Adderall [amphetamine-dextroamphetamine], Amphetamines, Asa [aspirin], Codeine, Cogentin [benztropine], Depakote [divalproex sodium], Effexor [venlafaxine], Estrogens, Geodon [ziprasidone hcl], Hydrocodone, Lortab [hydrocodone-acetaminophen], Macrolides and ketolides, Metoprolol, Neurontin [gabapentin], Other, Pcn [penicillins], Provera [medroxyprogesterone], Prozac [fluoxetine hcl], Tetracyclines & related, Trazodone and nefazodone, Trileptal [oxcarbazepine], Trintellix [vortioxetine], Valium [diazepam], Vancomycin, Wellbutrin [bupropion], Zoloft [sertraline hcl], and Zyprexa [olanzapine]    Social History:   TOBACCO:   reports that she has never smoked. She has never used smokeless tobacco.  ETOH:   reports no history of alcohol use. DRUGS:   reports no history of drug use. MARITAL STATUS:    OCCUPATION:  Not working  Patient currently lives with family       Family History:   History reviewed. No pertinent family history. REVIEW OF SYSTEMS:  Constitutional: neg  CV: neg  Pulmonary: neg  GI: neg  : neg  Psych: depression, SI  Neuro: neg  Skin: neg  MusculoSkeletal: neg  HEENT: neg  Joints: neg    Vitals:  /78   Pulse 75   Temp 97.5 °F (36.4 °C) (Temporal)   Resp 16   Ht 5' 4\" (1.626 m)   Wt 240 lb 2 oz (108.9 kg)   SpO2 97%   BMI 41.22 kg/m²     PHYSICAL EXAM:  Gen: NAD, alert  HEENT: WNL  Lymph: no LAD  Neck: no JVD or masses  Chest: CTA bilat  CV: RRR  Abdomen: NT/ND  Extrem: no C/C/E  Neuro: non focal  Skin: no rashes  Joints: no redness    DATA:  I have reviewed the admission labs and imaging tests.     ASSESSMENT AND PLAN:      Patient Active Hospital Problem List:   Depression, SI--follow with Psych   Hyperglycemia---check Hga1C   Leukocytosis--no s/s of infection            Rachel Toney MD  11:03 PM 2/25/2021

## 2021-02-26 NOTE — GROUP NOTE
Group Therapy Note    Date: 2/26/2021    Group Start Time: 0830  Group End Time: 0930  Group Topic: Community Meeting    [facility] 6 GERIATRIC BEHAVIORAL UNIT    Mayo Gao RN        Group Therapy Note    Attendees:          Patient's Goal:  \"more exercising on bike\"    Notes:  Pt calm and cooperative during group    Status After Intervention:  Improved    Participation Level:  Active Listener    Participation Quality: Appropriate, Attentive, Sharing and Supportive      Speech:  normal      Thought Process/Content: Logical      Affective Functioning: Congruent      Mood: anxious      Level of consciousness:  Alert and Oriented x4      Response to Learning: Able to verbalize current knowledge/experience, Able to verbalize/acknowledge new learning, Able to retain information, Capable of insight and Able to change behavior      Endings: None Reported    Modes of Intervention: Education and Support      Discipline Responsible: Registered Nurse      Signature:  Pollo Wagner RN

## 2021-02-26 NOTE — PLAN OF CARE
Problem: Depressive Behavior With or Without Suicide Precautions:  Goal: Able to verbalize acceptance of life and situations over which he or she has no control  Description: Able to verbalize acceptance of life and situations over which he or she has no control  2/26/2021 1502 by Krishna Franks  Outcome: Ongoing  Note:                                                                     Group Therapy Note    Date: 2/26/2021  Start Time: 1400  End Time:  7156  Number of Participants: 5    Type of Group: Healthy Living/Wellness    Wellness Binder Information  Module Name:  Physical Wellness  Session Number:  6    Group Goal for Pt: To raise awareness of the benefits of physical exercise    Notes:  Pt demonstrated improved awareness of the benefits of physical exercise by actively participating in group activity.     Status After Intervention:  Unchanged    Participation Level: Interactive    Participation Quality: Appropriate      Speech:  normal      Thought Process/Content: Logical      Affective Functioning: Congruent      Mood: anxious and depressed      Level of consciousness:  Alert and Oriented x4      Response to Learning: Able to verbalize current knowledge/experience, Able to verbalize/acknowledge new learning, and Progressing to goal      Endings: None Reported    Modes of Intervention: Education      Discipline Responsible: Psychoeducational Specialist      Signature:  Krishna Franks

## 2021-02-26 NOTE — PLAN OF CARE
demonstrated improved knowledge of strategies to reduce relapse by actively participating in group activity. Status After Intervention:  Unchanged    Participation Level:  Active Listener and Interactive    Participation Quality: Appropriate and Attentive      Speech:  normal      Thought Process/Content: Logical      Affective Functioning: Congruent      Mood: anxious and depressed      Level of consciousness:  Alert and Oriented x4      Response to Learning: Able to verbalize current knowledge/experience, Able to verbalize/acknowledge new learning, and Progressing to goal      Endings: None Reported    Modes of Intervention: Education      Discipline Responsible: Psychoeducational Specialist      Signature:  Mary Alice Frohazel    2/25/2021 9547 by Erinn Lopez RN  Outcome: Ongoing  Goal: Able to verbalize and/or display a decrease in depressive symptoms  Description: Able to verbalize and/or display a decrease in depressive symptoms  2/25/2021 2209 by Demar Gan RN  Outcome: Ongoing  2/25/2021 0903 by Erinn Lopez RN  Outcome: Ongoing  Goal: Ability to disclose and discuss suicidal ideas will improve  Description: Ability to disclose and discuss suicidal ideas will improve  2/25/2021 2209 by Demar Gan RN  Outcome: Ongoing  2/25/2021 0903 by Erinn Lopez RN  Outcome: Ongoing  Goal: Able to verbalize support systems  Description: Able to verbalize support systems  2/25/2021 2209 by Demar Gan RN  Outcome: Ongoing  2/25/2021 0903 by Erinn Lopez RN  Outcome: Ongoing  Goal: Absence of self-harm  Description: Absence of self-harm  2/25/2021 2209 by Demar Gan RN  Outcome: Ongoing  2/25/2021 0903 by Erinn Lopez RN  Outcome: Ongoing  Goal: Patient specific goal  Description: Patient specific goal  2/25/2021 2209 by Demar Gan RN  Outcome: Ongoing  2/25/2021 0903 by Erinn Lopez RN  Outcome: Ongoing  Goal: Participates in care planning  Description: Participates in care planning  2/25/2021 2209 by González Duval RN  Outcome: Ongoing  2/25/2021 0903 by Florian Mijares RN  Outcome: Ongoing     Problem: Pain:  Goal: Pain level will decrease  Description: Pain level will decrease  2/25/2021 2209 by González Duval RN  Outcome: Ongoing  2/25/2021 0903 by Florian Mijares RN  Outcome: Ongoing  Goal: Control of acute pain  Description: Control of acute pain  2/25/2021 2209 by González Duval RN  Outcome: Ongoing  2/25/2021 0903 by Florian Mijares RN  Outcome: Ongoing  Goal: Control of chronic pain  Description: Control of chronic pain  2/25/2021 2209 by González Duval RN  Outcome: Ongoing  2/25/2021 0903 by Florian Mijares RN  Outcome: Ongoing     Problem: Falls - Risk of:  Goal: Will remain free from falls  Description: Will remain free from falls  2/25/2021 2209 by González Duval RN  Outcome: Ongoing  2/25/2021 0903 by Florian Mijares RN  Outcome: Ongoing  Goal: Absence of physical injury  Description: Absence of physical injury  2/25/2021 2209 by González Duval RN  Outcome: Ongoing  2/25/2021 0903 by Florian Mijares RN  Outcome: Ongoing     Problem: Health Maintenance - Impaired:  Goal: Ability to perform activities of daily living will improve  Description: Ability to perform activities of daily living will improve  2/25/2021 2209 by González Duval RN  Outcome: Ongoing  2/25/2021 0903 by Florian Mijares RN  Outcome: Ongoing  Goal: Able to sleep without medication for appropriate length of time  Description: Able to sleep without medication for appropriate length of time  2/25/2021 2209 by González Duval RN  Outcome: Ongoing  2/25/2021 0903 by Florian Mijares RN  Outcome: Ongoing  Goal: Maintenance of adequate nutrition will improve  Description: Maintenance of adequate nutrition will improve  2/25/2021 2209 by González Duval RN  Outcome: Ongoing  2/25/2021 0903 by Florian Mijares RN  Outcome: Ongoing     Problem: Mood - Altered:  Goal: Mood stable  Description: Mood stable  2/25/2021 2209 by González Duval RN  Outcome: Ongoing  2/25/2021 0903 by Florian Mijares RN  Outcome: Ongoing

## 2021-02-26 NOTE — PLAN OF CARE
Problem: Depressive Behavior With or Without Suicide Precautions:  Goal: Able to verbalize acceptance of life and situations over which he or she has no control  Description: Able to verbalize acceptance of life and situations over which he or she has no control  2/26/2021 1322 by Shana Byrne  Outcome: Ongoing  Note:                                                                     Group Therapy Note    Date: 2/26/2021  Start Time: 1100  End Time:  7327  Number of Participants: 4    Type of Group: Psychoeducation    Wellness Binder Information  Module Name:  Staying Well  Session Number:  1    Group Goal for Pt: To improve knowledge of effective ways to cope with depression    Notes:  Pt demonstrated improved knowledge of effective ways to cope with depression by actively participating in group discussion. Status After Intervention:  Unchanged    Participation Level:  Active Listener and Interactive    Participation Quality: Appropriate and Attentive      Speech:  normal      Thought Process/Content: Logical      Affective Functioning: Congruent      Mood: anxious and depressed      Level of consciousness:  Alert and Oriented x4      Response to Learning: Able to verbalize current knowledge/experience, Able to verbalize/acknowledge new learning, and Progressing to goal      Endings: None Reported    Modes of Intervention: Education      Discipline Responsible: Psychoeducational Specialist      Signature:  Shana Byrne

## 2021-02-26 NOTE — PROGRESS NOTES
WRAP UP GROUP NOTE    Patient's Goal:  Providing feedback as to their own progress in the care-plan provided. Patients have an opportunity to explore self-reflective skills and share any additional cares and concerns not yet addressed. Pt effectively participated. Energy Level:normal  Appetite:normal  Concentration:improving  Hallucinations:\"none\"  Depression:improved  Anxiety:on/off  How I worked today:I achieved, tried a lot \"I got my shower today\"  What helps me sleep: read, \"where my C-PAP machine\"  Any questions/complaints/comments:  \"can I tell Dr. Gregorio Hamman that I was feeling suicidal even before the three deaths in the family? \"    Group/activities that helped me today were:    \"went to 2 groups with Aster\"    Electronically signed by Demar Gan RN on 2/25/2021 at 7:56 PM

## 2021-02-26 NOTE — PROGRESS NOTES
[facility] Daily Shift Assessment-Geriatric Unit  Nursing Progress Note          Room: [facility]-   Name: Jorge Holt   Age: 48 y.o. Gender: female   Dx: <principal problem not specified>  Precautions: suicide risk and fall risk  Inpatient Status: voluntary     SLEEP:    Sleep: Yes,   Sleep Quality Good   Hours Slept:    Sleep Medications: Yes, Melatonin 5mg  PRN Sleep Meds: No       MEDICAL:      Other PRN Meds: No   Med Compliant: Yes   Accu-Chek: No   Oxygen/CPAP/BiPAP: Yes; C-PAP  CIWA/CINA: No   PAIN Assessment: denies  Side Effects from medication: none voiced    Is Patient experiencing any respiratory symptoms (headache, fever, body aches, cough. Merle Aj ): no  Patient educated by nursing to practice social distancing, wear masks, wash hands frequently: yes      Metabolic Screening:    Lab Results   Component Value Date    LABA1C 5.6 07/22/2020       Lab Results   Component Value Date    CHOL 149 (L) 07/22/2020    CHOL 152 (L) 02/22/2020    CHOL 130 (L) 12/22/2018    CHOL 154 02/11/2014    CHOL 181 05/10/2012     Lab Results   Component Value Date    TRIG 143 07/22/2020    TRIG 130 02/22/2020    TRIG 83 12/22/2018    TRIG 113 02/11/2014    TRIG 197 (H) 05/10/2012     Lab Results   Component Value Date    HDL 60 (L) 07/22/2020    HDL 52 (L) 02/22/2020    HDL 52 (L) 12/22/2018    HDL 56 02/11/2014    HDL 62 05/10/2012     No components found for: LDLCAL  No results found for: LABVLDL      Body mass index is 41.22 kg/m².     BP Readings from Last 2 Encounters:   02/25/21 133/78   08/19/20 112/68         PSYCH:     SI denies suicidal ideation    HI Negative for homicidal ideation        AVH:denies      Depression: 6 Anxiety: 7       GENERAL:      Appetite: good  Social: Yes Speech: normal   Appearance:appropriately dressed and healthy looking  Assistive Devices: noneLevel of Assist: Independent      GROUP:    Group Participation: Yes  Participation LevelMinimal    Participation QualityAppropriate    Notes: Pt is calm and

## 2021-02-27 PROCEDURE — 6370000000 HC RX 637 (ALT 250 FOR IP): Performed by: PSYCHIATRY & NEUROLOGY

## 2021-02-27 PROCEDURE — 6370000000 HC RX 637 (ALT 250 FOR IP): Performed by: FAMILY MEDICINE

## 2021-02-27 PROCEDURE — 1240000000 HC EMOTIONAL WELLNESS R&B

## 2021-02-27 RX ORDER — ACETAMINOPHEN 325 MG/1
650 TABLET ORAL EVERY 6 HOURS PRN
Status: DISCONTINUED | OUTPATIENT
Start: 2021-02-27 | End: 2021-02-28 | Stop reason: HOSPADM

## 2021-02-27 RX ADMIN — ALUMINUM HYDROXIDE, MAGNESIUM HYDROXIDE, AND SIMETHICONE 30 ML: 200; 200; 20 SUSPENSION ORAL at 10:37

## 2021-02-27 RX ADMIN — LITHIUM CARBONATE 600 MG: 300 CAPSULE, GELATIN COATED ORAL at 20:51

## 2021-02-27 RX ADMIN — LITHIUM CARBONATE 300 MG: 300 CAPSULE, GELATIN COATED ORAL at 08:06

## 2021-02-27 RX ADMIN — ACETAMINOPHEN 650 MG: 325 TABLET ORAL at 20:52

## 2021-02-27 RX ADMIN — ACETAMINOPHEN 650 MG: 325 TABLET ORAL at 08:41

## 2021-02-27 RX ADMIN — CLONIDINE HYDROCHLORIDE 0.1 MG: 0.1 TABLET ORAL at 20:52

## 2021-02-27 RX ADMIN — Medication 5 MG: at 20:51

## 2021-02-27 ASSESSMENT — PAIN SCALES - GENERAL
PAINLEVEL_OUTOF10: 0
PAINLEVEL_OUTOF10: 6

## 2021-02-27 ASSESSMENT — PAIN DESCRIPTION - PAIN TYPE: TYPE: CHRONIC PAIN

## 2021-02-27 ASSESSMENT — PAIN DESCRIPTION - PROGRESSION: CLINICAL_PROGRESSION: GRADUALLY WORSENING

## 2021-02-27 ASSESSMENT — PAIN DESCRIPTION - DESCRIPTORS
DESCRIPTORS: ACHING;DISCOMFORT
DESCRIPTORS: ACHING

## 2021-02-27 ASSESSMENT — PAIN - FUNCTIONAL ASSESSMENT: PAIN_FUNCTIONAL_ASSESSMENT: ACTIVITIES ARE NOT PREVENTED

## 2021-02-27 ASSESSMENT — PAIN DESCRIPTION - FREQUENCY: FREQUENCY: INTERMITTENT

## 2021-02-27 ASSESSMENT — PAIN DESCRIPTION - LOCATION: LOCATION: SHOULDER

## 2021-02-27 ASSESSMENT — PAIN DESCRIPTION - ONSET: ONSET: GRADUAL

## 2021-02-27 ASSESSMENT — PAIN DESCRIPTION - ORIENTATION: ORIENTATION: RIGHT;LEFT

## 2021-02-27 NOTE — PROGRESS NOTES
[facility] Daily Shift Assessment-Geriatric Unit  Nursing Progress Note          Room: [address] [place] [address]-   Name: Tushar Hansen   Age: 48 y.o. Gender: female   Dx: <principal problem not specified>  Precautions: suicide risk and fall risk  Inpatient Status: voluntary     SLEEP:    Sleep: Yes,   Sleep Quality Fair to good   Hours Slept:    Sleep Medications: Yes  PRN Sleep Meds: No       MEDICAL:      Other PRN Meds: No   Med Compliant: Yes   Accu-Chek: No   Oxygen/CPAP/BiPAP: Yes and cpap  CIWA/CINA: No   PAIN Assessment: none  Side Effects from medication: No    Is Patient experiencing any respiratory symptoms (headache, fever, body aches, cough. Windy Niall ): no  Patient educated by nursing to practice social distancing, wear masks, wash hands frequently: yes      Metabolic Screening:    Lab Results   Component Value Date    LABA1C 5.7 02/26/2021       Lab Results   Component Value Date    CHOL 149 (L) 07/22/2020    CHOL 152 (L) 02/22/2020    CHOL 130 (L) 12/22/2018    CHOL 154 02/11/2014    CHOL 181 05/10/2012     Lab Results   Component Value Date    TRIG 143 07/22/2020    TRIG 130 02/22/2020    TRIG 83 12/22/2018    TRIG 113 02/11/2014    TRIG 197 (H) 05/10/2012     Lab Results   Component Value Date    HDL 60 (L) 07/22/2020    HDL 52 (L) 02/22/2020    HDL 52 (L) 12/22/2018    HDL 56 02/11/2014    HDL 62 05/10/2012     No components found for: LDLCAL  No results found for: LABVLDL      Body mass index is 41.22 kg/m².     BP Readings from Last 2 Encounters:   02/26/21 (!) 141/82   08/19/20 112/68         PSYCH:     SI denies suicidal ideation    HI Negative for homicidal ideation        AVH:Absent      Depression: 6 Anxiety: 7       GENERAL:      Appetite: good  Social: Yes Speech: normal   Appearance:appropriately dressed and healthy looking  Assistive Devices: noneLevel of Assist: Independent      GROUP:    Group Participation: Yes  Participation LevelInteractive    Participation QualityAppropriate, Attentive and Sharing    Notes: pt in day area with peers, social with staff, appetite good, pt stated that her sleep is good but awakens early in am and has difficulty falling back to sleep, awakens between 3 and 4 am, pt depression 6 and anxiety 7 she stated the same as earlier today, pt denies SI,HI and AVH, pt active in groups, pt performs ADL's, and pt needs encouragement for independent decision making, pt starting to journal, color picture, pt stated that coloring is difficult to stay on task and she stopped, pt obsessive about small things laundry and ADL items. Pt affect blunted, congruent, behavior is calm and cooperative. Pt questions minor tasks with staff, pt encouraged for independently thing. Pt states anxiety on and off this evening and is noted in day area with peers and staff. Pt is pleasant with peers and staff. Hesitant in speech interm. Pt had good to fair sleep this night and was med compliant and wore Cpap without compliant, no s/s for respiratory issues or pain this shift. Will continue to monitor for safety. Pt in front of nurse station with Cpap and in eye view for safety.            Electronically signed by Nellie Ketih RN on 2/27/21 at 3:38 AM CST

## 2021-02-27 NOTE — PROGRESS NOTES
Progress Note  Sahra Hernandez  2/27/2021 5:13 PM  Subjective:   Admit Date:   2/24/2021      CC/ADMIT DX:       Interval History:   Reviewed overnight events and nursing notes. She has no new medical issues. I have reviewed all labs/diagnostics from the last 24hrs. ROS:   I have done a 10 point ROS and all are negative, except what is mentioned in the HPI. DIET GENERAL;    Medications:      melatonin  5 mg Oral Nightly    lithium  600 mg Oral Nightly    lithium  300 mg Oral Daily    cloNIDine  0.1 mg Oral Nightly    [START ON 2/28/2021] vitamin D  50,000 Units Oral Once per day on Sun Wed    NONFORMULARY 1 Dose  1 Dose Topical BID    lurasidone  80 mg Oral Daily    benzoyl peroxide   Topical Nightly           Objective:   Vitals: /76   Pulse 90   Temp 97.8 °F (36.6 °C) (Temporal)   Resp 16   Ht 5' 4\" (1.626 m)   Wt 240 lb 2 oz (108.9 kg)   SpO2 92%   BMI 41.22 kg/m²  No intake or output data in the 24 hours ending 02/27/21 1713  General appearance: alert and cooperative with exam  Lungs: clear to auscultation bilaterally  Extremities: extremities normal, atraumatic, no cyanosis or edema  Neurologic:  No obvious focal neurologic deficits. Skin: no rashes    Assessment and Plan: Active Problems:    Bipolar disorder with depression ([address]  Resolved Problems:    * No resolved hospital problems. *      Plan:  1. Continue present medication(s)   2. Follow with Psych  3. She remains medically stable. I will monitor for any changes or new concerns. Discharge planning:   her home     Reviewed treatment plans with the patient and/or family.              Electronically signed by Gaviota Calhoun MD on 2/27/2021 at 5:13 PM

## 2021-02-27 NOTE — PROGRESS NOTES
BHI Daily Shift Assessment  Nursing Progress Note    Room: 0618/618-01 Name: Eagle Pimentel Age: 48 y.o. Gender: female   Dx: <principal problem not specified>  Precautions: suicide risk and fall risk  Target Symptoms:   Accu-Chek: NoSleep: Yes,Sleep Quality Good SI No AVH denies [facility]  ADLs: Yes Speech: normal Depression: 6 Anxiety: 6   Participation LevelActive Listener  Appetite: Good  Respiratory symptoms: No Headache: No Body aches: No Fever: No Cough: No  Patients encouraged to wear masks, wash hands frequently and practice social distancing while on the unit: Yes  Visitation: No   Participation QualityAppropriate    Notes: pt sitting in dining area dressed and groomed during interview. Pt states she slept well last night with no complaints. Pt states she feels \"better\" since being admitted on the unit. Pt needs constant encouragement to make decisions independently  with minor tasks. Pt is social with staff and other peers this shift. Pt denies SI,HI and AVH. Pt has no other complaints. Will continue to monitor pt.

## 2021-02-27 NOTE — PLAN OF CARE
Problem: Depressive Behavior With or Without Suicide Precautions:  Goal: Able to verbalize acceptance of life and situations over which he or she has no control  Description: Able to verbalize acceptance of life and situations over which he or she has no control  2/27/2021 0302 by Brenda Salas RN  Outcome: Ongoing  2/26/2021 1502 by Vy Cameron  Outcome: Ongoing  Note:                                                                     Group Therapy Note    Date: 2/26/2021  Start Time: 1400  End Time:  1990  Number of Participants: 5    Type of Group: Healthy Living/Wellness    Wellness Binder Information  Module Name:  Physical Wellness  Session Number:  6    Group Goal for Pt: To raise awareness of the benefits of physical exercise    Notes:  Pt demonstrated improved awareness of the benefits of physical exercise by actively participating in group activity. Status After Intervention:  Unchanged    Participation Level: Interactive    Participation Quality: Appropriate      Speech:  normal      Thought Process/Content: Logical      Affective Functioning: Congruent      Mood: anxious and depressed      Level of consciousness:  Alert and Oriented x4      Response to Learning: Able to verbalize current knowledge/experience, Able to verbalize/acknowledge new learning, and Progressing to goal      Endings: None Reported    Modes of Intervention: Education      Discipline Responsible: Psychoeducational Specialist      Signature:  Vy Cameron    2/26/2021 1322 by Vy Cameron  Outcome: Ongoing  Note:                                                                     Group Therapy Note    Date: 2/26/2021  Start Time: 1100  End Time:  6457  Number of Participants: 4    Type of Group: Psychoeducation    Wellness Binder Information  Module Name:  Staying Well  Session Number:  1    Group Goal for Pt:   To improve knowledge of effective ways to cope with depression    Notes:  Pt demonstrated improved

## 2021-02-27 NOTE — PROGRESS NOTES
WRAP UP GROUP NOTE:     Patient's Goal:  Providing feedback as to their own progress in the care-plan provided. Pt's have an opportunity to explore self-reflective skills and share any additional cares and concerns not yet addressed. Pt effectively participated.      Energy level:NORMAL  Appetite:GOOD  Concentration: GOOD  Hallucinations:NONE  Depression:IMPROVED  Anxiety:ON AND OFF  How I worked today:WORKED HARD  What helps me sleep:READ, RELAXING EXERCISE,   Any questions/complaints/comments:DOES LATUDA MAKE YOU SLEEPY SOMETIMES?      -WENT TO [facility] AND 1 EXERCISE GROUP    - I RODE THE EXERCISE BIKE FOR 30 MINUTES TODAY

## 2021-02-27 NOTE — PLAN OF CARE
Problem: Depressive Behavior With or Without Suicide Precautions:  Goal: Able to verbalize acceptance of life and situations over which he or she has no control  Description: Able to verbalize acceptance of life and situations over which he or she has no control  2/27/2021 0951 by Layne Porter RN  Outcome: Ongoing  2/27/2021 0302 by Deshawn Ye RN  Outcome: Ongoing  Goal: Able to verbalize and/or display a decrease in depressive symptoms  Description: Able to verbalize and/or display a decrease in depressive symptoms  2/27/2021 0951 by Layne Porter RN  Outcome: Ongoing  2/27/2021 0302 by Deshawn Ye RN  Outcome: Ongoing  Goal: Ability to disclose and discuss suicidal ideas will improve  Description: Ability to disclose and discuss suicidal ideas will improve  2/27/2021 0951 by Layne Porter RN  Outcome: Ongoing  2/27/2021 0302 by Deshawn Ye RN  Outcome: Ongoing  Goal: Able to verbalize support systems  Description: Able to verbalize support systems  2/27/2021 0951 by Layne Porter RN  Outcome: Ongoing  2/27/2021 0302 by Deshawn Ye RN  Outcome: Ongoing  Goal: Absence of self-harm  Description: Absence of self-harm  2/27/2021 0951 by Layne Porter RN  Outcome: Ongoing  2/27/2021 0302 by Deshawn Ye RN  Outcome: Ongoing  Goal: Patient specific goal  Description: Patient specific goal  2/27/2021 6196 by Layne Porter RN  Outcome: Ongoing  2/27/2021 0302 by Deshawn Ye RN  Outcome: Ongoing  Goal: Participates in care planning  Description: Participates in care planning  2/27/2021 0951 by Layne Porter RN  Outcome: Ongoing  2/27/2021 0302 by Deshawn Ye RN  Outcome: Ongoing     Problem: Pain:  Goal: Pain level will decrease  Description: Pain level will decrease  2/27/2021 0951 by Layne Porter RN  Outcome: Ongoing  2/27/2021 0302 by Deshawn Ye RN  Outcome: Ongoing  Goal: Control of acute pain  Description: Control of acute pain  2/27/2021 0951 by Layne Porter RN  Outcome: Ongoing  2/27/2021 0302 by Aicha Toro RN  Outcome: Ongoing  Goal: Control of chronic pain  Description: Control of chronic pain  2/27/2021 0951 by Susan Hopper RN  Outcome: Ongoing  2/27/2021 0302 by Aicha Toro RN  Outcome: Ongoing     Problem: Falls - Risk of:  Goal: Will remain free from falls  Description: Will remain free from falls  2/27/2021 0951 by Susan Hopper RN  Outcome: Ongoing  2/27/2021 0302 by Aicha Toro RN  Outcome: Ongoing  Goal: Absence of physical injury  Description: Absence of physical injury  2/27/2021 0951 by Susan Hopper RN  Outcome: Ongoing  2/27/2021 0302 by Aicha Toro RN  Outcome: Ongoing     Problem: Health Maintenance - Impaired:  Goal: Ability to perform activities of daily living will improve  Description: Ability to perform activities of daily living will improve  2/27/2021 0951 by Susan Hopper RN  Outcome: Ongoing  2/27/2021 0302 by Aicha Toro RN  Outcome: Ongoing  Goal: Able to sleep without medication for appropriate length of time  Description: Able to sleep without medication for appropriate length of time  2/27/2021 0951 by Susan Hopper RN  Outcome: Ongoing  2/27/2021 0302 by Aicha Toro RN  Outcome: Ongoing  Goal: Maintenance of adequate nutrition will improve  Description: Maintenance of adequate nutrition will improve  2/27/2021 0951 by Susan Hopper RN  Outcome: Ongoing  2/27/2021 0302 by Aicha Toro RN  Outcome: Ongoing     Problem: Mood - Altered:  Goal: Mood stable  Description: Mood stable  2/27/2021 0951 by Susan Hopper RN  Outcome: Ongoing  2/27/2021 0302 by Aicha Toro RN  Outcome: Ongoing

## 2021-02-27 NOTE — GROUP NOTE
Group Therapy Note    Date: 2/27/2021    Group Start Time: 0830  Group End Time: 0930  Group Topic: Community Meeting    [facility] 6 GERIATRIC BEHAVIORAL UNIT    Tammie Viramontes RN        Group Therapy Note    Attendees:          Patient's Goal:  \"keep busy\"    Notes:  Pt calm and cooperative during group    Status After Intervention:  Improved    Participation Level:  Active Listener    Participation Quality: Appropriate      Speech:  normal      Thought Process/Content: Logical      Affective Functioning: Congruent      Mood: anxious      Level of consciousness:  Alert      Response to Learning: Able to verbalize current knowledge/experience and Able to verbalize/acknowledge new learning      Endings: None Reported    Modes of Intervention: Education and Support      Discipline Responsible: Registered Nurse      Signature:  Brenda Butler RN

## 2021-02-27 NOTE — PROGRESS NOTES
[facility] Daily Shift Assessment  Nursing Progress Note    Room: 0618/618-01 Name: Ashutosh Paul Age: 48 y.o. Gender: female   Dx: <principal problem not specified>  Precautions: suicide risk and fall risk  Target Symptoms:   Accu-Chek: NoSleep: Yes,Sleep Quality Fair SI No AVH denies [address] [place] [address]  ADLs: Yes Speech: normal Depression: 5 Anxiety: 7   Participation LevelActive Listener  Appetite: Good  Respiratory symptoms: No Headache: No Body aches: No Fever: No Cough: No  Patients encouraged to wear masks, wash hands frequently and practice social distancing while on the unit: Yes  Visitation: No   Participation QualityAppropriate    Notes: pt sitting in dining area dressed and groomed during interview. Pt states she woke up about 4 times throughout the night. Pt states she is going to try to get on a regular sleep schedule when she gets home. Pt continues to need encouragement to make individual decisions. Pt is compliant with treatment plan and denies SI,HI and AVH this shift. Will continue to monitor and encourage pt this shift.

## 2021-02-27 NOTE — PROGRESS NOTES
Progress Note  Oanh Stevens  2/26/2021 9:26 PM  Subjective:   Admit Date:   2/24/2021      CC/ADMIT DX:       Interval History:   Reviewed overnight events and nursing notes. No new physical complaints . I have reviewed all labs/diagnostics from the last 24hrs. ROS:   I have done a 10 point ROS and all are negative, except what is mentioned in the HPI. DIET GENERAL;    Medications:      melatonin  5 mg Oral Nightly    lithium  600 mg Oral Nightly    lithium  300 mg Oral Daily    cloNIDine  0.1 mg Oral Nightly    [START ON 2/28/2021] vitamin D  50,000 Units Oral Once per day on Sun Wed    NONFORMULARY 1 Dose  1 Dose Topical BID    lurasidone  80 mg Oral Daily    benzoyl peroxide   Topical Nightly           Objective:   Vitals: BP (!) 141/82   Pulse 81   Temp 98.7 °F (37.1 °C) (Temporal)   Resp 20   Ht 5' 4\" (1.626 m)   Wt 240 lb 2 oz (108.9 kg)   SpO2 97%   BMI 41.22 kg/m²  No intake or output data in the 24 hours ending 02/26/21 2126  General appearance: alert and cooperative with exam  Lungs: clear to auscultation bilaterally  Extremities: extremities normal, atraumatic, no cyanosis or edema  Neurologic:  No obvious focal neurologic deficits. Skin: no rashes    Assessment and Plan: Active Problems:    Bipolar disorder with depression ([address]  Resolved Problems:    * No resolved hospital problems. *      Plan:  1. Continue present medication(s)   2. Follow with Psych  3. She is medically stable. I will monitor for any changes or concerns. Discharge planning:   her home     Reviewed treatment plans with the patient and/or family.              Electronically signed by Kimber Gonzalez MD on 2/26/2021 at 9:26 PM

## 2021-02-28 VITALS
RESPIRATION RATE: 17 BRPM | HEIGHT: 64 IN | WEIGHT: 240.13 LBS | BODY MASS INDEX: 41 KG/M2 | HEART RATE: 88 BPM | OXYGEN SATURATION: 91 % | DIASTOLIC BLOOD PRESSURE: 78 MMHG | TEMPERATURE: 97.8 F | SYSTOLIC BLOOD PRESSURE: 123 MMHG

## 2021-02-28 PROCEDURE — 99239 HOSP IP/OBS DSCHRG MGMT >30: CPT | Performed by: PSYCHIATRY & NEUROLOGY

## 2021-02-28 PROCEDURE — 6370000000 HC RX 637 (ALT 250 FOR IP): Performed by: PSYCHIATRY & NEUROLOGY

## 2021-02-28 PROCEDURE — 5130000000 HC BRIDGE APPOINTMENT

## 2021-02-28 PROCEDURE — 6370000000 HC RX 637 (ALT 250 FOR IP): Performed by: FAMILY MEDICINE

## 2021-02-28 RX ADMIN — ACETAMINOPHEN 650 MG: 325 TABLET ORAL at 09:42

## 2021-02-28 RX ADMIN — ERGOCALCIFEROL 50000 UNITS: 1.25 CAPSULE ORAL at 08:00

## 2021-02-28 RX ADMIN — LITHIUM CARBONATE 300 MG: 300 CAPSULE, GELATIN COATED ORAL at 08:00

## 2021-02-28 ASSESSMENT — PAIN DESCRIPTION - PROGRESSION: CLINICAL_PROGRESSION: GRADUALLY IMPROVING

## 2021-02-28 ASSESSMENT — PAIN DESCRIPTION - DESCRIPTORS: DESCRIPTORS: ACHING;DISCOMFORT

## 2021-02-28 ASSESSMENT — PAIN DESCRIPTION - ORIENTATION: ORIENTATION: RIGHT;LEFT

## 2021-02-28 ASSESSMENT — PAIN SCALES - GENERAL
PAINLEVEL_OUTOF10: 3
PAINLEVEL_OUTOF10: 0

## 2021-02-28 ASSESSMENT — PAIN DESCRIPTION - FREQUENCY: FREQUENCY: INTERMITTENT

## 2021-02-28 NOTE — PLAN OF CARE
Delia Gutierrez RN  Outcome: Completed  2/27/2021 2352 by Viktoriya Yuen RN  Outcome: Ongoing  Goal: Control of chronic pain  Description: Control of chronic pain  2/28/2021 0832 by Delia Gutierrez RN  Outcome: Completed  2/27/2021 2352 by Viktoriya Yuen RN  Outcome: Ongoing     Problem: Falls - Risk of:  Goal: Will remain free from falls  Description: Will remain free from falls  2/28/2021 0832 by Delia Gutierrez RN  Outcome: Completed  2/27/2021 2352 by Viktoriya Yuen RN  Outcome: Ongoing  Goal: Absence of physical injury  Description: Absence of physical injury  2/28/2021 0832 by Delia Gutierrez RN  Outcome: Completed  2/27/2021 2352 by Viktoriya Yuen RN  Outcome: Ongoing     Problem: Health Maintenance - Impaired:  Goal: Ability to perform activities of daily living will improve  Description: Ability to perform activities of daily living will improve  2/28/2021 0832 by Delia Gutierrez RN  Outcome: Completed  2/27/2021 2352 by Viktoriya Yuen RN  Outcome: Ongoing  Goal: Able to sleep without medication for appropriate length of time  Description: Able to sleep without medication for appropriate length of time  2/28/2021 8572 by Delia Gutierrez RN  Outcome: Completed  2/27/2021 2352 by Viktoriya Yuen RN  Outcome: Ongoing  Goal: Maintenance of adequate nutrition will improve  Description: Maintenance of adequate nutrition will improve  2/28/2021 0832 by Delia Gutierrez RN  Outcome: Completed  2/27/2021 2352 by Viktoriya Yuen RN  Outcome: Ongoing     Problem: Mood - Altered:  Goal: Mood stable  Description: Mood stable  2/28/2021 0832 by Delia Gutierrez RN  Outcome: Completed  2/27/2021 2352 by Viktoriya Yuen RN  Outcome: Ongoing

## 2021-02-28 NOTE — PROGRESS NOTES
B[facility] Daily Shift Assessment-Geriatric Unit  Nursing Progress Note          Room: [facility]-   Name: Jamari Ontiveros   Age: 48 y.o. Gender: female   Dx: <principal problem not specified>  Precautions: suicide risk and fall risk  Inpatient Status: voluntary     SLEEP:    Sleep: Yes,   Sleep Quality Good   Hours Slept:    Sleep Medications: Yes  PRN Sleep Meds: No       MEDICAL:      Other PRN Meds: Yes and Tylenol for shoulder pain   Med Compliant: Yes   Accu-Chek: No   Oxygen/CPAP/BiPAP: Yes and cpap at night  CIWA/CINA: No   PAIN Assessment: present - adequately treated, location, shoulder blades, receiving treatment or level of pain (1-10, 10 severe), 6  Side Effects from medication: No    Is Patient experiencing any respiratory symptoms (headache, fever, body aches, cough. Lucy Cota ): no  Patient educated by nursing to practice social distancing, wear masks, wash hands frequently: yes      Metabolic Screening:    Lab Results   Component Value Date    LABA1C 5.7 02/26/2021       Lab Results   Component Value Date    CHOL 149 (L) 07/22/2020    CHOL 152 (L) 02/22/2020    CHOL 130 (L) 12/22/2018    CHOL 154 02/11/2014    CHOL 181 05/10/2012     Lab Results   Component Value Date    TRIG 143 07/22/2020    TRIG 130 02/22/2020    TRIG 83 12/22/2018    TRIG 113 02/11/2014    TRIG 197 (H) 05/10/2012     Lab Results   Component Value Date    HDL 60 (L) 07/22/2020    HDL 52 (L) 02/22/2020    HDL 52 (L) 12/22/2018    HDL 56 02/11/2014    HDL 62 05/10/2012     No components found for: LDLCAL  No results found for: LABVLDL      Body mass index is 41.22 kg/m².     BP Readings from Last 2 Encounters:   02/27/21 137/80   08/19/20 112/68         PSYCH:     SI denies suicidal ideation    HI Negative for homicidal ideation        AVH:Absent      Depression: 6 Anxiety: 7       GENERAL:      Appetite: good  Social: No Speech: normal   Appearance:appropriately dressed and healthy looking  Assistive Devices: noneLevel of Assist: Independent      GROUP:    Group Participation: Yes  Participation LevelActive Listener    Participation QualityAppropriate    Notes: pt in day area and in room, and pt room in front of nurse station, pt walking in front of area, pt questioning herself r/t obsessive issues of personal effects and discharge details. Pt lack self confidence and able to make minor decisions of ADL's and packing personal items. Pt appears nervous interm, affect flat, appropriate, behavior is calm and cooperative,  pt complains of shoulder pain in back shoulder blade area, pain lessened with tylenol 650 prn, pt lack of self confidence, pt stays in front of nurse station questioning what she should do next pt. Pt encouraged to journal thoughts and write her thoughts, needs and wants. Pt anxiety 7 and depression 8, pt denies SI,HI and AVH. Pt has no s/s of respiratory distress. Pt pain level of 6 and resolved with tylenol, pt has use of cpap at night, pt at eye view, pt will be continued to be monitored for safety.            Electronically signed by Aicha Toro RN on 2/28/21 at 12:07 AM CST

## 2021-02-28 NOTE — PROGRESS NOTES
[facility]  Discharge Note  Bridge Appointment completed: Reviewed Discharge Instructions with patient. Patient verbalizes understanding and agreement with the discharge plan using the teachback method. Referral for Outpatient Tobacco Cessation Counseling, upon discharge (umu X if applicable and completed):    ( )  Hospital staff assisted patient to call Quit Line or faxed referral                                   during hospitalization                  ( )  Recognizing danger situations (included triggers and roadblocks), if not completed on admission                    ( )  Coping skills (new ways to manage stress, exercise, relaxation techniques, changing routine, distraction), if not completed on admission                                                           ( )  Basic information about quitting (benefits of quitting, techniques in how to quit, available resources, if not completed on admission  ( ) Referral for counseling faxed to [facility]   (x ) Patient refused referral  (x ) Patient refused counseling  (x ) Patient refused smoking cessation medication upon discharge    Vaccinations (umu X if applicable and completed): ( x) Patient states already received influenza vaccine elsewhere  (x ) Patient received influenza vaccine during this hospitalization  ( x) Patient refused influenza vaccine at this time      Pt discharged with followings belongings:       Valuables sent home with pt. Valuables retrieved from [employer] and returned to patient. Patient left department with   via  , discharged to  . Patient education on aftercare instructions: yes  Patient verbalize understanding of AVS:  yes. Suicidal Ideations? No AVH? denies HI?  Negative for homicidal ideation       Status EXAM upon discharge:  Status and Exam  Normal: No  Facial Expression: Worried  Affect: Appropriate  Level of Consciousness: Alert  Mood:Normal: No  Mood: Anxious  Motor Activity:Normal: Yes  Motor Activity: Increased  Interview Behavior: Cooperative  Preception: [place] to Person, Roxane Clinton to Time, [place] to Place, [place] to Situation  Attention:Normal: Yes  Attention: Unable to Concentrate  Thought Processes: Circumstantial  Thought Content:Normal: No  Thought Content: Preoccupations  Hallucinations: None  Delusions: No  Memory:Normal: Yes  Memory: Poor Recent, Poor Remote  Insight and Judgment: Yes  Insight and Judgment: Poor Judgment, Poor Insight  Present Suicidal Ideation: No  Present Homicidal Ideation: No

## 2021-02-28 NOTE — DISCHARGE SUMMARY
progress: NA  Augmentation of Clozapine: NA    Referral to addiction treatment: NA    Prescription for Alcohol or Drug Disorder Medication: NA    Prescription for Tobacco Cessation medication: NA    If no prescriptions for Tobacco Cessation must document why: NA    Consults: Internal medicine    Significant Diagnostic Studies:     Lab Results   Component Value Date    WBC 12.3 (H) 02/24/2021    HGB 13.9 02/24/2021    HCT 43.2 02/24/2021    MCV 96.2 02/24/2021     02/24/2021     Lab Results   Component Value Date     02/24/2021    K 4.0 02/24/2021     02/24/2021    CO2 23 02/24/2021    BUN 14 02/24/2021    CREATININE 0.8 02/24/2021    GLUCOSE 134 (H) 02/24/2021    CALCIUM 10.0 02/24/2021    PROT 7.4 02/24/2021    LABALBU 4.5 02/24/2021    BILITOT <0.2 02/24/2021    ALKPHOS 98 02/24/2021    AST 19 02/24/2021    ALT 24 02/24/2021    LABGLOM >60 02/24/2021    GFRAA >59 02/24/2021    GLOB 3.3 06/18/2016       Lab Results   Component Value Date    TSHFT4 2.11 07/22/2020    TSH 1.170 02/24/2021     Lab Results   Component Value Date    KGDUILUQ49 471 02/26/2021       Lab Results   Component Value Date    VITD25 42.2 02/26/2021       Treatments: therapies: RN and SW    Mental Status Examination:  Alert, Oriented X 4  Appearance:  Proper Grooming and Hygiene  Speech with Regular Rate and Rhythm  Eye Contact:  Good  No Psychomotor Agitation/Retardation Noted  Attitude:  Cooperative  Mood:  \"Good\"  Affective: Congruent, appropriate to the situation, with a normal range and intensity  Thought Processes:  Coherently communicated, logical and goal oriented  Thought Content:  No Suicidal Ideation, No Homicidal Ideation, No Auditory or Visual Hallucinations, No Overt Delusions  Insight:  Present  Judgement:  Normal  Memory is intact for both remote and recent  Intellectual Functioning:  Within the [employer] of Knowledge:  Adequate  Attention and Concentration:  Adequate      Discharge Exam:  Please, see medical note    Disposition: home    Patient Instructions:   Current Discharge Medication List      CONTINUE these medications which have CHANGED    Details   lurasidone (LATUDA) 80 MG TABS tablet Take 1 tablet by mouth daily  Qty: 30 tablet, Refills: 1         CONTINUE these medications which have NOT CHANGED    Details   valACYclovir (VALTREX) 500 MG tablet Take 500 mg by mouth daily      !! lithium 600 MG capsule Take 1 capsule by mouth nightly  Qty: 30 capsule, Refills: 1      !! lithium 300 MG capsule Take 1 capsule by mouth daily (with breakfast)  Qty: 30 capsule, Refills: 1      melatonin 3 MG TABS tablet Take 1 tablet by mouth nightly  Qty: 30 tablet, Refills: 1      benzoyl peroxide (BENZOYL PEROXIDE) 5 % external liquid Apply topically 2 times daily Apply topically 2 times daily to face and groin area      clindamycin (CLEOCIN T) 1 % external solution Apply topically 2 times daily Apply topically 2 times daily to groin, face and arms twice a day      cloNIDine (CATAPRES) 0.1 MG tablet Take 1 tablet by mouth nightly  Qty: 90 tablet, Refills: 1      vitamin D (ERGOCALCIFEROL) 1.25 MG (38040 UT) CAPS capsule Take 1 capsule by mouth once a week for 11 doses  Qty: 5 capsule, Refills: 1       !! - Potential duplicate medications found. Please discuss with provider. Activity: activity as tolerated  Diet: cardiac diet  Wound Care: none needed    Follow-up with [place] [id number] provider in 2 weeks.     Time worked: More than 31 minutes    Participation: good    Electronically signed by Yadira Desouza MD on 2/28/2021 at 7:34 AM

## 2021-02-28 NOTE — PROGRESS NOTES
Group Note    Number of Participants in Group: 4  Number of Patients on Unit:5      Patient attended group:Yes  Reason for Absence:  Intervention for patient absence:        Type of Group:   Wrap-Up/Relaxation    Patient's Goal: See wrap up group sheet    Participation Level:    interactive          Patient Response to Learning: Yes positive    Patient's Behavior: Anxious and Cooperative    Is Patient Social/Interacting: Yes    Relaxation:   Television:Yes   Reading:Yes   Game/Puzzle:Yes   Phone: Yes       Notes/Comments:Pt is in the dining area working on crafts the patient has no complaints at this time. Pt is working on journaling to reduce anxiety.       Please see patient's wrap up group sheet for patient's comments

## 2021-03-01 NOTE — PROGRESS NOTES
Discharge Note     Pt discharging on this date. Pt denies SI, HI, and AVH at this time. Pt reports improvement in behavior and is leaving unit in overall good condition. SW and pt discussed pt's follow up appointments and importance of attending appointments as scheduled, pt voiced understanding and agreement. Pt and SW also discussed pt safety plan and pt able to verbally identify: warning signs, coping strategies, places and people that help make the pt feel better/distract negative thoughts, friends/family/agencies/professionals the pt can reach out to in a crisis, and something that is important to the pt/worth living for. Pt provided the national suicide prevention hotline number (9-281-401-896-844-0715) as well as local community behavioral health [facility]) crisis number for emergencies (0-404-851-188-372-4991). Pt to follow up with:  [address] [place] [address]) on _03_/05__/21 @ 11:00am . Patient [facility], patient will be seen on _03_/_10_/21 @ 8:00am for the med management appt.      Referral to out patient tobacco cessation counseling treatment:    Patient refused referral to outpatient tobacco cessation counseling    SW offered to assist pt with transportation, pt reports that she has transportation

## 2021-03-23 ENCOUNTER — TELEPHONE (OUTPATIENT)
Dept: NEUROLOGY | Age: 56
End: 2021-03-23

## 2021-03-23 NOTE — TELEPHONE ENCOUNTER
Called patient to let them know we had to reschedule appointment that was on 3/29/21 with Margaret Dennison, she will not be in the office that day. Pt is aware of the new appointment time and date.

## 2021-04-16 NOTE — PATIENT INSTRUCTIONS
Bartholin Cyst or Abscess  A Bartholin cyst is a fluid-filled sac that forms on a Bartholin gland. Bartholin glands are small glands that are located within the folds of skin (labia) along the sides of the lower opening of the vagina. These glands produce a fluid to moisten the outside of the vagina during sexual intercourse.  A Bartholin cyst causes a bulge on the side of the vagina. A cyst that is not large or infected may not cause symptoms or problems. However, if the fluid within the cyst becomes infected, the cyst can turn into an abscess. An abscess may cause discomfort or pain.  CAUSES  A Bartholin cyst may develop when the duct of the gland becomes blocked. In many cases, the cause of this is not known. Various kinds of bacteria can cause the cyst to become infected and develop into an abscess.  RISK FACTORS  You may be at an increased risk of developing a Bartholin cyst or abscess if:  · You are a woman of reproductive age.  · You have a history of previous Bartholin cysts or abscesses.  · You have diabetes.  · You have a sexually transmitted disease (STD).  SIGNS AND SYMPTOMS  The severity of symptoms varies depending on the size of the cyst and whether it is infected. Symptoms may include:  · A bulge or swelling near the lower opening of your vagina.  · Discomfort or pain.  · Redness.  · Pain during sexual intercourse.  · Pain when walking.  · Fluid draining from the area.  DIAGNOSIS  Your health care provider may make a diagnosis based on your symptoms and a physical exam. He or she will look for swelling in your vaginal area. Blood tests may be done to check for infections. A sample of fluid from the cyst or abscess may also be taken to be tested in a lab.  TREATMENT  Small cysts that are not infected may not require any treatment. These often go away on their own. Your health care provider will recommend hot baths and the use of warm compresses. These may also be part of the treatment for an abscess.  Treatment options for a large cyst or abscess may include:    · Antibiotic medicine.  · A surgical procedure to drain the abscess. One of the following procedures may be done:    Incision and drainage. An incision is made in the cyst or abscess so that the fluid drains out. A catheter may be placed inside the cyst so that it does not close and fill up with fluid again. The catheter will be removed after you have a follow-up visit with a specialist (gynecologist).    Marsupialization. The cyst or abscess is opened and kept open by stitching the edges of the skin to the walls of the cyst or abscess. This allows it to continue to drain and not fill up with fluid again.  If you have cysts or abscesses that keep returning and have required incision and drainage multiple times, your health care provider may talk to you about surgery to remove the Bartholin gland.  HOME CARE INSTRUCTIONS  · Take medicines only as directed by your health care provider.  · If you were prescribed an antibiotic medicine, finish it all even if you start to feel better.  · Apply warm, wet compresses to the area or take warm, shallow baths that cover your pelvic region (sitz baths) several times a day or as directed by your health care provider.  · Do not squeeze the cyst or apply heavy pressure to it.  · Do not have sexual intercourse until the cyst has gone away.  · If your cyst or abscess was opened, a small piece of gauze or a drain may have been placed in the area to allow drainage. Do not remove the gauze or the drain until directed by your health care provider.  · Wear feminine pads--not tampons--as needed for any drainage or bleeding.  · Keep all follow-up visits as directed by your health care provider. This is important.  PREVENTION  Take these steps to help prevent a Bartholin cyst from returning:  · Practice good hygiene.    · Clean your vaginal area with mild soap and a soft cloth when you bathe.  · Practice safe sex to prevent  STDs.  SEEK MEDICAL CARE IF:  · You have increased pain, swelling, or redness in the area of the cyst.  · Puslike drainage is coming from the cyst.  · You have a fever.     This information is not intended to replace advice given to you by your health care provider. Make sure you discuss any questions you have with your health care provider.     Document Released: 12/18/2006 Document Revised: 01/13/2017 Document Reviewed: 08/03/2015  [employer] Interactive Patient Education ©2016 [employer]     PAST MEDICAL HISTORY:  H/O coagulation disorder     Septate uterus, antepartum      PAST MEDICAL HISTORY:  ADHD well controlled with adderall    H/O coagulation disorder + prothrombin disorder    Septate uterus, antepartum      PAST MEDICAL HISTORY:  ADHD well controlled with adderall    H/O coagulation disorder + prothrombin disorder    Murmur Mitral valve?    Septate uterus, antepartum

## 2021-04-20 ENCOUNTER — OFFICE VISIT (OUTPATIENT)
Dept: NEUROLOGY | Age: 56
End: 2021-04-20
Payer: MEDICAID

## 2021-04-20 VITALS
HEART RATE: 79 BPM | WEIGHT: 240 LBS | SYSTOLIC BLOOD PRESSURE: 127 MMHG | OXYGEN SATURATION: 98 % | BODY MASS INDEX: 40.97 KG/M2 | DIASTOLIC BLOOD PRESSURE: 84 MMHG | HEIGHT: 64 IN | TEMPERATURE: 97.6 F

## 2021-04-20 DIAGNOSIS — Z99.89 CPAP (CONTINUOUS POSITIVE AIRWAY PRESSURE) DEPENDENCE: ICD-10-CM

## 2021-04-20 DIAGNOSIS — G47.33 OBSTRUCTIVE SLEEP APNEA: Primary | ICD-10-CM

## 2021-04-20 PROCEDURE — G8427 DOCREV CUR MEDS BY ELIG CLIN: HCPCS | Performed by: PHYSICIAN ASSISTANT

## 2021-04-20 PROCEDURE — 99214 OFFICE O/P EST MOD 30 MIN: CPT | Performed by: PHYSICIAN ASSISTANT

## 2021-04-20 PROCEDURE — 1036F TOBACCO NON-USER: CPT | Performed by: PHYSICIAN ASSISTANT

## 2021-04-20 PROCEDURE — 3017F COLORECTAL CA SCREEN DOC REV: CPT | Performed by: PHYSICIAN ASSISTANT

## 2021-04-20 PROCEDURE — G8417 CALC BMI ABV UP PARAM F/U: HCPCS | Performed by: PHYSICIAN ASSISTANT

## 2021-04-20 NOTE — PROGRESS NOTES
[facility] Neurology and Sleep [facility]  [address] [place] [address] Drive, [address] [address]  [facility], [facility], [place] [address]  Phone [phone]  Fax [phone]       [facility] Sleep Follow Up Encounter      Information:   Patient Name: Uma Cuellar  :   1965  Age:   55 y.o. MRN:   [id number]  Account #:  [de-identified]  Today:                21    Provider:  Fernandez Clark PA-C    Chief Complaint   Patient presents with    Sleep Apnea     new patient         Subjective:   Uma Cuellar is a 55 y.o. female  with a history of SANDY who comes in for a sleep clinic follow up. She was referred for a PSG due to snoring and excessive daytime somnolence. The PSG, 9/15/2020 revealed an AHI of 21.1. She is prescribed CPAP therapy with a pressure of 8cm. The compliance report indicates that she is averaging close to 8 hours of CPAP use per day. She reports that consistent PAP use has alleviated the previous SANDY symptoms. Location or symptom:  SANDY  Onset:  PS2020   Timing:  q hs  Severity:  Moderate  Associated:  Snoring, witnessed apneas, and excessive daytime somnolence  Alleviated:  CPAP      Objective:     Past Medical History:   Diagnosis Date    Chest pain 2012    Depression     Hypoglycemia     Schizoaffective disorder ([facility] [address] 2012    Suicide attempt [facility])        Past Surgical History:   Procedure Laterality Date    DILATION AND CURETTAGE OF UTERUS  2017    LEEP         Recent Hospitalizations  ·     Significant Injuries  ·     No family history on file.     Social History  Social History     Tobacco Use   Smoking Status Never Smoker   Smokeless Tobacco Never Used     Social History     Substance and Sexual Activity   Alcohol Use No     Social History     Substance and Sexual Activity   Drug Use No         Current Outpatient Medications   Medication Sig Dispense Refill    lurasidone (LATUDA) 80 MG TABS tablet Take 1 tablet by mouth daily 30 tablet 1    lithium 600 MG capsule Take 1 capsule by mouth nightly 30 capsule 1    lithium 300 MG capsule Take 1 capsule by mouth daily (with breakfast) 30 capsule 1    melatonin 3 MG TABS tablet Take 1 tablet by mouth nightly 30 tablet 1    cloNIDine (CATAPRES) 0.1 MG tablet Take 1 tablet by mouth nightly 90 tablet 1    vitamin D (ERGOCALCIFEROL) 1.25 MG (13746 UT) CAPS capsule Take 1 capsule by mouth once a week for 11 doses (Patient taking differently: Take 50,000 Units by mouth Twice a Week Take on Sunday and Wednesday) 5 capsule 1     No current facility-administered medications for this visit. Allergies:  Seroquel [quetiapine fumarate], Adderall [amphetamine-dextroamphetamine], Amphetamines, Asa [aspirin], Codeine, Cogentin [benztropine], Depakote [divalproex sodium], Effexor [venlafaxine], Estrogens, Geodon [ziprasidone hcl], Hydrocodone, Lortab [hydrocodone-acetaminophen], Macrolides and ketolides, Metoprolol, Neurontin [gabapentin], Other, Pcn [penicillins], Provera [medroxyprogesterone], Prozac [fluoxetine hcl], Tetracyclines & related, Trazodone and nefazodone, Trileptal [oxcarbazepine], Trintellix [vortioxetine], Valium [diazepam], Vancomycin, Wellbutrin [bupropion], Zoloft [sertraline hcl], and Zyprexa [olanzapine]    REVIEW OF SYSTEMS     Constitutional: []? Fever []? Sweats []? Chills []? Recent Injury   [x]? Denies all unless marked  HENT:[]? Headache  []? Head Injury  []? Sore Throat  []? Ear Pain  []? Dizziness []? Hearing Loss   [x]? Denies all unless marked  Musculoskeletal: []? Arthralgia  []? Myalgias []? Muscle cramps  []? Muscle twitches   [x]? Denies all unless marked   Spine:  []? Neck pain  []? Back pain  []? Sciaticia  [x]? Denies all unless marked  Neurological:[]? Visual Disturbance []? Double Vision []? Slurred Speech []? Trouble swallowing  []? Vertigo []? Tingling []? Numbness []? Weakness []? Loss of Balance   []? Loss of Consciousness []? Memory Loss []? Seizures  [x]? Denies all unless marked  Psychiatric/Behavioral:[]? Depression []? Anxiety  [x]?  Denies all unless marked  Sleep: []? Insomnia []? Sleep Disturbance []? Snoring []? Restless Legs []? Daytime Sleepiness [x]? Sleep Apnea  []? Denies all unless marked    The MA has completed the ROS with the patient. I have reviewed it in its' entirety with the patient and agree with the documentation. PHYSICAL EXAM  /84 (Site: Left Upper Arm, Position: Sitting, Cuff Size: Medium Adult)   Pulse 79   Temp 97.6 °F (36.4 °C)   Ht 5' 4\" (1.626 m)   Wt 240 lb (108.9 kg)   SpO2 98%   Breastfeeding No   BMI 41.20 kg/m²      Constitutional   Alert in NAD, well developed, pleasant and cooperative with exam; body habitus obese as indicated by BMI  HEENT- Conjunctiva normal.  No scars, masses, or lesions over external nose or ears, hearing intact, no neck masses noted, no jugular vein distension, no bruit  Cardiac- Regular rate and rhythm  Pulmonary- Clear to auscultation, good expansion, normal effort without use of accessory muscles  Musculoskeletal  No significant wasting of muscles noted, no bony deformities  Extremities - No clubbing, cyanosis or edema  Skin  Warm, dry, and intact. No rash, erythema, or pallor  Psychiatric  Mood, affect, and behavior appear normal      Neurologic:  Extraocular movements are intact without nystagmus. PERRL. Visual fields are full to confrontation. Facial movements are symmetrical and normal.  Speech is precise. Extremity strength is normal in both uppers and lowers. Deep tendon reflexes are intact and symmetrical.  Rapid alternating movements are unimpaired. Finger-to-nose testing is performed well, without dysmetria. Gait is normal.    I reviewed the following studies:       []  :  Clinical laboratory test results     []  :  Radiology reports                    [x]  :  Review and summarization of medical records and/or obtain medical records        [x]  :  Previous/recent polysomnogram report(s)     []  :  Belt Sleepiness Scale            [x]  :  Compliance download:   The CPAP is set at a pressure of 8cm. Compliance download shows that she uses device: 100% of the time;  percentage of days with usage >=4 hours: 100%. AHI: 0.8    Assessment:       ICD-10-CM    1. Obstructive sleep apnea  G47.33    2. CPAP (continuous positive airway pressure) dependence  Z99.89           []  :  Stable     []  :  Improved                       [x]  :  Well controlled              []  :  Resolving     []  :  Resolved     []  :  Inadequately controlled     []  :  Worsening     []  :  Additional workup planned    Patient is compliant and benefiting from therapy as indicated by compliance evaluation and patient report. Plan:     No orders of the defined types were placed in this encounter. 1.   Patient advised of the etiology,  pathophysiology, diagnosis, treatment options, and risks of untreated SANDY. Risks may include, but are not limited to  hypertension, coronary artery disease, diabetes, stroke, weight gain, impaired cognition, daytime somnolence, and motor vehicle accidents. Advised to abstain from driving or operating heavy machinery when drowsy and the use of respiratory suppressants. Discussed diagnostic studies and potential treatment plan. Will evaluate for clinical benefit and and compliance during a 30 day period within the preceding 90 days at follow up. 2.  The following educational material has been included in this visit after visit summary for your review: SANDY/PAP guidelines-Discussed with the patient and all questions fully answered. 3.  Continue CPAP  4.   Follow up in 6 months

## 2021-04-20 NOTE — PATIENT INSTRUCTIONS
Patient education: Sleep apnea in adults       INTRODUCTION  Normally during sleep, air moves through the throat and in and out of the lungs at a regular rhythm. In a person with sleep apnea, air movement is periodically diminished or stopped. There are two types of sleep apnea: obstructive sleep apnea and central sleep apnea. In obstructive sleep apnea, breathing is abnormal because of narrowing or closure of the throat. In central sleep apnea, breathing is abnormal because of a change in the breathing control and rhythm. Sleep apnea is a serious condition that can affect a person's ability to safely perform normal daily activities and can affect long term health. Approximately 25 percent of adults are at risk for sleep apnea of some degree. Men are more commonly affected than women. Other risk factors include middle and older age, being overweight or obese, and having a small mouth and throat. This topic review focuses on the most common type of sleep apnea in adults, obstructive sleep apnea (SANDY). HOW SLEEP APNEA OCCURS  The throat is surrounded by muscles that control the airway for speaking, swallowing, and breathing. During sleep, these muscles are less active, and this causes the throat to narrow. In most people, this narrowing does not affect breathing. In others, it can cause snoring, sometimes with reduced or completely blocked airflow. A completely blocked airway without airflow is called an obstructive apnea. Partial obstruction with diminished airflow is called a hypopnea. A person may have apnea and hypopnea during sleep. Insufficient breathing due to apnea or hypopnea causes oxygen levels to fall and carbon dioxide to rise. Because the airway is blocked, breathing faster or harder does not help to improve oxygen levels until the airway is reopened. Typically, the obstruction requires the person to awaken to activate the upper airway muscles.  Once the airway is opened, the person then takes several deep breaths to catch up on breathing. As the person awakens, he or she may move briefly, snort or snore, and take a deep breath. Less frequently, a person may awaken completely with a sensation of gasping, smothering, or choking. If the person falls back to sleep quickly, he or she will not remember the event. Many people with sleep apnea are unaware of their abnormal breathing in sleep, and all patients underestimate how often their sleep is interrupted. Awakening from sleep causes sleep to be unrefreshing and causes fatigue and daytime sleepiness. Anatomic causes of obstructive sleep apnea   Most patients have SANDY because of a small upper airway. As the bones of the face and skull develop, some people develop a small lower face, a small mouth, and a tongue that seems too large for the mouth. These features are genetically determined, which explains why SANDY tends to cluster in families. Obesity is another major factor. Tonsil enlargement can be an important cause, especially in children. SLEEP APNEA SYMPTOMS  The main symptoms of SANDY are loud snoring, fatigue, and daytime sleepiness. However, some people have no symptoms. For example, if the person does not have a bed partner, he or she may not be aware of the snoring. Fatigue and sleepiness have many causes and are often attributed to overwork and increasing age. As a result, a person may be slow to recognize that they have a problem. A bed partner or spouse often prompts the patient to seek medical care. Other symptoms may include one or more of the following:  ?Restless sleep  ? Awakening with choking, gasping, or smothering  ? Morning headaches, dry mouth, or sore throat  ? Waking frequently to urinate  ? Awakening unrested, groggy  ? Low energy, difficulty concentrating, memory impairment    Risk factors  Certain factors increase the risk of sleep apnea.   ?Increasing age [de-identified] SANDY occurs at all ages, but it is more common in middle and older age adults. ?Male sex  SANDY is two times more common in men, especially in middle age. ?Obesity  The more obese a person is, the more likely he or she is to have SANDY. ? Sedation from medication or alcohol  This interferes with the ability to awaken from sleep and can lengthen periods of apnea (no breathing), with potentially dangerous consequences. ? Abnormality of the airway. SLEEP APNEA CONSEQUENCES  Complications of sleep apnea can include daytime sleepiness and difficulty concentrating. The consequence of this is an increased risk of accidents and errors in daily activities. Studies have shown that people with severe SANDY are more than twice as likely to be involved in a motor vehicle accident as people without these conditions. People with SANDY are encouraged to discuss options for driving, working, and performing other high-risk tasks with a healthcare provider. In addition, people with untreated SANDY may have an increased risk of cardiovascular problems such as high blood pressure, heart attack, abnormal heart rhythms, or stroke. This risk may be due to changes in the heart rate and blood pressure that occur during sleep. SLEEP APNEA DIAGNOSIS  The diagnosis of SANDY is best made by a knowledgeable sleep medicine specialist who has an understanding of the individual's health issues. The diagnosis is usually based upon the person's medical history, physical examination, and testing, including:  ? A complaint of snoring and ineffective sleep  ? Neck size (greater than 16 inches in men or 14 inches in women) is associated with an increased risk of sleep apnea  ? A small upper airway: difficulty seeing the throat because of a tongue that is large for the mouth  ? High blood pressure, especially if it is resistant to treatment  ? If a bed partner has observed the patient during episodes of stopped breathing (apnea), choking, or gasping during sleep, there is a strong possibility of sleep apnea.     Testing is usually performed in a sleep laboratory. A full sleep study is called a polysomnogram. The polysomnogram measures the breathing effort and airflow, blood oxygen level, heart rate and rhythm, duration of the various stages of sleep, body position, and movement of the arms/legs. Home monitoring devices are available that can perform a sleep study. This is a reasonable alternative to conventional testing in a sleep laboratory if the clinician strongly suspects moderate or severe sleep apnea and the patient does not have other illnesses or sleep disorders that may interfere with the results. SLEEP APNEA TREATMENT  Sleep apnea is best treated by a knowledgeable sleep medicine specialist. The goal of treatment is to maintain an open airway during sleep. Effective treatment will eliminate the symptoms of sleep disturbance; long-term health consequences are also reduced. Most treatments require nightly use. The challenge for the clinician and the patient is to select an effective therapy that is appropriate for the patient's problem and that is acceptable for long term use. Auto-titrating CPAP delivers an amount of PAP that varies during the night. The variation is dependent on event detection software algorithms, which will increase the pressure gradually in response to flow changes until adequate patency is detected. After a period of sustained upper airway patency, the delivered level of pressure gradually decreases until the algorithm identifies recurrent upper airway obstruction, at which point the delivered pressure again increases. The result is that the delivered pressure varies throughout the night, in an effort to provide the lowest pressure that is necessary to maintain upper airway patency. Continuous positive airway pressure (CPAP)  The most effective treatment for sleep apnea uses air pressure from a mechanical device to keep the upper airway open during sleep.  A CPAP (continuous positive airway pressure)  device uses an air-tight attachment to the nose, typically a mask, connected to a tube and a blower which generates the pressure. Devices that fit comfortably into the nasal opening, rather than over the nose, are also available. CPAP should be used any time the person sleeps (day or night). The CPAP device is usually used for the first time in the sleep lab, where a technician can adjust the pressure and select the best equipment to keep the airway open. Alternatively, an auto device with a self-adjusting pressure feature, provided with proper education and training, can get treatment started without another sleep test. While the treatment may seem uncomfortable, noisy, or bulky at first, most people accept the treatment after experiencing better sleep. However, difficulty with mask comfort and nasal congestion prevent up to 50 percent of people from using the treatment on a regular basis. Continued follow up with a healthcare provider helps to ensure that the treatment is effective and comfortable. Information from the CPAP machine is often used by physicians, therapists, and insurers to track the success of treatment. CPAP can be delivered with different features to improve comfort and solve problems that may come up during treatment. Changes in treatment may be needed if symptoms do not improve or if the persons condition changes, such as a gain or loss of weight. Adjust sleep position  Adjusting sleep position (to stay off the back) may help improve sleep quality in people who have SANDY when sleeping on the back. However, this is difficult to maintain throughout the night and is rarely an adequate solution. Weight loss  Weight loss may be helpful for obese or overweight patients. Weight loss may be accomplished with dietary changes, exercise, and/or surgical treatment.  However, it can be difficult to maintain weight loss; the five-year success of non-surgical weight loss is only 5 percent, meaning that 95 percent of people regain lost weight. Avoid alcohol and other sedatives  Alcohol can worsen sleepiness, potentially increasing the risk of accidents or injury. People with SANDY are often counseled to drink little to no alcohol, even during the daytime. Similarly, people who take anti-anxiety medications or sedatives to sleep should speak with their healthcare provider about the safety of these medications. People with SANDY must notify all healthcare providers, including surgeons, about their condition and the potential risks of being sedated. People with SANDY who are given anesthesia and/or pain medications require special management and close monitoring to reduce the risk of a blocked airway. Dental devices  A dental device, called an oral appliance or mandibular advancement device, can reposition the jaw (mandible), bringing the tongue and soft palate forward as well. This may relieve obstruction in some people. This treatment is excellent for reducing snoring, although the effect on SANDY is sometimes more limited. As a result, dental devices are best used for mild cases of SANDY when relief of snoring is the main goal. Failure to tolerate and accept CPAP is another indication for dental devices. While dental devices are not as effective as CPAP for SANDY, some patients prefer a dental device to CPAP. Side effects of dental devices are generally minor but may include changes to the bite with prolonged use. Surgical treatment  Surgery is an alternative therapy for patients who cannot tolerate or do not improve with nonsurgical treatments such as CPAP or oral devices. Surgery can also be used in combination with other nonsurgical treatments. Surgical procedures reshape structures in the upper airways or surgically reposition bone or soft tissue. Uvulopalatopharyngoplasty (UPPP) removes the uvula and excessive tissue in the throat, including the tonsils, if present.  Other procedures, such as maxillomandibular advancement (MMA), address both the upper and lower pharyngeal airway more globally. UPPP alone has limited success rates (less than 50 percent) and people can relapse (when SANDY symptoms return after surgery). As a result, this surgery is only recommended in a minority of people and should be considered with caution. MMA may have a higher success rate, particularly in people with abnormal jaw (maxilla and mandible) anatomy, but it is the most complicated procedure. A newer surgical approach, nerve stimulation to protrude the tongue, has promising success rates in very selected people. Tracheostomy creates a permanent opening in the neck. It is reserved for people with severe disease in whom less drastic measures have failed or are inappropriate. Although it is always successful in eliminating obstructive sleep apnea, tracheostomy requires significant lifestyle changes and carries some serious risks (eg, infection, bleeding, blockage). All surgical treatments require discussions about the goals of treatment, the expected outcomes, and potential complications. Hypoglossal nerve stimulator- \"Inspire\" device    PAP treatment failure:  Possible causes of treatment failure include nonadherence or suboptimal adherence, weight gain, an inappropriate level of prescribed positive pressure, or an additional disorder causing sleepiness (eg, narcolepsy) that may require alterations in the therapeutic regimen. A review of medications should also be undertaken since many drugs may lead to sleepiness. Inadequate sleep time may also negate the expected effects from treatment of SANDY. Also, pt's can have persistent hypersomnolence associated with sleep apnea even in the presence of adequate therapy and at those times Provigil or Nuvigil or other stimulants may be indicated.     Once the patient's positive airway pressure therapy has been optimized and symptoms resolved, a regimen of long-term follow-up should be established. Annual visits are reasonable, with more frequent visits in between if new issues arise. The purpose of long-term follow-up is to assess usage and monitor for recurrent SANDY, new side effects, air leakage, and fluctuations in body weight. WHERE TO GET MORE INFORMATION  Your healthcare provider is the best source of information for questions and concerns related to your medical problem. Organizations  American Sleep Apnea Association  Provides information about sleep apnea to the public, publishes a newsletter, and serves as an advocate for people with the disorder. Joyce, [address]   Shashank@[email or web address]. org   http://hardwick.goBalto/. org   Tel: [phone]   Fax: [facility] organization that works to [employer] and safety by promoting public understanding of sleep and sleep disorders. Supports sleep-related education, research, and advocacy; produces and distributes educational materials to the public and healthcare professionals; and offers postdoctoral fellowships and grants for sleep researchers. [address]   Renny@[employer]. org   [employer]. org   Tel: [phone]   Fax: [phone]    Important information:  Medicare/private insurance CPAP/BiPAP/APAP requirements:  Medicare/private insurance has specific requirements for PAP compliance that must be met during the first 90 days of use to continue coverage for CPAP/BiPAP/APAP  from day 91 and beyond. The policy requires that patients use a PAP device 4 hours per 24 hour period, at least 70% of the time over a 30 day period. This data must be downloaded as a report direct from the PAP devices. This is called a compliance download. Your PAP supplier will assist you in this matter.      Reminder:  Please bring a copy of the compliance download to your next office visit or have your supplier fax it to our office prior to your office visit. Note:  Where applicable, we will utilize PAP device efficiency reports, additional testing, and face-to-face  clinical evaluation subsequent to any treatment, changes in treatment, and continued treatment. Caution:  Please abstain from driving or engaging in other activities which may be hazardous in the presence of diminished alertness or daytime drowsiness. And avoid the use of sedatives or alcohol, which can worsen sleep apnea and daytime drowsiness. Mask suggestions:  -     Resmed Airfit N20 (Nasal) or F20 (Full face mask). They conform to your face, thus decreasing the potential for mask leakage. You might like the AirTouch F20(full face mask). It has a \"memory foam\" like cushion. The AirFit F30 is a smaller style full face mask designed to sit low on and cover less of your face for fewer facial marks. AirFit N30i has a top of the head tube with a nasal mask. AirFit P10 reported to be the most comfortable nasal pillow mask. Resmed Mirage FX reported to be the most comfortable nasal mask. Resmed Mirage Yorklyn reported to be the most comfortable hybrid mask. AirTouch N20-memory foam nasal mask. Respironics: You might also like to try a nasal mask called a Dreamwear nasal mask or the Dreamwear nasal pillow. Another suggestion is the [facility], it is a minimal contact full face mask. The Yuni miranda under the nose design makes it the only full face mask that won't cause red marks on the bridge of your nose when compared to other full face masks. The Dreamwear full face mask has a  soft feel, unique in-frame air-flow, and innovative air tube connection at the top of the head for the ultimate in sleep comfort. Comfort Gel Blue. Dreamwear gel pillows. Phil [employer] Live: Brevida nasal pillow mask and Simplus FFM    The use of a memory foam CPAP pillow supports the head and neck throughout the night.

## 2021-05-24 ENCOUNTER — TELEPHONE (OUTPATIENT)
Dept: OBSTETRICS AND GYNECOLOGY | Facility: CLINIC | Age: 56
End: 2021-05-24

## 2021-05-24 NOTE — TELEPHONE ENCOUNTER
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 Wants to know if she should start taking Valtrex. Informed yes. Pt v/u.

## 2021-06-25 ENCOUNTER — HOSPITAL ENCOUNTER (INPATIENT)
Age: 56
LOS: 2 days | Discharge: HOME | DRG: 885 | End: 2021-06-27
Attending: EMERGENCY MEDICINE | Admitting: PSYCHIATRY & NEUROLOGY
Payer: MEDICAID

## 2021-06-25 DIAGNOSIS — R45.851 DEPRESSION WITH SUICIDAL IDEATION: Primary | ICD-10-CM

## 2021-06-25 DIAGNOSIS — F32.A DEPRESSION WITH SUICIDAL IDEATION: Primary | ICD-10-CM

## 2021-06-25 LAB
ACETAMINOPHEN LEVEL: <15 UG/ML
ALBUMIN SERPL-MCNC: 4.5 G/DL (ref 3.5–5.2)
ALP BLD-CCNC: 90 U/L (ref 35–104)
ALT SERPL-CCNC: 26 U/L (ref 5–33)
AMPHETAMINE SCREEN, URINE: NEGATIVE
ANION GAP SERPL CALCULATED.3IONS-SCNC: 12 MMOL/L (ref 7–19)
AST SERPL-CCNC: 20 U/L (ref 5–32)
BACTERIA: ABNORMAL /HPF
BARBITURATE SCREEN URINE: NEGATIVE
BASOPHILS ABSOLUTE: 0.1 K/UL (ref 0–0.2)
BASOPHILS RELATIVE PERCENT: 0.6 % (ref 0–1)
BENZODIAZEPINE SCREEN, URINE: NEGATIVE
BILIRUB SERPL-MCNC: 0.4 MG/DL (ref 0.2–1.2)
BILIRUBIN URINE: NEGATIVE
BLOOD, URINE: NEGATIVE
BUN BLDV-MCNC: 9 MG/DL (ref 6–20)
CALCIUM SERPL-MCNC: 10.1 MG/DL (ref 8.6–10)
CANNABINOID SCREEN URINE: NEGATIVE
CHLORIDE BLD-SCNC: 105 MMOL/L (ref 98–111)
CLARITY: CLEAR
CO2: 22 MMOL/L (ref 22–29)
COCAINE METABOLITE SCREEN URINE: NEGATIVE
COLOR: YELLOW
CREAT SERPL-MCNC: 0.8 MG/DL (ref 0.5–0.9)
CRYSTALS, UA: ABNORMAL /HPF
EOSINOPHILS ABSOLUTE: 0.1 K/UL (ref 0–0.6)
EOSINOPHILS RELATIVE PERCENT: 1.2 % (ref 0–5)
EPITHELIAL CELLS, UA: 3 /HPF (ref 0–5)
ETHANOL: <10 MG/DL (ref 0–0.08)
GFR AFRICAN AMERICAN: >59
GFR NON-AFRICAN AMERICAN: >60
GLUCOSE BLD-MCNC: 126 MG/DL (ref 74–109)
GLUCOSE URINE: NEGATIVE MG/DL
HCT VFR BLD CALC: 45.3 % (ref 37–47)
HEMOGLOBIN: 14.8 G/DL (ref 12–16)
HYALINE CASTS: 0 /HPF (ref 0–8)
IMMATURE GRANULOCYTES #: 0 K/UL
KETONES, URINE: NEGATIVE MG/DL
LEUKOCYTE ESTERASE, URINE: ABNORMAL
LITHIUM LEVEL: 0.7 MMOL/L (ref 0.6–1.2)
LYMPHOCYTES ABSOLUTE: 1.4 K/UL (ref 1.1–4.5)
LYMPHOCYTES RELATIVE PERCENT: 14.2 % (ref 20–40)
Lab: NORMAL
MCH RBC QN AUTO: 31.6 PG (ref 27–31)
MCHC RBC AUTO-ENTMCNC: 32.7 G/DL (ref 33–37)
MCV RBC AUTO: 96.8 FL (ref 81–99)
MONOCYTES ABSOLUTE: 0.6 K/UL (ref 0–0.9)
MONOCYTES RELATIVE PERCENT: 6.4 % (ref 0–10)
NEUTROPHILS ABSOLUTE: 7.6 K/UL (ref 1.5–7.5)
NEUTROPHILS RELATIVE PERCENT: 77.4 % (ref 50–65)
NITRITE, URINE: NEGATIVE
OPIATE SCREEN URINE: NEGATIVE
PDW BLD-RTO: 13.5 % (ref 11.5–14.5)
PH UA: 7 (ref 5–8)
PLATELET # BLD: 301 K/UL (ref 130–400)
PMV BLD AUTO: 10 FL (ref 9.4–12.3)
POTASSIUM SERPL-SCNC: 4.1 MMOL/L (ref 3.5–5)
PROTEIN UA: NEGATIVE MG/DL
RBC # BLD: 4.68 M/UL (ref 4.2–5.4)
RBC UA: 2 /HPF (ref 0–4)
SALICYLATE, SERUM: <3 MG/DL (ref 3–10)
SARS-COV-2, NAAT: NOT DETECTED
SODIUM BLD-SCNC: 139 MMOL/L (ref 136–145)
SPECIFIC GRAVITY UA: 1.01 (ref 1–1.03)
TOTAL PROTEIN: 7.3 G/DL (ref 6.6–8.7)
UROBILINOGEN, URINE: 0.2 E.U./DL
WBC # BLD: 9.8 K/UL (ref 4.8–10.8)
WBC UA: 3 /HPF (ref 0–5)

## 2021-06-25 PROCEDURE — 1240000000 HC EMOTIONAL WELLNESS R&B

## 2021-06-25 PROCEDURE — 82077 ASSAY SPEC XCP UR&BREATH IA: CPT

## 2021-06-25 PROCEDURE — 80053 COMPREHEN METABOLIC PANEL: CPT

## 2021-06-25 PROCEDURE — 6370000000 HC RX 637 (ALT 250 FOR IP): Performed by: PSYCHIATRY & NEUROLOGY

## 2021-06-25 PROCEDURE — 87635 SARS-COV-2 COVID-19 AMP PRB: CPT

## 2021-06-25 PROCEDURE — 81001 URINALYSIS AUTO W/SCOPE: CPT

## 2021-06-25 PROCEDURE — 99284 EMERGENCY DEPT VISIT MOD MDM: CPT

## 2021-06-25 PROCEDURE — 36415 COLL VENOUS BLD VENIPUNCTURE: CPT

## 2021-06-25 PROCEDURE — 80178 ASSAY OF LITHIUM: CPT

## 2021-06-25 PROCEDURE — 80143 DRUG ASSAY ACETAMINOPHEN: CPT

## 2021-06-25 PROCEDURE — 85025 COMPLETE CBC W/AUTO DIFF WBC: CPT

## 2021-06-25 PROCEDURE — 80179 DRUG ASSAY SALICYLATE: CPT

## 2021-06-25 PROCEDURE — 80307 DRUG TEST PRSMV CHEM ANLYZR: CPT

## 2021-06-25 RX ORDER — CLINDAMYCIN PHOSPHATE 11.9 MG/ML
SOLUTION TOPICAL 2 TIMES DAILY
Status: DISCONTINUED | OUTPATIENT
Start: 2021-06-25 | End: 2021-06-27 | Stop reason: HOSPADM

## 2021-06-25 RX ORDER — CLINDAMYCIN PHOSPHATE 11.9 MG/ML
SOLUTION TOPICAL 2 TIMES DAILY
COMMUNITY

## 2021-06-25 RX ORDER — ERGOCALCIFEROL 1.25 MG/1
50000 CAPSULE ORAL WEEKLY
Status: DISCONTINUED | OUTPATIENT
Start: 2021-06-27 | End: 2021-06-27 | Stop reason: HOSPADM

## 2021-06-25 RX ORDER — ERGOCALCIFEROL 1.25 MG/1
50000 CAPSULE ORAL WEEKLY
Status: DISCONTINUED | OUTPATIENT
Start: 2021-06-25 | End: 2021-06-25

## 2021-06-25 RX ORDER — CLONIDINE HYDROCHLORIDE 0.1 MG/1
0.1 TABLET ORAL NIGHTLY
Status: DISCONTINUED | OUTPATIENT
Start: 2021-06-25 | End: 2021-06-27 | Stop reason: HOSPADM

## 2021-06-25 RX ORDER — POLYETHYLENE GLYCOL 3350 17 G/17G
17 POWDER, FOR SOLUTION ORAL DAILY PRN
Status: DISCONTINUED | OUTPATIENT
Start: 2021-06-25 | End: 2021-06-27 | Stop reason: HOSPADM

## 2021-06-25 RX ORDER — LANOLIN ALCOHOL/MO/W.PET/CERES
3 CREAM (GRAM) TOPICAL NIGHTLY
Status: DISCONTINUED | OUTPATIENT
Start: 2021-06-25 | End: 2021-06-27 | Stop reason: HOSPADM

## 2021-06-25 RX ORDER — LITHIUM CARBONATE 300 MG/1
300 CAPSULE ORAL 2 TIMES DAILY WITH MEALS
Status: DISCONTINUED | OUTPATIENT
Start: 2021-06-25 | End: 2021-06-27 | Stop reason: HOSPADM

## 2021-06-25 RX ADMIN — Medication 3 MG: at 20:50

## 2021-06-25 RX ADMIN — CLONIDINE HYDROCHLORIDE 0.1 MG: 0.1 TABLET ORAL at 20:50

## 2021-06-25 RX ADMIN — LURASIDONE HYDROCHLORIDE 80 MG: 40 TABLET, FILM COATED ORAL at 19:43

## 2021-06-25 RX ADMIN — LITHIUM CARBONATE 300 MG: 300 CAPSULE ORAL at 19:43

## 2021-06-25 ASSESSMENT — ENCOUNTER SYMPTOMS
ABDOMINAL PAIN: 0
BACK PAIN: 0

## 2021-06-25 ASSESSMENT — LIFESTYLE VARIABLES: HISTORY_ALCOHOL_USE: NO

## 2021-06-25 ASSESSMENT — SLEEP AND FATIGUE QUESTIONNAIRES
DO YOU HAVE DIFFICULTY SLEEPING: YES
SLEEP PATTERN: DIFFICULTY FALLING ASLEEP;RESTLESSNESS
DIFFICULTY FALLING ASLEEP: YES
RESTFUL SLEEP: NO
DO YOU USE A SLEEP AID: YES
DIFFICULTY ARISING: NO
DIFFICULTY STAYING ASLEEP: YES

## 2021-06-25 ASSESSMENT — PAIN SCALES - GENERAL: PAINLEVEL_OUTOF10: 0

## 2021-06-25 NOTE — PROGRESS NOTES
[facility] Admission From ED  Nursing Admission Note              Patient Active Problem List   Diagnosis    Bipolar depression ([facility]    Borderline personality disorder ([facility]    Insomnia    Hoarding disorder    Affective psychosis, bipolar ([facility]    [facility]    Obstructive sleep apnea    CPAP (continuous positive airway pressure) dependence       Pt admitted from 's care in ED to [facility] room 06/616-01. Arrived on unit via [facility] with staff escort. Pt appropriately attired in paper scrubs. Body assessment completed by RN with no contraband discovered. All tubes, lines, and drains were appropriately discontinued by ED staff prior to pt transfer to [facility]. Pt belongings and valuables inventoried and cataloged, stored per policy. Pt oriented to surroundings, program expectations, and copy pt rights given. Received admit packet: [facility], Visitation Info, Fall Prevention, Restraints Info. Consents reviewed, signed Pt Rights, Handbook Acceptance, Visit/Call Acceptance, PHI Release, Social Info Release, and Treatment Agreement. Pt verbalizes understanding. Pt is a smoker? no Pt offered Nicotine patch yes,   Pt refused Nicotine patch? yes,  Patient provided education on Covid-19 and the importance of reporting any respiratory symptoms, headache, body aches or cough to the nursing staff as well as  frequent hand washing, social distancing and wearing a mask while on the unit? yes,  patient provided mask? yes,  patient refused mask? no Patient verbalized understanding? yes,     Identifies stressors. Pt states \"my meds are messed up and I need them to check on them\".
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
## 2021-06-26 PROCEDURE — 6370000000 HC RX 637 (ALT 250 FOR IP): Performed by: PSYCHIATRY & NEUROLOGY

## 2021-06-26 PROCEDURE — 1240000000 HC EMOTIONAL WELLNESS R&B

## 2021-06-26 PROCEDURE — 99221 1ST HOSP IP/OBS SF/LOW 40: CPT | Performed by: PSYCHIATRY & NEUROLOGY

## 2021-06-26 RX ORDER — HYDROXYZINE HYDROCHLORIDE 25 MG/1
25 TABLET, FILM COATED ORAL 4 TIMES DAILY PRN
Status: DISCONTINUED | OUTPATIENT
Start: 2021-06-26 | End: 2021-06-27 | Stop reason: HOSPADM

## 2021-06-26 RX ADMIN — LURASIDONE HYDROCHLORIDE 80 MG: 40 TABLET, FILM COATED ORAL at 17:09

## 2021-06-26 RX ADMIN — LITHIUM CARBONATE 300 MG: 300 CAPSULE ORAL at 17:09

## 2021-06-26 RX ADMIN — Medication 3 MG: at 20:49

## 2021-06-26 RX ADMIN — CLONIDINE HYDROCHLORIDE 0.1 MG: 0.1 TABLET ORAL at 20:49

## 2021-06-26 RX ADMIN — LITHIUM CARBONATE 300 MG: 300 CAPSULE ORAL at 08:33

## 2021-06-26 ASSESSMENT — ENCOUNTER SYMPTOMS
EYES NEGATIVE: 1
RESPIRATORY NEGATIVE: 1
GASTROINTESTINAL NEGATIVE: 1

## 2021-06-26 NOTE — SUICIDE SAFETY PLAN
SAFETY PLAN    A suicide Safety Plan is a document that supports someone when they are having thoughts of suicide. Warning Signs that indicate a suicidal crisis may be developing: What (situations, thoughts, feelings, body sensations, behaviors, etc.) do you experience that lets you know you are beginning to think about suicide? 1. Suicidal thoughts, thoughts about dying. 2. Laying around a lot. 3. Putting off getting showers. Internal Coping Strategies:  What things can I do (relaxation techniques, hobbies, physical activities, etc.) to take my mind off my problems without contacting another person? 1. Go for a Walk  2. Water flowers  3. Pet a cat    People and social settings that provide distraction: Who can I call or where can I go to distract me? 1. Name: Deborah Madera  Phone: [phone]  2. Name: Adwoa Sheets  Phone: [phone]   3. Place: [facility]            4. Place: [facility] whom I can ask for help: Who can I call when I need help - for example, friends, family, clergy, someone else? 1. Name: Diane Almendarez                Phone: [phone]  2. Name: Alexei Allison  Phone: [phone]  3. Name: Erin Hess"Levi Aguilar  Phone: [phone]    Professionals or [facility] agencies I can contact during a crisis: Who can I call for help - for example, my doctor, my psychiatrist, my psychologist, a mental health provider, a suicide hotline? 1. Clinician Name: [id number] LYNNETTE Fernandes   Phone: [phone]      Clinician Pager or Emergency Contact #: [phone]    2. Clinician Name: [address]   Phone: [phone]      Clinician Pager or Emergency Contact #: [phone]    3. Suicide Prevention Lifeline: 6-187-954-TALK (2072)    4. [facility] : [address] Emergency Department      Emergency Services Address: [address]  Emergency Services Phone: [phone]    Making the environment safe: How can I make my environment (house/apartment/living space) safer? For example, can I remove guns, medications, and other items? 1. Remove all guns and weapons from the home. 2. Discard any extra medications in the home.

## 2021-06-26 NOTE — GROUP NOTE
Group Therapy Note    Date: 6/26/2021    Group Start Time: 0830  Group End Time: 0900  Group Topic: Community Meeting    [facility] GERIATRIC BEHAVIORAL UNIT    Nora Rachel RN        Group Therapy Note    Attendees:          Patient's Goal:  \"getting a shower\"    Notes:  Pt anxious during group    Status After Intervention:  Unchanged    Participation Level:  Active Listener    Participation Quality: Appropriate      Speech:  pressured      Thought Process/Content: Logical      Affective Functioning: Congruent      Mood: anxious      Level of consciousness:  Alert      Response to Learning: Able to verbalize current knowledge/experience, Able to verbalize/acknowledge new learning and Able to retain information      Endings: None Reported    Modes of Intervention: Education and Support      Discipline Responsible: Registered Nurse      Signature:  Priyanka Bernal RN

## 2021-06-26 NOTE — PROGRESS NOTES
[facility] Daily Shift Assessment-Geriatric Unit  Nursing Progress Note          Room: [facility]-01   Name: Scott Lan   Age: 55 y.o. Gender: female   Dx: <principal problem not specified>  Precautions: suicide risk and fall risk  Inpatient Status: voluntary     SLEEP:    Sleep: Yes,   Sleep Quality Fair   Hours Slept:    Sleep Medications: Yes  PRN Sleep Meds: No       MEDICAL:      Other PRN Meds: No   Med Compliant: Yes   Accu-Chek: No   Oxygen/CPAP/BiPAP: Yes CPAP  CIWA/CINA: No   PAIN Assessment: none  Side Effects from medication: Yes    Is Patient experiencing any respiratory symptoms (headache, fever, body aches, cough. Ale Westfall ): no  Patient educated by nursing to practice social distancing, wear masks, wash hands frequently: yes      Metabolic Screening:    Lab Results   Component Value Date    LABA1C 5.7 02/26/2021       Lab Results   Component Value Date    CHOL 149 (L) 07/22/2020    CHOL 152 (L) 02/22/2020    CHOL 130 (L) 12/22/2018    CHOL 154 02/11/2014    CHOL 181 05/10/2012     Lab Results   Component Value Date    TRIG 143 07/22/2020    TRIG 130 02/22/2020    TRIG 83 12/22/2018    TRIG 113 02/11/2014    TRIG 197 (H) 05/10/2012     Lab Results   Component Value Date    HDL 60 (L) 07/22/2020    HDL 52 (L) 02/22/2020    HDL 52 (L) 12/22/2018    HDL 56 02/11/2014    HDL 62 05/10/2012     No components found for: LDLCAL  No results found for: LABVLDL      Body mass index is 40.06 kg/m². BP Readings from Last 2 Encounters:   06/25/21 139/76   04/20/21 127/84         PSYCH:     SI denies suicidal ideation    HI Negative for homicidal ideation        AVH:Absent      Depression: 9 Anxiety: 8       GENERAL:      Appetite: no change from normal  Social: No Speech: normal   Appearance:appropriately dressed  Assistive Devices: noneLevel of Assist: Independent      GROUP:    Group Participation: No  Participation LevelNone    Participation QualityAppropriate    Notes:   Patient has been awake and alert this evening.  She has watched television, made phone calls and sit in the dining room. She has taking all of her medications tonight. She has denied SI at this time. No HI or AVH. Patient sleeps with her home CPAP machine. Patient remains in line of sight with the staff on the unit while using the machine.          Electronically signed by Jay Olmstead RN on 6/25/21 at 10:53 PM CDT

## 2021-06-26 NOTE — PLAN OF CARE
Problem: Falls - Risk of:  Goal: Will remain free from falls  Description: Will remain free from falls  Outcome: Ongoing  Goal: Absence of physical injury  Description: Absence of physical injury  Outcome: Ongoing     Problem: Pain:  Description: Pain management should include both nonpharmacologic and pharmacologic interventions.   Goal: Pain level will decrease  Description: Pain level will decrease  Outcome: Ongoing  Goal: Control of acute pain  Description: Control of acute pain  Outcome: Ongoing  Goal: Control of chronic pain  Description: Control of chronic pain  Outcome: Ongoing     Problem: Depressive Behavior With or Without Suicide Precautions:  Goal: Able to verbalize acceptance of life and situations over which he or she has no control  Description: Able to verbalize acceptance of life and situations over which he or she has no control  Outcome: Ongoing  Goal: Able to verbalize and/or display a decrease in depressive symptoms  Description: Able to verbalize and/or display a decrease in depressive symptoms  Outcome: Ongoing  Goal: Ability to disclose and discuss suicidal ideas will improve  Description: Ability to disclose and discuss suicidal ideas will improve  Outcome: Ongoing  Goal: Absence of self-harm  Description: Absence of self-harm  Outcome: Ongoing

## 2021-06-26 NOTE — H&P
Department of Psychiatry  Attending History and Physical        CHIEF COMPLAINT:  \"I needed to come for help\"    History obtained from: patient, chart    HISTORY OF PRESENT ILLNESS:    79-year-old white female with history of bipolar disorder, hoarding, borderline personality disorder, SANDY, well-known to our service, admitted for suicidal ideation. UDS negative, BAL less than 10. Patient is observed sitting in the dining area this morning. She is calm and cooperative. Smiled. States she is still having intermittent suicidal thoughts. \"No plan at this time. \"  She has been overwhelmed lately. She started the weight watchers program and it has been hard for her to keep track of her food intake. She has signed up for community support program at [facility]. A counselor came to her home on Tuesday to help her start decluttering the house. \"I felt overwhelmed and thought about overdosing. But at the same time I don't want to quit the program.\"  Reassurance and supportive psychotherapy provided. Encouraged patient to talk to the counselor about how she feels. Suggested gradual changes. Patient reports some depression and anxiety. Slept poorly last night. States that she used her CPAP. No physical complaints at this time. States her lithium was recently decreased due to concern for abnormal kidney function. Her creatinine is within normal limits on this admission. Patient states today is her son's 20th birthday. \"I feel bad that I'm missing it. \"    PSYCHIATRIC HISTORY:    Diagnoses: bipolar disorder, hoarding, BPD  Suicide attempts/gestures: 4 times by overdose on different medications and using house cleaning liquid  Prior hospitalizations: Multiple to [facility] and [facility]  Medication trials: Depakote, lithium, Lamictal, risperidone, Abilify, trazodone, doxepin  Mental health contact: [facility], previously [address] [facility]     SUBSTANCE USE HISTORY:  Denies alcohol and illicit drug use.  Denies tobacco use. Past Medical History:    Past Medical History:   Diagnosis Date    Chest pain 2/8/2012    Depression     Hypoglycemia     Schizoaffective disorder ([facility] [address] 2/8/2012    Suicide attempt [facility])        Past Surgical History:    Past Surgical History:   Procedure Laterality Date    DILATION AND CURETTAGE OF UTERUS  2017    LEEP         Medications Prior to Admission:   Prior to Admission medications    Medication Sig Start Date End Date Taking? Authorizing Provider   clindamycin (CLEOCIN T) 1 % external solution Apply topically 2 times daily Apply topically 2 times daily.    Yes Historical Provider, MD   lurasidone (LATUDA) 80 MG TABS tablet Take 1 tablet by mouth daily  Patient taking differently: Take 80 mg by mouth nightly  2/28/21  Yes Levi Moreno MD   melatonin 3 MG TABS tablet Take 1 tablet by mouth nightly 8/19/20  Yes Levi Moreno MD   cloNIDine (CATAPRES) 0.1 MG tablet Take 1 tablet by mouth nightly 7/17/20  Yes TING Urbina NP   lithium 300 MG capsule Take 1 capsule by mouth daily (with breakfast)  Patient taking differently: Take 300 mg by mouth 2 times daily (with meals)  8/20/20   Levi Moreno MD   vitamin D (ERGOCALCIFEROL) 1.25 MG (20180 UT) CAPS capsule Take 1 capsule by mouth once a week for 11 doses  Patient taking differently: Take 50,000 Units by mouth Twice a Week Take on Sunday and Wednesday 3/2/20 8/13/20  Ilana Villegas MD       Allergies:  Seroquel [quetiapine fumarate], Adderall [amphetamine-dextroamphetamine], Amphetamines, Asa [aspirin], Codeine, Cogentin [benztropine], Depakote [divalproex sodium], Effexor [venlafaxine], Estrogens, Geodon [ziprasidone hcl], Hydrocodone, Lortab [hydrocodone-acetaminophen], Macrolides and ketolides, Metoprolol, Neurontin [gabapentin], Other, Pcn [penicillins], Provera [medroxyprogesterone], Prozac [fluoxetine hcl], Tetracyclines & related, Trazodone and nefazodone, Trileptal [oxcarbazepine], Trintellix [vortioxetine], Valium [diazepam], Vancomycin, Wellbutrin [bupropion], Zoloft [sertraline hcl], and Zyprexa [olanzapine]    Social History:  Patient lives with her 21years old son. Piter Freeman also has 35years old twins, boy and girl.  Patient's parents live across the street. Family History:   History reviewed. No pertinent family history. No history of psychiatric illness or suicide attempts. REVIEW OF SYSTEMS:  General: No fevers, chills, night sweats, no recent weight loss or gain. Head: No headache, no migraine. Eyes: No recent visual changes. Ears: No recent hearing changes. Nose: No increased congestion or change in sense of smell. Throat: No sore throat, no trouble swallowing. Cardiovascular: No chest pain or palpitations, or dizziness. Respiratory: No cough, wheezes, congestion, or shortness of breath. Gastrointestinal: No abdominal pain, nausea or vomiting, no diarrhea or constipation. Musculo-skeletal: No edema, deformities, or loss of functions. Neurological: No loss of consciousness, abnormal sensations, or weakness. Skin: No rash, abrasions or bruises. PHYSICAL EXAM:  GENERAL APPEARANCE: 55y.o. year-old female in NAD   HEAD: Normocephalic, atraumatic. THROAT: No erythema, exudates, lesions. No tongue laceration. CARDIOVASCULAR: PMI nondisplaced. Regular rhythm and rate. Normal S1 and S2.  PULMONARY: Clear to auscultation bilaterally, no tenderness to palpation. ABDOMEN: Soft, nontender, nondistended. MUSCULOSKELTAL: No obvious deformities, clubbing, cyanosis or edema, no spinous process or paraspinous tenderness, normal ROM, distal pulses intact symmetric 2+ bilaterally. NEUROLOGICAL: Alert, oriented x 3, CN II-XII grossly intact, motor strength 5/5 all muscle groups, DTR 2+ intact and symmetric, sensation intact to sharp and dull. No abnormal movements or tremors.    SKIN: Warm, dry, intact, no rash, abrasions bruises     Vitals:  BP (!) 99/55   Pulse 81   Temp 97.6 °F (36.4 °C) (Temporal)   Resp 16   Ht 5' 4\" (1.626 m)   Wt 233 lb 6.4 oz (105.9 kg)   SpO2 97%   BMI 40.06 kg/m²     Mental Status Examination:    Appearance: Stated age. Long hair. Gait stable. No abnormal movements or tremor. Behavior: Calm, cooperative, smiled  Speech: Normal in tone, volume, and quality. No slurring, dysarthria or pressured speech noted. Mood: \"I need help \"   Affect: Labile  Thought Process: Appears linear. Thought Content: Intermittent passive SI. No HI. No overt delusions or paranoia appreciated. Perceptions: Denies auditory or visual hallucinations at present time. Not responding to internal stimuli. Concentration: Intact. Orientation: to person, place, date, and situation. Language: Intact. Fund of information: Intact. Memory: Recent and remote appear intact. Neurovegitative: Fair appetite and poor sleep. Insight: Poor. Judgment: Poor. DATA:  Lab Results   Component Value Date    WBC 9.8 06/25/2021    HGB 14.8 06/25/2021    HCT 45.3 06/25/2021    MCV 96.8 06/25/2021     06/25/2021     Lab Results   Component Value Date     06/25/2021    K 4.1 06/25/2021     06/25/2021    CO2 22 06/25/2021    BUN 9 06/25/2021    CREATININE 0.8 06/25/2021    GLUCOSE 126 (H) 06/25/2021    CALCIUM 10.1 (H) 06/25/2021    PROT 7.3 06/25/2021    LABALBU 4.5 06/25/2021    BILITOT 0.4 06/25/2021    ALKPHOS 90 06/25/2021    AST 20 06/25/2021    ALT 26 06/25/2021    LABGLOM >60 06/25/2021    GFRAA >59 06/25/2021    GLOB 3.3 06/18/2016         ASSESSMENT AND PLAN:  DSM-5 DIAGNOSIS:   Impression:  Bipolar depression  Suicidal ideation  Hoarding  Borderline personality disorder     Pertinent medical issues  SANDY  HSV, type 1 & 2    Patient endorses suicidal ideation and is meeting the criteria for inpatient psychiatric treatment. Plan:   -Admit to [facility] Unit and monitor on 15 minute checks. Suicide precautions.  Agustín Gambino reviewed.   -Gather collateral information from family with release.  -Medical monitoring to be performed by Dr. Miramontes Estela and associates. Order routine labs. Monitor renal fx. May use CPAP with eyesight observation.   -Acclimate to the unit. Provide supportive psychotherapy.  -Encourage participation in groups and therapeutic activities as appropriate. Work on coping skills. -Medications:    Restart home regimen including lithium and Latuda for mood stabilization. Melatonin and as needed trazodone for sleep.   As needed Atarax for anxiety.    -The risks, benefits, side effects, indications, contraindications, and adverse effects of the medications have been discussed.  -The patient has verbalized understanding and has capacity to give informed consent.  -SW help evaluate home environment and provide outpatient resources.  -Discuss with treatment team.

## 2021-06-26 NOTE — GROUP NOTE
Group Therapy Note    Date: 6/26/2021    Group Start Time: 5909  Group End Time: 1600  Group Topic: Art Therapy     [facility] GERIATRIC BEHAVIORAL UNIT    Kwame Schofield RN        Group Therapy Note    Attendees:          Patient's Goal:  \"relax\"    Notes:  Pt anxious during group    Status After Intervention:  Improved    Participation Level:  Active Listener    Participation Quality: Appropriate      Speech:  normal      Thought Process/Content: Logical      Affective Functioning: Congruent      Mood: anxious      Level of consciousness:  Alert      Response to Learning: Able to verbalize current knowledge/experience, Able to verbalize/acknowledge new learning, Able to retain information and Capable of insight      Endings: None Reported    Modes of Intervention: Activity      Discipline Responsible: Registered Nurse      Signature:  Arjun Katz RN

## 2021-06-26 NOTE — PLAN OF CARE
Problem: Falls - Risk of:  Goal: Will remain free from falls  Description: Will remain free from falls  6/26/2021 1024 by David Thomas RN  Outcome: Ongoing  6/25/2021 2240 by Tere Kaur RN  Outcome: Ongoing  Goal: Absence of physical injury  Description: Absence of physical injury  6/26/2021 1024 by David Thomas RN  Outcome: Ongoing  6/25/2021 2240 by Tere Kaur RN  Outcome: Ongoing     Problem: Pain:  Goal: Pain level will decrease  Description: Pain level will decrease  6/26/2021 1024 by David Thomas RN  Outcome: Ongoing  6/25/2021 2240 by Tere Kaur RN  Outcome: Ongoing  Goal: Control of acute pain  Description: Control of acute pain  6/26/2021 1024 by David Thomas RN  Outcome: Ongoing  6/25/2021 2240 by Tere Kaur RN  Outcome: Ongoing  Goal: Control of chronic pain  Description: Control of chronic pain  6/26/2021 1024 by David Thomas RN  Outcome: Ongoing  6/25/2021 2240 by Tere Kaur RN  Outcome: Ongoing     Problem: Depressive Behavior With or Without Suicide Precautions:  Goal: Able to verbalize acceptance of life and situations over which he or she has no control  Description: Able to verbalize acceptance of life and situations over which he or she has no control  6/26/2021 1024 by David Thomas RN  Outcome: Ongoing  6/25/2021 2240 by Tere Kaur RN  Outcome: Ongoing  Goal: Able to verbalize and/or display a decrease in depressive symptoms  Description: Able to verbalize and/or display a decrease in depressive symptoms  6/26/2021 1024 by David Thomas RN  Outcome: Ongoing  6/25/2021 2240 by Tere Kaur RN  Outcome: Ongoing  Goal: Ability to disclose and discuss suicidal ideas will improve  Description: Ability to disclose and discuss suicidal ideas will improve  6/26/2021 1024 by David Thomas RN  Outcome: Ongoing  6/25/2021 2240 by Tere Kaur RN  Outcome: Ongoing  Goal: Absence of self-harm  Description: Absence of self-harm  6/26/2021 1024 by Beryl Lares RN  Outcome: Ongoing  6/25/2021 2240 by Malu Su RN  Outcome: Ongoing

## 2021-06-26 NOTE — PROGRESS NOTES
Group Note  Group Time: 20:00 to 20:30    Number of Participants in Group: 4  Number of Patients on Unit:4      Patient attended group:Yes  Reason for Absence:  Intervention for patient absence:        Type of Group:   Wrap-Up/Relaxation    Patient's Goal: See wrap up group sheet    Participation Level: Active listener         Patient Response to Learning: appropriate    Patient's Behavior: Cooperative    Is Patient Social/Interacting: No    Relaxation:   Television:Yes   Reading:No   Game/Puzzle:No   Phone:  Yes             Please see patient's wrap up group sheet for patient's comments

## 2021-06-26 NOTE — PROGRESS NOTES
Behavioral Services  Medicare Certification Upon Admission    I certify that this patient's inpatient psychiatric hospital admission is medically necessary for:    [x] (1) Treatment which could reasonably be expected to improve this patient's condition,       [] (2) Or for diagnostic study;     AND     [x](2) The inpatient psychiatric services are provided while the individual is under the care of a physician and are included in the individualized plan of care.     Estimated length of stay/service 5 days to 7 weeks  Plan for post-hospital care TBA    Electronically signed by Aracelis Pepe MD on 6/26/2021 at 2:14 PM
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
## 2021-06-26 NOTE — GROUP NOTE
Group Therapy Note    Date: 6/26/2021    Group Start Time: 1030  Group End Time: 1100  Group Topic: Healthy Living/Wellness    [facility] GERIATRIC BEHAVIORAL UNIT    Almon Cockayne, RN        Group Therapy Note    Attendees:          Patient's Goal:  \"work on self\"    Notes:  Pt anxious during group    Status After Intervention:  Unchanged    Participation Level:  Active Listener    Participation Quality: Appropriate      Speech:  normal      Thought Process/Content: Logical      Affective Functioning: Congruent      Mood: anxious      Level of consciousness:  Alert      Response to Learning: Able to verbalize current knowledge/experience and Able to verbalize/acknowledge new learning      Endings: None Reported    Modes of Intervention: Education and Support      Discipline Responsible: Registered Nurse      Signature:  Morgan Dickinson RN

## 2021-06-27 VITALS
OXYGEN SATURATION: 97 % | HEIGHT: 64 IN | TEMPERATURE: 97.8 F | RESPIRATION RATE: 16 BRPM | SYSTOLIC BLOOD PRESSURE: 128 MMHG | HEART RATE: 77 BPM | WEIGHT: 233.4 LBS | BODY MASS INDEX: 39.85 KG/M2 | DIASTOLIC BLOOD PRESSURE: 76 MMHG

## 2021-06-27 PROBLEM — F31.9 AFFECTIVE PSYCHOSIS, BIPOLAR (HCC): Status: RESOLVED | Noted: 2020-08-14 | Resolved: 2021-06-27

## 2021-06-27 PROBLEM — F32.A DEPRESSION WITH SUICIDAL IDEATION: Status: RESOLVED | Noted: 2021-06-25 | Resolved: 2021-06-27

## 2021-06-27 PROBLEM — F31.9 BIPOLAR DISORDER WITH DEPRESSION (HCC): Status: RESOLVED | Noted: 2021-02-25 | Resolved: 2021-06-27

## 2021-06-27 PROBLEM — R45.851 DEPRESSION WITH SUICIDAL IDEATION: Status: RESOLVED | Noted: 2021-06-25 | Resolved: 2021-06-27

## 2021-06-27 LAB
CHOLESTEROL, TOTAL: 143 MG/DL (ref 160–199)
HBA1C MFR BLD: 5.6 % (ref 4–6)
HDLC SERPL-MCNC: 48 MG/DL (ref 65–121)
LDL CHOLESTEROL CALCULATED: 62 MG/DL
TRIGL SERPL-MCNC: 166 MG/DL (ref 0–149)
TSH REFLEX FT4: 1.32 UIU/ML (ref 0.35–5.5)
VITAMIN B-12: 483 PG/ML (ref 211–946)
VITAMIN D 25-HYDROXY: 55.5 NG/ML

## 2021-06-27 PROCEDURE — 6370000000 HC RX 637 (ALT 250 FOR IP): Performed by: PSYCHIATRY & NEUROLOGY

## 2021-06-27 PROCEDURE — 82607 VITAMIN B-12: CPT

## 2021-06-27 PROCEDURE — 5130000000 HC BRIDGE APPOINTMENT

## 2021-06-27 PROCEDURE — 84443 ASSAY THYROID STIM HORMONE: CPT

## 2021-06-27 PROCEDURE — 82306 VITAMIN D 25 HYDROXY: CPT

## 2021-06-27 PROCEDURE — 80061 LIPID PANEL: CPT

## 2021-06-27 PROCEDURE — 99239 HOSP IP/OBS DSCHRG MGMT >30: CPT | Performed by: PSYCHIATRY & NEUROLOGY

## 2021-06-27 PROCEDURE — 83036 HEMOGLOBIN GLYCOSYLATED A1C: CPT

## 2021-06-27 PROCEDURE — 36415 COLL VENOUS BLD VENIPUNCTURE: CPT

## 2021-06-27 RX ORDER — HYDROXYZINE HYDROCHLORIDE 25 MG/1
25 TABLET, FILM COATED ORAL 3 TIMES DAILY PRN
Qty: 30 TABLET | Refills: 1 | Status: SHIPPED | OUTPATIENT
Start: 2021-06-27 | End: 2021-07-27

## 2021-06-27 RX ORDER — LITHIUM CARBONATE 300 MG/1
300 CAPSULE ORAL 2 TIMES DAILY WITH MEALS
Qty: 60 CAPSULE | Refills: 0
Start: 2021-06-27 | End: 2021-07-27

## 2021-06-27 RX ADMIN — LITHIUM CARBONATE 300 MG: 300 CAPSULE ORAL at 08:19

## 2021-06-27 RX ADMIN — HYDROXYZINE HYDROCHLORIDE 25 MG: 25 TABLET, FILM COATED ORAL at 08:19

## 2021-06-27 RX ADMIN — ERGOCALCIFEROL 50000 UNITS: 1.25 CAPSULE, LIQUID FILLED ORAL at 08:19

## 2021-06-27 RX ADMIN — HYDROXYZINE HYDROCHLORIDE 25 MG: 25 TABLET, FILM COATED ORAL at 12:30

## 2021-06-27 NOTE — PROGRESS NOTES
I Daily Shift Assessment-Geriatric Unit  Nursing Progress Note          Room: [facility]-01   Name: Fredy Castillo   Age: 55 y.o. Gender: female   Dx: <principal problem not specified>  Precautions: suicide risk and fall risk  Inpatient Status: voluntary     SLEEP:    Sleep: Yes,   Sleep Quality Fair   Hours Slept: 8   Sleep Medications: Yes melatonin 3mg  PRN Sleep Meds: No       MEDICAL:      Other PRN Meds: No   Med Compliant: Yes   Accu-Chek: No   Oxygen/CPAP/BiPAP: Yes c-pap  CIWA/CINA: No   PAIN Assessment: none  Side Effects from medication: No    Is Patient experiencing any respiratory symptoms (headache, fever, body aches, cough. Lorean Snare ): no  Patient educated by nursing to practice social distancing, wear masks, wash hands frequently: yes      Metabolic Screening:    Lab Results   Component Value Date    LABA1C 5.7 02/26/2021       Lab Results   Component Value Date    CHOL 149 (L) 07/22/2020    CHOL 152 (L) 02/22/2020    CHOL 130 (L) 12/22/2018    CHOL 154 02/11/2014    CHOL 181 05/10/2012     Lab Results   Component Value Date    TRIG 143 07/22/2020    TRIG 130 02/22/2020    TRIG 83 12/22/2018    TRIG 113 02/11/2014    TRIG 197 (H) 05/10/2012     Lab Results   Component Value Date    HDL 60 (L) 07/22/2020    HDL 52 (L) 02/22/2020    HDL 52 (L) 12/22/2018    HDL 56 02/11/2014    HDL 62 05/10/2012     No components found for: LDLCAL  No results found for: LABVLDL      Body mass index is 40.06 kg/m².     BP Readings from Last 2 Encounters:   06/26/21 (!) 105/46   04/20/21 127/84         PSYCH:     SI denies suicidal ideation    HI Negative for homicidal ideation        AVH:Absent      Depression: 7 Anxiety: 6       GENERAL:      Appetite: good  Social: No Speech: hesitant   Appearance:appropriately dressed, appropriately groomed, good hygiene and healthy looking  Assistive Devices: noneLevel of Assist: Independent      GROUP:    Group Participation: Yes  Participation LevelActive Listener    Participation QualityAppropriate    Notes: Pt in the dining area during this interview. She has been isolative during this shift stating she did some word puzzles in her room. Pt said she wanted to watch TV but could not. Will continue to monitor for safety.

## 2021-06-27 NOTE — PROGRESS NOTES
Requirement Note     SW met with pt to complete Psychosocial and CSSR-S on this date. Patients long and short term goals discussed. Patient voiced understanding. Treatment plan sheet signed. Patient verbalized understanding of the treatment plan. Patient participated in goals and objectives of the treatment plan. Patient completed safety plan with , patient received copy of plan, and original was placed into patient's chart. SW explained treatment goals with pt. Decreasing depression and anxiety by improvement of positive coping patterns was discussed. Pt acknowledged understanding of treatment goals and signed treatment plan signature sheet. In the last 6 months has the pt been a danger to self: YES  In the last 6 months has the pt been a danger to others: NO  Legal Guardian/POA: NO     Provided patient with [employer] handout entitled \"Quitting Smoking. \"  Reviewed handout with patient: addressing dangers of smoking, developing coping skills, and providing basic information about quitting. Patient received all components practical counseling of tobacco practical counseling during the hospital stay.

## 2021-06-27 NOTE — PLAN OF CARE
Problem: Falls - Risk of:  Goal: Will remain free from falls  Description: Will remain free from falls  6/27/2021 1124 by Candis Keen RN  Outcome: Ongoing  6/27/2021 0245 by Ke Larson RN  Outcome: Met This Shift  Goal: Absence of physical injury  Description: Absence of physical injury  6/27/2021 1124 by Candis Keen RN  Outcome: Ongoing  6/27/2021 0245 by Ke Larson RN  Outcome: Met This Shift     Problem: Pain:  Goal: Pain level will decrease  Description: Pain level will decrease  6/27/2021 1124 by Candis Keen RN  Outcome: Ongoing  6/27/2021 0245 by Ke Larson RN  Outcome: Met This Shift  Goal: Control of acute pain  Description: Control of acute pain  6/27/2021 1124 by Candis Keen RN  Outcome: Ongoing  6/27/2021 0245 by eK Larson RN  Outcome: Met This Shift  Goal: Control of chronic pain  Description: Control of chronic pain  6/27/2021 1124 by Candis Keen RN  Outcome: Ongoing  6/27/2021 0245 by Ke Larson RN  Outcome: Met This Shift     Problem: Depressive Behavior With or Without Suicide Precautions:  Goal: Able to verbalize acceptance of life and situations over which he or she has no control  Description: Able to verbalize acceptance of life and situations over which he or she has no control  6/27/2021 1124 by Candis Keen RN  Outcome: Ongoing  6/27/2021 0245 by Ke Larson RN  Outcome: Ongoing  Goal: Able to verbalize and/or display a decrease in depressive symptoms  Description: Able to verbalize and/or display a decrease in depressive symptoms  6/27/2021 1124 by Candis Keen RN  Outcome: Ongoing  6/27/2021 0245 by Ke Larson RN  Outcome: Ongoing  Goal: Ability to disclose and discuss suicidal ideas will improve  Description: Ability to disclose and discuss suicidal ideas will improve  6/27/2021 1124 by Candis Keen RN  Outcome: Ongoing  6/27/2021 0245 by Ke Larson RN  Outcome: Ongoing  Goal: Absence of self-harm  Description: Absence of self-harm  6/27/2021 1124 by Coleman Mari RN  Outcome: Ongoing  6/27/2021 0245 by Kalie Figueroa RN  Outcome: Met This Shift

## 2021-06-27 NOTE — PROGRESS NOTES
[facility]  Discharge Note  Bridge Appointment completed: Reviewed Discharge Instructions with patient. Patient verbalizes understanding and agreement with the discharge plan using the teachback method. Referral for Outpatient Tobacco Cessation Counseling, upon discharge (umu X if applicable and completed):    ( )  Hospital staff assisted patient to call Quit Line or faxed referral                                   during hospitalization                  ( )  Recognizing danger situations (included triggers and roadblocks), if not completed on admission                    ( )  Coping skills (new ways to manage stress, exercise, relaxation techniques, changing routine, distraction), if not completed on admission                                                           ( )  Basic information about quitting (benefits of quitting, techniques in how to quit, available resources, if not completed on admission  ( ) Referral for counseling faxed to [facility]   (x ) Patient refused referral  (x ) Patient refused counseling  (x ) Patient refused smoking cessation medication upon discharge    Vaccinations (umu X if applicable and completed):  ( ) Patient states already received influenza vaccine elsewhere  ( ) Patient received influenza vaccine during this hospitalization  (x ) Patient refused influenza vaccine at this time      Pt discharged with followings belongings:   Dentures: None  Vision - Corrective Lenses: Glasses  Hearing Aid: None  Jewelry: Ring  Body Piercings Removed: N/A  Clothing: Pajamas, Footwear, Shirt, Pants, Undergarments (Comment), Socks  Were All Patient Medications Collected?: Not Applicable  Other Valuables: Other (Comment) (CPAP MACHINE)   Valuables sent home with pt. Valuables retrieved from safe and returned to patient. Patient left department with   via  , discharged to  .  Patient education on aftercare instructions: yes  Patient verbalize understanding of AVS:  yes. Suicidal Ideations? No AVH? denies HI?  Negative for homicidal ideation       Status EXAM upon discharge:  Status and Exam  Normal: No  Facial Expression: Flat, Worried, Sad  Affect: Congruent  Level of Consciousness: Alert  Mood:Normal: No  Mood: Depressed, Anxious, Helpless  Motor Activity:Normal: No  Motor Activity: Decreased  Interview Behavior: Cooperative  Preception: Nevis to Person, Verlon Craw to Time, Nevis to Place, Nevis to Situation  Attention:Normal: No  Attention: Unable to Concentrate, Distractible  Thought Processes: Circumstantial  Thought Content:Normal: No  Thought Content: Preoccupations  Hallucinations: None  Delusions: No  Memory:Normal: Yes  Memory: Poor Recent, Poor Remote  Insight and Judgment: No  Insight and Judgment: Poor Judgment, Poor Insight  Present Suicidal Ideation: No  Present Homicidal Ideation: No

## 2021-06-27 NOTE — PROGRESS NOTES
WRAP UP GROUP NOTE:     Patient's Goal:  Providing feedback as to their own progress in the care-plan provided. Pt's have an opportunity to explore self-reflective skills and share any additional cares and concerns not yet addressed. Pt effectively participated.      Energy level:NORMAL  Appetite:NORMAL  Concentration: IMPROVING  Hallucinations:NONE  Depression:IMPROVED  Anxiety:ON AND OFF  How I worked today:WORKED HARD, WORKED [address] [place] [address]  What helps me sleep:RELAXATION, READ  Any questions/complaints/comments:NO    ON ON ONE TALK WITH ILENE Cano Frames     TODAY IS MY YOUNGEST SON'S BIRTHDAY

## 2021-06-27 NOTE — PROGRESS NOTES
Admission Note      Reason for admission/Target Symptom: Patient admitted to [facility] due to   Diagnoses: depression, suicidal ideation  UDS: neg  BAL:  neg    SW met with treatment team to discuss patient's treatment including care planning, discharge planning, and follow-up needs. Pt has been admitted to [facility]. Treatment team has identified patient's discharge needs as medication management and outpatient therapy/counseling. Pt confirmed  the need for ongoing treatment post inpatient stay. Pt was also provided a handout of contact information for drug and alcohol treatment centers and other community support service such as [facility], AA, and [facility].

## 2021-06-27 NOTE — DISCHARGE SUMMARY
Discharge Summary     Patient ID:  Rene Lopez  [id number]  50 y.o.  1965    Admit date: 6/25/2021  Discharge date: 6/27/2021    Admitting Physician: Sung Jade MD   Attending Physician: Sung Jade MD  Discharge Provider: Sung Jade MD     Admission Diagnoses:   Bipolar depression  Suicidal ideation  Hoarding  Borderline personality disorder  SANDY  HSV, type 1 & 2    Discharge Diagnoses:   Bipolar depression  Hoarding  Borderline personality disorder  SANDY  HSV, type 1 & 2    Admission Condition: poor    Discharged Condition: stable    Indication for Admission: Suicidal ideation    CHIEF COMPLAINT:  \"I needed to come for help\"     History obtained from: patient, chart     HISTORY OF PRESENT ILLNESS:    52-year-old white female with history of bipolar disorder, hoarding, borderline personality disorder, SANDY, well-known to our service, admitted for suicidal ideation. UDS negative, BAL less than 10.     Patient is observed sitting in the dining area this morning. She is calm and cooperative. Smiled. States she is still having intermittent suicidal thoughts. \"No plan at this time. \"  She has been overwhelmed lately. She started the weight watchers program and it has been hard for her to keep track of her food intake. She has signed up for [facility] support program at [facility]. A counselor came to her home on Tuesday to help her start decluttering the house. \"I felt overwhelmed and thought about overdosing. But at the same time I don't want to quit the program.\"  Reassurance and supportive psychotherapy provided. Encouraged patient to talk to the counselor about how she feels. Suggested gradual changes. Patient reports some depression and anxiety. Slept poorly last night. States that she used her CPAP. No physical complaints at this time. States her lithium was recently decreased due to concern for abnormal kidney function.   Her creatinine is within normal limits on this admission. Patient states today is her son's 20th birthday. \"I feel bad that I'm missing it. \"     PSYCHIATRIC HISTORY:    Diagnoses: bipolar disorder, hoarding, BPD  Suicide attempts/gestures: 4 times by overdose on different medications and using house cleaning liquid  Prior hospitalizations: Multiple to [facility] and [facility]  Medication trials: Depakote, lithium, Lamictal, risperidone, Abilify, trazodone, doxepin  Mental health contact: [facility], previously  clinic     SUBSTANCE USE HISTORY:  Denies alcohol and illicit drug use.  Denies tobacco use. Hospital Course:  Patient was admitted to the behavioral health floor and was acclimated to the unit. She was placed on suicide precautions. Labs were reviewed and physical exam was completed by Dr. Amanda De La Torre and associates. Home medications were reconciled. MAY was obtained and reviewed. Patient was continued on her home regimen, including lithium and Latuda for mood stabilization. She was given Atarax for anxiety. She tolerated all her medications well, without side effects, and was compliant. She used CPAP for SANDY. With treatment, suicidality resolved, mood improved and affect brightened. Patient attended and participated in groups. All interactions with the peers and staff members were appropriate. Behaviorally, she was not a problem. Sleep and appetite improved. With the above-mentioned medications changes as well as psychotherapeutic interventions, patient reported considerable improvement in her condition and requested discharge home. Patient was future oriented and was looking forward to going home to celebrate her son's 21 his birthday. It was felt that patient was at her baseline. Patient has no access to guns at home. On 6/27/2021 it was therefore decided to discharge the patient, as it was felt that she received maximal benefit from her hospitalization.   This patient is not suicidal, homicidal or psychotic at discharge. She does not present danger to self or others.       Number of antipsychotic medication prescribed at discharge: 1  IF MORE THAN ONE IS USED: NA    History of greater than 3 failed multiple monotherapy trials: NA  Monotherapy taper plan/ cross taper in progress: NA  Augmentation of Clozapine: NA    Referral to addiction treatment: patient refused    Prescription for Alcohol or Drug Disorder Medication: patient refused    Prescription for Tobacco Cessation medication: none    If no prescriptions for Tobacco Cessation must document why: nonsmoker    Consults: Internal medicine    Significant Diagnostic Studies:   Lab Results   Component Value Date    WBC 9.8 06/25/2021    HGB 14.8 06/25/2021    HCT 45.3 06/25/2021    MCV 96.8 06/25/2021     06/25/2021     Lab Results   Component Value Date     06/25/2021    K 4.1 06/25/2021     06/25/2021    CO2 22 06/25/2021    BUN 9 06/25/2021    CREATININE 0.8 06/25/2021    GLUCOSE 126 (H) 06/25/2021    CALCIUM 10.1 (H) 06/25/2021    PROT 7.3 06/25/2021    LABALBU 4.5 06/25/2021    BILITOT 0.4 06/25/2021    ALKPHOS 90 06/25/2021    AST 20 06/25/2021    ALT 26 06/25/2021    LABGLOM >60 06/25/2021    GFRAA >59 06/25/2021    GLOB 3.3 06/18/2016         Lab Results   Component Value Date    XSNYUSAH64 483 06/27/2021     Lab Results   Component Value Date    VITD25 55.5 06/27/2021     Lab Results   Component Value Date    CHOL 143 (L) 06/27/2021    CHOL 149 (L) 07/22/2020    CHOL 152 (L) 02/22/2020     Lab Results   Component Value Date    TRIG 166 (H) 06/27/2021    TRIG 143 07/22/2020    TRIG 130 02/22/2020     Lab Results   Component Value Date    HDL 48 (L) 06/27/2021    HDL 60 (L) 07/22/2020    HDL 52 (L) 02/22/2020     Lab Results   Component Value Date    LDLCALC 62 06/27/2021    LDLCALC 60 07/22/2020    [address] 02/22/2020     No results found for: LABVLDL, VLDL  No results found for: CHOLHDLRATIO  Lab Results   Component Value Date    LABA1C 5.6 06/27/2021     No results found for: EAG  Lab Results   Component Value Date    TSHFT4 1.32 06/27/2021    TSH 1.170 02/24/2021       Treatments: RN and SW    Mental status examination at the time of discharge:  Alert, Oriented X 4  Appearance:  Improved Hygiene, smiling  Speech with Regular Rate and Rhythm  Eye Contact:  Good  No Psychomotor Agitation/Retardation Noted  Attitude:  Cooperative  Mood:  \"Much better\"  Affective: Congruent, appropriate to the situation, with a normal range and intensity  Thought Processes:  Coherently communicated, logical and goal oriented  Thought Content:  No Suicidal Ideation, No Homicidal Ideation, No Auditory or Visual Hallucinations, NO Overt Delusions  Insight: Improved  Judgement: Improved  Memory is intact for both remote and recent  Intellectual Functioning:  Within the [address] of Knowledge:  Adequate  Attention and Concentration:  Adequate    Discharge Exam:  Please, see medical note    Disposition: home    Patient Instructions:   Current Discharge Medication List      START taking these medications    Details   hydrOXYzine (ATARAX) 25 MG tablet Take 1 tablet by mouth 3 times daily as needed for Anxiety  Qty: 30 tablet, Refills: 1         CONTINUE these medications which have CHANGED    Details   lithium 300 MG capsule Take 1 capsule by mouth 2 times daily (with meals)  Qty: 60 capsule, Refills: 0         CONTINUE these medications which have NOT CHANGED    Details   clindamycin (CLEOCIN T) 1 % external solution Apply topically 2 times daily Apply topically 2 times daily.       lurasidone (LATUDA) 80 MG TABS tablet Take 1 tablet by mouth daily  Qty: 30 tablet, Refills: 1      melatonin 3 MG TABS tablet Take 1 tablet by mouth nightly  Qty: 30 tablet, Refills: 1      cloNIDine (CATAPRES) 0.1 MG tablet Take 1 tablet by mouth nightly  Qty: 90 tablet, Refills: 1      vitamin D (ERGOCALCIFEROL) 1.25 MG (51601 UT) CAPS capsule Take 1 capsule by mouth once a week for 11 doses  Qty: 5 capsule, Refills: 1             Activity: as tolerated  Diet: regular diet  Wound Care: none needed    Follow-up: TBA    Time worked: 33 min    Participation: good    Electronically signed by Juan King MD on 6/27/2021 at 11:42 AM

## 2021-06-28 NOTE — PROGRESS NOTES
Pt discharged on 6/27/2021. SW called pt to see where she follows up. Pt reported [address] behavioral health in [place]. Follow up appointments was made on this date at [address] [facility]. Intake/therapy appointment on 7/2/2021 at 10 AM with Nery Osman and a med management appointment on 7/9/2021 at 11:30 AM with Kevin. SW called pt to inform her about appointments dates and times. Pt voiced agreement and understanding.      Electronically signed by Hans Chou, [address] on 6/28/2021 at 11:36 AM

## 2021-07-27 ENCOUNTER — TELEPHONE (OUTPATIENT)
Dept: OBSTETRICS AND GYNECOLOGY | Facility: CLINIC | Age: 56
End: 2021-07-27

## 2021-07-27 DIAGNOSIS — E55.9 VITAMIN D DEFICIENCY: Primary | ICD-10-CM

## 2021-07-27 NOTE — TELEPHONE ENCOUNTER
If she could come in and do that that would be good.  I do not want it to be too high and has keep giving her the supplement.  Thanks, Kimberly

## 2021-07-28 LAB — 25(OH)D3+25(OH)D2 SERPL-MCNC: 48.6 NG/ML

## 2021-07-28 RX ORDER — ERGOCALCIFEROL 1.25 MG/1
50000 CAPSULE ORAL WEEKLY
Qty: 5 CAPSULE | Refills: 3 | Status: SHIPPED | OUTPATIENT
Start: 2021-07-28 | End: 2022-08-17 | Stop reason: SDUPTHER

## 2021-08-19 ENCOUNTER — TELEPHONE (OUTPATIENT)
Dept: NEUROLOGY | Age: 56
End: 2021-08-19

## 2021-09-16 ENCOUNTER — OFFICE VISIT (OUTPATIENT)
Dept: OBSTETRICS AND GYNECOLOGY | Facility: CLINIC | Age: 56
End: 2021-09-16

## 2021-09-16 VITALS
BODY MASS INDEX: 40.29 KG/M2 | SYSTOLIC BLOOD PRESSURE: 118 MMHG | DIASTOLIC BLOOD PRESSURE: 82 MMHG | HEIGHT: 64 IN | WEIGHT: 236 LBS

## 2021-09-16 DIAGNOSIS — R35.0 URINARY FREQUENCY: ICD-10-CM

## 2021-09-16 DIAGNOSIS — L02.92 BOIL: Primary | ICD-10-CM

## 2021-09-16 PROCEDURE — 99214 OFFICE O/P EST MOD 30 MIN: CPT | Performed by: NURSE PRACTITIONER

## 2021-09-16 RX ORDER — DOCUSATE SODIUM 100 MG/1
CAPSULE, LIQUID FILLED ORAL
COMMUNITY
Start: 2021-07-07

## 2021-09-16 RX ORDER — AZITHROMYCIN 250 MG/1
TABLET, FILM COATED ORAL
Qty: 6 TABLET | Refills: 0 | Status: SHIPPED | OUTPATIENT
Start: 2021-09-16 | End: 2021-09-23

## 2021-09-16 RX ORDER — HYDROXYZINE HYDROCHLORIDE 25 MG/1
TABLET, FILM COATED ORAL
COMMUNITY
Start: 2021-08-26

## 2021-09-16 RX ORDER — NYSTATIN 100000 [USP'U]/G
POWDER TOPICAL
COMMUNITY
Start: 2021-08-11

## 2021-09-16 RX ORDER — LANOLIN ALCOHOL/MO/W.PET/CERES
CREAM (GRAM) TOPICAL
COMMUNITY
Start: 2021-07-07 | End: 2021-09-16

## 2021-09-16 NOTE — PROGRESS NOTES
"Subjective     Edith Stephens is a 56 y.o. female    Patient is here today for complaint of a infected hair follicle.    Abscess  This is a new problem. The current episode started in the past 7 days. The problem occurs daily. The problem has been gradually worsening. Pertinent negatives include no abdominal pain, anorexia, arthralgias, change in bowel habit, chest pain, chills, congestion, coughing, diaphoresis, fatigue, fever, headaches, joint swelling, myalgias, nausea, neck pain, numbness, rash, sore throat, swollen glands, urinary symptoms, vertigo, visual change, vomiting or weakness. Nothing aggravates the symptoms. She has tried nothing for the symptoms.         /82   Ht 162.6 cm (64.02\")   Wt 107 kg (236 lb)   LMP 12/13/2015 (Approximate)   Breastfeeding No   BMI 40.49 kg/m²     Outpatient Encounter Medications as of 9/16/2021   Medication Sig Dispense Refill   • BENZOYL PEROXIDE 5 % external wash   3   • clindamycin (CLEOCIN T) 1 % external solution   3   • cloNIDine (CATAPRES) 0.1 MG tablet      • Denta 5000 Plus 1.1 % cream      • docusate sodium (COLACE) 100 MG capsule      • hydrOXYzine (ATARAX) 25 MG tablet      • Latuda 80 MG tablet tablet      • lithium 600 MG capsule      • lithium carbonate 300 MG capsule      • Melatonin 3 MG tablet Take 2 tablets by mouth At Night As Needed for Sleep.     • Multiple Vitamins-Minerals (MULTIVITAMIN GUMMIES ADULT PO)      • nystatin 234312 UNIT/GM powder      • valACYclovir (Valtrex) 500 MG tablet Take 1 tablet by mouth Daily. 30 tablet 12   • vitamin D (ERGOCALCIFEROL) 1.25 MG (55108 UT) capsule capsule Take 1 capsule by mouth 1 (One) Time Per Week. 5 capsule 3   • azithromycin (Zithromax Z-Chip) 250 MG tablet Take 2 tablets the first day, then 1 tablet daily for 4 days. 6 tablet 0   • [DISCONTINUED] azithromycin (Zithromax Z-Chip) 250 MG tablet Take 2 tablets the first day, then 1 tablet daily for 4 days. 6 tablet 0   • [DISCONTINUED] melatonin 3 MG " tablet      • [DISCONTINUED] Multiple Vitamins-Minerals (CENTRUM SILVER PO) Take 1 tablet by mouth Daily.       No facility-administered encounter medications on file as of 9/16/2021.       Surgical History  Past Surgical History:   Procedure Laterality Date   • CERVICAL BIOPSY  W/ LOOP ELECTRODE EXCISION     • D & C HYSTEROSCOPY N/A 5/30/2017    Procedure: DILATATION AND CURETTAGE HYSTEROSCOPY;  Surgeon: Citlali Arita MD;  Location: [facility];  Service:    • OTHER SURGICAL HISTORY      cyst removed from foot as a child       Family History  Family History   Problem Relation Age of Onset   • Hypertension Father    • Coronary artery disease Paternal Grandfather    • Coronary artery disease Paternal Grandmother    • Diabetes Paternal Grandmother    • Kidney cancer Maternal Grandfather    • Thyroid disease Mother    • No Known Problems Sister    • Breast cancer Neg Hx    • Ovarian cancer Neg Hx    • Uterine cancer Neg Hx    • Colon cancer Neg Hx    • Melanoma Neg Hx    • Prostate cancer Neg Hx        The following portions of the patient's history were reviewed and updated as appropriate: allergies, current medications, past family history, past medical history, past social history, past surgical history and problem list.    Review of Systems   Constitutional: Negative for activity change, appetite change, chills, diaphoresis, fatigue, fever, unexpected weight gain and unexpected weight loss.   HENT: Negative for congestion, dental problem, drooling, ear discharge, ear pain, facial swelling, hearing loss, mouth sores, nosebleeds, postnasal drip, rhinorrhea, sinus pressure, sneezing, sore throat, swollen glands, tinnitus, trouble swallowing and voice change.    Eyes: Negative for blurred vision, double vision, photophobia, pain, discharge, redness, itching and visual disturbance.   Respiratory: Negative for apnea, cough, choking, chest tightness, shortness of breath, wheezing and stridor.    Cardiovascular: Negative  for chest pain, palpitations and leg swelling.   Gastrointestinal: Negative for abdominal distention, abdominal pain, anal bleeding, anorexia, blood in stool, change in bowel habit, constipation, diarrhea, nausea, rectal pain, vomiting, GERD and indigestion.   Endocrine: Negative for cold intolerance, heat intolerance, polydipsia, polyphagia and polyuria.   Genitourinary: Negative for amenorrhea, breast discharge, breast lump, breast pain, decreased libido, decreased urine volume, difficulty urinating, dyspareunia, dysuria, flank pain, frequency, genital sores, hematuria, menstrual problem, pelvic pain, pelvic pressure, urgency, urinary incontinence, vaginal bleeding, vaginal discharge and vaginal pain.   Musculoskeletal: Negative for arthralgias, back pain, gait problem, joint swelling, myalgias, neck pain, neck stiffness and bursitis.   Skin: Negative for color change, dry skin and rash.   Allergic/Immunologic: Negative for environmental allergies, food allergies and immunocompromised state.   Neurological: Negative for dizziness, vertigo, tremors, seizures, syncope, facial asymmetry, speech difficulty, weakness, light-headedness, numbness, headache, memory problem and confusion.   Hematological: Negative for adenopathy. Does not bruise/bleed easily.   Psychiatric/Behavioral: Negative for agitation, behavioral problems, decreased concentration, dysphoric mood, hallucinations, self-injury, sleep disturbance, suicidal ideas, negative for hyperactivity, depressed mood and stress. The patient is not nervous/anxious.        Objective   Physical Exam  Vitals and nursing note reviewed. Exam conducted with a chaperone present.   Constitutional:       Appearance: She is well-developed.   HENT:      Head: Normocephalic and atraumatic.   Abdominal:      General: There is no distension.      Palpations: Abdomen is soft.      Tenderness: There is no abdominal tenderness.      Hernia: There is no hernia in the left inguinal  area or right inguinal area.   Genitourinary:     General: Normal vulva.      Exam position: Lithotomy position.      Labia:         Right: No rash, tenderness, lesion or injury.         Left: No rash, tenderness, lesion or injury.       Vagina: Normal. No vaginal discharge, erythema, tenderness or bleeding.      Cervix: Normal.      Uterus: Normal. Not enlarged and not tender.       Adnexa: Right adnexa normal and left adnexa normal.        Right: No mass, tenderness or fullness.          Left: No mass, tenderness or fullness.         Lymphadenopathy:      Lower Body: No right inguinal adenopathy. No left inguinal adenopathy.   Skin:     General: Skin is warm and dry.   Neurological:      Mental Status: She is alert and oriented to person, place, and time.   Psychiatric:         Mood and Affect: Mood normal.         Behavior: Behavior normal.         Thought Content: Thought content normal.         Judgment: Judgment normal.         Assessment/Plan   Diagnoses and all orders for this visit:    1. Boil (Primary)  Comments:  Patient has a 3 cm boil on the right labia majora.  It is draining.  She is allergic to many antibiotics.  Zithromax is the only thing she can take for skin infections.  She is encouraged to soak in a hot tub at least 3 times daily.  RTO in 1 week for follow-up.  Call if it becomes larger.  Orders:  -     azithromycin (Zithromax Z-Chip) 250 MG tablet; Take 2 tablets the first day, then 1 tablet daily for 4 days.  Dispense: 6 tablet; Refill: 0    2. Urinary frequency  Comments:  Patient is having urinary frequency.  Urine is sent to lab for culture.  Orders:  -     Urine Culture - , Urine, Clean Catch  -     UA / M With / Rflx Culture([facility] ONLY) - Urine, Clean Catch         Patient's Body mass index is 40.49 kg/m². indicating that she is morbidly obese (BMI > 40 or > 35 with obesity - related health condition). Obesity-related health conditions include the following: hypertension. Obesity is  unchanged. BMI is is above average; BMI management plan is completed. We discussed portion control and increasing exercise..      Kimberly Daley, APRN  9/16/2021

## 2021-09-16 NOTE — PATIENT INSTRUCTIONS
"BMI for Adults  What is BMI?  Body mass index (BMI) is a number that is calculated from a person's weight and height. BMI can help estimate how much of a person's weight is composed of fat. BMI does not measure body fat directly. Rather, it is an alternative to procedures that directly measure body fat, which can be difficult and expensive.  BMI can help identify people who may be at higher risk for certain medical problems.  What are BMI measurements used for?  BMI is used as a screening tool to identify possible weight problems. It helps determine whether a person is obese, overweight, a healthy weight, or underweight.  BMI is useful for:  · Identifying a weight problem that may be related to a medical condition or may increase the risk for medical problems.  · Promoting changes, such as changes in diet and exercise, to help reach a healthy weight. BMI screening can be repeated to see if these changes are working.  How is BMI calculated?  BMI involves measuring your weight in relation to your height. Both height and weight are measured, and the BMI is calculated from those numbers. This can be done either in English (U[place] or metric measurements. Note that charts and online BMI calculators are available to help you find your BMI quickly and easily without having to do these calculations yourself.  To calculate your BMI in English (U.S[place] measurements:    1. Measure your weight in pounds (lb).  2. Multiply the number of pounds by 703.  ? For example, for a person who weighs 180 lb, multiply that number by 703, which equals 126,540.  3. Measure your height in inches. Then multiply that number by itself to get a measurement called \"inches squared.\"  ? For example, for a person who is 70 inches tall, the \"inches squared\" measurement is 70 inches x 70 inches, which equals 4,900 inches squared.  4. Divide the total from step 2 (number of lb x 703) by the total from step 3 (inches squared): 126,540 ÷ 4,900 = 25.8. This is " "your BMI.    To calculate your BMI in metric measurements:  1. Measure your weight in kilograms (kg).  2. Measure your height in meters (m). Then multiply that number by itself to get a measurement called \"meters squared.\"  ? For example, for a person who is 1.75 m tall, the \"meters squared\" measurement is 1.75 m x 1.75 m, which is equal to 3.1 meters squared.  3. Divide the number of kilograms (your weight) by the meters squared number. In this example: 70 ÷ 3.1 = 22.6. This is your BMI.  What do the results mean?  BMI charts are used to identify whether you are underweight, normal weight, overweight, or obese. The following guidelines will be used:  · Underweight: BMI less than 18.5.  · Normal weight: BMI between 18.5 and 24.9.  · Overweight: BMI between 25 and 29.9.  · Obese: BMI of 30 or above.  Keep these notes in mind:  · Weight includes both fat and muscle, so someone with a muscular build, such as an athlete, may have a BMI that is higher than 24.9. In cases like these, BMI is not an accurate measure of body fat.  · To determine if excess body fat is the cause of a BMI of 25 or higher, further assessments may need to be done by a health care provider.  · BMI is usually interpreted in the same way for men and women.  Where to find more information  For more information about BMI, including tools to quickly calculate your BMI, go to these websites:  · Centers for Disease Control and Prevention: www.cdc.gov  · American Heart Association: www.heart.org  · National Heart, Lung, and Blood [place]: www.nhlbi.nih.gov  Summary  · Body mass index (BMI) is a number that is calculated from a person's weight and height.  · BMI may help estimate how much of a person's weight is composed of fat. BMI can help identify those who may be at higher risk for certain medical problems.  · BMI can be measured using English measurements or metric measurements.  · BMI charts are used to identify whether you are underweight, normal " weight, overweight, or obese.  This information is not intended to replace advice given to you by your health care provider. Make sure you discuss any questions you have with your health care provider.  Document Revised: 09/09/2020 Document Reviewed: 07/17/2020  Elsevier Patient Education © 2021 [employer]

## 2021-09-19 LAB
APPEARANCE UR: ABNORMAL
BACTERIA #/AREA URNS HPF: ABNORMAL /HPF
BACTERIA UR CULT: NORMAL
BACTERIA UR CULT: NORMAL
BILIRUB UR QL STRIP: NEGATIVE
CASTS URNS QL MICRO: ABNORMAL /LPF
COLOR UR: YELLOW
EPI CELLS #/AREA URNS HPF: >10 /HPF (ref 0–10)
GLUCOSE UR QL: NEGATIVE
HGB UR QL STRIP: ABNORMAL
KETONES UR QL STRIP: NEGATIVE
LEUKOCYTE ESTERASE UR QL STRIP: ABNORMAL
MICRO URNS: ABNORMAL
NITRITE UR QL STRIP: NEGATIVE
PH UR STRIP: 6 [PH] (ref 5–7.5)
PROT UR QL STRIP: NEGATIVE
RBC #/AREA URNS HPF: ABNORMAL /HPF (ref 0–2)
SP GR UR: 1.02 (ref 1–1.03)
URINALYSIS REFLEX: ABNORMAL
UROBILINOGEN UR STRIP-MCNC: 0.2 MG/DL (ref 0.2–1)
WBC #/AREA URNS HPF: >30 /HPF (ref 0–5)

## 2021-09-23 ENCOUNTER — OFFICE VISIT (OUTPATIENT)
Dept: OBSTETRICS AND GYNECOLOGY | Facility: CLINIC | Age: 56
End: 2021-09-23

## 2021-09-23 VITALS
HEIGHT: 64 IN | DIASTOLIC BLOOD PRESSURE: 88 MMHG | SYSTOLIC BLOOD PRESSURE: 132 MMHG | BODY MASS INDEX: 40.29 KG/M2 | WEIGHT: 236 LBS

## 2021-09-23 DIAGNOSIS — L02.92 BOIL: Primary | ICD-10-CM

## 2021-09-23 PROCEDURE — 99213 OFFICE O/P EST LOW 20 MIN: CPT | Performed by: NURSE PRACTITIONER

## 2021-09-23 RX ORDER — AZITHROMYCIN 250 MG/1
TABLET, FILM COATED ORAL
Qty: 6 TABLET | Refills: 0 | Status: SHIPPED | OUTPATIENT
Start: 2021-09-23 | End: 2021-09-30

## 2021-09-23 NOTE — PROGRESS NOTES
"Subjective     Edith Stephens is a 56 y.o. female    Patient is here for follow-up of labial boil.  It is much improved.        /88   Ht 162.6 cm (64\")   Wt 107 kg (236 lb)   LMP 12/13/2015 (Approximate)   BMI 40.51 kg/m²     Outpatient Encounter Medications as of 9/23/2021   Medication Sig Dispense Refill   • BENZOYL PEROXIDE 5 % external wash   3   • clindamycin (CLEOCIN T) 1 % external solution   3   • cloNIDine (CATAPRES) 0.1 MG tablet      • Denta 5000 Plus 1.1 % cream      • docusate sodium (COLACE) 100 MG capsule      • hydrOXYzine (ATARAX) 25 MG tablet      • Latuda 80 MG tablet tablet      • lithium 600 MG capsule      • lithium carbonate 300 MG capsule      • Melatonin 3 MG tablet Take 2 tablets by mouth At Night As Needed for Sleep.     • Multiple Vitamins-Minerals (MULTIVITAMIN GUMMIES ADULT PO)      • nystatin 793975 UNIT/GM powder      • valACYclovir (Valtrex) 500 MG tablet Take 1 tablet by mouth Daily. 30 tablet 12   • vitamin D (ERGOCALCIFEROL) 1.25 MG (14522 UT) capsule capsule Take 1 capsule by mouth 1 (One) Time Per Week. 5 capsule 3   • azithromycin (Zithromax Z-Chip) 250 MG tablet Take 2 tablets the first day, then 1 tablet daily for 4 days. 6 tablet 0   • [DISCONTINUED] azithromycin (Zithromax Z-Chip) 250 MG tablet Take 2 tablets the first day, then 1 tablet daily for 4 days. 6 tablet 0     No facility-administered encounter medications on file as of 9/23/2021.       Surgical History  Past Surgical History:   Procedure Laterality Date   • CERVICAL BIOPSY  W/ LOOP ELECTRODE EXCISION     • D & C HYSTEROSCOPY N/A 5/30/2017    Procedure: DILATATION AND CURETTAGE HYSTEROSCOPY;  Surgeon: Citlali Arita MD;  Location: [facility] OR;  Service:    • OTHER SURGICAL HISTORY      cyst removed from foot as a child       Family History  Family History   Problem Relation Age of Onset   • Hypertension Father    • Coronary artery disease Paternal Grandfather    • Coronary artery disease Paternal " Grandmother    • Diabetes Paternal Grandmother    • Kidney cancer Maternal Grandfather    • Thyroid disease Mother    • No Known Problems Sister    • Breast cancer Neg Hx    • Ovarian cancer Neg Hx    • Uterine cancer Neg Hx    • Colon cancer Neg Hx    • Melanoma Neg Hx    • Prostate cancer Neg Hx        The following portions of the patient's history were reviewed and updated as appropriate: allergies, current medications, past family history, past medical history, past social history, past surgical history and problem list.    Review of Systems   Constitutional: Negative for activity change, appetite change, chills, diaphoresis, fatigue, fever, unexpected weight gain and unexpected weight loss.   HENT: Negative for congestion, dental problem, drooling, ear discharge, ear pain, facial swelling, hearing loss, mouth sores, nosebleeds, postnasal drip, rhinorrhea, sinus pressure, sneezing, sore throat, swollen glands, tinnitus, trouble swallowing and voice change.    Eyes: Negative for blurred vision, double vision, photophobia, pain, discharge, redness, itching and visual disturbance.   Respiratory: Negative for apnea, cough, choking, chest tightness, shortness of breath, wheezing and stridor.    Cardiovascular: Negative for chest pain, palpitations and leg swelling.   Gastrointestinal: Negative for abdominal distention, abdominal pain, anal bleeding, blood in stool, constipation, diarrhea, nausea, rectal pain, vomiting, GERD and indigestion.   Endocrine: Negative for cold intolerance, heat intolerance, polydipsia, polyphagia and polyuria.   Genitourinary: Negative for amenorrhea, breast discharge, breast lump, breast pain, decreased libido, decreased urine volume, difficulty urinating, dyspareunia, dysuria, flank pain, frequency, genital sores, hematuria, menstrual problem, pelvic pain, pelvic pressure, urgency, urinary incontinence, vaginal bleeding, vaginal discharge and vaginal pain.   Musculoskeletal: Negative  for arthralgias, back pain, gait problem, joint swelling, myalgias, neck pain, neck stiffness and bursitis.   Skin: Negative for color change, dry skin and rash.   Allergic/Immunologic: Negative for environmental allergies, food allergies and immunocompromised state.   Neurological: Negative for dizziness, tremors, seizures, syncope, facial asymmetry, speech difficulty, weakness, light-headedness, numbness, headache, memory problem and confusion.   Hematological: Negative for adenopathy. Does not bruise/bleed easily.   Psychiatric/Behavioral: Negative for agitation, behavioral problems, decreased concentration, dysphoric mood, hallucinations, self-injury, sleep disturbance, suicidal ideas, negative for hyperactivity, depressed mood and stress. The patient is not nervous/anxious.        Objective   Physical Exam  Vitals and nursing note reviewed. Exam conducted with a chaperone present.   Constitutional:       Appearance: She is well-developed.   HENT:      Head: Normocephalic and atraumatic.   Abdominal:      General: There is no distension.      Palpations: Abdomen is soft.      Tenderness: There is no abdominal tenderness.      Hernia: There is no hernia in the left inguinal area or right inguinal area.   Genitourinary:     General: Normal vulva.      Exam position: Lithotomy position.      Labia:         Right: No rash, tenderness, lesion or injury.         Left: No rash, tenderness, lesion or injury.       Vagina: Normal. No vaginal discharge, erythema, tenderness or bleeding.      Cervix: Normal.      Uterus: Normal. Not enlarged and not tender.       Adnexa: Right adnexa normal and left adnexa normal.        Right: No mass, tenderness or fullness.          Left: No mass, tenderness or fullness.         Lymphadenopathy:      Lower Body: No right inguinal adenopathy. No left inguinal adenopathy.   Skin:     General: Skin is warm and dry.   Neurological:      Mental Status: She is alert and oriented to person,  place, and time.   Psychiatric:         Mood and Affect: Mood normal.         Behavior: Behavior normal.         Thought Content: Thought content normal.         Judgment: Judgment normal.         Assessment/Plan   Diagnoses and all orders for this visit:    1. Boil (Primary)  Comments:  Patient's boil is improved.  She will continue using sitz bath's and is given a refill on Zithromax.  She will return in 1 week for follow-up.  Orders:  -     azithromycin (Zithromax Z-Chip) 250 MG tablet; Take 2 tablets the first day, then 1 tablet daily for 4 days.  Dispense: 6 tablet; Refill: 0         Patient's Body mass index is 40.51 kg/m². indicating that she is morbidly obese (BMI > 40 or > 35 with obesity - related health condition). Obesity-related health conditions include the following: hypertension. Obesity is unchanged. BMI is is above average; BMI management plan is completed. We discussed portion control and increasing exercise..      Kimberly Daley, APRN  9/23/2021

## 2021-09-23 NOTE — PATIENT INSTRUCTIONS
"BMI for Adults  What is BMI?  Body mass index (BMI) is a number that is calculated from a person's weight and height. BMI can help estimate how much of a person's weight is composed of fat. BMI does not measure body fat directly. Rather, it is an alternative to procedures that directly measure body fat, which can be difficult and expensive.  BMI can help identify people who may be at higher risk for certain medical problems.  What are BMI measurements used for?  BMI is used as a screening tool to identify possible weight problems. It helps determine whether a person is obese, overweight, a healthy weight, or underweight.  BMI is useful for:  · Identifying a weight problem that may be related to a medical condition or may increase the risk for medical problems.  · Promoting changes, such as changes in diet and exercise, to help reach a healthy weight. BMI screening can be repeated to see if these changes are working.  How is BMI calculated?  BMI involves measuring your weight in relation to your height. Both height and weight are measured, and the BMI is calculated from those numbers. This can be done either in English (U.S[place] or metric measurements. Note that charts and online BMI calculators are available to help you find your BMI quickly and easily without having to do these calculations yourself.  To calculate your BMI in English ([place].S[place] measurements:    1. Measure your weight in pounds (lb).  2. Multiply the number of pounds by 703.  ? For example, for a person who weighs 180 lb, multiply that number by 703, which equals 126,540.  3. Measure your height in inches. Then multiply that number by itself to get a measurement called \"inches squared.\"  ? For example, for a person who is 70 inches tall, the \"inches squared\" measurement is 70 inches x 70 inches, which equals 4,900 inches squared.  4. Divide the total from step 2 (number of lb x 703) by the total from step 3 (inches squared): 126,540 ÷ 4,900 = 25.8. This is " "your BMI.    To calculate your BMI in metric measurements:  1. Measure your weight in kilograms (kg).  2. Measure your height in meters (m). Then multiply that number by itself to get a measurement called \"meters squared.\"  ? For example, for a person who is 1.75 m tall, the \"meters squared\" measurement is 1.75 m x 1.75 m, which is equal to 3.1 meters squared.  3. Divide the number of kilograms (your weight) by the meters squared number. In this example: 70 ÷ 3.1 = 22.6. This is your BMI.  What do the results mean?  BMI charts are used to identify whether you are underweight, normal weight, overweight, or obese. The following guidelines will be used:  · Underweight: BMI less than 18.5.  · Normal weight: BMI between 18.5 and 24.9.  · Overweight: BMI between 25 and 29.9.  · Obese: BMI of 30 or above.  Keep these notes in mind:  · Weight includes both fat and muscle, so someone with a muscular build, such as an athlete, may have a BMI that is higher than 24.9. In cases like these, BMI is not an accurate measure of body fat.  · To determine if excess body fat is the cause of a BMI of 25 or higher, further assessments may need to be done by a health care provider.  · BMI is usually interpreted in the same way for men and women.  Where to find more information  For more information about BMI, including tools to quickly calculate your BMI, go to these websites:  · Centers for Disease Control and Prevention: www.cdc.gov  · American Heart Association: www.heart.org  · National Heart, Lung, and Blood [place]: www.nhlbi.nih.gov  Summary  · Body mass index (BMI) is a number that is calculated from a person's weight and height.  · BMI may help estimate how much of a person's weight is composed of fat. BMI can help identify those who may be at higher risk for certain medical problems.  · BMI can be measured using English measurements or metric measurements.  · BMI charts are used to identify whether you are underweight, normal " weight, overweight, or obese.  This information is not intended to replace advice given to you by your health care provider. Make sure you discuss any questions you have with your health care provider.  Document Revised: 09/09/2020 Document Reviewed: 07/17/2020  Elsevier Patient Education © 2021 Elsevier Inc.

## 2021-09-30 ENCOUNTER — OFFICE VISIT (OUTPATIENT)
Dept: OBSTETRICS AND GYNECOLOGY | Facility: CLINIC | Age: 56
End: 2021-09-30

## 2021-09-30 VITALS
SYSTOLIC BLOOD PRESSURE: 122 MMHG | HEIGHT: 64 IN | WEIGHT: 236 LBS | BODY MASS INDEX: 40.29 KG/M2 | DIASTOLIC BLOOD PRESSURE: 82 MMHG

## 2021-09-30 DIAGNOSIS — R35.0 URINARY FREQUENCY: Primary | ICD-10-CM

## 2021-09-30 DIAGNOSIS — L02.92 BOIL: ICD-10-CM

## 2021-09-30 PROCEDURE — 99213 OFFICE O/P EST LOW 20 MIN: CPT | Performed by: NURSE PRACTITIONER

## 2021-09-30 NOTE — PROGRESS NOTES
"Subjective     Edith Stephens is a 56 y.o. female    Patient is here today to follow-up on labial boil.  She feels it is improved.  She is having urinary frequency.    Abscess  This is a recurrent problem. The current episode started more than 1 month ago. The problem occurs intermittently. The problem has been resolved. Associated symptoms include urinary symptoms. Pertinent negatives include no abdominal pain, anorexia, arthralgias, change in bowel habit, chest pain, chills, congestion, coughing, diaphoresis, fatigue, fever, headaches, joint swelling, myalgias, nausea, neck pain, numbness, rash, sore throat, swollen glands, vertigo, visual change, vomiting or weakness. Nothing aggravates the symptoms. Treatments tried: Antibiotic. The treatment provided significant relief.         /82   Ht 162.6 cm (64.02\")   Wt 107 kg (236 lb)   LMP 12/13/2015 (Approximate)   BMI 40.49 kg/m²     Outpatient Encounter Medications as of 9/30/2021   Medication Sig Dispense Refill   • BENZOYL PEROXIDE 5 % external wash   3   • clindamycin (CLEOCIN T) 1 % external solution   3   • cloNIDine (CATAPRES) 0.1 MG tablet      • Denta 5000 Plus 1.1 % cream      • docusate sodium (COLACE) 100 MG capsule      • hydrOXYzine (ATARAX) 25 MG tablet      • Latuda 80 MG tablet tablet      • lithium 600 MG capsule      • lithium carbonate 300 MG capsule      • Melatonin 3 MG tablet Take 2 tablets by mouth At Night As Needed for Sleep.     • Multiple Vitamins-Minerals (MULTIVITAMIN GUMMIES ADULT PO)      • nystatin 752830 UNIT/GM powder      • valACYclovir (Valtrex) 500 MG tablet Take 1 tablet by mouth Daily. 30 tablet 12   • vitamin D (ERGOCALCIFEROL) 1.25 MG (44349 UT) capsule capsule Take 1 capsule by mouth 1 (One) Time Per Week. 5 capsule 3   • [DISCONTINUED] azithromycin (Zithromax Z-Chip) 250 MG tablet Take 2 tablets the first day, then 1 tablet daily for 4 days. 6 tablet 0     No facility-administered encounter medications on file as of " 9/30/2021.       Surgical History  Past Surgical History:   Procedure Laterality Date   • CERVICAL BIOPSY  W/ LOOP ELECTRODE EXCISION     • D & C HYSTEROSCOPY N/A 5/30/2017    Procedure: DILATATION AND CURETTAGE HYSTEROSCOPY;  Surgeon: Citlali Arita MD;  Location: [facility];  Service:    • OTHER SURGICAL HISTORY      cyst removed from foot as a child       Family History  Family History   Problem Relation Age of Onset   • Hypertension Father    • Coronary artery disease Paternal Grandfather    • Coronary artery disease Paternal Grandmother    • Diabetes Paternal Grandmother    • Kidney cancer Maternal Grandfather    • Thyroid disease Mother    • No Known Problems Sister    • Breast cancer Neg Hx    • Ovarian cancer Neg Hx    • Uterine cancer Neg Hx    • Colon cancer Neg Hx    • Melanoma Neg Hx    • Prostate cancer Neg Hx        The following portions of the patient's history were reviewed and updated as appropriate: allergies, current medications, past family history, past medical history, past social history, past surgical history and problem list.    Review of Systems   Constitutional: Negative for activity change, appetite change, chills, diaphoresis, fatigue, fever, unexpected weight gain and unexpected weight loss.   HENT: Negative for congestion, dental problem, drooling, ear discharge, ear pain, facial swelling, hearing loss, mouth sores, nosebleeds, postnasal drip, rhinorrhea, sinus pressure, sneezing, sore throat, swollen glands, tinnitus, trouble swallowing and voice change.    Eyes: Negative for blurred vision, double vision, photophobia, pain, discharge, redness, itching and visual disturbance.   Respiratory: Negative for apnea, cough, choking, chest tightness, shortness of breath, wheezing and stridor.    Cardiovascular: Negative for chest pain, palpitations and leg swelling.   Gastrointestinal: Negative for abdominal distention, abdominal pain, anal bleeding, anorexia, blood in stool, change in  bowel habit, constipation, diarrhea, nausea, rectal pain, vomiting, GERD and indigestion.   Endocrine: Negative for cold intolerance, heat intolerance, polydipsia, polyphagia and polyuria.   Genitourinary: Positive for frequency. Negative for amenorrhea, breast discharge, breast lump, breast pain, decreased libido, decreased urine volume, difficulty urinating, dyspareunia, dysuria, flank pain, genital sores, hematuria, menstrual problem, pelvic pain, pelvic pressure, urgency, urinary incontinence, vaginal bleeding, vaginal discharge and vaginal pain.   Musculoskeletal: Negative for arthralgias, back pain, gait problem, joint swelling, myalgias, neck pain, neck stiffness and bursitis.   Skin: Negative for color change, dry skin and rash.   Allergic/Immunologic: Negative for environmental allergies, food allergies and immunocompromised state.   Neurological: Negative for dizziness, vertigo, tremors, seizures, syncope, facial asymmetry, speech difficulty, weakness, light-headedness, numbness, headache, memory problem and confusion.   Hematological: Negative for adenopathy. Does not bruise/bleed easily.   Psychiatric/Behavioral: Negative for agitation, behavioral problems, decreased concentration, dysphoric mood, hallucinations, self-injury, sleep disturbance, suicidal ideas, negative for hyperactivity, depressed mood and stress. The patient is not nervous/anxious.        Objective   Physical Exam  Vitals and nursing note reviewed. Exam conducted with a chaperone present.   Constitutional:       Appearance: She is well-developed.   HENT:      Head: Normocephalic and atraumatic.   Abdominal:      General: There is no distension.      Palpations: Abdomen is soft.      Tenderness: There is no abdominal tenderness.      Hernia: There is no hernia in the left inguinal area or right inguinal area.   Genitourinary:     General: Normal vulva.      Exam position: Lithotomy position.      Labia:         Right: No rash,  tenderness, lesion or injury.         Left: No rash, tenderness, lesion or injury.       Vagina: Normal. No vaginal discharge, erythema, tenderness or bleeding.      Cervix: Normal.      Uterus: Normal. Not enlarged and not tender.       Adnexa: Right adnexa normal and left adnexa normal.        Right: No mass, tenderness or fullness.          Left: No mass, tenderness or fullness.         Lymphadenopathy:      Lower Body: No right inguinal adenopathy. No left inguinal adenopathy.   Skin:     General: Skin is warm and dry.   Neurological:      Mental Status: She is alert and oriented to person, place, and time.   Psychiatric:         Mood and Affect: Mood normal.         Behavior: Behavior normal.         Thought Content: Thought content normal.         Judgment: Judgment normal.         Assessment/Plan   Diagnoses and all orders for this visit:    1. Urinary frequency (Primary)  Comments:  She is having urinary frequency.  Urine is sent to lab.  Orders:  -     Urine Culture - , Urine, Clean Catch  -     UA / M With / Rflx Culture([facility] ONLY) - Urine, Clean Catch    2. Boil  Comments:  Patient's boil has completely resolved after medication.  She is encouraged to continue to bathe daily.         Patient's Body mass index is 40.49 kg/m². indicating that she is morbidly obese (BMI > 40 or > 35 with obesity - related health condition). Obesity-related health conditions include the following: hypertension. Obesity is unchanged. BMI is is above average; BMI management plan is completed. We discussed portion control and increasing exercise..      Kimberly Daley, APRN  9/30/2021

## 2021-10-03 LAB
APPEARANCE UR: CLEAR
BACTERIA #/AREA URNS HPF: ABNORMAL /HPF
BACTERIA UR CULT: NORMAL
BACTERIA UR CULT: NORMAL
BILIRUB UR QL STRIP: NEGATIVE
CASTS URNS QL MICRO: ABNORMAL /LPF
COLOR UR: YELLOW
EPI CELLS #/AREA URNS HPF: ABNORMAL /HPF (ref 0–10)
GLUCOSE UR QL: NEGATIVE
HGB UR QL STRIP: NEGATIVE
KETONES UR QL STRIP: NEGATIVE
LEUKOCYTE ESTERASE UR QL STRIP: ABNORMAL
MICRO URNS: ABNORMAL
NITRITE UR QL STRIP: NEGATIVE
PH UR STRIP: 6 [PH] (ref 5–7.5)
PROT UR QL STRIP: ABNORMAL
RBC #/AREA URNS HPF: ABNORMAL /HPF (ref 0–2)
SP GR UR: 1.01 (ref 1–1.03)
URINALYSIS REFLEX: ABNORMAL
UROBILINOGEN UR STRIP-MCNC: 0.2 MG/DL (ref 0.2–1)
WBC #/AREA URNS HPF: ABNORMAL /HPF (ref 0–5)

## 2021-10-25 ENCOUNTER — TELEPHONE (OUTPATIENT)
Dept: NEUROLOGY | Age: 56
End: 2021-10-25

## 2021-10-25 NOTE — TELEPHONE ENCOUNTER
Called patient to let them know we had to reschedule appointment. Cheryl Trent will not be in the office. Patient is aware of new appointment time and date.

## 2021-11-08 NOTE — PATIENT INSTRUCTIONS
bathroom, if available. Animals: You should restrict contact with pets and other animals while you are sick with COVID-19, just like you would around other people. Although there have not been reports of pets or other animals becoming sick with COVID-19, it is still recommended that people sick with COVID-19 limit contact with animals until more information is known about the virus. When possible, have another member of your household care for your animals while you are sick. If you are sick with COVID-19, avoid contact with your pet, including petting, snuggling, being kissed or licked, and sharing food. If you must care for your pet or be around animals while you are sick, wash your hands before and after you interact with pets and wear a facemask. Call ahead before visiting your doctor  If you have a medical appointment, call the healthcare provider and tell them that you have or may have COVID-19. This will help the healthcare providers office take steps to keep other people from getting infected or exposed. Wear a facemask  You should wear a facemask when you are around other people (e.g., sharing a room or vehicle) or pets and before you enter a healthcare providers office. If you are not able to wear a facemask (for example, because it causes trouble breathing), then people who live with you should not stay in the same room with you, or they should wear a facemask if they enter your room. Cover your coughs and sneezes  Cover your mouth and nose with a tissue when you cough or sneeze. Throw used tissues in a lined trash can. Immediately wash your hands with soap and water for at least 20 seconds or, if soap and water are not available, clean your hands with an alcohol-based hand  that contains at least 60% alcohol.   Clean your hands often  Wash your hands often with soap and water for at least 20 seconds, especially after blowing your nose, coughing, or sneezing; going to the bathroom; and before eating or preparing food. If soap and water are not readily available, use an alcohol-based hand  with at least 60% alcohol, covering all surfaces of your hands and rubbing them together until they feel dry. Soap and water are the best option if hands are visibly dirty. Avoid touching your eyes, nose, and mouth with unwashed hands. Avoid sharing personal household items  You should not share dishes, drinking glasses, cups, eating utensils, towels, or bedding with other people or pets in your home. After using these items, they should be washed thoroughly with soap and water. Clean all high-touch surfaces everyday  High touch surfaces include counters, tabletops, doorknobs, bathroom fixtures, toilets, phones, keyboards, tablets, and bedside tables. Also, clean any surfaces that may have blood, stool, or body fluids on them. Use a household cleaning spray or wipe, according to the label instructions. Labels contain instructions for safe and effective use of the cleaning product including precautions you should take when applying the product, such as wearing gloves and making sure you have good ventilation during use of the product. Monitor your symptoms  Seek prompt medical attention if your illness is worsening (e.g., difficulty breathing). Before seeking care, call your healthcare provider and tell them that you have, or are being evaluated for, COVID-19. Put on a facemask before you enter the facility. These steps will help the healthcare providers office to keep other people in the office or waiting room from getting infected or exposed. Ask your healthcare provider to call the local or state health department. Persons who are placed under active monitoring or facilitated self-monitoring should follow instructions provided by their local health department or occupational health professionals, as appropriate. When working with your local health department check their available hours.   If you have a medical emergency and need to call 911, notify the dispatch personnel that you have, or are being evaluated for COVID-19. If possible, put on a facemask before emergency medical services arrive. Discontinuing home isolation  Patients with confirmed COVID-19 should remain under home isolation precautions until the risk of secondary transmission to others is thought to be low. The decision to discontinue home isolation precautions should be made on a case-by-case basis, in consultation with healthcare providers and state and local health departments. 1) Routines: Disruptions to routines/schedules can increase anxiety and feelings of being out of control. Create a schedule that you can follow to help bring direction to your day. It does not have to be strict or confined to specific times. Just keep a regular flow to the day (e.g., eat breakfast, read a book, take a walk, each lunch, watch a TV show). 2) Keep moving: When there are disruptions to our routines (e.g., going to the gym) and our work/home demands change, it can be difficult to find the time or figure out how to keep our body active. Exercise releases serotonin and other chemicals that positively affect your mood. Therefore, it is very important to try to exercise in times of increased stress. Aim to get in at least 30 minutes a day of physical exercise. Here are some free home workout options: [employer]og[employer]me[employer]com, [employer]. [employer] (kid focused), [employer].     3) Focus on what is in your control: With so many mandatory closings and decisions being made without your opinion, people can begin to feel hopeless and like everything is out of their control. Some people may feel upset at choices family members are making during this time. We can't control other people's behavior or *some* aspects of our environment. However, there are many parts of our day that we can control and it helps to focus on those.  If you are worried about the spread R shoulder WFL's (limited due to shoulder replacement )/no ROM deficits were identified

## 2021-11-16 ENCOUNTER — OFFICE VISIT (OUTPATIENT)
Dept: OBSTETRICS AND GYNECOLOGY | Facility: CLINIC | Age: 56
End: 2021-11-16

## 2021-11-16 VITALS
DIASTOLIC BLOOD PRESSURE: 74 MMHG | SYSTOLIC BLOOD PRESSURE: 128 MMHG | WEIGHT: 235 LBS | HEIGHT: 64 IN | BODY MASS INDEX: 40.12 KG/M2

## 2021-11-16 DIAGNOSIS — N64.9 BREAST LESION: Primary | ICD-10-CM

## 2021-11-16 PROCEDURE — 99213 OFFICE O/P EST LOW 20 MIN: CPT | Performed by: NURSE PRACTITIONER

## 2021-11-16 RX ORDER — HYDROQUINONE 4% 4 G/100G
EMULSION TOPICAL
COMMUNITY

## 2021-11-16 NOTE — PROGRESS NOTES
Edith Stephens is a 56 y.o.      Chief Complaint   Patient presents with   • Breast Problem     Patient has a bruise on her right breast. Patient states bruise has been there for several weeks.           HPI - Patient has a discolored small area on the underside of her right breast that has been present for about 3 weeks.      The following portions of the patient's history were reviewed and updated as appropriate:vital signs, allergies, current medications, past family history, past medical history, past social history, past surgical history and problem list.      Current Outpatient Medications:   •  BENZOYL PEROXIDE 5 % external wash, , Disp: , Rfl: 3  •  clindamycin (CLEOCIN T) 1 % external solution, , Disp: , Rfl: 3  •  cloNIDine (CATAPRES) 0.1 MG tablet, , Disp: , Rfl:   •  Denta 5000 Plus 1.1 % cream, , Disp: , Rfl:   •  docusate sodium (COLACE) 100 MG capsule, , Disp: , Rfl:   •  Hydroquinone 4 % emulsion, Apply  topically., Disp: , Rfl:   •  hydrOXYzine (ATARAX) 25 MG tablet, , Disp: , Rfl:   •  Latuda 80 MG tablet tablet, , Disp: , Rfl:   •  lithium 600 MG capsule, , Disp: , Rfl:   •  lithium carbonate 300 MG capsule, , Disp: , Rfl:   •  Melatonin 3 MG tablet, Take 2 tablets by mouth At Night As Needed for Sleep., Disp: , Rfl:   •  Multiple Vitamins-Minerals (MULTIVITAMIN GUMMIES ADULT PO), , Disp: , Rfl:   •  nystatin 834734 UNIT/GM powder, , Disp: , Rfl:   •  valACYclovir (Valtrex) 500 MG tablet, Take 1 tablet by mouth Daily., Disp: 30 tablet, Rfl: 12  •  vitamin D (ERGOCALCIFEROL) 1.25 MG (16383 UT) capsule capsule, Take 1 capsule by mouth 1 (One) Time Per Week., Disp: 5 capsule, Rfl: 3    Review of Systems   Constitutional: Negative for activity change, appetite change, chills, diaphoresis, fatigue, fever, unexpected weight gain and unexpected weight loss.   HENT: Negative for congestion, dental problem, drooling, ear discharge, ear pain, facial swelling, hearing loss, mouth sores,  nosebleeds, postnasal drip, rhinorrhea, sinus pressure, sneezing, sore throat, swollen glands, tinnitus, trouble swallowing and voice change.    Eyes: Negative for blurred vision, double vision, photophobia, pain, discharge, redness, itching and visual disturbance.   Respiratory: Negative for apnea, cough, choking, chest tightness, shortness of breath, wheezing and stridor.    Cardiovascular: Negative for chest pain, palpitations and leg swelling.   Gastrointestinal: Positive for GERD. Negative for abdominal distention, abdominal pain, anal bleeding, blood in stool, constipation, diarrhea, nausea, rectal pain, vomiting and indigestion.   Endocrine: Negative for cold intolerance, heat intolerance, polydipsia, polyphagia and polyuria.        Diabetes   Genitourinary: Negative for amenorrhea, breast discharge, breast lump, breast pain, decreased libido, decreased urine volume, difficulty urinating, dyspareunia, dysuria, flank pain, frequency, genital sores, hematuria, menstrual problem, pelvic pain, pelvic pressure, urgency, urinary incontinence, vaginal bleeding, vaginal discharge and vaginal pain.   Musculoskeletal: Negative for arthralgias, back pain, gait problem, joint swelling, myalgias, neck pain, neck stiffness and bursitis.   Skin: Negative for color change, dry skin and rash.   Allergic/Immunologic: Negative for environmental allergies, food allergies and immunocompromised state.   Neurological: Negative for dizziness, tremors, seizures, syncope, facial asymmetry, speech difficulty, weakness, light-headedness, numbness, headache, memory problem and confusion.   Hematological: Negative for adenopathy. Does not bruise/bleed easily.   Psychiatric/Behavioral: Negative for agitation, behavioral problems, decreased concentration, dysphoric mood, hallucinations, self-injury, sleep disturbance, suicidal ideas, negative for hyperactivity, depressed mood and stress. The patient is not nervous/anxious.         Bi polar  "    righ  Breast small area that is described as looking like a bruise that her been present for over 3 weeks.    Objective     /74   Ht 162.6 cm (64.02\")   Wt 107 kg (235 lb)   LMP 12/13/2015 (Approximate)   Breastfeeding No   BMI 40.31 kg/m²       Physical Exam  Vitals and nursing note reviewed. Exam conducted with a chaperone present.   HENT:      Head: Normocephalic and atraumatic.      Right Ear: External ear normal.      Left Ear: External ear normal.      Nose: No congestion or rhinorrhea.   Eyes:      General:         Right eye: No discharge.         Left eye: No discharge.   Pulmonary:      Effort: Pulmonary effort is normal.   Chest:   Breasts:      Right: Skin change and tenderness present. No swelling, bleeding, inverted nipple, mass, nipple discharge, axillary adenopathy or supraclavicular adenopathy.      Left: No swelling, bleeding, inverted nipple, mass, nipple discharge, skin change, tenderness, axillary adenopathy or supraclavicular adenopathy.            Comments: 2 cm oval discoloring on the underside of the right breast   No palpable mass associated   Musculoskeletal:         General: No swelling.   Lymphadenopathy:      Upper Body:      Right upper body: No supraclavicular or axillary adenopathy.      Left upper body: No supraclavicular or axillary adenopathy.   Skin:     General: Skin is warm.   Neurological:      General: No focal deficit present.      Mental Status: She is alert and oriented to person, place, and time.   Psychiatric:         Mood and Affect: Mood normal.         Behavior: Behavior normal.         Thought Content: Thought content normal.            Assessment/Plan       Diagnoses and all orders for this visit:    1. Breast lesion (Primary)   Ha the appearance  of a bruised area, slightly tender  No palpable masses either breast  -     Mammo Diagnostic Digital Tomosynthesis Bilateral With CAD; Future  -     Ambulatory Referral to Dermatology  Patient is counseled " and understands that I will let her know the results and I feel this area is dermal and she will see a dermatologist.            Mary Hawkins, APRN  11/16/2021

## 2021-12-23 ENCOUNTER — OFFICE VISIT (OUTPATIENT)
Dept: OBSTETRICS AND GYNECOLOGY | Facility: CLINIC | Age: 56
End: 2021-12-23

## 2021-12-23 VITALS
BODY MASS INDEX: 40.97 KG/M2 | HEIGHT: 64 IN | SYSTOLIC BLOOD PRESSURE: 124 MMHG | DIASTOLIC BLOOD PRESSURE: 84 MMHG | WEIGHT: 240 LBS

## 2021-12-23 DIAGNOSIS — Z12.31 ENCOUNTER FOR SCREENING MAMMOGRAM FOR BREAST CANCER: ICD-10-CM

## 2021-12-23 DIAGNOSIS — Z13.820 ENCOUNTER FOR SCREENING FOR OSTEOPOROSIS: ICD-10-CM

## 2021-12-23 DIAGNOSIS — Z01.419 WELL WOMAN EXAM WITH ROUTINE GYNECOLOGICAL EXAM: Primary | ICD-10-CM

## 2021-12-23 DIAGNOSIS — A60.04 HERPES, VULVAR: ICD-10-CM

## 2021-12-23 DIAGNOSIS — F41.1 GENERALIZED ANXIETY DISORDER: ICD-10-CM

## 2021-12-23 PROCEDURE — 87624 HPV HI-RISK TYP POOLED RSLT: CPT | Performed by: NURSE PRACTITIONER

## 2021-12-23 PROCEDURE — G0123 SCREEN CERV/VAG THIN LAYER: HCPCS | Performed by: NURSE PRACTITIONER

## 2021-12-23 PROCEDURE — 99396 PREV VISIT EST AGE 40-64: CPT | Performed by: NURSE PRACTITIONER

## 2021-12-23 RX ORDER — VALACYCLOVIR HYDROCHLORIDE 500 MG/1
500 TABLET, FILM COATED ORAL DAILY
Qty: 30 TABLET | Refills: 12 | Status: SHIPPED | OUTPATIENT
Start: 2021-12-23 | End: 2022-09-08 | Stop reason: SDUPTHER

## 2021-12-23 NOTE — PROGRESS NOTES
"Subjective     Edith Stephens is a 56 y.o. female    Patient is here today for yearly checkup.  She has no GYN complaints.    Gynecologic Exam  The patient's pertinent negatives include no pelvic pain, vaginal bleeding or vaginal discharge. The patient is experiencing no pain. Pertinent negatives include no abdominal pain, anorexia, back pain, chills, constipation, diarrhea, discolored urine, dysuria, fever, flank pain, frequency, headaches, hematuria, joint pain, joint swelling, nausea, painful intercourse, rash, sore throat, urgency or vomiting. She is sexually active. She is postmenopausal.         /84   Ht 162.6 cm (64.02\")   Wt 109 kg (240 lb)   LMP 12/13/2015 (Approximate)   BMI 41.18 kg/m²     Outpatient Encounter Medications as of 12/23/2021   Medication Sig Dispense Refill   • BENZOYL PEROXIDE 5 % external wash   3   • clindamycin (CLEOCIN T) 1 % external solution   3   • cloNIDine (CATAPRES) 0.1 MG tablet      • Denta 5000 Plus 1.1 % cream      • docusate sodium (COLACE) 100 MG capsule      • Hydroquinone 4 % emulsion Apply  topically.     • hydrOXYzine (ATARAX) 25 MG tablet      • Latuda 80 MG tablet tablet      • lithium 600 MG capsule      • lithium carbonate 300 MG capsule      • Melatonin 3 MG tablet Take 2 tablets by mouth At Night As Needed for Sleep.     • Multiple Vitamins-Minerals (MULTIVITAMIN GUMMIES ADULT PO)      • nystatin 484324 UNIT/GM powder      • valACYclovir (Valtrex) 500 MG tablet Take 1 tablet by mouth Daily. 30 tablet 12   • vitamin D (ERGOCALCIFEROL) 1.25 MG (82066 UT) capsule capsule Take 1 capsule by mouth 1 (One) Time Per Week. 5 capsule 3   • [DISCONTINUED] valACYclovir (Valtrex) 500 MG tablet Take 1 tablet by mouth Daily. 30 tablet 12     No facility-administered encounter medications on file as of 12/23/2021.       Surgical History  Past Surgical History:   Procedure Laterality Date   • CERVICAL BIOPSY  W/ LOOP ELECTRODE EXCISION     • D & C HYSTEROSCOPY N/A " 5/30/2017    Procedure: DILATATION AND CURETTAGE HYSTEROSCOPY;  Surgeon: Citlali Arita MD;  Location: [facility] OR;  Service:    • OTHER SURGICAL HISTORY      cyst removed from foot as a child       Family History  Family History   Problem Relation Age of Onset   • Hypertension Father    • Coronary artery disease Paternal Grandfather    • Coronary artery disease Paternal Grandmother    • Diabetes Paternal Grandmother    • Kidney cancer Maternal Grandfather    • Thyroid disease Mother    • No Known Problems Sister    • Breast cancer Neg Hx    • Ovarian cancer Neg Hx    • Uterine cancer Neg Hx    • Colon cancer Neg Hx    • Melanoma Neg Hx    • Prostate cancer Neg Hx        The following portions of the patient's history were reviewed and updated as appropriate: allergies, current medications, past family history, past medical history, past social history, past surgical history and problem list.    Review of Systems   Constitutional: Negative for activity change, appetite change, chills, diaphoresis, fatigue, fever, unexpected weight gain and unexpected weight loss.   HENT: Negative for congestion, dental problem, drooling, ear discharge, ear pain, facial swelling, hearing loss, mouth sores, nosebleeds, postnasal drip, rhinorrhea, sinus pressure, sneezing, sore throat, swollen glands, tinnitus, trouble swallowing and voice change.    Eyes: Negative for blurred vision, double vision, photophobia, pain, discharge, redness, itching and visual disturbance.   Respiratory: Negative for apnea, cough, choking, chest tightness, shortness of breath, wheezing and stridor.    Cardiovascular: Negative for chest pain, palpitations and leg swelling.   Gastrointestinal: Negative for abdominal distention, abdominal pain, anal bleeding, anorexia, blood in stool, constipation, diarrhea, nausea, rectal pain, vomiting, GERD and indigestion.   Endocrine: Negative for cold intolerance, heat intolerance, polydipsia, polyphagia and polyuria.    Genitourinary: Negative for amenorrhea, breast discharge, breast lump, breast pain, decreased libido, decreased urine volume, difficulty urinating, dyspareunia, dysuria, flank pain, frequency, genital sores, hematuria, menstrual problem, pelvic pain, pelvic pressure, urgency, urinary incontinence, vaginal bleeding, vaginal discharge and vaginal pain.   Musculoskeletal: Negative for arthralgias, back pain, gait problem, joint pain, joint swelling, myalgias, neck pain, neck stiffness and bursitis.   Skin: Negative for color change, dry skin and rash.   Allergic/Immunologic: Negative for environmental allergies, food allergies and immunocompromised state.   Neurological: Negative for dizziness, tremors, seizures, syncope, facial asymmetry, speech difficulty, weakness, light-headedness, numbness, headache, memory problem and confusion.   Hematological: Negative for adenopathy. Does not bruise/bleed easily.   Psychiatric/Behavioral: Negative for agitation, behavioral problems, decreased concentration, dysphoric mood, hallucinations, self-injury, sleep disturbance, suicidal ideas, negative for hyperactivity, depressed mood and stress. The patient is not nervous/anxious.        Objective   Physical Exam  Vitals and nursing note reviewed. Exam conducted with a chaperone present.   Constitutional:       General: She is not in acute distress.     Appearance: She is well-developed. She is not diaphoretic.   HENT:      Head: Normocephalic.      Right Ear: External ear normal.      Left Ear: External ear normal.      Nose: Nose normal.   Eyes:      General: No scleral icterus.        Right eye: No discharge.         Left eye: No discharge.      Conjunctiva/sclera: Conjunctivae normal.      Pupils: Pupils are equal, round, and reactive to light.   Neck:      Thyroid: No thyromegaly.      Vascular: No carotid bruit.      Trachea: No tracheal deviation.   Cardiovascular:      Rate and Rhythm: Normal rate and regular rhythm.       Heart sounds: Normal heart sounds. No murmur heard.      Pulmonary:      Effort: Pulmonary effort is normal. No respiratory distress.      Breath sounds: Normal breath sounds. No wheezing.   Chest:   Breasts: Breasts are symmetrical.      Right: Normal. No swelling, bleeding, inverted nipple, mass, nipple discharge, skin change, tenderness, axillary adenopathy or supraclavicular adenopathy.      Left: Normal. No swelling, bleeding, inverted nipple, mass, nipple discharge, skin change, tenderness, axillary adenopathy or supraclavicular adenopathy.       Abdominal:      General: There is no distension.      Palpations: Abdomen is soft. There is no mass.      Tenderness: There is no abdominal tenderness. There is no right CVA tenderness, left CVA tenderness or guarding.      Hernia: No hernia is present. There is no hernia in the left inguinal area or right inguinal area.   Genitourinary:     General: Normal vulva.      Exam position: Lithotomy position.      Labia:         Right: No rash, tenderness, lesion or injury.         Left: No rash, tenderness, lesion or injury.       Vagina: Normal. No signs of injury and foreign body. No vaginal discharge, erythema, tenderness or bleeding.      Cervix: Normal.      Uterus: Normal. Not enlarged, not fixed and not tender.       Adnexa: Right adnexa normal and left adnexa normal.        Right: No mass, tenderness or fullness.          Left: No mass, tenderness or fullness.        Rectum: Normal. No mass.      Comments:   BSU normal  Urethral meatus  Normal  Perineum  Normal  Musculoskeletal:         General: No tenderness. Normal range of motion.      Cervical back: Normal range of motion and neck supple.   Lymphadenopathy:      Head:      Right side of head: No submental, submandibular, tonsillar, preauricular, posterior auricular or occipital adenopathy.      Left side of head: No submental, submandibular, tonsillar, preauricular, posterior auricular or occipital adenopathy.       Cervical: No cervical adenopathy.      Right cervical: No superficial, deep or posterior cervical adenopathy.     Left cervical: No superficial, deep or posterior cervical adenopathy.      Upper Body:      Right upper body: No supraclavicular, axillary or pectoral adenopathy.      Left upper body: No supraclavicular, axillary or pectoral adenopathy.      Lower Body: No right inguinal adenopathy. No left inguinal adenopathy.   Skin:     General: Skin is warm and dry.      Findings: No bruising, erythema or rash.   Neurological:      Mental Status: She is alert and oriented to person, place, and time.      Coordination: Coordination normal.   Psychiatric:         Mood and Affect: Mood normal.         Behavior: Behavior normal.         Thought Content: Thought content normal.         Judgment: Judgment normal.         Assessment/Plan   Diagnoses and all orders for this visit:    1. Well woman exam with routine gynecological exam (Primary)  Normal GYN exam. Will have lab work at PCP. Encouraged SBE, pt is aware how to do self breast exam and the importance of same. Discussed weight management and importance of maintaining a healthy weight. Discussed Vitamin D intake and the importance of adequate vitamin D for both Bone Health and a healthy immune system.  Discussed Daily exercise and the importance of same, in regards to a healthy heart as well as helping to maintain her weight and improving her mental health.  BMI 41.1.  Patient declines colonoscopy.  She will have a Cologuard done.  Mammogram was already done at [facility].  Bone density will be scheduled at [facility].  Pap smear is done per ASCCP guidelines.  -     Liquid-based Pap Smear, Screening  -     Cologuard - Stool, Per Rectum; Future  -     HPV DNA Probe, Direct - ThinPrep Vial, Cervix    2. Encounter for screening mammogram for breast cancer  Comments:  Patient has already had a mammogram at [facility].  We do  not have those results.  We will get them from [facility].    3. Encounter for screening for osteoporosis  Comments:  Patient will have a bone density at [facility].  Orders:  -     DEXA Bone Density Axial; Future    4. Herpes, vulvar  Comments:  Patient was found to have herpes through her STD screening.  She has many questions today.  We spent approximately 15 minutes discussing outbreaks and when to take the Valtrex.   Orders:  -     valACYclovir (Valtrex) 500 MG tablet; Take 1 tablet by mouth Daily.  Dispense: 30 tablet; Refill: 12    5. Generalized anxiety disorder  Comments:  Patient is followed by primary care and a psychiatrist.  States she is doing well on her meds at this time.         Patient's Body mass index is 41.18 kg/m². indicating that she is morbidly obese (BMI > 40 or > 35 with obesity - related health condition). Obesity-related health conditions include the following: hypertension. Obesity is unchanged. BMI is is above average; BMI management plan is completed. We discussed portion control and increasing exercise..      Kimberly Daley, APRN  12/23/2021

## 2021-12-23 NOTE — PATIENT INSTRUCTIONS
"BMI for Adults  What is BMI?  Body mass index (BMI) is a number that is calculated from a person's weight and height. BMI can help estimate how much of a person's weight is composed of fat. BMI does not measure body fat directly. Rather, it is an alternative to procedures that directly measure body fat, which can be difficult and expensive.  BMI can help identify people who may be at higher risk for certain medical problems.  What are BMI measurements used for?  BMI is used as a screening tool to identify possible weight problems. It helps determine whether a person is obese, overweight, a healthy weight, or underweight.  BMI is useful for:  · Identifying a weight problem that may be related to a medical condition or may increase the risk for medical problems.  · Promoting changes, such as changes in diet and exercise, to help reach a healthy weight. BMI screening can be repeated to see if these changes are working.  How is BMI calculated?  BMI involves measuring your weight in relation to your height. Both height and weight are measured, and the BMI is calculated from those numbers. This can be done either in English (U[place] or metric measurements. Note that charts and online BMI calculators are available to help you find your BMI quickly and easily without having to do these calculations yourself.  To calculate your BMI in English ([place] measurements:    1. Measure your weight in pounds (lb).  2. Multiply the number of pounds by 703.  ? For example, for a person who weighs 180 lb, multiply that number by 703, which equals 126,540.  3. Measure your height in inches. Then multiply that number by itself to get a measurement called \"inches squared.\"  ? For example, for a person who is 70 inches tall, the \"inches squared\" measurement is 70 inches x 70 inches, which equals 4,900 inches squared.  4. Divide the total from step 2 (number of lb x 703) by the total from step 3 (inches squared): 126,540 ÷ 4,900 = 25.8. This is " "your BMI.    To calculate your BMI in metric measurements:  1. Measure your weight in kilograms (kg).  2. Measure your height in meters (m). Then multiply that number by itself to get a measurement called \"meters squared.\"  ? For example, for a person who is 1.75 m tall, the \"meters squared\" measurement is 1.75 m x 1.75 m, which is equal to 3.1 meters squared.  3. Divide the number of kilograms (your weight) by the meters squared number. In this example: 70 ÷ 3.1 = 22.6. This is your BMI.  What do the results mean?  BMI charts are used to identify whether you are underweight, normal weight, overweight, or obese. The following guidelines will be used:  · Underweight: BMI less than 18.5.  · Normal weight: BMI between 18.5 and 24.9.  · Overweight: BMI between 25 and 29.9.  · Obese: BMI of 30 or above.  Keep these notes in mind:  · Weight includes both fat and muscle, so someone with a muscular build, such as an athlete, may have a BMI that is higher than 24.9. In cases like these, BMI is not an accurate measure of body fat.  · To determine if excess body fat is the cause of a BMI of 25 or higher, further assessments may need to be done by a health care provider.  · BMI is usually interpreted in the same way for men and women.  Where to find more information  For more information about BMI, including tools to quickly calculate your BMI, go to these websites:  · Centers for Disease Control and Prevention: www.cdc.gov  · American Heart Association: www.heart.org  · National Heart, Lung, and Blood [place]: www.nhlbi.nih.gov  Summary  · Body mass index (BMI) is a number that is calculated from a person's weight and height.  · BMI may help estimate how much of a person's weight is composed of fat. BMI can help identify those who may be at higher risk for certain medical problems.  · BMI can be measured using English measurements or metric measurements.  · BMI charts are used to identify whether you are underweight, normal " weight, overweight, or obese.  This information is not intended to replace advice given to you by your health care provider. Make sure you discuss any questions you have with your health care provider.  Document Revised: 09/09/2020 Document Reviewed: 07/17/2020  Elsevier Patient Education © 2021 [employer]

## 2021-12-27 LAB
GEN CATEG CVX/VAG CYTO-IMP: NORMAL
HPV I/H RISK 4 DNA CVX QL PROBE+SIG AMP: NOT DETECTED
LAB AP CASE REPORT: NORMAL
LAB AP GYN ADDITIONAL INFORMATION: NORMAL
LAB AP GYN OTHER FINDINGS: NORMAL
PATH INTERP SPEC-IMP: NORMAL
STAT OF ADQ CVX/VAG CYTO-IMP: NORMAL

## 2021-12-28 ENCOUNTER — OFFICE VISIT (OUTPATIENT)
Dept: NEUROLOGY | Age: 56
End: 2021-12-28
Payer: MEDICAID

## 2021-12-28 VITALS
SYSTOLIC BLOOD PRESSURE: 134 MMHG | OXYGEN SATURATION: 99 % | WEIGHT: 233 LBS | DIASTOLIC BLOOD PRESSURE: 81 MMHG | BODY MASS INDEX: 39.78 KG/M2 | HEIGHT: 64 IN | HEART RATE: 80 BPM

## 2021-12-28 DIAGNOSIS — G47.33 OBSTRUCTIVE SLEEP APNEA: Primary | ICD-10-CM

## 2021-12-28 DIAGNOSIS — Z99.89 CPAP (CONTINUOUS POSITIVE AIRWAY PRESSURE) DEPENDENCE: ICD-10-CM

## 2021-12-28 PROCEDURE — 3017F COLORECTAL CA SCREEN DOC REV: CPT | Performed by: PHYSICIAN ASSISTANT

## 2021-12-28 PROCEDURE — G8484 FLU IMMUNIZE NO ADMIN: HCPCS | Performed by: PHYSICIAN ASSISTANT

## 2021-12-28 PROCEDURE — 99213 OFFICE O/P EST LOW 20 MIN: CPT | Performed by: PHYSICIAN ASSISTANT

## 2021-12-28 PROCEDURE — G8417 CALC BMI ABV UP PARAM F/U: HCPCS | Performed by: PHYSICIAN ASSISTANT

## 2021-12-28 PROCEDURE — G8427 DOCREV CUR MEDS BY ELIG CLIN: HCPCS | Performed by: PHYSICIAN ASSISTANT

## 2021-12-28 PROCEDURE — 1036F TOBACCO NON-USER: CPT | Performed by: PHYSICIAN ASSISTANT

## 2021-12-28 NOTE — LETTER
[id number] [facility]  [address] [place] [address] [address], [address]  [place]  Phone [phone]  Fax [phone]             Re:  Urmila Quisperi    21  :  1965  Address: [address] [place] [address]       Replinishible PAP Supplies, 1 year supply  Item HPCPS Code Frequency   Mask of choice  or  1 per 3 months   Nasal Mask cushion/pillows  or  2 per 30 days   Full Face Mask Interface  1 per 30 days   Headgear  1 per 6 months   Tubing, length of choice  or  1 per 3 months   Water Chamber  1 per 6 months   Chinstrap  1 per 6 months   Disposable Filters  2 per 30 days   Reusable Filters  1 per 6 months     Diagnoses:  Obstructive sleep apnea (G47.33)  Length of Need: Lifetime, 99    Ordering Provider: Seda Lopez PA-C  NPI:  [id number]        Signature: [unfilled]        Date: 2021      Electronically Signed by Seda Lopez PA-C  on 2021 at 2:29 PM

## 2021-12-28 NOTE — PROGRESS NOTES
[facility]  [address] [place] [address] Drive, [address] [address]  [place]  Phone [phone]  Fax [phone]       [facility] Sleep Follow Up Encounter      Information:   Patient Name: Kane Olivera  :   1965  Age:   55 y.o. MRN:   [id number]  Account #:  [de-identified]  Today:                21    Provider:  Reina Miller PA-C    Chief Complaint   Patient presents with    Sleep Apnea     follow up         Subjective:   Kane Olivera is a 55 y.o. female  with a history of SANDY who comes in for a sleep clinic follow up. She was referred for a PSG due to snoring and excessive daytime somnolence. The PSG, 9/15/2020 revealed an AHI of 21.1. She is prescribed CPAP therapy with a pressure of 8cm. The compliance report indicates that she is averaging 9 hours of CPAP use per day. She sometimes goes to sleep before she puts the mask on. She goes to bed early. She reports that consistent PAP use has alleviated the previous SANDY symptoms.     Location or symptom:  SANDY  Onset:  PS2020   Timing:  q hs  Severity:  Moderate  Associated:  Snoring, witnessed apneas, and excessive daytime somnolence  Alleviated:  CPAP      Objective:     Past Medical History:   Diagnosis Date    Chest pain 2012    CPAP (continuous positive airway pressure) dependence     Depression     Hypoglycemia     SANDY (obstructive sleep apnea)     Schizoaffective disorder ([facility] [address] 2012    Suicide attempt [facility])        Past Surgical History:   Procedure Laterality Date    DILATION AND CURETTAGE OF UTERUS  2017    [facility]         Recent Hospitalizations  ·     Significant Injuries  ·     No family history on file.     Social History  Social History     Tobacco Use   Smoking Status Never Smoker   Smokeless Tobacco Never Used     Social History     Substance and Sexual Activity   Alcohol Use No     Social History     Substance and Sexual Activity   Drug Use No         Current Outpatient Medications   Medication Sig Dispense Refill  clindamycin (CLEOCIN T) 1 % external solution Apply topically 2 times daily Apply topically 2 times daily.  lurasidone (LATUDA) 80 MG TABS tablet Take 1 tablet by mouth daily (Patient taking differently: Take 80 mg by mouth nightly ) 30 tablet 1    melatonin 3 MG TABS tablet Take 1 tablet by mouth nightly 30 tablet 1    cloNIDine (CATAPRES) 0.1 MG tablet Take 1 tablet by mouth nightly 90 tablet 1    lithium 300 MG capsule Take 1 capsule by mouth 2 times daily (with meals) 60 capsule 0    vitamin D (ERGOCALCIFEROL) 1.25 MG (62249 UT) CAPS capsule Take 1 capsule by mouth once a week for 11 doses (Patient taking differently: Take 50,000 Units by mouth Twice a Week Take on Sunday and Wednesday) 5 capsule 1     No current facility-administered medications for this visit. Allergies:  Seroquel [quetiapine fumarate], Adderall [amphetamine-dextroamphetamine], Amphetamines, Asa [aspirin], Codeine, Cogentin [benztropine], Depakote [divalproex sodium], Effexor [venlafaxine], Estrogens, Geodon [ziprasidone hcl], Hydrocodone, Lortab [hydrocodone-acetaminophen], Macrolides and ketolides, Metoprolol, Neurontin [gabapentin], Other, Pcn [penicillins], Provera [medroxyprogesterone], Prozac [fluoxetine hcl], Tetracyclines & related, Trazodone and nefazodone, Trileptal [oxcarbazepine], Trintellix [vortioxetine], Valium [diazepam], Vancomycin, Wellbutrin [bupropion], Zoloft [sertraline hcl], and Zyprexa [olanzapine]    REVIEW OF SYSTEMS     Constitutional: []? Fever []? Sweats []? Chills []? Recent Injury   [x]? Denies all unless marked  HENT:[]? Headache  []? Head Injury  []? Sore Throat  []? Ear Pain  []? Dizziness []? Hearing Loss   [x]? Denies all unless marked  Musculoskeletal: []? Arthralgia  []? Myalgias []? Muscle cramps  []? Muscle twitches   [x]? Denies all unless marked   Spine:  []? Neck pain  []? Back pain  []? Sciaticia  [x]? Denies all unless marked  Neurological:[]? Visual Disturbance []? Double Vision []? Slurred Speech []? Trouble swallowing  []? Vertigo []? Tingling []? Numbness []? Weakness []? Loss of Balance   []? Loss of Consciousness []? Memory Loss []? Seizures  [x]? Denies all unless marked  Psychiatric/Behavioral:[]? Depression []? Anxiety  [x]? Denies all unless marked  Sleep: []? Insomnia []? Sleep Disturbance []? Snoring []? Restless Legs []? Daytime Sleepiness [x]? Sleep Apnea  []? Denies all unless marked    The MA has completed the ROS with the patient. I have reviewed it in its' entirety with the patient and agree with the documentation. PHYSICAL EXAM  /81 (Site: Left Upper Arm, Position: Sitting, Cuff Size: Medium Adult)   Pulse 80   Ht 5' 4\" (1.626 m)   Wt 233 lb (105.7 kg)   SpO2 99%   Breastfeeding No   BMI 39.99 kg/m²      Constitutional   Alert in NAD, well developed, pleasant and cooperative with exam  HEENT- Conjunctiva normal.  No scars, masses, or lesions over external nose or ears, hearing intact, no neck masses noted, no jugular vein distension, no bruit  Cardiac- Regular rate and rhythm  Pulmonary- Clear to auscultation, good expansion, normal effort without use of accessory muscles  Musculoskeletal  No significant wasting of muscles noted, no bony deformities  Extremities - No clubbing, cyanosis or edema  Skin  Warm, dry, and intact. No rash, erythema, or pallor  Psychiatric  Mood, affect, and behavior appear normal      NEUROLOGIC EXAMINATION:  Mental Status:  alert, oriented to person, place, and time. Speech:  Clear without dysarthria or dysphonia  Language:  Fluent without aphasia  Cranial Nerves:              II          Visual fields are full to confrontation              III,IV, VI           Extraocular movements are full              VII        Facial movements are symmetrical without weakness              VIII       Hearing is intact  Motor:  Normal strength in both upper and lower extremities. Normal muscle tone and bulk.     Coordination:  Rapid alternating movements are normal in both upper and lower extremities. Finger to nose testing is unimpaired bilaterally. Gait:  Normal station and gait. I reviewed the following studies:       []  :  Clinical laboratory test results     []  :  Radiology reports                    [x]  :  Review and summarization of medical records and/or obtain medical records        []  :  Previous/recent polysomnogram report(s)     []  :  [place] Sleepiness Scale       [x]  :  Compliance download: The CPAP is set at a pressure of 8cm. Compliance download shows that she uses device: 100% of the time;  percentage of days with usage >=4 hours: 100%. AHI: 0.8    Assessment:       ICD-10-CM    1. Obstructive sleep apnea  G47.33    2. CPAP (continuous positive airway pressure) dependence  Z99.89           []  :  Stable     []  :  Improved                       [x]  :  Well controlled              []  :  Resolving     []  :  Resolved     []  :  Inadequately controlled     []  :  Worsening     []  :  Additional workup planned    Patient is compliant and benefiting from therapy as indicated by compliance evaluation and patient report. Plan:     No orders of the defined types were placed in this encounter. 1.   Previously or presently advised of the etiology,  pathophysiology, diagnosis, treatment options, and risks of untreated SANDY. Risks may include, but are not limited to  hypertension, coronary artery disease, atrial fibrillation, CHF, diabetes, stroke, weight gain, impaired cognition, daytime somnolence, and motor vehicle accidents. Advised to abstain from driving or operating heavy machinery when drowsy and the use of respiratory suppressants. Discussed diagnostic studies and potential treatment plan. 2.  Will evaluate for PAP clinical benefit and and compliance during a 30 day period within the preceding 90 days PRN.   3.  The following educational material has been included in this visit after visit summary for your review: SANDY/PAP guidelines-Discussed with the patient and all questions fully answered. 4.  Continue PAP  therapy. The patient voices understanding and recognizes the need for adherence to the prescribed therapy  5. Order-supplies-[employer]  6.   Follow up in 1 year

## 2021-12-28 NOTE — PATIENT INSTRUCTIONS
Instructions:  Soak supplies in 50% white vinegar and 50% water; don't soak the \"velvet\". (2 cups each should work). Rinse it real well. Patient education: Sleep apnea in adults       INTRODUCTION  Normally during sleep, air moves through the throat and in and out of the lungs at a regular rhythm. In a person with sleep apnea, air movement is periodically diminished or stopped. There are two types of sleep apnea: obstructive sleep apnea and central sleep apnea. In obstructive sleep apnea, breathing is abnormal because of narrowing or closure of the throat. In central sleep apnea, breathing is abnormal because of a change in the breathing control and rhythm. Sleep apnea is a serious condition that can affect a person's ability to safely perform normal daily activities and can affect long term health. Approximately 25 percent of adults are at risk for sleep apnea of some degree. Men are more commonly affected than women. Other risk factors include middle and older age, being overweight or obese, and having a small mouth and throat. This topic review focuses on the most common type of sleep apnea in adults, obstructive sleep apnea (SANDY). HOW SLEEP APNEA OCCURS  The throat is surrounded by muscles that control the airway for speaking, swallowing, and breathing. During sleep, these muscles are less active, and this causes the throat to narrow. In most people, this narrowing does not affect breathing. In others, it can cause snoring, sometimes with reduced or completely blocked airflow. A completely blocked airway without airflow is called an obstructive apnea. Partial obstruction with diminished airflow is called a hypopnea. A person may have apnea and hypopnea during sleep. Insufficient breathing due to apnea or hypopnea causes oxygen levels to fall and carbon dioxide to rise. Because the airway is blocked, breathing faster or harder does not help to improve oxygen levels until the airway is reopened.  Certain factors increase the risk of sleep apnea. ?Increasing age [de-identified] SANDY occurs at all ages, but it is more common in middle and older age adults. ?Male sex  SANDY is two times more common in men, especially in middle age. ?Obesity  The more obese a person is, the more likely he or she is to have SANDY. ? Sedation from medication or alcohol  This interferes with the ability to awaken from sleep and can lengthen periods of apnea (no breathing), with potentially dangerous consequences. ? Abnormality of the airway. SLEEP APNEA CONSEQUENCES  Complications of sleep apnea can include daytime sleepiness and difficulty concentrating. The consequence of this is an increased risk of accidents and errors in daily activities. Studies have shown that people with severe SANDY are more than twice as likely to be involved in a motor vehicle accident as people without these conditions. People with SANDY are encouraged to discuss options for driving, working, and performing other high-risk tasks with a healthcare provider. In addition, people with untreated SANDY may have an increased risk of cardiovascular problems such as high blood pressure, heart attack, abnormal heart rhythms, or stroke. This risk may be due to changes in the heart rate and blood pressure that occur during sleep. SLEEP APNEA DIAGNOSIS  The diagnosis of SANDY is best made by a knowledgeable sleep medicine specialist who has an understanding of the individual's health issues. The diagnosis is usually based upon the person's medical history, physical examination, and testing, including:  ? A complaint of snoring and ineffective sleep  ? Neck size (greater than 16 inches in men or 14 inches in women) is associated with an increased risk of sleep apnea  ? A small upper airway: difficulty seeing the throat because of a tongue that is large for the mouth  ? High blood pressure, especially if it is resistant to treatment  ? If a bed partner has observed the patient during episodes of stopped breathing (apnea), choking, or gasping during sleep, there is a strong possibility of sleep apnea. Testing is usually performed in a sleep laboratory. A full sleep study is called a polysomnogram. The polysomnogram measures the breathing effort and airflow, blood oxygen level, heart rate and rhythm, duration of the various stages of sleep, body position, and movement of the arms/legs. Home monitoring devices are available that can perform a sleep study. This is a reasonable alternative to conventional testing in a sleep laboratory if the clinician strongly suspects moderate or severe sleep apnea and the patient does not have other illnesses or sleep disorders that may interfere with the results. SLEEP APNEA TREATMENT  Sleep apnea is best treated by a knowledgeable sleep medicine specialist. The goal of treatment is to maintain an open airway during sleep. Effective treatment will eliminate the symptoms of sleep disturbance; long-term health consequences are also reduced. Most treatments require nightly use. The challenge for the clinician and the patient is to select an effective therapy that is appropriate for the patient's problem and that is acceptable for long term use. Auto-titrating CPAP delivers an amount of PAP that varies during the night. The variation is dependent on event detection software algorithms, which will increase the pressure gradually in response to flow changes until adequate patency is detected. After a period of sustained upper airway patency, the delivered level of pressure gradually decreases until the algorithm identifies recurrent upper airway obstruction, at which point the delivered pressure again increases. The result is that the delivered pressure varies throughout the night, in an effort to provide the lowest pressure that is necessary to maintain upper airway patency.     Continuous positive airway pressure (CPAP)  The most effective treatment for sleep apnea uses air pressure from a mechanical device to keep the upper airway open during sleep. A CPAP (continuous positive airway pressure)  device uses an air-tight attachment to the nose, typically a mask, connected to a tube and a blower which generates the pressure. Devices that fit comfortably into the nasal opening, rather than over the nose, are also available. CPAP should be used any time the person sleeps (day or night). The CPAP device is usually used for the first time in the sleep lab, where a technician can adjust the pressure and select the best equipment to keep the airway open. Alternatively, an auto device with a self-adjusting pressure feature, provided with proper education and training, can get treatment started without another sleep test. While the treatment may seem uncomfortable, noisy, or bulky at first, most people accept the treatment after experiencing better sleep. However, difficulty with mask comfort and nasal congestion prevent up to 50 percent of people from using the treatment on a regular basis. Continued follow up with a healthcare provider helps to ensure that the treatment is effective and comfortable. Information from the CPAP machine is often used by physicians, therapists, and insurers to track the success of treatment. CPAP can be delivered with different features to improve comfort and solve problems that may come up during treatment. Changes in treatment may be needed if symptoms do not improve or if the persons condition changes, such as a gain or loss of weight. Adjust sleep position  Adjusting sleep position (to stay off the back) may help improve sleep quality in people who have SANDY when sleeping on the back. However, this is difficult to maintain throughout the night and is rarely an adequate solution. Weight loss  Weight loss may be helpful for obese or overweight patients.  Weight loss may be accomplished with dietary changes, exercise, and/or surgical treatment. However, it can be difficult to maintain weight loss; the five-year success of non-surgical weight loss is only 5 percent, meaning that 95 percent of people regain lost weight. Avoid alcohol and other sedatives  Alcohol can worsen sleepiness, potentially increasing the risk of accidents or injury. People with SANDY are often counseled to drink little to no alcohol, even during the daytime. Similarly, people who take anti-anxiety medications or sedatives to sleep should speak with their healthcare provider about the safety of these medications. People with SANDY must notify all healthcare providers, including surgeons, about their condition and the potential risks of being sedated. People with SANDY who are given anesthesia and/or pain medications require special management and close monitoring to reduce the risk of a blocked airway. Dental devices  A dental device, called an oral appliance or mandibular advancement device, can reposition the jaw (mandible), bringing the tongue and soft palate forward as well. This may relieve obstruction in some people. This treatment is excellent for reducing snoring, although the effect on SANDY is sometimes more limited. As a result, dental devices are best used for mild cases of SANDY when relief of snoring is the main goal. Failure to tolerate and accept CPAP is another indication for dental devices. While dental devices are not as effective as CPAP for SANDY, some patients prefer a dental device to CPAP. Side effects of dental devices are generally minor but may include changes to the bite with prolonged use. Surgical treatment  Surgery is an alternative therapy for patients who cannot tolerate or do not improve with nonsurgical treatments such as CPAP or oral devices. Surgery can also be used in combination with other nonsurgical treatments. Surgical procedures reshape structures in the upper airways or surgically reposition bone or soft tissue. Uvulopalatopharyngoplasty (UPPP) removes the uvula and excessive tissue in the throat, including the tonsils, if present. Other procedures, such as maxillomandibular advancement (MMA), address both the upper and lower pharyngeal airway more globally. UPPP alone has limited success rates (less than 50 percent) and people can relapse (when SANDY symptoms return after surgery). As a result, this surgery is only recommended in a minority of people and should be considered with caution. MMA may have a higher success rate, particularly in people with abnormal jaw (maxilla and mandible) anatomy, but it is the most complicated procedure. A newer surgical approach, nerve stimulation to protrude the tongue, has promising success rates in very selected people. Tracheostomy creates a permanent opening in the neck. It is reserved for people with severe disease in whom less drastic measures have failed or are inappropriate. Although it is always successful in eliminating obstructive sleep apnea, tracheostomy requires significant lifestyle changes and carries some serious risks (eg, infection, bleeding, blockage). All surgical treatments require discussions about the goals of treatment, the expected outcomes, and potential complications. Hypoglossal nerve stimulator- \"Inspire\" device    PAP treatment failure:  Possible causes of treatment failure include nonadherence or suboptimal adherence, weight gain, an inappropriate level of prescribed positive pressure, or an additional disorder causing sleepiness (eg, narcolepsy) that may require alterations in the therapeutic regimen. A review of medications should also be undertaken since many drugs may lead to sleepiness. Inadequate sleep time may also negate the expected effects from treatment of SANDY.  Also, pt's can have persistent hypersomnolence associated with sleep apnea even in the presence of adequate therapy and at those times Provigil or Nuvigil or other stimulants may be indicated. Once the patient's positive airway pressure therapy has been optimized and symptoms resolved, a regimen of long-term follow-up should be established. Annual visits are reasonable, with more frequent visits in between if new issues arise. The purpose of long-term follow-up is to assess usage and monitor for recurrent SANDY, new side effects, air leakage, and fluctuations in body weight. WHERE TO GET MORE INFORMATION  Your healthcare provider is the best source of information for questions and concerns related to your medical problem. Organizations  American [employer] Association  Provides information about sleep apnea to the public, publishes a newsletter, and serves as an advocate for people with the disorder. [place], [address] [facility], [address] [facility]   Ariel@[employer]ac. org   [email or web address] org   Tel: [phone]   Fax: [facility] organization that works to [employer] and safety by promoting public understanding of sleep and sleep disorders. Supports sleep-related education, research, and advocacy; produces and distributes educational materials to the public and healthcare professionals; and offers postdoctoral fellowships and grants for sleep researchers. [address]   Opal@[email or web address] org   [employer]. org   Tel: [phone]   Fax: [phone]    Important information:  Medicare/private insurance CPAP/BiPAP/APAP requirements:  Medicare/private insurance has specific requirements for PAP compliance that must be met during the first 90 days of use to continue coverage for CPAP/BiPAP/APAP  from day 91 and beyond. The policy requires that patients use a PAP device 4 hours per 24 hour period, at least 70% of the time over a 30 day period. This data must be downloaded as a report direct from the PAP devices. This is called a compliance download. Your PAP supplier will assist you in this matter. Note:  Where applicable, we will utilize PAP device efficiency reports, additional testing, and face-to-face  clinical evaluation subsequent to any treatment, changes in treatment, and continued treatment. Caution:  Please abstain from driving or engaging in other activities which may be hazardous in the presence of diminished alertness or daytime drowsiness. And avoid the use of sedatives or alcohol, which can worsen sleep apnea and daytime drowsiness. Mask suggestions:  -     Resmed Airfit N20 (Nasal) or F20 (Full face mask). They conform to your face, thus decreasing the potential for mask leakage. You might like the AirTouch F20(full face mask). It has a \"memory foam\" like cushion. The AirFit F30 is a smaller style full face mask designed to sit low on and cover less of your face for fewer facial marks. AirFit N30i has a top of the head tube with a nasal mask. AirFit P10 reported to be the most comfortable nasal pillow mask. Resmed Mirage FX reported to be the most comfortable nasal mask. Resmed Mirage [place] reported to be the most comfortable hybrid mask. AirTouch N20-memory foam nasal mask. Respironics: You might also like to try a nasal mask called a Dreamwear nasal mask or the Dreamwear nasal pillow. Another suggestion is the [facility], it is a minimal contact full face mask. The Darcus Sjogren incredible under the nose design makes it the only full face mask that won't cause red marks on the bridge of your nose when compared to other full face masks. The Dreamwear full face mask has a  soft feel, unique in-frame air-flow, and innovative air tube connection at the top of the head for the ultimate in sleep comfort. Comfort Gel Blue. Dreamwear gel pillows. [employer]: Brevida nasal pillow mask and Simplus FFM    The use of a memory foam CPAP pillow supports the head and neck throughout the night.

## 2021-12-30 ENCOUNTER — HOSPITAL ENCOUNTER (OUTPATIENT)
Dept: BONE DENSITY | Facility: HOSPITAL | Age: 56
Discharge: HOME | End: 2021-12-30
Admitting: NURSE PRACTITIONER

## 2021-12-30 DIAGNOSIS — Z13.820 ENCOUNTER FOR SCREENING FOR OSTEOPOROSIS: ICD-10-CM

## 2021-12-30 DIAGNOSIS — A60.04 HERPES, VULVAR: Primary | ICD-10-CM

## 2021-12-30 PROCEDURE — 77080 DXA BONE DENSITY AXIAL: CPT

## 2021-12-30 RX ORDER — VALACYCLOVIR HYDROCHLORIDE 1 G/1
1000 TABLET, FILM COATED ORAL 2 TIMES DAILY
Qty: 10 TABLET | Refills: 0 | Status: SHIPPED | OUTPATIENT
Start: 2021-12-30 | End: 2022-01-04

## 2021-12-30 NOTE — TELEPHONE ENCOUNTER
Pt called c/o HSV outbreak and wanted to know if she needs to increase her Valtrex for outbreak. Med pended for review.

## 2022-01-04 DIAGNOSIS — N64.9 BREAST LESION: ICD-10-CM

## 2022-01-05 NOTE — PROGRESS NOTES
Tell patient her mammogram was ok and to check breast monthly and come in if there are any concerns.  Mary Hawkins, APRN

## 2022-02-21 NOTE — TELEPHONE ENCOUNTER
Per  request, NAINA placed call to [facility] program to set up an appointment for pt. It was reported that they would call pt and give her an appointment date/time for zoom. All appointments are completed over zoom. NAINA attempted to call pt at home and at pt's parents house to inform her of above information.  However, received voicemail on both numbers.      Electronically signed by Nolberto Webb LPC on 8/3/2020 at 9:41 AM

## 2022-06-02 ENCOUNTER — HOSPITAL ENCOUNTER (OUTPATIENT)
Dept: GENERAL RADIOLOGY | Age: 57
Discharge: HOME | End: 2022-06-02
Payer: MEDICAID

## 2022-06-02 DIAGNOSIS — N18.31 STAGE 3A CHRONIC KIDNEY DISEASE (HCC): ICD-10-CM

## 2022-06-02 PROCEDURE — 76770 US EXAM ABDO BACK WALL COMP: CPT

## 2022-06-02 PROCEDURE — 76770 US EXAM ABDO BACK WALL COMP: CPT | Performed by: RADIOLOGY

## 2022-06-13 ENCOUNTER — OFFICE VISIT (OUTPATIENT)
Dept: OBSTETRICS AND GYNECOLOGY | Facility: CLINIC | Age: 57
End: 2022-06-13

## 2022-06-13 VITALS
WEIGHT: 234 LBS | HEIGHT: 64 IN | BODY MASS INDEX: 39.95 KG/M2 | DIASTOLIC BLOOD PRESSURE: 80 MMHG | SYSTOLIC BLOOD PRESSURE: 128 MMHG

## 2022-06-13 DIAGNOSIS — L02.92 BOIL: Primary | ICD-10-CM

## 2022-06-13 DIAGNOSIS — B37.9 CANDIDIASIS: ICD-10-CM

## 2022-06-13 PROCEDURE — 99213 OFFICE O/P EST LOW 20 MIN: CPT | Performed by: NURSE PRACTITIONER

## 2022-06-13 RX ORDER — AZITHROMYCIN 250 MG/1
TABLET, FILM COATED ORAL
Qty: 6 TABLET | Refills: 0 | Status: SHIPPED | OUTPATIENT
Start: 2022-06-13 | End: 2022-07-18

## 2022-06-13 RX ORDER — CARIPRAZINE 1.5 MG/1
CAPSULE, GELATIN COATED ORAL
COMMUNITY
Start: 2022-06-03 | End: 2022-06-13 | Stop reason: SDUPTHER

## 2022-06-13 RX ORDER — LITHIUM CARBONATE 600 MG/1
600 CAPSULE ORAL
COMMUNITY
End: 2022-07-18

## 2022-06-13 RX ORDER — FLUCONAZOLE 150 MG/1
150 TABLET ORAL ONCE
Qty: 2 TABLET | Refills: 0 | Status: SHIPPED | OUTPATIENT
Start: 2022-06-13 | End: 2022-06-13

## 2022-06-13 NOTE — PROGRESS NOTES
"Subjective     Edith Stephens is a 57 y.o. female    Patient comes in today with complaint of a boil on her labia.  She also complains of yeast under her breast and her abdomen.    Gynecologic Exam  The patient's primary symptoms include genital lesions. The patient's pertinent negatives include no pelvic pain, vaginal bleeding or vaginal discharge. This is a recurrent problem. The current episode started in the past 7 days. The problem occurs daily. The problem has been gradually improving. The pain is mild. She is not pregnant. Pertinent negatives include no abdominal pain, anorexia, back pain, chills, constipation, diarrhea, discolored urine, dysuria, fever, flank pain, frequency, headaches, hematuria, joint pain, joint swelling, nausea, painful intercourse, rash, sore throat, urgency or vomiting. Nothing aggravates the symptoms. She is sexually active. She is postmenopausal.         /80   Ht 162.6 cm (64.02\")   Wt 106 kg (234 lb)   LMP 12/13/2015 (Approximate)   Breastfeeding No   BMI 40.14 kg/m²     Outpatient Encounter Medications as of 6/13/2022   Medication Sig Dispense Refill   • BENZOYL PEROXIDE 5 % external wash   3   • clindamycin (CLEOCIN T) 1 % external solution   3   • cloNIDine (CATAPRES) 0.1 MG tablet      • Denta 5000 Plus 1.1 % cream      • docusate sodium (COLACE) 100 MG capsule      • Hydroquinone 4 % emulsion Apply  topically.     • hydrOXYzine (ATARAX) 25 MG tablet      • Latuda 80 MG tablet tablet      • lithium 600 MG capsule Take 600 mg by mouth 3 (Three) Times a Day With Meals.     • Melatonin 3 MG tablet Take 2 tablets by mouth At Night As Needed for Sleep.     • Multiple Vitamins-Minerals (MULTIVITAMIN GUMMIES ADULT PO)      • Multiple Vitamins-Minerals (MULTIVITAMIN GUMMIES ADULT PO)      • nystatin 461677 UNIT/GM powder      • valACYclovir (Valtrex) 500 MG tablet Take 1 tablet by mouth Daily. 30 tablet 12   • vitamin D (ERGOCALCIFEROL) 1.25 MG (90447 UT) capsule capsule Take " 1 capsule by mouth 1 (One) Time Per Week. 5 capsule 3   • [DISCONTINUED] lithium carbonate 300 MG capsule      • azithromycin (Zithromax Z-Chip) 250 MG tablet Take 2 tablets the first day, then 1 tablet daily for 4 days. 6 tablet 0   • fluconazole (Diflucan) 150 MG tablet Take 1 tablet by mouth 1 (One) Time for 1 dose. Take once a week for 2 weeks. 2 tablet 0   • [DISCONTINUED] lithium 600 MG capsule      • [DISCONTINUED] Vraylar 1.5 MG capsule capsule        No facility-administered encounter medications on file as of 6/13/2022.       Surgical History  Past Surgical History:   Procedure Laterality Date   • CERVICAL BIOPSY  W/ LOOP ELECTRODE EXCISION     • D & C HYSTEROSCOPY N/A 5/30/2017    Procedure: DILATATION AND CURETTAGE HYSTEROSCOPY;  Surgeon: Citlali Arita MD;  Location: [facility];  Service:    • OTHER SURGICAL HISTORY      cyst removed from foot as a child       Family History  Family History   Problem Relation Age of Onset   • Hypertension Father    • Coronary artery disease Paternal Grandfather    • Coronary artery disease Paternal Grandmother    • Diabetes Paternal Grandmother    • Kidney cancer Maternal Grandfather    • Thyroid disease Mother    • No Known Problems Sister    • Breast cancer Neg Hx    • Ovarian cancer Neg Hx    • Uterine cancer Neg Hx    • Colon cancer Neg Hx    • Melanoma Neg Hx    • Prostate cancer Neg Hx        The following portions of the patient's history were reviewed and updated as appropriate: allergies, current medications, past family history, past medical history, past social history, past surgical history, and problem list.    Review of Systems   Constitutional: Negative for activity change, appetite change, chills, diaphoresis, fatigue, fever, unexpected weight gain and unexpected weight loss.   HENT: Negative for congestion, dental problem, drooling, ear discharge, ear pain, facial swelling, hearing loss, mouth sores, nosebleeds, postnasal drip, rhinorrhea, sinus  pressure, sneezing, sore throat, swollen glands, tinnitus, trouble swallowing and voice change.    Eyes: Negative for blurred vision, double vision, photophobia, pain, discharge, redness, itching and visual disturbance.   Respiratory: Negative for apnea, cough, choking, chest tightness, shortness of breath, wheezing and stridor.    Cardiovascular: Negative for chest pain, palpitations and leg swelling.   Gastrointestinal: Negative for abdominal distention, abdominal pain, anal bleeding, anorexia, blood in stool, constipation, diarrhea, nausea, rectal pain, vomiting, GERD and indigestion.   Endocrine: Negative for cold intolerance, heat intolerance, polydipsia, polyphagia and polyuria.   Genitourinary: Positive for vaginal pain. Negative for amenorrhea, breast discharge, breast lump, breast pain, decreased libido, decreased urine volume, difficulty urinating, dyspareunia, dysuria, flank pain, frequency, genital sores, hematuria, menstrual problem, pelvic pain, pelvic pressure, urgency, urinary incontinence, vaginal bleeding and vaginal discharge.   Musculoskeletal: Negative for arthralgias, back pain, gait problem, joint pain, joint swelling, myalgias, neck pain, neck stiffness and bursitis.   Skin: Negative for color change, dry skin and rash.   Allergic/Immunologic: Negative for environmental allergies, food allergies and immunocompromised state.   Neurological: Negative for dizziness, tremors, seizures, syncope, facial asymmetry, speech difficulty, weakness, light-headedness, numbness, headache, memory problem and confusion.   Hematological: Negative for adenopathy. Does not bruise/bleed easily.   Psychiatric/Behavioral: Negative for agitation, behavioral problems, decreased concentration, dysphoric mood, hallucinations, self-injury, sleep disturbance, suicidal ideas, negative for hyperactivity, depressed mood and stress. The patient is not nervous/anxious.        Objective   Physical Exam  Vitals and nursing note  reviewed. Exam conducted with a chaperone present.   Constitutional:       Appearance: She is well-developed.   HENT:      Head: Normocephalic and atraumatic.   Abdominal:      General: There is no distension.      Palpations: Abdomen is soft.      Tenderness: There is no abdominal tenderness.      Hernia: There is no hernia in the left inguinal area or right inguinal area.   Genitourinary:     General: Normal vulva.      Exam position: Lithotomy position.      Labia:         Right: No rash, tenderness, lesion or injury.         Left: No rash, tenderness, lesion or injury.       Vagina: Normal. No vaginal discharge, erythema, tenderness or bleeding.      Cervix: Normal.      Uterus: Normal. Not enlarged and not tender.       Adnexa: Right adnexa normal and left adnexa normal.        Right: No mass, tenderness or fullness.          Left: No mass, tenderness or fullness.         Lymphadenopathy:      Lower Body: No right inguinal adenopathy. No left inguinal adenopathy.   Skin:     General: Skin is warm and dry.   Neurological:      Mental Status: She is alert and oriented to person, place, and time.   Psychiatric:         Mood and Affect: Mood normal.         Behavior: Behavior normal.         Thought Content: Thought content normal.         Judgment: Judgment normal.         Assessment & Plan   Diagnoses and all orders for this visit:    1. Boil (Primary)  Comments:  She comes in with complaint of a boil on her labia.  She did get some fluid out of it.  It is not swollen today.  She is given a Z-Chip.  She is encouraged to bath daily.  She will call if the area does not improve.  Orders:  -     azithromycin (Zithromax Z-Chip) 250 MG tablet; Take 2 tablets the first day, then 1 tablet daily for 4 days.  Dispense: 6 tablet; Refill: 0    2. Candidiasis  Comments:  Patient has yeast under her abdominal fold.  She uses nystatin powder.  We will also add Diflucan.  Orders:  -     fluconazole (Diflucan) 150 MG tablet; Take 1  tablet by mouth 1 (One) Time for 1 dose. Take once a week for 2 weeks.  Dispense: 2 tablet; Refill: 0         Class 3 Severe Obesity (BMI >=40). Obesity-related health conditions include the following: hypertension, diabetes mellitus and GERD. Obesity is unchanged. BMI is is above average; BMI management plan is completed. We discussed portion control and increasing exercise.      Kimberly Daley, APRN  6/13/2022

## 2022-07-11 RX ORDER — ERGOCALCIFEROL 1.25 MG/1
CAPSULE ORAL
Qty: 5 CAPSULE | Refills: 3 | OUTPATIENT
Start: 2022-07-11

## 2022-07-18 ENCOUNTER — OFFICE VISIT (OUTPATIENT)
Dept: OBSTETRICS AND GYNECOLOGY | Facility: CLINIC | Age: 57
End: 2022-07-18

## 2022-07-18 VITALS
WEIGHT: 232 LBS | DIASTOLIC BLOOD PRESSURE: 72 MMHG | SYSTOLIC BLOOD PRESSURE: 128 MMHG | HEIGHT: 64 IN | BODY MASS INDEX: 39.61 KG/M2

## 2022-07-18 DIAGNOSIS — N76.0 ACUTE VAGINITIS: Primary | ICD-10-CM

## 2022-07-18 PROCEDURE — 87563 M. GENITALIUM AMP PROBE: CPT | Performed by: NURSE PRACTITIONER

## 2022-07-18 PROCEDURE — 87512 GARDNER VAG DNA QUANT: CPT | Performed by: NURSE PRACTITIONER

## 2022-07-18 PROCEDURE — 87798 DETECT AGENT NOS DNA AMP: CPT | Performed by: NURSE PRACTITIONER

## 2022-07-18 PROCEDURE — 99213 OFFICE O/P EST LOW 20 MIN: CPT | Performed by: NURSE PRACTITIONER

## 2022-07-18 PROCEDURE — 87481 CANDIDA DNA AMP PROBE: CPT | Performed by: NURSE PRACTITIONER

## 2022-07-18 PROCEDURE — 87661 TRICHOMONAS VAGINALIS AMPLIF: CPT | Performed by: NURSE PRACTITIONER

## 2022-07-18 RX ORDER — CARIPRAZINE 3 MG/1
CAPSULE, GELATIN COATED ORAL
COMMUNITY
Start: 2022-06-29 | End: 2023-02-17

## 2022-07-18 RX ORDER — LITHIUM CARBONATE 300 MG/1
300 CAPSULE ORAL NIGHTLY
COMMUNITY
End: 2022-10-10

## 2022-07-27 ENCOUNTER — TELEPHONE (OUTPATIENT)
Dept: OBSTETRICS AND GYNECOLOGY | Facility: CLINIC | Age: 57
End: 2022-07-27

## 2022-07-27 NOTE — TELEPHONE ENCOUNTER
Pt called the office for test results.  Pt advised we do not have results yet but once we do, someone will call her.  Pt voiced understanding.

## 2022-07-29 LAB
LAB AP CASE REPORT: NORMAL
Lab: NORMAL
PATH INTERP SPEC-IMP: NORMAL
TRICHOMONAS VAGINALIS PCR: NOT DETECTED

## 2022-07-31 DIAGNOSIS — B96.89 BV (BACTERIAL VAGINOSIS): Primary | ICD-10-CM

## 2022-07-31 DIAGNOSIS — N76.0 BV (BACTERIAL VAGINOSIS): Primary | ICD-10-CM

## 2022-07-31 RX ORDER — AZITHROMYCIN 500 MG/1
1000 TABLET, FILM COATED ORAL ONCE
Qty: 2 TABLET | Refills: 0 | Status: SHIPPED | OUTPATIENT
Start: 2022-07-31 | End: 2022-07-31

## 2022-08-03 ENCOUNTER — TELEPHONE (OUTPATIENT)
Dept: OBSTETRICS AND GYNECOLOGY | Facility: CLINIC | Age: 57
End: 2022-08-03

## 2022-08-08 NOTE — PROGRESS NOTES
Subjective   Edith Stephens is a 57 y.o. female  YOB: 1965      Chief Complaint   Patient presents with   • Gynecologic Exam     Patient is here due to a possible yeast infection that started Friday. Patient states she is having some vaginal burning and no discharge.        Gynecologic Exam  The patient's pertinent negatives include no pelvic pain or vaginal discharge. Pertinent negatives include no abdominal pain, back pain, chills, constipation, diarrhea, dysuria, fever, flank pain, frequency, headaches, hematuria, nausea, rash, sore throat, urgency or vomiting.       The following portions of the patient's history were reviewed and updated as appropriate: allergies, current medications, past family history, past medical history, past social history, past surgical history, and problem list.    Allergies   Allergen Reactions   • Bc Powder [Aspirin-Salicylamide-Caffeine] Swelling     LIPS SWELL   • Sertraline Hcl Other (See Comments)     SUICIDAL   • Quetiapine Fumarate GI Intolerance and Other (See Comments)     Pt states it makes her lethargic   • Medroxyprogesterone Unknown - Low Severity   • Other Hives and Swelling     BC powder  BC powder   • Aspirin Other (See Comments)     UNKNOWN   • Bupropion Other (See Comments)     Makes patient manic   • Diazepam Other (See Comments)     UNKNOWN   • Gabapentin Other (See Comments)     UNKNOWN   • Lortab [Hydrocodone-Acetaminophen] Other (See Comments)     UNKNOWN     • Metoprolol Other (See Comments)     UNKNOWN   • Penicillins Other (See Comments)     UNKNOWN   • Prozac [Fluoxetine Hcl] Rash   • Tetracyclines & Related Other (See Comments)     UNKNOWN     • Trazodone And Nefazodone Other (See Comments)     UNKNOWN   • Trileptal [Oxcarbazepine] Other (See Comments)     UNKNOWN   • Vancomycin Other (See Comments)     ALLERGIC TO ALL MYCIN ANTIBIOTICS - UNKNOWN REACTION   • Venlafaxine Other (See Comments)     UNKNOWN   • Ziprasidone Hcl Other (See  Comments)     UNKNOWN   • Zyprexa [Olanzapine] Other (See Comments)     UNKNOWN       Past Medical History:   Diagnosis Date   • Abnormal vaginal bleeding    • Anxiety    • Bipolar disorder (HCC)    • Depression    • Diabetes mellitus (HCC)     Prediabetic   • GERD (gastroesophageal reflux disease)    • Herpes    • Hypoglycemia    • Hypoglycemic reaction    • PONV (postoperative nausea and vomiting)    • Pre-diabetes    • Prediabetes    • Psychiatric illness    • PTSD (post-traumatic stress disorder)    • Suicide attempt (HCC)     pt. has had two previous attempts       Family History   Problem Relation Age of Onset   • Hypertension Father    • Coronary artery disease Paternal Grandfather    • Coronary artery disease Paternal Grandmother    • Diabetes Paternal Grandmother    • Kidney cancer Maternal Grandfather    • Thyroid disease Mother    • No Known Problems Sister    • Breast cancer Neg Hx    • Ovarian cancer Neg Hx    • Uterine cancer Neg Hx    • Colon cancer Neg Hx    • Melanoma Neg Hx    • Prostate cancer Neg Hx        Social History     Socioeconomic History   • Marital status:    Tobacco Use   • Smoking status: Never Smoker   • Smokeless tobacco: Never Used   Substance and Sexual Activity   • Alcohol use: No   • Drug use: No   • Sexual activity: Never     Partners: Male     Comment: last act intercourse 7 years         Current Outpatient Medications:   •  BENZOYL PEROXIDE 5 % external wash, , Disp: , Rfl: 3  •  clindamycin (CLEOCIN T) 1 % external solution, , Disp: , Rfl: 3  •  cloNIDine (CATAPRES) 0.1 MG tablet, , Disp: , Rfl:   •  Denta 5000 Plus 1.1 % cream, , Disp: , Rfl:   •  docusate sodium (COLACE) 100 MG capsule, , Disp: , Rfl:   •  Hydroquinone 4 % emulsion, Apply  topically., Disp: , Rfl:   •  hydrOXYzine (ATARAX) 25 MG tablet, , Disp: , Rfl:   •  Latuda 80 MG tablet tablet, , Disp: , Rfl:   •  lithium carbonate 300 MG capsule, Take 300 mg by mouth Every Night., Disp: , Rfl:   •  Melatonin  3 MG tablet, Take 2 tablets by mouth At Night As Needed for Sleep., Disp: , Rfl:   •  Multiple Vitamins-Minerals (MULTIVITAMIN GUMMIES ADULT PO), , Disp: , Rfl:   •  nystatin 395045 UNIT/GM powder, , Disp: , Rfl:   •  valACYclovir (Valtrex) 500 MG tablet, Take 1 tablet by mouth Daily., Disp: 30 tablet, Rfl: 12  •  vitamin D (ERGOCALCIFEROL) 1.25 MG (22929 UT) capsule capsule, Take 1 capsule by mouth 1 (One) Time Per Week., Disp: 5 capsule, Rfl: 3  •  Vraylar 3 MG capsule capsule, , Disp: , Rfl:     Patient's last menstrual period was 12/13/2015 (approximate).    Sexual History:         Could not be calculated    Past Surgical History:   Procedure Laterality Date   • CERVICAL BIOPSY  W/ LOOP ELECTRODE EXCISION     • D & C HYSTEROSCOPY N/A 5/30/2017    Procedure: DILATATION AND CURETTAGE HYSTEROSCOPY;  Surgeon: Citlali Arita MD;  Location: [facility];  Service:    • OTHER SURGICAL HISTORY      cyst removed from foot as a child       Review of Systems   Constitutional: Negative for activity change, appetite change, chills, diaphoresis, fatigue, fever and unexpected weight change.   HENT: Negative for congestion, dental problem, drooling, ear discharge, ear pain, facial swelling, hearing loss, mouth sores, nosebleeds, postnasal drip, rhinorrhea, sinus pressure, sinus pain, sneezing, sore throat, tinnitus, trouble swallowing and voice change.    Eyes: Negative for photophobia, pain, discharge, redness, itching and visual disturbance.   Respiratory: Negative for apnea, cough, choking, chest tightness, shortness of breath, wheezing and stridor.    Cardiovascular: Negative for chest pain, palpitations and leg swelling.   Gastrointestinal: Negative for abdominal distention, abdominal pain, anal bleeding, blood in stool, constipation, diarrhea, nausea, rectal pain and vomiting.   Endocrine: Negative for cold intolerance, heat intolerance, polydipsia, polyphagia and polyuria.   Genitourinary: Positive for vaginal pain  "(burning). Negative for decreased urine volume, difficulty urinating, dyspareunia, dysuria, enuresis, flank pain, frequency, genital sores, hematuria, menstrual problem, pelvic pain, urgency, vaginal bleeding and vaginal discharge.   Musculoskeletal: Negative for arthralgias, back pain, gait problem, joint swelling, myalgias, neck pain and neck stiffness.   Skin: Negative for color change, pallor, rash and wound.   Allergic/Immunologic: Negative for environmental allergies, food allergies and immunocompromised state.   Neurological: Negative for dizziness, tremors, seizures, syncope, facial asymmetry, speech difficulty, weakness, light-headedness, numbness and headaches.   Hematological: Negative for adenopathy. Does not bruise/bleed easily.   Psychiatric/Behavioral: Negative for agitation, behavioral problems, confusion, decreased concentration, dysphoric mood, hallucinations, self-injury, sleep disturbance and suicidal ideas. The patient is not nervous/anxious and is not hyperactive.        Objective   Physical Exam  Vitals and nursing note reviewed.   Constitutional:       Appearance: She is well-developed.   HENT:      Head: Normocephalic.   Eyes:      Pupils: Pupils are equal, round, and reactive to light.   Cardiovascular:      Rate and Rhythm: Normal rate and regular rhythm.   Pulmonary:      Effort: Pulmonary effort is normal.      Breath sounds: Normal breath sounds.   Abdominal:      Palpations: Abdomen is soft.   Musculoskeletal:         General: Normal range of motion.      Cervical back: Normal range of motion.   Skin:     General: Skin is warm and dry.   Neurological:      Mental Status: She is alert and oriented to person, place, and time.   Psychiatric:         Behavior: Behavior normal.           Vitals:    07/18/22 1406   BP: 128/72   Weight: 105 kg (232 lb)   Height: 162.6 cm (64.02\")       Diagnoses and all orders for this visit:    1. Acute vaginitis (Primary)  Comments:  Patient here for " possible yeast infection that started Friday.  Reports vaginal burning no discharge.  BV panel done [id number] pending results.  Orders:  -     Gynecologic Fluid, Supplemental Testing  -     Trichomonas vaginalis, PCR - ThinPrep Vial, Vagina          Class 2 Severe Obesity (BMI >=35 and <=39.9). Obesity-related health conditions include the following: none. Obesity is unchanged. BMI is is above average; BMI management plan is completed. We discussed portion control and increasing exercise.             Non-Smoker    MyChart Instructions Given

## 2022-08-17 ENCOUNTER — OFFICE VISIT (OUTPATIENT)
Dept: OBSTETRICS AND GYNECOLOGY | Facility: CLINIC | Age: 57
End: 2022-08-17

## 2022-08-17 VITALS
DIASTOLIC BLOOD PRESSURE: 70 MMHG | HEIGHT: 64 IN | SYSTOLIC BLOOD PRESSURE: 128 MMHG | BODY MASS INDEX: 37.56 KG/M2 | WEIGHT: 220 LBS

## 2022-08-17 DIAGNOSIS — B96.89 BV (BACTERIAL VAGINOSIS): Primary | ICD-10-CM

## 2022-08-17 DIAGNOSIS — N76.0 BV (BACTERIAL VAGINOSIS): Primary | ICD-10-CM

## 2022-08-17 DIAGNOSIS — E55.9 VITAMIN D DEFICIENCY: ICD-10-CM

## 2022-08-17 PROCEDURE — 99213 OFFICE O/P EST LOW 20 MIN: CPT | Performed by: NURSE PRACTITIONER

## 2022-08-17 PROCEDURE — 87481 CANDIDA DNA AMP PROBE: CPT | Performed by: NURSE PRACTITIONER

## 2022-08-17 PROCEDURE — 87798 DETECT AGENT NOS DNA AMP: CPT | Performed by: NURSE PRACTITIONER

## 2022-08-17 PROCEDURE — 87563 M. GENITALIUM AMP PROBE: CPT | Performed by: NURSE PRACTITIONER

## 2022-08-17 PROCEDURE — 87661 TRICHOMONAS VAGINALIS AMPLIF: CPT | Performed by: NURSE PRACTITIONER

## 2022-08-17 PROCEDURE — 87512 GARDNER VAG DNA QUANT: CPT | Performed by: NURSE PRACTITIONER

## 2022-08-17 RX ORDER — ERGOCALCIFEROL 1.25 MG/1
50000 CAPSULE ORAL WEEKLY
Qty: 5 CAPSULE | Refills: 3 | Status: SHIPPED | OUTPATIENT
Start: 2022-08-17 | End: 2023-02-17 | Stop reason: SDUPTHER

## 2022-08-19 ENCOUNTER — TELEPHONE (OUTPATIENT)
Dept: OBSTETRICS AND GYNECOLOGY | Facility: CLINIC | Age: 57
End: 2022-08-19

## 2022-08-19 LAB — TRICHOMONAS VAGINALIS PCR: NOT DETECTED

## 2022-08-19 NOTE — TELEPHONE ENCOUNTER
Pt called stating that you discussed myrbetriq with her at her most recent visit. Pt voiced that there was no an rx at her pharmacy. Please review and advise. BS

## 2022-08-22 LAB
LAB AP CASE REPORT: NORMAL
Lab: NORMAL
PATH INTERP SPEC-IMP: NORMAL

## 2022-08-23 ENCOUNTER — TELEPHONE (OUTPATIENT)
Dept: OBSTETRICS AND GYNECOLOGY | Facility: CLINIC | Age: 57
End: 2022-08-23

## 2022-08-24 ENCOUNTER — TELEPHONE (OUTPATIENT)
Dept: OBSTETRICS AND GYNECOLOGY | Facility: CLINIC | Age: 57
End: 2022-08-24

## 2022-08-24 DIAGNOSIS — N76.0 BV (BACTERIAL VAGINOSIS): Primary | ICD-10-CM

## 2022-08-24 DIAGNOSIS — B96.89 BV (BACTERIAL VAGINOSIS): Primary | ICD-10-CM

## 2022-08-24 DIAGNOSIS — N39.46 MIXED INCONTINENCE: ICD-10-CM

## 2022-08-24 RX ORDER — AZITHROMYCIN 500 MG/1
1000 TABLET, FILM COATED ORAL ONCE
Qty: 2 TABLET | Refills: 0 | Status: SHIPPED | OUTPATIENT
Start: 2022-08-24 | End: 2022-08-24

## 2022-08-24 NOTE — TELEPHONE ENCOUNTER
PA approved for Myrbetriq. Approval letter states approval good from 8/24/22 to 8/23/23 as long as patient remains covered under the same plan. Approval letter faxed to pharmacy and scanned into chart. Called and spoke with patient and informed her that the PA had been approved. Patient voiced understanding.

## 2022-08-26 ENCOUNTER — TELEPHONE (OUTPATIENT)
Dept: OBSTETRICS AND GYNECOLOGY | Facility: CLINIC | Age: 57
End: 2022-08-26

## 2022-09-06 NOTE — PROGRESS NOTES
Subjective   Edith Stephens is a 57 y.o. female  YOB: 1965      Chief Complaint   Patient presents with   • Gynecologic Exam     Patient is here to have a CATHERINE due to Ureaplasma patient states the mybetriq is helping with incontinence.        HPI    The following portions of the patient's history were reviewed and updated as appropriate: allergies, current medications, past family history, past medical history, past social history, past surgical history, and problem list.    Allergies   Allergen Reactions   • Bc Powder [Aspirin-Salicylamide-Caffeine] Swelling     LIPS SWELL   • Sertraline Hcl Other (See Comments)     SUICIDAL   • Quetiapine Fumarate GI Intolerance and Other (See Comments)     Pt states it makes her lethargic   • Medroxyprogesterone Unknown - Low Severity   • Other Hives and Swelling     BC powder  BC powder   • Aspirin Other (See Comments)     UNKNOWN   • Bupropion Other (See Comments)     Makes patient manic   • Diazepam Other (See Comments)     UNKNOWN   • Gabapentin Other (See Comments)     UNKNOWN   • Lortab [Hydrocodone-Acetaminophen] Other (See Comments)     UNKNOWN     • Metoprolol Other (See Comments)     UNKNOWN   • Penicillins Other (See Comments)     UNKNOWN   • Prozac [Fluoxetine Hcl] Rash   • Tetracyclines & Related Other (See Comments)     UNKNOWN     • Trazodone And Nefazodone Other (See Comments)     UNKNOWN   • Trileptal [Oxcarbazepine] Other (See Comments)     UNKNOWN   • Vancomycin Other (See Comments)     ALLERGIC TO ALL MYCIN ANTIBIOTICS - UNKNOWN REACTION   • Venlafaxine Other (See Comments)     UNKNOWN   • Ziprasidone Hcl Other (See Comments)     UNKNOWN   • Zyprexa [Olanzapine] Other (See Comments)     UNKNOWN       Past Medical History:   Diagnosis Date   • Abnormal vaginal bleeding    • Anxiety    • Bipolar disorder (HCC)    • Depression    • Diabetes mellitus (HCC)     Prediabetic   • GERD (gastroesophageal reflux disease)    • Herpes    • Hypoglycemia     • Hypoglycemic reaction    • PONV (postoperative nausea and vomiting)    • Pre-diabetes    • Prediabetes    • Psychiatric illness    • PTSD (post-traumatic stress disorder)    • Suicide attempt (HCC)     pt. has had two previous attempts       Family History   Problem Relation Age of Onset   • Hypertension Father    • Coronary artery disease Paternal Grandfather    • Coronary artery disease Paternal Grandmother    • Diabetes Paternal Grandmother    • Kidney cancer Maternal Grandfather    • Thyroid disease Mother    • No Known Problems Sister    • Breast cancer Neg Hx    • Ovarian cancer Neg Hx    • Uterine cancer Neg Hx    • Colon cancer Neg Hx    • Melanoma Neg Hx    • Prostate cancer Neg Hx        Social History     Socioeconomic History   • Marital status:    Tobacco Use   • Smoking status: Never Smoker   • Smokeless tobacco: Never Used   Substance and Sexual Activity   • Alcohol use: No   • Drug use: No   • Sexual activity: Never     Partners: Male     Comment: last act intercourse 7 years         Current Outpatient Medications:   •  BENZOYL PEROXIDE 5 % external wash, , Disp: , Rfl: 3  •  clindamycin (CLEOCIN T) 1 % external solution, , Disp: , Rfl: 3  •  cloNIDine (CATAPRES) 0.1 MG tablet, , Disp: , Rfl:   •  Denta 5000 Plus 1.1 % cream, , Disp: , Rfl:   •  docusate sodium (COLACE) 100 MG capsule, , Disp: , Rfl:   •  Hydroquinone 4 % emulsion, Apply  topically., Disp: , Rfl:   •  hydrOXYzine (ATARAX) 25 MG tablet, , Disp: , Rfl:   •  Latuda 80 MG tablet tablet, , Disp: , Rfl:   •  lithium carbonate 300 MG capsule, Take 300 mg by mouth Every Night., Disp: , Rfl:   •  Melatonin 3 MG tablet, Take 2 tablets by mouth At Night As Needed for Sleep., Disp: , Rfl:   •  Multiple Vitamins-Minerals (MULTIVITAMIN GUMMIES ADULT PO), , Disp: , Rfl:   •  nystatin 057035 UNIT/GM powder, , Disp: , Rfl:   •  valACYclovir (Valtrex) 500 MG tablet, Take 1 tablet by mouth Daily., Disp: 30 tablet, Rfl: 12  •  vitamin D  "(ERGOCALCIFEROL) 1.25 MG (63960 UT) capsule capsule, Take 1 capsule by mouth 1 (One) Time Per Week., Disp: 5 capsule, Rfl: 3  •  Vraylar 3 MG capsule capsule, , Disp: , Rfl:   •  Mirabegron ER (Myrbetriq) 25 MG tablet sustained-release 24 hour 24 hr tablet, Take 1 tablet by mouth Daily., Disp: 30 tablet, Rfl: 0    Patient's last menstrual period was 12/13/2015 (approximate).    Sexual History:         Could not be calculated    Past Surgical History:   Procedure Laterality Date   • CERVICAL BIOPSY  W/ LOOP ELECTRODE EXCISION     • D & C HYSTEROSCOPY N/A 5/30/2017    Procedure: DILATATION AND CURETTAGE HYSTEROSCOPY;  Surgeon: Citlali Arita MD;  Location: [facility] OR;  Service:    • OTHER SURGICAL HISTORY      cyst removed from foot as a child       Review of Systems    Objective   Physical Exam      Vitals:    08/17/22 1458   BP: 128/70   Weight: 99.8 kg (220 lb)   Height: 162.6 cm (64.02\")       Diagnoses and all orders for this visit:    1. BV (bacterial vaginosis) (Primary)  Comments:  Patient here for CATHERINE R/T Ureaplasma.  States she took medicine as directed.  CATHERINE done [id number] pending results.  Orders:  -     Gynecologic Fluid, Supplemental Testing  -     Trichomonas vaginalis, PCR - ThinPrep Vial, Cervix    2. Vitamin D deficiency  Comments:  Vitamin D refilled today.  Orders:  -     vitamin D (ERGOCALCIFEROL) 1.25 MG (15631 UT) capsule capsule; Take 1 capsule by mouth 1 (One) Time Per Week.  Dispense: 5 capsule; Refill: 3          Class 2 Severe Obesity (BMI >=35 and <=39.9). Obesity-related health conditions include the following: none. Obesity is unchanged. BMI is is above average; BMI management plan is completed. We discussed portion control and increasing exercise.             Non-Smoker    MyChart Instructions Given       "

## 2022-09-08 ENCOUNTER — OFFICE VISIT (OUTPATIENT)
Dept: OBSTETRICS AND GYNECOLOGY | Facility: CLINIC | Age: 57
End: 2022-09-08

## 2022-09-08 VITALS
SYSTOLIC BLOOD PRESSURE: 120 MMHG | DIASTOLIC BLOOD PRESSURE: 70 MMHG | BODY MASS INDEX: 37.56 KG/M2 | HEIGHT: 64 IN | WEIGHT: 220 LBS

## 2022-09-08 DIAGNOSIS — B96.89 BV (BACTERIAL VAGINOSIS): Primary | ICD-10-CM

## 2022-09-08 DIAGNOSIS — A60.04 HERPES, VULVAR: ICD-10-CM

## 2022-09-08 DIAGNOSIS — N76.0 BV (BACTERIAL VAGINOSIS): Primary | ICD-10-CM

## 2022-09-08 PROCEDURE — 87512 GARDNER VAG DNA QUANT: CPT | Performed by: NURSE PRACTITIONER

## 2022-09-08 PROCEDURE — 87661 TRICHOMONAS VAGINALIS AMPLIF: CPT | Performed by: NURSE PRACTITIONER

## 2022-09-08 PROCEDURE — 99213 OFFICE O/P EST LOW 20 MIN: CPT | Performed by: NURSE PRACTITIONER

## 2022-09-08 PROCEDURE — 87798 DETECT AGENT NOS DNA AMP: CPT | Performed by: NURSE PRACTITIONER

## 2022-09-08 PROCEDURE — 87481 CANDIDA DNA AMP PROBE: CPT | Performed by: NURSE PRACTITIONER

## 2022-09-08 PROCEDURE — 87563 M. GENITALIUM AMP PROBE: CPT | Performed by: NURSE PRACTITIONER

## 2022-09-08 RX ORDER — VALACYCLOVIR HYDROCHLORIDE 500 MG/1
500 TABLET, FILM COATED ORAL DAILY
Qty: 30 TABLET | Refills: 12 | Status: SHIPPED | OUTPATIENT
Start: 2022-09-08 | End: 2023-02-17 | Stop reason: SDUPTHER

## 2022-09-08 NOTE — PROGRESS NOTES
Subjective   Edith Stephens is a 57 y.o. female  YOB: 1965      Chief Complaint   Patient presents with   • Gynecologic Exam     Patient is here to follow up after completing medication for bacterial vaginosis. Was seen in ER for suicidal thoughts and for a UTI earlier this week. Patient states she needs refills on valtrex. Patient states she is taking 25mg  Mybetriq and is doing well on it.       Vaginitis  This is a recurrent problem. The current episode started 1 to 4 weeks ago. The problem has been unchanged. Pertinent negatives include no abdominal pain, arthralgias, chest pain, chills, congestion, coughing, diaphoresis, fatigue, fever, headaches, joint swelling, myalgias, nausea, neck pain, numbness, rash, sore throat, vomiting or weakness.       The following portions of the patient's history were reviewed and updated as appropriate: allergies, current medications, past family history, past medical history, past social history, past surgical history, and problem list.    Allergies   Allergen Reactions   • Bc Powder [Aspirin-Salicylamide-Caffeine] Swelling     LIPS SWELL   • Sertraline Hcl Other (See Comments)     SUICIDAL   • Quetiapine Fumarate GI Intolerance and Other (See Comments)     Pt states it makes her lethargic   • Medroxyprogesterone Unknown - Low Severity   • Other Hives and Swelling     BC powder  BC powder   • Aspirin Other (See Comments)     UNKNOWN   • Bupropion Other (See Comments)     Makes patient manic   • Diazepam Other (See Comments)     UNKNOWN   • Gabapentin Other (See Comments)     UNKNOWN   • Lortab [Hydrocodone-Acetaminophen] Other (See Comments)     UNKNOWN     • Metoprolol Other (See Comments)     UNKNOWN   • Penicillins Other (See Comments)     UNKNOWN   • Prozac [Fluoxetine Hcl] Rash   • Tetracyclines & Related Other (See Comments)     UNKNOWN     • Trazodone And Nefazodone Other (See Comments)     UNKNOWN   • Trileptal [Oxcarbazepine] Other (See Comments)      UNKNOWN   • Vancomycin Other (See Comments)     ALLERGIC TO ALL MYCIN ANTIBIOTICS - UNKNOWN REACTION   • Venlafaxine Other (See Comments)     UNKNOWN   • Ziprasidone Hcl Other (See Comments)     UNKNOWN   • Zyprexa [Olanzapine] Other (See Comments)     UNKNOWN       Past Medical History:   Diagnosis Date   • Abnormal vaginal bleeding    • Anxiety    • Bipolar disorder (HCC)    • Depression    • Diabetes mellitus (HCC)     Prediabetic   • GERD (gastroesophageal reflux disease)    • Herpes    • Hypoglycemia    • Hypoglycemic reaction    • PONV (postoperative nausea and vomiting)    • Pre-diabetes    • Prediabetes    • Psychiatric illness    • PTSD (post-traumatic stress disorder)    • Suicide attempt (HCC)     pt. has had two previous attempts       Family History   Problem Relation Age of Onset   • Hypertension Father    • Coronary artery disease Paternal Grandfather    • Coronary artery disease Paternal Grandmother    • Diabetes Paternal Grandmother    • Kidney cancer Maternal Grandfather    • Thyroid disease Mother    • No Known Problems Sister    • Breast cancer Neg Hx    • Ovarian cancer Neg Hx    • Uterine cancer Neg Hx    • Colon cancer Neg Hx    • Melanoma Neg Hx    • Prostate cancer Neg Hx        Social History     Socioeconomic History   • Marital status:    Tobacco Use   • Smoking status: Never Smoker   • Smokeless tobacco: Never Used   Substance and Sexual Activity   • Alcohol use: No   • Drug use: No   • Sexual activity: Never     Partners: Male     Comment: last act intercourse 7 years         Current Outpatient Medications:   •  BENZOYL PEROXIDE 5 % external wash, , Disp: , Rfl: 3  •  clindamycin (CLEOCIN T) 1 % external solution, , Disp: , Rfl: 3  •  cloNIDine (CATAPRES) 0.1 MG tablet, , Disp: , Rfl:   •  Denta 5000 Plus 1.1 % cream, , Disp: , Rfl:   •  docusate sodium (COLACE) 100 MG capsule, , Disp: , Rfl:   •  Hydroquinone 4 % emulsion, Apply  topically., Disp: , Rfl:   •  hydrOXYzine  (ATARAX) 25 MG tablet, , Disp: , Rfl:   •  Latuda 80 MG tablet tablet, , Disp: , Rfl:   •  lithium carbonate 300 MG capsule, Take 300 mg by mouth Every Night., Disp: , Rfl:   •  Melatonin 3 MG tablet, Take 2 tablets by mouth At Night As Needed for Sleep., Disp: , Rfl:   •  Mirabegron ER (Myrbetriq) 25 MG tablet sustained-release 24 hour 24 hr tablet, Take 1 tablet by mouth Daily., Disp: 30 tablet, Rfl: 0  •  Multiple Vitamins-Minerals (MULTIVITAMIN GUMMIES ADULT PO), , Disp: , Rfl:   •  nystatin 820760 UNIT/GM powder, , Disp: , Rfl:   •  valACYclovir (Valtrex) 500 MG tablet, Take 1 tablet by mouth Daily., Disp: 30 tablet, Rfl: 12  •  vitamin D (ERGOCALCIFEROL) 1.25 MG (77483 UT) capsule capsule, Take 1 capsule by mouth 1 (One) Time Per Week., Disp: 5 capsule, Rfl: 3  •  Vraylar 3 MG capsule capsule, , Disp: , Rfl:     Patient's last menstrual period was 12/13/2015 (approximate).    Sexual History:         Could not be calculated    Past Surgical History:   Procedure Laterality Date   • CERVICAL BIOPSY  W/ LOOP ELECTRODE EXCISION     • D & C HYSTEROSCOPY N/A 5/30/2017    Procedure: DILATATION AND CURETTAGE HYSTEROSCOPY;  Surgeon: Citlali Arita MD;  Location: [facility] OR;  Service:    • OTHER SURGICAL HISTORY      cyst removed from foot as a child       Review of Systems   Constitutional: Negative for activity change, appetite change, chills, diaphoresis, fatigue, fever and unexpected weight change.   HENT: Negative for congestion, dental problem, drooling, ear discharge, ear pain, facial swelling, hearing loss, mouth sores, nosebleeds, postnasal drip, rhinorrhea, sinus pressure, sinus pain, sneezing, sore throat, tinnitus, trouble swallowing and voice change.    Eyes: Negative for photophobia, pain, discharge, redness, itching and visual disturbance.   Respiratory: Negative for apnea, cough, choking, chest tightness, shortness of breath, wheezing and stridor.    Cardiovascular: Negative for chest pain, palpitations  and leg swelling.   Gastrointestinal: Negative for abdominal distention, abdominal pain, anal bleeding, blood in stool, constipation, diarrhea, nausea, rectal pain and vomiting.   Endocrine: Negative for cold intolerance, heat intolerance, polydipsia, polyphagia and polyuria.   Genitourinary: Negative for decreased urine volume, difficulty urinating, dyspareunia, dysuria, enuresis, flank pain, frequency, genital sores, hematuria, menstrual problem, pelvic pain, urgency, vaginal bleeding, vaginal discharge and vaginal pain.   Musculoskeletal: Negative for arthralgias, back pain, gait problem, joint swelling, myalgias, neck pain and neck stiffness.   Skin: Negative for color change, pallor, rash and wound.   Allergic/Immunologic: Negative for environmental allergies, food allergies and immunocompromised state.   Neurological: Negative for dizziness, tremors, seizures, syncope, facial asymmetry, speech difficulty, weakness, light-headedness, numbness and headaches.   Hematological: Negative for adenopathy. Does not bruise/bleed easily.   Psychiatric/Behavioral: Negative for agitation, behavioral problems, confusion, decreased concentration, dysphoric mood, hallucinations, self-injury, sleep disturbance and suicidal ideas. The patient is not nervous/anxious and is not hyperactive.        Objective   Physical Exam  Vitals and nursing note reviewed.   Constitutional:       Appearance: She is well-developed.   HENT:      Head: Normocephalic.   Eyes:      Pupils: Pupils are equal, round, and reactive to light.   Cardiovascular:      Rate and Rhythm: Normal rate and regular rhythm.   Pulmonary:      Effort: Pulmonary effort is normal.      Breath sounds: Normal breath sounds.   Abdominal:      Palpations: Abdomen is soft.   Musculoskeletal:         General: Normal range of motion.      Cervical back: Normal range of motion.   Skin:     General: Skin is warm and dry.   Neurological:      Mental Status: She is alert and  "oriented to person, place, and time.   Psychiatric:         Behavior: Behavior normal.           Vitals:    09/08/22 1139   BP: 120/70   Weight: 99.8 kg (220 lb)   Height: 162.6 cm (64.02\")       Diagnoses and all orders for this visit:    1. BV (bacterial vaginosis) (Primary)  Comments:  Patient here for CATHERINE.  BV panel done [id number] pending results.  Orders:  -     Gynecologic Fluid, Supplemental Testing    2. Herpes, vulvar  Comments:  Patient takes daily Valtrex related to HSV.  Requests refill.  Rx sent to pharmacy.  Orders:  -     valACYclovir (Valtrex) 500 MG tablet; Take 1 tablet by mouth Daily.  Dispense: 30 tablet; Refill: 12          Class 2 Severe Obesity (BMI >=35 and <=39.9). Obesity-related health conditions include the following: none. Obesity is unchanged. BMI is is above average; BMI management plan is completed. We discussed portion control and increasing exercise.             Non-Smoker    MyChart Instructions Given       "

## 2022-09-09 LAB — TRICHOMONAS VAGINALIS PCR: NOT DETECTED

## 2022-09-13 DIAGNOSIS — N39.46 MIXED INCONTINENCE: ICD-10-CM

## 2022-09-14 LAB
LAB AP CASE REPORT: NORMAL
Lab: NORMAL
PATH INTERP SPEC-IMP: NORMAL

## 2022-09-14 RX ORDER — METRONIDAZOLE 500 MG/1
500 TABLET ORAL 2 TIMES DAILY
Qty: 14 TABLET | Refills: 0 | Status: SHIPPED | OUTPATIENT
Start: 2022-09-14 | End: 2022-09-21

## 2022-09-14 RX ORDER — MIRABEGRON 25 MG/1
TABLET, FILM COATED, EXTENDED RELEASE ORAL
Qty: 30 TABLET | Refills: 0 | Status: SHIPPED | OUTPATIENT
Start: 2022-09-14 | End: 2022-10-18

## 2022-09-14 RX ORDER — AZITHROMYCIN 250 MG/1
TABLET, FILM COATED ORAL
Qty: 6 TABLET | Refills: 0 | Status: SHIPPED | OUTPATIENT
Start: 2022-09-14 | End: 2022-10-10

## 2022-10-10 ENCOUNTER — OFFICE VISIT (OUTPATIENT)
Dept: OBSTETRICS AND GYNECOLOGY | Facility: CLINIC | Age: 57
End: 2022-10-10

## 2022-10-10 VITALS
DIASTOLIC BLOOD PRESSURE: 80 MMHG | HEIGHT: 64 IN | WEIGHT: 212 LBS | SYSTOLIC BLOOD PRESSURE: 128 MMHG | BODY MASS INDEX: 36.19 KG/M2

## 2022-10-10 DIAGNOSIS — N39.46 MIXED INCONTINENCE: ICD-10-CM

## 2022-10-10 DIAGNOSIS — N76.0 BV (BACTERIAL VAGINOSIS): Primary | ICD-10-CM

## 2022-10-10 DIAGNOSIS — B96.89 BV (BACTERIAL VAGINOSIS): Primary | ICD-10-CM

## 2022-10-10 PROCEDURE — 87661 TRICHOMONAS VAGINALIS AMPLIF: CPT | Performed by: NURSE PRACTITIONER

## 2022-10-10 PROCEDURE — 87481 CANDIDA DNA AMP PROBE: CPT | Performed by: NURSE PRACTITIONER

## 2022-10-10 PROCEDURE — 87563 M. GENITALIUM AMP PROBE: CPT | Performed by: NURSE PRACTITIONER

## 2022-10-10 PROCEDURE — 87798 DETECT AGENT NOS DNA AMP: CPT | Performed by: NURSE PRACTITIONER

## 2022-10-10 PROCEDURE — 87512 GARDNER VAG DNA QUANT: CPT | Performed by: NURSE PRACTITIONER

## 2022-10-10 PROCEDURE — 99213 OFFICE O/P EST LOW 20 MIN: CPT | Performed by: NURSE PRACTITIONER

## 2022-10-10 NOTE — PROGRESS NOTES
Subjective   Edith Stephens is a 57 y.o. female  YOB: 1965      Chief Complaint   Patient presents with   • Follow-up     Pt here to f/u on bv. Pt states that she feels about the same. Pt states that she went to ER in [place] and got diagnosed with a mild UTI. Pt states she sees her PCP tomorrow and they are treating the UTI. Pt states that the Myrbetriq is doing well.        Patient here for CATHERINE after being diagnosed with BV.      The following portions of the patient's history were reviewed and updated as appropriate: allergies, current medications, past family history, past medical history, past social history, past surgical history, and problem list.    Allergies   Allergen Reactions   • Bc Powder [Aspirin-Salicylamide-Caffeine] Swelling     LIPS SWELL   • Sertraline Hcl Other (See Comments)     SUICIDAL   • Quetiapine Fumarate GI Intolerance and Other (See Comments)     Pt states it makes her lethargic   • Medroxyprogesterone Unknown - Low Severity   • Other Hives and Swelling     BC powder  BC powder   • Aspirin Other (See Comments)     UNKNOWN   • Bupropion Other (See Comments)     Makes patient manic   • Diazepam Other (See Comments)     UNKNOWN   • Gabapentin Other (See Comments)     UNKNOWN   • Lortab [Hydrocodone-Acetaminophen] Other (See Comments)     UNKNOWN     • Metoprolol Other (See Comments)     UNKNOWN   • Penicillins Other (See Comments)     UNKNOWN   • Prozac [Fluoxetine Hcl] Rash   • Tetracyclines & Related Other (See Comments)     UNKNOWN     • Trazodone And Nefazodone Other (See Comments)     UNKNOWN   • Trileptal [Oxcarbazepine] Other (See Comments)     UNKNOWN   • Vancomycin Other (See Comments)     ALLERGIC TO ALL MYCIN ANTIBIOTICS - UNKNOWN REACTION   • Venlafaxine Other (See Comments)     UNKNOWN   • Ziprasidone Hcl Other (See Comments)     UNKNOWN   • Zyprexa [Olanzapine] Other (See Comments)     UNKNOWN       Past Medical History:   Diagnosis Date   • Abnormal vaginal  bleeding    • Anxiety    • Bipolar disorder (HCC)    • Depression    • Diabetes mellitus (HCC)     Prediabetic   • GERD (gastroesophageal reflux disease)    • Herpes    • Hypoglycemia    • Hypoglycemic reaction    • PONV (postoperative nausea and vomiting)    • Pre-diabetes    • Prediabetes    • Psychiatric illness    • PTSD (post-traumatic stress disorder)    • Suicide attempt (HCC)     pt. has had two previous attempts       Family History   Problem Relation Age of Onset   • Hypertension Father    • Coronary artery disease Paternal Grandfather    • Coronary artery disease Paternal Grandmother    • Diabetes Paternal Grandmother    • Kidney cancer Maternal Grandfather    • Thyroid disease Mother    • No Known Problems Sister    • Breast cancer Neg Hx    • Ovarian cancer Neg Hx    • Uterine cancer Neg Hx    • Colon cancer Neg Hx    • Melanoma Neg Hx    • Prostate cancer Neg Hx        Social History     Socioeconomic History   • Marital status:    Tobacco Use   • Smoking status: Never   • Smokeless tobacco: Never   Vaping Use   • Vaping Use: Never used   Substance and Sexual Activity   • Alcohol use: No   • Drug use: No   • Sexual activity: Not Currently     Partners: Male     Comment: last act intercourse 7 years         Current Outpatient Medications:   •  BENZOYL PEROXIDE 5 % external wash, , Disp: , Rfl: 3  •  clindamycin (CLEOCIN T) 1 % external solution, , Disp: , Rfl: 3  •  cloNIDine (CATAPRES) 0.1 MG tablet, , Disp: , Rfl:   •  Denta 5000 Plus 1.1 % cream, , Disp: , Rfl:   •  docusate sodium (COLACE) 100 MG capsule, , Disp: , Rfl:   •  hydrOXYzine (ATARAX) 25 MG tablet, , Disp: , Rfl:   •  Latuda 80 MG tablet tablet, , Disp: , Rfl:   •  Multiple Vitamins-Minerals (MULTIVITAMIN GUMMIES ADULT PO), , Disp: , Rfl:   •  Myrbetriq 25 MG tablet sustained-release 24 hour 24 hr tablet, TAKE ONE TABLET BY MOUTH DAILY., Disp: 30 tablet, Rfl: 0  •  nystatin 769616 UNIT/GM powder, , Disp: , Rfl:   •  valACYclovir  (Valtrex) 500 MG tablet, Take 1 tablet by mouth Daily., Disp: 30 tablet, Rfl: 12  •  vitamin D (ERGOCALCIFEROL) 1.25 MG (74470 UT) capsule capsule, Take 1 capsule by mouth 1 (One) Time Per Week., Disp: 5 capsule, Rfl: 3  •  Vraylar 3 MG capsule capsule, , Disp: , Rfl:   •  Hydroquinone 4 % emulsion, Apply  topically., Disp: , Rfl:     Patient's last menstrual period was 12/13/2015 (approximate).    Sexual History:         Could not be calculated    Past Surgical History:   Procedure Laterality Date   • CERVICAL BIOPSY  W/ LOOP ELECTRODE EXCISION     • D & C HYSTEROSCOPY N/A 5/30/2017    Procedure: DILATATION AND CURETTAGE HYSTEROSCOPY;  Surgeon: Citlali Arita MD;  Location: [facility] OR;  Service:    • OTHER SURGICAL HISTORY      cyst removed from foot as a child       Review of Systems   Constitutional: Negative for activity change, appetite change, chills, diaphoresis, fatigue, fever and unexpected weight change.   HENT: Negative for congestion, dental problem, drooling, ear discharge, ear pain, facial swelling, hearing loss, mouth sores, nosebleeds, postnasal drip, rhinorrhea, sinus pressure, sinus pain, sneezing, sore throat, tinnitus, trouble swallowing and voice change.    Eyes: Negative for photophobia, pain, discharge, redness, itching and visual disturbance.   Respiratory: Negative for apnea, cough, choking, chest tightness, shortness of breath, wheezing and stridor.    Cardiovascular: Negative for chest pain, palpitations and leg swelling.   Gastrointestinal: Negative for abdominal distention, abdominal pain, anal bleeding, blood in stool, constipation, diarrhea, nausea, rectal pain and vomiting.   Endocrine: Negative for cold intolerance, heat intolerance, polydipsia, polyphagia and polyuria.   Genitourinary: Negative for decreased urine volume, difficulty urinating, dyspareunia, dysuria, enuresis, flank pain, frequency, genital sores, hematuria, menstrual problem, pelvic pain, urgency, vaginal bleeding,  "vaginal discharge and vaginal pain.   Musculoskeletal: Negative for arthralgias, back pain, gait problem, joint swelling, myalgias, neck pain and neck stiffness.   Skin: Negative for color change, pallor, rash and wound.   Allergic/Immunologic: Negative for environmental allergies, food allergies and immunocompromised state.   Neurological: Negative for dizziness, tremors, seizures, syncope, facial asymmetry, speech difficulty, weakness, light-headedness, numbness and headaches.   Hematological: Negative for adenopathy. Does not bruise/bleed easily.   Psychiatric/Behavioral: Negative for agitation, behavioral problems, confusion, decreased concentration, dysphoric mood, hallucinations, self-injury, sleep disturbance and suicidal ideas. The patient is not nervous/anxious and is not hyperactive.        Objective   Physical Exam  Vitals and nursing note reviewed.   Constitutional:       Appearance: She is well-developed.   HENT:      Head: Normocephalic.   Eyes:      Pupils: Pupils are equal, round, and reactive to light.   Cardiovascular:      Rate and Rhythm: Normal rate and regular rhythm.   Pulmonary:      Effort: Pulmonary effort is normal.      Breath sounds: Normal breath sounds.   Abdominal:      Palpations: Abdomen is soft.   Musculoskeletal:         General: Normal range of motion.      Cervical back: Normal range of motion.   Skin:     General: Skin is warm and dry.   Neurological:      Mental Status: She is alert and oriented to person, place, and time.   Psychiatric:         Behavior: Behavior normal.           Vitals:    10/10/22 1315   BP: 128/80   BP Location: Left arm   Patient Position: Sitting   Cuff Size: Large Adult   Weight: 96.2 kg (212 lb)   Height: 162.6 cm (64.02\")       Diagnoses and all orders for this visit:    1. BV (bacterial vaginosis) (Primary)  Comments:  Patient recently diagnosed and treated with Gardnerella vaginalis and Mycoplasma hominis.  Reports she took all medicine prescribed. "  CATHERINE done [id number]  Orders:  -     Gynecologic Fluid, Supplemental Testing    2. Mixed incontinence  Comments:  Patient reports mixed incontinence is incredibly improved since starting Myrbetriq.  Refill sent to pharmacy.          Class 2 Severe Obesity (BMI >=35 and <=39.9). Obesity-related health conditions include the following: none. Obesity is unchanged. BMI is is above average; BMI management plan is completed. We discussed portion control and increasing exercise.             Non-Smoker    MyChart Instructions Given

## 2022-10-13 LAB — TRICHOMONAS VAGINALIS PCR: NOT DETECTED

## 2022-10-18 DIAGNOSIS — N39.46 MIXED INCONTINENCE: ICD-10-CM

## 2022-10-18 LAB
LAB AP CASE REPORT: NORMAL
Lab: NORMAL
PATH INTERP SPEC-IMP: NORMAL

## 2022-10-18 RX ORDER — MIRABEGRON 25 MG/1
TABLET, FILM COATED, EXTENDED RELEASE ORAL
Qty: 30 TABLET | Refills: 3 | Status: SHIPPED | OUTPATIENT
Start: 2022-10-18 | End: 2022-10-19

## 2022-10-18 NOTE — TELEPHONE ENCOUNTER
Pt was here on 10/10/22 for med refill, note indicates that refills were sent but they weren't. Will you adjust the refill amount to avoid her from having to request every month?

## 2022-10-19 ENCOUNTER — TELEPHONE (OUTPATIENT)
Dept: OBSTETRICS AND GYNECOLOGY | Facility: CLINIC | Age: 57
End: 2022-10-19

## 2022-10-19 DIAGNOSIS — B96.89 BV (BACTERIAL VAGINOSIS): Primary | ICD-10-CM

## 2022-10-19 DIAGNOSIS — N76.0 BV (BACTERIAL VAGINOSIS): Primary | ICD-10-CM

## 2022-10-19 RX ORDER — AZITHROMYCIN 500 MG/1
1000 TABLET, FILM COATED ORAL ONCE
Qty: 2 TABLET | Refills: 0 | Status: SHIPPED | OUTPATIENT
Start: 2022-10-19 | End: 2022-10-19

## 2022-10-19 NOTE — PROGRESS NOTES
Please let patient know that infection testing is positive for Ureaplasma.  Med has been sent to her pharmacy.

## 2022-10-20 DIAGNOSIS — N76.0 BV (BACTERIAL VAGINOSIS): Primary | ICD-10-CM

## 2022-10-20 DIAGNOSIS — B96.89 BV (BACTERIAL VAGINOSIS): Primary | ICD-10-CM

## 2022-10-20 RX ORDER — METRONIDAZOLE 7.5 MG/G
GEL VAGINAL DAILY
Qty: 1 EACH | Refills: 0 | Status: SHIPPED | OUTPATIENT
Start: 2022-10-20 | End: 2023-02-17

## 2022-11-15 ENCOUNTER — TELEPHONE (OUTPATIENT)
Dept: OBSTETRICS AND GYNECOLOGY | Facility: CLINIC | Age: 57
End: 2022-11-15

## 2022-11-15 NOTE — TELEPHONE ENCOUNTER
Pt called office stating that she thinks she has bacterial infection or yeast infection. Pt notified that she would need to be seen. Pt states that she has some abnormal discharge and is concerned she may have a bacterial infection. Pt denies itching or odor. Pt transferred to  to schedule appt.

## 2022-11-23 ENCOUNTER — APPOINTMENT (OUTPATIENT)
Dept: GENERAL RADIOLOGY | Age: 57
DRG: 885 | End: 2022-11-23
Payer: MEDICAID

## 2022-11-23 ENCOUNTER — HOSPITAL ENCOUNTER (INPATIENT)
Age: 57
LOS: 12 days | Discharge: HOME | DRG: 885 | End: 2022-12-05
Attending: EMERGENCY MEDICINE | Admitting: PSYCHIATRY & NEUROLOGY
Payer: MEDICAID

## 2022-11-23 DIAGNOSIS — F31.9 BIPOLAR DEPRESSION (HCC): ICD-10-CM

## 2022-11-23 DIAGNOSIS — T14.91XA SUICIDE ATTEMPT (HCC): Primary | ICD-10-CM

## 2022-11-23 DIAGNOSIS — F60.3 BORDERLINE PERSONALITY DISORDER (HCC): ICD-10-CM

## 2022-11-23 PROBLEM — T65.91XA INGESTION OF TOXIC SUBSTANCE: Status: ACTIVE | Noted: 2022-11-23

## 2022-11-23 LAB
ACETAMINOPHEN LEVEL: <5 UG/ML (ref 10–30)
ALBUMIN SERPL-MCNC: 4.8 G/DL (ref 3.5–5.2)
ALP BLD-CCNC: 92 U/L (ref 35–104)
ALT SERPL-CCNC: 19 U/L (ref 5–33)
AMPHETAMINE SCREEN, URINE: NEGATIVE
ANION GAP SERPL CALCULATED.3IONS-SCNC: 13 MMOL/L (ref 7–19)
AST SERPL-CCNC: 16 U/L (ref 5–32)
BACTERIA: ABNORMAL /HPF
BARBITURATE SCREEN URINE: NEGATIVE
BASOPHILS ABSOLUTE: 0.1 K/UL (ref 0–0.2)
BASOPHILS RELATIVE PERCENT: 0.5 % (ref 0–1)
BENZODIAZEPINE SCREEN, URINE: NEGATIVE
BILIRUB SERPL-MCNC: 0.8 MG/DL (ref 0.2–1.2)
BILIRUBIN URINE: NEGATIVE
BLOOD, URINE: NEGATIVE
BUN BLDV-MCNC: 11 MG/DL (ref 6–20)
BUPRENORPHINE URINE: NEGATIVE
CALCIUM SERPL-MCNC: 10.4 MG/DL (ref 8.6–10)
CANNABINOID SCREEN URINE: NEGATIVE
CHLORIDE BLD-SCNC: 102 MMOL/L (ref 98–111)
CLARITY: CLEAR
CO2: 23 MMOL/L (ref 22–29)
COCAINE METABOLITE SCREEN URINE: NEGATIVE
COLOR: YELLOW
CREAT SERPL-MCNC: 0.9 MG/DL (ref 0.5–0.9)
EOSINOPHILS ABSOLUTE: 0 K/UL (ref 0–0.6)
EOSINOPHILS RELATIVE PERCENT: 0.4 % (ref 0–5)
EPITHELIAL CELLS, UA: 5 /HPF (ref 0–5)
ETHANOL: <10 MG/DL (ref 0–0.08)
GFR SERPL CREATININE-BSD FRML MDRD: >60 ML/MIN/{1.73_M2}
GLUCOSE BLD-MCNC: 173 MG/DL (ref 74–109)
GLUCOSE URINE: NEGATIVE MG/DL
HCT VFR BLD CALC: 49.4 % (ref 37–47)
HEMOGLOBIN: 16.2 G/DL (ref 12–16)
HYALINE CASTS: 1 /HPF (ref 0–8)
IMMATURE GRANULOCYTES #: 0 K/UL
KETONES, URINE: NEGATIVE MG/DL
LEUKOCYTE ESTERASE, URINE: ABNORMAL
LITHIUM LEVEL: 0.1 MMOL/L (ref 0.6–1.2)
LYMPHOCYTES ABSOLUTE: 1.2 K/UL (ref 1.1–4.5)
LYMPHOCYTES RELATIVE PERCENT: 12.8 % (ref 20–40)
Lab: NORMAL
MCH RBC QN AUTO: 31.9 PG (ref 27–31)
MCHC RBC AUTO-ENTMCNC: 32.8 G/DL (ref 33–37)
MCV RBC AUTO: 97.2 FL (ref 81–99)
METHADONE SCREEN, URINE: NEGATIVE
METHAMPHETAMINE, URINE: NEGATIVE
MONOCYTES ABSOLUTE: 0.5 K/UL (ref 0–0.9)
MONOCYTES RELATIVE PERCENT: 4.9 % (ref 0–10)
NEUTROPHILS ABSOLUTE: 7.7 K/UL (ref 1.5–7.5)
NEUTROPHILS RELATIVE PERCENT: 81 % (ref 50–65)
NITRITE, URINE: NEGATIVE
OPIATE SCREEN URINE: NEGATIVE
OXYCODONE URINE: NEGATIVE
PDW BLD-RTO: 13.5 % (ref 11.5–14.5)
PH UA: 6 (ref 5–8)
PHENCYCLIDINE SCREEN URINE: NEGATIVE
PLATELET # BLD: 279 K/UL (ref 130–400)
PMV BLD AUTO: 9.7 FL (ref 9.4–12.3)
POTASSIUM REFLEX MAGNESIUM: 3.9 MMOL/L (ref 3.5–5)
PROPOXYPHENE SCREEN: NEGATIVE
PROTEIN UA: NEGATIVE MG/DL
RBC # BLD: 5.08 M/UL (ref 4.2–5.4)
RBC UA: 2 /HPF (ref 0–4)
SALICYLATE, SERUM: <0.3 MG/DL (ref 3–10)
SARS-COV-2, NAAT: NOT DETECTED
SODIUM BLD-SCNC: 138 MMOL/L (ref 136–145)
SPECIFIC GRAVITY UA: 1 (ref 1–1.03)
TOTAL PROTEIN: 7.9 G/DL (ref 6.6–8.7)
TRICYCLIC, URINE: NEGATIVE
UROBILINOGEN, URINE: 0.2 E.U./DL
WBC # BLD: 9.5 K/UL (ref 4.8–10.8)
WBC UA: 12 /HPF (ref 0–5)

## 2022-11-23 PROCEDURE — 87635 SARS-COV-2 COVID-19 AMP PRB: CPT

## 2022-11-23 PROCEDURE — 90792 PSYCH DIAG EVAL W/MED SRVCS: CPT | Performed by: NURSE PRACTITIONER

## 2022-11-23 PROCEDURE — 82077 ASSAY SPEC XCP UR&BREATH IA: CPT

## 2022-11-23 PROCEDURE — 1240000000 HC EMOTIONAL WELLNESS R&B

## 2022-11-23 PROCEDURE — 2500000003 HC RX 250 WO HCPCS: Performed by: NURSE PRACTITIONER

## 2022-11-23 PROCEDURE — 99285 EMERGENCY DEPT VISIT HI MDM: CPT

## 2022-11-23 PROCEDURE — 85025 COMPLETE CBC W/AUTO DIFF WBC: CPT

## 2022-11-23 PROCEDURE — 94660 CPAP INITIATION&MGMT: CPT

## 2022-11-23 PROCEDURE — 71045 X-RAY EXAM CHEST 1 VIEW: CPT

## 2022-11-23 PROCEDURE — 80178 ASSAY OF LITHIUM: CPT

## 2022-11-23 PROCEDURE — 87086 URINE CULTURE/COLONY COUNT: CPT

## 2022-11-23 PROCEDURE — 2500000003 HC RX 250 WO HCPCS: Performed by: EMERGENCY MEDICINE

## 2022-11-23 PROCEDURE — 6370000000 HC RX 637 (ALT 250 FOR IP): Performed by: PSYCHIATRY & NEUROLOGY

## 2022-11-23 PROCEDURE — 6370000000 HC RX 637 (ALT 250 FOR IP): Performed by: NURSE PRACTITIONER

## 2022-11-23 PROCEDURE — 93005 ELECTROCARDIOGRAM TRACING: CPT

## 2022-11-23 PROCEDURE — 80143 DRUG ASSAY ACETAMINOPHEN: CPT

## 2022-11-23 PROCEDURE — 80306 DRUG TEST PRSMV INSTRMNT: CPT

## 2022-11-23 PROCEDURE — 2580000003 HC RX 258: Performed by: EMERGENCY MEDICINE

## 2022-11-23 PROCEDURE — 36415 COLL VENOUS BLD VENIPUNCTURE: CPT

## 2022-11-23 PROCEDURE — 81001 URINALYSIS AUTO W/SCOPE: CPT

## 2022-11-23 PROCEDURE — 80053 COMPREHEN METABOLIC PANEL: CPT

## 2022-11-23 PROCEDURE — 96374 THER/PROPH/DIAG INJ IV PUSH: CPT

## 2022-11-23 PROCEDURE — 80179 DRUG ASSAY SALICYLATE: CPT

## 2022-11-23 PROCEDURE — 6370000000 HC RX 637 (ALT 250 FOR IP): Performed by: EMERGENCY MEDICINE

## 2022-11-23 RX ORDER — CLONIDINE HYDROCHLORIDE 0.1 MG/1
0.1 TABLET ORAL NIGHTLY
Status: DISCONTINUED | OUTPATIENT
Start: 2022-11-23 | End: 2022-11-25

## 2022-11-23 RX ORDER — ACYCLOVIR 200 MG/1
400 CAPSULE ORAL 3 TIMES DAILY
Status: DISCONTINUED | OUTPATIENT
Start: 2022-11-23 | End: 2022-11-23

## 2022-11-23 RX ORDER — VALACYCLOVIR HYDROCHLORIDE 500 MG/1
500 TABLET, FILM COATED ORAL DAILY
COMMUNITY

## 2022-11-23 RX ORDER — SODIUM CHLORIDE, SODIUM LACTATE, POTASSIUM CHLORIDE, AND CALCIUM CHLORIDE .6; .31; .03; .02 G/100ML; G/100ML; G/100ML; G/100ML
1000 INJECTION, SOLUTION INTRAVENOUS ONCE
Status: COMPLETED | OUTPATIENT
Start: 2022-11-23 | End: 2022-11-23

## 2022-11-23 RX ORDER — VALACYCLOVIR HYDROCHLORIDE 500 MG/1
500 TABLET, FILM COATED ORAL DAILY
Status: DISCONTINUED | OUTPATIENT
Start: 2022-11-23 | End: 2022-12-05 | Stop reason: HOSPADM

## 2022-11-23 RX ORDER — POLYETHYLENE GLYCOL 3350 17 G/17G
17 POWDER, FOR SOLUTION ORAL DAILY PRN
Status: DISCONTINUED | OUTPATIENT
Start: 2022-11-23 | End: 2022-12-05 | Stop reason: HOSPADM

## 2022-11-23 RX ORDER — CALCIUM CARBONATE 200(500)MG
500 TABLET,CHEWABLE ORAL 3 TIMES DAILY PRN
Status: DISCONTINUED | OUTPATIENT
Start: 2022-11-23 | End: 2022-12-05 | Stop reason: HOSPADM

## 2022-11-23 RX ORDER — FAMOTIDINE 10 MG/ML
40 INJECTION, SOLUTION INTRAVENOUS ONCE
Status: COMPLETED | OUTPATIENT
Start: 2022-11-23 | End: 2022-11-23

## 2022-11-23 RX ORDER — DOCUSATE SODIUM 100 MG/1
100 CAPSULE, LIQUID FILLED ORAL PRN
COMMUNITY

## 2022-11-23 RX ORDER — ACETAMINOPHEN 325 MG/1
650 TABLET ORAL EVERY 4 HOURS PRN
Status: DISCONTINUED | OUTPATIENT
Start: 2022-11-23 | End: 2022-12-05 | Stop reason: HOSPADM

## 2022-11-23 RX ORDER — MECOBALAMIN 5000 MCG
5 TABLET,DISINTEGRATING ORAL NIGHTLY
Status: DISCONTINUED | OUTPATIENT
Start: 2022-11-23 | End: 2022-12-05 | Stop reason: HOSPADM

## 2022-11-23 RX ORDER — DOCUSATE SODIUM 100 MG/1
100 CAPSULE, LIQUID FILLED ORAL PRN
Status: DISCONTINUED | OUTPATIENT
Start: 2022-11-23 | End: 2022-12-05 | Stop reason: HOSPADM

## 2022-11-23 RX ORDER — HYDROXYZINE PAMOATE 25 MG/1
25 CAPSULE ORAL 3 TIMES DAILY PRN
Status: DISCONTINUED | OUTPATIENT
Start: 2022-11-23 | End: 2022-12-05 | Stop reason: HOSPADM

## 2022-11-23 RX ORDER — MAGNESIUM HYDROXIDE/ALUMINUM HYDROXICE/SIMETHICONE 120; 1200; 1200 MG/30ML; MG/30ML; MG/30ML
30 SUSPENSION ORAL EVERY 6 HOURS PRN
Status: DISCONTINUED | OUTPATIENT
Start: 2022-11-23 | End: 2022-12-05 | Stop reason: HOSPADM

## 2022-11-23 RX ORDER — VALACYCLOVIR HYDROCHLORIDE 500 MG/1
500 TABLET, FILM COATED ORAL 2 TIMES DAILY
Status: DISCONTINUED | OUTPATIENT
Start: 2022-11-23 | End: 2022-11-23

## 2022-11-23 RX ORDER — HYDROXYZINE PAMOATE 25 MG/1
25 CAPSULE ORAL 3 TIMES DAILY PRN
COMMUNITY

## 2022-11-23 RX ORDER — PHENOL 1.4 %
1 AEROSOL, SPRAY (ML) MUCOUS MEMBRANE 2 TIMES DAILY PRN
COMMUNITY

## 2022-11-23 RX ADMIN — SODIUM CHLORIDE, POTASSIUM CHLORIDE, SODIUM LACTATE AND CALCIUM CHLORIDE 1000 ML: 600; 310; 30; 20 INJECTION, SOLUTION INTRAVENOUS at 08:08

## 2022-11-23 RX ADMIN — CLONIDINE HYDROCHLORIDE 0.1 MG: 0.1 TABLET ORAL at 21:18

## 2022-11-23 RX ADMIN — ALUMINUM HYDROXIDE, MAGNESIUM HYDROXIDE, AND SIMETHICONE 30 ML: 200; 200; 20 SUSPENSION ORAL at 08:02

## 2022-11-23 RX ADMIN — FAMOTIDINE 40 MG: 10 INJECTION, SOLUTION INTRAVENOUS at 08:02

## 2022-11-23 RX ADMIN — ANTACID TABLETS 500 MG: 500 TABLET, CHEWABLE ORAL at 21:38

## 2022-11-23 RX ADMIN — VALACYCLOVIR HYDROCHLORIDE 500 MG: 500 TABLET, FILM COATED ORAL at 21:38

## 2022-11-23 RX ADMIN — LURASIDONE HYDROCHLORIDE 120 MG: 40 TABLET, FILM COATED ORAL at 21:18

## 2022-11-23 RX ADMIN — MICONAZOLE NITRATE: 2 POWDER TOPICAL at 20:54

## 2022-11-23 RX ADMIN — Medication 5 MG: at 21:18

## 2022-11-23 RX ADMIN — HYDROXYZINE PAMOATE 25 MG: 25 CAPSULE ORAL at 21:18

## 2022-11-23 ASSESSMENT — ENCOUNTER SYMPTOMS
SINUS PRESSURE: 0
EYE DISCHARGE: 0
FACIAL SWELLING: 0
SHORTNESS OF BREATH: 0
SORE THROAT: 1
COUGH: 0
ABDOMINAL PAIN: 0
VOMITING: 1
BLOOD IN STOOL: 0
APNEA: 0
NAUSEA: 0
DIARRHEA: 0
CONSTIPATION: 0
VOICE CHANGE: 0
CHOKING: 0

## 2022-11-23 ASSESSMENT — SLEEP AND FATIGUE QUESTIONNAIRES
DO YOU USE A SLEEP AID: YES
DO YOU HAVE DIFFICULTY SLEEPING: YES

## 2022-11-23 ASSESSMENT — PATIENT HEALTH QUESTIONNAIRE - PHQ9: SUM OF ALL RESPONSES TO PHQ QUESTIONS 1-9: 27

## 2022-11-23 NOTE — ED NOTES
Spoke with poison control, Tracy Claudio, updated on patient. Notified that it was spic and span everyday antibacterial . Poison control states to make sure patient has something to drink and see if she has any pain of difficulty with swallowing.       Vy Reyes RN  11/23/22 0521

## 2022-11-23 NOTE — ED NOTES
Called poison control recommends to monitor for 4 hours post ingestion, monitor throat and ability to swallow during those 4 hours because we do not know the kind of \"spik and span\". If she does start complaining of pain with swallowing or difficulty swallowing she may need to have a scope performed. Would recommend drawing basic labs, acetaminophen level, and salicylate level.       Nette Reyes RN  11/23/22 3089
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
## 2022-11-23 NOTE — PROGRESS NOTES
Daughter bedside and requests we call her first with updates rather than her father bc he is home sick.      Calli Martínez [phone]    Electronically signed by Lexy Castillo RN on 11/23/2022 at 9:43 AM
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
## 2022-11-23 NOTE — H&P
Behavioral Services  Medicare Certification Upon Admission    I certify that this patient's inpatient psychiatric hospital admission is medically necessary for:    [x] (1) Treatment which could reasonably be expected to improve this patient's condition,       [] (2) Or for diagnostic study;     AND     [x](2) The inpatient psychiatric services are provided while the individual is under the care of a physician and are included in the individualized plan of care.     Estimated length of stay/service 3 days- 5 weeks    Plan for post-hospital care :tbd    Electronically signed by TING Armijo on 11/23/2022 at 2:37 PM

## 2022-11-23 NOTE — ED NOTES
Pt able to tolerate milk x2, pt denies difficulty swallowing. Pt asking for food now. Spoke with Shobha Perez MD and okay martha. Pt provided safety tray.       Luis Rodriguez RN  11/23/22 145 Mika Portillo RN  11/23/22 3889

## 2022-11-24 PROCEDURE — 99231 SBSQ HOSP IP/OBS SF/LOW 25: CPT | Performed by: NURSE PRACTITIONER

## 2022-11-24 PROCEDURE — 6370000000 HC RX 637 (ALT 250 FOR IP): Performed by: PSYCHIATRY & NEUROLOGY

## 2022-11-24 PROCEDURE — 6370000000 HC RX 637 (ALT 250 FOR IP): Performed by: NURSE PRACTITIONER

## 2022-11-24 PROCEDURE — 1240000000 HC EMOTIONAL WELLNESS R&B

## 2022-11-24 PROCEDURE — 94660 CPAP INITIATION&MGMT: CPT

## 2022-11-24 RX ADMIN — Medication 5 MG: at 21:26

## 2022-11-24 RX ADMIN — MICONAZOLE NITRATE: 2 POWDER TOPICAL at 08:13

## 2022-11-24 RX ADMIN — VALACYCLOVIR HYDROCHLORIDE 500 MG: 500 TABLET, FILM COATED ORAL at 08:13

## 2022-11-24 RX ADMIN — MICONAZOLE NITRATE: 2 POWDER TOPICAL at 21:27

## 2022-11-24 RX ADMIN — HYDROXYZINE PAMOATE 25 MG: 25 CAPSULE ORAL at 21:26

## 2022-11-24 RX ADMIN — LURASIDONE HYDROCHLORIDE 120 MG: 40 TABLET, FILM COATED ORAL at 21:26

## 2022-11-24 RX ADMIN — CLONIDINE HYDROCHLORIDE 0.1 MG: 0.1 TABLET ORAL at 21:26

## 2022-11-24 NOTE — H&P
HISTORY and PHYSICAL      CHIEF COMPLAINT:   Depression, SA    Reason for Admission:  Depression, SA    History Obtained From:  patient    HISTORY OF PRESENT ILLNESS:      The patient is a 65 y.o. female who is admitted to the [place] [address] unit with worsening mood issues. She denies any CP or SOA. She has no abdominal pain or N/V. She has no dysuria. She has no HA or dizziness. She has no fevers. She has no new pain issues. Past Medical History:        Diagnosis Date    Chest pain 02/08/2012    CPAP (continuous positive airway pressure) dependence     Depression     Hypoglycemia     SANDY (obstructive sleep apnea)     Schizoaffective disorder ([facility] 02/08/2012    Suicide attempt [facility])      Past Surgical History:        Procedure Laterality Date    DILATION AND CURETTAGE OF UTERUS  2017    LEE           Medications Prior to Admission:    Medications Prior to Admission: hydrOXYzine pamoate (VISTARIL) 25 MG capsule, Take 25 mg by mouth 3 times daily as needed for Itching or Anxiety  docusate sodium (COLACE) 100 MG capsule, Take 100 mg by mouth as needed for Constipation  calcium carbonate 600 MG TABS tablet, Take 1 tablet by mouth 2 times daily as needed  mirabegron (MYRBETRIQ) 50 MG TB24, Take 50 mg by mouth daily  valACYclovir (VALTREX) 500 MG tablet, Take 500 mg by mouth daily  lithium 300 MG capsule, Take 1 capsule by mouth 2 times daily (with meals)  clindamycin (CLEOCIN T) 1 % external solution, Apply topically 2 times daily Apply topically 2 times daily.  (Patient not taking: Reported on 11/23/2022)  lurasidone (LATUDA) 80 MG TABS tablet, Take 1 tablet by mouth daily (Patient taking differently: Take 120 mg by mouth nightly)  melatonin 3 MG TABS tablet, Take 1 tablet by mouth nightly (Patient not taking: Reported on 11/23/2022)  cloNIDine (CATAPRES) 0.1 MG tablet, Take 1 tablet by mouth nightly  vitamin D (ERGOCALCIFEROL) 1.25 MG (48471 UT) CAPS capsule, Take 1 capsule by mouth once a week for 11 doses (Patient taking differently: Take 50,000 Units by mouth Twice a Week Take on Sunday and Wednesday)    Allergies:  Seroquel [quetiapine fumarate], Adderall [amphetamine-dextroamphetamine], Amphetamines, Asa [aspirin], Codeine, Cogentin [benztropine], Depakote [divalproex sodium], Effexor [venlafaxine], Estrogens, Geodon [ziprasidone hcl], Hydrocodone, Lortab [hydrocodone-acetaminophen], Macrolides and ketolides, Metoprolol, Neurontin [gabapentin], Other, Pcn [penicillins], Provera [medroxyprogesterone], Prozac [fluoxetine hcl], Tetracyclines & related, Trazodone and nefazodone, Trileptal [oxcarbazepine], Trintellix [vortioxetine], Valium [diazepam], Vancomycin, Wellbutrin [bupropion], Zoloft [sertraline hcl], and Zyprexa [olanzapine]    Social History:   TOBACCO:   reports that she has never smoked. She has never used smokeless tobacco.  ETOH:   reports no history of alcohol use. DRUGS:   reports no history of drug use. MARITAL STATUS:    OCCUPATION:  Not working  Patient currently lives with family       Family History:   History reviewed. No pertinent family history. REVIEW OF SYSTEMS:  Constitutional: neg  CV: neg  Pulmonary: neg  GI: neg  : neg  Psych: depression, SA  Neuro: neg  Skin: neg  MusculoSkeletal: neg  HEENT: neg  Joints: neg    Vitals:  BP (!) 138/104   Pulse 76   Temp 97.3 °F (36.3 °C)   Resp 18   Ht 5' 4\" (1.626 m)   Wt 207 lb 8 oz (94.1 kg)   SpO2 95%   BMI 35.62 kg/m²     PHYSICAL EXAM:  Gen: NAD, alert  HEENT: WNL  Lymph: no LAD  Neck: no JVD or masses  Chest: CTA bilat  CV: RRR  Abdomen: NT/ND  Extrem: no C/C/E  Neuro: non focal  Skin: no rashes  Joints: no redness    DATA:  I have reviewed the admission labs and imaging tests.     ASSESSMENT AND PLAN:      Patient Active Hospital Problem List:   Depression, SA----follow with Psych   Hyperglycemia   Elevated BP    SANDY            Lady Steele MD  10:27 AM 11/24/2022

## 2022-11-24 NOTE — PROGRESS NOTES
[address] [place] [address]      Psychiatric Progress Note    Name:  Roseline Robison  Date:  11/24/2022  Age:  65 y.o. Sex:  female  Ethnicity:   Primary Care Physician:  TING Kim CNP   Patient Care Team:  Patient Care Team:  TING Kim CNP as PCP - General  Eben Zimmer MD (Cardiology)  IRAM Lugo (Physician Assistant Medical)  Chief Complaint: \" I am feeling a little better today. \"        Historian:patient  Complaint Type: anxiety, depression, loss of interest in favorite activities, and sleep disturbance  Course of Symptoms: ongoing  Precipitating Factors:           Subjective  Nursing notes were reviewed and patient had no behavioral issues during the night. As needed medications administered during the night include Tums and Hydroxyzine. Today she reports that she had a suicidal thought today. She reports that she \"felt like dying\" but had no plan. She denies homicidal ideation and psychosis. Today she talks about hoarding for the past 30 years. She reports that she is overwhelmed in her own home due to the stuff she has piled up in her home. She reports that she has boxes piled up in her home full of things that are older than 10 years. Her couch is piled high of mail that is 3years old. She has tried to throw pieces of mail away and she reports that she freezes. Her bathroom and bedrooms are full of things as well. She reports that recently [facility] has told her that they have a woman that can come into her home and assist her with her hoarding however she has to start throwing some things away on her own first. Her daughter recently helped her clear off her bed so she can have a place to sleep comfortably. She reports that she has one path to walk in from her living to her bedroom and bathrooms. Reports it is hard for her to focus on clearing one area because she becomes too overwhelmed with it all. Patient reports sleep as \"I slept really good last night. \" Patient has been calm and cooperative with staff and peers. Patient has been compliant with medications. Patient has been attending groups. Patient reports appetite as \"I am eating all of my meals. \" Patient reports no side effects from medications. She rates depression as a 7 and anxiety as a 6 on a 0-10 scale. Objective  Vitals:    11/23/22 1909   BP: (!) 138/104   Pulse: 76   Resp: 18   Temp: 97.3 °F (36.3 °C)   SpO2: 95%       Previous Psychiatric/Substance Use History      Medical History:  Past Medical History:   Diagnosis Date    Chest pain 02/08/2012    CPAP (continuous positive airway pressure) dependence     Depression     Hypoglycemia     SANDY (obstructive sleep apnea)     Schizoaffective disorder ([facility] 02/08/2012    Suicide attempt ([facility]         BRABMILA History:   Social History     Substance and Sexual Activity   Alcohol Use No         Social History     Substance and Sexual Activity   Drug Use No        Social History     Tobacco Use   Smoking Status Never   Smokeless Tobacco Never        Family History:     History reviewed. No pertinent family history. Mental Status:  Level of consciousness:  within normal limits and awake  Appearance:  well-appearing, hospital attire, seated in bed, good grooming, and good hygiene  Behavior/Motor:  no abnormalities noted  Attitude toward examiner:  cooperative, attentive, and good eye contact  Speech:  normal rate and normal volume  Mood:  \" I had a suicidal thought today. \"  Affect:  blunted and flat  Thought processes:  slow  Thought content:  Homocidal ideation : denies  Suicidal Ideation:  passive  Delusions:  no evidence of delusions  Perceptual Disturbance:  denies any perceptual disturbance  Cognition:  oriented to person, place, and time  Concentration : good  Memory intact for recent and remote  Fund of knowledge:  average  Abstract thinking:  adequate  Insight: limited  Judgment:  limited      cloNIDine  0.1 mg Oral Nightly    lurasidone  120 mg Oral Nightly    mirabegron  50 mg Oral Daily    melatonin  5 mg Oral Nightly    miconazole   Topical BID    valACYclovir  500 mg Oral Daily       Current Medications:  Current Facility-Administered Medications   Medication Dose Route Frequency Provider Last Rate Last Admin    aluminum & magnesium hydroxide-simethicone (MAALOX) 200-200-20 MG/5ML suspension 30 mL  30 mL Oral Q6H PRN Chris Ho MD   30 mL at 11/23/22 0802    cloNIDine (CATAPRES) tablet 0.1 mg  0.1 mg Oral Nightly Nahomy Jose A, APRN   0.1 mg at 11/23/22 2118    docusate sodium (COLACE) capsule 100 mg  100 mg Oral PRN Nahomy Jose A, APRN        hydrOXYzine pamoate (VISTARIL) capsule 25 mg  25 mg Oral TID PRN Nahomy Jose A, APRN   25 mg at 11/23/22 2118    lurasidone (LATUDA) tablet 120 mg  120 mg Oral Nightly Nahomy Jose A, APRN   120 mg at 11/23/22 2118    mirabegron (MYRBETRIQ) extended release tablet 50 mg (Patient Supplied)  50 mg Oral Daily Nahomy Jose A, APRN        melatonin disintegrating tablet 5 mg  5 mg Oral Nightly Nahomy Jose A, APRN   5 mg at 11/23/22 2118    miconazole (MICOTIN) 2 % powder   Topical BID Nahomy Jose A, APRN   Given at 11/24/22 0813    acetaminophen (TYLENOL) tablet 650 mg  650 mg Oral Q4H PRN Kwabena Johnson MD        polyethylene glycol (GLYCOLAX) packet 17 g  17 g Oral Daily PRN Kwabena Johnson MD        valACYclovir (VALTREX) tablet 500 mg  500 mg Oral Daily Kwabena Johnson MD   500 mg at 11/24/22 0813    calcium carbonate (TUMS) chewable tablet 500 mg  500 mg Oral TID PRN Kwabena Johnson MD   500 mg at 11/23/22 2138       Psychotherapy:   SUPPORTIVE    DSM V Diagnoses:    Bipolar depression  Suicidal ideation  Borderline personality disorder  Hoarding disorder     Plan:    Encourage group therapy  15 minute safety checks  Medical monitoring by Dr. Mel Solorio and associates  Continue current therapy and medications      Amount of time spent with patient:  15 minutes with greater than 50% of the time spent in counseling and collaboration of care.     Tahmina Noonan, PMHNP-BC, FNP-C   Clinician Signature: signed electronically

## 2022-11-24 NOTE — PROGRESS NOTES
Admission Note      Reason for admission/Target Symptom: Per nursing admission assessment - Reason for Admission: Eda Lehman is a  65 yoa female who Drank spic and span  as suicide attempt. Med change recently and has felt suicidal and decreased functioning x 1 week. Diagnoses: Bipolar Depression; Borderline Personality Disorder    UDS: Negative  BAL: Negative <10    SW will meet with treatment team to discuss patient's treatment including care planning, discharge planning, and follow-up needs. Patient has been admitted to [facility]. Treatment team will identify the patient's discharge needs. Appointments will be made for medication management and outpatient therapy/counseling. Pt confirmed the need for ongoing treatment post inpatient stay. Pt was also provided a handout of contact information for drug and alcohol treatment centers and other community support service such as [facility], AA, and [facility].

## 2022-11-24 NOTE — PROGRESS NOTES
SW met with patient to complete psychosocial and lifetime CSSR-S on this date. Patients long and short-term goals discussed. Patient voiced understanding. Treatment plan sheet signed. Patient verbalized understanding of the treatment plan. Patient participated in goals and objectives of the treatment plan. Patient discussed safety plan with . In the last 6 months has the patient been a danger to self: Yes  In the last 6 months has the patient been a danger to others: No  Legal Guardian/POA: No    Provided patient with [employer] handout entitled \"Quitting Smoking. \"  Reviewed handout with patient: addressing dangers of smoking, developing coping skills, and providing basic information about quitting. Patient received all components practical counseling of tobacco practical counseling during the hospital stay.

## 2022-11-24 NOTE — PROGRESS NOTES
[facility] Adult Unit Daily Assessment  Nursing Progress Note    Room: [address] [place] [address]   Name: Phi Valenzuela   Age: 65 y.o. Gender: female   Dx: Bipolar depression ([address]  Precautions: suicide risk  Inpatient Status: voluntary       SLEEP:  Sleep Quality Good  Sleep Medications: Yes   PRN Sleep Meds: No       MEDICAL:  Other PRN Meds: Yes   Med Compliant: Yes  Accu-Chek: No  Oxygen/CPAP/BiPAP: Yes  CIWA/CINA: No   PAIN Assessment: none  Side Effects from medication: No      Metabolic Screening:  Lab Results   Component Value Date    LABA1C 5.6 06/27/2021     Lab Results   Component Value Date    CHOL 143 (L) 06/27/2021    CHOL 149 (L) 07/22/2020    CHOL 152 (L) 02/22/2020    CHOL 130 (L) 12/22/2018    CHOL 154 02/11/2014    CHOL 181 05/10/2012     Lab Results   Component Value Date    TRIG 166 (H) 06/27/2021    TRIG 143 07/22/2020    TRIG 130 02/22/2020    TRIG 83 12/22/2018    TRIG 113 02/11/2014    TRIG 197 (H) 05/10/2012     Lab Results   Component Value Date    HDL 48 (L) 06/27/2021    HDL 60 (L) 07/22/2020    HDL 52 (L) 02/22/2020    HDL 52 (L) 12/22/2018    HDL 56 02/11/2014    HDL 62 05/10/2012     No components found for: LDLCAL  No results found for: LABVLDL  Body mass index is 35.62 kg/m². BP Readings from Last 2 Encounters:   11/23/22 (!) 138/104   12/28/21 134/81         Medical Bed:   Is patient in a medical bed? No   If medical bed is in use, has nursing secured room while patient is awake and out of the room? N/A  Has safety checks by nursing been completed on the bed/room this shift? N/A    Protective Factors:  Patient identifies protective factors with nursing staff as follows:    Identifies reasons for living: Yes   Supportive Social Network or family: Yes    Belief that suicide is immoral/high spirituality: Yes   Responsibility to family or others/living with family: Yes   Fear of death or dying due to pain and suffering: No   Engaged in work or school: No     If Patient is unable to identify, reason why?

## 2022-11-24 NOTE — PROGRESS NOTES
General acute hospital Admission Note  Nursing Admission Note         Candelario Salazar is a 65 yoa , female, who presented to ER after drinking Spick-and-Span . PT was monitored by poison control in ER and was able to eat and drink without difficulty. This was reported to poison control. Past hx of multiple psych admissions and multiple suicide attempts. Pt reports her provider was switching from Jim Kraft to Trish Fus. Due to sedation, Trish Fus was discontinued last week. Pt began having increased suicidal thoughts after she was started on Vraylar. After discontinuing pt had loss of interest in things, in bed more and decline in functioning. Patient Active Problem List   Diagnosis    Bipolar depression ([address]    Borderline personality disorder ([address]    Insomnia    Hoarding disorder    Obstructive sleep apnea    CPAP (continuous positive airway pressure) dependence    Ingestion of toxic substance    Bipolar 1 disorder, depressed ([address]         Addictive Behavior:        Medical Problems:   Past Medical History:   Diagnosis Date    Chest pain 02/08/2012    CPAP (continuous positive airway pressure) dependence     Depression     Hypoglycemia     SANDY (obstructive sleep apnea)     Schizoaffective disorder ([address] 02/08/2012    Suicide attempt ([address]        Status EXAM:       Psych:   Suicidal Ideation: yes. If yes, is there a plan? (Describe) drank spic and span              Risk of Suicide: High Risk   Homicidal Ideation:  no.  If yes, describe: Auditory/Visual Hallucinations:  no.      If yes, describe AVH?  *    Metabolic Screening:  Lab Results   Component Value Date    LABA1C 5.6 06/27/2021     Lab Results   Component Value Date    CHOL 143 (L) 06/27/2021    CHOL 149 (L) 07/22/2020    CHOL 152 (L) 02/22/2020    CHOL 130 (L) 12/22/2018    CHOL 154 02/11/2014    CHOL 181 05/10/2012     Lab Results   Component Value Date    TRIG 166 (H) 06/27/2021    TRIG 143 07/22/2020    TRIG 130 02/22/2020    TRIG 83 12/22/2018    TRIG 113 02/11/2014    TRIG 197 (H) 05/10/2012     Lab Results   Component Value Date    HDL 48 (L) 06/27/2021    HDL 60 (L) 07/22/2020    HDL 52 (L) 02/22/2020    HDL 52 (L) 12/22/2018    HDL 56 02/11/2014    HDL 62 05/10/2012     No components found for: LDLCAL  No results found for: LABVLDL  Body mass index is 36.56 kg/m². BP Readings from Last 2 Encounters:   11/23/22 133/64   12/28/21 134/81       PATIENT STRENGTHS and Barriers:          Tobacco Screening:  Practical Counseling, on admission, umu X, if applicable and completed (first 3 are required if patient doesn't refuse):            Recognizing danger situations (included triggers and roadblocks)   Yes              Coping skills (new ways to manage stress, exercise, relaxation techniques, changing routine, distraction  yes                                                    Basic information about quitting (benefits of quitting, techniques in how to quit, available resources yes  Referral for counseling faxed to [facility]     no                                       Patient refused counseling yes  Patient has not smoked in the last 30 days Does not smoke  Patient offered nicotine patch. Received No  Refused Yes  Patient is a non-smoker Correct       Admission to Unit:    Pt admitted to [facility] under the care of Dr. Ernestine Robles,  arrived on unit via [facility] with security and staff from ER    Patient arrived dressed in paper scrubs:  yes. Body assessment and safety check completed by Carmel Nye and Nadine Guillermo and  no contraband discovered. Patient belongings and valuables was cataloged and accounted for by Nadine Guillermo and Careml Nye. Admission completed by Carmel Nye and Mj Graf  Oriented to unit, unit policy and expectations:  yes    Reviewed and explained all legal documents:  yes    Education for Fall Prevention and Restraints given: yes    Patient signed all legal documents yes   Pt verbalizes understanding:yes     Florin Dural Obtained? yes    Medical Bed:  Does patient require a medical bed? no  If answered yes for medical bed use, does the patient have the following conditions? High risk for falls? no   Obstructive sleep apnea? yes   Oxygen Use? yes   Use of assistive devices? yes   Other, (explain)? CPAP but does not have it with her. Will need oxygen at night      Identifies stressors.yes   . Protective Factors:  Patient identifies protective factors with nursing staff as follows: Identifies reasons for living: Yes   Supportive Social Network or family: Yes    Belief that suicide is immoral/high spirituality: Yes   Responsibility to family or others/living with family: Yes   Fear of death or dying due to pain and suffering: No   Engaged in work or school: No     If Patient is unable to identify, reason why? Admission Note:    Calm and cooperative upon arrival to unit. Pt is anxious. Will need 1:1 sitter at bedtime for oxygen.   Skin assessment performed, legals signed          Electronically signed by Glo Jackson RN on 11/23/22 at 6:24 PM CST

## 2022-11-24 NOTE — PROGRESS NOTES
Group Therapy Note    Start Time: 043  End Time:  154  Number of Participants: 7    Type of Group: Community Meeting       Patient's Goal:  plans on getting things gone thru at home      Notes:      Participation Level:  Active Listener       Participation Quality: Appropriate      Thought Process/Content: Logical      Affective Functioning: Congruent      Mood:  calm      Level of consciousness:  Alert      Modes of Intervention: Support      Discipline Responsible: Behavioral Health Tech II      Signature:  Fauzia Johnson

## 2022-11-24 NOTE — PROGRESS NOTES
identify, reason why? PSYCH:  Depression: 8   Anxiety: 7   SI passive   Risk of Suicide: Low Risk  HI Negative for homicidal ideation      AVH:no If Hallucinations are present, describe? GENERAL:  Appetite: good   Percent Meals:    Social: No   Speech: normal, hesitant    Appearance: appropriately dressed    GROUP:  Group Participation: No  Participation Quality: None    Notes:     Patient has been friendly and cooperative this evening. Patient was concern about medications she was taking this evening. Patient also performed ADL. Patient reported to this nurse that she has herpes one and two simplex and that she has a phobia of taking showers related to her spreading herpes. Patient was educated on how herpes is spread. Patient is reported that before she took her shower that she felt suicidal but after her shower, she felt a lot better and no longer suicidal. Patient contracted for safety. Patient was placed on a CPAP. Patient is now resting, will continue to monitor.      Electronically signed by Katheryn Gentile on 11/23/22 at 10:36 PM CST

## 2022-11-25 LAB
CHOLESTEROL, TOTAL: 172 MG/DL (ref 160–199)
HBA1C MFR BLD: 6.1 % (ref 4–6)
HDLC SERPL-MCNC: 59 MG/DL (ref 65–121)
LDL CHOLESTEROL CALCULATED: 78 MG/DL
TRIGL SERPL-MCNC: 177 MG/DL (ref 0–149)
URINE CULTURE, ROUTINE: NORMAL

## 2022-11-25 PROCEDURE — 36415 COLL VENOUS BLD VENIPUNCTURE: CPT

## 2022-11-25 PROCEDURE — 99231 SBSQ HOSP IP/OBS SF/LOW 25: CPT | Performed by: NURSE PRACTITIONER

## 2022-11-25 PROCEDURE — 94660 CPAP INITIATION&MGMT: CPT

## 2022-11-25 PROCEDURE — 6370000000 HC RX 637 (ALT 250 FOR IP): Performed by: NURSE PRACTITIONER

## 2022-11-25 PROCEDURE — 83036 HEMOGLOBIN GLYCOSYLATED A1C: CPT

## 2022-11-25 PROCEDURE — 80061 LIPID PANEL: CPT

## 2022-11-25 PROCEDURE — 1240000000 HC EMOTIONAL WELLNESS R&B

## 2022-11-25 PROCEDURE — 6370000000 HC RX 637 (ALT 250 FOR IP): Performed by: PSYCHIATRY & NEUROLOGY

## 2022-11-25 RX ORDER — CLOMIPRAMINE HYDROCHLORIDE 25 MG/1
25 CAPSULE ORAL NIGHTLY
Status: DISCONTINUED | OUTPATIENT
Start: 2022-11-25 | End: 2022-12-02

## 2022-11-25 RX ADMIN — Medication 5 MG: at 21:14

## 2022-11-25 RX ADMIN — HYDROXYZINE PAMOATE 25 MG: 25 CAPSULE ORAL at 21:15

## 2022-11-25 RX ADMIN — MICONAZOLE NITRATE: 2 POWDER TOPICAL at 21:14

## 2022-11-25 RX ADMIN — LURASIDONE HYDROCHLORIDE 120 MG: 40 TABLET, FILM COATED ORAL at 09:52

## 2022-11-25 RX ADMIN — VALACYCLOVIR HYDROCHLORIDE 500 MG: 500 TABLET, FILM COATED ORAL at 08:05

## 2022-11-25 RX ADMIN — CLOMIPRAMINE HYDROCHLORIDE 25 MG: 25 CAPSULE ORAL at 21:14

## 2022-11-25 RX ADMIN — MICONAZOLE NITRATE: 2 POWDER TOPICAL at 08:04

## 2022-11-25 NOTE — PROGRESS NOTES
[address] [place] [address]      Psychiatric Progress Note    Name:  Kika Gale  Date:  11/25/2022  Age:  65 y.o. Sex:  female  Ethnicity:   Primary Care Physician:  TING Smith CNP   Patient Care Team:  Patient Care Team:  TING Smith CNP as PCP - General  Evita Edwards MD (Cardiology)  IRAM Lynn (Physician Assistant Medical)  Chief Complaint: \" I feel about the same but I was able to shower. \"        Historian:patient  Complaint Type: anxiety, depression, loss of interest in favorite activities, sleep disturbance, and tobacco use  Course of Symptoms: ongoing  Precipitating Factors: history of mental illness     Subjective  Nursing notes were removed and patient had no behavioral issues during the night. As needed medications administered during the night include Hydroxyzine. Today she endorses passive suicidal ideation. She denies homicidal ideation and psychosis. She rates depression as a 7 and anxiety as a 5. She reports that last night when she was taking a shower that she \"froze and felt stuck\" when she started to wash her genitals. She reports that since being diagnosed with HSV 1&2 she over thinks washing her genitals and wonders if she is doing it correctly. She also worries about spreading genital herpes to others. She reports that she also freezes and feels stuck when she tries to throw things away in her home not knowing where to start. She reports that she began hoarding 34 years ago. She has been able to throw 2 pieces of mail away that is piled up on her couch for the past 2 years. She was educated on the medication Clomipramine and she agreed to start the medication. Patient reports sleep as \"I slept really good. \"  Patient has been calm and cooperative with staff and peers. Patient has been compliant with medications. Patient has been attending groups. Patient reports appetite as \"I am eating all of my food. \" Patient reports no side effects from medications. Objective  Vitals:    11/25/22 0858   BP: 111/66   Pulse: 95   Resp: 20   Temp: 97.2 °F (36.2 °C)   SpO2: 97%       Previous Psychiatric/Substance Use History      Medical History:  Past Medical History:   Diagnosis Date    Chest pain 02/08/2012    CPAP (continuous positive airway pressure) dependence     Depression     Hypoglycemia     SANDY (obstructive sleep apnea)     Schizoaffective disorder ([facility] 02/08/2012    Suicide attempt ([facility]         BRAMBILA History:   Social History     Substance and Sexual Activity   Alcohol Use No         Social History     Substance and Sexual Activity   Drug Use No        Social History     Tobacco Use   Smoking Status Never   Smokeless Tobacco Never        Family History:     History reviewed. No pertinent family history. Mental Status:  Level of consciousness:  within normal limits and awake  Appearance:  well-appearing, street clothes, in chair, good grooming, and good hygiene  Behavior/Motor:  no abnormalities noted  Attitude toward examiner:  cooperative, attentive, and good eye contact  Speech:  slow  Mood:  \" I am feeling about the same. \"  Affect:  blunted and flat  Thought processes:  linear and goal directed  Thought content:  Homocidal ideation :denies  Suicidal Ideation:  passive  Delusions:  no evidence of delusions  Perceptual Disturbance:  denies any perceptual disturbance  Cognition:  oriented to person, place, and time  Concentration : good  Memory intact for recent and remote  Fund of knowledge:  average  Abstract thinking:  adequate  Insight: limited  Judgment:  limited     clomiPRAMINE  25 mg Oral Nightly    lurasidone  120 mg Oral Daily    mirabegron  50 mg Oral Daily    melatonin  5 mg Oral Nightly    miconazole   Topical BID    valACYclovir  500 mg Oral Daily       Current Medications:  Current Facility-Administered Medications   Medication Dose Route Frequency Provider Last Rate Last Admin    clomiPRAMINE (ANAFRANIL) capsule 25 mg  25 mg Oral Nightly Lazaro Babe, APRN        lurasidone (LATUDA) tablet 120 mg  120 mg Oral Daily Lazaro Babe, APRN        aluminum & magnesium hydroxide-simethicone (MAALOX) 200-200-20 MG/5ML suspension 30 mL  30 mL Oral Q6H PRN Poly Seo MD   30 mL at 11/23/22 0802    docusate sodium (COLACE) capsule 100 mg  100 mg Oral PRN Lazaro Babe, APRN        hydrOXYzine pamoate (VISTARIL) capsule 25 mg  25 mg Oral TID PRN Lazaro Babe, APRN   25 mg at 11/24/22 2126    mirabegron (MYRBETRIQ) extended release tablet 50 mg  (Patient Supplied)  50 mg Oral Daily Lazaro Babe, APRN   50 mg at 11/25/22 0805    melatonin disintegrating tablet 5 mg  5 mg Oral Nightly Lazaro Babe, APRN   5 mg at 11/24/22 2126    miconazole (MICOTIN) 2 % powder   Topical BID Lazaro Babe, APRN   Given at 11/25/22 4445    acetaminophen (TYLENOL) tablet 650 mg  650 mg Oral Q4H PRN Elvin Saenz MD        polyethylene glycol (GLYCOLAX) packet 17 g  17 g Oral Daily PRN Elvin Saenz MD        valACYclovir (VALTREX) tablet 500 mg  500 mg Oral Daily Elvin Saenz MD   500 mg at 11/25/22 0805    calcium carbonate (TUMS) chewable tablet 500 mg  500 mg Oral TID PRN Elvin Saenz MD   500 mg at 11/23/22 2138       Psychotherapy:   SUPPORTIVE    DSM V Diagnoses:    Bipolar depression  Suicide attempt by ingestion of toxic substance   Borderline personality disorder  Hoarding Disorder       Plan:    Encourage group therapy  15 minute safety checks  Medical monitoring by Dr. Niyah Armas and associates  Continue current therapy and medications  Will initiate Clomipramine 25 mg po nightly for hoarding disorder and OCD-nausea, drowsiness, dry mouth, vomiting, constipation, headaches, decrease in concentration. Amount of time spent with patient:  15 minutes with greater than 50% of the time spent in counseling and collaboration of care.     SERGEY De León-BC, FNP-C   Clinician Signature: signed electronically

## 2022-11-25 NOTE — PROGRESS NOTES
Treatment Team Note:    Target Symptoms/Reason for admission: Per nursing admission assessment - Reason for Admission: Migue Putnam is a  65 yoa female who Drank spic and span  as suicide attempt. Med change recently and has felt suicidal and decreased functioning x 1 week. Diagnoses per psych provider: Borderline personality disorder ([facility] [F60.3]  Suicide attempt ([facility] [place] [facility]  Bipolar depression ([facility] [F31.9]  Bipolar 1 disorder, depressed ([facility] [F31.9]    Therapist met with treatment team to discuss patients treatment and discharge plans. Patient's aftercare plan is: [address]    Aftercare appointments made: Yes    Pt lives with: SW will meet with patient to gather information    Collateral obtained from: SW will meet with patient to gather information  Collateral obtained on:when authorized    Attending groups: New admission - SW will monitor group attendance    Behavior: cooperative    Has patient been completing ADL's:  New admission - unknown at this time - SW will monitor    SI:  patient reports SI  Plan: no and yes   If yes describe:  drink spic and span  HI:  patient denies HI  If present describe: N/A  Delusions: patient denies delusions  If present describe: N/A  Hallucinations: patient denies hallucinations  If present describe: N/A    Patient rates their -- Depression: 1-10:  pt unable to rate    Anxiety:1-10:  pt unable to rate      Sleeping:New admission - unknown at this time. Taking medication: New admission - unknown at this time.     Misc:

## 2022-11-25 NOTE — PLAN OF CARE
Problem: Self Harm/Suicidality  Goal: Will have no self-injury during hospital stay  Outcome: Progressing                                                                       Group Therapy Note    Date: 11/25/2022  Start Time: 1000  End Time:  1030  Number of Participants: 7    Type of Group: Psychoeducation    Wellness Binder Information  Module Name:  emotional wellness  Session Number:  5    Patient's Goal:  obstacles to emotional wellness    Notes:  pt acknowledged negative thinking as an obstacle to emotional wellness.     Status After Intervention:  Improved    Participation Level: Interactive    Participation Quality: Appropriate, Attentive, and Sharing      Speech:  normal      Thought Process/Content: Logical      Affective Functioning: Congruent      Mood: congruent      Level of consciousness:  Alert, Oriented x4, and Attentive      Response to Learning: Able to verbalize current knowledge/experience      Endings: None Reported    Modes of Intervention: Education      Discipline Responsible: Psychoeducational Specialist      Signature:  Rigoberto Yuen

## 2022-11-25 NOTE — PROGRESS NOTES
Patient in shower, writer was rounding and patient asked writer for help. Patient was currently standing in shower but not cleaning herself. Asked patient what she was needing and patient responded \"I don't know what to do next. \" Patient given directions by writer to wash her body with soap and water and hair with shampoo and water and then shower is complete and she may dry off with provided towels and dress in her clothes. Patient stated verbal understanding and began washing herself with soap and washcloth.     Electronically signed by Jesus Kessler RN on 11/24/2022 at 8:23 PM

## 2022-11-25 NOTE — PROGRESS NOTES
Progress Note  Amy Lim  11/25/2022 3:16 PM  Subjective:   Admit Date:   11/23/2022      CC/ADMIT DX:       Interval History:   Reviewed overnight events and nursing notes. She has no new medical issues. I have reviewed all labs/diagnostics from the last 24hrs. ROS:   I have done a 10 point ROS and all are negative, except what is mentioned in the HPI. ADULT DIET; Regular; 4 carb choices (60 gm/meal)    Medications:      clomiPRAMINE  25 mg Oral Nightly    lurasidone  120 mg Oral Daily    mirabegron  50 mg Oral Daily    melatonin  5 mg Oral Nightly    miconazole   Topical BID    valACYclovir  500 mg Oral Daily           Objective:   Vitals: /66   Pulse 95   Temp 97.2 °F (36.2 °C) (Temporal)   Resp 20   Ht 5' 4\" (1.626 m)   Wt 207 lb 8 oz (94.1 kg)   SpO2 97%   BMI 35.62 kg/m²  No intake or output data in the 24 hours ending 11/25/22 1516  General appearance: alert and cooperative with exam  Extremities: extremities normal, atraumatic, no cyanosis or edema  Neurologic:  No obvious focal neurologic deficits. Skin: no rashes    Assessment and Plan:   Principal Problem:    Bipolar depression ([address]  Active Problems:    Bipolar 1 disorder, depressed (HCC)    Borderline personality disorder (HCC)    Obstructive sleep apnea    CPAP (continuous positive airway pressure) dependence    Ingestion of toxic substance  Resolved Problems:    * No resolved hospital problems. *    Pre DM    Plan:   Continue present medication(s)    Follow with Psych   Follow with BP      Discharge planning:   her home     Reviewed treatment plans with the patient and/or family.              Electronically signed by Kae Denise MD on 11/25/2022 at 3:16 PM

## 2022-11-25 NOTE — PROGRESS NOTES
[facility] Adult Unit Daily Assessment  Nursing Progress Note    Room: [address] [place] [address]   Name: Roseline Robison   Age: 65 y.o. Gender: female   Dx: Bipolar depression ([address]  Precautions: suicide risk  Inpatient Status: voluntary       SLEEP:  Sleep Quality Good  Sleep Medications: Yes; Melatonin 5mg  PRN Sleep Meds: No       MEDICAL:  Other PRN Meds: Yes; Atarax 25mg  Med Compliant: Yes  Accu-Chek: No  Oxygen/CPAP/BiPAP: Yes; CPAP  CIWA/CINA: No   PAIN Assessment: denies  Side Effects from medication: none voiced      Metabolic Screening:  Lab Results   Component Value Date    LABA1C 5.6 06/27/2021     Lab Results   Component Value Date    CHOL 143 (L) 06/27/2021    CHOL 149 (L) 07/22/2020    CHOL 152 (L) 02/22/2020    CHOL 130 (L) 12/22/2018    CHOL 154 02/11/2014    CHOL 181 05/10/2012     Lab Results   Component Value Date    TRIG 166 (H) 06/27/2021    TRIG 143 07/22/2020    TRIG 130 02/22/2020    TRIG 83 12/22/2018    TRIG 113 02/11/2014    TRIG 197 (H) 05/10/2012     Lab Results   Component Value Date    HDL 48 (L) 06/27/2021    HDL 60 (L) 07/22/2020    HDL 52 (L) 02/22/2020    HDL 52 (L) 12/22/2018    HDL 56 02/11/2014    HDL 62 05/10/2012     No components found for: LDLCAL  No results found for: LABVLDL  Body mass index is 35.62 kg/m². BP Readings from Last 2 Encounters:   11/24/22 122/89   12/28/21 134/81         Medical Bed:   Is patient in a medical bed? no   If medical bed is in use, has nursing secured room while patient is awake and out of the room? NA  Has safety checks by nursing been completed on the bed/room this shift? yes    Protective Factors:  Patient identifies protective factors with nursing staff as follows:    Identifies reasons for living: Yes   Supportive Social Network or family: Yes    Belief that suicide is immoral/high spirituality: Yes   Responsibility to family or others/living with family: Yes   Fear of death or dying due to pain and suffering: No   Engaged in work or school: No     If Patient is unable to identify, reason why? N/A      PSYCH:  Depression: 8   Anxiety: 7   SI denies suicidal ideation   Risk of Suicide: Low Risk  HI Negative for homicidal ideation      AVH:no If Hallucinations are present, describe? denies        GENERAL:  Appetite: good   Percent Meals: no meals consumed during this shift   Social: No   Speech: normal   Appearance: appropriately dressed    GROUP:  Group Participation: Yes  Participation Quality: Minimal    Notes: Pt is cooperative with her interview tonight. Affect is flat. Mood is congruent. Pt requires near constant reinforcement of her actions and that what she is doing is ok or appropriate or the \"right\" course of action. Pt required direction on appropriate self care tonight in the shower by a member of staff and then in her interview stated \"I don't think I showered properly. \" Positive reinforcement provided by this writer as well as additional education. Pt rates her depression an 8 and her anxiety a 7. She reports SI with a plan to \"not take care of myself. \" She reports that she contracts for safety while here. Pt reports that she knows her kids would be sad if something happened to her. She was med compliant tonight and dressed appropriately. She denies HI and AVH. She remains LOS tonight while using her CPAP and has bben resting well since going to bed.          Electronically signed by Robert Prasad RN on 11/25/22 at 12:08 AM CST

## 2022-11-25 NOTE — PROGRESS NOTES
[facility] Adult Unit Daily Assessment  Nursing Progress Note    Room: [address] [place] [address]-   Name: Rafaela Shearer   Age: 65 y.o. Gender: female   Dx: Bipolar depression ([address]  Precautions: close watch and suicide risk  Inpatient Status: voluntary       SLEEP:  Sleep Quality Fair  Sleep Medications: Yes   PRN Sleep Meds: Yes       MEDICAL:  Other PRN Meds: Yes   Med Compliant: Yes  Accu-Chek: No  Oxygen/CPAP/BiPAP: No  CIWA/CINA: No   PAIN Assessment: none  Side Effects from medication: No      Metabolic Screening:  Lab Results   Component Value Date    LABA1C 6.1 (H) 11/25/2022     Lab Results   Component Value Date    CHOL 172 11/25/2022    CHOL 143 (L) 06/27/2021    CHOL 149 (L) 07/22/2020    CHOL 152 (L) 02/22/2020    CHOL 130 (L) 12/22/2018    CHOL 154 02/11/2014    CHOL 181 05/10/2012     Lab Results   Component Value Date    TRIG 177 (H) 11/25/2022    TRIG 166 (H) 06/27/2021    TRIG 143 07/22/2020    TRIG 130 02/22/2020    TRIG 83 12/22/2018    TRIG 113 02/11/2014    TRIG 197 (H) 05/10/2012     Lab Results   Component Value Date    HDL 59 (L) 11/25/2022    HDL 48 (L) 06/27/2021    HDL 60 (L) 07/22/2020    HDL 52 (L) 02/22/2020    HDL 52 (L) 12/22/2018    HDL 56 02/11/2014    HDL 62 05/10/2012     No components found for: LDLCAL  No results found for: LABVLDL  Body mass index is 35.62 kg/m². BP Readings from Last 2 Encounters:   11/25/22 111/66   12/28/21 134/81         Medical Bed:   Is patient in a medical bed? NA   If medical bed is in use, has nursing secured room while patient is awake and out of the room? NA  Has safety checks by nursing been completed on the bed/room this shift? NA    Protective Factors:  Patient identifies protective factors with nursing staff as follows:    Identifies reasons for living: Yes   Supportive Social Network or family: Yes    Belief that suicide is immoral/high spirituality: Yes   Responsibility to family or others/living with family: Yes   Fear of death or dying due to pain and suffering: Yes   Engaged in work or school: No     If Patient is unable to identify, reason why? PSYCH:  Depression: 7   Anxiety: 5   SI denies suicidal ideation   Risk of Suicide: No Risk  HI Negative for homicidal ideation      AVH:no If Hallucinations are present, describe? GENERAL:  Appetite: no change from normal   Percent Meals: 75%   Social: Yes   Speech: pressured   Appearance: appropriately dressed and healthy looking    GROUP:  Group Participation: Yes  Participation Quality: Minimal    Notes: Patient previously needed quite a lot of guidance and instruction for even basic task, according to previous shift report. Patient has been much more coherent and comprehensive today. Patient is med compliant, cooperative, and required very little direction or instruction. Patient is mildly confused at times, but is able to function soley on her own.          Electronically signed by Sherlene Kocher, RN on 11/25/22 at 12:56 PM CST

## 2022-11-25 NOTE — PROGRESS NOTES
WRAP UP GROUP NOTE:     Start 1900  End 1930    Patient's Goal:  Providing feedback as to their own progress in the care-plan provided. Pt's have an opportunity to explore self-reflective skills and share any additional cares and concerns not yet addressed. Pt effectively participated. Energy level: normal  Appetite: good  Concentration: improving  Hallucinations: blank  Depression: same  Anxiety: on/off  How I worked today: worked hard  What helps me sleep: read  Any questions/complaints/comments: blank    Groups/activities that helped me today were:   Therapeutic recreation - \"arts and crafts (colored)\"  Seeing doctors - \"worksheets on hoarding\"    Electronically signed by Wiley Germain on 11/24/2022 at 9:30 PM

## 2022-11-25 NOTE — PROGRESS NOTES
Group Note    Date: 11/24/22  Start Time: 15:45 PM   End Time:16:15 PM     Number of Participants: 11    Type of Group: Health Living/Wellness       Status After Intervention:  Improved    Participation Level:  Active Listener and Interactive    Participation Quality: Appropriate    Speech:  normal    Thought Process/Content: Linear    Mood: depressed and anxious    Level of consciousness:  Alert and Oriented x4    Response to Learning: Progressing to goal    Modes of Intervention: Education and Support    Discipline Responsible: Registered Nurse     Signature:  Isaac Hoffman RN

## 2022-11-26 PROCEDURE — 1240000000 HC EMOTIONAL WELLNESS R&B

## 2022-11-26 PROCEDURE — 6370000000 HC RX 637 (ALT 250 FOR IP): Performed by: PSYCHIATRY & NEUROLOGY

## 2022-11-26 PROCEDURE — 6370000000 HC RX 637 (ALT 250 FOR IP): Performed by: NURSE PRACTITIONER

## 2022-11-26 PROCEDURE — 94660 CPAP INITIATION&MGMT: CPT

## 2022-11-26 PROCEDURE — 99231 SBSQ HOSP IP/OBS SF/LOW 25: CPT | Performed by: PSYCHIATRY & NEUROLOGY

## 2022-11-26 RX ADMIN — CLOMIPRAMINE HYDROCHLORIDE 25 MG: 25 CAPSULE ORAL at 21:09

## 2022-11-26 RX ADMIN — MICONAZOLE NITRATE: 2 POWDER TOPICAL at 21:10

## 2022-11-26 RX ADMIN — LURASIDONE HYDROCHLORIDE 120 MG: 40 TABLET, FILM COATED ORAL at 16:39

## 2022-11-26 RX ADMIN — Medication 5 MG: at 21:10

## 2022-11-26 RX ADMIN — HYDROXYZINE PAMOATE 25 MG: 25 CAPSULE ORAL at 21:09

## 2022-11-26 RX ADMIN — MICONAZOLE NITRATE: 2 POWDER TOPICAL at 08:29

## 2022-11-26 RX ADMIN — VALACYCLOVIR HYDROCHLORIDE 500 MG: 500 TABLET, FILM COATED ORAL at 08:29

## 2022-11-26 NOTE — PROGRESS NOTES
Group Therapy Note    Date: 11/26/2022  Start Time: 1600  End Time:  1630  Number of Participants: 14    Type of Group: Healthy Living/Wellness    Wellness Binder Information  Module Name:  Personal strengths  Session Number:  1    Status After Intervention:  Unchanged    Participation Level: Active Listener    Participation Quality: Appropriate, Attentive, and Sharing      Speech:  normal      Thought Process/Content: Logical      Affective Functioning: Congruent      Mood: calm      Level of consciousness:  Alert, Oriented x4, and Attentive      Response to Learning: Capable of insight      Endings: None Reported    Modes of Intervention: Support, Socialization, and Exploration      Discipline Responsible: Registered Nurse      Signature:   Yusuf Conte RN

## 2022-11-26 NOTE — PLAN OF CARE
Problem: Nutrition Deficit:  Goal: Optimize nutritional status  Flowsheets (Taken 11/26/2022 0756)  Nutrient intake appropriate for improving, restoring, or maintaining nutritional needs: Provide specific nutrition education to patient or family as appropriate

## 2022-11-26 NOTE — PROGRESS NOTES
Department of Psychiatry  Attending Progress Note     Chief complaint: \"I'm better\"    SUBJECTIVE:   Chart reviewed, discussed with the team. No major issues overnight. Patient is med-compliant. No SEs. Performs ADLs. Social and goes to groups. Patient seen in the dining area interacting with a peer. Denies SI/HI/AVH. Rates depression 4/10, anxiety 3/10. Slept somewhat poorly. States she typically takes Latuda in the evening and it was switched to daytime. She would like to continue taking it in the evening. OBJECTIVE    Physical  Wt Readings from Last 3 Encounters:   11/23/22 207 lb 8 oz (94.1 kg)   12/28/21 233 lb (105.7 kg)   06/25/21 233 lb 6.4 oz (105.9 kg)     Temp Readings from Last 3 Encounters:   11/26/22 (!) 96.3 °F (35.7 °C)   06/27/21 97.8 °F (36.6 °C) (Temporal)   04/20/21 97.6 °F (36.4 °C)     BP Readings from Last 3 Encounters:   11/26/22 (!) 138/101   12/28/21 134/81   06/27/21 128/76     Pulse Readings from Last 3 Encounters:   11/26/22 95   12/28/21 80   06/27/21 77        Review of Systems: 14-point review of systems negative except as described above    Mental Status Examination:   Appearance: Stated age. Long hair. Gait stable. No abnormal movements or tremor. Behavior: Calm and cooperative  Speech: Normal in tone, volume, and quality. No slurring, dysarthria or pressured speech noted. Mood: \"Better\"   Affect: Mood congruent. Thought Process: Appears linear. Thought Content: Denies suicidal and homicidal ideation. No overt delusions or paranoia appreciated. Perceptions: Denies auditory or visual hallucinations at present time. Not responding to internal stimuli. Concentration: Intact. Orientation: to person, place, date, and situation. Language: Intact. Fund of information: Intact. Memory: Recent and remote appear intact. Neurovegitative: Fair appetite and poor sleep. Insight: Improving. Judgment: Improving.     Data  Lab Results   Component Value Date    WBC 9.5 11/23/2022    HGB 16.2 (H) 11/23/2022    HCT 49.4 (H) 11/23/2022    MCV 97.2 11/23/2022     11/23/2022      Lab Results   Component Value Date     11/23/2022    K 3.9 11/23/2022     11/23/2022    CO2 23 11/23/2022    BUN 11 11/23/2022    CREATININE 0.9 11/23/2022    GLUCOSE 173 (H) 11/23/2022    CALCIUM 10.4 (H) 11/23/2022    PROT 7.9 11/23/2022    LABALBU 4.8 11/23/2022    BILITOT 0.8 11/23/2022    ALKPHOS 92 11/23/2022    AST 16 11/23/2022    ALT 19 11/23/2022    LABGLOM >60 11/23/2022    GFRAA >59 06/25/2021    GLOB 3.3 06/18/2016       Medications    Current Facility-Administered Medications:     clomiPRAMINE (ANAFRANIL) capsule 25 mg, 25 mg, Oral, Nightly, Martina Bradford APRN, 25 mg at 11/25/22 2114    lurasidone (LATUDA) tablet 120 mg, 120 mg, Oral, Daily, Ronnald Pair, APRN, 120 mg at 11/25/22 7677    aluminum & magnesium hydroxide-simethicone (MAALOX) 200-200-20 MG/5ML suspension 30 mL, 30 mL, Oral, Q6H PRN, Venkatesh Bolden MD, 30 mL at 11/23/22 0802    docusate sodium (COLACE) capsule 100 mg, 100 mg, Oral, PRN, Ronnald Pair, APRN    hydrOXYzine pamoate (VISTARIL) capsule 25 mg, 25 mg, Oral, TID PRN, Ronnald Pair, APRN, 25 mg at 11/25/22 2115    mirabegron (MYRBETRIQ) extended release tablet 50 mg  (Patient Supplied), 50 mg, Oral, Daily, Ronnald Pair, APRN, 50 mg at 11/26/22 0830    melatonin disintegrating tablet 5 mg, 5 mg, Oral, Nightly, Ronnald Pair, APRN, 5 mg at 11/25/22 2114    miconazole (MICOTIN) 2 % powder, , Topical, BID, Ronnalnatasha Perez, APRN, Given at 11/26/22 5098    acetaminophen (TYLENOL) tablet 650 mg, 650 mg, Oral, Q4H PRN, Carlo Martin MD    polyethylene glycol (GLYCOLAX) packet 17 g, 17 g, Oral, Daily PRN, Carlo Martin MD    valACYclovir (VALTREX) tablet 500 mg, 500 mg, Oral, Daily, Carlo Martin MD, 500 mg at 11/26/22 0829    calcium carbonate (TUMS) chewable tablet 500 mg, 500 mg, Oral, TID PRN, Carlo Martin MD, 500 mg at 11/23/22 2138    ASSESSMENT AND PLAN  DSM 5 DIAGNOSIS  Impression  Bipolar depression  Suicide attempt by ingestion of toxic substance   Borderline personality disorder  Hoarding Disorder   SANDY  HSV, type 1 & 2    Continue to observe. Engage in activities. Work on coping skills. Plan:   1. Psychiatric Medications:   Continue current psychotropic medications as recommended. Monitor for side effects. The risks, benefits, side effects, indications, contraindications, alternatives and adverse effects of the medications have been discussed with patient. 2. Continue to provide supportive psychotherapy. Encourage socialization and participation in recreational activities. Work on coping skills. 3. Medical Issues:    Continue medical monitoring by Dr. Radha Tierney and associates. 4. Disposition:     to provide outpatient resources and facilitate disposition.      Amount of time spent with patient:      15 minutes with greater than 50 % of the time spent in counseling and collaboration of care

## 2022-11-26 NOTE — PROGRESS NOTES
WRAP UP GROUP NOTE:     Start 1900  End 1930    Patient's Goal:  Providing feedback as to their own progress in the care-plan provided. Pt's have an opportunity to explore self-reflective skills and share any additional cares and concerns not yet addressed. Pt effectively participated.      Energy level: low  Appetite: good  Concentration: poor  Hallucinations: gone  Depression: same  Anxiety: on/off  How I worked today: tried a lot   What helps me sleep: try a relaxation exercise  Any questions/complaints/comments: \"Latuda made me sleepy this morning\"    Groups/activities that helped me today were:  Life skills - \"coping skills with Smita\"  Seeing Doctors - \"saw Yoan Dupont"    Electronically signed by Deonte Means on 11/25/2022 at 9:29 PM

## 2022-11-26 NOTE — PROGRESS NOTES
[facility] Adult Unit Daily Assessment  Nursing Progress Note    Room: [address] [place] [address]-   Name: Shantell Bee   Age: 65 y.o. Gender: female   Dx: Bipolar depression ([address]  Precautions: close watch and suicide risk  Inpatient Status: voluntary       SLEEP:  Sleep Quality Fair  Sleep Medications: Yes   PRN Sleep Meds: Yes       MEDICAL:  Other PRN Meds: Yes   Med Compliant: Yes  Accu-Chek: No  Oxygen/CPAP/BiPAP: No  CIWA/CINA: No   PAIN Assessment: none  Side Effects from medication: No      Metabolic Screening:  Lab Results   Component Value Date    LABA1C 6.1 (H) 11/25/2022     Lab Results   Component Value Date    CHOL 172 11/25/2022    CHOL 143 (L) 06/27/2021    CHOL 149 (L) 07/22/2020    CHOL 152 (L) 02/22/2020    CHOL 130 (L) 12/22/2018    CHOL 154 02/11/2014    CHOL 181 05/10/2012     Lab Results   Component Value Date    TRIG 177 (H) 11/25/2022    TRIG 166 (H) 06/27/2021    TRIG 143 07/22/2020    TRIG 130 02/22/2020    TRIG 83 12/22/2018    TRIG 113 02/11/2014    TRIG 197 (H) 05/10/2012     Lab Results   Component Value Date    HDL 59 (L) 11/25/2022    HDL 48 (L) 06/27/2021    HDL 60 (L) 07/22/2020    HDL 52 (L) 02/22/2020    HDL 52 (L) 12/22/2018    HDL 56 02/11/2014    HDL 62 05/10/2012     No components found for: LDLCAL  No results found for: LABVLDL  Body mass index is 35.6 kg/m². BP Readings from Last 2 Encounters:   11/26/22 (!) 138/101   12/28/21 134/81         Medical Bed:   Is patient in a medical bed? NA   If medical bed is in use, has nursing secured room while patient is awake and out of the room? NA  Has safety checks by nursing been completed on the bed/room this shift? NA    Protective Factors:  Patient identifies protective factors with nursing staff as follows:    Identifies reasons for living: Yes   Supportive Social Network or family: Yes    Belief that suicide is immoral/high spirituality: Yes   Responsibility to family or others/living with family: Yes   Fear of death or dying due to pain and suffering: Yes   Engaged in work or school: No     If Patient is unable to identify, reason why? PSYCH:  Depression: 7   Anxiety: 7   SI denies suicidal ideation   Risk of Suicide: No Risk  HI Negative for homicidal ideation      AVH:no If Hallucinations are present, describe? GENERAL:  Appetite: no change from normal   Percent Meals: 75%   Social: Yes   Speech: pressured   Appearance: appropriately dressed and healthy looking    GROUP:  Group Participation: yes  Participation Quality: active listener    Notes: Patient is calm, cooperative, and again more coherent/comprehensive today. Patient is med compliant and attends group. Patient still endorsing moderate depression and anxiety. Patient is motivated to improve and continues to do so.           Electronically signed by Jorge Payne RN on 11/26/22 at 11:27 AM CST

## 2022-11-26 NOTE — PROGRESS NOTES
Group Therapy Note    Date: 11/26/2022  Start Time: 0730  End Time:  0800  Number of Participants: 9    Type of Group: [employer] Information    Session Number:  1    Patient's Goal:  Work on worksheets    Status After Intervention:  Unchanged    Participation Level: Active Listener    Participation Quality: Appropriate      Speech:  pressured      Thought Process/Content: Logical      Affective Functioning: Congruent      Mood: Calm      Level of consciousness:  Alert    Modes of Intervention: Support      Discipline Responsible: Registered Nurse      Signature:   Ai Rose RN

## 2022-11-26 NOTE — PLAN OF CARE
Problem: Self Harm/Suicidality  Goal: Will have no self-injury during hospital stay  Description: INTERVENTIONS:  1. Q 30 MINUTES: Routine safety checks  2. Q SHIFT & PRN: Assess risk to determine if routine checks are adequate to maintain patient safety  Outcome: Progressing     Problem: Safety - Adult  Goal: Free from fall injury  Outcome: Progressing     Problem: Gastrointestinal - Adult  Goal: Maintains adequate nutritional intake  Outcome: Progressing

## 2022-11-26 NOTE — CONSULTS
Comprehensive Nutrition Assessment    Type and Reason for Visit:  Initial, Consult, Patient Education (A1c 6.1; pre-diabetes, new dx)    Nutrition Recommendations/Plan:   Will monitor POC. Offer diet counseling as able. Malnutrition Assessment:  Malnutrition Status:  Insufficient data (11/26/22 0808)    Context:  Social/Environmental Circumstances     Findings of the 6 clinical characteristics of malnutrition:  Energy Intake:  Mild decrease in energy intake (Comment)  Weight Loss:  Unable to assess     Body Fat Loss:  Unable to assess     Muscle Mass Loss:  Unable to assess    Fluid Accumulation:   (None per flowsheets)     Strength:  Not Performed    Nutrition Assessment:    Reviewed chart. 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 New A1c found to be elevated at 6.1%; prediabetes range. Pt unaware of dx at this time per RN. Will await formal dx discussion and possible edu if/when pt readiness progresses s/p dx awareness. Nutrition Related Findings:    No BM documented, no edema, genital herpes and breast rash. Wound Type: None (See below.)       Current Nutrition Intake & Therapies:    Average Meal Intake: %  Average Supplements Intake: None Ordered  ADULT DIET; Regular; 4 carb choices (60 gm/meal)    Anthropometric Measures:  Height: 5' 4.02\" (162.6 cm)  Ideal Body Weight (IBW): 120 lbs (55 kg)    Current Body Weight: 207 lb 8 oz (94.1 kg), 172.9 % IBW.     Current BMI (kg/m2): 35.6  Weight Adjustment For: No Adjustment   BMI Categories: Obese Class 2 (BMI 35.0 -39.9)    Estimated Daily Nutrient Needs:  Energy Requirements Based On: Kcal/kg  Weight Used for Energy Requirements: Current  Energy (kcal/day): 7265-2699 kcal/day = 15-18kcal/kg actual weight/day  Weight Used for Protein Requirements: Ideal  Protein (g/day): 65-82 g/day = 1.2-1.5g/kg IBW/day  Method Used for Fluid Requirements: 1 ml/kcal  Fluid (ml/day): 4851-4582    Nutrition Diagnosis:   Altered nutrition-related lab values related to other (comment) (new dx pre-DM, suspected etiologies include excessive energy intake, decreased physical activity, age, and medication-related side effects) as evidenced by BMI, lab values    Nutrition Interventions:   Food and/or Nutrient Delivery: Continue Current Diet  Nutrition Education/Counseling: Education needed (Will monitor POC; pt is unaware of this dx at this time)  Coordination of Nutrition Care: Continue to monitor while inpatient  Plan of Care discussed with: RN    Goals:  Previous Goal Met: Progressing toward Goal(s)  Goals: Meet at least 75% of estimated needs       Nutrition Monitoring and Evaluation:   Behavioral-Environmental Outcomes: Readiness for Change  Food/Nutrient Intake Outcomes: Food and Nutrient Intake  Physical Signs/Symptoms Outcomes: Biochemical Data    Discharge Planning:    Coordination of community care     MS Baljit, RD, LD  Contact: [phone]

## 2022-11-26 NOTE — GROUP NOTE
Group Therapy Note    Date: 11/26/2022    Group Start Time: 1430  Group End Time: 1450  Group Topic: Psychoeducation    [facility]    Olga Vernon           Patient's Goal: Happiness    Notes: Pt talked about thankfulness and went through the alphabet and pt's shared what they were thankful for on each letter. Status After Intervention:  Unchanged    Participation Level:  Active Listener and Interactive    Participation Quality: Appropriate, Attentive, and Sharing      Speech:  normal      Thought Process/Content: Logical      Affective Functioning: Congruent      Mood: euthymic      Level of consciousness:  Alert, Oriented x4, and Attentive      Response to Learning: Able to verbalize current knowledge/experience      Endings: None Reported    Modes of Intervention: Support, Socialization, and Exploration      Discipline Responsible: Psychoeducational Specialist      Signature:  Olga Vernon

## 2022-11-26 NOTE — PROGRESS NOTES
[facility] Adult Unit Daily Assessment  Nursing Progress Note    Room: [id number]-   Name: Obed Sun   Age: 65 y.o. Gender: female   Dx: Bipolar depression ([address]  Precautions: suicide risk, close watch  Inpatient Status: voluntary       SLEEP:  Sleep Quality Good  Sleep Medications: Yes   PRN Sleep Meds: No       MEDICAL:  Other PRN Meds: No   Med Compliant: Yes  Accu-Chek: No  Oxygen/CPAP/BiPAP: No  CIWA/CINA: No   PAIN Assessment: none  Side Effects from medication: No      Metabolic Screening:  Lab Results   Component Value Date    LABA1C 6.1 (H) 11/25/2022     Lab Results   Component Value Date    CHOL 172 11/25/2022    CHOL 143 (L) 06/27/2021    CHOL 149 (L) 07/22/2020    CHOL 152 (L) 02/22/2020    CHOL 130 (L) 12/22/2018    CHOL 154 02/11/2014    CHOL 181 05/10/2012     Lab Results   Component Value Date    TRIG 177 (H) 11/25/2022    TRIG 166 (H) 06/27/2021    TRIG 143 07/22/2020    TRIG 130 02/22/2020    TRIG 83 12/22/2018    TRIG 113 02/11/2014    TRIG 197 (H) 05/10/2012     Lab Results   Component Value Date    HDL 59 (L) 11/25/2022    HDL 48 (L) 06/27/2021    HDL 60 (L) 07/22/2020    HDL 52 (L) 02/22/2020    HDL 52 (L) 12/22/2018    HDL 56 02/11/2014    HDL 62 05/10/2012     No components found for: LDLCAL  No results found for: LABVLDL  Body mass index is 35.62 kg/m². BP Readings from Last 2 Encounters:   11/25/22 137/64   12/28/21 134/81         Medical Bed:   Is patient in a medical bed? no   If medical bed is in use, has nursing secured room while patient is awake and out of the room? NA  Has safety checks by nursing been completed on the bed/room this shift? NA    Protective Factors:  Patient identifies protective factors with nursing staff as follows:    Identifies reasons for living: Yes   Supportive Social Network or family: Yes    Belief that suicide is immoral/high spirituality: Yes   Responsibility to family or others/living with family: Yes   Fear of death or dying due to pain and suffering: Yes   Engaged in work or school: No     If Patient is unable to identify, reason why? PSYCH:  Depression: 8   Anxiety: 7   SI passive , Patient contracts for safety. Risk of Suicide: Low Risk  HI Negative for homicidal ideation      AVH:no If Hallucinations are present, describe? GENERAL:  Appetite: good   Percent Meals:    Social: Yes   Speech: normal   Appearance: appropriately dressed and healthy looking    GROUP:  Group Participation: Yes  Participation Quality: Interactive    Notes:  Patient has isolated herself in her room most of the evening. Alert and oriented x4, pleasant , cooperative. Patient was withdrawn, low concentration, anxious, mildly confused at times and flat facial expression. Vistaril 25 mg given. Patient required some guidance and instruction for basic task. Patient reported that her felt hopelessness and helplessness. Patient stated she was suicidal because she did not take her evening bath. Contacts for safety. Patient stated her adult children are supportive. Continue to monitor for safety.          Electronically signed by Thais Sigala RN on 11/26/22 at 12:07 AM CST

## 2022-11-27 PROCEDURE — 6370000000 HC RX 637 (ALT 250 FOR IP): Performed by: PSYCHIATRY & NEUROLOGY

## 2022-11-27 PROCEDURE — 1240000000 HC EMOTIONAL WELLNESS R&B

## 2022-11-27 PROCEDURE — 94660 CPAP INITIATION&MGMT: CPT

## 2022-11-27 PROCEDURE — 6370000000 HC RX 637 (ALT 250 FOR IP): Performed by: NURSE PRACTITIONER

## 2022-11-27 RX ADMIN — Medication 5 MG: at 20:56

## 2022-11-27 RX ADMIN — LURASIDONE HYDROCHLORIDE 120 MG: 40 TABLET, FILM COATED ORAL at 16:56

## 2022-11-27 RX ADMIN — CLOMIPRAMINE HYDROCHLORIDE 25 MG: 25 CAPSULE ORAL at 20:56

## 2022-11-27 RX ADMIN — DOCUSATE SODIUM 100 MG: 100 CAPSULE, LIQUID FILLED ORAL at 15:04

## 2022-11-27 RX ADMIN — MICONAZOLE NITRATE: 2 POWDER TOPICAL at 20:57

## 2022-11-27 RX ADMIN — MICONAZOLE NITRATE: 2 POWDER TOPICAL at 08:10

## 2022-11-27 RX ADMIN — HYDROXYZINE PAMOATE 25 MG: 25 CAPSULE ORAL at 20:57

## 2022-11-27 RX ADMIN — VALACYCLOVIR HYDROCHLORIDE 500 MG: 500 TABLET, FILM COATED ORAL at 08:10

## 2022-11-27 NOTE — PROGRESS NOTES
Group Note    Date: 11/26/22  Start Time: 7:30 PM   End Time:8:00 PM     Number of Participants: 10    Type of Group: Wrap-Up     Patient's Goal:  what to be thankful for    Notes:  pt participated in group, isolative to self    Status After Intervention:  Improved    Participation Level:  Active Listener and Interactive    Participation Quality: Appropriate    Speech:  normal and hesitant    Thought Process/Content: Logical    Mood: depressed    Level of consciousness:  Alert    Response to Learning: Able to verbalize current knowledge/experience and Able to verbalize/acknowledge new learning    Modes of Intervention: Education and Support    Discipline Responsible: Registered Nurse     Signature:  Connie Holm RN

## 2022-11-27 NOTE — PROGRESS NOTES
[facility] Adult Unit Daily Assessment  Nursing Progress Note    Room: [address] [place] [address]   Name: Ailyn Ruiz   Age: 65 y.o. Gender: female   Dx: Bipolar depression ([address]  Precautions: suicide risk  Inpatient Status: voluntary       SLEEP:  Sleep Quality Good  Sleep Medications:    PRN Sleep Meds:        MEDICAL:  Other PRN Meds:    Med Compliant: Yes  Accu-Chek: No  Oxygen/CPAP/BiPAP: No  CIWA/CINA: No   PAIN Assessment: none  Side Effects from medication: No      Metabolic Screening:  Lab Results   Component Value Date    LABA1C 6.1 (H) 11/25/2022     Lab Results   Component Value Date    CHOL 172 11/25/2022    CHOL 143 (L) 06/27/2021    CHOL 149 (L) 07/22/2020    CHOL 152 (L) 02/22/2020    CHOL 130 (L) 12/22/2018    CHOL 154 02/11/2014    CHOL 181 05/10/2012     Lab Results   Component Value Date    TRIG 177 (H) 11/25/2022    TRIG 166 (H) 06/27/2021    TRIG 143 07/22/2020    TRIG 130 02/22/2020    TRIG 83 12/22/2018    TRIG 113 02/11/2014    TRIG 197 (H) 05/10/2012     Lab Results   Component Value Date    HDL 59 (L) 11/25/2022    HDL 48 (L) 06/27/2021    HDL 60 (L) 07/22/2020    HDL 52 (L) 02/22/2020    HDL 52 (L) 12/22/2018    HDL 56 02/11/2014    HDL 62 05/10/2012     No components found for: LDLCAL  No results found for: LABVLDL  Body mass index is 35.6 kg/m². BP Readings from Last 2 Encounters:   11/27/22 (!) 126/95   12/28/21 134/81         Medical Bed:   Is patient in a medical bed? no   If medical bed is in use, has nursing secured room while patient is awake and out of the room? NA  Has safety checks by nursing been completed on the bed/room this shift? NA    Protective Factors:  Patient identifies protective factors with nursing staff as follows:    Identifies reasons for living: Yes   Supportive Social Network or family: Yes    Belief that suicide is immoral/high spirituality: Yes   Responsibility to family or others/living with family: Yes   Fear of death or dying due to pain and suffering: Yes   Engaged in work or school: No     If Patient is unable to identify, reason why? PSYCH:  Depression: 7   Anxiety: 6   SI denies suicidal ideation   Risk of Suicide: No Risk  HI Negative for homicidal ideation      AVH:no If Hallucinations are present, describe? GENERAL:  Appetite: good   Percent Meals: 75%   Social: Yes   Speech: normal   Appearance: appropriately dressed and healthy looking    GROUP:  Group Participation: Yes  Participation Quality: Minimal    Notes: Patient is up and in the day area  for breakfast and medications. She reports she slept well last night. Appetite is good. Reports her mood as \"anxious\". She showered. She reports \"I am afraid I'm going to have another outbreak\" . Asking for Vit. D. It is not ordered. She then ask for Colace. She did laundry. She placed the brown paper bag in the washer and then emptied it into the drum. Affect is flat. She is at the desk frequently for simple requests.          Electronically signed by Maryruth Cowden, RN on 11/27/22 at 9:26 AM CST

## 2022-11-27 NOTE — PROGRESS NOTES
[facility] Adult Unit Daily Assessment  Nursing Progress Note    Room: [address] [place], [place] [address]-   Name: Neda Jean-Baptiste   Age: 65 y.o. Gender: female   Dx: Bipolar depression ([address]  Precautions: close watch and suicide risk  Inpatient Status: voluntary       SLEEP:  Sleep Quality Good  Sleep Medications: Yes   PRN Sleep Meds: No       MEDICAL:  Other PRN Meds: No   Med Compliant: Yes  Accu-Chek: No  Oxygen/CPAP/BiPAP: No  CIWA/CINA: No   PAIN Assessment: none  Side Effects from medication: No      Metabolic Screening:  Lab Results   Component Value Date    LABA1C 6.1 (H) 11/25/2022     Lab Results   Component Value Date    CHOL 172 11/25/2022    CHOL 143 (L) 06/27/2021    CHOL 149 (L) 07/22/2020    CHOL 152 (L) 02/22/2020    CHOL 130 (L) 12/22/2018    CHOL 154 02/11/2014    CHOL 181 05/10/2012     Lab Results   Component Value Date    TRIG 177 (H) 11/25/2022    TRIG 166 (H) 06/27/2021    TRIG 143 07/22/2020    TRIG 130 02/22/2020    TRIG 83 12/22/2018    TRIG 113 02/11/2014    TRIG 197 (H) 05/10/2012     Lab Results   Component Value Date    HDL 59 (L) 11/25/2022    HDL 48 (L) 06/27/2021    HDL 60 (L) 07/22/2020    HDL 52 (L) 02/22/2020    HDL 52 (L) 12/22/2018    HDL 56 02/11/2014    HDL 62 05/10/2012     No components found for: LDLCAL  No results found for: LABVLDL  Body mass index is 35.6 kg/m². BP Readings from Last 2 Encounters:   11/26/22 125/82   12/28/21 134/81         Medical Bed:   Is patient in a medical bed? no   If medical bed is in use, has nursing secured room while patient is awake and out of the room? NA  Has safety checks by nursing been completed on the bed/room this shift? NA    Protective Factors:  Patient identifies protective factors with nursing staff as follows:    Identifies reasons for living: Yes   Supportive Social Network or family: Yes    Belief that suicide is immoral/high spirituality: Yes   Responsibility to family or others/living with family: Yes   Fear of death or dying due to pain and suffering: Yes   Engaged in work or school: No     If Patient is unable to identify, reason why? PSYCH:  Depression: 6   Anxiety: 5   SI denies suicidal ideation   Risk of Suicide: No Risk  HI Negative for homicidal ideation      AVH:no If Hallucinations are present, describe? GENERAL:  Appetite: good   Percent Meals:    Social: no  Speech: normal   Appearance: appropriately dressed and healthy looking    GROUP:  Group Participation: Yes  Participation Quality: Interactive    Notes:  Patient was in the dayroom sitting by herself. She was not social. Patient reported that she was hopeful because she had spoken to her adult children and felt better. Patient noted to be anxious. Vistaril 25 mg given. Continue to monitor for safety.          Electronically signed by Rin Trevino RN on 11/27/22 at 4:22 AM CST

## 2022-11-27 NOTE — PROGRESS NOTES
Patient requested information on pre-diabetes and carb control diet options. Printed information from [employer] and gave to patient.     Electronically signed by Jose Luis Rubin RN on 11/27/2022 at 4:36 PM

## 2022-11-27 NOTE — PROGRESS NOTES
Group Therapy Note    Date: 11/27/22  Start Time: 1400  End Time: 1430    Number of Participants: 11    Type of Group: Recreation - Bingo    Patient's Goal:  Socialization    Notes:  Patient pleasant and cooperative. Needs some encouragement. Status After Intervention:  Improved    Participation Level:  Active Listener    Participation Quality: Appropriate and Attentive    Speech:  normal    Thought Process/Content: Logical  Linear    Affective Functioning: Congruent    Mood: anxious    Level of consciousness:  Alert and Oriented x4    Response to Learning: Able to verbalize current knowledge/experience and Able to verbalize/acknowledge new learning    Modes of Intervention: Education and Support    Discipline Responsible: Registered Nurse    Signature:  Sherren Mcclintock, RN

## 2022-11-27 NOTE — PROGRESS NOTES
Group Therapy Note    Date: 11/26/2022  Start Time: 1800  End Time:  4931  Number of Participants: 14    Type of Group: Recreational    Wellness Binder Information  Module Name:  Letting Go  Session Number:  1    Status After Intervention:  Unchanged    Participation Level: Active Listener    Participation Quality: Appropriate      Speech:  normal      Thought Process/Content: Logical      Affective Functioning: Congruent      Mood: calm      Level of consciousness:  Alert      Response to Learning: Capable of insight      Endings: None Reported    Modes of Intervention: Support and Activity      Discipline Responsible: Registered Nurse      Signature:   Ai Rose RN

## 2022-11-27 NOTE — PLAN OF CARE
Problem: Self Harm/Suicidality  Goal: Will have no self-injury during hospital stay  Description: INTERVENTIONS:  1. Q 30 MINUTES: Routine safety checks  2. Q SHIFT & PRN: Assess risk to determine if routine checks are adequate to maintain patient safety  Outcome: Progressing     Problem: Safety - Adult  Goal: Free from fall injury  Outcome: Progressing     Problem: Gastrointestinal - Adult  Goal: Minimal or absence of nausea and vomiting  Outcome: Progressing  Goal: Maintains or returns to baseline bowel function  Outcome: Progressing  Goal: Maintains adequate nutritional intake  Outcome: Progressing     Problem: Nutrition Deficit:  Goal: Optimize nutritional status  Outcome: Progressing

## 2022-11-27 NOTE — PROGRESS NOTES
Progress Note  Rick Horne  11/27/2022 11:56 AM  Subjective:   Admit Date:   11/23/2022      CC/ADMIT DX:       Interval History:   Reviewed overnight events and nursing notes. She has no new physical complaints. I have reviewed all labs/diagnostics from the last 24hrs. ROS:   I have done a 10 point ROS and all are negative, except what is mentioned in the HPI. ADULT DIET; Regular; 4 carb choices (60 gm/meal)    Medications:      lurasidone  120 mg Oral Dinner    clomiPRAMINE  25 mg Oral Nightly    mirabegron  50 mg Oral Daily    melatonin  5 mg Oral Nightly    miconazole   Topical BID    valACYclovir  500 mg Oral Daily           Objective:   Vitals: BP (!) 126/95   Pulse 81   Temp (!) 96.6 °F (35.9 °C)   Resp 16   Ht 5' 4.02\" (1.626 m)   Wt 207 lb 8 oz (94.1 kg)   SpO2 95%   BMI 35.60 kg/m²  No intake or output data in the 24 hours ending 11/27/22 1156    General appearance: alert and cooperative with exam  Extremities: extremities normal, atraumatic, no cyanosis or edema  Neurologic:  No obvious focal neurologic deficits. Skin: no rashes    Assessment and Plan:   Principal Problem:    Bipolar depression ([facility]  Active Problems:    Bipolar 1 disorder, depressed (HCC)    Borderline personality disorder (HCC)    Obstructive sleep apnea    CPAP (continuous positive airway pressure) dependence    Ingestion of toxic substance  Resolved Problems:    * No resolved hospital problems. *    Pre DM    Plan:   Continue present medication(s)    Follow with Psych   Follow with BP      Discharge planning:   her home     Reviewed treatment plans with the patient and/or family.              Electronically signed by Camilla Monteiro MD on 11/27/2022 at 11:56 AM

## 2022-11-28 LAB
ANION GAP SERPL CALCULATED.3IONS-SCNC: 11 MMOL/L (ref 7–19)
BACTERIA: ABNORMAL /HPF
BILIRUBIN URINE: NEGATIVE
BLOOD, URINE: NEGATIVE
BUN BLDV-MCNC: 13 MG/DL (ref 6–20)
CALCIUM SERPL-MCNC: 9.9 MG/DL (ref 8.6–10)
CHLORIDE BLD-SCNC: 105 MMOL/L (ref 98–111)
CLARITY: ABNORMAL
CO2: 23 MMOL/L (ref 22–29)
COLOR: YELLOW
CREAT SERPL-MCNC: 0.7 MG/DL (ref 0.5–0.9)
CRYSTALS, UA: ABNORMAL /HPF
EKG P AXIS: 60 DEGREES
EKG P-R INTERVAL: 140 MS
EKG Q-T INTERVAL: 380 MS
EKG QRS DURATION: 102 MS
EKG QTC CALCULATION (BAZETT): 388 MS
EKG T AXIS: 37 DEGREES
EPITHELIAL CELLS, UA: 4 /HPF (ref 0–5)
GFR SERPL CREATININE-BSD FRML MDRD: >60 ML/MIN/{1.73_M2}
GLUCOSE BLD-MCNC: 172 MG/DL (ref 74–109)
GLUCOSE URINE: NEGATIVE MG/DL
HCT VFR BLD CALC: 45.2 % (ref 37–47)
HEMOGLOBIN: 14.8 G/DL (ref 12–16)
HYALINE CASTS: 0 /HPF (ref 0–8)
KETONES, URINE: NEGATIVE MG/DL
LEUKOCYTE ESTERASE, URINE: ABNORMAL
MCH RBC QN AUTO: 31.9 PG (ref 27–31)
MCHC RBC AUTO-ENTMCNC: 32.7 G/DL (ref 33–37)
MCV RBC AUTO: 97.4 FL (ref 81–99)
NITRITE, URINE: NEGATIVE
PDW BLD-RTO: 13.4 % (ref 11.5–14.5)
PH UA: 6 (ref 5–8)
PLATELET # BLD: 287 K/UL (ref 130–400)
PMV BLD AUTO: 10.4 FL (ref 9.4–12.3)
POTASSIUM SERPL-SCNC: 4.5 MMOL/L (ref 3.5–5)
PROTEIN UA: NEGATIVE MG/DL
RBC # BLD: 4.64 M/UL (ref 4.2–5.4)
RBC UA: 2 /HPF (ref 0–4)
SODIUM BLD-SCNC: 139 MMOL/L (ref 136–145)
SPECIFIC GRAVITY UA: 1.01 (ref 1–1.03)
UROBILINOGEN, URINE: 0.2 E.U./DL
WBC # BLD: 8.2 K/UL (ref 4.8–10.8)
WBC UA: 24 /HPF (ref 0–5)

## 2022-11-28 PROCEDURE — 87186 SC STD MICRODIL/AGAR DIL: CPT

## 2022-11-28 PROCEDURE — 94660 CPAP INITIATION&MGMT: CPT

## 2022-11-28 PROCEDURE — 80048 BASIC METABOLIC PNL TOTAL CA: CPT

## 2022-11-28 PROCEDURE — 6370000000 HC RX 637 (ALT 250 FOR IP): Performed by: PSYCHIATRY & NEUROLOGY

## 2022-11-28 PROCEDURE — 87086 URINE CULTURE/COLONY COUNT: CPT

## 2022-11-28 PROCEDURE — 81001 URINALYSIS AUTO W/SCOPE: CPT

## 2022-11-28 PROCEDURE — 36415 COLL VENOUS BLD VENIPUNCTURE: CPT

## 2022-11-28 PROCEDURE — 6370000000 HC RX 637 (ALT 250 FOR IP): Performed by: NURSE PRACTITIONER

## 2022-11-28 PROCEDURE — 99231 SBSQ HOSP IP/OBS SF/LOW 25: CPT | Performed by: NURSE PRACTITIONER

## 2022-11-28 PROCEDURE — 85027 COMPLETE CBC AUTOMATED: CPT

## 2022-11-28 PROCEDURE — 1240000000 HC EMOTIONAL WELLNESS R&B

## 2022-11-28 RX ADMIN — VALACYCLOVIR HYDROCHLORIDE 500 MG: 500 TABLET, FILM COATED ORAL at 08:27

## 2022-11-28 RX ADMIN — MICONAZOLE NITRATE: 2 POWDER TOPICAL at 08:28

## 2022-11-28 RX ADMIN — LURASIDONE HYDROCHLORIDE 80 MG: 40 TABLET, FILM COATED ORAL at 17:15

## 2022-11-28 RX ADMIN — Medication 5 MG: at 21:28

## 2022-11-28 RX ADMIN — HYDROXYZINE PAMOATE 25 MG: 25 CAPSULE ORAL at 21:28

## 2022-11-28 RX ADMIN — MICONAZOLE NITRATE: 2 POWDER TOPICAL at 21:28

## 2022-11-28 RX ADMIN — CLOMIPRAMINE HYDROCHLORIDE 25 MG: 25 CAPSULE ORAL at 21:28

## 2022-11-28 NOTE — PROGRESS NOTES
Umair placed a call to get collateral from patients daughter and left a message at [phone] for Jalil Laughlin

## 2022-11-28 NOTE — PLAN OF CARE
Problem: Self Harm/Suicidality  Goal: Will have no self-injury during hospital stay  Description: INTERVENTIONS:  1. Q 30 MINUTES: Routine safety checks  2. Q SHIFT & PRN: Assess risk to determine if routine checks are adequate to maintain patient safety  11/28/2022 1512 by José Miguel Murphy RN  Outcome: Progressing  11/28/2022 1134 by Sherron Dugan  Outcome: Progressing     Problem: Safety - Adult  Goal: Free from fall injury  Outcome: Progressing     Problem: Gastrointestinal - Adult  Goal: Minimal or absence of nausea and vomiting  Outcome: Progressing  Goal: Maintains or returns to baseline bowel function  Outcome: Progressing  Goal: Maintains adequate nutritional intake  Outcome: Progressing     Problem: Nutrition Deficit:  Goal: Optimize nutritional status  Outcome: Progressing     Problem: Coping  Goal: Pt/Family able to verbalize concerns and demonstrate effective coping strategies  Description: INTERVENTIONS:  1. Assist patient/family to identify coping skills, available support systems and cultural and spiritual values  2. Provide emotional support, including active listening and acknowledgement of concerns of patient and caregivers  3. Reduce environmental stimuli, as able  4. Instruct patient/family in relaxation techniques, as appropriate  5. Assess for spiritual pain/suffering and initiate Spiritual Care, Psychosocial Clinical Specialist consults as needed  11/28/2022 1512 by José Miguel Murphy RN  Outcome: Progressing  11/28/2022 1307 by Tee Soliz  Outcome: Progressing  Note:                                                                     Group Therapy Note    Date: 11/28/2022  Start Time: 1000  End Time:  1030  Number of Participants: 11    Type of Group: Psychoeducation    Wellness Binder Information  Module Name:  Relapse Prevention  Session Number:  5    Group Goal for Pt:   To improve knowledge of relapse prevention strategies    Notes:  Pt demonstrated improved knowledge of relapse prevention strategies by actively participating in group discussion. Status After Intervention:  Unchanged    Participation Level:  Active Listener    Participation Quality: Appropriate and Attentive      Speech:  normal      Thought Process/Content: Logical      Affective Functioning: Congruent      Mood: anxious and depressed      Level of consciousness:  Alert and Oriented x4      Response to Learning: Able to verbalize current knowledge/experience, Able to verbalize/acknowledge new learning, and Progressing to goal      Endings: None Reported    Modes of Intervention: Education      Discipline Responsible: Psychoeducational Specialist      Signature:  Griselda Spring

## 2022-11-28 NOTE — PROGRESS NOTES
[facility] Adult Unit Daily Assessment  Nursing Progress Note    Room: [facility]/612-01   Name: Horacio Andres   Age: 65 y.o. Gender: female   Dx: Bipolar depression ([address]  Precautions: suicide risk and fall risk  Inpatient Status: voluntary       SLEEP:  Sleep Quality Fair  Sleep Medications: Yes   PRN Sleep Meds: No       MEDICAL:  Other PRN Meds: Yes   Med Compliant: Yes  Accu-Chek: No  Oxygen/CPAP/BiPAP: Yes  CIWA/CINA: No   PAIN Assessment: none  Side Effects from medication: No      Metabolic Screening:  Lab Results   Component Value Date    LABA1C 6.1 (H) 11/25/2022     Lab Results   Component Value Date    CHOL 172 11/25/2022    CHOL 143 (L) 06/27/2021    CHOL 149 (L) 07/22/2020    CHOL 152 (L) 02/22/2020    CHOL 130 (L) 12/22/2018    CHOL 154 02/11/2014    CHOL 181 05/10/2012     Lab Results   Component Value Date    TRIG 177 (H) 11/25/2022    TRIG 166 (H) 06/27/2021    TRIG 143 07/22/2020    TRIG 130 02/22/2020    TRIG 83 12/22/2018    TRIG 113 02/11/2014    TRIG 197 (H) 05/10/2012     Lab Results   Component Value Date    HDL 59 (L) 11/25/2022    HDL 48 (L) 06/27/2021    HDL 60 (L) 07/22/2020    HDL 52 (L) 02/22/2020    HDL 52 (L) 12/22/2018    HDL 56 02/11/2014    HDL 62 05/10/2012     No components found for: LDLCAL  No results found for: LABVLDL  Body mass index is 35.6 kg/m². BP Readings from Last 2 Encounters:   11/27/22 104/60   12/28/21 134/81         Medical Bed:   Is patient in a medical bed? no   If medical bed is in use, has nursing secured room while patient is awake and out of the room? NA  Has safety checks by nursing been completed on the bed/room this shift? yes    Protective Factors:  Patient identifies protective factors with nursing staff as follows:    Identifies reasons for living: Yes   Supportive Social Network or family: Yes    Belief that suicide is immoral/high spirituality: Yes   Responsibility to family or others/living with family: Yes   Fear of death or dying due to pain and suffering: Yes   Engaged in work or school: No     If Patient is unable to identify, reason why? PSYCH:  Depression: 7   Anxiety: 7   SI denies suicidal ideation   Risk of Suicide: No Risk  HI Negative for homicidal ideation      AVH:no If Hallucinations are present, describe? GENERAL:  Appetite: good   Percent Meals:    Social: No   Speech: hesitant   Appearance: appropriately dressed    GROUP:  Group Participation: Yes  Participation Quality: Minimal    Notes:     Patient has been isolative to her room. Patient was seen trembling during med pass. When asked why patient reported because she was cold and anxious. Patient was cooperative this evening. Patient is now resting with CPAP, will continue to monitor for safety.      Electronically signed by Gina Landrum on 11/28/22 at 1:32 AM CST

## 2022-11-28 NOTE — PROGRESS NOTES
Group Note    Date: 11/28/22  Start Time: 8:00 AM   End Time:8:30 AM     Number of Participants: 18    Type of Group: Community/Goal     Patient's Goal:  \"Getting ready to go home\"    Notes:      Participation Level:  Active Listener    Participation Quality: Appropriate    Speech:  normal    Thought Process/Content: Logical    Mood:  Calm    Level of consciousness:  Alert    Response to Learning: Able to verbalize current knowledge/experience    Modes of Intervention: Education and Support    Discipline Responsible: Behavioral Health Technician     Signature:  Kera Mitchell

## 2022-11-28 NOTE — PROGRESS NOTES
11/28/22 1612   Encounter Summary   Encounter Overview/Reason  Behavioral Health   Service Provided For: Patient   Referral/Consult From: Physician  (Consult: Emotional distress)   Complexity of Encounter Moderate   Begin Time 1432   End Time  1451   Total Time Calculated 19 min   Encounter    Type Initial Screen/Assessment   Spiritual/Emotional needs   Type Emotional Distress   Rituals, Rites and Sacraments   Type Blessings; Rite (Comment)   Assessment/Intervention/Outcome   Assessment Calm  (Patient said \"Asking g-d to forgive me. Pink José Miguel Pink José Miguel \")   Intervention Active listening;Prayer (assurance of)/[place]   Outcome Comfort     Electronically signed by Hernando Ingram [facility] on 11/28/2022 at 4:15 PM

## 2022-11-28 NOTE — PLAN OF CARE
Problem: Self Harm/Suicidality  Goal: Will have no self-injury during hospital stay  Outcome: Progressing                                                                       Group Therapy Note    Date: 11/28/2022  Start Time: 1100  End Time:  1130  Number of Participants: 13    Type of Group: Psychotherapy    Wellness Binder Information  Module Name:  emotional wellness  Session Number:  1    Patient's Goal:  validation of feelings    Notes:  pt acknowledged to have feelings validated it may be necessary to share feelings with others.     Status After Intervention:  Improved    Participation Level: Interactive    Participation Quality: Appropriate, Attentive, and Sharing      Speech:  normal      Thought Process/Content: Logical      Affective Functioning: Congruent      Mood: congruent      Level of consciousness:  Alert, Oriented x4, and Attentive      Response to Learning: Able to verbalize current knowledge/experience      Endings: None Reported    Modes of Intervention: Education      Discipline Responsible: Psychoeducational Specialist      Signature:  Rabia Cordero

## 2022-11-28 NOTE — PROGRESS NOTES
Treatment Team Note:    Target Symptoms/Reason for admission: Per nursing admission assessment - Reason for Admission: Leland Parks is a  65 yoa female who Drank spic and span  as suicide attempt. Med change recently and has felt suicidal and decreased functioning x 1 week. Diagnoses per psych provider: Borderline personality disorder ([address] [F60.3]  Suicide attempt ([address] Mary Jo Sotelo  Bipolar depression ([address] [F31.9]  Bipolar 1 disorder, depressed ([address] [F31.9]    Therapist met with treatment team to discuss patients treatment and discharge plans. Patient's aftercare plan is: [address]    Aftercare appointments made: Yes    Pt lives with: SW will meet with patient to gather information    Collateral obtained from: mom  Collateral obtained on:11/28/22    Attending groups:  Minimal    Behavior: cooperative, isolative to self.     Has patient been completing ADL's:  Yes    SI:  patient denies SI  Plan: no   If yes describe: N/A - patient denies plan  HI:  patient denies HI  If present describe: N/A  Delusions: patient denies delusions  If present describe: N/A  Hallucinations: patient denies hallucinations  If present describe: N/A    Patient rates their -- Depression: 1-10:  7  Anxiety:1-10:  7    Sleeping: Fair    Taking medication: Yes    Misc:

## 2022-11-28 NOTE — PLAN OF CARE
Problem: Coping  Goal: Pt/Family able to verbalize concerns and demonstrate effective coping strategies  11/28/2022 1605 by Dillon Tellez  Outcome: Progressing                                                                       Group Therapy Note    Date: 11/28/2022  Start Time: 1015  End Time:  1600  Number of Participants: 10    Type of Group: Recovery    Wellness Binder Information  Module Name:  staying well  Session Number:  1    Patient's Goal:  daily maintenance and coping skills    Notes:  pt acknowledged use of positive coping skills daily to help stay well.     Status After Intervention:  Improved    Participation Level: Interactive    Participation Quality: Appropriate, Attentive, and Sharing      Speech:  normal      Thought Process/Content: Logical      Affective Functioning: Congruent      Mood: congruent      Level of consciousness:  Alert, Oriented x4, and Attentive      Response to Learning: Able to verbalize current knowledge/experience      Endings: None Reported    Modes of Intervention: Education      Discipline Responsible: Psychoeducational Specialist      Signature:  Dillon Tellez

## 2022-11-28 NOTE — PROGRESS NOTES
Group Note    Date: 11/28/22  Start Time: 7:30 PM   End Time:8:00 PM     Number of Participants: 16    Type of Group: Wrap-Up     Patient's Goal:      Notes:      Status After Intervention:  Unchanged    Participation Level:  Active Listener    Participation Quality: Appropriate    Speech:  normal    Thought Process/Content: Logical    Mood: anxious    Level of consciousness:  Alert and Oriented x4    Response to Learning: Able to verbalize current knowledge/experience    Modes of Intervention: Education and Support    Discipline Responsible: Registered Nurse     Signature:  Jacob Contreras RN

## 2022-11-28 NOTE — PROGRESS NOTES
[place] obtained [place] [facility] [phone]    Immediate Stressors & Time Episode Began: patient has been sick and took some antibiotics and that made her medication not work. Diagnosis/Hx of compliance with meds: taking medication as directed    Tx Hx/Past hospitalizations:  caller reports previous inpatient and outpatient treatment history --- history includes patient has been here before    Family hx of psychiatric issues: caller reports no family history of psychiatric issues    Substance Abuse: caller reports no substance abuse history    Pending Legal: caller reports no pending legal issues    Safety Issues (Weapons? Hx of attempts): The importance of locking weapons in a secured location was explained and recommended to collateral caller. No issues    Support system/Medication Managed by: The importance of medication management and locking extra medication in a secured location was explained and reccommended to collateral caller.      Discharge Disposition: home -lives with family    Additional Info:

## 2022-11-28 NOTE — PROGRESS NOTES
[facility] Daily Assessment  Nursing Progress Note    Room: [address] [place] [address]-   Name: Matt Wilkerson   Age: 65 y.o. Gender: female   Dx: Bipolar depression ([address]  Precautions: suicide risk  Inpatient Status: voluntary       SLEEP:  Sleep Quality Fair  Sleep Medications: No   PRN Sleep Meds: No       MEDICAL:  Other PRN Meds: No   Med Compliant: Yes  Accu-Chek: No  Oxygen/CPAP/BiPAP: No  CIWA/CINA: No   PAIN Assessment: none  Side Effects from medication: No      Metabolic Screening:  Lab Results   Component Value Date    LABA1C 6.1 (H) 11/25/2022     Lab Results   Component Value Date    CHOL 172 11/25/2022    CHOL 143 (L) 06/27/2021    CHOL 149 (L) 07/22/2020    CHOL 152 (L) 02/22/2020    CHOL 130 (L) 12/22/2018    CHOL 154 02/11/2014    CHOL 181 05/10/2012     Lab Results   Component Value Date    TRIG 177 (H) 11/25/2022    TRIG 166 (H) 06/27/2021    TRIG 143 07/22/2020    TRIG 130 02/22/2020    TRIG 83 12/22/2018    TRIG 113 02/11/2014    TRIG 197 (H) 05/10/2012     Lab Results   Component Value Date    HDL 59 (L) 11/25/2022    HDL 48 (L) 06/27/2021    HDL 60 (L) 07/22/2020    HDL 52 (L) 02/22/2020    HDL 52 (L) 12/22/2018    HDL 56 02/11/2014    HDL 62 05/10/2012     No components found for: LDLCAL  No results found for: LABVLDL  Body mass index is 35.6 kg/m². BP Readings from Last 2 Encounters:   11/28/22 (!) 112/59   12/28/21 134/81         Medical Bed:   Is patient in a medical bed? no   If medical bed is in use, has nursing secured room while patient is awake and out of the room? NA  Has safety checks by nursing been completed on the bed/room this shift? NA    Protective Factors:  Patient identifies protective factors with nursing staff as follows:    Identifies reasons for living: Yes   Supportive Social Network or family: Yes    Belief that suicide is immoral/high spirituality: Yes   Responsibility to family or others/living with family: Yes   Fear of death or dying due to pain and suffering: Yes   Engaged in work or school: No     If Patient is unable to identify, reason why? PSYCH:  Depression: 8   Anxiety: 7   SI denies suicidal ideation   Risk of Suicide: No Risk  HI Negative for homicidal ideation      AVH:no If Hallucinations are present, describe? GENERAL:  Appetite: good   Percent Meals: 100%   Social: No   Speech: hesitant   Appearance: appropriately dressed, appropriately groomed, good hygiene, looks younger, and healthy looking    GROUP:  Group Participation: Yes  Participation Quality: Interactive    Notes: Patient complains about cold and spent most of the day in bed under covers. Patient very flat and complained that she was having blood in her urine. Patient states that she slept for 12 hours but does not feel rested. Patient rated her depression as an 8 and her anxiety as a 7. Patient was unable to state what was that made these so high. Patient said she was positive for si but had no plan. Patient contracted for safety with nurse. Patient denied HI and AVH. Will continue to monitor for safety.          Electronically signed by Nona Muniz RN on 11/28/22 at 3:01 PM CST

## 2022-11-28 NOTE — PLAN OF CARE
Problem: Coping  Goal: Pt/Family able to verbalize concerns and demonstrate effective coping strategies  Description: INTERVENTIONS:  1. Assist patient/family to identify coping skills, available support systems and cultural and spiritual values  2. Provide emotional support, including active listening and acknowledgement of concerns of patient and caregivers  3. Reduce environmental stimuli, as able  4. Instruct patient/family in relaxation techniques, as appropriate  5. Assess for spiritual pain/suffering and initiate Spiritual Care, Psychosocial Clinical Specialist consults as needed  Outcome: Progressing  Note:                                                                     Group Therapy Note    Date: 11/28/2022  Start Time: 1000  End Time:  1030  Number of Participants: 11    Type of Group: Psychoeducation    Wellness Binder Information  Module Name:  Relapse Prevention  Session Number:  5    Group Goal for Pt: To improve knowledge of relapse prevention strategies    Notes:  Pt demonstrated improved knowledge of relapse prevention strategies by actively participating in group discussion. Status After Intervention:  Unchanged    Participation Level:  Active Listener    Participation Quality: Appropriate and Attentive      Speech:  normal      Thought Process/Content: Logical      Affective Functioning: Congruent      Mood: anxious and depressed      Level of consciousness:  Alert and Oriented x4      Response to Learning: Able to verbalize current knowledge/experience, Able to verbalize/acknowledge new learning, and Progressing to goal      Endings: None Reported    Modes of Intervention: Education      Discipline Responsible: Psychoeducational Specialist      Signature:  Brittany Pereyra

## 2022-11-28 NOTE — PROGRESS NOTES
[address] [place] [address]      Psychiatric Progress Note    Name:  Lulu Griffith  Date:  11/28/2022  Age:  65 y.o. Sex:  female  Ethnicity:   Primary Care Physician:  TING Johns CNP   Patient Care Team:  Patient Care Team:  TING Johns CNP as PCP - General  Joon Roy MD (Cardiology)  IRAM Carlin (Physician Assistant Medical)  Chief Complaint: \" I am suicidal today. \"        Historian:patient  Complaint Type: anxiety, decreased appetite, depression, loss of interest in favorite activities, sleep disturbance, tobacco use, and hoarding  Course of Symptoms: ongoing  Precipitating Factors: history of mental illness       Subjective  She notes were reviewed and patient had no behavioral issues during the night. As needed medications administered include hydroxyzine for anxiety. Today she endorses suicidal ideations with no specific plan. She denies homicidal ideation and psychosis. Today reports that she is feeling \"really nervous about going home. \"  She was able to shower without assistance from the nursing staff yesterday. Today she reports that her thoughts are not moving as fast today and she is able to concentrate better. She was able to fill out the hoarding worksheets that she was given. She reports the worksheets are helpful for her. Patient reports sleep as \" I slept really good last night. \"  Patient has been calm and cooperative with staff and peers. Patient has been compliant with medications. Patient has been attending groups. Patient reports appetite as \" I am eating good. \" Patient reports no side effects from medications.       Objective  Vitals:    11/28/22 0726   BP: (!) 112/59   Pulse: 72   Resp: 18   Temp: 98.6 °F (37 °C)   SpO2: 94%       Previous Psychiatric/Substance Use History      Medical History:  Past Medical History:   Diagnosis Date    Chest pain 02/08/2012    CPAP (continuous positive airway pressure) dependence     Depression Hypoglycemia     SANDY (obstructive sleep apnea)     Schizoaffective disorder ([facility] 02/08/2012    Suicide attempt ([facility]         BRAMBILA History:   Social History     Substance and Sexual Activity   Alcohol Use No         Social History     Substance and Sexual Activity   Drug Use No        Social History     Tobacco Use   Smoking Status Never   Smokeless Tobacco Never        Family History:     History reviewed. No pertinent family history. Mental Status:  Level of consciousness:  within normal limits and awake  Appearance:  well-appearing, street clothes, in chair, good grooming, and good hygiene  Behavior/Motor:  no abnormalities noted  Attitude toward examiner:  cooperative, attentive, and good eye contact  Speech:  normal rate and normal volume  Mood:  \" I feel suicidal today!   Affect:  mood congruent  Thought processes:  linear and goal directed  Thought content:  Homocidal ideation : denies  Suicidal Ideation:  active  Delusions:  no evidence of delusions  Perceptual Disturbance:  denies any perceptual disturbance  Cognition:  oriented to person, place, and time  Concentration : good  Memory intact for recent and remote  Fund of knowledge: average  Abstract thinking:  adequate  Insight: limited  Judgment:  limited     lurasidone  80 mg Oral Dinner    clomiPRAMINE  25 mg Oral Nightly    mirabegron  50 mg Oral Daily    melatonin  5 mg Oral Nightly    miconazole   Topical BID    valACYclovir  500 mg Oral Daily       Current Medications:  Current Facility-Administered Medications   Medication Dose Route Frequency Provider Last Rate Last Admin    lurasidone (LATUDA) tablet 80 mg  80 mg Oral Dinner TING Craft        clomiPRAMINE (ANAFRANIL) capsule 25 mg  25 mg Oral Nightly Lequita Aasbrenda, APRN   25 mg at 11/27/22 2056    aluminum & magnesium hydroxide-simethicone (MAALOX) 200-200-20 MG/5ML suspension 30 mL  30 mL Oral Q6H PRN Zeke Baptiste MD   30 mL at 11/23/22 0802    docusate sodium (COLACE) capsule 100 mg  100 mg Oral PRN Jayla Jump, APRN   100 mg at 11/27/22 1504    hydrOXYzine pamoate (VISTARIL) capsule 25 mg  25 mg Oral TID PRN Jayla Jump, APRN   25 mg at 11/27/22 2057    mirabegron (MYRBETRIQ) extended release tablet 50 mg  (Patient Supplied)  50 mg Oral Daily Jayla Jump, APRN   50 mg at 11/28/22 0827    melatonin disintegrating tablet 5 mg  5 mg Oral Nightly Jayla Jump, APRN   5 mg at 11/27/22 2056    miconazole (MICOTIN) 2 % powder   Topical BID Jayla Jump, APRN   Given at 11/28/22 8118    acetaminophen (TYLENOL) tablet 650 mg  650 mg Oral Q4H PRN Rhina Brown MD        polyethylene glycol (GLYCOLAX) packet 17 g  17 g Oral Daily PRN Rhina Brown MD        valACYclovir (VALTREX) tablet 500 mg  500 mg Oral Daily Rhina Brown MD   500 mg at 11/28/22 0827    calcium carbonate (TUMS) chewable tablet 500 mg  500 mg Oral TID PRN Rhina Brown MD   500 mg at 11/23/22 2138       Psychotherapy:   SUPPORTIVE    DSM V Diagnoses:    Principal Problem:    Bipolar depression ([facility]  Active Problems:    Ingestion of toxic substance    Borderline personality disorder ([facility]    Bipolar 1 disorder, depressed ([facility]    Obstructive sleep apnea    CPAP (continuous positive airway pressure) dependence  Resolved Problems:    * No resolved hospital problems. *            Plan:    Encourage group therapy  15 minute safety checks  Medical monitoring by Dr. Gaye Arcos and associates  Continue current therapy and medications    Amount of time spent with patient:  15 minutes with greater than 50% of the time spent in counseling and collaboration of care.     Jayla Kunz, PMKEVINP-BC, FNP-C   Clinician Signature: signed electronically

## 2022-11-29 PROCEDURE — 99231 SBSQ HOSP IP/OBS SF/LOW 25: CPT | Performed by: NURSE PRACTITIONER

## 2022-11-29 PROCEDURE — 94660 CPAP INITIATION&MGMT: CPT

## 2022-11-29 PROCEDURE — 6370000000 HC RX 637 (ALT 250 FOR IP): Performed by: NURSE PRACTITIONER

## 2022-11-29 PROCEDURE — 1240000000 HC EMOTIONAL WELLNESS R&B

## 2022-11-29 PROCEDURE — 6370000000 HC RX 637 (ALT 250 FOR IP): Performed by: PSYCHIATRY & NEUROLOGY

## 2022-11-29 RX ADMIN — MICONAZOLE NITRATE: 2 POWDER TOPICAL at 13:08

## 2022-11-29 RX ADMIN — HYDROXYZINE PAMOATE 25 MG: 25 CAPSULE ORAL at 20:38

## 2022-11-29 RX ADMIN — MICONAZOLE NITRATE: 2 POWDER TOPICAL at 20:38

## 2022-11-29 RX ADMIN — VALACYCLOVIR HYDROCHLORIDE 500 MG: 500 TABLET, FILM COATED ORAL at 09:14

## 2022-11-29 RX ADMIN — LURASIDONE HYDROCHLORIDE 80 MG: 40 TABLET, FILM COATED ORAL at 17:07

## 2022-11-29 RX ADMIN — Medication 5 MG: at 20:38

## 2022-11-29 RX ADMIN — CLOMIPRAMINE HYDROCHLORIDE 25 MG: 25 CAPSULE ORAL at 20:38

## 2022-11-29 NOTE — PLAN OF CARE
Problem: Self Harm/Suicidality  Goal: Will have no self-injury during hospital stay  Description: INTERVENTIONS:  1. Q 30 MINUTES: Routine safety checks  2. Q SHIFT & PRN: Assess risk to determine if routine checks are adequate to maintain patient safety  Outcome: Progressing     Problem: Safety - Adult  Goal: Free from fall injury  Outcome: Progressing     Problem: Gastrointestinal - Adult  Goal: Minimal or absence of nausea and vomiting  Outcome: Progressing  Goal: Maintains or returns to baseline bowel function  Outcome: Progressing  Goal: Maintains adequate nutritional intake  Outcome: Progressing     Problem: Nutrition Deficit:  Goal: Optimize nutritional status  Outcome: Progressing     Problem: Coping  Goal: Pt/Family able to verbalize concerns and demonstrate effective coping strategies  Description: INTERVENTIONS:  1. Assist patient/family to identify coping skills, available support systems and cultural and spiritual values  2. Provide emotional support, including active listening and acknowledgement of concerns of patient and caregivers  3. Reduce environmental stimuli, as able  4. Instruct patient/family in relaxation techniques, as appropriate  5.  Assess for spiritual pain/suffering and initiate Spiritual Care, Psychosocial Clinical Specialist consults as needed  11/29/2022 1756 by Nona Muniz RN  Outcome: Progressing  11/29/2022 1137 by Jake Peters [facility]  Outcome: Progressing  11/29/2022 1[address] by [address]  Outcome: Progressing

## 2022-11-29 NOTE — PROGRESS NOTES
[facility] Adult Unit Daily Assessment  Nursing Progress Note    Room: [facility]/609-01   Name: Severo Coho   Age: 65 y.o. Gender: female   Dx: Bipolar depression ([address]  Precautions: suicide risk  Inpatient Status: voluntary       SLEEP:  Sleep Quality Very poor  Sleep Medications: No   PRN Sleep Meds: No       MEDICAL:  Other PRN Meds: No   Med Compliant: Yes  Accu-Chek: No  Oxygen/CPAP/BiPAP: Yes  CIWA/CINA: No   PAIN Assessment: none  Side Effects from medication: No      Metabolic Screening:  Lab Results   Component Value Date    LABA1C 6.1 (H) 11/25/2022     Lab Results   Component Value Date    CHOL 172 11/25/2022    CHOL 143 (L) 06/27/2021    CHOL 149 (L) 07/22/2020    CHOL 152 (L) 02/22/2020    CHOL 130 (L) 12/22/2018    CHOL 154 02/11/2014    CHOL 181 05/10/2012     Lab Results   Component Value Date    TRIG 177 (H) 11/25/2022    TRIG 166 (H) 06/27/2021    TRIG 143 07/22/2020    TRIG 130 02/22/2020    TRIG 83 12/22/2018    TRIG 113 02/11/2014    TRIG 197 (H) 05/10/2012     Lab Results   Component Value Date    HDL 59 (L) 11/25/2022    HDL 48 (L) 06/27/2021    HDL 60 (L) 07/22/2020    HDL 52 (L) 02/22/2020    HDL 52 (L) 12/22/2018    HDL 56 02/11/2014    HDL 62 05/10/2012     No components found for: LDLCAL  No results found for: LABVLDL  Body mass index is 35.6 kg/m². BP Readings from Last 2 Encounters:   11/29/22 125/82   12/28/21 134/81         Medical Bed:   Is patient in a medical bed? no   If medical bed is in use, has nursing secured room while patient is awake and out of the room? NA  Has safety checks by nursing been completed on the bed/room this shift? NA    Protective Factors:  Patient identifies protective factors with nursing staff as follows:    Identifies reasons for living: Yes   Supportive Social Network or family: Yes    Belief that suicide is immoral/high spirituality: Yes   Responsibility to family or others/living with family: Yes   Fear of death or dying due to pain and suffering: Yes   Engaged in work or school: No     If Patient is unable to identify, reason why? PSYCH:  Depression: 8   Anxiety: 7   SI denies suicidal ideation   Risk of Suicide: Low Risk  HI Negative for homicidal ideation      AVH:no If Hallucinations are present, describe? GENERAL:  Appetite: good   Percent Meals: 100%   Social: Yes   Speech: hesitant   Appearance: appropriately dressed, appropriately groomed, good hygiene, looks younger, and healthy looking    GROUP:  Group Participation: Yes  Participation Quality: Minimal    Notes: patient sitting in the day area. Flat affect but pleasant. Patient complained that she did not have a good night sleep with her room mate the night before. Patient rated her depression as a 8 and her anxiety as a 7. Patient denied SI, HI and avh. Patient has been out in the day area more than the day before and socializing more. Will continue to monitor for safety.         Electronically signed by Wiley Hurtado RN on 11/29/22 at 5:50 PM CST

## 2022-11-29 NOTE — PSYCHOTHERAPY
Group Therapy Note    Date: 11/28/2022  Start Time: 1330  End Time:  1400  Number of Participants: 7    Type of Group: SPIRITUALITY     Wellness Binder Information  Module Name:  Mindfulness  Session Number:      Patient's Goal:  To rest the mind. .. Notes:      Status After Intervention:  Improved    Participation Level:  Active Listener and Interactive    Participation Quality: Appropriate, Attentive, and Sharing      Speech:  normal      Thought Process/Content:       Affective Functioning: Congruent      Mood: calm      Level of consciousness:  Oriented x4      Response to Learning: Able to verbalize current knowledge/experience, Able to verbalize/acknowledge new learning, and Capable of insight      Endings:     Modes of Intervention: Education, Support, and Activity      Discipline Responsible:       Signature:  Dhiraj Rendon MA [facility]

## 2022-11-29 NOTE — PROGRESS NOTES
Group Note    Date: 11/29/22  Start Time: 9:15 AM   End Time:9:45 AM     Number of Participants: 13    Type of Group: Community/Goal     Patient's Goal:  \"Staying out of bed\"    Notes:      Participation Level:  Active Listener    Participation Quality: Appropriate    Speech:  normal    Thought Process/Content: Logical    Mood:  Calm    Level of consciousness:  Alert    Response to Learning: Able to verbalize current knowledge/experience    Modes of Intervention: Education and Support    Discipline Responsible: Behavioral Health Technician     Signature:  Hilda Rivas

## 2022-11-29 NOTE — PROGRESS NOTES
[address] [place] [address]      Psychiatric Progress Note    Name:  Ly Daniel  Date:  11/29/2022  Age:  65 y.o. Sex:  female  Ethnicity:   Primary Care Physician:  TING Valerio CNP   Patient Care Team:  Patient Care Team:  TING Valerio CNP as PCP - General  Ranjith Wells MD (Cardiology)  IRAM Au (Physician Assistant Medical)  Chief Complaint: \" I feel a little better today. \"        Historian:patient  Complaint Type: anxiety, depression, loss of interest in favorite activities, and sleep disturbance  Course of Symptoms: ongoing  Precipitating Factors: history of mental illness         Subjective  Nursing notes were reviewed and patient had no behavioral issues during the night. No as needed medications were administered during the night. Today she denies suicidal ideation, homicidal ideation and psychosis. She rates depression as an 8 and anxiety as a 7 on a 0-to-10 scale. She states, \"I am feeling a little better today. \"  Continues to report that she is \"very cold. \"  Reports she did not sleep very related to her roommate. Patient reports sleep as \"I only slept a few hours because my roommate was loud. \"  Patient has been calm and cooperative with staff and peers. Patient has been compliant with medications. Patient has been attending groups. Patient reports appetite as \"I am eating a lot better now. \"She has not showered last night or this morning. She reports she will try to shower this morning however she does not feel comfortable sharing a shower with another peer. We will try to get her a private room. She reports she has not been handwashing near as often as she had been. She reported that she was washing her hands up to 15 times per day. Patient reports no side effects from medications.       Objective  Vitals:    11/29/22 0804   BP: 125/82   Pulse: 78   Resp: 16   Temp: (!) 96.6 °F (35.9 °C)   SpO2: 96%       Previous Psychiatric/Substance Use History      Medical History:  Past Medical History:   Diagnosis Date    Chest pain 02/08/2012    CPAP (continuous positive airway pressure) dependence     Depression     Hypoglycemia     SANDY (obstructive sleep apnea)     Schizoaffective disorder ([facility] 02/08/2012    Suicide attempt ([facility]         BRAMBILA History:   Social History     Substance and Sexual Activity   Alcohol Use No         Social History     Substance and Sexual Activity   Drug Use No        Social History     Tobacco Use   Smoking Status Never   Smokeless Tobacco Never        Family History:     History reviewed. No pertinent family history. Mental Status:  Level of consciousness:  within normal limits and awake  Appearance:  well-appearing, street clothes, in chair, good grooming, and good hygiene  Behavior/Motor:  no abnormalities noted  Attitude toward examiner:  cooperative, attentive, and good eye contact  Speech:  normal rate and normal volume  Mood:  \" I am feeling a little better today. \"  Affect:  blunted and flat  Thought processes:  slow  Thought content:  Homocidal ideation : denies  Suicidal Ideation:  denies suicidal ideation  Delusions:  no evidence of delusions  Perceptual Disturbance:  denies any perceptual disturbance  Cognition:  oriented to person, place, and time  Concentration : good  Memory intact for recent and remote  Fund of knowledge:  average  Abstract thinking:  adequate  Insight:  limited  Judgment:  limited      lurasidone  80 mg Oral Dinner    clomiPRAMINE  25 mg Oral Nightly    mirabegron  50 mg Oral Daily    melatonin  5 mg Oral Nightly    miconazole   Topical BID    valACYclovir  500 mg Oral Daily       Current Medications:  Current Facility-Administered Medications   Medication Dose Route Frequency Provider Last Rate Last Admin    lurasidone (LATUDA) tablet 80 mg  80 mg Oral Dinner TING Agarwal   80 mg at 11/28/22 1715    clomiPRAMINE (ANAFRANIL) capsule 25 mg  25 mg Oral Nightly TING Agarwal 25 mg at 11/28/22 2128    aluminum & magnesium hydroxide-simethicone (MAALOX) 200-200-20 MG/5ML suspension 30 mL  30 mL Oral Q6H PRN Casey Andrade MD   30 mL at 11/23/22 0802    docusate sodium (COLACE) capsule 100 mg  100 mg Oral PRN Erinn Lower, APRN   100 mg at 11/27/22 1504    hydrOXYzine pamoate (VISTARIL) capsule 25 mg  25 mg Oral TID PRN Erinn Lower, APRN   25 mg at 11/28/22 2128    mirabegron (MYRBETRIQ) extended release tablet 50 mg  (Patient Supplied)  50 mg Oral Daily Erinn Lower, APRN   50 mg at 11/29/22 1288    melatonin disintegrating tablet 5 mg  5 mg Oral Nightly Erinn Lower, APRN   5 mg at 11/28/22 2128    miconazole (MICOTIN) 2 % powder   Topical BID Erinn Lower, APRN   Given at 11/28/22 2128    acetaminophen (TYLENOL) tablet 650 mg  650 mg Oral Q4H PRN Gabe Neely MD        polyethylene glycol (GLYCOLAX) packet 17 g  17 g Oral Daily PRN Gabe Neely MD        valACYclovir (VALTREX) tablet 500 mg  500 mg Oral Daily Gabe Neely MD   500 mg at 11/29/22 0914    calcium carbonate (TUMS) chewable tablet 500 mg  500 mg Oral TID PRN Gabe Neely MD   500 mg at 11/23/22 2138       Psychotherapy:   SUPPORTIVE    DSM V Diagnoses:    Bipolar depression  Suicide attempt by ingestion of toxic substance   Borderline personality disorder  Hoarding Disorder             Plan:    Encourage group therapy  15 minute safety checks  Medical monitoring by Dr. Tommy Olsen and associates  Continue current therapy and medications    Amount of time spent with patient:  15 minutes with greater than 50% of the time spent in counseling and collaboration of care.     Erinn Watt, PMHNP-BC, FNP-C   Clinician Signature: signed electronically

## 2022-11-29 NOTE — PLAN OF CARE
Problem: Coping  Goal: Pt/Family able to verbalize concerns and demonstrate effective coping strategies  Description: INTERVENTIONS:  1. Assist patient/family to identify coping skills, available support systems and cultural and spiritual values  2. Provide emotional support, including active listening and acknowledgement of concerns of patient and caregivers  3. Reduce environmental stimuli, as able  4. Instruct patient/family in relaxation techniques, as appropriate  5. Assess for spiritual pain/suffering and initiate Spiritual Care, Psychosocial Clinical Specialist consults as needed  11/29/2022 1137 by [facility]  Outcome: Progressing   Group Therapy Note     Date: 11/29/2022  Start Time: 1000  End Time:  1030  Number of Participants: 9     Type of Group: Psychotherapy     Patient's Goal: Patient will process emotions and actions and how to relate to other or their with others. Patient discussed open communication and feelings and emotions. Notes:  Patient attended process group as scheduled, patient identified a issue to work on today regarding how they will interact and relate to others. Status After Intervention:  Improved     Participation Level:  Active Listener     Participation Quality: Appropriate, Attentive, and Sharing        Speech:  normal        Thought Process/Content: Logical        Affective Functioning: Congruent        Mood: euthymic        Level of consciousness:  Alert        Response to Learning: Able to verbalize current knowledge/experience        Endings: None Reported     Modes of Intervention: Education, Support, and Socialization        Discipline Responsible: /Counselor        Signature:  [facility]

## 2022-11-29 NOTE — PROGRESS NOTES
Progress Note  Ly Daniel  11/28/2022 10:48 PM  Subjective:   Admit Date:   11/23/2022      CC/ADMIT DX:       Interval History:   Reviewed overnight events and nursing notes. She has c/o dysuria. I have reviewed all labs/diagnostics from the last 24hrs. ROS:   I have done a 10 point ROS and all are negative, except what is mentioned in the HPI. ADULT DIET; Regular; 4 carb choices (60 gm/meal)    Medications:      lurasidone  80 mg Oral Dinner    clomiPRAMINE  25 mg Oral Nightly    mirabegron  50 mg Oral Daily    melatonin  5 mg Oral Nightly    miconazole   Topical BID    valACYclovir  500 mg Oral Daily           Objective:   Vitals: /86   Pulse 86   Temp 97.5 °F (36.4 °C) (Temporal)   Resp 16   Ht 5' 4.02\" (1.626 m)   Wt 207 lb 8 oz (94.1 kg)   SpO2 94%   BMI 35.60 kg/m²  No intake or output data in the 24 hours ending 11/28/22 0065    General appearance: alert and cooperative with exam  Extremities: extremities normal, atraumatic, no cyanosis or edema  Neurologic:  No obvious focal neurologic deficits. Skin: no rashes    Assessment and Plan:   Principal Problem:    Bipolar depression ([facility]  Active Problems:    Bipolar 1 disorder, depressed (HCC)    Borderline personality disorder (HCC)    Obstructive sleep apnea    CPAP (continuous positive airway pressure) dependence    Ingestion of toxic substance  Resolved Problems:    * No resolved hospital problems. *    Pre DM    Dysuria    Plan:   Continue present medication(s)    Follow with Psych   Check UA       Discharge planning:   her home     Reviewed treatment plans with the patient and/or family.              Electronically signed by Vikki Brumfield MD on 11/28/2022 at 10:48 PM

## 2022-11-29 NOTE — PROGRESS NOTES
Treatment Team Note:     Target Symptoms/Reason for admission: Per nursing admission assessment - Reason for Admission: Manuel Alvares is a  65 yoa female who Drank spic and span  as suicide attempt. Med change recently and has felt suicidal and decreased functioning x 1 week. Diagnoses per psych provider: Borderline personality disorder ([facility] [F60.3]  [facility] ([facility] Lencho Shield  Bipolar depression ([facility] [F31.9]  Bipolar 1 disorder, depressed ([facility] [F31.9]     Therapist met with treatment team to discuss patients treatment and discharge plans. Patient's aftercare plan is: [id number] [address]     Aftercare appointments made: Yes     Pt lives with: alone     Collateral obtained from: mom  Collateral obtained on:11/28/22     Attending groups:  Minimal     Behavior: Pt was cooperative with her assessment tonight. She was isolated to her room most of this shift. Her face is often expressionless and she rates her depression a 8 and her anxiety a 7. Pt continues to endorse SI, but denies a plan and contracts for safety tonight while on the unit. Pt denies HI and AVH. Pt is not social with others, but will engage if addressed. She reports a good appetite and was med compliant. She has good eye contact and was dressed appropriately. Pt remains on LOS precautions while using her CPAP at night for sleep. She has been resting quietly since going to bed. Will continue to monitor for safety as ordered.       Has patient been completing ADL's:  Yes     SI:  patient denies SI  Plan: no   If yes describe: N/A - patient denies plan  HI:  patient denies HI  If present describe: N/A  Delusions: patient denies delusions  If present describe: N/A  Hallucinations: patient denies hallucinations  If present describe: N/A     Patient rates their -- Depression: 1-10:  8  Anxiety:1-10:  7     Sleeping: Fair     Taking medication: Yes     Misc:

## 2022-11-29 NOTE — PROGRESS NOTES
[facility] Adult Unit Daily Assessment  Nursing Progress Note    Room: [facility]-   Name: Horacio Andres   Age: 65 y.o. Gender: female   Dx: Bipolar depression ([address]  Precautions: suicide risk and fall risk  Inpatient Status: voluntary       SLEEP:  Sleep Quality Good  Sleep Medications: Yes; melatonin 5mg  PRN Sleep Meds: No       MEDICAL:  Other PRN Meds: Yes; atarax 25mg   Med Compliant: Yes  Accu-Chek: No  Oxygen/CPAP/BiPAP: Yes; CPAP  CIWA/CINA: No   PAIN Assessment: denies  Side Effects from medication: none voiced      Metabolic Screening:  Lab Results   Component Value Date    LABA1C 6.1 (H) 11/25/2022     Lab Results   Component Value Date    CHOL 172 11/25/2022    CHOL 143 (L) 06/27/2021    CHOL 149 (L) 07/22/2020    CHOL 152 (L) 02/22/2020    CHOL 130 (L) 12/22/2018    CHOL 154 02/11/2014    CHOL 181 05/10/2012     Lab Results   Component Value Date    TRIG 177 (H) 11/25/2022    TRIG 166 (H) 06/27/2021    TRIG 143 07/22/2020    TRIG 130 02/22/2020    TRIG 83 12/22/2018    TRIG 113 02/11/2014    TRIG 197 (H) 05/10/2012     Lab Results   Component Value Date    HDL 59 (L) 11/25/2022    HDL 48 (L) 06/27/2021    HDL 60 (L) 07/22/2020    HDL 52 (L) 02/22/2020    HDL 52 (L) 12/22/2018    HDL 56 02/11/2014    HDL 62 05/10/2012     No components found for: LDLCAL  No results found for: LABVLDL  Body mass index is 35.6 kg/m². BP Readings from Last 2 Encounters:   11/28/22 132/86   12/28/21 134/81         Medical Bed:   Is patient in a medical bed? no   If medical bed is in use, has nursing secured room while patient is awake and out of the room? NA  Has safety checks by nursing been completed on the bed/room this shift? yes    Protective Factors:  Patient identifies protective factors with nursing staff as follows:    Identifies reasons for living: Yes   Supportive Social Network or family: Yes    Belief that suicide is immoral/high spirituality: No   Responsibility to family or others/living with family: Yes   Fear of death or dying due to pain and suffering: Yes   Engaged in work or school: No     If Patient is unable to identify, reason why? N/A      PSYCH:  Depression: 8   Anxiety: 7   SI without plan   Risk of Suicide: Low Risk  HI Negative for homicidal ideation      AVH:no If Hallucinations are present, describe? denies        GENERAL:  Appetite: good   Percent Meals: no meals consumed during this shift   Social: No   Speech: normal   Appearance: appropriately dressed    GROUP:  Group Participation: No  Participation Quality: None    Notes: Pt was cooperative with her assessment tonight. She was isolated to her room most of this shift. Her face is often expressionless and she rates her depression a 8 and her anxiety a 7. Pt continues to endorse SI, but denies a plan and contracts for safety tonight while on the unit. Pt denies HI and AVH. Pt is not social with others, but will engage if addressed. She reports a good appetite and was med compliant. She has good eye contact and was dressed appropriately. Pt remains on LOS precautions while using her CPAP at night for sleep. She has been resting quietly since going to bed. Will continue to monitor for safety as ordered.          Electronically signed by Yoana Kasper RN on 11/29/22 at 12:04 AM CST

## 2022-11-29 NOTE — PLAN OF CARE
Problem: Coping  Goal: Pt/Family able to verbalize concerns and demonstrate effective coping strategies  Outcome: Progressing                                                                       Group Therapy Note    Date: 11/29/2022  Start Time: 1100  End Time:  6617  Number of Participants: 10    Type of Group: Psychoeducation    Wellness Binder Information  Module Name:  emotional wellness  Session Number:  5    Patient's Goal:  obstacles to emotional wellness    Notes:  pt acknowledged negative thinking as an obstacle to emotional wellness    Status After Intervention:  Improved    Participation Level: Interactive    Participation Quality: Appropriate, Attentive, and Sharing      Speech:  normal      Thought Process/Content: Logical      Affective Functioning: Congruent      Mood: congruent      Level of consciousness:  Alert, Oriented x4, and Attentive      Response to Learning: Able to verbalize current knowledge/experience      Endings: None Reported    Modes of Intervention: Education      Discipline Responsible: Psychoeducational Specialist      Signature:  Lovely Leyden

## 2022-11-30 PROBLEM — F42.2 MIXED OBSESSIONAL THOUGHTS AND ACTS: Status: ACTIVE | Noted: 2022-11-30

## 2022-11-30 LAB
ORGANISM: ABNORMAL
URINE CULTURE, ROUTINE: ABNORMAL
URINE CULTURE, ROUTINE: ABNORMAL

## 2022-11-30 PROCEDURE — 6370000000 HC RX 637 (ALT 250 FOR IP): Performed by: NURSE PRACTITIONER

## 2022-11-30 PROCEDURE — 6370000000 HC RX 637 (ALT 250 FOR IP): Performed by: PSYCHIATRY & NEUROLOGY

## 2022-11-30 PROCEDURE — 99231 SBSQ HOSP IP/OBS SF/LOW 25: CPT | Performed by: NURSE PRACTITIONER

## 2022-11-30 PROCEDURE — 1240000000 HC EMOTIONAL WELLNESS R&B

## 2022-11-30 PROCEDURE — 94660 CPAP INITIATION&MGMT: CPT

## 2022-11-30 RX ORDER — NITROFURANTOIN 25; 75 MG/1; MG/1
100 CAPSULE ORAL EVERY 12 HOURS SCHEDULED
Status: DISCONTINUED | OUTPATIENT
Start: 2022-11-30 | End: 2022-11-30

## 2022-11-30 RX ADMIN — CLOMIPRAMINE HYDROCHLORIDE 25 MG: 25 CAPSULE ORAL at 20:34

## 2022-11-30 RX ADMIN — MICONAZOLE NITRATE 2 %: 2 POWDER TOPICAL at 09:27

## 2022-11-30 RX ADMIN — MICONAZOLE NITRATE: 2 POWDER TOPICAL at 20:34

## 2022-11-30 RX ADMIN — DOCUSATE SODIUM 100 MG: 100 CAPSULE, LIQUID FILLED ORAL at 09:28

## 2022-11-30 RX ADMIN — LURASIDONE HYDROCHLORIDE 80 MG: 40 TABLET, FILM COATED ORAL at 16:54

## 2022-11-30 RX ADMIN — Medication 5 MG: at 20:34

## 2022-11-30 RX ADMIN — VALACYCLOVIR HYDROCHLORIDE 500 MG: 500 TABLET, FILM COATED ORAL at 09:27

## 2022-11-30 RX ADMIN — HYDROXYZINE PAMOATE 25 MG: 25 CAPSULE ORAL at 20:41

## 2022-11-30 NOTE — PROGRESS NOTES
Group Note    Date: 11/30/22  Start Time: 8:00 AM   End Time:8:30 AM     Number of Participants: 6    Type of Group: Community/Goal     Patient's Goal:  getting shower and working on    Notes:      Status After Intervention:      Participation Level:  Active Listener    Participation Quality: Appropriate    Speech:  normal    Thought Process/Content: Logical    Mood:  calm    Level of consciousness:  Alert    Response to Learning: Able to verbalize current knowledge/experience    Modes of Intervention: Education and Support    Discipline Responsible: Behavioral Health Technician     Signature:  Caitlyn Vizcarra

## 2022-11-30 NOTE — PROGRESS NOTES
[facility] Adult Unit Daily Assessment  Nursing Progress Note    Room: [facility]   Name: Johnathan Doshi   Age: 65 y.o. Gender: female   Dx: Bipolar depression ([address]  Precautions: suicide risk, 1:1 with CPAP  Inpatient Status: voluntary       SLEEP:  Sleep Quality Good  Sleep Medications: Yes, Melatonin   PRN Sleep Meds: No       MEDICAL:  Other PRN Meds: Yes, Atarax for c/o's anxiety   Med Compliant: Yes  Accu-Chek: No  Oxygen/CPAP/BiPAP: Yes, CPAP at HS  CIWA/CINA: No   PAIN Assessment: none  Side Effects from medication: No      Metabolic Screening:  Lab Results   Component Value Date    LABA1C 6.1 (H) 11/25/2022     Lab Results   Component Value Date    CHOL 172 11/25/2022    CHOL 143 (L) 06/27/2021    CHOL 149 (L) 07/22/2020    CHOL 152 (L) 02/22/2020    CHOL 130 (L) 12/22/2018    CHOL 154 02/11/2014    CHOL 181 05/10/2012     Lab Results   Component Value Date    TRIG 177 (H) 11/25/2022    TRIG 166 (H) 06/27/2021    TRIG 143 07/22/2020    TRIG 130 02/22/2020    TRIG 83 12/22/2018    TRIG 113 02/11/2014    TRIG 197 (H) 05/10/2012     Lab Results   Component Value Date    HDL 59 (L) 11/25/2022    HDL 48 (L) 06/27/2021    HDL 60 (L) 07/22/2020    HDL 52 (L) 02/22/2020    HDL 52 (L) 12/22/2018    HDL 56 02/11/2014    HDL 62 05/10/2012     No components found for: LDLCAL  No results found for: LABVLDL  Body mass index is 35.6 kg/m². BP Readings from Last 2 Encounters:   11/29/22 112/63   12/28/21 134/81         Medical Bed:   Is patient in a medical bed? no   If medical bed is in use, has nursing secured room while patient is awake and out of the room? NA  Has safety checks by nursing been completed on the bed/room this shift? NA    Protective Factors:  Patient identifies protective factors with nursing staff as follows:    Identifies reasons for living: Yes   Supportive Social Network or family: Yes    Belief that suicide is immoral/high spirituality: Yes   Responsibility to family or others/living with family:

## 2022-11-30 NOTE — PLAN OF CARE
Problem: Coping  Goal: Pt/Family able to verbalize concerns and demonstrate effective coping strategies  Description: INTERVENTIONS:  1. Assist patient/family to identify coping skills, available support systems and cultural and spiritual values  2. Provide emotional support, including active listening and acknowledgement of concerns of patient and caregivers  3. Reduce environmental stimuli, as able  4. Instruct patient/family in relaxation techniques, as appropriate  5. Assess for spiritual pain/suffering and initiate Spiritual Care, Psychosocial Clinical Specialist consults as needed  11/30/2022 1114 by Akash Rachel  Outcome: Progressing  Note:                                                                     Group Therapy Note    Date: 11/30/2022  Start Time: 1000  End Time:  0406  Number of Participants: 7    Type of Group: Psychoeducation    Wellness Binder Information  Module Name:  [address] [place] [address]  Session Number:  1    Group Goal for Pt: To improve knowledge of practical facts about depression    Notes:  Pt demonstrated improved knowledge of practical facts about depression by actively participating in group activity. Status After Intervention:  Unchanged    Participation Level:  Active Listener and Interactive    Participation Quality: Appropriate and Attentive      Speech:  normal      Thought Process/Content: Logical      Affective Functioning: Congruent      Mood: anxious and depressed      Level of consciousness:  Alert and Oriented x4      Response to Learning: Able to verbalize current knowledge/experience, Able to verbalize/acknowledge new learning, and Progressing to goal      Endings: None Reported    Modes of Intervention: Education      Discipline Responsible: Psychoeducational Specialist      Signature:  Akash Rachel

## 2022-11-30 NOTE — PROGRESS NOTES
[facility] Adult Unit Daily Assessment  Nursing Progress Note     Room: [facility]/609-01   Name: Neda Jean-Baptiste   Age: 65 y.o. Gender: female   Dx: Bipolar depression ([address]  Precautions: suicide risk, 1:1 with CPAP  Inpatient Status: voluntary         SLEEP:  Sleep Quality Good  Sleep Medications: Yes, Melatonin   PRN Sleep Meds: No         MEDICAL:  Other PRN Meds: Yes, Atarax for c/o's anxiety   Med Compliant: Yes  Accu-Chek: No  Oxygen/CPAP/BiPAP: Yes, CPAP at HS  CIWA/CINA: No   PAIN Assessment: none  Side Effects from medication: No              Medical Bed:   Is patient in a medical bed? no   If medical bed is in use, has nursing secured room while patient is awake and out of the room? NA  Has safety checks by nursing been completed on the bed/room this shift? NA     Protective Factors:  Patient identifies protective factors with nursing staff as follows: Identifies reasons for living: Yes              Supportive Social Network or family: Yes               Belief that suicide is immoral/high spirituality: Yes              Responsibility to family or others/living with family: Yes              Fear of death or dying due to pain and suffering: Yes              Engaged in work or school: No                If Patient is unable to identify, reason why? N/a        PSYCH:  Depression: 8   Anxiety: 7   SI denies suicidal ideation   Risk of Suicide: No Risk  HI Negative for homicidal ideation      AVH:no If Hallucinations are present, describe? N/a           GENERAL:  Appetite: good   Percent Meals: reports good appetite   Social: No   Speech: normal   Appearance: appropriately dressed and appropriately groomed     GROUP:  Group Participation: Yes  Participation Quality: Minimal     Notes:Patient is observed in day area wearing casual attire. She is appropriately dressed and well groomed. Alert and oriented x 4. She is pleasant, calm, and cooperative. Blunted effect. She is attending and participating in all groups. She performs ADLS, compliant with medications with no side effects noted. Reports good sleep and appetite. Denies suicidal ideation, homicidal ideation, and hallucinations. No evidence of delusions or paranoia.      Electronically signed by Rajeev Marshall RN on 11/30/22 at 11:14 AM CST

## 2022-11-30 NOTE — PROGRESS NOTES
Patient reported she was allergic to Macrobid.    Dr. Carla Lobo made aware with no new orders wants to wait for final test.

## 2022-11-30 NOTE — PROGRESS NOTES
Treatment Team Note:    Target Symptoms/Reason for admission: Per nursing admission assessment - Reason for Admission: Rose Conway is a  65 yoa female who Drank spic and span  as suicide attempt. Med change recently and has felt suicidal and decreased functioning x 1 week. Diagnoses per psych provider: Borderline personality disorder ([facility] [F60.3]  Suicide attempt ([facility] Flory Hnids  Bipolar depression ([facility] [F31.9]  Bipolar 1 disorder, depressed ([facility] [F31.9]    Therapist met with treatment team to discuss patients treatment and discharge plans. Patient's aftercare plan is: [address]    Aftercare appointments made: Yes    Pt lives with: patient lives alone    Collateral obtained from: Mom  Collateral obtained on:11/28/22    Attending groups:  Minimal    Behavior: cooperative, fair eye contact, poor concentration, affect is sad/worried. Has patient been completing ADL's:  Yes    SI:  patient denies SI  Plan: no   If yes describe: N/A - patient denies plan  HI:  patient denies HI  If present describe: N/A  Delusions: patient denies delusions  If present describe: N/A  Hallucinations: patient denies hallucinations  If present describe: N/A    Patient rates their -- Depression: 1-10:  8  Anxiety:1-10:  7    Sleeping:Yes    Taking medication: Yes    Misc: Tentative discharge Thursday.

## 2022-11-30 NOTE — PROGRESS NOTES
[address] [place] [address]      Psychiatric Progress Note    Name:  Sapphire Melo  Date:  11/30/2022  Age:  65 y.o. Sex:  female  Ethnicity:   Primary Care Physician:  TING Harper CNP   Patient Care Team:  Patient Care Team:  TING Harper CNP as PCP - General  Pravin Mercedes MD (Cardiology)  IRAM Andre (Physician Assistant Medical)  Chief Complaint: \" I am feeling a little better. \"        Historian:patient  Complaint Type: anxiety, depression, loss of interest in favorite activities, and sleep disturbance  Course of Symptoms: improved  Precipitating Factors: history of mental illness         Subjective  Nursing notes were reviewed and patient had no behavioral issues during the night. As needed medications administered in the not include hydroxyzine. Today she denies suicidal ideation, homicidal ideation and psychosis. She reports that last night she had suicidal thoughts off and on with no specific plan. Again reports she has only been handwashing about 7-8 times per day which is an improvement from 15 times per day. She was able to fill out questionnaire for her hoarding disorder as well. Reports when she returns home she is going to try to take small steps of getting rid of things she has hoarded. Patient reports sleep as \"I am sleeping really good. \"  Patient has been calm and cooperative with staff and peers. Patient has been compliant with medications. Patient has been attending groups. Patient reports appetite as \"I am eating all of my meals now. \" Patient reports no side effects from medications.       Objective  Vitals:    11/30/22 0743   BP: (!) 130/91   Pulse: 94   Resp: 18   Temp: 97.2 °F (36.2 °C)   SpO2: 96%       Previous Psychiatric/Substance Use History      Medical History:  Past Medical History:   Diagnosis Date    Chest pain 02/08/2012    CPAP (continuous positive airway pressure) dependence     Depression     Hypoglycemia     SANDY (obstructive sleep apnea)     Schizoaffective disorder ([facility] 02/08/2012    Suicide attempt ([facility]         BRAMBILA History:   Social History     Substance and Sexual Activity   Alcohol Use No         Social History     Substance and Sexual Activity   Drug Use No        Social History     Tobacco Use   Smoking Status Never   Smokeless Tobacco Never        Family History:     History reviewed. No pertinent family history. Mental Status:  Level of consciousness:  within normal limits and awake  Appearance:  well-appearing, street clothes, in chair, good grooming, and good hygiene  Behavior/Motor:  no abnormalities noted  Attitude toward examiner:  cooperative, attentive, and good eye contact  Speech:  normal rate and normal volume  Mood:  \" I feel a little better. \"  Affect:  blunted  Thought processes:  linear and goal directed  Thought content:  Homocidal ideation : denies  Suicidal Ideation:  denies suicidal ideation  Delusions:  no evidence of delusions  Perceptual Disturbance:  denies any perceptual disturbance  Cognition:  oriented to person, place, and time  Concentration : good  Memory intact for recent and remote  Fund of knowledge:  average  Abstract thinking:  adequate  Insight: improved  Judgment:  appropriate      nitrofurantoin (macrocrystal-monohydrate)  100 mg Oral 2 times per day    lurasidone  80 mg Oral Dinner    clomiPRAMINE  25 mg Oral Nightly    mirabegron  50 mg Oral Daily    melatonin  5 mg Oral Nightly    miconazole   Topical BID    valACYclovir  500 mg Oral Daily       Current Medications:  Current Facility-Administered Medications   Medication Dose Route Frequency Provider Last Rate Last Admin    nitrofurantoin (macrocrystal-monohydrate) (MACROBID) capsule 100 mg  100 mg Oral 2 times per day Malcom Jones MD        lurasidone (LATUDA) tablet 80 mg  80 mg Oral Dinner Danielle Hunting, APRN   80 mg at 11/29/22 1707    clomiPRAMINE (ANAFRANIL) capsule 25 mg  25 mg Oral Nightly Danielle Hunting, APRN   25 mg at 11/29/22 2038    aluminum & magnesium hydroxide-simethicone (MAALOX) 200-200-20 MG/5ML suspension 30 mL  30 mL Oral Q6H PRN Venkatesh Bolden MD   30 mL at 11/23/22 0802    docusate sodium (COLACE) capsule 100 mg  100 mg Oral PRN Ronnald Pair, APRN   100 mg at 11/30/22 2845    hydrOXYzine pamoate (VISTARIL) capsule 25 mg  25 mg Oral TID PRN Ronnald Pair, APRN   25 mg at 11/29/22 2038    mirabegron (MYRBETRIQ) extended release tablet 50 mg  (Patient Supplied)  50 mg Oral Daily Ronnald Pair, APRN   50 mg at 11/30/22 4934    melatonin disintegrating tablet 5 mg  5 mg Oral Nightly Ronnald Pair, APRN   5 mg at 11/29/22 2038    miconazole (MICOTIN) 2 % powder   Topical BID Ronnald Pair, APRN   2 % at 11/30/22 3761    acetaminophen (TYLENOL) tablet 650 mg  650 mg Oral Q4H PRN Carlo Martin MD        polyethylene glycol (GLYCOLAX) packet 17 g  17 g Oral Daily PRN Carlo Martin MD        valACYclovir (VALTREX) tablet 500 mg  500 mg Oral Daily Carlo Martin MD   500 mg at 11/30/22 9619    calcium carbonate (TUMS) chewable tablet 500 mg  500 mg Oral TID PRN Carlo Martin MD   500 mg at 11/23/22 2138       Psychotherapy:   SUPPORTIVE    DSM V Diagnoses:    Bipolar depression  Suicide attempt by ingestion of toxic substance   Borderline personality disorder  Hoarding Disorder   Obsessive Compulsive Disorder           Plan:    Encourage group therapy  15 minute safety checks  Medical monitoring by Dr. Alex Azul and associates  Continue current therapy and medications  Possible discharge tomorrow     Amount of time spent with patient:  15 minutes with greater than 50% of the time spent in counseling and collaboration of care.     Christal Perez, SERGEY-BC, FNP-C   Clinician Signature: signed electronically

## 2022-11-30 NOTE — PLAN OF CARE
Problem: Coping  Goal: Pt/Family able to verbalize concerns and demonstrate effective coping strategies  11/30/2022 1132 by Ivan Sutton  Outcome: Progressing                                                                       Group Therapy Note    Date: 11/30/2022  Start Time: 1100  End Time:  7054  Number of Participants: 8    Type of Group: Psychotherapy    Wellness Binder Information  Module Name:  staying well  Session Number:  1    Patient's Goal:  daily maintenance and coping skills    Notes:  pt acknowledged use of positive coping skills daily to help stay well.     Status After Intervention:  Improved    Participation Level: Interactive    Participation Quality: Appropriate, Attentive, and Sharing      Speech:  normal      Thought Process/Content: Logical      Affective Functioning: Congruent      Mood: congruent      Level of consciousness:  Alert, Oriented x4, and Attentive      Response to Learning: Able to verbalize current knowledge/experience      Endings: None Reported    Modes of Intervention: Education      Discipline Responsible: Psychoeducational Specialist      Signature:  Ivan Sutton

## 2022-11-30 NOTE — PROGRESS NOTES
Progress Note  Nitin River  11/30/2022 8:08 AM  Subjective:   Admit Date:   11/23/2022      CC/ADMIT DX:       Interval History:   Reviewed overnight events and nursing notes. She has no new complaints. I have reviewed all labs/diagnostics from the last 24hrs. ROS:   I have done a 10 point ROS and all are negative, except what is mentioned in the HPI. ADULT DIET; Regular; 4 carb choices (60 gm/meal)    Medications:      lurasidone  80 mg Oral Dinner    clomiPRAMINE  25 mg Oral Nightly    mirabegron  50 mg Oral Daily    melatonin  5 mg Oral Nightly    miconazole   Topical BID    valACYclovir  500 mg Oral Daily           Objective:   Vitals: BP (!) 130/91   Pulse 94   Temp 97.2 °F (36.2 °C)   Resp 18   Ht 5' 4.02\" (1.626 m)   Wt 207 lb 8 oz (94.1 kg)   SpO2 96%   BMI 35.60 kg/m²  No intake or output data in the 24 hours ending 11/30/22 0808    General appearance: alert and cooperative with exam  Extremities: extremities normal, atraumatic, no cyanosis or edema  Neurologic:  No obvious focal neurologic deficits. Skin: no rashes    Assessment and Plan:   Principal Problem:    Bipolar depression ([facility] [address]  Active Problems:    Bipolar 1 disorder, depressed (HCC)    Borderline personality disorder (HCC)    Obstructive sleep apnea    CPAP (continuous positive airway pressure) dependence    Ingestion of toxic substance  Resolved Problems:    * No resolved hospital problems. *    Pre DM    Dysuria    Plan:   Continue present medication(s)    Follow with Psych   She is medically stable. I will monitor for any changes or concerns. Discharge planning:   her home     Reviewed treatment plans with the patient and/or family.              Electronically signed by Courtney Mckeon MD on 11/30/2022 at 8:08 AM

## 2022-11-30 NOTE — PROGRESS NOTES
Group Therapy Note    Date:   Start Time: 4601  End Time:  2367  Number of Participants: 5    Type of Group: Healthy Living/Wellness    Wellness Binder Information  Module Name:  31 tips to boost your mental health    Patient's Goal:  to be grateful     Notes:      Status After Intervention:  improved     Participation Level: active listener     Participation Quality: good       Speech:  normal       Thought Process/Content: logica      Affective Functioning:       Mood: anxious      Level of consciousness:  Alert and Oriented x4      Response to Learning: Able to verbalize current knowledge/experience, Able to retain information, and Capable of insight      Endings: None Reported    Modes of Intervention: Education and Support      Discipline Responsible: Registered Nurse      Signature:  Danna Rodríguez RN

## 2022-11-30 NOTE — PLAN OF CARE
Problem: Self Harm/Suicidality  Goal: Will have no self-injury during hospital stay  11/30/2022 1028 by Betzy De La Rosa RN  Outcome: Progressing  11/29/2022 2246 by Pedro Luis Esparza RN  Outcome: Progressing     Problem: Safety - Adult  Goal: Free from fall injury  11/30/2022 1028 by Betzy De La Rosa RN  Outcome: Progressing  11/29/2022 2246 by Pedro Luis Espraza RN  Outcome: Progressing     Problem: Gastrointestinal - Adult  Goal: Minimal or absence of nausea and vomiting  11/30/2022 1028 by Betzy De La Rosa RN  Outcome: Progressing  11/29/2022 2246 by Pedro Luis Esparza RN  Outcome: Progressing  Goal: Maintains or returns to baseline bowel function  11/30/2022 1028 by Betzy De La Rosa RN  Outcome: Progressing  11/29/2022 2246 by Pedro Luis Esparza RN  Outcome: Progressing  Goal: Maintains adequate nutritional intake  11/30/2022 1028 by Betzy De La Rosa RN  Outcome: Progressing  11/29/2022 2246 by Pedro Luis Esparza RN  Outcome: Progressing     Problem: Nutrition Deficit:  Goal: Optimize nutritional status  11/30/2022 1028 by Betzy De La Rosa RN  Outcome: Progressing  11/29/2022 2246 by Pedro Luis Esparza RN  Outcome: Progressing     Problem: Coping  Goal: Pt/Family able to verbalize concerns and demonstrate effective coping strategies  11/30/2022 1028 by Betzy De La Rosa RN  Outcome: Progressing  11/29/2022 2246 by Pedro Luis Esparza RN  Outcome: Progressing

## 2022-11-30 NOTE — PSYCHOTHERAPY
Group Therapy Note    Date: 11/30/2022  Start Time: 1330  End Time:  1400  Number of Participants: 5    Type of Group: SPIRITUALITY     Wellness Binder Information  Module Name:  Mindfulness  Session Number:      Patient's Goal:  To rest the mind. .. Notes:      Status After Intervention:  Improved    Participation Level:  Active Listener and Interactive    Participation Quality: Appropriate, Attentive, and Sharing      Speech:  normal      Thought Process/Content:       Affective Functioning: Congruent      Mood: calm      Level of consciousness:  Oriented x4      Response to Learning: Able to verbalize current knowledge/experience and Able to verbalize/acknowledge new learning      Endings:     Modes of Intervention: Education, Support, and Socialization      Discipline Responsible:       Signature:  Wing Yung MA [facility]

## 2022-12-01 PROCEDURE — 6370000000 HC RX 637 (ALT 250 FOR IP): Performed by: NURSE PRACTITIONER

## 2022-12-01 PROCEDURE — 6370000000 HC RX 637 (ALT 250 FOR IP): Performed by: PSYCHIATRY & NEUROLOGY

## 2022-12-01 PROCEDURE — 6370000000 HC RX 637 (ALT 250 FOR IP): Performed by: FAMILY MEDICINE

## 2022-12-01 PROCEDURE — 99231 SBSQ HOSP IP/OBS SF/LOW 25: CPT | Performed by: NURSE PRACTITIONER

## 2022-12-01 PROCEDURE — 1240000000 HC EMOTIONAL WELLNESS R&B

## 2022-12-01 PROCEDURE — 94660 CPAP INITIATION&MGMT: CPT

## 2022-12-01 RX ORDER — CIPROFLOXACIN 500 MG/1
500 TABLET, FILM COATED ORAL EVERY 12 HOURS SCHEDULED
Status: COMPLETED | OUTPATIENT
Start: 2022-12-01 | End: 2022-12-03

## 2022-12-01 RX ADMIN — CIPROFLOXACIN HYDROCHLORIDE 500 MG: 500 TABLET, FILM COATED ORAL at 10:14

## 2022-12-01 RX ADMIN — VALACYCLOVIR HYDROCHLORIDE 500 MG: 500 TABLET, FILM COATED ORAL at 09:11

## 2022-12-01 RX ADMIN — LURASIDONE HYDROCHLORIDE 80 MG: 40 TABLET, FILM COATED ORAL at 16:46

## 2022-12-01 RX ADMIN — CIPROFLOXACIN HYDROCHLORIDE 500 MG: 500 TABLET, FILM COATED ORAL at 20:24

## 2022-12-01 RX ADMIN — MICONAZOLE NITRATE: 2 POWDER TOPICAL at 09:10

## 2022-12-01 RX ADMIN — MICONAZOLE NITRATE: 2 POWDER TOPICAL at 20:23

## 2022-12-01 RX ADMIN — CLOMIPRAMINE HYDROCHLORIDE 25 MG: 25 CAPSULE ORAL at 20:24

## 2022-12-01 RX ADMIN — HYDROXYZINE PAMOATE 25 MG: 25 CAPSULE ORAL at 20:24

## 2022-12-01 RX ADMIN — Medication 5 MG: at 20:24

## 2022-12-01 NOTE — PROGRESS NOTES
[facility] Adult Unit Daily Assessment  Nursing Progress Note    Room: [facility]-01   Name: Obed Sun   Age: 65 y.o. Gender: female   Dx: Bipolar depression ([address]  Precautions: suicide risk risk to fall  Inpatient Status: voluntary       SLEEP:  Sleep Quality Good  Sleep Medications:    PRN Sleep Meds:        MEDICAL:  Other PRN Meds:    Med Compliant: Yes  Accu-Chek: No  Oxygen/CPAP/BiPAP: No  CIWA/CINA: No   PAIN Assessment: none  Side Effects from medication: No      Metabolic Screening:  Lab Results   Component Value Date    LABA1C 6.1 (H) 11/25/2022     Lab Results   Component Value Date    CHOL 172 11/25/2022    CHOL 143 (L) 06/27/2021    CHOL 149 (L) 07/22/2020    CHOL 152 (L) 02/22/2020    CHOL 130 (L) 12/22/2018    CHOL 154 02/11/2014    CHOL 181 05/10/2012     Lab Results   Component Value Date    TRIG 177 (H) 11/25/2022    TRIG 166 (H) 06/27/2021    TRIG 143 07/22/2020    TRIG 130 02/22/2020    TRIG 83 12/22/2018    TRIG 113 02/11/2014    TRIG 197 (H) 05/10/2012     Lab Results   Component Value Date    HDL 59 (L) 11/25/2022    HDL 48 (L) 06/27/2021    HDL 60 (L) 07/22/2020    HDL 52 (L) 02/22/2020    HDL 52 (L) 12/22/2018    HDL 56 02/11/2014    HDL 62 05/10/2012     No components found for: LDLCAL  No results found for: LABVLDL  Body mass index is 35.6 kg/m². BP Readings from Last 2 Encounters:   12/01/22 121/82   12/28/21 134/81         Medical Bed:   Is patient in a medical bed? no   If medical bed is in use, has nursing secured room while patient is awake and out of the room? NA  Has safety checks by nursing been completed on the bed/room this shift? NA    Protective Factors:  Patient identifies protective factors with nursing staff as follows:    Identifies reasons for living: Yes   Supportive Social Network or family: Yes    Belief that suicide is immoral/high spirituality: Yes   Responsibility to family or others/living with family: Yes   Fear of death or dying due to pain and suffering: Yes   Engaged in work or school: No     If Patient is unable to identify, reason why? PSYCH:  Depression: 8   Anxiety: 7   SI denies suicidal ideation   Risk of Suicide: No Risk  HI Negative for homicidal ideation      AVH:no If Hallucinations are present, describe? GENERAL:  Appetite: good   Percent Meals: 75%   Social: No   Speech: normal   Appearance: appropriately dressed, looks older, and healthy looking    GROUP:  Group Participation: Yes  Participation Quality: Minimal    Notes: Questioned order for Cipro. Patient in the day area for breakfast and medications. She showered. She is more social and in the day area with other patients. She is calm and cooperative  attending groups. She is looking forward to be discharged. She talked about trying to get her house organized.          Electronically signed by Derrick Fowler RN on 12/1/22 at 5:37 PM CST

## 2022-12-01 NOTE — PROGRESS NOTES
Progress Note  Rafaela Sleeper  12/1/2022 7:15 AM  Subjective:   Admit Date:   11/23/2022      CC/ADMIT DX:       Interval History:   Reviewed overnight events and nursing notes. She has no new medical issues. I have reviewed all labs/diagnostics from the last 24hrs. ROS:   I have done a 10 point ROS and all are negative, except what is mentioned in the HPI. ADULT DIET; Regular; 4 carb choices (60 gm/meal)    Medications:      ciprofloxacin  500 mg Oral 2 times per day    lurasidone  80 mg Oral Dinner    clomiPRAMINE  25 mg Oral Nightly    mirabegron  50 mg Oral Daily    melatonin  5 mg Oral Nightly    miconazole   Topical BID    valACYclovir  500 mg Oral Daily           Objective:   Vitals: /66   Pulse 71   Temp 98.2 °F (36.8 °C) (Temporal)   Resp 16   Ht 5' 4.02\" (1.626 m)   Wt 207 lb 8 oz (94.1 kg)   SpO2 94%   BMI 35.60 kg/m²  No intake or output data in the 24 hours ending 12/01/22 0715    General appearance: alert and cooperative with exam  Extremities: extremities normal, atraumatic, no cyanosis or edema  Neurologic:  No obvious focal neurologic deficits. Skin: no rashes    Assessment and Plan:   Principal Problem:    Bipolar depression ([address]  Active Problems:    Bipolar 1 disorder, depressed ([address]    Mixed obsessional thoughts and acts    Borderline personality disorder ([address]    Obstructive sleep apnea    CPAP (continuous positive airway pressure) dependence    Ingestion of toxic substance  Resolved Problems:    * No resolved hospital problems. *    Pre DM    Pyuria    Plan:   Continue present medication(s)    Follow with Psych   Follow with urine culture      Discharge planning:   her home     Reviewed treatment plans with the patient and/or family.              Electronically signed by Celestino Robertson MD on 12/1/2022 at 7:15 AM

## 2022-12-01 NOTE — PROGRESS NOTES
Group Note    Date: 11/30/2022  Start Time: 7:30 PM   End Time:8:00 PM     Number of Participants: 6    Type of Group: Wrap-Up     Patient's Goal:  to improve    Notes:  isolative to self    Status After Intervention:  Unchanged    Participation Level: Minimal    Participation Quality: Appropriate    Speech:  normal and hesitant    Thought Process/Content: Linear    Mood: depressed and anxious    Level of consciousness:  Alert    Response to Learning: Able to verbalize current knowledge/experience    Modes of Intervention: Education and Support    Discipline Responsible: Registered Nurse     Signature:  Allen Chris RN

## 2022-12-01 NOTE — PROGRESS NOTES
Treatment Team Note:    Target Symptoms/Reason for admission: Per nursing admission assessment - Reason for Admission: Eda Lehman is a  65 yoa female who Drank spic and span  as suicide attempt. Med change recently and has felt suicidal and decreased functioning x 1 week. Diagnoses per psych provider: Borderline personality disorder ([facility] [F60.3]  Suicide attempt ([facility] Mary Dee  Bipolar depression ([facility] [F31.9]  Bipolar 1 disorder, depressed ([facility] [F31.9]    Therapist met with treatment team to discuss patients treatment and discharge plans. Patient's aftercare plan is: [address]    Aftercare appointments made: Yes    Pt lives with: patient lives alone    Collateral obtained from: Mom  Collateral obtained on:11/28/22    Attending groups: Yes    Behavior: cooperative, anxious, flat affect. Has patient been completing ADL's:  Yes    SI:  patient denies SI  Plan: no   If yes describe: N/A - patient denies plan  HI:  patient denies HI  If present describe: N/A  Delusions: patient denies delusions  If present describe: N/A  Hallucinations: patient denies hallucinations  If present describe: N/A    Patient rates their -- Depression: 1-10:  8  Anxiety:1-10:  7    Sleeping:Yes    Taking medication: Yes    Misc: Possible discharge today.

## 2022-12-01 NOTE — PROGRESS NOTES
Group Note    Date: 12/01/22  Start Time: 8:00 AM   End Time:8:30 AM     Number of Participants: 10    Type of Group: Community/Goal     Patient's Goal:  \"getting shower\"    Notes:      Status After Intervention:  Improved    Participation Level:  Active Listener    Participation Quality: Appropriate    Speech:  normal    Thought Process/Content: Logical    Mood:  calm    Level of consciousness:  Alert    Response to Learning: Able to verbalize current knowledge/experience    Modes of Intervention: Education and Support    Discipline Responsible: Behavioral Health Technician     Signature:  Hugh Weinstein

## 2022-12-01 NOTE — PLAN OF CARE
Problem: Coping  Goal: Pt/Family able to verbalize concerns and demonstrate effective coping strategies  Description: INTERVENTIONS:  1. Assist patient/family to identify coping skills, available support systems and cultural and spiritual values  2. Provide emotional support, including active listening and acknowledgement of concerns of patient and caregivers  3. Reduce environmental stimuli, as able  4. Instruct patient/family in relaxation techniques, as appropriate  5. Assess for spiritual pain/suffering and initiate Spiritual Care, Psychosocial Clinical Specialist consults as needed  Outcome: Progressing  Note:                                                                     Group Therapy Note    Date: 12/1/2022  Start Time: 1000  End Time:  1030  Number of Participants: 7    Type of Group: Psychoeducation    Wellness Binder Information  Module Name:  Women's Issues  Session Number:  1    Group Goal for Pt: To raise awareness of how thoughts influence feelings    Notes:  Pt demonstrated improved awareness of how thoughts influence feelings by actively participating in group discussion. Status After Intervention:  Unchanged    Participation Level:  Active Listener    Participation Quality: Appropriate and Attentive      Speech:  normal      Thought Process/Content: Linear      Affective Functioning: Congruent      Mood: anxious and depressed      Level of consciousness:  Alert and Oriented x4      Response to Learning: Able to verbalize current knowledge/experience, Able to verbalize/acknowledge new learning, and Progressing to goal      Endings: None Reported    Modes of Intervention: Education      Discipline Responsible: Psychoeducational Specialist      Signature:  Lexy Au

## 2022-12-01 NOTE — PROGRESS NOTES
Behavioral Services                                              Medicare Re-Certification    I certify that the inpatient psychiatric hospital services furnished since the previous certification/re-certification were, and continue to be, medically necessary for;    [x] (1) Treatment which could reasonably be expected to improve the patient's condition,    [] (2) Or for diagnostic study. Estimated length of stay/service : 3 days-5 weeks    Plan for post-hospital care :tbd    This patient continues to need, on a daily basis, active treatment furnished directly by or requiring the supervision of inpatient psychiatric personnel.     Electronically signed by TING Alex on 12/1/2022 at 10:19 AM

## 2022-12-01 NOTE — PLAN OF CARE
Problem: Coping  Goal: Pt/Family able to verbalize concerns and demonstrate effective coping strategies  Description: INTERVENTIONS:  1. Assist patient/family to identify coping skills, available support systems and cultural and spiritual values  2. Provide emotional support, including active listening and acknowledgement of concerns of patient and caregivers  3. Reduce environmental stimuli, as able  4. Instruct patient/family in relaxation techniques, as appropriate  5. Assess for spiritual pain/suffering and initiate Spiritual Care, Psychosocial Clinical Specialist consults as needed  12/1/2022 1415 by Harrison Perkins [facility]  Outcome: Progressing     Group Therapy Note     Date: 12/1/2022  Start Time: 1000  End Time:  1784  Number of Participants: 9     Type of Group: Psychotherapy     Patient's Goal: Patient will process emotions and actions and how to relate to other or their with others. Patient discussed open communication and feelings and emotions. Notes:  Patient attended process group as scheduled, patient identified a issue to work on today regarding how they will interact and relate to others. Participation Level:  Active Listener     Participation Quality: Appropriate, Attentive, and Sharing        Speech:  normal        Thought Process/Content: Logical        Affective Functioning: Congruent        Mood: euthymic        Level of consciousness:  Alert        Response to Learning: Able to verbalize current knowledge/experience        Endings: None Reported     Modes of Intervention: Education, Support, and Socialization        Discipline Responsible: /Counselor        Signature:  Harrison Perkins [facility]

## 2022-12-01 NOTE — CONSULTS
Comprehensive Nutrition Assessment    Type and Reason for Visit:  Consult    Nutrition Recommendations/Plan:   Continue to follow for questions that may arise     Malnutrition Assessment:  Malnutrition Status:  No malnutrition (12/01/22 0968)    Context:  Social/Environmental Circumstances     Findings of the 6 clinical characteristics of malnutrition:  Energy Intake:  No significant decrease in energy intake  Weight Loss:  Unable to assess     Body Fat Loss:  No significant body fat loss     Muscle Mass Loss:  No significant muscle mass loss    Fluid Accumulation:  No significant fluid accumulation     Strength:  Not Performed    Nutrition Assessment:    Received consult for healthy eating education. Awre pt is \"prediabetic\" A1C 6.1.  glucose levels that are available have been elevated 172/173. Plate method for diabetes given to pt with explanations. Pt very receptive. Has phone number if questions develop once discharged. New wt NA    Nutrition Related Findings:    No BM documented, no edema, genital herpes and breast rash. Wound Type: None (See below.)       Current Nutrition Intake & Therapies:    Average Meal Intake: %  Average Supplements Intake: None Ordered  ADULT DIET; Regular; 4 carb choices (60 gm/meal)    Anthropometric Measures:  Height: 5' 4.02\" (162.6 cm)  Ideal Body Weight (IBW): 120 lbs (55 kg)       Current Body Weight: 207 lb 8 oz (94.1 kg), 172.9 % IBW.     Current BMI (kg/m2): 35.6  Weight Adjustment For: No Adjustment  BMI Categories: Obese Class 2 (BMI 35.0 -39.9)    Estimated Daily Nutrient Needs:  Energy Requirements Based On: Kcal/kg  Weight Used for Energy Requirements: Current  Energy (kcal/day): 1046-9645 kcal/day = 15-18kcal/kg actual weight/day  Weight Used for Protein Requirements: Ideal  Protein (g/day): 65-82 g/day = 1.2-1.5g/kg IBW/day  Method Used for Fluid Requirements: 1 ml/kcal  Fluid (ml/day): 2739-5482    Nutrition Diagnosis:   Altered nutrition-related lab values related to food and nutrition related knowledge deficit, endocrine dysfuntion as evidenced by lab values    Nutrition Interventions:   Food and/or Nutrient Delivery: Continue Current Diet  Nutrition Education/Counseling: Education completed  Coordination of Nutrition Care: Continue to monitor while inpatient  Plan of Care discussed with: RN    Goals:  Previous Goal Met: Goal(s) Achieved  Goals: Meet at least 75% of estimated needs       Nutrition Monitoring and Evaluation:   Behavioral-Environmental Outcomes: Knowledge or Skill, Readiness for Change  Food/Nutrient Intake Outcomes: Food and Nutrient Intake  Physical Signs/Symptoms Outcomes: Biochemical Data, Fluid Status or Edema, Weight    Discharge Planning:    Continue current diet, Coordination of community care     En Chou MS, RD, LD  Contact: [phone]

## 2022-12-01 NOTE — PROGRESS NOTES
[facility] Adult Unit Daily Assessment  Nursing Progress Note    Room: [facility]609-01   Name: Neda Jean-Baptiste   Age: 65 y.o. Gender: female   Dx: Bipolar depression ([address]  Precautions: suicide risk and 1:1 sitter c-pap  Inpatient Status: voluntary       SLEEP:  Sleep Quality Good  Sleep Medications: Yes   PRN Sleep Meds: No       MEDICAL:  Other PRN Meds: Yes   Med Compliant: Yes  Accu-Chek: No  Oxygen/CPAP/BiPAP: No  CIWA/CINA: No   PAIN Assessment: none  Side Effects from medication: No      Metabolic Screening:  Lab Results   Component Value Date    LABA1C 6.1 (H) 11/25/2022     Lab Results   Component Value Date    CHOL 172 11/25/2022    CHOL 143 (L) 06/27/2021    CHOL 149 (L) 07/22/2020    CHOL 152 (L) 02/22/2020    CHOL 130 (L) 12/22/2018    CHOL 154 02/11/2014    CHOL 181 05/10/2012     Lab Results   Component Value Date    TRIG 177 (H) 11/25/2022    TRIG 166 (H) 06/27/2021    TRIG 143 07/22/2020    TRIG 130 02/22/2020    TRIG 83 12/22/2018    TRIG 113 02/11/2014    TRIG 197 (H) 05/10/2012     Lab Results   Component Value Date    HDL 59 (L) 11/25/2022    HDL 48 (L) 06/27/2021    HDL 60 (L) 07/22/2020    HDL 52 (L) 02/22/2020    HDL 52 (L) 12/22/2018    HDL 56 02/11/2014    HDL 62 05/10/2012     No components found for: LDLCAL  No results found for: LABVLDL  Body mass index is 35.6 kg/m². BP Readings from Last 2 Encounters:   11/30/22 113/66   12/28/21 134/81         Medical Bed:   Is patient in a medical bed? no   If medical bed is in use, has nursing secured room while patient is awake and out of the room? NA  Has safety checks by nursing been completed on the bed/room this shift? NA    Protective Factors:  Patient identifies protective factors with nursing staff as follows:    Identifies reasons for living: Yes   Supportive Social Network or family: Yes    Belief that suicide is immoral/high spirituality: Yes   Responsibility to family or others/living with family: Yes   Fear of death or dying due to pain and suffering: Yes   Engaged in work or school: No     If Patient is unable to identify, reason why? N/a      PSYCH:  Depression: 8   Anxiety: 7   SI denies suicidal ideation   Risk of Suicide: No Risk  HI Negative for homicidal ideation      AVH:no If Hallucinations are present, describe? GENERAL:  Appetite: good   Percent Meals:    Social: No   Speech: normal   Appearance: appropriately dressed and healthy looking    GROUP:  Group Participation: No  Participation Quality: none    Notes:  Patient was in her room this evening. She came out of her room to get her evening medications. Patient was anxious, flat facial expression, withdrawn. Patient stated that she was still worried about all the things she needs to get done in her home. Patient stated that her day was long. Continue to monitor for safety.          Electronically signed by Anahy Butt RN on 11/30/22 at 11:16 PM CST

## 2022-12-01 NOTE — PLAN OF CARE
Problem: Self Harm/Suicidality  Goal: Will have no self-injury during hospital stay  Description: INTERVENTIONS:  1. Q 30 MINUTES: Routine safety checks  2. Q SHIFT & PRN: Assess risk to determine if routine checks are adequate to maintain patient safety  Outcome: Progressing     Problem: Safety - Adult  Goal: Free from fall injury  Outcome: Progressing     Problem: Gastrointestinal - Adult  Goal: Minimal or absence of nausea and vomiting  Outcome: Progressing  Goal: Maintains or returns to baseline bowel function  Outcome: Progressing  Goal: Maintains adequate nutritional intake  Outcome: Progressing     Problem: Nutrition Deficit:  Goal: Optimize nutritional status  Outcome: Progressing  Flowsheets (Taken 12/1/2022 1444 by Monique Eli, MS, RD, LD)  Nutrient intake appropriate for improving, restoring, or maintaining nutritional needs: Assess nutritional status and recommend course of action     Problem: Coping  Goal: Pt/Family able to verbalize concerns and demonstrate effective coping strategies  Description: INTERVENTIONS:  1. Assist patient/family to identify coping skills, available support systems and cultural and spiritual values  2. Provide emotional support, including active listening and acknowledgement of concerns of patient and caregivers  3. Reduce environmental stimuli, as able  4. Instruct patient/family in relaxation techniques, as appropriate  5.  Assess for spiritual pain/suffering and initiate Spiritual Care, Psychosocial Clinical Specialist consults as needed  12/1/2022 1734 by Susan Bravo RN  Outcome: Progressing  12/1/2022 1415 by Chanelle Vallejo LPC  Outcome: Progressing  12/1/2022 1151 by Griselda Sarmiento  Outcome: Progressing  Note:                                                                     Group Therapy Note    Date: 12/1/2022  Start Time: 1000  End Time:  7841  Number of Participants: 7    Type of Group: Psychoeducation    Wellness Binder Information  Module Name: Women's Issues  Session Number:  1    Group Goal for Pt: To raise awareness of how thoughts influence feelings    Notes:  Pt demonstrated improved awareness of how thoughts influence feelings by actively participating in group discussion. Status After Intervention:  Unchanged    Participation Level:  Active Listener    Participation Quality: Appropriate and Attentive      Speech:  normal      Thought Process/Content: Linear      Affective Functioning: Congruent      Mood: anxious and depressed      Level of consciousness:  Alert and Oriented x4      Response to Learning: Able to verbalize current knowledge/experience, Able to verbalize/acknowledge new learning, and Progressing to goal      Endings: None Reported    Modes of Intervention: Education      Discipline Responsible: Psychoeducational Specialist      Signature:  Cedrick James

## 2022-12-01 NOTE — PROGRESS NOTES
[address] [place] [address]      Psychiatric Progress Note    Name:  Sapphire Melo  Date:  12/1/2022  Age:  65 y.o. Sex:  female  Ethnicity:   Primary Care Physician:  TING Harper CNP   Patient Care Team:  Patient Care Team:  TING Harper CNP as PCP - General  Pravin Mercedes MD (Cardiology)  IRAM Andre (Physician Assistant Medical)  Chief Complaint: \" I am doing better today, I think. \"        Historian:patient  Complaint Type: anxiety, depression, irritability, loss of interest in favorite activities, mood swings, and sleep disturbance  Course of Symptoms: improved  Precipitating Factors: history of mental illness       Subjective  Nursing notes reviewed and patient had no behavioral issues during the night. No as needed medications were administered during the night. Today she denies suicidal ideation, homicidal ideation and psychosis. She reports that she continues to have \"negative thoughts. \"  She reports that her negative thoughts tell her that she will never get any better however she is feeling better. She has been out of her room more today. Reports she plans on showering. Enjoys group therapy especially relaxation groups. Reports she wants to continue to try to decluttering her home when she is discharged. Patient reports sleep as \"I am sleeping a lot better now. \"  Patient has been calm and cooperative with staff and peers. Patient has been compliant with medications. Patient has been attending groups. Patient reports appetite as \"I am eating all my meals now. \"  She is future oriented and is looking forward to continuing outpatient therapy after discharge. Patient reports no side effects from medications.       Objective  Vitals:    12/01/22 0746   BP: 121/82   Pulse: 78   Resp: 20   Temp: 98.6 °F (37 °C)   SpO2: 97%       Previous Psychiatric/Substance Use History      Medical History:  Past Medical History:   Diagnosis Date    Chest pain 02/08/2012    CPAP (continuous positive airway pressure) dependence     Depression     Hypoglycemia     SANDY (obstructive sleep apnea)     Schizoaffective disorder ([facility] [address] 02/08/2012    Suicide attempt ([facility]         BRAMBILA History:   Social History     Substance and Sexual Activity   Alcohol Use No         Social History     Substance and Sexual Activity   Drug Use No        Social History     Tobacco Use   Smoking Status Never   Smokeless Tobacco Never        Family History:     History reviewed. No pertinent family history. Mental Status:  Level of consciousness:  within normal limits and awake  Appearance:  well-appearing, street clothes, in chair, good grooming, and good hygiene  Behavior/Motor:  no abnormalities noted  Attitude toward examiner:  cooperative, attentive, and good eye contact  Speech:  normal rate and normal volume  Mood:  \" I am feeling better but still having negative thoughts. \"  Affect:  mood congruent  Thought processes:  linear and goal directed  Thought content:  Homocidal ideation :denies  Suicidal Ideation:  denies suicidal ideation  Delusions:  no evidence of delusions  Perceptual Disturbance:  denies any perceptual disturbance  Cognition:  oriented to person, place, and time  Concentration : good  Memory intact for recent and remote  Fund of knowledge:  average  Abstract thinking:  adequate  Insight: improved  Judgment:  appropriate      ciprofloxacin  500 mg Oral 2 times per day    lurasidone  80 mg Oral Dinner    clomiPRAMINE  25 mg Oral Nightly    mirabegron  50 mg Oral Daily    melatonin  5 mg Oral Nightly    miconazole   Topical BID    valACYclovir  500 mg Oral Daily       Current Medications:  Current Facility-Administered Medications   Medication Dose Route Frequency Provider Last Rate Last Admin    ciprofloxacin (CIPRO) tablet 500 mg  500 mg Oral 2 times per day Malcom Jones MD   500 mg at 12/01/22 1014    lurasidone (LATUDA) tablet 80 mg  80 mg Oral Dinner TING Mcclain   80 mg at 11/30/22 1654    clomiPRAMINE (ANAFRANIL) capsule 25 mg  25 mg Oral Nightly Tammy Carey, APRN   25 mg at 11/30/22 2034    aluminum & magnesium hydroxide-simethicone (MAALOX) 200-200-20 MG/5ML suspension 30 mL  30 mL Oral Q6H PRN Jayashree Serna MD   30 mL at 11/23/22 0802    docusate sodium (COLACE) capsule 100 mg  100 mg Oral PRN Tammy Carey, APRN   100 mg at 11/30/22 5329    hydrOXYzine pamoate (VISTARIL) capsule 25 mg  25 mg Oral TID PRN Tammy Carey APRN   25 mg at 11/30/22 2041    mirabegron (MYRBETRIQ) extended release tablet 50 mg  (Patient Supplied)  50 mg Oral Daily Tammy Carey, APRN   50 mg at 12/01/22 1487    melatonin disintegrating tablet 5 mg  5 mg Oral Nightly Tammy Carey APRN   5 mg at 11/30/22 2034    miconazole (MICOTIN) 2 % powder   Topical BID Tammy Carey APRN   Given at 12/01/22 0161    acetaminophen (TYLENOL) tablet 650 mg  650 mg Oral Q4H PRN Hiram Caban MD        polyethylene glycol (GLYCOLAX) packet 17 g  17 g Oral Daily PRN Hiram Caban MD        valACYclovir (VALTREX) tablet 500 mg  500 mg Oral Daily Hiram Caban MD   500 mg at 12/01/22 4301    calcium carbonate (TUMS) chewable tablet 500 mg  500 mg Oral TID PRN Hiram Caban MD   500 mg at 11/23/22 2138       Psychotherapy:   SUPPORTIVE    DSM V Diagnoses:    Bipolar depression  Suicide attempt by ingestion of toxic substance   Borderline personality disorder  Hoarding Disorder   Obsessive Compulsive Disorder        Plan:    Encourage group therapy  15 minute safety checks  Medical monitoring by Dr. Alexandrea Richardson and associates  Continue current therapy and medications  Possible discharge tomorrow     Amount of time spent with patient:  15 minutes with greater than 50% of the time spent in counseling and collaboration of care.     URSULA BarretoP-BC, FNP-C   Clinician Signature: signed electronically

## 2022-12-02 LAB
ALBUMIN SERPL-MCNC: 3.9 G/DL (ref 3.5–5.2)
ALP BLD-CCNC: 82 U/L (ref 35–104)
ALT SERPL-CCNC: 15 U/L (ref 5–33)
ANION GAP SERPL CALCULATED.3IONS-SCNC: 8 MMOL/L (ref 7–19)
AST SERPL-CCNC: 12 U/L (ref 5–32)
BASOPHILS ABSOLUTE: 0.1 K/UL (ref 0–0.2)
BASOPHILS RELATIVE PERCENT: 0.7 % (ref 0–1)
BILIRUB SERPL-MCNC: 0.5 MG/DL (ref 0.2–1.2)
BUN BLDV-MCNC: 13 MG/DL (ref 6–20)
CALCIUM SERPL-MCNC: 9.4 MG/DL (ref 8.6–10)
CHLORIDE BLD-SCNC: 103 MMOL/L (ref 98–111)
CO2: 23 MMOL/L (ref 22–29)
CREAT SERPL-MCNC: 0.7 MG/DL (ref 0.5–0.9)
EOSINOPHILS ABSOLUTE: 0.1 K/UL (ref 0–0.6)
EOSINOPHILS RELATIVE PERCENT: 1.6 % (ref 0–5)
GFR SERPL CREATININE-BSD FRML MDRD: >60 ML/MIN/{1.73_M2}
GLUCOSE BLD-MCNC: 121 MG/DL (ref 74–109)
HCT VFR BLD CALC: 42.8 % (ref 37–47)
HEMOGLOBIN: 14.4 G/DL (ref 12–16)
IMMATURE GRANULOCYTES #: 0 K/UL
LYMPHOCYTES ABSOLUTE: 1.9 K/UL (ref 1.1–4.5)
LYMPHOCYTES RELATIVE PERCENT: 21.8 % (ref 20–40)
MCH RBC QN AUTO: 32.3 PG (ref 27–31)
MCHC RBC AUTO-ENTMCNC: 33.6 G/DL (ref 33–37)
MCV RBC AUTO: 96 FL (ref 81–99)
MONOCYTES ABSOLUTE: 0.7 K/UL (ref 0–0.9)
MONOCYTES RELATIVE PERCENT: 8.7 % (ref 0–10)
NEUTROPHILS ABSOLUTE: 5.7 K/UL (ref 1.5–7.5)
NEUTROPHILS RELATIVE PERCENT: 66.7 % (ref 50–65)
PDW BLD-RTO: 13.2 % (ref 11.5–14.5)
PLATELET # BLD: 281 K/UL (ref 130–400)
PMV BLD AUTO: 9.6 FL (ref 9.4–12.3)
POTASSIUM SERPL-SCNC: 4.2 MMOL/L (ref 3.5–5)
RBC # BLD: 4.46 M/UL (ref 4.2–5.4)
SODIUM BLD-SCNC: 134 MMOL/L (ref 136–145)
TOTAL PROTEIN: 6.8 G/DL (ref 6.6–8.7)
WBC # BLD: 8.5 K/UL (ref 4.8–10.8)

## 2022-12-02 PROCEDURE — 36415 COLL VENOUS BLD VENIPUNCTURE: CPT

## 2022-12-02 PROCEDURE — 6370000000 HC RX 637 (ALT 250 FOR IP): Performed by: NURSE PRACTITIONER

## 2022-12-02 PROCEDURE — 94660 CPAP INITIATION&MGMT: CPT

## 2022-12-02 PROCEDURE — 80053 COMPREHEN METABOLIC PANEL: CPT

## 2022-12-02 PROCEDURE — 6370000000 HC RX 637 (ALT 250 FOR IP): Performed by: FAMILY MEDICINE

## 2022-12-02 PROCEDURE — 85025 COMPLETE CBC W/AUTO DIFF WBC: CPT

## 2022-12-02 PROCEDURE — 6370000000 HC RX 637 (ALT 250 FOR IP): Performed by: PSYCHIATRY & NEUROLOGY

## 2022-12-02 PROCEDURE — 99231 SBSQ HOSP IP/OBS SF/LOW 25: CPT | Performed by: NURSE PRACTITIONER

## 2022-12-02 PROCEDURE — 1240000000 HC EMOTIONAL WELLNESS R&B

## 2022-12-02 RX ORDER — CLOMIPRAMINE HYDROCHLORIDE 25 MG/1
50 CAPSULE ORAL NIGHTLY
Status: DISCONTINUED | OUTPATIENT
Start: 2022-12-02 | End: 2022-12-05 | Stop reason: HOSPADM

## 2022-12-02 RX ADMIN — CLOMIPRAMINE HYDROCHLORIDE 50 MG: 25 CAPSULE ORAL at 21:10

## 2022-12-02 RX ADMIN — DOCUSATE SODIUM 100 MG: 100 CAPSULE, LIQUID FILLED ORAL at 09:56

## 2022-12-02 RX ADMIN — VALACYCLOVIR HYDROCHLORIDE 500 MG: 500 TABLET, FILM COATED ORAL at 08:38

## 2022-12-02 RX ADMIN — HYDROXYZINE PAMOATE 25 MG: 25 CAPSULE ORAL at 21:10

## 2022-12-02 RX ADMIN — Medication 5 MG: at 21:10

## 2022-12-02 RX ADMIN — CIPROFLOXACIN HYDROCHLORIDE 500 MG: 500 TABLET, FILM COATED ORAL at 08:37

## 2022-12-02 RX ADMIN — CIPROFLOXACIN HYDROCHLORIDE 500 MG: 500 TABLET, FILM COATED ORAL at 21:10

## 2022-12-02 RX ADMIN — LURASIDONE HYDROCHLORIDE 80 MG: 40 TABLET, FILM COATED ORAL at 17:00

## 2022-12-02 RX ADMIN — MICONAZOLE NITRATE: 2 POWDER TOPICAL at 09:18

## 2022-12-02 NOTE — PROGRESS NOTES
Treatment Team Note:     Target Symptoms/Reason for admission: Per nursing admission assessment - Reason for Admission: Nataliya Fuentes is a  65 yoa female who Drank spic and span  as suicide attempt. Med change recently and has felt suicidal and decreased functioning x 1 week. Diagnoses per psych provider: Borderline personality disorder ([facility] [address] [F60.3]  Suicide attempt ([facility] [address] Marvelisaac Malik  B[facility] depression [facility] [F31.9]  Bipolar 1 disorder, depressed ([facility] [F31.9]     Therapist met with treatment team to discuss patients treatment and discharge plans. Patient's aftercare plan is: [address]     Aftercare appointments made: Yes     Pt lives with: patient lives alone     Collateral obtained from: Mom  Collateral obtained on:11/28/22     Attending groups: Yes     Behavior: Pt is in day area, walking back and forth from room to day area, pt is oriented x4, with affect flat, pt stated that depression and anxiety are high this evening. Pt stated Elsajere Rendonr they going to send me home with me feeling depressed? \" Pt stated that she remains depressed and anxious. Pt is attending group, performing ADL's minimal today, appetite is good, pt is medication compliant, and patient questions her judment, staff reassuring pt for daily functioning. Pt encouraged to participate in activities, to encourage coping skills to improve her anxiety and depression. Pt responded positively and did participate in coloring this shift. Pt stated that she is increasing her ADL's. Pt denies SI,HI and AVH. Will continue to monitor for safety and comfort.       Has patient been completing ADL's:  Yes     SI:  patient denies SI  Plan: no   If yes describe: N/A - patient denies plan  HI:  patient denies HI  If present describe: N/A  Delusions: patient denies delusions  If present describe: N/A  Hallucinations: patient denies hallucinations  If present describe: N/A     Patient rates their -- Depression: 1-10:  8/9  Anxiety:1-10:  8/9 Sleeping:Yes     Taking medication: Yes     Misc: Possible discharge today.

## 2022-12-02 NOTE — PROGRESS NOTES
Group Note    Date: 12/01/22  Start Time: 7:30 PM   End Time:8:00 PM     Number of Participants: 7    Type of Group: Wrap-Up     Patient's Goal:      Notes:      Status After Intervention:  Unchanged    Participation Level: Minimal    Participation Quality: Appropriate    Speech:  normal    Thought Process/Content: Logical    Mood: anxious and depressed    Level of consciousness:  Alert and Oriented x4    Response to Learning: Able to verbalize current knowledge/experience    Modes of Intervention: Education and Support    Discipline Responsible: Registered Nurse     Signature:  Nona Leos

## 2022-12-02 NOTE — PROGRESS NOTES
[facility] Adult Unit Daily Assessment  Nursing Progress Note    Room: [facility]   Name: Quita Chadwick   Age: 65 y.o. Gender: female   Dx: Bipolar depression ([address]  Precautions: suicide risk, 1:1 due to cpap at night  Inpatient Status: voluntary       SLEEP:  Sleep Quality Good  Sleep Medications: Yes   PRN Sleep Meds: No       MEDICAL:  Other PRN Meds: Yes and vistaril   Med Compliant: Yes  Accu-Chek: No  Oxygen/CPAP/BiPAP: Yes cpap at night 1:1  CIWA/CINA: No   PAIN Assessment: none  Side Effects from medication: No      Metabolic Screening:  Lab Results   Component Value Date    LABA1C 6.1 (H) 11/25/2022     Lab Results   Component Value Date    CHOL 172 11/25/2022    CHOL 143 (L) 06/27/2021    CHOL 149 (L) 07/22/2020    CHOL 152 (L) 02/22/2020    CHOL 130 (L) 12/22/2018    CHOL 154 02/11/2014    CHOL 181 05/10/2012     Lab Results   Component Value Date    TRIG 177 (H) 11/25/2022    TRIG 166 (H) 06/27/2021    TRIG 143 07/22/2020    TRIG 130 02/22/2020    TRIG 83 12/22/2018    TRIG 113 02/11/2014    TRIG 197 (H) 05/10/2012     Lab Results   Component Value Date    HDL 59 (L) 11/25/2022    HDL 48 (L) 06/27/2021    HDL 60 (L) 07/22/2020    HDL 52 (L) 02/22/2020    HDL 52 (L) 12/22/2018    HDL 56 02/11/2014    HDL 62 05/10/2012     No components found for: LDLCAL  No results found for: LABVLDL  Body mass index is 35.6 kg/m². BP Readings from Last 2 Encounters:   12/01/22 106/66   12/28/21 134/81         Medical Bed:   Is patient in a medical bed? no   If medical bed is in use, has nursing secured room while patient is awake and out of the room? NA  Has safety checks by nursing been completed on the bed/room this shift? NA    Protective Factors:  Patient identifies protective factors with nursing staff as follows:    Identifies reasons for living: Yes   Supportive Social Network or family: Yes    Belief that suicide is immoral/high spirituality: Yes   Responsibility to family or others/living with family: Yes   Fear of death or dying due to pain and suffering: Yes   Engaged in work or school: No     If Patient is unable to identify, reason why? na      PSYCH:  Depression: 8/9   Anxiety: 8/9   SI denies suicidal ideation   Risk of Suicide: No Risk  HI Negative for homicidal ideation      AVH:no If Hallucinations are present, describe? na        GENERAL:  Appetite: good   Percent Meals: no meals this shift, pt is eating and snacking and drinking this shift   Social: No   Speech: normal and hesitant   Appearance: appropriately dressed    GROUP:  Group Participation: Yes  Participation Quality: Minimal    Notes:   Pt is in day area, walking back and forth from room to day area, pt is oriented x4, with affect flat, pt stated that depression and anxiety are high this evening. Pt stated Jamas Lennox they going to send me home with me feeling depressed? \" Pt stated that she remains depressed and anxious. Pt is attending group, performing ADL's minimal today, appetite is good, pt is medication compliant, and patient questions her judment, staff reassuring pt for daily functioning. Pt encouraged to participate in activities, to encourage coping skills to improve her anxiety and depression. Pt responded positively and did participate in coloring this shift. Pt stated that she is increasing her ADL's. Pt denies SI,HI and AVH. Will continue to monitor for safety and comfort.        Electronically signed by Gil Santos RN on 12/1/22 at 9:38 PM CST

## 2022-12-02 NOTE — PLAN OF CARE
Problem: Self Harm/Suicidality  Goal: Will have no self-injury during hospital stay  Description: INTERVENTIONS:  1. Q 30 MINUTES: Routine safety checks  2. Q SHIFT & PRN: Assess risk to determine if routine checks are adequate to maintain patient safety  Outcome: Progressing     Problem: Safety - Adult  Goal: Free from fall injury  12/2/2022 1545 by Lashawn Wong RN  Outcome: Progressing  12/2/2022 1058 by Chanelle Vallejo LPC  Outcome: Progressing     Problem: Gastrointestinal - Adult  Goal: Minimal or absence of nausea and vomiting  Outcome: Progressing  Goal: Maintains or returns to baseline bowel function  Outcome: Progressing  Goal: Maintains adequate nutritional intake  Outcome: Progressing     Problem: Nutrition Deficit:  Goal: Optimize nutritional status  Outcome: Progressing     Problem: Coping  Goal: Pt/Family able to verbalize concerns and demonstrate effective coping strategies  Description: INTERVENTIONS:  1. Assist patient/family to identify coping skills, available support systems and cultural and spiritual values  2. Provide emotional support, including active listening and acknowledgement of concerns of patient and caregivers  3. Reduce environmental stimuli, as able  4. Instruct patient/family in relaxation techniques, as appropriate  5.  Assess for spiritual pain/suffering and initiate Spiritual Care, Psychosocial Clinical Specialist consults as needed  12/2/2022 1545 by Lashawn Wong RN  Outcome: Progressing  12/2/2022 1137 by Krysten Odom  Outcome: Progressing

## 2022-12-02 NOTE — PROGRESS NOTES
[address] [place] [address]      Psychiatric Progress Note    Name:  Quita Chadwick  Date:  12/2/2022  Age:  65 y.o. Sex:  female  Ethnicity:   Primary Care Physician:  Starlin Lanes, APRN - CNP   Patient Care Team:  Patient Care Team:  Starlin Lanes, APRN - CNP as PCP - General  Teja Barney MD (Cardiology)  IRAM Soriano (Physician Assistant Medical)  Chief Complaint: \" I still feel depressed. \"        Historian:patient  Complaint Type: anxiety, depression, loss of interest in favorite activities, and sleep disturbance  Course of Symptoms: improved  Precipitating Factors: history of mental illness         Subjective  Nursing notes were reviewed and patient had no behavioral issues during the night. As needed medications administered during the night include hydroxyzine. Today she endorses passive suicidal ideation. She denies homicidal ideation and psychosis. She states, \"I can't go home like this, I have negative thoughts. \" I feel more depressed today. \" Reports that she thought she was feeling better the 2 days prior however now feels worse today. She was educated on the importance of not isolating herself as well as getting ready for the day. She reports that she planned on taking a shower today. Is worried and anxious about returning to her home that is full of clutter and items she has been hoarding the past 30 years. Patient reports sleep as \"I am still sleeping really good. \"  Patient has been calm and cooperative with staff and peers. Patient has been compliant with medications. Patient has been attending groups. Patient reports appetite as \"I am eating all of my meals. \" Patient reports no side effects from medications.       Objective  Vitals:    12/02/22 0810   BP: 116/88   Pulse:    Resp:    Temp:    SpO2:        Previous Psychiatric/Substance Use History      Medical History:  Past Medical History:   Diagnosis Date    Chest pain 02/08/2012    CPAP (continuous positive airway pressure) dependence     Depression     Hypoglycemia     SANDY (obstructive sleep apnea)     Schizoaffective disorder ([facility] [address] 02/08/2012    Suicide attempt ([facility]         BRAMBILA History:   Social History     Substance and Sexual Activity   Alcohol Use No         Social History     Substance and Sexual Activity   Drug Use No        Social History     Tobacco Use   Smoking Status Never   Smokeless Tobacco Never        Family History:     History reviewed. No pertinent family history. Mental Status:  Level of consciousness:  within normal limits and awake  Appearance:  well-appearing, street clothes, in chair, good grooming, and good hygiene  Behavior/Motor:  no abnormalities noted  Attitude toward examiner:  cooperative, attentive, and good eye contact  Speech:  normal rate and normal volume  Mood:  \" I just don't think I can go home feeling this way. \"  Affect:  mood congruent  Thought processes:  slow  Thought content:  Homocidal ideation : denies  Suicidal Ideation:  passive  Delusions:  no evidence of delusions  Perceptual Disturbance:  denies any perceptual disturbance  Cognition:  oriented to person, place, and time  Concentration : good  Memory intact for recent and remote  Fund of knowledge:  average  Abstract thinking:  adequate  Insight: improved  Judgment:  appropriate      clomiPRAMINE  50 mg Oral Nightly    ciprofloxacin  500 mg Oral 2 times per day    lurasidone  80 mg Oral Dinner    mirabegron  50 mg Oral Daily    melatonin  5 mg Oral Nightly    miconazole   Topical BID    valACYclovir  500 mg Oral Daily       Current Medications:  Current Facility-Administered Medications   Medication Dose Route Frequency Provider Last Rate Last Admin    clomiPRAMINE (ANAFRANIL) capsule 50 mg  50 mg Oral Nightly TING Craft        ciprofloxacin (CIPRO) tablet 500 mg  500 mg Oral 2 times per day Camilla Monteiro MD   500 mg at 12/02/22 0837    lurasidone (LATUDA) tablet 80 mg  80 mg Oral Dinner Jamie Meade Sanchez, APRN   80 mg at 12/01/22 1646    aluminum & magnesium hydroxide-simethicone (MAALOX) 200-200-20 MG/5ML suspension 30 mL  30 mL Oral Q6H PRN Jason Christianson MD   30 mL at 11/23/22 0802    docusate sodium (COLACE) capsule 100 mg  100 mg Oral PRN Suella Pong, APRN   100 mg at 12/02/22 6520    hydrOXYzine pamoate (VISTARIL) capsule 25 mg  25 mg Oral TID PRN Suella Cliffordg, APRN   25 mg at 12/01/22 2024    mirabegron (MYRBETRIQ) extended release tablet 50 mg  (Patient Supplied)  50 mg Oral Daily Suella Pong, APRN   50 mg at 12/02/22 7752    melatonin disintegrating tablet 5 mg  5 mg Oral Nightly Татьянаella Cliffordg, APRN   5 mg at 12/01/22 2024    miconazole (MICOTIN) 2 % powder   Topical BID Татьянаella Rafael APRN   Given at 12/02/22 9781    acetaminophen (TYLENOL) tablet 650 mg  650 mg Oral Q4H PRN Alma Casas MD        polyethylene glycol (GLYCOLAX) packet 17 g  17 g Oral Daily PRN Alma Casas MD        valACYclovir (VALTREX) tablet 500 mg  500 mg Oral Daily Alma Casas MD   500 mg at 12/02/22 2046    calcium carbonate (TUMS) chewable tablet 500 mg  500 mg Oral TID PRN Alma Casas MD   500 mg at 11/23/22 2138       Psychotherapy:   SUPPORTIVE    DSM V Diagnoses:    Bipolar depression  Suicide attempt by ingestion of toxic substance   Borderline personality disorder  Hoarding Disorder   Obsessive Compulsive Disorder              Plan:    Encourage group therapy  15 minute safety checks  Medical monitoring by Dr. Mary Carmen Stokes and associates  Continue current therapy and medications  Possible discharge Monday   Will increase Clomipramine to 50 mg po daily for OCD    Amount of time spent with patient:  15 minutes with greater than 50% of the time spent in counseling and collaboration of care.     URSULA MartinP-BC, FNP-C   Clinician Signature: signed electronically

## 2022-12-02 NOTE — PLAN OF CARE
Problem: Coping  Goal: Pt/Family able to verbalize concerns and demonstrate effective coping strategies  Outcome: Progressing       Group Therapy Note     Date: 12/2/2022  Start Time: 1100  End Time:  2775  Number of Participants: 7     Type of Group: Psychoeducation     Wellness Binder Information  Module Name:  emotional wellness  Session Number:  1     Patient's Goal:  validation of feelings     Notes:  pt acknowledged to have feelings validated it may be necessary to share feelings with others.      Status After Intervention:  Improved     Participation Level: Interactive     Participation Quality: Appropriate, Attentive, and Sharing        Speech:  normal        Thought Process/Content: Logical        Affective Functioning: Congruent        Mood: congruent        Level of consciousness:  Alert, Oriented x4, and Attentive        Response to Learning: Able to verbalize current knowledge/experience        Endings: None Reported     Modes of Intervention: Education        Discipline Responsible: Psychoeducational Specialist        Signature:  Dillon Tellez

## 2022-12-02 NOTE — PROGRESS NOTES
Progress Note  Ailyn Ruiz  12/2/2022 12:16 AM  Subjective:   Admit Date:   11/23/2022      CC/ADMIT DX:       Interval History:   Reviewed overnight events and nursing notes. She has no new physical issues. No AMS, no fever, no new urinary issues. No N/V. I have reviewed all labs/diagnostics from the last 24hrs. ROS:   I have done a 10 point ROS and all are negative, except what is mentioned in the HPI. ADULT DIET; Regular; 4 carb choices (60 gm/meal)    Medications:      ciprofloxacin  500 mg Oral 2 times per day    lurasidone  80 mg Oral Dinner    clomiPRAMINE  25 mg Oral Nightly    mirabegron  50 mg Oral Daily    melatonin  5 mg Oral Nightly    miconazole   Topical BID    valACYclovir  500 mg Oral Daily           Objective:   Vitals: /66   Pulse 76   Temp 98.1 °F (36.7 °C) (Temporal)   Resp 16   Ht 5' 4.02\" (1.626 m)   Wt 207 lb 8 oz (94.1 kg)   SpO2 94%   BMI 35.60 kg/m²  No intake or output data in the 24 hours ending 12/02/22 0016    General appearance: alert and cooperative with exam  Extremities: extremities normal, atraumatic, no cyanosis or edema  Neurologic:  No obvious focal neurologic deficits. Skin: no rashes    Assessment and Plan:   Principal Problem:    Bipolar depression ([address]  Active Problems:    Bipolar 1 disorder, depressed ([address]    Mixed obsessional thoughts and acts    Borderline personality disorder ([address]    Obstructive sleep apnea    CPAP (continuous positive airway pressure) dependence    Ingestion of toxic substance  Resolved Problems:    * No resolved hospital problems. *    Pre DM    Pyuria    Plan:   Continue present medication(s)    Follow with Psych   She is medically  stable. I will follow for any concerns. Discharge planning:   her home     Reviewed treatment plans with the patient and/or family.              Electronically signed by Pau Grier MD on 12/2/2022 at 12:16 AM

## 2022-12-02 NOTE — PLAN OF CARE
Problem: Safety - Adult  Goal: Free from fall injury  [facility]2022 [facility]  Outcome: Progressing     Group Therapy Note     Date: 12/2/2022  Start Time: 1000  End Time:  2197  Number of Participants: 6     Type of Group: Psychotherapy     Patient's Goal: Patient will process emotions and actions and how to relate to other or their with others. Patient discussed open communication and feelings and emotions. Notes:  Patient attended process group as scheduled, patient identified a issue to work on today regarding how they will interact and relate to others. Status After Intervention:  Improved     Participation Level:  Active Listener     Participation Quality: Appropriate        Speech:  normal        Thought Process/Content: Logical        Affective Functioning: Congruent        Mood: euthymic        Level of consciousness:  Alert        Response to Learning: Able to verbalize current knowledge/experience        Endings: None Reported     Modes of Intervention: Education, Support, and Socialization        Discipline Responsible: /Counselor        Signature:  Nancy Jolly [facility]

## 2022-12-02 NOTE — PROGRESS NOTES
[facility] Adult Unit Daily Assessment  Nursing Progress Note    Room: [facility]609-01   Name: Johnathan Doshi   Age: 65 y.o. Gender: female   Dx: Bipolar depression ([address]  Precautions: suicide risk  Inpatient Status: voluntary       SLEEP:  Sleep Quality Very good  Sleep Medications: No   PRN Sleep Meds: No       MEDICAL:  Other PRN Meds: No   Med Compliant: Yes  Accu-Chek: No  Oxygen/CPAP/BiPAP: No  CIWA/CINA: No   PAIN Assessment: none  Side Effects from medication: No      Metabolic Screening:  Lab Results   Component Value Date    LABA1C 6.1 (H) 11/25/2022     Lab Results   Component Value Date    CHOL 172 11/25/2022    CHOL 143 (L) 06/27/2021    CHOL 149 (L) 07/22/2020    CHOL 152 (L) 02/22/2020    CHOL 130 (L) 12/22/2018    CHOL 154 02/11/2014    CHOL 181 05/10/2012     Lab Results   Component Value Date    TRIG 177 (H) 11/25/2022    TRIG 166 (H) 06/27/2021    TRIG 143 07/22/2020    TRIG 130 02/22/2020    TRIG 83 12/22/2018    TRIG 113 02/11/2014    TRIG 197 (H) 05/10/2012     Lab Results   Component Value Date    HDL 59 (L) 11/25/2022    HDL 48 (L) 06/27/2021    HDL 60 (L) 07/22/2020    HDL 52 (L) 02/22/2020    HDL 52 (L) 12/22/2018    HDL 56 02/11/2014    HDL 62 05/10/2012     No components found for: LDLCAL  No results found for: LABVLDL  Body mass index is 35.6 kg/m². BP Readings from Last 2 Encounters:   12/02/22 116/88   12/28/21 134/81         Medical Bed:   Is patient in a medical bed? no   If medical bed is in use, has nursing secured room while patient is awake and out of the room? NA  Has safety checks by nursing been completed on the bed/room this shift? NA    Protective Factors:  Patient identifies protective factors with nursing staff as follows:    Identifies reasons for living: Yes   Supportive Social Network or family: Yes    Belief that suicide is immoral/high spirituality: Yes   Responsibility to family or others/living with family: Yes   Fear of death or dying due to pain and suffering: Yes   Engaged in work or school: Yes     If Patient is unable to identify, reason why? PSYCH:  Depression: 6   Anxiety: 7   SI denies suicidal ideation   Risk of Suicide: No Risk  HI Negative for homicidal ideation      AVH:no If Hallucinations are present, describe? GENERAL:  Appetite: good   Percent Meals: 100%   Social: Yes and No   Speech: hesitant   Appearance: appropriately dressed, appropriately groomed, good hygiene, looks younger, and healthy looking    GROUP:  Group Participation: Yes  Participation Quality: Active Listener    Notes: Patient sitting in the day area coloring pictures at a distance of other the table and chairs. Patient very worried about being discharged at the start of the shift but says she feels better knowing she will be here throughout the weekend. Patient rated her depression as a 7 and her anxiety as a 6 and patient denied SI, HI and AVH. Patient said she had \"OK\" sleep and said she got about 12 hours. Will continue to monitor for safety.          Electronically signed by Wiley Hurtado RN on 12/2/22 at 3:33 PM CST

## 2022-12-02 NOTE — PLAN OF CARE
Problem: Self Harm/Suicidality  Goal: Will have no self-injury during hospital stay  Description: INTERVENTIONS:  1. Q 30 MINUTES: Routine safety checks  2. Q SHIFT & PRN: Assess risk to determine if routine checks are adequate to maintain patient safety  12/1/2022 2130 by Anne Onofre RN  Outcome: Adequate for Discharge  12/1/2022 1734 by Stephane Lisa RN  Outcome: Progressing     Problem: Safety - Adult  Goal: Free from fall injury  12/1/2022 2130 by Anne Onofre RN  Outcome: Adequate for Discharge  12/1/2022 1734 by Stephane Lisa RN  Outcome: Progressing     Problem: Gastrointestinal - Adult  Goal: Minimal or absence of nausea and vomiting  12/1/2022 2130 by Anne Onofre RN  Outcome: Progressing  12/1/2022 1734 by Stephane Lisa RN  Outcome: Progressing  Goal: Maintains or returns to baseline bowel function  12/1/2022 2130 by Anne Onofre RN  Outcome: Progressing  12/1/2022 1734 by Stephane Lisa RN  Outcome: Progressing  Goal: Maintains adequate nutritional intake  12/1/2022 2130 by Anne Onofre RN  Outcome: Progressing  12/1/2022 1734 by Stephane Lisa RN  Outcome: Progressing     Problem: Nutrition Deficit:  Goal: Optimize nutritional status  12/1/2022 2130 by Anne Onofre RN  Outcome: Progressing  12/1/2022 1734 by Stephane Lisa RN  Outcome: Progressing  Flowsheets (Taken 12/1/2022 1444 by Tian Randolph, MS, RD, LD)  Nutrient intake appropriate for improving, restoring, or maintaining nutritional needs: Assess nutritional status and recommend course of action     Problem: Coping  Goal: Pt/Family able to verbalize concerns and demonstrate effective coping strategies  Description: INTERVENTIONS:  1. Assist patient/family to identify coping skills, available support systems and cultural and spiritual values  2. Provide emotional support, including active listening and acknowledgement of concerns of patient and caregivers  3. Reduce environmental stimuli, as able  4. Instruct patient/family in relaxation techniques, as appropriate  5. Assess for spiritual pain/suffering and initiate Spiritual Care, Psychosocial Clinical Specialist consults as needed  12/1/2022 2130 by Mac Canseco RN  Outcome: Progressing  12/1/2022 1734 by Maryruth Cowden, RN  Outcome: Progressing  12/1/2022 1415 by Noy Enriquez LPC  Outcome: Progressing  12/1/2022 1151 by Sharon Tristan  Outcome: Progressing  Note:                                                                     Group Therapy Note    Date: 12/1/2022  Start Time: 1000  End Time:  5707  Number of Participants: 7    Type of Group: Psychoeducation    Wellness Binder Information  Module Name:  Women's Issues  Session Number:  1    Group Goal for Pt: To raise awareness of how thoughts influence feelings    Notes:  Pt demonstrated improved awareness of how thoughts influence feelings by actively participating in group discussion. Status After Intervention:  Unchanged    Participation Level:  Active Listener    Participation Quality: Appropriate and Attentive      Speech:  normal      Thought Process/Content: Linear      Affective Functioning: Congruent      Mood: anxious and depressed      Level of consciousness:  Alert and Oriented x4      Response to Learning: Able to verbalize current knowledge/experience, Able to verbalize/acknowledge new learning, and Progressing to goal      Endings: None Reported    Modes of Intervention: Education      Discipline Responsible: Psychoeducational Specialist      Signature:  Sharon Tristan

## 2022-12-03 PROCEDURE — 6370000000 HC RX 637 (ALT 250 FOR IP): Performed by: NURSE PRACTITIONER

## 2022-12-03 PROCEDURE — 6370000000 HC RX 637 (ALT 250 FOR IP): Performed by: FAMILY MEDICINE

## 2022-12-03 PROCEDURE — 94660 CPAP INITIATION&MGMT: CPT

## 2022-12-03 PROCEDURE — 1240000000 HC EMOTIONAL WELLNESS R&B

## 2022-12-03 PROCEDURE — 6370000000 HC RX 637 (ALT 250 FOR IP): Performed by: PSYCHIATRY & NEUROLOGY

## 2022-12-03 RX ADMIN — HYDROXYZINE PAMOATE 25 MG: 25 CAPSULE ORAL at 20:05

## 2022-12-03 RX ADMIN — LURASIDONE HYDROCHLORIDE 80 MG: 40 TABLET, FILM COATED ORAL at 16:42

## 2022-12-03 RX ADMIN — VALACYCLOVIR HYDROCHLORIDE 500 MG: 500 TABLET, FILM COATED ORAL at 08:24

## 2022-12-03 RX ADMIN — CLOMIPRAMINE HYDROCHLORIDE 50 MG: 25 CAPSULE ORAL at 20:02

## 2022-12-03 RX ADMIN — MICONAZOLE NITRATE: 2 POWDER TOPICAL at 08:26

## 2022-12-03 RX ADMIN — CIPROFLOXACIN HYDROCHLORIDE 500 MG: 500 TABLET, FILM COATED ORAL at 08:25

## 2022-12-03 RX ADMIN — CIPROFLOXACIN HYDROCHLORIDE 500 MG: 500 TABLET, FILM COATED ORAL at 20:02

## 2022-12-03 RX ADMIN — Medication 5 MG: at 20:02

## 2022-12-03 NOTE — PROGRESS NOTES
Group Note    Date: 12/03/22  Start Time: 9:30 AM   End Time:10:00 AM     Number of Participants: 6    Type of Group: Community/Goal     Patient's Goal:  staying out of bed    Notes:      Status After Intervention:      Participation Level:  Active Listener    Participation Quality: Appropriate    Speech:  normal    Thought Process/Content: Logical    Mood:  calm    Level of consciousness:  Alert    Response to Learning: Able to verbalize current knowledge/experience    Modes of Intervention: Education and Support    Discipline Responsible: Behavioral Health Technician     Signature:  Bossman Edmondson

## 2022-12-03 NOTE — GROUP NOTE
Group Therapy Note    Date: 12/3/2022    Group Start Time: 1530  Group End Time: 1600  Group Topic: Healthy Living/Wellness    [facility]    Stephane Gregorio; Hugo Perez RN    Group Therapy Note                                                                    Group Note    Date: 12/03/22  Start Time: 3:30 PM   End Time:4:00 PM     Number of Participants: 6    Type of Group: Health Living/Wellness     Patient's Goal:  Identify leisure activities for wellness    Notes:      Status After Intervention:  Improved    Participation Level:  Active Listener    Participation Quality: Appropriate    Speech:  normal    Thought Process/Content: Linear    Mood:  calm and cooperative    Level of consciousness:  Alert    Response to Learning: Able to verbalize/acknowledge new learning    Modes of Intervention: Education and Support    Discipline Responsible: Behavioral Health Technician     Signature:  Hugo Perez RN   Electronically signed by Hugo Preez RN on 12/3/2022 at 4:30 PM

## 2022-12-03 NOTE — PLAN OF CARE
Problem: Self Harm/Suicidality  Goal: Will have no self-injury during hospital stay  Outcome: Progressing     Problem: Safety - Adult  Goal: Free from fall injury  Outcome: Progressing     Problem: Gastrointestinal - Adult  Goal: Minimal or absence of nausea and vomiting  Outcome: Progressing  Goal: Maintains or returns to baseline bowel function  Outcome: Progressing  Goal: Maintains adequate nutritional intake  Outcome: Progressing     Problem: Nutrition Deficit:  Goal: Optimize nutritional status  Outcome: Progressing     Problem: Coping  Goal: Pt/Family able to verbalize concerns and demonstrate effective coping strategies  Outcome: Progressing

## 2022-12-03 NOTE — PROGRESS NOTES
Progress Note  Roseline Robison  12/2/2022 7:56 PM  Subjective:   Admit Date:   11/23/2022      CC/ADMIT DX:       Interval History:   Reviewed overnight events and nursing notes. She has no physical complaints. I have reviewed all labs/diagnostics from the last 24hrs. ROS:   I have done a 10 point ROS and all are negative, except what is mentioned in the HPI. ADULT DIET; Regular; 4 carb choices (60 gm/meal)    Medications:      clomiPRAMINE  50 mg Oral Nightly    ciprofloxacin  500 mg Oral 2 times per day    lurasidone  80 mg Oral Dinner    mirabegron  50 mg Oral Daily    melatonin  5 mg Oral Nightly    miconazole   Topical BID    valACYclovir  500 mg Oral Daily           Objective:   Vitals: /74   Pulse 76   Temp 97 °F (36.1 °C) (Oral)   Resp 20   Ht 5' 4.02\" (1.626 m)   Wt 207 lb 8 oz (94.1 kg)   SpO2 95%   BMI 35.60 kg/m²  No intake or output data in the 24 hours ending 12/02/22 1956    General appearance: alert and cooperative with exam  Extremities: extremities normal, atraumatic, no cyanosis or edema  Neurologic:  No obvious focal neurologic deficits. Skin: no rashes    Assessment and Plan:   Principal Problem:    Bipolar depression ([address]  Active Problems:    Bipolar 1 disorder, depressed ([address]    Mixed obsessional thoughts and acts    Borderline personality disorder ([address]    Obstructive sleep apnea    CPAP (continuous positive airway pressure) dependence    Ingestion of toxic substance  Resolved Problems:    * No resolved hospital problems. *    Pre DM    Pyuria    Plan:   Continue present medication(s)    Follow with Psych   She remains medically  stable. I will follow for any concerns. Discharge planning:   her home     Reviewed treatment plans with the patient and/or family.              Electronically signed by Antwan Smith MD on 12/2/2022 at 7:56 PM

## 2022-12-03 NOTE — PROGRESS NOTES
[facility] Adult Unit Daily Assessment  Nursing Progress Note    Room: [facility]/609-01   Name: Rusty Her   Age: 65 y.o. Gender: female   Dx: Bipolar depression ([address]  Precautions: close watch, suicide risk, and fall risk  Inpatient Status: voluntary       SLEEP:  Sleep Quality Good  Sleep Medications: Yes melatonin   PRN Sleep Meds: No       MEDICAL:  Other PRN Meds: Yes   Med Compliant: Yes  Accu-Chek: No  Oxygen/CPAP/BiPAP: Yes  CIWA/CINA: No   PAIN Assessment: none  Side Effects from medication: No      Metabolic Screening:  Lab Results   Component Value Date    LABA1C 6.1 (H) 11/25/2022     Lab Results   Component Value Date    CHOL 172 11/25/2022    CHOL 143 (L) 06/27/2021    CHOL 149 (L) 07/22/2020    CHOL 152 (L) 02/22/2020    CHOL 130 (L) 12/22/2018    CHOL 154 02/11/2014    CHOL 181 05/10/2012     Lab Results   Component Value Date    TRIG 177 (H) 11/25/2022    TRIG 166 (H) 06/27/2021    TRIG 143 07/22/2020    TRIG 130 02/22/2020    TRIG 83 12/22/2018    TRIG 113 02/11/2014    TRIG 197 (H) 05/10/2012     Lab Results   Component Value Date    HDL 59 (L) 11/25/2022    HDL 48 (L) 06/27/2021    HDL 60 (L) 07/22/2020    HDL 52 (L) 02/22/2020    HDL 52 (L) 12/22/2018    HDL 56 02/11/2014    HDL 62 05/10/2012     No components found for: LDLCAL  No results found for: LABVLDL  Body mass index is 35.6 kg/m². BP Readings from Last 2 Encounters:   12/02/22 119/74   12/28/21 134/81         Medical Bed:   Is patient in a medical bed? no   If medical bed is in use, has nursing secured room while patient is awake and out of the room? NA  Has safety checks by nursing been completed on the bed/room this shift? NA    Protective Factors:  Patient identifies protective factors with nursing staff as follows:    Identifies reasons for living: Yes   Supportive Social Network or family: Yes    Belief that suicide is immoral/high spirituality: Yes   Responsibility to family or others/living with family: Yes   Fear of death or dying due to pain and suffering: Yes   Engaged in work or school: No     If Patient is unable to identify, reason why? N/a      PSYCH:  Depression: 7   Anxiety: 6   SI denies suicidal ideation   Risk of Suicide: No Risk  HI Negative for homicidal ideation      AVH:no If Hallucinations are present, describe? N/a        GENERAL:  Appetite: good   Percent Meals: n/a   Social: Yes   Speech: normal   Appearance: appropriately dressed, good hygiene, and healthy looking    GROUP:  Group Participation: Yes  Participation Quality: Active Listener    Notes:     PT observed at the team station, in dinning area and room. PT has good eye contact and social with staff. PT's facial expression appears worried or as if she has a question (blank stare). PT states she was unaware of the change made to medication Clomipramine. PT was educated at this time and understood.      Electronically signed by Lyssa Stroud RN on 12/3/22 at 4:39 AM CST

## 2022-12-03 NOTE — GROUP NOTE
Group Therapy Note    Date: 12/3/2022    Group Start Time: 1430  Group End Time: 1500  Group Topic: Nursing    [facility]    Aj Burdick RN    Group Therapy Note                                                                    Group Note    Date: 12/03/22  Start Time: 2:30 PM   End Time:3:00 PM     Number of Participants: 6    Type of Group: Nursing Education     Patient's Goal:  Identify a coping mechanism for depression    Notes: exercise, calling somebody, therapy      Status After Intervention:  Improved    Participation Level:  Active Listener    Participation Quality: Appropriate    Speech:  normal    Thought Process/Content: Linear    Mood:  calm and cooperative    Level of consciousness:  Alert    Response to Learning: Able to verbalize/acknowledge new learning    Modes of Intervention: Education and Support    Discipline Responsible: Registered Nurse     Signature:  Aj Burdick RN   Electronically signed by Aj Burdick RN on 12/3/2022 at 4:02 PM

## 2022-12-04 PROCEDURE — 6370000000 HC RX 637 (ALT 250 FOR IP): Performed by: PSYCHIATRY & NEUROLOGY

## 2022-12-04 PROCEDURE — 6370000000 HC RX 637 (ALT 250 FOR IP): Performed by: NURSE PRACTITIONER

## 2022-12-04 PROCEDURE — 94660 CPAP INITIATION&MGMT: CPT

## 2022-12-04 PROCEDURE — 1240000000 HC EMOTIONAL WELLNESS R&B

## 2022-12-04 RX ADMIN — VALACYCLOVIR HYDROCHLORIDE 500 MG: 500 TABLET, FILM COATED ORAL at 07:53

## 2022-12-04 RX ADMIN — DOCUSATE SODIUM 100 MG: 100 CAPSULE, LIQUID FILLED ORAL at 09:48

## 2022-12-04 RX ADMIN — CLOMIPRAMINE HYDROCHLORIDE 50 MG: 25 CAPSULE ORAL at 20:59

## 2022-12-04 RX ADMIN — LURASIDONE HYDROCHLORIDE 80 MG: 40 TABLET, FILM COATED ORAL at 16:52

## 2022-12-04 RX ADMIN — Medication 5 MG: at 20:59

## 2022-12-04 RX ADMIN — MICONAZOLE NITRATE: 2 POWDER TOPICAL at 21:02

## 2022-12-04 RX ADMIN — HYDROXYZINE PAMOATE 25 MG: 25 CAPSULE ORAL at 20:59

## 2022-12-04 RX ADMIN — MICONAZOLE NITRATE: 2 POWDER TOPICAL at 07:53

## 2022-12-04 NOTE — PROGRESS NOTES
[facility] Adult Unit Daily Assessment  Nursing Progress Note    Room: [facility]609-01   Name: Dorita Hassan   Age: 65 y.o. Gender: female   Dx: Bipolar depression ([address]  Precautions: close watch, suicide risk, and fall risk  Inpatient Status: voluntary       SLEEP:  Sleep Quality Good  Sleep Medications: Yes   PRN Sleep Meds: No       MEDICAL:  Other PRN Meds: No   Med Compliant: Yes  Accu-Chek: No  Oxygen/CPAP/BiPAP: Yes and CPAP @ HS  CIWA/CINA: No   PAIN Assessment: none  Side Effects from medication: No      Metabolic Screening:  Lab Results   Component Value Date    LABA1C 6.1 (H) 11/25/2022     Lab Results   Component Value Date    CHOL 172 11/25/2022    CHOL 143 (L) 06/27/2021    CHOL 149 (L) 07/22/2020    CHOL 152 (L) 02/22/2020    CHOL 130 (L) 12/22/2018    CHOL 154 02/11/2014    CHOL 181 05/10/2012     Lab Results   Component Value Date    TRIG 177 (H) 11/25/2022    TRIG 166 (H) 06/27/2021    TRIG 143 07/22/2020    TRIG 130 02/22/2020    TRIG 83 12/22/2018    TRIG 113 02/11/2014    TRIG 197 (H) 05/10/2012     Lab Results   Component Value Date    HDL 59 (L) 11/25/2022    HDL 48 (L) 06/27/2021    HDL 60 (L) 07/22/2020    HDL 52 (L) 02/22/2020    HDL 52 (L) 12/22/2018    HDL 56 02/11/2014    HDL 62 05/10/2012     No components found for: LDLCAL  No results found for: LABVLDL  Body mass index is 35.6 kg/m². BP Readings from Last 2 Encounters:   12/03/22 (!) 120/53   12/28/21 134/81         Medical Bed:   Is patient in a medical bed? no   If medical bed is in use, has nursing secured room while patient is awake and out of the room? NA  Has safety checks by nursing been completed on the bed/room this shift? NA    Protective Factors:  Patient identifies protective factors with nursing staff as follows:    Identifies reasons for living: Yes   Supportive Social Network or family: Yes    Belief that suicide is immoral/high spirituality: Yes   Responsibility to family or others/living with family: Yes   Fear of death or dying due to pain and suffering: Yes   Engaged in work or school: No     If Patient is unable to identify, reason why? N/A      PSYCH:  Depression: 7   Anxiety: 6   SI denies suicidal ideation   Risk of Suicide: No Risk  HI Negative for homicidal ideation      AVH:no If Hallucinations are present, describe? GENERAL:  Appetite: good   Percent Meals: 100%   Social: Yes   Speech: normal   Appearance: appropriately dressed, appropriately groomed, good hygiene, and healthy looking    GROUP:  Group Participation: Yes  Participation Quality: Interactive    Notes:   Patient observed up in the day area socializing with peers. She is calm, pleasant, and cooperative with staff. She has completed group wrap up and is compliant with medications. She currently denies SI, HI, and AVH.       Electronically signed by Ravi Thomas LPN on 60/1/33 at 46:81 PM CST

## 2022-12-04 NOTE — PROGRESS NOTES
Group Therapy Note    Date: 12/04/22  Start Time: 1500  End Time:1530    Number of Participants: 9    Type of Group:     Patient's Goal:      Notes:      Status After Intervention:  Improved    Participation Level:  Active Listener and Interactive    Participation Quality: Appropriate    Speech:  pressured    Thought Process/Content: Logical    Affective Functioning: Congruent    Mood: anxious    Level of consciousness:  Alert and Oriented x4    Response to Learning: Progressing to goal    Modes of Intervention: Education and Support    Discipline Responsible: Registered Nurse    Signature:  Vinicio Cordova RN

## 2022-12-04 NOTE — PROGRESS NOTES
Group Note    Date: 12/04/22  Start Time: 9:00 AM   End Time:9:30 AM     Number of Participants: 10    Type of Group: Community/Goal     Patient's Goal:  \"Staying out of my room\"    Notes:      Status After Intervention:      Participation Level:  Active Listener    Participation Quality: Appropriate    Speech:  normal    Thought Process/Content: Logical    Mood:  Calm    Level of consciousness:  Alert    Response to Learning: Able to verbalize current knowledge/experience    Modes of Intervention: Education and Support    Discipline Responsible: Behavioral Health Technician     Signature:  Dinah Cabot

## 2022-12-04 NOTE — PROGRESS NOTES
Department of Psychiatry  Attending Progress Note      SUBJECTIVE:    65years old female with previous psychiatric history of bipolar disorder, hoarding disorder, borderline personality disorder, who has been admitted to our psychiatric unit after patient tried to commit suicide by ingestion of spick-and-span . Patient has a history of multiple suicidal attempts in the past.    Patient has been seen in treatment team room. She reported no significant changes in her condition during this hospital stay, stated that her mood is \"a little better\" today, after staying in the hospital for almost 2 weeks. She endorses improved appetite and improved quality of sleep. Patient is compliant with currently prescribed medications and denies any side effects. She continues to report feeling of anxiety and depression, and rated both of them as 7 out of 10, with 10 being the worst.  Patient stated that thoughts about returning back home make her feel anxious and depressed. She attends group activities in the unit and participates in those activities. Patient is social with medical staff and other patients in the unit. She performs her ADLs daily. Patient denies current active suicidal or homicidal ideations, denies any plans. Also, she denies auditory and visual hallucinations. OBJECTIVE    Physical  VITALS:  BP (!) 108/58   Pulse 80   Temp 97.3 °F (36.3 °C) (Temporal)   Resp 16   Ht 5' 4.02\" (1.626 m)   Wt 207 lb 8 oz (94.1 kg)   SpO2 95%   BMI 35.60 kg/m²   TEMPERATURE:  Current - Temp: 97.3 °F (36.3 °C);  Max - Temp  Av.6 °F (35.9 °C)  Min: 96.1 °F (35.6 °C)  Max: 97.3 °F (36.3 °C)  RESPIRATIONS RANGE: Resp  Av  Min: 16  Max: 20  PULSE RANGE: Pulse  Av.7  Min: 70  Max: 95  BLOOD PRESSURE RANGE:  Systolic (17OPA), DNM:510 , Min:108 , FHQ:955  ; Diastolic (05NCU), LWV:43, Min:53, Max:75   PULSE OXIMETRY RANGE: SpO2  Av.3 %  Min: 95 %  Max: 96 %    Review of Systems: 14 point review of systems is negative    Psychological ROS: Positive for presence of anxiety and depression    Mental Status Examination:    Appearance: Appropriately groomed, wearing casual civilian clothes. Made good eye contact. Behavior: Slightly anxious, jittering, cooperative with interview, socially appropriate. Mild psychomotor retardation appreciated. Gait unremarkable. Speech: Normal in tone, volume, and quality. Mood: \"A little better\"   Affect: Mood congruent. Range is mildly restricted  Thought Process: Appears linear and goal oriented. Thought Content: Patient does not have any current active suicidal and homicidal ideations. No overt delusions or paranoia appreciated. Perceptions: Seems patient does not have any auditory or visual hallucinations at present time. Patient did not appear to be responding to internal stimuli. Orientation: to person, place, date, and situation. Alert.    Impulsivity: Questionable  Neurovegitative: Fair appetite, fair sleep  Insight: Limited  Judgment: Limited       Data  No new lab tests results to review    Medications  Current Facility-Administered Medications: clomiPRAMINE (ANAFRANIL) capsule 50 mg, 50 mg, Oral, Nightly  lurasidone (LATUDA) tablet 80 mg, 80 mg, Oral, Dinner  aluminum & magnesium hydroxide-simethicone (MAALOX) 200-200-20 MG/5ML suspension 30 mL, 30 mL, Oral, Q6H PRN  docusate sodium (COLACE) capsule 100 mg, 100 mg, Oral, PRN  hydrOXYzine pamoate (VISTARIL) capsule 25 mg, 25 mg, Oral, TID PRN  mirabegron (MYRBETRIQ) extended release tablet 50 mg  (Patient Supplied), 50 mg, Oral, Daily  melatonin disintegrating tablet 5 mg, 5 mg, Oral, Nightly  miconazole (MICOTIN) 2 % powder, , Topical, BID  acetaminophen (TYLENOL) tablet 650 mg, 650 mg, Oral, Q4H PRN  polyethylene glycol (GLYCOLAX) packet 17 g, 17 g, Oral, Daily PRN  valACYclovir (VALTREX) tablet 500 mg, 500 mg, Oral, Daily  calcium carbonate (TUMS) chewable tablet 500 mg, 500 mg, Oral, TID PRN    ASSESSMENT AND PLAN    DSM 5 Diagnoses:    Bipolar depression  Suicide attempt by ingestion of toxic substance   Borderline personality disorder  Hoarding Disorder      Medical conditions pertinent to the patient's mental health  SANDY  HSV, type 1 & 2    Plan:   1. Psychiatric Medications:   Continue current psychotropic medications as recommended. Patient is compliant with currently prescribed medications. No changes to the dose of prescribed psychotropic medications today. 2. Medical Issues:    Continue medical monitoring by Dr. Hermelinda Varghese and associates. 3. Disposition:    Discharge patient home when she will be in stable mental condition and adjustment psychotropic medications will be done.      Amount of time spent with patient:    35 minutes with greater than 50% of the time spent in counseling and collaboration of care

## 2022-12-04 NOTE — PROGRESS NOTES
Group Note    Date: 12/04/22  Start Time: 19:30 PM   End Time:20:00 PM     Number of Participants: 1    Type of Group: Wrap-Up     Patient's Goal:  no goal listed    Notes:  no notes listed    Status After Intervention:  Unchanged    Participation Level: Minimal    Participation Quality: Appropriate    Speech:  normal    Thought Process/Content: Logical    Mood: anxious    Level of consciousness:  Alert    Response to Learning: Progressing to goal    Modes of Intervention: Education and Support    Discipline Responsible: Registered Nurse     Signature:  Aaron Price RN

## 2022-12-04 NOTE — PROGRESS NOTES
[facility] Adult Unit Daily Assessment  Nursing Progress Note     Room: [facility]/609-01   Name: Dorita Hassan   Age: 65 y.o. Gender: female   Dx: Bipolar depression ([address]  Precautions: close watch, suicide risk, and fall risk  Inpatient Status: voluntary         MEDICAL:  Other PRN Meds: Yes   Med Compliant: Yes  Accu-Chek: No  Oxygen/CPAP/BiPAP: Yes  CIWA/CINA: No   PAIN Assessment: none  Side Effects from medication: No         Medical Bed:   Is patient in a medical bed? no   If medical bed is in use, has nursing secured room while patient is awake and out of the room? NA  Has safety checks by nursing been completed on the bed/room this shift? NA     Protective Factors:  Patient identifies protective factors with nursing staff as follows: Identifies reasons for living: Yes              Supportive Social Network or family: Yes               Belief that suicide is immoral/high spirituality: Yes              Responsibility to family or others/living with family: Yes              Fear of death or dying due to pain and suffering: Yes              Engaged in work or school: No                If Patient is unable to identify, reason why? N/a        PSYCH:  Depression: 7   Anxiety: 6   SI denies suicidal ideation   Risk of Suicide: No Risk  HI Negative for homicidal ideation      AVH:no If Hallucinations are present, describe? N/a           GENERAL:  Appetite: good   Percent Meals: 100%   Social: Yes   Speech: normal   Appearance: appropriately dressed, good hygiene, and healthy looking     GROUP:  Group Participation: Yes  Participation Quality: Active Listener     Notes: Patient is observed in day area wearing casual attire. She is appropriately dressed and well groomed. Took shower and did ADLs this am. Alert and oriented x 4. Pleasant, calm, and cooperative. Good eye contact with worried expression on face. Thought processes are slow. Compliant with medications with no side effects noted.  Reports good appetite and good sleep last night. Social and attending groups. Denies suicidal ideation, homicidal ideation, and hallucinations. No evidence of delusions or paranoia.      Electronically signed by Gabe Pickering RN on 12/4/22 at 11:04 AM CST

## 2022-12-05 VITALS
HEIGHT: 64 IN | OXYGEN SATURATION: 95 % | RESPIRATION RATE: 16 BRPM | DIASTOLIC BLOOD PRESSURE: 88 MMHG | BODY MASS INDEX: 35.42 KG/M2 | HEART RATE: 85 BPM | SYSTOLIC BLOOD PRESSURE: 130 MMHG | TEMPERATURE: 97.3 F | WEIGHT: 207.5 LBS

## 2022-12-05 PROCEDURE — 5130000000 HC BRIDGE APPOINTMENT

## 2022-12-05 PROCEDURE — 6370000000 HC RX 637 (ALT 250 FOR IP): Performed by: PSYCHIATRY & NEUROLOGY

## 2022-12-05 RX ORDER — CLOMIPRAMINE HYDROCHLORIDE 50 MG/1
50 CAPSULE ORAL NIGHTLY
Qty: 30 CAPSULE | Refills: 0 | Status: SHIPPED | OUTPATIENT
Start: 2022-12-05

## 2022-12-05 RX ADMIN — VALACYCLOVIR HYDROCHLORIDE 500 MG: 500 TABLET, FILM COATED ORAL at 09:06

## 2022-12-05 RX ADMIN — MICONAZOLE NITRATE: 2 POWDER TOPICAL at 09:07

## 2022-12-05 NOTE — PROGRESS NOTES
Treatment Team Note:    Target Symptoms/Reason for admission: Per nursing admission assessment - Reason for Admission: Eda Lehman is a  65 yoa female who Drank spic and span  as suicide attempt. Med change recently and has felt suicidal and decreased functioning x 1 week. Diagnoses per psych provider: Borderline personality disorder ([facility] [F60.3]  Suicide attempt ([facility] Mary Dee  Bipolar depression ([facility] [F31.9]  Bipolar 1 disorder, depressed ([facility] [F31.9]    Therapist met with treatment team to discuss patients treatment and discharge plans. Patient's aftercare plan is: [address]    Aftercare appointments made:  yes    Pt lives with: patient lives alone    Collateral obtained from: Mom  Collateral obtained on:11/28/22    Attending groups: Yes    Behavior: calm and cooperative, does not interact with peers. Has patient been completing ADL's:  Yes    SI:  patient denies SI  Plan: no   If yes describe: N/A - patient denies plan  HI:  patient denies HI  If present describe: N/A  Delusions: patient denies delusions  If present describe: N/A  Hallucinations: patient denies hallucinations  If present describe: N/A    Patient rates their -- Depression: 1-10:  6  Anxiety:1-10:  7    Sleeping:Yes    Taking medication: Yes    Misc: Pt will be discharged today.

## 2022-12-05 NOTE — DISCHARGE INSTR - DIET

## 2022-12-05 NOTE — PROGRESS NOTES
Discharge Note     Patient is discharging on this date. Patient denies SI, HI, and AVH at this time. Patient reports improvement in behavior and is leaving unit in overall good condition. SW and patient discussed patient's follow up appointments and importance of attending appointments as scheduled, patient voiced understanding and agreement. Patient and SW also discussed patient's safety plan and patient was able to verbally identify: warning signs, coping strategies, places and people that help make the patient feel better/distract negative thoughts, friends/family/agencies/professionals the patient can reach out to in a crisis, and something that is important to the patient/worth living for. Patient was provided the national suicide prevention hotline number (6-705-074-566-760-4192) as well as local community behavioral health [facility]) crisis number for emergencies (0-087-909-287-578-7907). Discharge Disposition: home -lives alone      Pt to follow up with:  [address]  on December 5 , 2022 at 2:00 PM for the intake appointment. Patient will follow up with [address]    On December 6 , 2022   at 2:00 PM for the medication management appointment.      Referral to outpatient tobacco cessation counseling treatment:  Patient refused referral to outpatient tobacco cessation counseling    SW offered to assist patient with transportation, patient declined transportation assistance

## 2022-12-05 NOTE — PLAN OF CARE
Problem: Coping  Goal: Pt/Family able to verbalize concerns and demonstrate effective coping strategies  Outcome: Progressing       Group Therapy Note     Date: 12/5/2022  Start Time: 1100  End Time:  9170  Number of Participants: 10     Type of Group: Psychotherapy     Wellness Binder Information  Module Name:  emotional wellness  Session Number:  1     Patient's Goal:  validation of feelings     Notes:  pt acknowledged to have feelings validated it may be necessary to share feelings with others.      Status After Intervention:  Improved     Participation Level: Interactive     Participation Quality: Appropriate, Attentive, and Sharing        Speech:  normal        Thought Process/Content: Logical        Affective Functioning: Congruent        Mood: congruent        Level of consciousness:  Alert, Oriented x4, and Attentive        Response to Learning: Able to verbalize current knowledge/experience        Endings: None Reported     Modes of Intervention: Education        Discipline Responsible: Psychoeducational Specialist        Signature:  Dillon Tellez

## 2022-12-05 NOTE — PLAN OF CARE
Group Therapy Note    Date: 12/5/2022  Start Time: 1000  End Time:  1030  Number of Participants: 11    Type of Group: Psychoeducation    Wellness Binder Information  Module Name:  Relapse Prevention  Session Number:  5    Group Goal for Pt: To improve knowledge of relapse prevention strategies    Notes: Pt demonstrated improved knowledge of relapse prevention strategies by actively participating in group discussion. Status After Intervention:  Improved    Participation Level:  Active Listener    Participation Quality: Appropriate and Attentive      Speech:  normal      Thought Process/Content: Logical      Affective Functioning: Congruent      Mood: anxious and depressed      Level of consciousness:  Alert and Oriented x4      Response to Learning: Able to verbalize current knowledge/experience, Able to verbalize/acknowledge new learning, and Progressing to goal      Endings: None Reported    Modes of Intervention: Education      Discipline Responsible: Psychoeducational Specialist      Signature:  Lexy Au

## 2022-12-05 NOTE — PROGRESS NOTES
[facility] Adult Unit Daily Assessment  Nursing Progress Note    Room: [facility]/609-01   Name: Eleanor Mckeon   Age: 65 y.o. Gender: female   Dx: Bipolar depression ([address]  Precautions: suicide risk and fall risk  Inpatient Status: voluntary       SLEEP:  Sleep Quality Good  Sleep Medications:    PRN Sleep Meds:        MEDICAL:  Other PRN Meds:    Med Compliant:   Accu-Chek: No  Oxygen/CPAP/BiPAP:   CIWA/CINA: No   PAIN Assessment: none  Side Effects from medication: No      Metabolic Screening:  Lab Results   Component Value Date    LABA1C 6.1 (H) 11/25/2022     Lab Results   Component Value Date    CHOL 172 11/25/2022    CHOL 143 (L) 06/27/2021    CHOL 149 (L) 07/22/2020    CHOL 152 (L) 02/22/2020    CHOL 130 (L) 12/22/2018    CHOL 154 02/11/2014    CHOL 181 05/10/2012     Lab Results   Component Value Date    TRIG 177 (H) 11/25/2022    TRIG 166 (H) 06/27/2021    TRIG 143 07/22/2020    TRIG 130 02/22/2020    TRIG 83 12/22/2018    TRIG 113 02/11/2014    TRIG 197 (H) 05/10/2012     Lab Results   Component Value Date    HDL 59 (L) 11/25/2022    HDL 48 (L) 06/27/2021    HDL 60 (L) 07/22/2020    HDL 52 (L) 02/22/2020    HDL 52 (L) 12/22/2018    HDL 56 02/11/2014    HDL 62 05/10/2012     No components found for: LDLCAL  No results found for: LABVLDL  Body mass index is 35.6 kg/m². BP Readings from Last 2 Encounters:   12/05/22 130/88   12/28/21 134/81         Medical Bed:   Is patient in a medical bed? no   If medical bed is in use, has nursing secured room while patient is awake and out of the room? no  Has safety checks by nursing been completed on the bed/room this shift? no    Protective Factors:  Patient identifies protective factors with nursing staff as follows:    Identifies reasons for living: Yes   Supportive Social Network or family: Yes    Belief that suicide is immoral/high spirituality: Yes   Responsibility to family or others/living with family: Yes   Fear of death or dying due to pain and suffering: Yes   Engaged in work or school: No     If Patient is unable to identify, reason why? PSYCH:  Depression: 7   Anxiety: 6   SI denies suicidal ideation   Risk of Suicide: No Risk  HI Negative for homicidal ideation      AVH:no If Hallucinations are present, describe? GENERAL:  Appetite: good   Percent Meals: 75%   Social: No   Speech: normal   Appearance: appropriately dressed, good hygiene, and healthy looking    GROUP:  Group Participation: Yes  Participation Quality: Active Listener    Notes: Patient is up and in the day area for breakfast and medications. She is anxious about discharge and is at the desk every 5 minutes asking if she is ready. Home medications are returned in full. There are no security items here.         Electronically signed by Jeannie Mojica RN on 12/5/22 at 12:24 PM CST

## 2022-12-05 NOTE — DISCHARGE INSTRUCTIONS
Medications:   Medication Summary Provided. I understand that I should take only the medications on my list.   --why and when I need to take each medication. --which side effects to watch for.   --that I should carry my medication information at all times in case of emergency    Situations. --I will take all medications until discontinued by physician. I will take all my medications to follow up appointments. --check with my physician or pharmacist before taking any new medication, over    the counter product or drink alcohol.   --ask about food, drug and dietary supplement interactions. --discard old lists and update records with medication providers. Behavior Health Follow Up:  Original Referral Source: ED  Discharge Diagnosis: [unfilled]  Recomendations for Level of Care: Follow Up  Patient Status at Discharge: Stable  [facility]  Aftercare plan faxed:    Faxed by: Social Work staff   Date: 22   Time:   Prescriptions sent with pt.  Scripts sent to pharmacy    General Information:   Questions regarding your bill: Call [phone]   Suicide Hotline (Rescue Crisis) 2-969.165.9427   To obtain results of pending studies call Medical Records at: [phone]   For emergencies and 24 hour/7 days a week contact information: [phone]

## 2022-12-05 NOTE — DISCHARGE SUMMARY
Discharge Summary     Patient ID:  Lulu Griffith  [id number]  89 y.o.  1965    Admit date: 11/23/2022  Discharge date: 12/5/2022    Admitting Physician: Marycarmen Lomeli MD   Attending Physician: Taylor att. providers found  Discharge Provider: TING Alex     Admission Diagnoses: Borderline personality disorder ([facility] [F60.3]  Suicide attempt ([facility] Melinda Scot  Bipolar depression ([facility] [F31.9]  Bipolar 1 disorder, depressed ([facility] [F31.9]    Discharge Diagnoses:   Bipolar depression  Suicide attempt by ingestion of toxic substance   Borderline personality disorder  Hoarding Disorder   Obsessive Compulsive Disorder     Admission Condition: fair    Discharged Condition: good    Indication for Admission: depression and suicidal ideation    HPI:  Patient is a 71-year-old  female who presented after an ingestion of spick-and-span . Urine drug screen negative. Blood alcohol level negative. She is well-known to this facility and has had several psychiatric hospitalizations and more than 5 suicide attempts. Medical History includes obstructive sleep apnea and HSV 1& 2, bipolar depression, chronic PTSD and borderline personality disorder        Today patient reports that she has had recent medication changes. She reports she was prescribed Latuda and Darl Ranks however was starting to feel overly sedated and her primary outpatient psychiatrist discontinued Darl Ranks last Wednesday. Patient also reports she began having an increase in suicidal thoughts since that medication was initiated. After Darl Ranks was discontinued patient reports she has been spending more time in the bed and has been unable to function. She reports spending most of her days for the past week off and on in the bed. Not showering or eating normally. She reports she has not had a shower since last Wednesday. Endorses poor energy, poor concentration and feeling helpless, hopeless and worthless.   Reports she is sleeping 8 hours/ night and taking naps during the day. She denies homicidal ideation and psychosis at this time. She actually had an appointment today at [facility] behavioral health to see her psychiatrist today. She began feeling guilty about her father finding her in the same clothes she has worn since Monday with no shower. She then went to the kitchen and grabbed a spick-and-span cleaning bottle and drank half of the bottle. She reports she immediately caught her daughter and let the daughter know who came to the house and brought her to the emergency room. She reports she did vomit at home and was able to drink 2 bottles of water directly after drinking the . Today she reports that she feels like she is disappointed God in trying to kill herself. She states, \"I thought I would be better off dead than my dad find me in that shape. \"     Reports for the past week she has been having suicidal thoughts at least once per day. She reports that her suicidal thoughts tell her that she is \"not good enough\" and she said \"just kill herself. \"  She was also recently diagnosed with herpes simplex virus 1 and 2. She reports she is not sure how she is going to deal with that. Reports her prior boyfriend is the one that gave her herpes. She is currently taking Valtrex daily. She was prescribed Lithium and reported to be doing well on that medication. She reports that she started having problems with her kidney function last year and was seen by Nephrology and [place] was discontinued. Hospital Course:   Patient was admitted to the adult behavioral health floor and was acclimated to the floor. Labs were reviewed and physical exam was completed by Dr. Lucrezia Phalen and associates. Home medications were reconciled. MAY was obtained and reviewed. Collateral was obtained from the patients mother. Medication changes were made and patient tolerated well with no side effects. Clomipramine was initiated for OCD and Hoarding disorder. Home medication of Celia Heredia was decreased back to 80 mg po daily as patient reported when it was increased to 120 mg at her last outpatient appointment she started having suicidal ideations. She was behaviorally stable during the entire psychiatric hospitalization. Initially she was isolative to her room and would not shower however as hospitalization progressed she started to become social with select peers and began showering. She was given worksheets to complete about her hoarding and she was able to complete those. She better understood what behaviors makes her azucena and what small changes she can make to start de cluttering her home. She reported that she is most anxious in her home because of the boxes she has piled up and her hoarding. She reported that [address] [place] [address] [facility] are going to start helping her with the clutter in her home. Patient attended and participated in groups. She reported that she enjoyed group therapy mostly relaxation therapy. Patient was calm and cooperative with staff and peers. Patient was compliant with her medications. Patient was sleeping through the night. This patient is not suicidal, homicidal or psychotic at discharge. She does not present a danger to self or others. On the day of discharge and transfer of care, patient indicated readiness for discharge. She was not acutely manic nor agitated with no reported symptoms of psychosis. She indicated no thoughts of self-harm or harm to others. Given resolution of presenting symptoms and patient readiness for discharge and that patient agreed to follow-up with outpatient services with [address] [place] [address] [address]  and  the patient was discharged with a 30 day supply of medication. She denied access to firearms or weapons. He was advised to abstain from all drugs and alcohol and to remain adherent to medication as prescribed.  On 12/5/2022 patient had received maximum benefit from the inpatient psychiatric hospitalization and was discharged home with outpatient follow up appointments.          Number of antipsychotic medication prescribed at discharge: - Lurasidone       Referral to addiction treatment: n/a    Prescription for Alcohol or Drug Disorder Medication: n/a    Prescription for Tobacco Cessation medication: n/a    If no prescriptions for Tobacco Cessation must document why: n/a    Consults: internal medicine    Significant Diagnostic Studies: labs:     Lab Results   Component Value Date    WBC 8.5 12/02/2022    HGB 14.4 12/02/2022    HCT 42.8 12/02/2022    MCV 96.0 12/02/2022     12/02/2022     Lab Results   Component Value Date/Time     12/02/2022 06:30 AM     05/04/2012 09:49 PM    K 4.2 12/02/2022 06:30 AM    K 3.9 11/23/2022 07:50 AM    K 4.7 05/04/2012 09:49 PM     12/02/2022 06:30 AM     05/04/2012 09:49 PM    CO2 23 12/02/2022 06:30 AM    BUN 13 12/02/2022 06:30 AM    CREATININE 0.7 12/02/2022 06:30 AM    CREATININE 0.9 05/04/2012 09:49 PM    GLUCOSE 121 12/02/2022 06:30 AM    CALCIUM 9.4 12/02/2022 06:30 AM      Lab Results   Component Value Date    TSHFT4 1.32 06/27/2021    TSH 1.170 02/24/2021     Lab Results   Component Value Date    VITD25 55.5 06/27/2021       Latest Reference Range & Units 12/2/22 06:30   Albumin 3.5 - 5.2 g/dL 3.9   Alk Phos 35 - 104 U/L 82   ALT 5 - 33 U/L 15   AST 5 - 32 U/L 12   Bilirubin 0.2 - 1.2 mg/dL 0.5   Total Protein 6.6 - 8.7 g/dL 6.8      Latest Reference Range & Units 11/23/22 08:23   Amphetamine Screen, Urine Negative <500 ng/mL  Negative   Barbiturate Screen, Ur Negative < 200 ng/mL  Negative   Benzodiazepine Screen, Urine Negative <150 ng/mL  Negative   Buprenorphine Urine Negative <10 ng/mL  Negative   Cannabinoid Scrn, Ur Negative <50 ng/mL  Negative   METHADONE SCREEN, URINE, 417[address] Negative <200 ng/mL  Negative   METHAMPHETAMINE,URINE Negative <500 ng/mL  Negative   Opiate Scrn, Ur Negative < 100 ng/mL  Negative   PCP Screen, Urine Negative <25 ng/mL  Negative   Propoxyphene Scrn, Ur Negative <300 ng/mL  Negative   Tricyclic Negative <421 ng/mL  Negative   Cocaine Metabolite Screen, Urine Negative <150 ng/mL  Negative   Oxycodone Urine Negative <100 ng/mL  Negative           Treatments: therapies: RN and SW    Alert, Oriented X 4  Appearance:  Grooming and Hygiene attended to  Speech with Regular Rate and Rhythm  Eye Contact:  Good  No Psychomotor Agitation/Retardation Noted  Attitude:  Cooperative  Mood:  \"I am feeling so much better. \"  Affective: Congruent, appropriate to the situation, with a normal range and intensity  Thought Processes:  Coherently communicated, logical and goal oriented  Thought Content:  At this time  No Suicidal Ideation, No Homicidal Ideation, No Auditory or Visual  Hallucinations, No Overt Delusions  Insight:  Present  Judgement:  Normal  Memory is intact for both remote and recent  Intellectual Functioning:  Within the [address] of Knowledge:  Adequate  Attention and Concentration:  Adequate        Discharge Exam:  GAIT STABLE  SPEAKS IN FULL SENTENCES WITHOUT SHORTNESS OF AIR    Disposition: home    Patient Instructions:   Discharge Medication List as of 12/5/2022 12:45 PM        START taking these medications    Details   clomiPRAMINE (ANAFRANIL) 50 MG capsule Take 1 capsule by mouth nightly, Disp-30 capsule, R-0Normal           CONTINUE these medications which have CHANGED    Details   lurasidone (LATUDA) 80 MG TABS tablet Take 1 tablet by mouth Daily with supper, Disp-30 tablet, R-0Normal           CONTINUE these medications which have NOT CHANGED    Details   hydrOXYzine pamoate (VISTARIL) 25 MG capsule Take 25 mg by mouth 3 times daily as needed for Itching or AnxietyHistorical Med      docusate sodium (COLACE) 100 MG capsule Take 100 mg by mouth as needed for ConstipationHistorical Med      calcium carbonate 600 MG TABS tablet Take 1 tablet by mouth 2 times daily as neededHistorical Med      mirabegron (MYRBETRIQ) 50 MG TB24 Take 50 mg by mouth dailyHistorical Med      valACYclovir (VALTREX) 500 MG tablet Take 500 mg by mouth dailyHistorical Med           STOP taking these medications       lithium 300 MG capsule Comments:   Reason for Stopping: pt no longer prescribed this medication        clindamycin (CLEOCIN T) 1 % external solution Comments:   Reason for Stopping: pt no longer prescribed this medication        melatonin 3 MG TABS tablet Comments:   Reason for Stopping: pt no longer prescribed this medication        cloNIDine (CATAPRES) 0.1 MG tablet Comments:   Reason for Stopping: pt no longer prescribed this medication        vitamin D (ERGOCALCIFEROL) 1.25 MG (01023 UT) CAPS capsule Comments:   Reason for Stopping: pt no longer prescribed this medication            Activity: activity as tolerated  Diet: regular diet  Wound Care: none needed    Follow-up with   PCP in 2 weeks.     [address] on 12/6/2022 at 2 pm and 12/7/2022 at 2pm       Time worked: Less than 30 minutes    Participation:good    Electronically signed by Chidi Love, [id number], FNP-C on 12/5/2022 at 3:26 PM

## 2022-12-05 NOTE — PLAN OF CARE
Problem: Self Harm/Suicidality  Goal: Will have no self-injury during hospital stay  Description: INTERVENTIONS:  1. Q 30 MINUTES: Routine safety checks  2. Q SHIFT & PRN: Assess risk to determine if routine checks are adequate to maintain patient safety  Outcome: Completed     Problem: Safety - Adult  Goal: Free from fall injury  Outcome: Completed     Problem: Gastrointestinal - Adult  Goal: Minimal or absence of nausea and vomiting  Outcome: Completed  Goal: Maintains or returns to baseline bowel function  Outcome: Completed  Goal: Maintains adequate nutritional intake  Outcome: Completed     Problem: Nutrition Deficit:  Goal: Optimize nutritional status  Outcome: Completed     Problem: Coping  Goal: Pt/Family able to verbalize concerns and demonstrate effective coping strategies  Description: INTERVENTIONS:  1. Assist patient/family to identify coping skills, available support systems and cultural and spiritual values  2. Provide emotional support, including active listening and acknowledgement of concerns of patient and caregivers  3. Reduce environmental stimuli, as able  4. Instruct patient/family in relaxation techniques, as appropriate  5.  Assess for spiritual pain/suffering and initiate Spiritual Care, Psychosocial Clinical Specialist consults as needed  12/5/2022 1207 by Doug Barajas RN  Outcome: Completed  12/5/2022 1144 by Zoila Casillas  Outcome: Progressing

## 2022-12-05 NOTE — PROGRESS NOTES
[facility] Adult Unit Daily Assessment  Nursing Progress Note    Room: [facility]-   Name: Neda Jean-Baptiste   Age: 65 y.o. Gender: female   Dx: Bipolar depression ([address]  Precautions: close watch and suicide risk  Inpatient Status: voluntary       SLEEP:  Sleep Quality Good  Sleep Medications: Yes, melatonin   PRN Sleep Meds: Yes, vistaril      MEDICAL:  Other PRN Meds: No   Med Compliant: Yes  Accu-Chek: No  Oxygen/CPAP/BiPAP: No  CIWA/CINA: No   PAIN Assessment: none  Side Effects from medication: No      Metabolic Screening:  Lab Results   Component Value Date    LABA1C 6.1 (H) 11/25/2022     Lab Results   Component Value Date    CHOL 172 11/25/2022    CHOL 143 (L) 06/27/2021    CHOL 149 (L) 07/22/2020    CHOL 152 (L) 02/22/2020    CHOL 130 (L) 12/22/2018    CHOL 154 02/11/2014    CHOL 181 05/10/2012     Lab Results   Component Value Date    TRIG 177 (H) 11/25/2022    TRIG 166 (H) 06/27/2021    TRIG 143 07/22/2020    TRIG 130 02/22/2020    TRIG 83 12/22/2018    TRIG 113 02/11/2014    TRIG 197 (H) 05/10/2012     Lab Results   Component Value Date    HDL 59 (L) 11/25/2022    HDL 48 (L) 06/27/2021    HDL 60 (L) 07/22/2020    HDL 52 (L) 02/22/2020    HDL 52 (L) 12/22/2018    HDL 56 02/11/2014    HDL 62 05/10/2012     No components found for: LDLCAL  No results found for: LABVLDL  Body mass index is 35.6 kg/m². BP Readings from Last 2 Encounters:   12/04/22 (!) 138/90   12/28/21 134/81         Medical Bed:   Is patient in a medical bed? no   If medical bed is in use, has nursing secured room while patient is awake and out of the room? NA  Has safety checks by nursing been completed on the bed/room this shift? NA    Protective Factors:  Patient identifies protective factors with nursing staff as follows:    Identifies reasons for living: Yes   Supportive Social Network or family: Yes    Belief that suicide is immoral/high spirituality: No   Responsibility to family or others/living with family: No   Fear of death or dying due to pain and suffering: No   Engaged in work or school: No     If Patient is unable to identify, reason why? N/A      PSYCH:  Depression: 6   Anxiety: 7   SI denies suicidal ideation   Risk of Suicide: No Risk  HI Negative for homicidal ideation      AVH:no If Hallucinations are present, describe? N/A        GENERAL:  Appetite: good   Percent Meals: n/a   Social: No   Speech: hesitant   Appearance: appropriately dressed and healthy looking    GROUP:  Group Participation: Yes  Participation Quality: Active Listener    Notes: Sitting in day room with peers, does not interact. A/O x 3, calm and cooperative with interview. Rates depression 6/10 and anxiety 7/10, denies SI, HI, AVH. Performing ADL, eating and sleeping well, attending groups, not social with peers. Will continue to monitor for safety.          Electronically signed by Radha Marinelli RN on 12/4/22 at 11:55 PM CST

## 2022-12-05 NOTE — PROGRESS NOTES
Behavioral Health   Discharge Note  Bridge Appointment completed: Reviewed Discharge Instructions with patient. Patient verbalizes understanding and agreement with the discharge plan using the teachback method. Referral for Outpatient Tobacco Cessation Counseling, upon discharge (umu X if applicable and completed):    ( )  Hospital staff assisted patient to call Quit Line or faxed referral                                   during hospitalization                  ( )  Recognizing danger situations (included triggers and roadblocks), if not completed on admission                    ( )  Coping skills (new ways to manage stress, exercise, relaxation techniques, changing routine, distraction), if not completed on admission                                                           ( )  Basic information about quitting (benefits of quitting, techniques in how to quit, available resources, if not completed on admission  ( ) Referral for counseling faxed to [facility]   ( ) Patient refused referral  ( ) Patient refused counseling  ( ) Patient refused smoking cessation medication upon discharge    Vaccinations (umu X if applicable and completed):  ( ) Patient states already received influenza vaccine elsewhere  ( ) Patient received influenza vaccine during this hospitalization  ( x) Patient refused influenza vaccine at this time      Pt discharged with followings belongings:   Dental Appliances: None  Vision - Corrective Lenses: Eyeglasses  Hearing Aid: None  Jewelry: None  Body Piercings Removed: No   Valuables sent home with patient. Valuables retrieved from safe and returned to patient. Yes Patient left department with staff via ambulatory discharged to self care Patient education on aftercare instructions: yes  Patient verbalize understanding of AVS:  yes. Suicidal Ideations? No Risk of Suicide: No Risk AVH? HI?  Negative for homicidal ideation       Status EXAM upon discharge:  Mental Status and Behavioral Exam  Normal: No  Level of Assistance: Independent/Self  Facial Expression: Flat, Worried  Affect: Congruent  Level of Consciousness: Alert  Frequency of Checks: 4 times per hour, close  Mood:Normal: No  Mood: Anxious, Depressed  Motor Activity:Normal: Yes  Motor Activity: Increased  Eye Contact: Good  Observed Behavior: Preoccupied, Cooperative  Sexual Misconduct History: Past - no  Preception: [place] to time, [place] to person, [place] to place, [place] to situation  Attention:Normal: No  Attention: Unable to concentrate  Thought Processes: Circumstantial  Thought Content:Normal: No  Thought Content:  (denies suicidal ideation)  Depression Symptoms: Change in energy level, Impaired concentration  Anxiety Symptoms: Generalized  Estela Symptoms: No problems reported or observed. Hallucinations: None  Delusions: No  Memory:Normal: No  Memory: Poor recent, Poor remote  Insight and Judgment: No  Insight and Judgment:  (limited insight and judgment)    AVS/Transition Record has been discussed with patient and a copy was given to the patient. The AVS/Transition Record was faxed to the next level of care today.

## 2022-12-06 NOTE — PROGRESS NOTES
Progress Note  Julio Pro  12/5/2022 11:03 PM  Subjective:   Admit Date:   11/23/2022      CC/ADMIT DX:       Interval History:   Reviewed overnight events and nursing notes. She denies any new medical issues. I have reviewed all labs/diagnostics from the last 24hrs. ROS:   I have done a 10 point ROS and all are negative, except what is mentioned in the HPI. No diet orders on file    Medications:   REM  REM          Objective:   Vitals: /88   Pulse 85   Temp 97.3 °F (36.3 °C) (Temporal)   Resp 16   Ht 5' 4.02\" (1.626 m)   Wt 207 lb 8 oz (94.1 kg)   SpO2 95%   BMI 35.60 kg/m²  No intake or output data in the 24 hours ending 12/05/22 2303    General appearance: alert and cooperative with exam  Extremities: extremities normal, atraumatic, no cyanosis or edema  Neurologic:  No obvious focal neurologic deficits. Skin: no rashes    Assessment and Plan:   Principal Problem:    Bipolar depression ([address]  Active Problems:    Bipolar 1 disorder, depressed ([address]    Mixed obsessional thoughts and acts    Borderline personality disorder ([address]    Obstructive sleep apnea    CPAP (continuous positive airway pressure) dependence    Ingestion of toxic substance  Resolved Problems:    * No resolved hospital problems. *    Pre DM    Pyuria    Plan:   Continue present medication(s)    Follow with Psych   Follow with BP      Discharge planning:   her home     Reviewed treatment plans with the patient and/or family.              Electronically signed by Donte Condon MD on 12/5/2022 at 11:03 PM

## 2022-12-07 ENCOUNTER — TELEPHONE (OUTPATIENT)
Dept: NEUROLOGY | Age: 57
End: 2022-12-07

## 2022-12-07 DIAGNOSIS — E55.9 VITAMIN D DEFICIENCY: ICD-10-CM

## 2022-12-07 RX ORDER — ERGOCALCIFEROL 1.25 MG/1
50000 CAPSULE ORAL WEEKLY
Qty: 5 CAPSULE | Refills: 3 | OUTPATIENT
Start: 2022-12-07

## 2022-12-07 NOTE — TELEPHONE ENCOUNTER
Called and left patient a VM to let her know that her appointment with IRAM Lugo had to be rescheduled due to the provider is out of the office. Left on VM of when I have patient appointment rescheduled too.

## 2023-01-16 ENCOUNTER — TELEPHONE (OUTPATIENT)
Dept: NEUROLOGY | Age: 58
End: 2023-01-16

## 2023-01-16 NOTE — TELEPHONE ENCOUNTER
Patient called requesting to reschedule 1/18 appt to an afternoon. Rescheduled for next available sleep follow up appt 2/20. If office feels she needs to be seen sooner please reach out anytime [phone].        Thank you

## 2023-01-17 RX ORDER — MIRABEGRON 50 MG/1
TABLET, FILM COATED, EXTENDED RELEASE ORAL
Qty: 30 TABLET | Refills: 3 | Status: SHIPPED | OUTPATIENT
Start: 2023-01-17

## 2023-02-17 ENCOUNTER — OFFICE VISIT (OUTPATIENT)
Dept: OBSTETRICS AND GYNECOLOGY | Facility: CLINIC | Age: 58
End: 2023-02-17
Payer: COMMERCIAL

## 2023-02-17 VITALS
DIASTOLIC BLOOD PRESSURE: 80 MMHG | SYSTOLIC BLOOD PRESSURE: 130 MMHG | WEIGHT: 208 LBS | BODY MASS INDEX: 35.51 KG/M2 | HEIGHT: 64 IN

## 2023-02-17 DIAGNOSIS — K62.5 RECTAL BLEEDING: ICD-10-CM

## 2023-02-17 DIAGNOSIS — N89.8 VAGINAL DISCHARGE: ICD-10-CM

## 2023-02-17 DIAGNOSIS — A60.04 HERPES, VULVAR: ICD-10-CM

## 2023-02-17 DIAGNOSIS — E55.9 VITAMIN D DEFICIENCY: ICD-10-CM

## 2023-02-17 DIAGNOSIS — Z11.3 SCREENING EXAMINATION FOR STD (SEXUALLY TRANSMITTED DISEASE): ICD-10-CM

## 2023-02-17 DIAGNOSIS — Z01.419 WELL WOMAN EXAM WITH ROUTINE GYNECOLOGICAL EXAM: Primary | ICD-10-CM

## 2023-02-17 DIAGNOSIS — Z12.31 ENCOUNTER FOR SCREENING MAMMOGRAM FOR BREAST CANCER: ICD-10-CM

## 2023-02-17 PROCEDURE — 99396 PREV VISIT EST AGE 40-64: CPT | Performed by: NURSE PRACTITIONER

## 2023-02-17 PROCEDURE — 87798 DETECT AGENT NOS DNA AMP: CPT | Performed by: NURSE PRACTITIONER

## 2023-02-17 PROCEDURE — 87481 CANDIDA DNA AMP PROBE: CPT | Performed by: NURSE PRACTITIONER

## 2023-02-17 PROCEDURE — 87491 CHLMYD TRACH DNA AMP PROBE: CPT | Performed by: NURSE PRACTITIONER

## 2023-02-17 PROCEDURE — 87661 TRICHOMONAS VAGINALIS AMPLIF: CPT | Performed by: NURSE PRACTITIONER

## 2023-02-17 PROCEDURE — 87624 HPV HI-RISK TYP POOLED RSLT: CPT | Performed by: NURSE PRACTITIONER

## 2023-02-17 PROCEDURE — 87563 M. GENITALIUM AMP PROBE: CPT | Performed by: NURSE PRACTITIONER

## 2023-02-17 PROCEDURE — G0123 SCREEN CERV/VAG THIN LAYER: HCPCS | Performed by: NURSE PRACTITIONER

## 2023-02-17 PROCEDURE — 87512 GARDNER VAG DNA QUANT: CPT | Performed by: NURSE PRACTITIONER

## 2023-02-17 PROCEDURE — 87591 N.GONORRHOEAE DNA AMP PROB: CPT | Performed by: NURSE PRACTITIONER

## 2023-02-17 RX ORDER — LURASIDONE HYDROCHLORIDE 120 MG/1
TABLET, FILM COATED ORAL
COMMUNITY

## 2023-02-17 RX ORDER — ERGOCALCIFEROL 1.25 MG/1
50000 CAPSULE ORAL WEEKLY
Qty: 5 CAPSULE | Refills: 12 | Status: SHIPPED | OUTPATIENT
Start: 2023-02-17

## 2023-02-17 RX ORDER — IBUPROFEN/PSEUDOEPHEDRINE HCL 200MG-30MG
TABLET ORAL
COMMUNITY
End: 2023-02-17

## 2023-02-17 RX ORDER — VALACYCLOVIR HYDROCHLORIDE 500 MG/1
500 TABLET, FILM COATED ORAL DAILY
Qty: 30 TABLET | Refills: 12 | Status: SHIPPED | OUTPATIENT
Start: 2023-02-17

## 2023-02-17 RX ORDER — DULOXETIN HYDROCHLORIDE 30 MG/1
CAPSULE, DELAYED RELEASE ORAL
COMMUNITY

## 2023-02-17 RX ORDER — DOCOSANOL 100 MG/G
CREAM TOPICAL
COMMUNITY

## 2023-02-17 RX ORDER — ASPIRIN 325 MG
TABLET ORAL
COMMUNITY

## 2023-02-17 RX ORDER — CLOMIPRAMINE HYDROCHLORIDE 50 MG/1
CAPSULE ORAL
COMMUNITY
Start: 2022-12-05 | End: 2023-02-17

## 2023-02-17 RX ORDER — LITHIUM CARBONATE 300 MG/1
CAPSULE ORAL
COMMUNITY
End: 2023-02-17

## 2023-02-17 NOTE — PROGRESS NOTES
"Subjective     Edith Stephens is a 57 y.o. female    History of Present Illness  Patient is here today for yearly checkup.  She states she is continue to have a vaginal infection.  States that she is practicing better hygiene than she was previously.  She also complains of rectal bleeding.  She states it is only a small amount.  She denies any vaginal bleeding or pain.  Gynecologic Exam  The patient's pertinent negatives include no pelvic pain, vaginal bleeding or vaginal discharge. Pertinent negatives include no abdominal pain, anorexia, back pain, chills, constipation, diarrhea, discolored urine, dysuria, fever, flank pain, frequency, headaches, hematuria, joint pain, joint swelling, nausea, painful intercourse, rash, sore throat, urgency or vomiting. She is sexually active. She is postmenopausal.         /80   Ht 162.6 cm (64\")   Wt 94.3 kg (208 lb)   LMP 12/13/2015 (Approximate)   BMI 35.70 kg/m²     Outpatient Encounter Medications as of 2/17/2023   Medication Sig Dispense Refill   • clindamycin (CLEOCIN T) 1 % external solution   3   • cloNIDine (CATAPRES) 0.1 MG tablet      • Denta 5000 Plus 1.1 % cream      • docosanol (ABREVA) 10 % cream cream Abreva 10 % topical cream     • docusate sodium (COLACE) 100 MG capsule      • DULoxetine (CYMBALTA) 30 MG capsule duloxetine 30 mg capsule,delayed release     • glucose blood test strip FreeStyle Lite Strips     • Hydroquinone 4 % emulsion Apply  topically.     • hydrOXYzine (ATARAX) 25 MG tablet      • Latuda 80 MG tablet tablet      • Lurasidone HCl (LATUDA) 120 MG tablet tablet Latuda 120 mg tablet     • metFORMIN (GLUCOPHAGE) 500 MG tablet metformin 500 mg tablet   TAKE ONE TABLET BY MOUTH TWICE A DAY -TAKE WITH FOOD     • Multiple Vitamins-Minerals (MULTIVITAMIN GUMMIES ADULT PO)      • Multiple Vitamins-Minerals (Multivitamin Gummies Adult) chewable tablet Adult Multivitamin Gummies 200 mcg chewable tablet     • Myrbetriq 50 MG tablet sustained-release " 24 hour 24 hr tablet TAKE ONE TABLET BY MOUTH DAILY 30 tablet 3   • nystatin 117034 UNIT/GM powder      • valACYclovir (Valtrex) 500 MG tablet Take 1 tablet by mouth Daily. 30 tablet 12   • vitamin D (ERGOCALCIFEROL) 1.25 MG (02495 UT) capsule capsule Take 1 capsule by mouth 1 (One) Time Per Week. 5 capsule 12   • [DISCONTINUED] BENZOYL PEROXIDE 5 % external wash   3   • [DISCONTINUED] valACYclovir (Valtrex) 500 MG tablet Take 1 tablet by mouth Daily. 30 tablet 12   • [DISCONTINUED] vitamin D (ERGOCALCIFEROL) 1.25 MG (58803 UT) capsule capsule Take 1 capsule by mouth 1 (One) Time Per Week. 5 capsule 3   • [DISCONTINUED] clomiPRAMINE (ANAFRANIL) 50 MG capsule      • [DISCONTINUED] lithium carbonate 300 MG capsule lithium carbonate 300 mg capsule     • [DISCONTINUED] Melatonin 3 MG tablet dispersible melatonin 3 mg tbdp     • [DISCONTINUED] metroNIDAZOLE (METROGEL VAGINAL) 0.75 % vaginal gel Insert  into the vagina Daily. 1 each 0   • [DISCONTINUED] Vraylar 3 MG capsule capsule        No facility-administered encounter medications on file as of 2/17/2023.       Surgical History  Past Surgical History:   Procedure Laterality Date   • CERVICAL BIOPSY  W/ LOOP ELECTRODE EXCISION     • D & C HYSTEROSCOPY N/A 5/30/2017    Procedure: DILATATION AND CURETTAGE HYSTEROSCOPY;  Surgeon: Citlali Arita MD;  Location: [facility];  Service:    • OTHER SURGICAL HISTORY      cyst removed from foot as a child       Family History  Family History   Problem Relation Age of Onset   • Hypertension Father    • Coronary artery disease Paternal Grandfather    • Coronary artery disease Paternal Grandmother    • Diabetes Paternal Grandmother    • Kidney cancer Maternal Grandfather    • Thyroid disease Mother    • No Known Problems Sister    • Breast cancer Neg Hx    • Ovarian cancer Neg Hx    • Uterine cancer Neg Hx    • Colon cancer Neg Hx    • Melanoma Neg Hx    • Prostate cancer Neg Hx        The following portions of the patient's history  were reviewed and updated as appropriate: allergies, current medications, past family history, past medical history, past social history, past surgical history, and problem list.    Review of Systems   Constitutional: Negative for activity change, appetite change, chills, diaphoresis, fatigue, fever, unexpected weight gain and unexpected weight loss.   HENT: Negative for congestion, dental problem, drooling, ear discharge, ear pain, facial swelling, hearing loss, mouth sores, nosebleeds, postnasal drip, rhinorrhea, sinus pressure, sneezing, sore throat, swollen glands, tinnitus, trouble swallowing and voice change.    Eyes: Negative for blurred vision, double vision, photophobia, pain, discharge, redness, itching and visual disturbance.   Respiratory: Negative for apnea, cough, choking, chest tightness, shortness of breath, wheezing and stridor.    Cardiovascular: Negative for chest pain, palpitations and leg swelling.   Gastrointestinal: Positive for blood in stool. Negative for abdominal distention, abdominal pain, anal bleeding, anorexia, constipation, diarrhea, nausea, rectal pain, vomiting, GERD and indigestion.   Endocrine: Negative for cold intolerance, heat intolerance, polydipsia, polyphagia and polyuria.   Genitourinary: Negative for amenorrhea, breast discharge, breast lump, breast pain, decreased libido, decreased urine volume, difficulty urinating, dyspareunia, dysuria, flank pain, frequency, genital sores, hematuria, menstrual problem, pelvic pain, pelvic pressure, urgency, urinary incontinence, vaginal bleeding, vaginal discharge and vaginal pain.   Musculoskeletal: Negative for arthralgias, back pain, gait problem, joint pain, joint swelling, myalgias, neck pain, neck stiffness and bursitis.   Skin: Negative for color change, dry skin and rash.   Allergic/Immunologic: Negative for environmental allergies, food allergies and immunocompromised state.   Neurological: Negative for dizziness, tremors,  seizures, syncope, facial asymmetry, speech difficulty, weakness, light-headedness, numbness, headache, memory problem and confusion.   Hematological: Negative for adenopathy. Does not bruise/bleed easily.   Psychiatric/Behavioral: Positive for depressed mood. Negative for agitation, behavioral problems, decreased concentration, dysphoric mood, hallucinations, self-injury, sleep disturbance, suicidal ideas, negative for hyperactivity and stress. The patient is nervous/anxious.        Objective   Physical Exam  Vitals and nursing note reviewed. Exam conducted with a chaperone present.   Constitutional:       General: She is not in acute distress.     Appearance: She is well-developed. She is not diaphoretic.   HENT:      Head: Normocephalic.      Right Ear: External ear normal.      Left Ear: External ear normal.      Nose: Nose normal.   Eyes:      General: No scleral icterus.        Right eye: No discharge.         Left eye: No discharge.      Conjunctiva/sclera: Conjunctivae normal.      Pupils: Pupils are equal, round, and reactive to light.   Neck:      Thyroid: No thyromegaly.      Vascular: No carotid bruit.      Trachea: No tracheal deviation.   Cardiovascular:      Rate and Rhythm: Normal rate and regular rhythm.      Heart sounds: Normal heart sounds. No murmur heard.  Pulmonary:      Effort: Pulmonary effort is normal. No respiratory distress.      Breath sounds: Normal breath sounds. No wheezing.   Chest:   Breasts:     Breasts are symmetrical.      Right: Normal. No swelling, bleeding, inverted nipple, mass, nipple discharge, skin change or tenderness.      Left: Normal. No swelling, bleeding, inverted nipple, mass, nipple discharge, skin change or tenderness.   Abdominal:      General: There is no distension.      Palpations: Abdomen is soft. There is no mass.      Tenderness: There is no abdominal tenderness. There is no right CVA tenderness, left CVA tenderness or guarding.      Hernia: No hernia is  present. There is no hernia in the left inguinal area or right inguinal area.   Genitourinary:     General: Normal vulva.      Exam position: Lithotomy position.      Labia:         Right: No rash, tenderness, lesion or injury.         Left: No rash, tenderness, lesion or injury.       Vagina: No signs of injury and foreign body. Vaginal discharge present. No erythema, tenderness or bleeding.      Cervix: Normal.      Uterus: Normal. Not enlarged, not fixed and not tender.       Adnexa: Right adnexa normal and left adnexa normal.        Right: No mass, tenderness or fullness.          Left: No mass, tenderness or fullness.        Rectum: Normal. No mass.          Comments:   BSU normal  Urethral meatus  Normal  Perineum  Normal  Musculoskeletal:         General: No tenderness. Normal range of motion.      Cervical back: Normal range of motion and neck supple.   Lymphadenopathy:      Head:      Right side of head: No submental, submandibular, tonsillar, preauricular, posterior auricular or occipital adenopathy.      Left side of head: No submental, submandibular, tonsillar, preauricular, posterior auricular or occipital adenopathy.      Cervical: No cervical adenopathy.      Right cervical: No superficial, deep or posterior cervical adenopathy.     Left cervical: No superficial, deep or posterior cervical adenopathy.      Upper Body:      Right upper body: No supraclavicular, axillary or pectoral adenopathy.      Left upper body: No supraclavicular, axillary or pectoral adenopathy.      Lower Body: No right inguinal adenopathy. No left inguinal adenopathy.   Skin:     General: Skin is warm and dry.      Findings: No bruising, erythema or rash.   Neurological:      Mental Status: She is alert and oriented to person, place, and time.      Coordination: Coordination normal.   Psychiatric:         Mood and Affect: Mood normal.         Behavior: Behavior normal.         Thought Content: Thought content normal.          Judgment: Judgment normal.         Assessment & Plan   Diagnoses and all orders for this visit:    1. Well woman exam with routine gynecological exam (Primary)  Normal GYN exam. Will have lab work at PCP. Encouraged SBE, pt is aware how to do self breast exam and the importance of same. Discussed weight management and importance of maintaining a healthy weight. Discussed Vitamin D intake and the importance of adequate vitamin D for both Bone Health and a healthy immune system.  Discussed Daily exercise and the importance of same, in regards to a healthy heart as well as helping to maintain her weight and improving her mental health.  BMI 35.7.  Colonoscopy to be scheduled.  Mammogram will be scheduled at [facility] per her request.  Pap smear is done per ASCCP guidelines.  -     Liquid-based Pap Smear, Screening  -     Ambulatory Referral For Screening Colonoscopy    2. Encounter for screening mammogram for breast cancer  Comments:  Patient will have a mammogram at [facility] per her request.  She is given an order today.  Orders:  -     Mammo Screening Digital Tomosynthesis Bilateral With CAD; Future    3. Herpes, vulvar  Comments:  Patient takes daily Valtrex related to HSV.  Requests refill.  Rx sent to pharmacy.  Patient states she has had 1 outbreak this year.  Orders:  -     valACYclovir (Valtrex) 500 MG tablet; Take 1 tablet by mouth Daily.  Dispense: 30 tablet; Refill: 12    4. Vitamin D deficiency  Comments:  Vitamin D refilled today.  Orders:  -     vitamin D (ERGOCALCIFEROL) 1.25 MG (93946 UT) capsule capsule; Take 1 capsule by mouth 1 (One) Time Per Week.  Dispense: 5 capsule; Refill: 12    5. Vaginal discharge  Comments:  Patient has a small amount of yellow discharge.  BV panel is added to Pap smear.  If patient has BV again she prefers to use MetroGel.  Orders:  -     Liquid-based Pap Smear, Screening    6. Screening examination for STD (sexually transmitted  disease)  Comments:  Gonorrhea and Chlamydia are added to Pap smear.  Orders:  -     Liquid-based Pap Smear, Screening    7. Rectal bleeding  Comments:  Patient states she has had some rectal bleeding.  She is not sure if she has a hemorrhoid.  She is referred for colonoscopy.         Class 2 Severe Obesity (BMI >=35 and <=39.9). Obesity-related health conditions include the following: hypertension. Obesity is improving with lifestyle modifications. BMI is is above average; BMI management plan is completed. We discussed portion control and increasing exercise.      Kimberly Daley, APRN  2/17/2023

## 2023-02-20 LAB — HPV I/H RISK 4 DNA CVX QL PROBE+SIG AMP: NOT DETECTED

## 2023-02-21 LAB
C TRACH RRNA CVX QL NAA+PROBE: NOT DETECTED
N GONORRHOEA RRNA SPEC QL NAA+PROBE: NOT DETECTED
TRICHOMONAS VAGINALIS PCR: NOT DETECTED

## 2023-02-22 LAB
GEN CATEG CVX/VAG CYTO-IMP: NORMAL
LAB AP CASE REPORT: NORMAL
LAB AP GYN ADDITIONAL INFORMATION: NORMAL
LAB AP GYN OTHER FINDINGS: NORMAL
Lab: NORMAL
PATH INTERP SPEC-IMP: NORMAL
STAT OF ADQ CVX/VAG CYTO-IMP: NORMAL

## 2023-02-22 RX ORDER — AZITHROMYCIN 250 MG/1
TABLET, FILM COATED ORAL
Qty: 6 TABLET | Refills: 0 | Status: SHIPPED | OUTPATIENT
Start: 2023-02-22

## 2023-02-23 ENCOUNTER — HOSPITAL ENCOUNTER (INPATIENT)
Age: 58
DRG: 885 | End: 2023-02-23
Attending: EMERGENCY MEDICINE | Admitting: PSYCHIATRY & NEUROLOGY
Payer: MEDICAID

## 2023-02-23 DIAGNOSIS — R45.851 SUICIDAL IDEATION: Primary | ICD-10-CM

## 2023-02-23 PROBLEM — F32.A DEPRESSION WITH SUICIDAL IDEATION: Status: ACTIVE | Noted: 2023-02-23

## 2023-02-23 LAB
ACETAMINOPHEN LEVEL: <5 UG/ML (ref 10–30)
ALBUMIN SERPL-MCNC: 4.3 G/DL (ref 3.5–5.2)
ALP BLD-CCNC: 108 U/L (ref 35–104)
ALT SERPL-CCNC: 17 U/L (ref 5–33)
AMPHETAMINE SCREEN, URINE: NEGATIVE
ANION GAP SERPL CALCULATED.3IONS-SCNC: 11 MMOL/L (ref 7–19)
AST SERPL-CCNC: 16 U/L (ref 5–32)
BARBITURATE SCREEN URINE: NEGATIVE
BASOPHILS ABSOLUTE: 0.1 K/UL (ref 0–0.2)
BASOPHILS RELATIVE PERCENT: 0.8 % (ref 0–1)
BENZODIAZEPINE SCREEN, URINE: NEGATIVE
BILIRUB SERPL-MCNC: 0.5 MG/DL (ref 0.2–1.2)
BILIRUBIN URINE: NEGATIVE
BLOOD, URINE: NEGATIVE
BUN BLDV-MCNC: 13 MG/DL (ref 6–20)
BUPRENORPHINE URINE: NEGATIVE
CALCIUM SERPL-MCNC: 10.4 MG/DL (ref 8.6–10)
CANNABINOID SCREEN URINE: NEGATIVE
CHLORIDE BLD-SCNC: 105 MMOL/L (ref 98–111)
CLARITY: CLEAR
CO2: 20 MMOL/L (ref 22–29)
COCAINE METABOLITE SCREEN URINE: NEGATIVE
COLOR: YELLOW
CREAT SERPL-MCNC: 0.7 MG/DL (ref 0.5–0.9)
EOSINOPHILS ABSOLUTE: 0.1 K/UL (ref 0–0.6)
EOSINOPHILS RELATIVE PERCENT: 1.4 % (ref 0–5)
EPITHELIAL CELLS, UA: NORMAL /HPF
ETHANOL: <10 MG/DL (ref 0–0.08)
GFR SERPL CREATININE-BSD FRML MDRD: >60 ML/MIN/{1.73_M2}
GLUCOSE BLD-MCNC: 111 MG/DL (ref 74–109)
GLUCOSE URINE: NEGATIVE MG/DL
HCT VFR BLD CALC: 45.3 % (ref 37–47)
HEMOGLOBIN: 15.6 G/DL (ref 12–16)
IMMATURE GRANULOCYTES #: 0 K/UL
KETONES, URINE: NEGATIVE MG/DL
LEUKOCYTE ESTERASE, URINE: ABNORMAL
LYMPHOCYTES ABSOLUTE: 1.9 K/UL (ref 1.1–4.5)
LYMPHOCYTES RELATIVE PERCENT: 20.8 % (ref 20–40)
Lab: NORMAL
MCH RBC QN AUTO: 32.5 PG (ref 27–31)
MCHC RBC AUTO-ENTMCNC: 34.4 G/DL (ref 33–37)
MCV RBC AUTO: 94.4 FL (ref 81–99)
METHADONE SCREEN, URINE: NEGATIVE
METHAMPHETAMINE, URINE: NEGATIVE
MONOCYTES ABSOLUTE: 0.9 K/UL (ref 0–0.9)
MONOCYTES RELATIVE PERCENT: 9.5 % (ref 0–10)
NEUTROPHILS ABSOLUTE: 6.2 K/UL (ref 1.5–7.5)
NEUTROPHILS RELATIVE PERCENT: 67.2 % (ref 50–65)
NITRITE, URINE: NEGATIVE
OPIATE SCREEN URINE: NEGATIVE
OXYCODONE URINE: NEGATIVE
PDW BLD-RTO: 13.1 % (ref 11.5–14.5)
PH UA: 7 (ref 5–8)
PHENCYCLIDINE SCREEN URINE: NEGATIVE
PLATELET # BLD: 331 K/UL (ref 130–400)
PMV BLD AUTO: 10.6 FL (ref 9.4–12.3)
POTASSIUM SERPL-SCNC: 4.1 MMOL/L (ref 3.5–5)
PROPOXYPHENE SCREEN: NEGATIVE
PROTEIN UA: NEGATIVE MG/DL
RBC # BLD: 4.8 M/UL (ref 4.2–5.4)
RBC UA: NORMAL /HPF (ref 0–2)
SALICYLATE, SERUM: <0.3 MG/DL (ref 3–10)
SARS-COV-2, NAAT: NOT DETECTED
SODIUM BLD-SCNC: 136 MMOL/L (ref 136–145)
SPECIFIC GRAVITY UA: 1.01 (ref 1–1.03)
TOTAL PROTEIN: 7.6 G/DL (ref 6.6–8.7)
TRICYCLIC, URINE: NEGATIVE
UROBILINOGEN, URINE: 0.2 E.U./DL
WBC # BLD: 9.2 K/UL (ref 4.8–10.8)
WBC UA: NORMAL /HPF (ref 0–5)

## 2023-02-23 PROCEDURE — 94660 CPAP INITIATION&MGMT: CPT

## 2023-02-23 PROCEDURE — 36415 COLL VENOUS BLD VENIPUNCTURE: CPT

## 2023-02-23 PROCEDURE — 80306 DRUG TEST PRSMV INSTRMNT: CPT

## 2023-02-23 PROCEDURE — 82077 ASSAY SPEC XCP UR&BREATH IA: CPT

## 2023-02-23 PROCEDURE — 80179 DRUG ASSAY SALICYLATE: CPT

## 2023-02-23 PROCEDURE — 80053 COMPREHEN METABOLIC PANEL: CPT

## 2023-02-23 PROCEDURE — 99285 EMERGENCY DEPT VISIT HI MDM: CPT

## 2023-02-23 PROCEDURE — 81001 URINALYSIS AUTO W/SCOPE: CPT

## 2023-02-23 PROCEDURE — 6370000000 HC RX 637 (ALT 250 FOR IP): Performed by: PSYCHIATRY & NEUROLOGY

## 2023-02-23 PROCEDURE — 87635 SARS-COV-2 COVID-19 AMP PRB: CPT

## 2023-02-23 PROCEDURE — 85025 COMPLETE CBC W/AUTO DIFF WBC: CPT

## 2023-02-23 PROCEDURE — 1240000000 HC EMOTIONAL WELLNESS R&B

## 2023-02-23 PROCEDURE — 80143 DRUG ASSAY ACETAMINOPHEN: CPT

## 2023-02-23 RX ORDER — DULOXETIN HYDROCHLORIDE 30 MG/1
30 CAPSULE, DELAYED RELEASE ORAL DAILY
Status: ON HOLD | COMMUNITY

## 2023-02-23 RX ORDER — CLINDAMYCIN AND BENZOYL PEROXIDE 10; 50 MG/G; MG/G
GEL TOPICAL 2 TIMES DAILY
Status: ON HOLD | COMMUNITY
End: 2023-02-24

## 2023-02-23 RX ORDER — CHOLECALCIFEROL (VITAMIN D3) 1250 MCG
1 CAPSULE ORAL WEEKLY
Status: ON HOLD | COMMUNITY

## 2023-02-23 RX ORDER — NYSTATIN 100000 [USP'U]/G
POWDER TOPICAL 4 TIMES DAILY PRN
Status: ON HOLD | COMMUNITY
End: 2023-02-28 | Stop reason: HOSPADM

## 2023-02-23 RX ORDER — CHLORHEXIDINE GLUCONATE 213 G/1000ML
SOLUTION TOPICAL DAILY PRN
Status: ON HOLD | COMMUNITY

## 2023-02-23 RX ORDER — POLYETHYLENE GLYCOL 3350 17 G/17G
17 POWDER, FOR SOLUTION ORAL DAILY PRN
Status: DISCONTINUED | OUTPATIENT
Start: 2023-02-23 | End: 2023-02-28 | Stop reason: HOSPADM

## 2023-02-23 RX ORDER — HYDROXYZINE HYDROCHLORIDE 25 MG/1
25 TABLET, FILM COATED ORAL 3 TIMES DAILY PRN
Status: DISCONTINUED | OUTPATIENT
Start: 2023-02-23 | End: 2023-02-28 | Stop reason: HOSPADM

## 2023-02-23 RX ORDER — M-VIT,TX,IRON,MINS/CALC/FOLIC 27MG-0.4MG
1 TABLET ORAL DAILY
Status: ON HOLD | COMMUNITY

## 2023-02-23 RX ADMIN — HYDROXYZINE HYDROCHLORIDE 25 MG: 25 TABLET ORAL at 22:29

## 2023-02-23 RX ADMIN — LURASIDONE HYDROCHLORIDE 80 MG: 40 TABLET, FILM COATED ORAL at 18:35

## 2023-02-23 ASSESSMENT — ENCOUNTER SYMPTOMS
BACK PAIN: 0
SHORTNESS OF BREATH: 0
DIARRHEA: 0
SORE THROAT: 0
COUGH: 0
ABDOMINAL PAIN: 0
VOMITING: 0

## 2023-02-23 ASSESSMENT — SLEEP AND FATIGUE QUESTIONNAIRES
DO YOU HAVE DIFFICULTY SLEEPING: YES
SLEEP PATTERN: RESTLESSNESS
DO YOU USE A SLEEP AID: YES

## 2023-02-23 ASSESSMENT — LIFESTYLE VARIABLES
HOW MANY STANDARD DRINKS CONTAINING ALCOHOL DO YOU HAVE ON A TYPICAL DAY: PATIENT DOES NOT DRINK
HOW OFTEN DO YOU HAVE A DRINK CONTAINING ALCOHOL: NEVER

## 2023-02-23 ASSESSMENT — PATIENT HEALTH QUESTIONNAIRE - PHQ9: SUM OF ALL RESPONSES TO PHQ QUESTIONS 1-9: 19
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
## 2023-02-24 PROCEDURE — 6370000000 HC RX 637 (ALT 250 FOR IP): Performed by: PSYCHIATRY & NEUROLOGY

## 2023-02-24 PROCEDURE — 1240000000 HC EMOTIONAL WELLNESS R&B

## 2023-02-24 PROCEDURE — 94660 CPAP INITIATION&MGMT: CPT

## 2023-02-24 PROCEDURE — 2500000003 HC RX 250 WO HCPCS: Performed by: PSYCHIATRY & NEUROLOGY

## 2023-02-24 RX ORDER — CLINDAMYCIN PHOSPHATE 11.9 MG/ML
SOLUTION TOPICAL DAILY
Status: DISCONTINUED | OUTPATIENT
Start: 2023-02-24 | End: 2023-02-28 | Stop reason: HOSPADM

## 2023-02-24 RX ORDER — ERGOCALCIFEROL 1.25 MG/1
50000 CAPSULE ORAL WEEKLY
Status: DISCONTINUED | OUTPATIENT
Start: 2023-02-26 | End: 2023-02-28 | Stop reason: HOSPADM

## 2023-02-24 RX ORDER — DOCUSATE SODIUM 100 MG/1
100 CAPSULE, LIQUID FILLED ORAL DAILY PRN
Status: DISCONTINUED | OUTPATIENT
Start: 2023-02-24 | End: 2023-02-28 | Stop reason: HOSPADM

## 2023-02-24 RX ORDER — CLINDAMYCIN AND BENZOYL PEROXIDE 10; 50 MG/G; MG/G
GEL TOPICAL 2 TIMES DAILY
Status: DISCONTINUED | OUTPATIENT
Start: 2023-02-24 | End: 2023-02-24

## 2023-02-24 RX ORDER — ACETAMINOPHEN 325 MG/1
650 TABLET ORAL EVERY 4 HOURS PRN
Status: DISCONTINUED | OUTPATIENT
Start: 2023-02-24 | End: 2023-02-28 | Stop reason: HOSPADM

## 2023-02-24 RX ORDER — CALCIUM CARBONATE 200(500)MG
1 TABLET,CHEWABLE ORAL 2 TIMES DAILY PRN
Status: DISCONTINUED | OUTPATIENT
Start: 2023-02-24 | End: 2023-02-28 | Stop reason: HOSPADM

## 2023-02-24 RX ORDER — ACYCLOVIR 200 MG/1
400 CAPSULE ORAL 3 TIMES DAILY
Status: DISCONTINUED | OUTPATIENT
Start: 2023-02-24 | End: 2023-02-28 | Stop reason: HOSPADM

## 2023-02-24 RX ORDER — CLINDAMYCIN PHOSPHATE 11.9 MG/ML
SOLUTION TOPICAL DAILY
Status: ON HOLD | COMMUNITY

## 2023-02-24 RX ORDER — DULOXETIN HYDROCHLORIDE 30 MG/1
30 CAPSULE, DELAYED RELEASE ORAL DAILY
Status: DISCONTINUED | OUTPATIENT
Start: 2023-02-24 | End: 2023-02-28 | Stop reason: HOSPADM

## 2023-02-24 RX ORDER — HYDROXYZINE PAMOATE 25 MG/1
25 CAPSULE ORAL NIGHTLY
Status: DISCONTINUED | OUTPATIENT
Start: 2023-02-24 | End: 2023-02-28 | Stop reason: HOSPADM

## 2023-02-24 RX ORDER — M-VIT,TX,IRON,MINS/CALC/FOLIC 27MG-0.4MG
1 TABLET ORAL DAILY
Status: DISCONTINUED | OUTPATIENT
Start: 2023-02-24 | End: 2023-02-28 | Stop reason: HOSPADM

## 2023-02-24 RX ADMIN — DOCUSATE SODIUM 100 MG: 100 CAPSULE, LIQUID FILLED ORAL at 11:54

## 2023-02-24 RX ADMIN — HYDROXYZINE PAMOATE 25 MG: 25 CAPSULE ORAL at 21:04

## 2023-02-24 RX ADMIN — MICONAZOLE NITRATE: 2 POWDER TOPICAL at 12:23

## 2023-02-24 RX ADMIN — LURASIDONE HYDROCHLORIDE 80 MG: 40 TABLET, FILM COATED ORAL at 16:50

## 2023-02-24 RX ADMIN — HYDROXYZINE HYDROCHLORIDE 25 MG: 25 TABLET ORAL at 21:04

## 2023-02-24 RX ADMIN — MULTIPLE VITAMINS W/ MINERALS TAB 1 TABLET: TAB at 11:52

## 2023-02-24 RX ADMIN — MICONAZOLE NITRATE: 2 POWDER TOPICAL at 20:06

## 2023-02-24 RX ADMIN — CLINDAMYCIN PHOSPHATE: 11.9 SOLUTION TOPICAL at 14:43

## 2023-02-24 RX ADMIN — ACYCLOVIR 400 MG: 200 CAPSULE ORAL at 14:43

## 2023-02-24 RX ADMIN — DULOXETINE HYDROCHLORIDE 30 MG: 30 CAPSULE, DELAYED RELEASE ORAL at 11:52

## 2023-02-24 RX ADMIN — ACYCLOVIR 400 MG: 200 CAPSULE ORAL at 21:04

## 2023-02-24 NOTE — PLAN OF CARE
Problem: Anxiety  Goal: Will report anxiety at manageable levels  Outcome: Progressing       Group Therapy Note     Date: 2/24/2023  Start Time: 1100  End Time:  5550  Number of Participants: 13     Type of Group: Psychoeducation     Wellness Binder Information  Module Name:  emotional wellness  Session Number:  5     Patient's Goal:  obstacles to emotional wellness     Notes:  pt acknowledged negative thinking as an obstacle to emotional wellness.      Status After Intervention:  Improved     Participation Level: Interactive     Participation Quality: Appropriate, Attentive, and Sharing        Speech:  normal        Thought Process/Content: Logical        Affective Functioning: Congruent        Mood: congruent        Level of consciousness:  Alert, Oriented x4, and Attentive        Response to Learning: Able to verbalize current knowledge/experience        Endings: None Reported     Modes of Intervention: Education        Discipline Responsible: Psychoeducational Specialist        Signature:  Chuck Euceda

## 2023-02-24 NOTE — PLAN OF CARE
Problem: Depression/Self Harm  Goal: Effect of psychiatric condition will be minimized and patient will be protected from self harm  Description: INTERVENTIONS:  1. Assess impact of patient's symptoms on level of functioning, self care needs and offer support as indicated  2. Assess patient/family knowledge of depression, impact on illness and need for teaching  3. Provide emotional support, presence and reassurance  4. Assess for possible suicidal thoughts or ideation. If patient expresses suicidal thoughts or statements do not leave alone, initiate Suicide Precautions, move to a room close to the nursing station and obtain sitter  5. Initiate consults as appropriate with Mental Health Professional, Spiritual Care, Psychosocial CNS, and consider a recommendation to the L[facility] for a Psychiatric Consultation  Outcome: Progressing     Group Therapy Note     Date: 2/24/2023  Start Time: 1000  End Time:  1045  Number of Participants: 14     Type of Group: Psychotherapy     Patient's Goal: Patient will process emotions and actions and how to relate to other or their with others. Patient discussed open communication and feelings and emotions. Notes:  Patient attended process group as scheduled, patient identified a issue to work on today regarding how they will interact and relate to others. Status After Intervention:  Improved     Participation Level:  Active Listener     Participation Quality: Appropriate, Attentive, and Sharing        Speech:  normal        Thought Process/Content: Logical        Affective Functioning: Congruent        Mood: euthymic        Level of consciousness:  Alert        Response to Learning: Able to verbalize current knowledge/experience        Endings: None Reported     Modes of Intervention: Education, Support, and Socialization        Discipline Responsible: /Counselor        Signature:  Timi Chna [facility]

## 2023-02-24 NOTE — PROGRESS NOTES
[facility] Adult Unit Daily Assessment  Nursing Progress Note    Room: [address] [place] [address]   Name: Alexei Nieves   Age: 65 y.o. Gender: female   Dx: Depression with suicidal ideation  Precautions: suicide risk  Inpatient Status: voluntary       SLEEP:  Sleep Quality Fair  Sleep Medications: No   PRN Sleep Meds: No       MEDICAL:  Other PRN Meds: No   Med Compliant: Yes  Accu-Chek: No  Oxygen/CPAP/BiPAP: No  CIWA/CINA: No   PAIN Assessment: none  Side Effects from medication: No      Metabolic Screening:  Lab Results   Component Value Date    LABA1C 6.1 (H) 11/25/2022     Lab Results   Component Value Date    CHOL 172 11/25/2022    CHOL 143 (L) 06/27/2021    CHOL 149 (L) 07/22/2020    CHOL 152 (L) 02/22/2020    CHOL 130 (L) 12/22/2018    CHOL 154 02/11/2014    CHOL 181 05/10/2012     Lab Results   Component Value Date    TRIG 177 (H) 11/25/2022    TRIG 166 (H) 06/27/2021    TRIG 143 07/22/2020    TRIG 130 02/22/2020    TRIG 83 12/22/2018    TRIG 113 02/11/2014    TRIG 197 (H) 05/10/2012     Lab Results   Component Value Date    HDL 59 (L) 11/25/2022    HDL 48 (L) 06/27/2021    HDL 60 (L) 07/22/2020    HDL 52 (L) 02/22/2020    HDL 52 (L) 12/22/2018    HDL 56 02/11/2014    HDL 62 05/10/2012     No components found for: LDLCAL  No results found for: LABVLDL  Body mass index is 36.13 kg/m². BP Readings from Last 2 Encounters:   02/24/23 136/78   12/05/22 130/88         Medical Bed:   Is patient in a medical bed? no   If medical bed is in use, has nursing secured room while patient is awake and out of the room? NA  Has safety checks by nursing been completed on the bed/room this shift? NA    Protective Factors:  Patient identifies protective factors with nursing staff as follows:    Identifies reasons for living: Yes   Supportive Social Network or family: Yes    Belief that suicide is immoral/high spirituality: Yes   Responsibility to family or others/living with family: Yes   Fear of death or dying due to pain and suffering: Yes   Engaged in work or school: Yes     If Patient is unable to identify, reason why? PSYCH:  Depression: 9   Anxiety: 8   SI denies suicidal ideation      HI Negative for homicidal ideation      AVH:no If Hallucinations are present, describe? GENERAL:  Appetite: good   Percent Meals: 100%   Social: Yes   Speech: normal   Appearance: appropriately dressed, appropriately groomed, good hygiene, and healthy looking    GROUP:  Group Participation: Yes  Participation Quality: Active Listener and Interactive    Notes: Patient paces in and out of her room and frequently approaches the nurse's station to ask questions. Initiated interview during one of these moments. Patient said she had fair sleep and thinks she got about 6 hours. Rates her depression as a 9 and her anxiety as an 8. Patient denies SI, HI and AVH. Will continue to monitor for safety.         Electronically signed by Buffy Garrison RN on 2/24/23 at 4:41 PM CST

## 2023-02-24 NOTE — PROGRESS NOTES
Treatment Team Note:    Target Symptoms/Reason for admission: Per nursing admission assessment - Reason for Admission: Luis Manzano is a 65year old female admitted from the ER with worsening depression and a plan to OD on medication. These symptoms have been worsening for about one week. Pt reports that it has been going on since Del Rosario's Day and got worse and had thought of using a gun to harm herself. Pt has an extensive mental health history with dx to include Bipolar, MDD, OCD and Borderline Personality Disorder. Pt sees [facility] for OP treatment. Pt reports compliance with medication and treatment. She has multiple admissions to the Psych unit at [facility] and [facility] as well as [facility]. Diagnoses per psych provider: Suicidal ideation [R45.851]  Depression with suicidal ideation [F32. [place] Haider    Therapist met with treatment team to discuss patients treatment and discharge plans. Patient's aftercare plan is: [address]    Aftercare appointments made: No - SW will make discharge appointments    Pt lives with: spouse    Collateral obtained from: SW will meet with patient to gather information  Collateral obtained on:new admissions    Attending groups: New admission - SW will monitor group attendance    Behavior: calm    Has patient been completing ADL's:  New admission - unknown at this time - SW will monitor    SI:  patient denies SI  Plan: no   If yes describe: N/A - patient denies plan  HI:  patient denies HI  If present describe: N/A  Delusions: patient denies delusions  If present describe: N/A  Hallucinations: patient denies hallucinations  If present describe: N/A    Patient rates their -- Depression: 1-10:  0  Anxiety:1-10:  0    Sleeping:New admission - unknown at this time. Taking medication: New admission - unknown at this time.     Misc:

## 2023-02-24 NOTE — PROGRESS NOTES
Group Note    Date: 02/24/23  Start Time: 8:00 AM   End Time:8:30 AM     Number of Participants: 15    Type of Group: Community/Goal     Patient's Goal:  going to group and talking with doctor    Notes:  Alert and oriented, cooperative    Status After Intervention:  Improved    Participation Level:  Active Listener    Participation Quality: Appropriate    Speech:  normal    Thought Process/Content: Linear    Mood: anxious    Level of consciousness:  Alert and Oriented x4    Response to Learning: Able to verbalize current knowledge/experience    Modes of Intervention: Education and Support    Discipline Responsible: Registered Nurse     Signature:  Cedric Lee RN

## 2023-02-24 NOTE — PROGRESS NOTES
Behavioral Services  Medicare Certification Upon Admission    I certify that this patient's inpatient psychiatric hospital admission is medically necessary for:    [x] (1) Treatment which could reasonably be expected to improve this patient's condition,       [x] (2) Or for diagnostic study;     AND     [x](2) The inpatient psychiatric services are provided while the individual is under the care of a physician and are included in the individualized plan of care.     Estimated length of stay/service 5-7 days    Plan for post-hospital care TBD    Electronically signed by Don Chase MD on 2/24/2023 at 2:10 PM

## 2023-02-24 NOTE — PROGRESS NOTES
[facility] Admission Note  Nursing Admission Note        Reason for Admission: Patrick Thomason is a 65year old female admitted from the ER with worsening depression and a plan to OD on medication. These symptoms have been worsening for about one week. Pt reports that it has been going on since Del Rosario's Day and got worse and had thought of using a gun to harm herself. Pt has an extensive mental health history with dx to include Bipolar, MDD, OCD and Borderline Personality Disorder. Pt sees [facility] for OP treatment. Pt reports compliance with medication and treatment. She has [facility]     Patient Active Problem List   Diagnosis    Bipolar depression ([address]    Borderline personality disorder (HCC)    Insomnia    Hoarding disorder    Obstructive sleep apnea    CPAP (continuous positive airway pressure) dependence    Ingestion of toxic substance    Bipolar 1 disorder, depressed ([address]    Mixed obsessional thoughts and acts    Depression with suicidal ideation         Addictive Behavior:   Addictive Behavior  In the Past 3 Months, Have You Felt or Has Someone Told You That You Have a Problem With  : None    Medical Problems:   Past Medical History:   Diagnosis Date    Chest pain 02/08/2012    CPAP (continuous positive airway pressure) dependence     Depression     Hypoglycemia     SANDY (obstructive sleep apnea)     Schizoaffective disorder ([address] 02/08/2012    Suicide attempt ([facility]        Status EXAM:  Mental Status and Behavioral Exam  Normal: No  Level of Assistance: Independent/Self  Facial Expression: Flat  Affect: Congruent  Level of Consciousness: Alert  Frequency of Checks: 4 times per hour, close  Mood:Normal: No  Mood: Depressed, Anxious  Motor Activity:Normal: Yes  Eye Contact: Good  Observed Behavior: Cooperative  Sexual Misconduct History: Current - no  Preception: [place] to person, [place] to time, [place] to place, [place] to situation  Attention:Normal: No  Attention: Distractible  Thought Processes: Circumstantial  Thought Content:Normal: No  Thought Content: Preoccupations  Depression Symptoms: Change in energy level, Feelings of helplessness, Feelings of hopelessess, Impaired concentration, Loss of interest  Anxiety Symptoms: Generalized  Estela Symptoms: No problems reported or observed. Hallucinations: None  Delusions: No  Memory:Normal: No  Memory: Poor recent  Insight and Judgment: No  Insight and Judgment: Poor judgment, Poor insight    Psych:   Suicidal Ideation: yes. If yes, is there a plan? (Describe) OD on medications              Risk of Suicide: High Risk   Homicidal Ideation:  no.  If yes, describe: N/A   Auditory/Visual Hallucinations:  no.      If yes, describe AVH? denies    Metabolic Screening:  Lab Results   Component Value Date    LABA1C 6.1 (H) 11/25/2022     Lab Results   Component Value Date    CHOL 172 11/25/2022    CHOL 143 (L) 06/27/2021    CHOL 149 (L) 07/22/2020    CHOL 152 (L) 02/22/2020    CHOL 130 (L) 12/22/2018    CHOL 154 02/11/2014    CHOL 181 05/10/2012     Lab Results   Component Value Date    TRIG 177 (H) 11/25/2022    TRIG 166 (H) 06/27/2021    TRIG 143 07/22/2020    TRIG 130 02/22/2020    TRIG 83 12/22/2018    TRIG 113 02/11/2014    TRIG 197 (H) 05/10/2012     Lab Results   Component Value Date    HDL 59 (L) 11/25/2022    HDL 48 (L) 06/27/2021    HDL 60 (L) 07/22/2020    HDL 52 (L) 02/22/2020    HDL 52 (L) 12/22/2018    HDL 56 02/11/2014    HDL 62 05/10/2012     No components found for: LDLCAL  No results found for: LABVLDL  Body mass index is 36.13 kg/m². BP Readings from Last 2 Encounters:   02/23/23 (!) 124/91   12/05/22 130/88       PATIENT STRENGTHS and Barriers:   Patient Strengths/Barriers  Strengths (Must Choose Two):  Motivation level for treatment, Support from family  Barriers: Recreational/leisure/hobbies, Technical/vocation      Tobacco Screening:  Practical Counseling, on admission, umu X, if applicable and completed (first 3 are required if patient doesn't refuse):            Recognizing danger situations (included triggers and roadblocks)   non smoker              Coping skills (new ways to manage stress, exercise, relaxation techniques, changing routine, distraction  non smoker                                                    Basic information about quitting (benefits of quitting, techniques in how to quit, available resources non smoker  Referral for counseling faxed to [facility]     non smoker                                       Patient refused counseling non smoker  Patient has not smoked in the last 30 days non smoker  Patient offered nicotine patch. No Received Non smoker  Refused NO  Patient is a non-smoker Yes       Admission to Unit:    Pt admitted to [facility] under the care of Dr. Adriano Cardoso,  arrived on unit via [facility] with security and staff from ER    Patient arrived dressed in paper scrubs:  yes. Body assessment and safety check completed by ARCHANA Gonzales and Frantz Pressley RN and  no contraband discovered. Patient belongings and valuables was cataloged and accounted for by Colletta Postin, RN. Admission completed by Luis Layton RN  Oriented to unit, unit policy and expectations:  yes    Reviewed and explained all legal documents:  yes    Education for Fall Prevention and Restraints given: yes    Patient signed all legal documents yes   Pt verbalizes understanding:yes     Vianey Cheney Obtained? yes    Medical Bed:  Does patient require a medical bed? no  If answered yes for medical bed use, does the patient have the following conditions? High risk for falls? no   Obstructive sleep apnea? yes   Oxygen Use? no   Use of assistive devices? no   Other, (explain)? N/A      Identifies stressors. yes   relationship stressors. Protective Factors:  Patient identifies protective factors with nursing staff as follows:    Identifies reasons for living: Yes   Supportive Social Network or family: Yes    Belief that suicide is immoral/high spirituality: Yes   Responsibility to family or others/living with family: Yes   Fear of death or dying due to pain and suffering: No   Engaged in work or school: No     If Patient is unable to identify, reason why? N/A          Admission Note:  Judith Patton is a 65year old female admitted from the ER with worsening depression and a plan to OD on medication. [facility] staff and ER staff. She was acclimated to the unit per policy and participated in her admission and was cooperative. These symptoms have been worsening for about one week. Pt reports that it has been going on since Del Rosario's Day and got worse and had thought of using a gun to harm herself. Pt has an extensive mental health history with dx to include Bipolar, MDD, OCD and Borderline Personality Disorder. Pt sees [facility] for OP treatment. Pt reports compliance with medication and treatment. She has multiple admissions to the Psych unit at [facility] and [facility] as well as [facility]. She uses a CPAP at night. She has been resting well since going to bed.            Electronically signed by Rosas Stovall RN on 2/24/23 at 2:29 AM CST
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
## 2023-02-24 NOTE — PLAN OF CARE
Problem: Self Harm/Suicidality  Goal: Will have no self-injury during hospital stay  Description: INTERVENTIONS:  1. Ensure constant observer at bedside with Q15M safety checks  2. Maintain a safe environment  3. Secure patient belongings  4. Ensure family/visitors adhere to safety recommendations  5. Ensure safety tray has been added to patient's diet order  6. Every shift and PRN: Re-assess suicidal risk via Frequent Screener    Outcome: Progressing     Problem: Anxiety  Goal: Will report anxiety at manageable levels  Description: INTERVENTIONS:  1. Administer medication as ordered  2. Teach and rehearse alternative coping skills  3. Provide emotional support with 1:1 interaction with staff  2/24/2023 1655 by Jackson May RN  Outcome: Progressing  2/24/2023 1147 by Shireen Ware  Outcome: Progressing     Problem: Coping  Goal: Pt/Family able to verbalize concerns and demonstrate effective coping strategies  Description: INTERVENTIONS:  1. Assist patient/family to identify coping skills, available support systems and cultural and spiritual values  2. Provide emotional support, including active listening and acknowledgement of concerns of patient and caregivers  3. Reduce environmental stimuli, as able  4. Instruct patient/family in relaxation techniques, as appropriate  5. Assess for spiritual pain/suffering and initiate Spiritual Care, Psychosocial Clinical Specialist consults as needed  Outcome: Progressing     Problem: Decision Making  Goal: Pt/Family able to effectively weigh alternatives and participate in decision making related to treatment and care  Description: INTERVENTIONS:  1. Determine when there are differences between patient's view, family's view, and healthcare provider's view of condition  2. Facilitate patient and family articulation of goals for care  3. Help patient and family identify pros/cons of alternative solutions  4. Provide information as requested by patient/family  5. Respect patient/family right to receive or not to receive information  6. Serve as a liaison between patient and family and health care team  7. Initiate Consults from Ethics, Palliative Care or initiate [address] [place] [address] as is appropriate  Outcome: Progressing     Problem: Behavior  Goal: Pt/Family maintain appropriate behavior and adhere to behavioral management agreement, if implemented  Description: INTERVENTIONS:  1. Assess patient/family's coping skills and  non-compliant behavior (including use of illegal substances)  2. Notify security of behavior or suspected illegal substances which indicate the need for search of the family and/or belongings  3. Encourage verbalization of thoughts and concerns in a socially appropriate manner  4. Utilize positive, consistent limit setting strategies supporting safety of patient, staff and others  5. Encourage participation in the decision making process about the behavioral management agreement  6. If a visitor's behavior poses a threat to safety call refer to organization policy. 7. Initiate consult with , Psychosocial CNS, Spiritual Care as appropriate  Outcome: Progressing     Problem: Depression/Self Harm  Goal: Effect of psychiatric condition will be minimized and patient will be protected from self harm  Description: INTERVENTIONS:  1. Assess impact of patient's symptoms on level of functioning, self care needs and offer support as indicated  2. Assess patient/family knowledge of depression, impact on illness and need for teaching  3. Provide emotional support, presence and reassurance  4. Assess for possible suicidal thoughts or ideation. If patient expresses suicidal thoughts or statements do not leave alone, initiate Suicide Precautions, move to a room close to the nursing station and obtain sitter  5.  Initiate consults as appropriate with Mental Health Professional, Spiritual Care, Psychosocial CNS, and consider a recommendation to the LIP for a Psychiatric Consultation  2/24/2023 1655 by Sathya Dickinson RN  Outcome: Progressing  2/24/2023 1330 by Ihsan Nicholas LPC  Outcome: Progressing     Problem: Safety - Adult  Goal: Free from fall injury  Outcome: Progressing     Problem: Pain  Goal: Verbalizes/displays adequate comfort level or baseline comfort level  Outcome: Progressing     Problem: Chronic Conditions and Co-morbidities  Goal: Patient's chronic conditions and co-morbidity symptoms are monitored and maintained or improved  Outcome: Progressing

## 2023-02-24 NOTE — PROGRESS NOTES
SW met with patient to complete psychosocial and lifetime CSSR-S on this date. Patients long and short-term goals discussed. Patient voiced understanding. Treatment plan sheet signed. Patient verbalized understanding of the treatment plan. Patient participated in goals and objectives of the treatment plan. Patient discussed safety plan with . SW explained treatment goals with pt. Decreasing depression and anxiety by improvement of positive coping patterns was discussed. Pt acknowledged understanding of treatment goals and signed treatment plan signature sheet. In the last 6 months has the patient been a danger to self: Yes  In the last 6 months has the patient been a danger to others: No  Legal Guardian/POA: No    Provided patient with [employer] handout entitled \"Quitting Smoking. \"  Reviewed handout with patient: addressing dangers of smoking, developing coping skills, and providing basic information about quitting. Patient received all components practical counseling of tobacco practical counseling during the hospital stay.

## 2023-02-24 NOTE — PROGRESS NOTES
Admission Note      Reason for admission/Target Symptom: Per nursing admission assessment - Reason for Admission: Jennifer Rushing is a 65year old female admitted from the ER with worsening depression and a plan to OD on medication. These symptoms have been worsening for about one week. Pt reports that it has been going on since Del Rosario's Day and got worse and had thought of using a gun to harm herself. Pt has an extensive mental health history with dx to include Bipolar, MDD, OCD and Borderline Personality Disorder. Pt sees [facility] for OP treatment. Pt reports compliance with medication and treatment. She has multiple admissions to the Psych unit at [facility] and [facility] as well as [facility]. Diagnoses: Depression NOS  UDS: Neg  BAL:  Neg    SW will meet with treatment team to discuss patient's treatment including care planning, discharge planning, and follow-up needs. Patient has been admitted to [facility] Unit. Treatment team will identify the patient's discharge needs. Appointments will be made for medication management and outpatient therapy/counseling. Pt confirmed the need for ongoing treatment post inpatient stay. Pt was also provided a handout of contact information for drug and alcohol treatment centers and other community support service such as [facility], AA, and [facility].

## 2023-02-24 NOTE — PROGRESS NOTES
Pt reports that she uses Tylenol at home to treat minor pain with no issues or negative side effects. She denies any allergy to Tylenol.      Electronically signed by Hannah Hanson RN on 2/24/2023 at 5:15 AM

## 2023-02-24 NOTE — PROGRESS NOTES
02/24/23 1239   Encounter Summary   Encounter Overview/Reason  Behavioral Health   Service Provided For: Patient   Referral/Consult From: Nurse   Complexity of Encounter Moderate   Begin Time 1125   End Time  1140   Total Time Calculated 15 min   Encounter    Type Initial Screen/Assessment   Spiritual/Emotional needs   Type Spiritual Distress; Emotional Distress   Rituals, Rites and Sacraments   Type Rite (Comment); Blessings   Assessment/Intervention/Outcome   Assessment Moral distress   Intervention Discussed belief system/Presybeterian practices/nadia; Confronted/Challenged;Prayer (assurance of)/[place]   Outcome Engaged in conversation;Expressed feelings, needs, and concerns     S:  Patient stated \"Hard to forgive myself. \"  O:  Patient was quite engaging in the process of conversation. A:  Patient seems to identify her sense of hopelessness and ability to hope, to embrace her imperfections, let go. Seems talking about it - with an affirming ears - facilitate \"I can do it\" understanding. P:  Spiritual Care: Prayer/blessing.     Electronically signed by Marion Gregory MA [facility] on 2/24/2023 at 12:49 PM

## 2023-02-25 LAB
TSH REFLEX FT4: 0.72 UIU/ML (ref 0.35–5.5)
VITAMIN B-12: 656 PG/ML (ref 211–946)
VITAMIN D 25-HYDROXY: 59.9 NG/ML

## 2023-02-25 PROCEDURE — 82607 VITAMIN B-12: CPT

## 2023-02-25 PROCEDURE — 6370000000 HC RX 637 (ALT 250 FOR IP): Performed by: FAMILY MEDICINE

## 2023-02-25 PROCEDURE — 84443 ASSAY THYROID STIM HORMONE: CPT

## 2023-02-25 PROCEDURE — 1240000000 HC EMOTIONAL WELLNESS R&B

## 2023-02-25 PROCEDURE — 82306 VITAMIN D 25 HYDROXY: CPT

## 2023-02-25 PROCEDURE — 6370000000 HC RX 637 (ALT 250 FOR IP): Performed by: PSYCHIATRY & NEUROLOGY

## 2023-02-25 PROCEDURE — 94660 CPAP INITIATION&MGMT: CPT

## 2023-02-25 PROCEDURE — 36415 COLL VENOUS BLD VENIPUNCTURE: CPT

## 2023-02-25 RX ORDER — HYDROCORTISONE 25 MG/G
CREAM TOPICAL 2 TIMES DAILY PRN
Status: DISCONTINUED | OUTPATIENT
Start: 2023-02-25 | End: 2023-02-28 | Stop reason: HOSPADM

## 2023-02-25 RX ORDER — METRONIDAZOLE 7.5 MG/G
GEL VAGINAL NIGHTLY
Status: DISCONTINUED | OUTPATIENT
Start: 2023-02-25 | End: 2023-02-28 | Stop reason: HOSPADM

## 2023-02-25 RX ADMIN — ACYCLOVIR 400 MG: 200 CAPSULE ORAL at 21:05

## 2023-02-25 RX ADMIN — HYDROXYZINE PAMOATE 25 MG: 25 CAPSULE ORAL at 21:05

## 2023-02-25 RX ADMIN — HYDROXYZINE HYDROCHLORIDE 25 MG: 25 TABLET ORAL at 21:06

## 2023-02-25 RX ADMIN — ACYCLOVIR 400 MG: 200 CAPSULE ORAL at 08:00

## 2023-02-25 RX ADMIN — ACYCLOVIR 400 MG: 200 CAPSULE ORAL at 14:34

## 2023-02-25 RX ADMIN — DULOXETINE HYDROCHLORIDE 30 MG: 30 CAPSULE, DELAYED RELEASE ORAL at 08:00

## 2023-02-25 RX ADMIN — DOCUSATE SODIUM 100 MG: 100 CAPSULE, LIQUID FILLED ORAL at 08:00

## 2023-02-25 RX ADMIN — CLINDAMYCIN PHOSPHATE: 11.9 SOLUTION TOPICAL at 08:01

## 2023-02-25 RX ADMIN — MICONAZOLE NITRATE: 2 POWDER TOPICAL at 08:01

## 2023-02-25 RX ADMIN — HYDROCORTISONE 2.5%: 25 CREAM TOPICAL at 21:11

## 2023-02-25 RX ADMIN — LURASIDONE HYDROCHLORIDE 80 MG: 40 TABLET, FILM COATED ORAL at 16:57

## 2023-02-25 RX ADMIN — MULTIPLE VITAMINS W/ MINERALS TAB 1 TABLET: TAB at 08:00

## 2023-02-25 RX ADMIN — METRONIDAZOLE: 7.5 GEL VAGINAL at 21:10

## 2023-02-25 RX ADMIN — MICONAZOLE NITRATE: 2 POWDER TOPICAL at 21:11

## 2023-02-25 NOTE — PROGRESS NOTES
Department of Psychiatry  Attending Progress Note      SUBJECTIVE:    65 y.o. female with previous psychiatric history of bipolar depression, borderline personality disorder, obsessive-compulsive disorder, hoarding disorder, who has been admitted to our psychiatric unit secondary to suicidal ideations. Patient has been seen in treatment team room. She reported that her condition mildly improved since time of admission to the hospital, stated that her mood is \"better\" today. Patient endorses good appetite and good quality of sleep during the last night. She is compliant with currently prescribed medications and denies any side effect. Patient continues to endorse mild feeling of depression and anxiety, and rated both of them as 4-5 out of 10, with 10 being the worst.  Patient attends group activities in the unit and participates in some of those activities. Patient reported that she was able to identify her life stressors and wrote on the paper the solutions to resolve her stressors. She became more social with medical staff and other patients in the unit. She performed her ADLs today and took a shower. Patient denies current active suicidal or homicidal ideations, denies any plans. Also, she denies auditory and visual hallucinations. OBJECTIVE    Physical  VITALS:  /71   Pulse 74   Temp 97.2 °F (36.2 °C) (Temporal)   Resp 20   Ht 5' 4\" (1.626 m)   Wt 210 lb 8 oz (95.5 kg)   SpO2 98%   BMI 36.13 kg/m²   TEMPERATURE:  Current - Temp: 97.2 °F (36.2 °C);  Max - Temp  Av.2 °F (36.2 °C)  Min: 97.2 °F (36.2 °C)  Max: 97.2 °F (36.2 °C)  RESPIRATIONS RANGE: Resp  Av  Min: 20  Max: 20  PULSE RANGE: Pulse  Av  Min: 74  Max: 80  BLOOD PRESSURE RANGE:  Systolic (63AIO), OJW:795 , Min:123 , LZZ:936  ; Diastolic (63TLX), RBH:90, Min:71, Max:104   PULSE OXIMETRY RANGE: SpO2  Av.5 %  Min: 95 %  Max: 98 %    Review of Systems: 14 point review of systems is negative    Psychological ROS: Positive for presence of mild anxiety and mild depression    Mental Status Examination:    Appearance: Appropriately groomed, wearing casual civilian clothes. Made good eye contact. Behavior: Anxious, cooperative with interview, socially appropriate. No psychomotor retardation or agitation appreciated. Gait unremarkable. Speech: Normal in tone, volume, and quality. Mood: \"Better\"   Affect: Mood congruent. Range is mildly restricted  Thought Process: Appears linear and goal oriented. Thought Content: Patient does not have any current active suicidal and homicidal ideations. No overt delusions or paranoia appreciated. Perceptions: Seems patient does not have any auditory or visual hallucinations at present time. Patient did not appear to be responding to internal stimuli. No overt psychosis. Orientation: to person, place, date, and situation. Alert.    Impulsivity: Seems improved  Neurovegitative: Good appetite, good sleep  Insight: Limited  Judgment: Limited       Data    Lab Results   Component Value Date    XIKITGTO21 255 02/25/2023     Lab Results   Component Value Date    VITD25 59.9 02/25/2023        Medications  Current Facility-Administered Medications: acetaminophen (TYLENOL) tablet 650 mg, 650 mg, Oral, Q4H PRN  calcium carbonate (TUMS) chewable tablet 500 mg, 1 tablet, Oral, BID PRN  acyclovir (ZOVIRAX) capsule 400 mg, 400 mg, Oral, TID  miconazole (MICOTIN) 2 % powder, , Topical, BID  hydrOXYzine pamoate (VISTARIL) capsule 25 mg, 25 mg, Oral, Nightly  DULoxetine (CYMBALTA) extended release capsule 30 mg, 30 mg, Oral, Daily  [START ON 2/26/2023] vitamin D (ERGOCALCIFEROL) capsule 50,000 Units, 50,000 Units, Oral, Weekly  docusate sodium (COLACE) capsule 100 mg, 100 mg, Oral, Daily PRN  therapeutic multivitamin-minerals 1 tablet, 1 tablet, Oral, Daily  mirabegron (MYRBETRIQ) extended release tablet 50 mg (Patient Supplied), 50 mg, Oral, Daily  clindamycin (CLEOCIN T) 1 % external solution (Patient Supplied), , Topical, Daily  lurasidone (LATUDA) tablet 80 mg, 80 mg, Oral, Dinner  chlorhexidine gluconate (ANTISEPTIC SKIN CLEANSER) 4 % solution (Patient Supplied), , Topical, Daily PRN  hydrOXYzine HCl (ATARAX) tablet 25 mg, 25 mg, Oral, TID PRN  polyethylene glycol (GLYCOLAX) packet 17 g, 17 g, Oral, Daily PRN    ASSESSMENT AND PLAN    DSM 5 DIAGNOSIS:  Bipolar depression  Borderline personality disorder  Anxiety, unspecified  Hoarding disorder  Obsessive-compulsive disorder  Obstructive sleep apnea  Suicidal ideations    Plan:   1. Psychiatric Medications:   Continue current psychotropic medications as recommended. Patient denies side effect of currently prescribed medications. No changes to the dose of prescribed psychotropic medications today. 2. Medical Issues:    Continue medical monitoring by Dr. Niyah Armas and associates. 3. Disposition:    Discharge patient home when she will be in stable mental condition and adjustment psychotropic medications will be done.      Amount of time spent with patient:    35 minutes with greater than 50% of the time spent in counseling and collaboration of care

## 2023-02-25 NOTE — PROGRESS NOTES
Group Note    Date: 02/25/23  Start Time: :  20:30  End Time[de-identified]  21:00    Number of Participants: 7    Type of Group: Wrap-Up     Patient's Goal:  to be less anxious and less depressed    Notes:      Status After Intervention:  Unchanged    Participation Level: Interactive    Participation Quality: Intrusive    Speech:  hesitant    Thought Process/Content: Perseverating    Mood: anxious and depressed    Level of consciousness:  Oriented x4    Response to Learning: Resistant    Modes of Intervention: Education and Support    Discipline Responsible: Registered Nurse     Signature:  Domi Kang RN

## 2023-02-25 NOTE — PROGRESS NOTES
Progress Note  Ernie Kidd  2/25/2023 12:56 PM  Subjective:   Admit Date:   2/23/2023      CC/ADMIT DX:       Interval History:   Reviewed overnight events and nursing notes. She has c/o hemorrhoid pain. I have reviewed all labs/diagnostics from the last 24hrs. ROS:   I have done a 10 point ROS and all are negative, except what is mentioned in the HPI. ADULT DIET; Regular; 4 carb choices (60 gm/meal)    Medications:      acyclovir  400 mg Oral TID    miconazole   Topical BID    hydrOXYzine pamoate  25 mg Oral Nightly    DULoxetine  30 mg Oral Daily    [START ON 2/26/2023] vitamin D  50,000 Units Oral Weekly    therapeutic multivitamin-minerals  1 tablet Oral Daily    mirabegron  50 mg Oral Daily    clindamycin   Topical Daily    lurasidone  80 mg Oral Dinner           Objective:   Vitals: /71   Pulse 74   Temp 97.2 °F (36.2 °C) (Temporal)   Resp 20   Ht 5' 4\" (1.626 m)   Wt 210 lb 8 oz (95.5 kg)   SpO2 98%   BMI 36.13 kg/m²  No intake or output data in the 24 hours ending 02/25/23 1256  General appearance: alert and cooperative with exam  Lungs: clear to auscultation bilaterally  Heart: regular rate and rhythm, S1, S2 normal, no murmur, click, rub or gallop  Abdomen: soft, non-tender; bowel sounds normal; no masses,  no organomegaly  Extremities: extremities normal, atraumatic, no cyanosis or edema  Neurologic:  No obvious focal neurologic deficits. Assessment and Plan:   Principal Problem:    Depression with suicidal ideation  Active Problems:    Bipolar depression ([facility] [place] [address]  Resolved Problems:    * No resolved hospital problems. *    HTN    Pre DM    Plan:   Continue present medication(s)    Prn Medicine for hemorrhoid pain   Follow with Psych          Discharge planning:    home    Reviewed treatment plans with the patient and/or family.              Electronically signed by Jatin Rizzo MD on 2/25/2023 at 12:56 PM

## 2023-02-25 NOTE — H&P
HISTORY and PHYSICAL      CHIEF COMPLAINT:   SI    Reason for Admission:  SI    History Obtained From:  patient, chart    HISTORY OF PRESENT ILLNESS:      The patient is a 65 y.o. female who is admitted to the [place] [address] unit with worsening mood issues. She has no c/o CP or SOA. She has no abdominal pain or N/V. She has no new pain issues. No fevers. She denies any HA or dizziness. Past Medical History:        Diagnosis Date    Chest pain 02/08/2012    CPAP (continuous positive airway pressure) dependence     Depression     Hypoglycemia     SANDY (obstructive sleep apnea)     Schizoaffective disorder ([facility] [address] 02/08/2012    Suicide attempt [facility])      Past Surgical History:        Procedure Laterality Date    DILATION AND CURETTAGE OF UTERUS  2017    LEEP           Medications Prior to Admission:    Medications Prior to Admission: clindamycin (CLEOCIN T) 1 % external solution, Apply topically daily Apply topically to the groin and abdomen once daily when and where lesions are present as needed. Multiple Vitamins-Minerals (THERAPEUTIC MULTIVITAMIN-MINERALS) tablet, Take 1 tablet by mouth daily  DULoxetine (CYMBALTA) 30 MG extended release capsule, Take 30 mg by mouth daily  Cholecalciferol (VITAMIN D3) 1.25 MG (73428 UT) CAPS, Take 1 capsule by mouth once a week Takes on Sundays  nystatin (MYCOSTATIN) 002808 UNIT/GM powder, Apply topically 4 times daily as needed Apply topically 4 times daily.   chlorhexidine (HIBICLENS) 4 % external liquid, Apply topically daily as needed Apply topically daily as needed to boil on thigh  [DISCONTINUED] clindamycin-benzoyl peroxide (BENZACLIN) 1-5 % gel, Apply topically 2 times daily Apply topically 2 times daily to boil on thigh and stomach  lurasidone (LATUDA) 80 MG TABS tablet, Take 1 tablet by mouth Daily with supper  hydrOXYzine pamoate (VISTARIL) 25 MG capsule, Take 25 mg by mouth at bedtime  docusate sodium (COLACE) 100 MG capsule, Take 100 mg by mouth as needed for Constipation  calcium carbonate 600 MG TABS tablet, Take 1 tablet by mouth 2 times daily as needed  mirabegron (MYRBETRIQ) 50 MG TB24, Take 50 mg by mouth daily  valACYclovir (VALTREX) 500 MG tablet, Take 500 mg by mouth daily    Allergies:  Seroquel [quetiapine fumarate], Adderall [amphetamine-dextroamphetamine], Amphetamines, Asa [aspirin], Codeine, Cogentin [benztropine], Depakote [divalproex sodium], Effexor [venlafaxine], Estrogens, Geodon [ziprasidone hcl], Hydrocodone, Lortab [hydrocodone-acetaminophen], Macrolides and ketolides, Metoprolol, Neurontin [gabapentin], Other, Pcn [penicillins], Provera [medroxyprogesterone], Prozac [fluoxetine hcl], Tetracyclines & related, Trazodone and nefazodone, Trileptal [oxcarbazepine], Trintellix [vortioxetine], Valium [diazepam], Vancomycin, Wellbutrin [bupropion], Zoloft [sertraline hcl], Zyprexa [olanzapine], and Macrobid [nitrofurantoin]    Social History:   TOBACCO:   reports that she has never smoked. She has never used smokeless tobacco.  ETOH:   reports no history of alcohol use. DRUGS:   reports no history of drug use. MARITAL STATUS:    OCCUPATION:  Not working  Patient currently lives with family       Family History:   History reviewed. No pertinent family history. REVIEW OF SYSTEMS:  Constitutional: neg  CV: neg  Pulmonary: neg  GI: neg  : neg  Psych: SI  Neuro: neg  Skin: neg  MusculoSkeletal: neg  HEENT: neg  Joints: neg    Vitals:  BP (!) 131/104   Pulse 80   Temp 97.2 °F (36.2 °C) (Temporal)   Resp 20   Ht 5' 4\" (1.626 m)   Wt 210 lb 8 oz (95.5 kg)   SpO2 95%   BMI 36.13 kg/m²     PHYSICAL EXAM:  Gen: NAD, alert  HEENT: WNL  Lymph: no LAD  Neck: no JVD or masses  Chest: CTA bilat  CV: RRR  Abdomen: NT/ND  Extrem: no C/C/E  Neuro: non focal  Skin: no rashes  Joints: no redness    DATA:  I have reviewed the admission labs and imaging tests.     ASSESSMENT AND PLAN:      Patient [facility] [address] Problem List:   Depression, SI---follow with Psych   Pre DM   Elevated BP---follow with BP    SANDY            Chick Factor, MD  8:58 PM 2/24/2023

## 2023-02-25 NOTE — RESEARCH
SW attempted to call pt's mom, Claudia Trujillo @[phone], to obtain collateral information about the pt, but it went to voicemail. The recording on voicemail was a man's voice, so SW did not leave a message in case it was the wrong number.

## 2023-02-25 NOTE — PLAN OF CARE
Problem: Self Harm/Suicidality  Goal: Will have no self-injury during hospital stay  Description: INTERVENTIONS:  1. Ensure constant observer at bedside with Q15M safety checks  2. Maintain a safe environment  3. Secure patient belongings  4. Ensure family/visitors adhere to safety recommendations  5. Ensure safety tray has been added to patient's diet order  6. Every shift and PRN: Re-assess suicidal risk via Frequent Screener    Outcome: Progressing  Flowsheets (Taken 2/25/2023 0950)  Will have no self-injury during hospital stay:   Maintain a safe environment   Every shift and PRN: Re-assess suicidal risk via Frequent Screener     Problem: Anxiety  Goal: Will report anxiety at manageable levels  Description: INTERVENTIONS:  1. Administer medication as ordered  2. Teach and rehearse alternative coping skills  3. Provide emotional support with 1:1 interaction with staff  Outcome: Progressing  Flowsheets (Taken 2/25/2023 0950)  Will report anxiety at manageable levels:   Administer medication as ordered   Provide emotional support with 1:1 interaction with staff   Teach and rehearse alternative coping skills     Problem: Coping  Goal: Pt/Family able to verbalize concerns and demonstrate effective coping strategies  Description: INTERVENTIONS:  1. Assist patient/family to identify coping skills, available support systems and cultural and spiritual values  2. Provide emotional support, including active listening and acknowledgement of concerns of patient and caregivers  3. Reduce environmental stimuli, as able  4. Instruct patient/family in relaxation techniques, as appropriate  5.  Assess for spiritual pain/suffering and initiate Spiritual Care, Psychosocial Clinical Specialist consults as needed  Outcome: Progressing  Flowsheets (Taken 2/25/2023 0950)  Patient/family able to verbalize anxieties, fears, and concerns, and demonstrate effective coping:   Instruct patient/family in relaxation techniques, as appropriate   Provide emotional support, including active listening and acknowledgement of concerns of patient and caregivers     Problem: Decision Making  Goal: Pt/Family able to effectively weigh alternatives and participate in decision making related to treatment and care  Description: INTERVENTIONS:  1. Determine when there are differences between patient's view, family's view, and healthcare provider's view of condition  2. Facilitate patient and family articulation of goals for care  3. Help patient and family identify pros/cons of alternative solutions  4. Provide information as requested by patient/family  5. Respect patient/family right to receive or not to receive information  6. Serve as a liaison between patient and family and health care team  7. Initiate Consults from Ethics, Palliative Care or initiate Family Care Conference as is appropriate  Outcome: Progressing  Flowsheets (Taken 2/25/2023 9579)  Patient/family able to effectively weigh alternatives and participate in decision making related to treatment and care: Provide information as requested by patient/family     Problem: Behavior  Goal: Pt/Family maintain appropriate behavior and adhere to behavioral management agreement, if implemented  Description: INTERVENTIONS:  1. Assess patient/family's coping skills and  non-compliant behavior (including use of illegal substances)  2. Notify security of behavior or suspected illegal substances which indicate the need for search of the family and/or belongings  3. Encourage verbalization of thoughts and concerns in a socially appropriate manner  4. Utilize positive, consistent limit setting strategies supporting safety of patient, staff and others  5. Encourage participation in the decision making process about the behavioral management agreement  6. If a visitor's behavior poses a threat to safety call refer to organization policy.  7. Initiate consult with , Psychosocial CNS, Spiritual  Care as appropriate  Outcome: Progressing     Problem: Depression/Self Harm  Goal: Effect of psychiatric condition will be minimized and patient will be protected from self harm  Description: INTERVENTIONS:  1. Assess impact of patient's symptoms on level of functioning, self care needs and offer support as indicated  2. Assess patient/family knowledge of depression, impact on illness and need for teaching  3. Provide emotional support, presence and reassurance  4. Assess for possible suicidal thoughts or ideation. If patient expresses suicidal thoughts or statements do not leave alone, initiate Suicide Precautions, move to a room close to the nursing station and obtain sitter  5. Initiate consults as appropriate with Mental Health Professional, Spiritual Care, Psychosocial CNS, and consider a recommendation to the LIP for a Psychiatric Consultation  Outcome: Progressing  Flowsheets  Taken 2/25/2023 0959  Effect of psychiatric condition will be minimized and patient will be protected from self harm:   Provide emotional support, presence and reassurance   Assess for suicidal thoughts or ideation. If patient expresses suicidal thoughts or statements do not leave alone, initiate Suicide Precautions, move near nurse station, obtain sitter  Taken 2/25/2023 0950  Effect of psychiatric condition will be minimized and patient will be protected from self harm:   Provide emotional support, presence and reassurance   Assess for suicidal thoughts or ideation.  If patient expresses suicidal thoughts or statements do not leave alone, initiate Suicide Precautions, move near nurse station, obtain sitter     Problem: Safety - Adult  Goal: Free from fall injury  Outcome: Progressing     Problem: Pain  Goal: Verbalizes/displays adequate comfort level or baseline comfort level  Outcome: Progressing     Problem: Chronic Conditions and Co-morbidities  Goal: Patient's chronic conditions and co-morbidity symptoms are monitored and maintained or improved  Outcome: Progressing

## 2023-02-25 NOTE — PROGRESS NOTES
[facility] Adult Unit Daily Assessment  Nursing Progress Note    Room: [address] [place], [place] [address]-   Name: Roseline Robison   Age: 65 y.o. Gender: female   Dx: Depression with suicidal ideation  Precautions: suicide risk  Inpatient Status: voluntary       SLEEP:  Sleep Quality Fair  Sleep Medications: Yes vistaril 25 mg  PRN Sleep Meds: No       MEDICAL:  Other PRN Meds: Yes atarax 25 mg  Med Compliant: Yes  Accu-Chek: No  Oxygen/CPAP/BiPAP: No  CIWA/CINA: No   PAIN Assessment: headaches  Side Effects from medication: No      Metabolic Screening:  Lab Results   Component Value Date    LABA1C 6.1 (H) 11/25/2022     Lab Results   Component Value Date    CHOL 172 11/25/2022    CHOL 143 (L) 06/27/2021    CHOL 149 (L) 07/22/2020    CHOL 152 (L) 02/22/2020    CHOL 130 (L) 12/22/2018    CHOL 154 02/11/2014    CHOL 181 05/10/2012     Lab Results   Component Value Date    TRIG 177 (H) 11/25/2022    TRIG 166 (H) 06/27/2021    TRIG 143 07/22/2020    TRIG 130 02/22/2020    TRIG 83 12/22/2018    TRIG 113 02/11/2014    TRIG 197 (H) 05/10/2012     Lab Results   Component Value Date    HDL 59 (L) 11/25/2022    HDL 48 (L) 06/27/2021    HDL 60 (L) 07/22/2020    HDL 52 (L) 02/22/2020    HDL 52 (L) 12/22/2018    HDL 56 02/11/2014    HDL 62 05/10/2012     No components found for: LDLCAL  No results found for: LABVLDL  Body mass index is 36.13 kg/m². BP Readings from Last 2 Encounters:   02/24/23 (!) 131/104   12/05/22 130/88         Medical Bed:   Is patient in a medical bed? no   If medical bed is in use, has nursing secured room while patient is awake and out of the room? NA  Has safety checks by nursing been completed on the bed/room this shift? yes    Protective Factors:  Patient identifies protective factors with nursing staff as follows:    Identifies reasons for living: Yes   Supportive Social Network or family: Yes    Belief that suicide is immoral/high spirituality: Yes   Responsibility to family or others/living with family: Yes   Fear of death or dying due to pain and suffering: Yes   Engaged in work or school: Yes     If Patient is unable to identify, reason why? PSYCH:  Depression: 9   Anxiety: 8   SI denies suicidal ideation   Risk of Suicide: No Risk  HI Negative for homicidal ideation      AVH:no If Hallucinations are present, describe? GENERAL:  Appetite: good   Percent Meals: 100%   Social: Yes   Speech: normal   Appearance: appropriately dressed, appropriately groomed, and healthy looking    GROUP:  Group Participation: Yes  Participation Quality: Active Listener    Notes: Pt was in the day area playing cards with peers prior to this assessment. Pt is pleasant and cooperative ,she is anxious and states she has been anxious all day long waiting for one of her Doctors to return her call. Pt is medication compliant,she has been social with peers,she is medication compliant. Pt does ask advice for the simplest of tasks. Pt rates her anxiety 9 and depression an eight,she denies SI,HI, AVH, and is not delusional.Will continue to monitor.

## 2023-02-25 NOTE — PROGRESS NOTES
[facility] Adult Unit Daily Assessment  Nursing Progress Note    Room: [address] [place] [address]-   Name: Kika Gale   Age: 65 y.o. Gender: female   Dx: Depression with suicidal ideation  Precautions: suicide risk  Inpatient Status: voluntary       SLEEP:  Sleep Quality Good  Sleep Medications:    PRN Sleep Meds:        MEDICAL:  Other PRN Meds:    Med Compliant: Yes  Accu-Chek: No  Oxygen/CPAP/BiPAP: Yes and No  CIWA/CINA: No   PAIN Assessment: none  Side Effects from medication:       Metabolic Screening:  Lab Results   Component Value Date    LABA1C 6.1 (H) 11/25/2022     Lab Results   Component Value Date    CHOL 172 11/25/2022    CHOL 143 (L) 06/27/2021    CHOL 149 (L) 07/22/2020    CHOL 152 (L) 02/22/2020    CHOL 130 (L) 12/22/2018    CHOL 154 02/11/2014    CHOL 181 05/10/2012     Lab Results   Component Value Date    TRIG 177 (H) 11/25/2022    TRIG 166 (H) 06/27/2021    TRIG 143 07/22/2020    TRIG 130 02/22/2020    TRIG 83 12/22/2018    TRIG 113 02/11/2014    TRIG 197 (H) 05/10/2012     Lab Results   Component Value Date    HDL 59 (L) 11/25/2022    HDL 48 (L) 06/27/2021    HDL 60 (L) 07/22/2020    HDL 52 (L) 02/22/2020    HDL 52 (L) 12/22/2018    HDL 56 02/11/2014    HDL 62 05/10/2012     No components found for: LDLCAL  No results found for: LABVLDL  Body mass index is 36.13 kg/m². BP Readings from Last 2 Encounters:   02/25/23 123/71   12/05/22 130/88         Medical Bed:   Is patient in a medical bed? no   If medical bed is in use, has nursing secured room while patient is awake and out of the room? no  Has safety checks by nursing been completed on the bed/room this shift? NA    Protective Factors:  Patient identifies protective factors with nursing staff as follows:    Identifies reasons for living: Yes   Supportive Social Network or family: Yes    Belief that suicide is immoral/high spirituality: Yes   Responsibility to family or others/living with family: Yes   Fear of death or dying due to pain and suffering: Yes   Engaged in work or school: Yes     If Patient is unable to identify, reason why? PSYCH:  Depression: 8   Anxiety: 7   SI denies suicidal ideation   Risk of Suicide: No Risk  HI Negative for homicidal ideation      AVH:NA If Hallucinations are present, describe? GENERAL:  Appetite: good   Percent Meals: 75%   Social: Yes   Speech: normal   Appearance: appropriately dressed and healthy looking    GROUP:  Group Participation: Yes  Participation Quality: Active Listener and Interactive    Notes: Patient is up and in the day area. She is intrusive and at the desk asking if she is going to see the doctor today. She remains at the desk most of the morning with multiple questions. She reports she has appointment on Wednesday with [facility] and she doesn't want to miss it. She reports she has questions for \" Dr. Jessie Macedo".          Electronically signed by Donnetta Gottron, RN on 2/25/23 at 9:30 AM CST

## 2023-02-25 NOTE — PROGRESS NOTES
Group Note    Date: 02/25/23  Start Time: 9:30 AM   End Time:10:00 AM     Number of Participants: 9    Type of Group: Community/Goal     Patient's Goal:  doing all I need to do for selfcare    Notes:      Status After Intervention:      Participation Level:  Active Listener    Participation Quality: Appropriate    Speech:  normal    Thought Process/Content: Logical    Mood:  calm    Level of consciousness:  Alert    Response to Learning: Able to verbalize current knowledge/experience    Modes of Intervention: Education and Support    Discipline Responsible: Behavioral Health Technician     Signature:  Sara Suárez

## 2023-02-26 VITALS
RESPIRATION RATE: 16 BRPM | DIASTOLIC BLOOD PRESSURE: 70 MMHG | HEART RATE: 79 BPM | WEIGHT: 210.5 LBS | SYSTOLIC BLOOD PRESSURE: 144 MMHG | TEMPERATURE: 96.8 F | OXYGEN SATURATION: 94 % | HEIGHT: 64 IN | BODY MASS INDEX: 35.94 KG/M2

## 2023-02-26 PROCEDURE — 1240000000 HC EMOTIONAL WELLNESS R&B

## 2023-02-26 PROCEDURE — 94660 CPAP INITIATION&MGMT: CPT

## 2023-02-26 PROCEDURE — 6370000000 HC RX 637 (ALT 250 FOR IP): Performed by: PSYCHIATRY & NEUROLOGY

## 2023-02-26 RX ADMIN — LURASIDONE HYDROCHLORIDE 80 MG: 40 TABLET, FILM COATED ORAL at 17:11

## 2023-02-26 RX ADMIN — CLINDAMYCIN PHOSPHATE: 11.9 SOLUTION TOPICAL at 08:10

## 2023-02-26 RX ADMIN — DOCUSATE SODIUM 100 MG: 100 CAPSULE, LIQUID FILLED ORAL at 08:08

## 2023-02-26 RX ADMIN — ACYCLOVIR 400 MG: 200 CAPSULE ORAL at 14:32

## 2023-02-26 RX ADMIN — MICONAZOLE NITRATE: 2 POWDER TOPICAL at 08:11

## 2023-02-26 RX ADMIN — DULOXETINE HYDROCHLORIDE 30 MG: 30 CAPSULE, DELAYED RELEASE ORAL at 08:09

## 2023-02-26 RX ADMIN — MULTIPLE VITAMINS W/ MINERALS TAB 1 TABLET: TAB at 08:09

## 2023-02-26 RX ADMIN — HYDROCORTISONE 2.5%: 25 CREAM TOPICAL at 15:35

## 2023-02-26 RX ADMIN — ACYCLOVIR 400 MG: 200 CAPSULE ORAL at 08:09

## 2023-02-26 RX ADMIN — ERGOCALCIFEROL 50000 UNITS: 1.25 CAPSULE ORAL at 08:08

## 2023-02-26 RX ADMIN — HYDROXYZINE HYDROCHLORIDE 25 MG: 25 TABLET ORAL at 21:49

## 2023-02-26 RX ADMIN — HYDROXYZINE PAMOATE 25 MG: 25 CAPSULE ORAL at 21:49

## 2023-02-26 RX ADMIN — METRONIDAZOLE: 7.5 GEL VAGINAL at 22:00

## 2023-02-26 RX ADMIN — METRONIDAZOLE: 7.5 GEL VAGINAL at 21:51

## 2023-02-26 RX ADMIN — ACYCLOVIR 400 MG: 200 CAPSULE ORAL at 21:49

## 2023-02-26 NOTE — PROGRESS NOTES
Group Note    Date: 02/26/23  Start Time: 7:00 PM   End Time:7:30 PM     Number of Participants: 9    Type of Group: Wrap-Up     Patient's Goal:      Notes:      Status After Intervention:  Unchanged    Participation Level: Interactive    Participation Quality: Sharing    Speech:  normal    Thought Process/Content: Logical    Mood:  appropriate for group    Level of consciousness:  Alert    Response to Learning: Progressing to goal    Modes of Intervention: Education and Support    Discipline Responsible: Behavioral Health Technician     Signature:  Beryle Stakes

## 2023-02-26 NOTE — PROGRESS NOTES
Progress Note  Grady Daugherty  2/26/2023 12:16 PM  Subjective:   Admit Date:   2/23/2023      CC/ADMIT DX:       Interval History:   Reviewed overnight events and nursing notes. She denies any new medical issues. I have reviewed all labs/diagnostics from the last 24hrs. ROS:   I have done a 10 point ROS and all are negative, except what is mentioned in the HPI. ADULT DIET; Regular; 4 carb choices (60 gm/meal)    Medications:      metroNIDAZOLE   Vaginal Nightly    acyclovir  400 mg Oral TID    miconazole   Topical BID    hydrOXYzine pamoate  25 mg Oral Nightly    DULoxetine  30 mg Oral Daily    vitamin D  50,000 Units Oral Weekly    therapeutic multivitamin-minerals  1 tablet Oral Daily    mirabegron  50 mg Oral Daily    clindamycin   Topical Daily    lurasidone  80 mg Oral Dinner           Objective:   Vitals: /83   Pulse 93   Temp 97.7 °F (36.5 °C) (Temporal)   Resp 20   Ht 5' 4\" (1.626 m)   Wt 210 lb 8 oz (95.5 kg)   SpO2 97%   BMI 36.13 kg/m²  No intake or output data in the 24 hours ending 02/26/23 1216  General appearance: alert and cooperative with exam  Lungs: clear to auscultation bilaterally  Heart: regular rate and rhythm, S1, S2 normal, no murmur, click, rub or gallop  Abdomen: soft, non-tender; bowel sounds normal; no masses,  no organomegaly  Extremities: extremities normal, atraumatic, no cyanosis or edema  Neurologic:  No obvious focal neurologic deficits. Assessment and Plan:   Principal Problem:    Depression with suicidal ideation  Active Problems:    Bipolar depression ([address]  Resolved Problems:    * No resolved hospital problems. *    HTN    Pre DM    Plan:   Continue present medication(s)    Consult Dietician at pt request   Follow with Psych          Discharge planning:    home    Reviewed treatment plans with the patient and/or family.              Electronically signed by Danielle Jackson MD on 2/26/2023 at 12:16 PM

## 2023-02-26 NOTE — GROUP NOTE
Group Therapy Note    Date: 2/26/2023    Group Start Time: 1345  Group End Time: 5457  Group Topic: Education Group - Inpatient    [facility] EVA Zurita RN        Group Therapy Note    Attendees: 11      Notes:  Educational video-Mood disorders     Status After Intervention:  Improved    Participation Level:  Active Listener    Participation Quality: Appropriate and Attentive      Speech:  normal      Thought Process/Content: Logical  Linear      Affective Functioning: Congruent      Mood: calm, attentive, cooperative, pleasant      Level of consciousness:  Alert, Oriented x4, and Attentive      Response to Learning: Able to verbalize current knowledge/experience, Able to verbalize/acknowledge new learning, and Able to retain information, progressing to goal    Modes of Intervention: Education and Media      Discipline Responsible: Registered Nurse      Signature:  Evelina Bolden RN

## 2023-02-26 NOTE — PROGRESS NOTES
[facility] Adult Unit Daily Assessment  Nursing Progress Note    Room: [facility]-01   Name: Lulu Griffith   Age: 65 y.o. Gender: female   Dx: Depression with suicidal ideation  Precautions: close watch and suicide risk  Inpatient Status: voluntary       SLEEP:  Sleep Quality Good  Sleep Medications: No   PRN Sleep Meds: No       MEDICAL:  Other PRN Meds: Yes   Med Compliant: Yes  Accu-Chek: No  Oxygen/CPAP/BiPAP: Yes, CPAP  CIWA/CINA: No   PAIN Assessment: none  Side Effects from medication: No      Metabolic Screening:  Lab Results   Component Value Date    LABA1C 6.1 (H) 11/25/2022     Lab Results   Component Value Date    CHOL 172 11/25/2022    CHOL 143 (L) 06/27/2021    CHOL 149 (L) 07/22/2020    CHOL 152 (L) 02/22/2020    CHOL 130 (L) 12/22/2018    CHOL 154 02/11/2014    CHOL 181 05/10/2012     Lab Results   Component Value Date    TRIG 177 (H) 11/25/2022    TRIG 166 (H) 06/27/2021    TRIG 143 07/22/2020    TRIG 130 02/22/2020    TRIG 83 12/22/2018    TRIG 113 02/11/2014    TRIG 197 (H) 05/10/2012     Lab Results   Component Value Date    HDL 59 (L) 11/25/2022    HDL 48 (L) 06/27/2021    HDL 60 (L) 07/22/2020    HDL 52 (L) 02/22/2020    HDL 52 (L) 12/22/2018    HDL 56 02/11/2014    HDL 62 05/10/2012     No components found for: LDLCAL  No results found for: LABVLDL  Body mass index is 36.13 kg/m². BP Readings from Last 2 Encounters:   02/25/23 (!) 146/75   12/05/22 130/88         Medical Bed:   Is patient in a medical bed? no   If medical bed is in use, has nursing secured room while patient is awake and out of the room? N/A  Has safety checks by nursing been completed on the bed/room this shift? NA    Protective Factors:  Patient identifies protective factors with nursing staff as follows:    Identifies reasons for living: Yes   Supportive Social Network or family: Yes    Belief that suicide is immoral/high spirituality: Yes   Responsibility to family or others/living with family: Yes   Fear of death or dying due to pain and suffering: Yes   Engaged in work or school: No     If Patient is unable to identify, reason why? N/A      PSYCH:  Depression: 7   Anxiety: 7   SI denies suicidal ideation   Risk of Suicide: No Risk  HI Negative for homicidal ideation      AVH:no If Hallucinations are present, describe? GENERAL:  Appetite: good   Percent Meals: No meals consumed this shift   Social: Yes   Speech: normal   Appearance: appropriately dressed, appropriately groomed, and healthy looking    GROUP:  Group Participation: Yes  Participation Quality: Interactive    Notes:   Patient observed up at the team station. She is calm, pleasant, and cooperative with staff and peers. Eye contact is good. She is compliant with bedtime medications and has completed group wrap up. Patient also completed shower tonight. She currently denies SI, HI,and AVH.       Electronically signed by Nayeli Cottrell LPN on 9/27/60 at 87:50 PM CST

## 2023-02-26 NOTE — PROGRESS NOTES
[facility] Adult Unit Daily Assessment  Nursing Progress Note    Room: [address] [place] [address]   Name: Yanci Branham   Age: 65 y.o. Gender: female   Dx: Depression with suicidal ideation  Precautions: suicide risk  Inpatient Status: voluntary       SLEEP:  Sleep Quality Good  Sleep Medications:    PRN Sleep Meds:        MEDICAL:  Other PRN Meds:    Med Compliant: Yes  Accu-Chek: No  Oxygen/CPAP/BiPAP: No  CIWA/CINA: No   PAIN Assessment: none  Side Effects from medication:       Metabolic Screening:  Lab Results   Component Value Date    LABA1C 6.1 (H) 11/25/2022     Lab Results   Component Value Date    CHOL 172 11/25/2022    CHOL 143 (L) 06/27/2021    CHOL 149 (L) 07/22/2020    CHOL 152 (L) 02/22/2020    CHOL 130 (L) 12/22/2018    CHOL 154 02/11/2014    CHOL 181 05/10/2012     Lab Results   Component Value Date    TRIG 177 (H) 11/25/2022    TRIG 166 (H) 06/27/2021    TRIG 143 07/22/2020    TRIG 130 02/22/2020    TRIG 83 12/22/2018    TRIG 113 02/11/2014    TRIG 197 (H) 05/10/2012     Lab Results   Component Value Date    HDL 59 (L) 11/25/2022    HDL 48 (L) 06/27/2021    HDL 60 (L) 07/22/2020    HDL 52 (L) 02/22/2020    HDL 52 (L) 12/22/2018    HDL 56 02/11/2014    HDL 62 05/10/2012     No components found for: LDLCAL  No results found for: LABVLDL  Body mass index is 36.13 kg/m². BP Readings from Last 2 Encounters:   02/25/23 (!) 146/75   12/05/22 130/88         Medical Bed:   Is patient in a medical bed? no   If medical bed is in use, has nursing secured room while patient is awake and out of the room? NA  Has safety checks by nursing been completed on the bed/room this shift? NA    Protective Factors:  Patient identifies protective factors with nursing staff as follows:    Identifies reasons for living: Yes   Supportive Social Network or family: Yes    Belief that suicide is immoral/high spirituality: Yes   Responsibility to family or others/living with family: Yes   Fear of death or dying due to pain and suffering: Yes   Engaged in work or school: Yes     If Patient is unable to identify, reason why? PSYCH:  Depression: 7   Anxiety: 8   SI denies suicidal ideation   Risk of Suicide: No Risk  HI Negative for homicidal ideation      AVH:no If Hallucinations are present, describe? GENERAL:  Appetite: good   Percent Meals: 75%   Social: Yes   Speech: normal   Appearance: appropriately dressed and healthy looking    GROUP:  Group Participation: Yes  Participation Quality: Active Listener and Interactive    Notes: Patient reports he slept well last night. \" I had good dreams\". She is flat and unsure of herself. She is intrusive of other patient. Needs redirection.  States she is due for a colonoscopy and mammogram and is \"worried about both \"         Electronically signed by Gal Burdick RN on 2/26/23 at 9:07 AM CST

## 2023-02-26 NOTE — PROGRESS NOTES
Group Therapy Note    Date: 2/26/2023  Start Time: 1500  End Time:  1600  Number of Participants: 12    Type of Group: Psychoeducation    Wellness Binder Information  Module Name:  medication education  Session Number:      Patient's Goal:  understanding of new medication    Notes:  handout given on Metronidazole and Micotin. Allowed time for questions and answers    Status After Intervention:  Improved    Participation Level:  Active Listener    Participation Quality: Appropriate and Attentive      Speech:  normal      Thought Process/Content: Linear      Affective Functioning: Flat      Mood: worries about Everything      Level of consciousness:  Alert, Oriented x4, and Attentiveand preoccupied      Response to Learning: Able to retain information and Progressing to goal      Endings: None Reported    Modes of Intervention: Education      Discipline Responsible: Registered Nurse      Signature:  Gal Burdick RN

## 2023-02-26 NOTE — GROUP NOTE
Group Therapy Note    Date: 2/25/2023    Group Start Time: 1700  Group End Time: 1800  Group Topic: Healthy Living/Wellness    [facility]    Marycarmen Villanueva RN; Sandy Sargent RN                Patient's Goal:  Healthy Living and Wellness         Status After Intervention:  Unchanged    Participation Level: Active Listener and Interactive    Participation Quality: Attentive      Speech:  normal      Thought Process/Content: Logical  Linear      Affective Functioning: Congruent      Mood: anxious      Level of consciousness:  Alert and Oriented x4      Response to Learning: Progressing to goal      Endings: None Reported    Modes of Intervention: Education and Support      Discipline Responsible: Registered Nurse      Signature:   Sandy Sargent RN

## 2023-02-26 NOTE — GROUP NOTE
Group Therapy Note    Date: 2/25/2023    Group Start Time: 1030  Group End Time: 1100  Group Topic: Healthy Living/Wellness    [facility]    Opal John RN                Patient's Goal:  Safety Plan          Status After Intervention:  Improved    Participation Level: Active Listener    Participation Quality: Attentive      Speech:  normal      Thought Process/Content: Logical      Affective Functioning: Congruent      Mood: anxious      Level of consciousness:  Alert and Oriented x4      Response to Learning: Able to verbalize/acknowledge new learning      Endings: None Reported    Modes of Intervention: Education and Support      Discipline Responsible: Registered Nurse      Signature:   Opal John RN

## 2023-02-26 NOTE — PROGRESS NOTES
Group Note    Date: 02/26/23  Start Time: 10:30 AM   End Time:11:00 AM     Number of Participants: 11    Type of Group: Community/Goal     Patient's Goal:  taking care of self care, journaling, coloring and thinking more positively    Notes:      Status After Intervention:      Participation Level:  Active Listener    Participation Quality: Appropriate    Speech:  normal    Thought Process/Content: Logical    Mood:  calm    Level of consciousness:  Alert    Response to Learning: Able to verbalize current knowledge/experience    Modes of Intervention: Education and Support    Discipline Responsible: Behavioral Health Technician     Signature:  Kika Zhong

## 2023-02-27 PROCEDURE — 94660 CPAP INITIATION&MGMT: CPT

## 2023-02-27 PROCEDURE — 1240000000 HC EMOTIONAL WELLNESS R&B

## 2023-02-27 PROCEDURE — 6370000000 HC RX 637 (ALT 250 FOR IP): Performed by: PSYCHIATRY & NEUROLOGY

## 2023-02-27 RX ADMIN — MICONAZOLE NITRATE: 2 POWDER TOPICAL at 08:39

## 2023-02-27 RX ADMIN — CLINDAMYCIN PHOSPHATE: 11.9 SOLUTION TOPICAL at 08:39

## 2023-02-27 RX ADMIN — ACYCLOVIR 400 MG: 200 CAPSULE ORAL at 15:05

## 2023-02-27 RX ADMIN — DULOXETINE HYDROCHLORIDE 30 MG: 30 CAPSULE, DELAYED RELEASE ORAL at 08:37

## 2023-02-27 RX ADMIN — ACYCLOVIR 400 MG: 200 CAPSULE ORAL at 08:37

## 2023-02-27 RX ADMIN — MICONAZOLE NITRATE: 2 POWDER TOPICAL at 21:39

## 2023-02-27 RX ADMIN — LURASIDONE HYDROCHLORIDE 80 MG: 40 TABLET, FILM COATED ORAL at 16:59

## 2023-02-27 RX ADMIN — METRONIDAZOLE: 7.5 GEL VAGINAL at 21:39

## 2023-02-27 RX ADMIN — MICONAZOLE NITRATE: 2 POWDER TOPICAL at 00:47

## 2023-02-27 RX ADMIN — DOCUSATE SODIUM 100 MG: 100 CAPSULE, LIQUID FILLED ORAL at 08:42

## 2023-02-27 RX ADMIN — HYDROXYZINE PAMOATE 25 MG: 25 CAPSULE ORAL at 21:39

## 2023-02-27 RX ADMIN — ACYCLOVIR 400 MG: 200 CAPSULE ORAL at 21:39

## 2023-02-27 RX ADMIN — MULTIPLE VITAMINS W/ MINERALS TAB 1 TABLET: TAB at 08:38

## 2023-02-27 NOTE — PROGRESS NOTES
Group Note    Date: 02/27/23  Start Time: 8:00 AM   End Time:8:30 AM     Number of Participants: 13    Type of Group: Community/Goal     Patient's Goal:  \"My worries + solutions + goals for self care\"    Notes:      Status After Intervention:      Participation Level:  Active Listener    Participation Quality: Appropriate    Speech:  normal    Thought Process/Content: Logical    Mood:  Calm    Level of consciousness:  Alert    Response to Learning: Able to verbalize current knowledge/experience    Modes of Intervention: Education and Support    Discipline Responsible: Behavioral Health Technician     Signature:  Jude Pratt

## 2023-02-27 NOTE — PSYCHOTHERAPY
1330                                                                    Group Therapy Note    Date: 2/27/2023  Start Time: 1300  End Time:  1400  Number of Participants: 9    Type of Group: SPIRITUALITY     Wellness Binder Information  Module Name:  Mindfulness  Session Number:      Patient's Goal:  To rest the mind...    Notes:      Status After Intervention:  Improved    Participation Level: Active Listener and Interactive    Participation Quality: Appropriate, Attentive, Sharing, and Supportive      Speech:  normal      Thought Process/Content:       Affective Functioning: Congruent      Mood: calm      Level of consciousness:  Oriented x4      Response to Learning: Able to verbalize current knowledge/experience, Able to verbalize/acknowledge new learning, and Capable of insight      Endings:     Modes of Intervention: Education, Exploration, and Activity      Discipline Responsible:       Signature:  Shekhar Granger MA [facility]

## 2023-02-27 NOTE — PROGRESS NOTES
Treatment Team Note:    Target Symptoms/Reason for admission: Per nursing admission assessment - Reason for Admission: Ambar Maldonado is a 65year old female admitted from the ER with worsening depression and a plan to OD on medication. These symptoms have been worsening for about one week. Pt reports that it has been going on since Del Rosario's Day and got worse and had thought of using a gun to harm herself. Pt has an extensive mental health history with dx to include Bipolar, MDD, OCD and Borderline Personality Disorder. Pt sees [facility] for OP treatment. Pt reports compliance with medication and treatment. She has multiple admissions to the Psych unit at [facility] and [facility] as well as [facility]. Diagnoses per psych provider: Suicidal ideation [R45.851]  Depression with suicidal ideation [F32. A, R45.851]  Bipolar depression ([address] [F31.9]    Therapist met with treatment team to discuss patients treatment and discharge plans. Patient's aftercare plan is: [address]    Aftercare appointments made: No - SW will make discharge appointments    Pt lives with: spouse    Collateral obtained from:  NAINA attempted to contact pt's mom, Jayy Ramey , but it went to voicemail  Collateral obtained on:NAINA did not leave a message due to a man's voice on the voicemail. NAINA was afraid it might be the wrong number.     Attending groups: Yes    Behavior: cooperative, social with peers/staff, reports her mood has improved    Has patient been completing ADL's:  Yes    SI:  patient denies SI  Plan: no   If yes describe: N/A - patient denies plan  HI:  patient denies HI  If present describe: N/A  Delusions: patient denies delusions  If present describe: N/A  Hallucinations: patient denies hallucinations  If present describe: N/A    Patient rates their -- Depression: 1-10:  6  Anxiety:1-10:  7    Sleeping:Yes    Taking medication: Yes    Misc: Tentative discharge Tuesday.

## 2023-02-27 NOTE — PLAN OF CARE
Problem: Coping  Goal: Pt/Family able to verbalize concerns and demonstrate effective coping strategies  Description: INTERVENTIONS:  1. Assist patient/family to identify coping skills, available support systems and cultural and spiritual values  2. Provide emotional support, including active listening and acknowledgement of concerns of patient and caregivers  3. Reduce environmental stimuli, as able  4. Instruct patient/family in relaxation techniques, as appropriate  5. Assess for spiritual pain/suffering and initiate Spiritual Care, Psychosocial Clinical Specialist consults as needed  Outcome: Progressing  Note:                                                                     Group Therapy Note    Date: 2/27/2023  Start Time: 1000  End Time:  1030  Number of Participants: 9    Type of Group: Psychoeducation    Wellness Binder Information  Module Name:  Relapse Prevention  Session Number:  5    Group Goal for Pt: To improve knowledge of relapse prevention strategies    Notes:  Pt demonstrated improved knowledge of relapse prevention strategies by actively participating in group discussion. Status After Intervention:  Unchanged    Participation Level:  Active Listener and Interactive    Participation Quality: Appropriate and Attentive      Speech:  normal      Thought Process/Content: Logical      Affective Functioning: Congruent      Mood: anxious and depressed      Level of consciousness:  Alert and Oriented x4      Response to Learning: Able to verbalize current knowledge/experience, Able to verbalize/acknowledge new learning, and Progressing to goal      Endings: None Reported    Modes of Intervention: Education      Discipline Responsible: Psychoeducational Specialist      Signature:  Carole Ramirez

## 2023-02-27 NOTE — PROGRESS NOTES
[facility] Adult Unit Daily Assessment  Nursing Progress Note    Room: [address] [place] [address]-   Name: Francheska Anderson   Age: 65 y.o. Gender: female   Dx: Depression with suicidal ideation  Precautions: suicide risk  Inpatient Status: voluntary       SLEEP:  Sleep Quality Very good  Sleep Medications: No   PRN Sleep Meds: No       MEDICAL:  Other PRN Meds: No   Med Compliant: Yes  Accu-Chek: No  Oxygen/CPAP/BiPAP: Yes  CIWA/CINA: No   PAIN Assessment: none  Side Effects from medication: No      Metabolic Screening:  Lab Results   Component Value Date    LABA1C 6.1 (H) 11/25/2022     Lab Results   Component Value Date    CHOL 172 11/25/2022    CHOL 143 (L) 06/27/2021    CHOL 149 (L) 07/22/2020    CHOL 152 (L) 02/22/2020    CHOL 130 (L) 12/22/2018    CHOL 154 02/11/2014    CHOL 181 05/10/2012     Lab Results   Component Value Date    TRIG 177 (H) 11/25/2022    TRIG 166 (H) 06/27/2021    TRIG 143 07/22/2020    TRIG 130 02/22/2020    TRIG 83 12/22/2018    TRIG 113 02/11/2014    TRIG 197 (H) 05/10/2012     Lab Results   Component Value Date    HDL 59 (L) 11/25/2022    HDL 48 (L) 06/27/2021    HDL 60 (L) 07/22/2020    HDL 52 (L) 02/22/2020    HDL 52 (L) 12/22/2018    HDL 56 02/11/2014    HDL 62 05/10/2012     No components found for: LDLCAL  No results found for: LABVLDL  Body mass index is 36.13 kg/m². BP Readings from Last 2 Encounters:   02/27/23 139/89   12/05/22 130/88         Medical Bed:   Is patient in a medical bed? no   If medical bed is in use, has nursing secured room while patient is awake and out of the room? NA  Has safety checks by nursing been completed on the bed/room this shift? NA    Protective Factors:  Patient identifies protective factors with nursing staff as follows:    Identifies reasons for living: Yes   Supportive Social Network or family: Yes    Belief that suicide is immoral/high spirituality: Yes   Responsibility to family or others/living with family: Yes   Fear of death or dying due to pain and suffering: Yes   Engaged in work or school: No     If Patient is unable to identify, reason why? PSYCH:  Depression: 6   Anxiety: 6   SI denies suicidal ideation   Risk of Suicide: No Risk  HI Negative for homicidal ideation      AVH:no If Hallucinations are present, describe? GENERAL:  Appetite: good   Percent Meals: 100%   Social: Yes   Speech: normal   Appearance: appropriately dressed, appropriately groomed, good hygiene, looks younger, and healthy looking    GROUP:  Group Participation: Yes  Participation Quality: Active Listener and Interactive    Notes: Patient was sitting with other patient's in the day area when interview initiated. Patient expressed worry about a list of questions she had for the doctor that she was thinking might sound juvenile. Patient rated her depression as a 6 and her anxiety as a 6. Patient denied SI, HI and AVH. Patient expressed worry about whether she had a dietary order entered by Dr. Erna Monroe. Will continue to monitor for safety.           Electronically signed by Canelo Colmenares RN on 2/27/23 at 3:57 PM CST

## 2023-02-27 NOTE — PROGRESS NOTES
[facility] Adult Unit Daily Assessment  Nursing Progress Note    Room: [facility]612-01   Name: Rhina Bosch   Age: 65 y.o. Gender: female   Dx: Depression with suicidal ideation  Precautions: suicide risk  Inpatient Status: voluntary       SLEEP:  Sleep Quality Good  Sleep Medications: Yes vistaril 25 mg   PRN Sleep Meds: No       MEDICAL:  Other PRN Meds: Yes atarax 25 mg  Med Compliant: yes  Accu-Chek: No  Oxygen/CPAP/BiPAP: No  CIWA/CINA: No   PAIN Assessment: none  Side Effects from medication: No      Metabolic Screening:  Lab Results   Component Value Date    LABA1C 6.1 (H) 11/25/2022     Lab Results   Component Value Date    CHOL 172 11/25/2022    CHOL 143 (L) 06/27/2021    CHOL 149 (L) 07/22/2020    CHOL 152 (L) 02/22/2020    CHOL 130 (L) 12/22/2018    CHOL 154 02/11/2014    CHOL 181 05/10/2012     Lab Results   Component Value Date    TRIG 177 (H) 11/25/2022    TRIG 166 (H) 06/27/2021    TRIG 143 07/22/2020    TRIG 130 02/22/2020    TRIG 83 12/22/2018    TRIG 113 02/11/2014    TRIG 197 (H) 05/10/2012     Lab Results   Component Value Date    HDL 59 (L) 11/25/2022    HDL 48 (L) 06/27/2021    HDL 60 (L) 07/22/2020    HDL 52 (L) 02/22/2020    HDL 52 (L) 12/22/2018    HDL 56 02/11/2014    HDL 62 05/10/2012     No components found for: LDLCAL  No results found for: LABVLDL  Body mass index is 36.13 kg/m². BP Readings from Last 2 Encounters:   02/26/23 (!) 144/70   12/05/22 130/88         Medical Bed:   Is patient in a medical bed? no   If medical bed is in use, has nursing secured room while patient is awake and out of the room? NA  Has safety checks by nursing been completed on the bed/room this shift? yes    Protective Factors:  Patient identifies protective factors with nursing staff as follows:    Identifies reasons for living: Yes   Supportive Social Network or family: Yes    Belief that suicide is immoral/high spirituality: Yes   Responsibility to family or others/living with family: Yes   Fear of death or dying due to pain and suffering: Yes   Engaged in work or school: Yes     If Patient is unable to identify, reason why? PSYCH:  Depression: 6   Anxiety: 7   SI denies suicidal ideation   Risk of Suicide: No Risk  HI Negative for homicidal ideation      AVH:no If Hallucinations are present, describe? GENERAL:  Appetite: good   Percent Meals: 75%   Social: Yes   Speech: normal   Appearance: appropriately dressed, good hygiene, and healthy looking    GROUP:  Group Participation: Yes  Participation Quality: Active Listener    Notes: Pt is in her room during this assessment. She is cooperative. Pt is medication compliant,attends to her ADL's,participates in group , is social with staff and peers. Pt  approaches the desk before making decisions seeking instructions on how to proceed. Pt does state her mood has improved. Will continue to monitor.

## 2023-02-27 NOTE — PLAN OF CARE
Problem: Coping  Goal: Pt/Family able to verbalize concerns and demonstrate effective coping strategies  2/27/2023 1138 by Gonsalo Desouza  Outcome: Progressing    Group Therapy Note     Date: 2/27/2023  Start Time: 1100  End Time:  1130  Number of Participants: 9     Type of Group: Psychotherapy     Wellness Binder Information  Module Name:  emotional wellness  Session Number:  1     Patient's Goal:  validation of feelings     Notes:  pt acknowledged to have feelings validated it may be necessary to share feelings with others.      Status After Intervention:  Improved     Participation Level: Interactive     Participation Quality: Appropriate, Attentive, and Sharing        Speech:  normal        Thought Process/Content: Logical        Affective Functioning: Congruent        Mood: congruent        Level of consciousness:  Alert, Oriented x4, and Attentive        Response to Learning: Able to verbalize current knowledge/experience        Endings: None Reported     Modes of Intervention: Education        Discipline Responsible: Psychoeducational Specialist        Signature:  Gonsalo Desouza

## 2023-02-27 NOTE — PLAN OF CARE

## 2023-02-27 NOTE — PROGRESS NOTES
Department of Psychiatry  Attending Progress Note      SUBJECTIVE:    65 y.o. female with previous psychiatric history of bipolar depression, borderline personality disorder, obsessive-compulsive disorder, hoarding disorder, who has been admitted to our psychiatric unit secondary to suicidal ideations. Patient has been seen in treatment team room. She reported that her condition significantly improved during this hospital stay, stated that her mood is \"pretty good\" today. She endorses good appetite and good quality of sleep during the last night, stated that she did not experience any difficulties to fall asleep or stay asleep and woke up rested well in the morning. Patient is compliant with currently prescribed medication and denies any side effects. She continues to report feeling of anxiety and depression, and rated both of them as 5 out of 10, with 10 being the worst.  Patient attends group activities in the unit and participates in those activities. However, medical staff reported that patient is constantly asking for instructions and directions with her decisions before making any actions. She performed her ADLs today and took a shower. Patient denies current active suicidal or homicidal ideations, denies any plans. Also, she denies auditory and visual hallucinations. Patient did not endorse any delusions or paranoid thoughts. OBJECTIVE    Physical  VITALS:  /89   Pulse 92   Temp 97 °F (36.1 °C) (Temporal)   Resp 16   Ht 5' 4\" (1.626 m)   Wt 210 lb 8 oz (95.5 kg)   SpO2 92%   BMI 36.13 kg/m²   TEMPERATURE:  Current - Temp: 97 °F (36.1 °C);  Max - Temp  Av.9 °F (36.1 °C)  Min: 96.8 °F (36 °C)  Max: 97 °F (36.1 °C)  RESPIRATIONS RANGE: Resp  Av  Min: 16  Max: 16  PULSE RANGE: Pulse  Av  Min: 79  Max: 93  BLOOD PRESSURE RANGE:  Systolic (27DKU), AKZ:251 , Min:139 , CHZ:586  ; Diastolic (28GSB), ZQH:13, Min:70, Max:89   PULSE OXIMETRY RANGE: SpO2  Av %  Min: 92 % Max: 94 %    Review of Systems: 14 point review of systems is negative    Psychological ROS: Positive for presence of mild anxiety and mild depression    Mental Status Examination:    Appearance: Appropriately groomed, wearing casual civilian clothes. Made good eye contact. Behavior: Calm, cooperative and socially appropriate. No psychomotor retardation/agitation appreciated. Gait unremarkable. Speech: Normal in tone, volume, and quality. Mood: \"pretty good\"   Affect: Mood congruent. Range is full  Thought Process: Appears linear and goal oriented. Thought Content: Patient does not have any current active suicidal and homicidal ideations. No overt delusions or paranoia appreciated. Perceptions: Seems patient does not have any auditory or visual hallucinations at present time. Patient did not appear to be responding to internal stimuli. No overt psychosis. Orientation: to person, place, date, and situation. Alert.    Impulsivity: Improved  Neurovegitative: Good appetite, good sleep  Insight: Improved  Judgment: Limited       Data  No new lab tests results to review    Medications  Current Facility-Administered Medications: hydrocortisone (ANUSOL-HC) 2.5 % rectal cream, , Rectal, BID PRN  metroNIDAZOLE (METROGEL) 0.75 % vaginal gel, , Vaginal, Nightly  acetaminophen (TYLENOL) tablet 650 mg, 650 mg, Oral, Q4H PRN  calcium carbonate (TUMS) chewable tablet 500 mg, 1 tablet, Oral, BID PRN  acyclovir (ZOVIRAX) capsule 400 mg, 400 mg, Oral, TID  miconazole (MICOTIN) 2 % powder, , Topical, BID  hydrOXYzine pamoate (VISTARIL) capsule 25 mg, 25 mg, Oral, Nightly  DULoxetine (CYMBALTA) extended release capsule 30 mg, 30 mg, Oral, Daily  vitamin D (ERGOCALCIFEROL) capsule 50,000 Units, 50,000 Units, Oral, Weekly  docusate sodium (COLACE) capsule 100 mg, 100 mg, Oral, Daily PRN  therapeutic multivitamin-minerals 1 tablet, 1 tablet, Oral, Daily  mirabegron (MYRBETRIQ) extended release tablet 50 mg (Patient Supplied), 50 mg, Oral, Daily  clindamycin (CLEOCIN T) 1 % external solution (Patient Supplied), , Topical, Daily  lurasidone (LATUDA) tablet 80 mg, 80 mg, Oral, Dinner  chlorhexidine gluconate (ANTISEPTIC SKIN CLEANSER) 4 % solution (Patient Supplied), , Topical, Daily PRN  hydrOXYzine HCl (ATARAX) tablet 25 mg, 25 mg, Oral, TID PRN  polyethylene glycol (GLYCOLAX) packet 17 g, 17 g, Oral, Daily PRN    ASSESSMENT AND PLAN    DSM 5 DIAGNOSIS:  Bipolar depression  Borderline personality disorder  Dependent personality disorder  Anxiety, unspecified  Hoarding disorder  Obsessive-compulsive disorder  Obstructive sleep apnea  Suicidal ideations     Plan:   1. Psychiatric Medications:   Continue current psychotropic medications as recommended. Patient denies side effect of currently prescribed medications. No changes to the dose of prescribed psychotropic medications today. 2. Medical Issues:    Continue medical monitoring by Dr. Nahun Grewal and associates. 3. Disposition:    Discharge patient home when she will be in stable mental condition and adjustment psychotropic medications will be done. Preliminary day of discharge is tomorrow on 02/28/2023.      Amount of time spent with patient:    35 minutes with greater than 50% of the time spent in counseling and collaboration of care

## 2023-02-27 NOTE — PLAN OF CARE
Problem: Self Harm/Suicidality  Goal: Will have no self-injury during hospital stay  Description: INTERVENTIONS:  1. Ensure constant observer at bedside with Q15M safety checks  2. Maintain a safe environment  3. Secure patient belongings  4. Ensure family/visitors adhere to safety recommendations  5. Ensure safety tray has been added to patient's diet order  6. Every shift and PRN: Re-assess suicidal risk via Frequent Screener    Outcome: Progressing     Problem: Anxiety  Goal: Will report anxiety at manageable levels  Description: INTERVENTIONS:  1. Administer medication as ordered  2. Teach and rehearse alternative coping skills  3. Provide emotional support with 1:1 interaction with staff  Outcome: Progressing     Problem: Coping  Goal: Pt/Family able to verbalize concerns and demonstrate effective coping strategies  Description: INTERVENTIONS:  1. Assist patient/family to identify coping skills, available support systems and cultural and spiritual values  2. Provide emotional support, including active listening and acknowledgement of concerns of patient and caregivers  3. Reduce environmental stimuli, as able  4. Instruct patient/family in relaxation techniques, as appropriate  5. Assess for spiritual pain/suffering and initiate Spiritual Care, Psychosocial Clinical Specialist consults as needed  2023 1605 by Ila Holstein, RN  Outcome: Progressing  2023 1138 by Arville Schirmer  Outcome: Progressing  2023 1119 by Hardeep Kaur  Outcome: Progressing  Note:                                                                     Group Therapy Note    Date: 2023  Start Time: 1000  End Time:  1030  Number of Participants: 9    Type of Group: Psychoeducation    Wellness Binder Information  Module Name:  Relapse Prevention  Session Number:  5    Group Goal for Pt:   To improve knowledge of relapse prevention strategies    Notes:  Pt demonstrated improved knowledge of relapse prevention strategies by actively participating in group discussion. Status After Intervention:  Unchanged    Participation Level: Active Listener and Interactive    Participation Quality: Appropriate and Attentive      Speech:  normal      Thought Process/Content: Logical      Affective Functioning: Congruent      Mood: anxious and depressed      Level of consciousness:  Alert and Oriented x4      Response to Learning: Able to verbalize current knowledge/experience, Able to verbalize/acknowledge new learning, and Progressing to goal      Endings: None Reported    Modes of Intervention: Education      Discipline Responsible: Psychoeducational Specialist      Signature:  Trever Huber       Problem: Decision Making  Goal: Pt/Family able to effectively weigh alternatives and participate in decision making related to treatment and care  Description: INTERVENTIONS:  1. Determine when there are differences between patient's view, family's view, and healthcare provider's view of condition  2. Facilitate patient and family articulation of goals for care  3. Help patient and family identify pros/cons of alternative solutions  4. Provide information as requested by patient/family  5. Respect patient/family right to receive or not to receive information  6. Serve as a liaison between patient and family and health care team  7. Initiate Consults from Ethics, Palliative Care or initiate [address] [place] [address] as is appropriate  Outcome: Progressing     Problem: Behavior  Goal: Pt/Family maintain appropriate behavior and adhere to behavioral management agreement, if implemented  Description: INTERVENTIONS:  1. Assess patient/family's coping skills and  non-compliant behavior (including use of illegal substances)  2. Notify security of behavior or suspected illegal substances which indicate the need for search of the family and/or belongings  3. Encourage verbalization of thoughts and concerns in a socially appropriate manner  4. Utilize positive, consistent limit setting strategies supporting safety of patient, staff and others  5. Encourage participation in the decision making process about the behavioral management agreement  6. If a visitor's behavior poses a threat to safety call refer to organization policy. 7. Initiate consult with , Psychosocial CNS, Spiritual Care as appropriate  Outcome: Progressing     Problem: Depression/Self Harm  Goal: Effect of psychiatric condition will be minimized and patient will be protected from self harm  Description: INTERVENTIONS:  1. Assess impact of patient's symptoms on level of functioning, self care needs and offer support as indicated  2. Assess patient/family knowledge of depression, impact on illness and need for teaching  3. Provide emotional support, presence and reassurance  4. Assess for possible suicidal thoughts or ideation. If patient expresses suicidal thoughts or statements do not leave alone, initiate Suicide Precautions, move to a room close to the nursing station and obtain sitter  5.  Initiate consults as appropriate with Mental Health Professional, Spiritual Care, Psychosocial CNS, and consider a recommendation to the LIP for a Psychiatric Consultation  2/27/2023 1605 by Mary Gordon RN  Outcome: Progressing  2/27/2023 1508 by Douglas Menezes LPC  Outcome: Progressing     Problem: Safety - Adult  Goal: Free from fall injury  Outcome: Progressing     Problem: Pain  Goal: Verbalizes/displays adequate comfort level or baseline comfort level  Outcome: Progressing     Problem: Chronic Conditions and Co-morbidities  Goal: Patient's chronic conditions and co-morbidity symptoms are monitored and maintained or improved  Outcome: Progressing

## 2023-02-28 VITALS
DIASTOLIC BLOOD PRESSURE: 63 MMHG | HEIGHT: 64 IN | OXYGEN SATURATION: 93 % | BODY MASS INDEX: 35.94 KG/M2 | WEIGHT: 210.5 LBS | HEART RATE: 75 BPM | RESPIRATION RATE: 18 BRPM | SYSTOLIC BLOOD PRESSURE: 116 MMHG | TEMPERATURE: 96.4 F

## 2023-02-28 PROCEDURE — 5130000000 HC BRIDGE APPOINTMENT

## 2023-02-28 PROCEDURE — 6370000000 HC RX 637 (ALT 250 FOR IP): Performed by: PSYCHIATRY & NEUROLOGY

## 2023-02-28 RX ORDER — METRONIDAZOLE 7.5 MG/G
GEL VAGINAL
Qty: 70 G | Refills: 0 | Status: ON HOLD | OUTPATIENT
Start: 2023-02-28

## 2023-02-28 RX ADMIN — CLINDAMYCIN PHOSPHATE: 11.9 SOLUTION TOPICAL at 08:46

## 2023-02-28 RX ADMIN — ACYCLOVIR 400 MG: 200 CAPSULE ORAL at 08:26

## 2023-02-28 RX ADMIN — MULTIPLE VITAMINS W/ MINERALS TAB 1 TABLET: TAB at 08:26

## 2023-02-28 RX ADMIN — MICONAZOLE NITRATE: 2 POWDER TOPICAL at 08:46

## 2023-02-28 RX ADMIN — DULOXETINE HYDROCHLORIDE 30 MG: 30 CAPSULE, DELAYED RELEASE ORAL at 08:26

## 2023-02-28 NOTE — DISCHARGE SUMMARY
Discharge Summary     Patient ID:  Francheska Anderson  [id number]  50 y.o.  1965    Admit date: 2/23/2023  Discharge date: 2/28/2023    Admitting Physician: Noah Mclaughlin MD   Attending Physician: Noah Mclaughlin MD  Discharge Provider: Vivi Casanova MD     Admission Diagnoses: Suicidal ideation [R45.851]  Depression with suicidal ideation [F32. A, R45.851]  [facility] ([facility] [F31.9]    Discharge Diagnoses: Bipolar depression  Borderline personality disorder  Dependent personality disorder  Anxiety, unspecified  Hoarding disorder  Obsessive-compulsive disorder  Obstructive sleep apnea  Suicidal ideations    Admission Condition: poor    Discharged Condition: stable    Indication for Admission: suicidal ideations    HPI:   The patient is a 65 y.o. female with previous psychiatric history of bipolar depression, borderline personality disorder, obsessive-compulsive disorder, hoarding disorder, who has been admitted to our psychiatric unit secondary to suicidal ideations. Patient is well-known to psychiatry due to multiple previous admissions to our psychiatric unit with same clinical presentation, as at present time. Patient has been seen in treatment team room with presence of the patient's nurse. She reported that she experienced suicidal ideations at home due to current multiple life stressors, however, stated that she was able to deal with her suicidal urges, stated that her children and grandchildren are major protective factor against suicide. Patient reported that her major life stresses related to not having a relationships with another man, due to contracted Herpes I and II infection, stated that she afraid that she can pass infection to other male if she would have a sexual relationship. Also, she reported that her sister plans to move to [place] in May to see her children, however, patient already anticipates the separation, stated \"I will miss her so much.   I do not know how I am going to live without her\". However, patient stated that she and her sister live with in different areas anyway. Also, patient reported that she experienced feeling of guilt due to [place] a sex before marriage\", despite the fact that it did happen more than 30 years ago. She reported that she has \"trouble cleaning a house\", due to her hoarding disorder, however, stated that she has a community support and once a week somebody comes to her house and helps her to clean it. Today during the interview, patient continues to report feeling of depression, anxiety, poor motivation, poor concentration, a lot of worry about her living situation. Patient endorses feeling of hopelessness and helplessness, denies feeling of worthlessness. She denies any mood swings or racing thoughts. She continues to endorse current active suicidal ideations with plan to overdose of medication on medications. She denies any homicidal ideations. Patient denies auditory and visual hallucinations. PSYCHIATRIC HISTORY:    Diagnoses: Bipolar disorder, borderline personality disorder, hoarding disorder, OCD  Suicide attempts/gestures: 4 times by overdose on different medications and using housecleaning liquid  Prior hospitalizations: Multiple to [facility] and [facility] with last admission in November-December 2022  Medication trials: Multiple psychotropic medications, including Depakote, lithium, Lamictal, Risperdal, Abilify, trazodone, doxepin, Zoloft, Zyprexa, Wellbutrin, clomipramine, Latuda, Cymbalta  [facility]  Head trauma: Denies       Hospital Course:   Patient was admitted to the adult psych behavioral health floor and was acclimated to the floor. Labs were reviewed and physical exam was completed by Dr. Sid Bernheim and associates. Home medications were reconciled. MAY was obtained and reviewed.    During the hospital stay patient's home medications have been restarted at previously prescribed and recommended dose, due to patient's treatment noncompliance. Patient has been provided with metronidazole 0.75% vaginal gel for vaginal yeast infection and miconazole 2% power for application under the breast, abdominal area and perineal area secondary to dermatitis. Patient attended and participated in groups. The patient did interact well with other patients and staff on the unit. Behaviorally she was not a problem. Patient was compliant with her medications. Patient was sleeping through the night. This patient is not suicidal, homicidal or psychotic at discharge. With the above mentioned psychotherapeutic interventions, the patient reported considerable improvement in her condition. On 02/28/2023 it was therefore decided to discharge the patient, as it was felt that she received maximal benefit from her hospitalization.       Number of antipsychotic medication prescribed at discharge: One, Latuda  IF MORE THAN ONE IS USED: NA    History of greater than 3 failed multiple monotherapy trials: NA  Monotherapy taper plan/ cross taper in progress: NA  Augmentation of Clozapine: NA    Referral to addiction treatment: NA    Prescription for Alcohol or Drug Disorder Medication: NA    Prescription for Tobacco Cessation medication: NA    If no prescriptions for Tobacco Cessation must document why: NA    Consults: Internal medicine    Significant Diagnostic Studies:     Lab Results   Component Value Date    WBC 9.2 02/23/2023    HGB 15.6 02/23/2023    HCT 45.3 02/23/2023    MCV 94.4 02/23/2023     02/23/2023     Lab Results   Component Value Date     02/23/2023    K 4.1 02/23/2023     02/23/2023    CO2 20 (L) 02/23/2023    BUN 13 02/23/2023    CREATININE 0.7 02/23/2023    GLUCOSE 111 (H) 02/23/2023    CALCIUM 10.4 (H) 02/23/2023    PROT 7.6 02/23/2023    LABALBU 4.3 02/23/2023    BILITOT 0.5 02/23/2023    ALKPHOS 108 (H) 02/23/2023    AST 16 02/23/2023    ALT 17 02/23/2023 LABGLOM >60 02/23/2023    GFRAA >59 06/25/2021    GLOB 3.3 06/18/2016     Lab Results   Component Value Date    LETSLNCZ42 805 02/25/2023     Lab Results   Component Value Date    VITD25 59.9 02/25/2023        Treatments: therapies: SW      Mental Status Examination:  Alert, Oriented X 4  Appearance:  Proper Grooming and Hygiene  Speech with Regular Rate and Rhythm  Eye Contact:  Good  No Psychomotor Agitation/Retardation Noted  Attitude:  Cooperative  Mood:  \"Good\"  Affective: Congruent, appropriate to the situation, with a normal range and intensity  Thought Processes:  Coherently communicated, logical and goal oriented  Thought Content:  No Suicidal Ideation, No Homicidal Ideation, No Auditory or Visual Hallucinations, No Overt Delusions  Insight: Limited  Judgement: Limited  Memory is intact for both remote and recent  Intellectual Functioning:  Within the [address] of Knowledge:  Adequate  Attention and Concentration:  Adequate      Discharge Exam:  Please, see medical note    Disposition: home    Patient Instructions:   Current Discharge Medication List        START taking these medications    Details   metroNIDAZOLE (METROGEL) 0.75 % vaginal gel One applicator nightly  Qty: 70 g, Refills: 0      miconazole (MICOTIN) 2 % powder Apply topically 2 times daily. Qty: 45 g, Refills: 0           CONTINUE these medications which have NOT CHANGED    Details   clindamycin (CLEOCIN T) 1 % external solution Apply topically daily Apply topically to the groin and abdomen once daily when and where lesions are present as needed.       Multiple Vitamins-Minerals (THERAPEUTIC MULTIVITAMIN-MINERALS) tablet Take 1 tablet by mouth daily      DULoxetine (CYMBALTA) 30 MG extended release capsule Take 30 mg by mouth daily      Cholecalciferol (VITAMIN D3) 1.25 MG (33215 UT) CAPS Take 1 capsule by mouth once a week Takes on Sundays      chlorhexidine (HIBICLENS) 4 % external liquid Apply topically daily as needed Apply topically daily as needed to boil on thigh      lurasidone (LATUDA) 80 MG TABS tablet Take 1 tablet by mouth Daily with supper  Qty: 30 tablet, Refills: 0      hydrOXYzine pamoate (VISTARIL) 25 MG capsule Take 25 mg by mouth at bedtime      docusate sodium (COLACE) 100 MG capsule Take 100 mg by mouth as needed for Constipation      calcium carbonate 600 MG TABS tablet Take 1 tablet by mouth 2 times daily as needed      mirabegron (MYRBETRIQ) 50 MG TB24 Take 50 mg by mouth daily      valACYclovir (VALTREX) 500 MG tablet Take 500 mg by mouth daily           STOP taking these medications       nystatin (MYCOSTATIN) 181285 UNIT/GM powder Comments:   Reason for Stopping:         clindamycin-benzoyl peroxide (BENZACLIN) 1-5 % gel Comments:   Reason for Stopping:             Activity: activity as tolerated  Diet: cardiac diet  Wound Care: none needed    [facility]     Time worked: More than 31 minutes    Participation: good    Electronically signed by Meredith Keller MD on 2/28/2023 at 8:33 AM

## 2023-02-28 NOTE — PLAN OF CARE
Problem: Self Harm/Suicidality  Goal: Will have no self-injury during hospital stay  Description: INTERVENTIONS:  1. Ensure constant observer at bedside with Q15M safety checks  2. Maintain a safe environment  3. Secure patient belongings  4. Ensure family/visitors adhere to safety recommendations  5. Ensure safety tray has been added to patient's diet order  6. Every shift and PRN: Re-assess suicidal risk via Frequent Screener    Outcome: Adequate for Discharge     Problem: Anxiety  Goal: Will report anxiety at manageable levels  Description: INTERVENTIONS:  1. Administer medication as ordered  2. Teach and rehearse alternative coping skills  3. Provide emotional support with 1:1 interaction with staff  2/28/2023 1148 by Amadeo Lewis RN  Outcome: Adequate for Discharge  2/28/2023 1142 by Tayler Olivia  Outcome: Progressing     Problem: Coping  Goal: Pt/Family able to verbalize concerns and demonstrate effective coping strategies  Description: INTERVENTIONS:  1. Assist patient/family to identify coping skills, available support systems and cultural and spiritual values  2. Provide emotional support, including active listening and acknowledgement of concerns of patient and caregivers  3. Reduce environmental stimuli, as able  4. Instruct patient/family in relaxation techniques, as appropriate  5. Assess for spiritual pain/suffering and initiate Spiritual Care, Psychosocial Clinical Specialist consults as needed  Outcome: Adequate for Discharge     Problem: Decision Making  Goal: Pt/Family able to effectively weigh alternatives and participate in decision making related to treatment and care  Description: INTERVENTIONS:  1. Determine when there are differences between patient's view, family's view, and healthcare provider's view of condition  2. Facilitate patient and family articulation of goals for care  3. Help patient and family identify pros/cons of alternative solutions  4.  Provide information as requested by patient/family  5. Respect patient/family right to receive or not to receive information  6. Serve as a liaison between patient and family and health care team  7. Initiate Consults from Ethics, Palliative Care or initiate Family Care Conference as is appropriate  Outcome: Adequate for Discharge     Problem: Behavior  Goal: Pt/Family maintain appropriate behavior and adhere to behavioral management agreement, if implemented  Description: INTERVENTIONS:  1. Assess patient/family's coping skills and  non-compliant behavior (including use of illegal substances)  2. Notify security of behavior or suspected illegal substances which indicate the need for search of the family and/or belongings  3. Encourage verbalization of thoughts and concerns in a socially appropriate manner  4. Utilize positive, consistent limit setting strategies supporting safety of patient, staff and others  5. Encourage participation in the decision making process about the behavioral management agreement  6. If a visitor's behavior poses a threat to safety call refer to organization policy.  7. Initiate consult with , Psychosocial CNS, Spiritual Care as appropriate  Outcome: Adequate for Discharge     Problem: Depression/Self Harm  Goal: Effect of psychiatric condition will be minimized and patient will be protected from self harm  Description: INTERVENTIONS:  1. Assess impact of patient's symptoms on level of functioning, self care needs and offer support as indicated  2. Assess patient/family knowledge of depression, impact on illness and need for teaching  3. Provide emotional support, presence and reassurance  4. Assess for possible suicidal thoughts or ideation. If patient expresses suicidal thoughts or statements do not leave alone, initiate Suicide Precautions, move to a room close to the nursing station and obtain sitter  5. Initiate consults as appropriate with Mental Health Professional, Spiritual Care,  Psychosocial CNS, and consider a recommendation to the [facility] for a Psychiatric Consultation  Outcome: Adequate for Discharge     Problem: Safety - Adult  Goal: Free from fall injury  Outcome: Adequate for Discharge     Problem: Pain  Goal: Verbalizes/displays adequate comfort level or baseline comfort level  Outcome: Adequate for Discharge     Problem: Chronic Conditions and Co-morbidities  Goal: Patient's chronic conditions and co-morbidity symptoms are monitored and maintained or improved  Outcome: Adequate for Discharge

## 2023-02-28 NOTE — PROGRESS NOTES
Progress Note  Lane Whitt  2/28/2023 7:04 AM  Subjective:   Admit Date:   2/23/2023      CC/ADMIT DX:       Interval History:   Reviewed overnight events and nursing notes. She has no physical complaints. I have reviewed all labs/diagnostics from the last 24hrs. ROS:   I have done a 10 point ROS and all are negative, except what is mentioned in the HPI. ADULT DIET; Regular; 4 carb choices (60 gm/meal)    Medications:      metroNIDAZOLE   Vaginal Nightly    acyclovir  400 mg Oral TID    miconazole   Topical BID    hydrOXYzine pamoate  25 mg Oral Nightly    DULoxetine  30 mg Oral Daily    vitamin D  50,000 Units Oral Weekly    therapeutic multivitamin-minerals  1 tablet Oral Daily    mirabegron  50 mg Oral Daily    clindamycin   Topical Daily    lurasidone  80 mg Oral Dinner           Objective:   Vitals: BP (!) 116/57   Pulse 82   Temp 97.5 °F (36.4 °C) (Temporal)   Resp 16   Ht 5' 4\" (1.626 m)   Wt 210 lb 8 oz (95.5 kg)   SpO2 96%   BMI 36.13 kg/m²  No intake or output data in the 24 hours ending 02/28/23 0704  General appearance: alert and cooperative with exam  Lungs: clear to auscultation bilaterally  Heart: regular rate and rhythm, S1, S2 normal, no murmur, click, rub or gallop  Abdomen: soft, non-tender; bowel sounds normal; no masses,  no organomegaly  Extremities: extremities normal, atraumatic, no cyanosis or edema  Neurologic:  No obvious focal neurologic deficits. Assessment and Plan:   Principal Problem:    Depression with suicidal ideation  Active Problems:    Bipolar depression ([address]  Resolved Problems:    * No resolved hospital problems. *    HTN    Pre DM    Plan:   Continue present medication(s)    She is medically stable. I will monitor for any changes or concerns. Follow with Psych          Discharge planning:    home    Reviewed treatment plans with the patient and/or family.              Electronically signed by Louann Das MD on 2/28/2023 at 7:04 AM

## 2023-02-28 NOTE — DISCHARGE INSTR - DIET

## 2023-02-28 NOTE — PROGRESS NOTES
Group Note    Date: 02/28/23  Start Time: 8:00 AM   End Time:8:30 AM     Number of Participants: 10    Type of Group: Community/Goal     Patient's Goal:  \"I like to see a dietician before I go home. Making goals to accomplish when I go home\"    Notes:      Status After Intervention:      Participation Level:  Active Listener    Participation Quality: Appropriate    Speech:  normal    Thought Process/Content: Logical    Mood:  Calm    Level of consciousness:  Alert    Response to Learning: Able to verbalize current knowledge/experience    Modes of Intervention: Education and Support    Discipline Responsible: Behavioral Health Technician     Signature:  Jose M Jean

## 2023-02-28 NOTE — PROGRESS NOTES
Nutrition Assessment         Discharge Planning:      Refer to [facility] Registered Dietitian [address] [facility] ([place], [place]), [address], LD  Contact: [phone]

## 2023-02-28 NOTE — PROGRESS NOTES
CLINICAL PHARMACY NOTE: MEDS TO BEDS    Total # of Prescriptions Filled: 1   The following medications were delivered to the patient:  Metronidazole 75% vaginal gel    Additional Documentation:   ARCHANA Messer picked up at the pharmacy window.

## 2023-02-28 NOTE — PROGRESS NOTES
Behavioral Health   Discharge Note  Bridge Appointment completed: Reviewed Discharge Instructions with patient. Patient verbalizes understanding and agreement with the discharge plan using the teachback method. Referral for Outpatient Tobacco Cessation Counseling, upon discharge (umu X if applicable and completed):    ( )  Hospital staff assisted patient to call Quit Line or faxed referral                                   during hospitalization                  ( )  Recognizing danger situations (included triggers and roadblocks), if not completed on admission                    ( )  Coping skills (new ways to manage stress, exercise, relaxation techniques, changing routine, distraction), if not completed on admission                                                           ( )  Basic information about quitting (benefits of quitting, techniques in how to quit, available resources, if not completed on admission  ( ) Referral [facility] counseling faxed to [facility]   ( ) Patient refused referral  ( ) Patient refused counseling  ( x) Patient refused smoking cessation medication upon discharge    Vaccinations (umu X if applicable and completed):  ( ) Patient states already received influenza vaccine elsewhere  ( ) Patient received influenza vaccine during this hospitalization  ( x) Patient refused influenza vaccine at this time      Pt discharged with followings belongings:       Valuables retrieved from [facility] and returned to patient. Patient left department with parents via private vehicle, discharged to home. Patient education on aftercare instructions: yes  Patient verbalize understanding of AVS:  yes. Suicidal Ideations? No Risk of Suicide: No Risk AVH? Denies  HI?  Negative for homicidal ideation       Status EXAM upon discharge:  Mental Status and Behavioral Exam  Normal: Yes  Level of Assistance: Independent/Self  Facial Expression: Brightened  Affect: Appropriate  Level of Consciousness: Alert  Frequency of Checks: 4 times per hour, close  Mood:Normal: Yes  Mood: Helpless  Motor Activity:Normal: Yes  Eye Contact: Good  Observed Behavior: Cooperative  Sexual Misconduct History: Current - no  Preception: [place] to person, [place] to time, [place] to situation, [place] to place  Attention:Normal: Yes  Attention: Distractible  Thought Processes: Tangential  Thought Content:Normal: Yes  Thought Content: Other (comment), Preoccupations, Obsessions (Denies SI; hi)  Depression Symptoms: No problems reported or observed. Anxiety Symptoms: No problems reported or observed. Estela Symptoms: No problems reported or observed. Hallucinations: None  Delusions: No  Memory:Normal: No  Memory: Poor recent, Poor remote  Insight and Judgment: No  Insight and Judgment: Poor judgment, Poor insight    AVS/Transition Record has been discussed with patient and a copy was given to the patient. The AVS/Transition Record was faxed to the next level of care today.

## 2023-02-28 NOTE — PROGRESS NOTES
[facility] Adult Unit Daily Assessment  Nursing Progress Note    Room: [address] [place], [place] [address]-   Name: Rickey Romero   Age: 65 y.o. Gender: female   Dx: Depression with suicidal ideation  Precautions: suicide risk  Inpatient Status: voluntary       SLEEP:  Sleep Quality Good  Sleep Medications: Yes   PRN Sleep Meds: No       MEDICAL:  Other PRN Meds: yes  Med Compliant: Yes  Accu-Chek: No  Oxygen/CPAP/BiPAP: CPAP  CIWA/CINA: No   PAIN Assessment: none  Side Effects from medication: No      Metabolic Screening:  Lab Results   Component Value Date    LABA1C 6.1 (H) 11/25/2022     Lab Results   Component Value Date    CHOL 172 11/25/2022    CHOL 143 (L) 06/27/2021    CHOL 149 (L) 07/22/2020    CHOL 152 (L) 02/22/2020    CHOL 130 (L) 12/22/2018    CHOL 154 02/11/2014    CHOL 181 05/10/2012     Lab Results   Component Value Date    TRIG 177 (H) 11/25/2022    TRIG 166 (H) 06/27/2021    TRIG 143 07/22/2020    TRIG 130 02/22/2020    TRIG 83 12/22/2018    TRIG 113 02/11/2014    TRIG 197 (H) 05/10/2012     Lab Results   Component Value Date    HDL 59 (L) 11/25/2022    HDL 48 (L) 06/27/2021    HDL 60 (L) 07/22/2020    HDL 52 (L) 02/22/2020    HDL 52 (L) 12/22/2018    HDL 56 02/11/2014    HDL 62 05/10/2012     No components found for: LDLCAL  No results found for: LABVLDL  Body mass index is 36.13 kg/m². BP Readings from Last 2 Encounters:   02/27/23 (!) 116/57   12/05/22 130/88         Medical Bed:   Is patient in a medical bed? no   If medical bed is in use, has nursing secured room while patient is awake and out of the room? NA  Has safety checks by nursing been completed on the bed/room this shift? NA    Protective Factors:  Patient identifies protective factors with nursing staff as follows:    Identifies reasons for living: Yes   Supportive Social Network or family: Yes    Belief that suicide is immoral/high spirituality: Yes   Responsibility to family or others/living with family: Yes   Fear of death or dying due to pain and suffering: Yes   Engaged in work or school: No     If Patient is unable to identify, reason why? N/a      PSYCH:  Depression: 6   Anxiety: 6   SI denies suicidal ideation   Risk of Suicide: No Risk  HI Negative for homicidal ideation      AVH:no If Hallucinations are present, describe? N/a        GENERAL:  Appetite: no change from normal   Percent Meals:    Social: No   Speech: normal   Appearance: appropriately dressed    GROUP:  Group Participation: No  Participation Quality: None    Notes:   Patient has been in her room the majority of this shift. She has  came out for snacks this evening. She has had a shower this evening and medication applied this evening. Patient is currently using a facility CPAP. Patient is in eyesight of staff while using the CPAP. No complaints voiced this evening.        Electronically signed by Khris Rendon RN on 2/28/23 at 3:54 AM CST

## 2023-02-28 NOTE — DISCHARGE INSTRUCTIONS
Depression and Chronic Disease: Care Instructions  Your Care Instructions     A chronic disease is one that you have for a long time. Some chronic diseases can be controlled, but they usually cannot be cured. Depression is common in people with chronic diseases, but it often goes unnoticed. Many people have concerns about seeking treatment for a mental health problem. You may think it's a sign of weakness, or you don't want people to know about it. It's important to overcome these reasons for not seeking treatment. Treating depression or anxiety is good for your health. Follow-up care is a key part of your treatment and safety. Be sure to make and go to all appointments, and call your doctor if you are having problems. It's also a good idea to know your test results and keep a list of the medicines you take. How can you care for yourself at home? Watch for symptoms of depression  The symptoms of depression are often subtle at first. You may think they are caused by your disease rather than depression. Or you may think it is normal to be depressed when you have a chronic disease. If you are depressed you may:  Feel sad or hopeless. Feel guilty or worthless. Not enjoy the things you used to enjoy. Feel hopeless, as though life is not worth living. Have trouble thinking or remembering. Have low energy, and you may not eat or sleep well. Pull away from others. Think often about death or killing yourself. Get treatment  By treating your depression, you can feel more hopeful and have more energy. If you feel better, you may take better care of yourself, so your health may improve. Talk to your doctor if you have any changes in mood during treatment for your disease. Ask your doctor for help. Counseling, antidepressant medicine, or a combination of the two can help most people with depression. Often a combination works best. Counseling can also help you cope with having a chronic disease.   Where to get help 24 hours a day, 7 days a week   If you or someone you know talks about suicide, self-harm, a mental health crisis, a substance use crisis, or any other kind of emotional distress, get help right away. You can:  Call the Suicide and Crisis Lifeline at 65. Call 9-789-599-TALK (4-904.907.7518). Text HOME to 033665 to access the Crisis Text Line. Consider saving these numbers in your phone. When should you call for help? Call 911 anytime you think you may need emergency care. For example, call if:    You feel like hurting yourself or someone else. Someone you know has depression and is about to attempt or is attempting suicide. Where to get help 24 hours a day, 7 days a week   If you or someone you know talks about suicide, self-harm, a mental health crisis, a substance use crisis, or any other kind of emotional distress, get help right away. You can:    Call the Suicide and Crisis Lifeline at 65. Call 9-354-858-TALK (1-429.796.6066). Text HOME to 163967 to access the Crisis Text Line. Consider saving these numbers in your phone. Call your doctor now or seek immediate medical care if:    You hear voices. Someone you know has depression and:  Starts to give away possessions. Uses illegal drugs or drinks alcohol heavily. Talks or writes about death, including writing suicide notes or talking about guns, knives, or pills. Starts to spend a lot of time alone. Acts very aggressively or suddenly appears calm. Watch closely for changes in your health, and be sure to contact your doctor if:    You do not get better as expected. Where can you learn more? Go to http://www.woods.com/ and enter A548 to learn more about \"Depression and Chronic Disease: Care Instructions. \"  Current as of: February 9, 2022               Content Version: 13.5  © 1386-2185 Healthwise, Incorporated. Care instructions adapted under license by [facility] ([facility]).  If you have questions about a medical condition or this instruction, always ask your healthcare professional. [address] any warranty or liability for your use of this information. Bipolar Disorder: Care Instructions  Your Care Instructions     Bipolar disorder is an illness that causes extreme mood changes, from times of very high energy (manic episodes) to times of depression. But many people with bipolar disorder show only the symptoms of depression. These moods may cause problems with your work, school, family life, friendships, and how well you function. This disease is also called manic-depression. There is no cure for bipolar disorder, but it can be helped with medicines. Counseling may also help. It is important to take your medicines exactly as prescribed, even when you feel well. You may need lifelong treatment. Follow-up care is a key part of your treatment and safety. Be sure to make and go to all appointments, and call your doctor if you are having problems. It's also a good idea to know your test results and keep a list of the medicines you take. How can you care for yourself at home? Be safe with medicines. Take your medicines exactly as prescribed. Do not stop or change a medicine without talking to your doctor first. Ben Schroeder and your doctor may need to try different combinations of medicines to find what works for you. Take your medicines on schedule to keep your moods even. When you feel good, you may think that you do not need your medicines. But it is important to keep taking them. Go to your counseling sessions. Call and talk with your counselor if you can't go to a session or if you don't think the sessions are helping. Do not just stop going. Get at least 30 minutes of activity on most days of the week. Walking is a good choice. You also may want to do other things, such as running, swimming, or cycling. Get enough sleep. Keep your room dark and quiet.  Try to go to bed at the same time every night. Eat a healthy diet. This includes whole grains, dairy, fruits, vegetables, and protein. Eat foods from each of these groups. Try to lower your stress. Manage your time, build a strong support system, and lead a healthy lifestyle. To lower your stress, try physical activity, slow deep breathing, or getting a massage. Do not use alcohol, marijuana, or illegal drugs. Learn the early signs of your mood changes. You can then take steps to help yourself feel better. Ask for help from friends and family when you need it. You may need help with daily chores when you are depressed. When you are manic, you may need support to control your high energy levels. What should you do if someone in your family has bipolar disorder? Learn about the disease and signs it's getting worse. Remind your family member you love them. Make a plan with all family members about how to take care of your loved one when symptoms are bad. Remind yourself it will take time for changes to occur. Try not to blame yourself for the disease. Know your legal rights and the legal rights of your family member. Support groups or counselors can help with this information. Take care of yourself. Keep up with your interests, such as career, hobbies, and friends. Use exercise, positive self-talk, deep breathing, and other relaxing exercises to help lower your stress. Give yourself time to grieve. You may need to deal with emotions such as anger, fear, and frustration. If you are having a hard time with your feelings or with your relationship with your family member, talk with a counselor. When should you call for help? Call 911 anytime you think you may need emergency care. For example, call if:    You feel like hurting yourself or someone else. Someone who has bipolar disorder displays dangerous behavior, and you think the person might hurt themself or someone else.    Where to get help 24 hours a day, 7 days a week   If you or someone you know talks about suicide, self-harm, a mental health crisis, a substance use crisis, or any other kind of emotional distress, get help right away. You can:    Call the Suicide and Crisis Lifeline at 65. Call 2-764-138-KICM (7-110.732.6321). Text HOME to 426472 to access the Crisis Text Line. Consider saving these numbers in your phone. Call your doctor now or seek immediate medical care if:    You hear voices. Someone you know has bipolar disorder and talks about suicide. If a suicide threat seems real, with a specific plan and a way to carry it out, stay with the person, or ask someone you trust to stay with the person, until you can get help. Someone you know has bipolar disorder and:  Starts to give away possessions. Is using illegal drugs or drinking alcohol heavily. Talks or writes about death, including writing suicide notes or talking about guns, knives, or pills. Talks or writes about hurting someone else. Starts to spend a lot of time alone. Acts very aggressively or suddenly appears calm. Talks about beliefs that are not based in reality (delusions). Watch closely for changes in your health, and be sure to contact your doctor if:    You cannot go to your counseling sessions. Where can you learn more? Go to http://www.woods.com/ and enter K052 to learn more about \"Bipolar Disorder: Care Instructions. \"  Current as of: February 9, 2022               Content Version: 13.5  © 2006-2022 Healthwise, Incorporated. Care instructions adapted under license by [facility] ([facility]). If you have questions about a medical condition or this instruction, always ask your healthcare professional. [place] [address] any warranty or liability for your use of this information. Suicidal Thoughts and Behavior: Care Instructions  Overview  You have been seen by a doctor because you've had thoughts of suicide or have harmed yourself.  Your doctor and support team want to help keep you safe. Your team may include a , a , and a counselor. People often think about suicide because they feel hopeless, helpless, or worthless. These feelings may come from having a mental health problem, such as depression. These problems can be treated. It's important to remember that there are people who care about you. Your doctor and support team take your pain very seriously, and they want to help. Treatment and close follow-up care can help you feel better. Follow-up care is a key part of your treatment and safety. Be sure to make and go to all appointments, and call your doctor if you are having problems. It's also a good idea to know your test results and keep a list of the medicines you take. How can you care for yourself at home? Where to get help 24 hours a day, 7 days a week   If you or someone you know talks about suicide, self-harm, a mental health crisis, a substance use crisis, or any other kind of emotional distress, get help right away. You can:  Call the Suicide and Crisis Lifeline at 65. Call 3-730-994-EQBE (4-290.375.9732). Text HOME to 100217 to access the Crisis Text Line. Consider saving these numbers in your phone. Other things you can do   Talk to someone. Be open about your feelings. Reach out to a trusted family member or friend, your doctor, or a counselor. Attend all counseling sessions recommended by your doctor. Make a suicide safety plan. This is a set of steps you can take when you feel suicidal. It includes your warning signs, coping strategies, and people you can ask for support. It's best to work with a therapist to make your plan. Ask someone to remove and store any guns, pills, or other means of suicide. Avoid alcohol and drug use. Be safe with medicines. Take your medicines exactly as prescribed. Call your doctor if you think you are having a problem with your medicine. When should you call for help?    Call [phone] anytime you think you may need emergency care. For example, call if:    You feel you cannot stop from hurting yourself or someone else. Where to get help 24 hours a day, 7 days a week   If you or someone you know talks about suicide, self-harm, a mental health crisis, a substance use crisis, or any other kind of emotional distress, get help right away. You can:    Call the Suicide and Crisis Lifeline at 65. Call 5-957-592-YNDJ (6-999.139.9181). Text HOME to 065141 to access the Crisis Text Line. Consider saving these numbers in your phone. Call your doctor now or seek immediate medical care if:    You have one or more warning signs of suicide. For example, call if:  You feel like giving away your possessions. You use illegal drugs or drink alcohol heavily. You talk or write about death. This may include writing suicide notes and talking about guns, knives, or pills. You start to spend a lot of time alone or spend more time alone than usual.     You hear voices. You start acting in an aggressive way that's not normal for you. Watch closely for changes in your health, and be sure to contact your doctor if you have any problems. Where can you learn more? Go to http://www.woods.com/ and enter G672 to learn more about \"Suicidal Thoughts and Behavior: Care Instructions. \"  Current as of: February 9, 2022               Content Version: 13.5  © 2006-2022 Healthwise, Incorporated. Care instructions adapted under license by [facility] ([facility]). If you have questions about a medical condition or this instruction, always ask your healthcare professional. [place] [address] any warranty or liability for your use of this information. Medications:   Medication Summary Provided. I understand that I should take only the medications on my list.   --why and when I need to take each medication.    --which side effects to watch for.   --that I should carry my medication information at all times in case of emergency    Situations. --I will take all medications until discontinued by physician. I will take all my medications to follow up appointments. --check with my physician or pharmacist before taking any new medication, over    the counter product or drink alcohol.   --ask about food, drug and dietary supplement interactions. --discard old lists and update records with medication providers. Behavior Health Follow Up:  Original Referral Source: ED  Discharge Diagnosis: [unfilled]  Recomendations for Level of Care: Follow Up  Patient Status at Discharge: Stable  [facility]  Aftercare plan faxed:    Faxed by: Social Work staff   Date: 23  Prescriptions sent with pt.     General Information:   Questions regarding your bill: Call [phone]   Suicide Hotline (Rescue Crisis) 3-790.953.3173   To obtain results of pending studies call Medical Records at: [phone]   For emergencies and 24 hour/7 days a week contact information: [phone]     Refer to [facility] Registered Cameronitian Mirna Naidu

## 2023-02-28 NOTE — PROGRESS NOTES
Treatment Team Note:     Target Symptoms/Reason for admission: Per nursing admission assessment - Reason for Admission: Jorge Regan is a 65year old female admitted from the ER with worsening depression and a plan to OD on medication. These symptoms have been worsening for about one week. Pt reports that it has been going on since Del Rosario's Day and got worse and had thought of using a gun to harm herself. Pt has an extensive mental health history with dx to include Bipolar, MDD, OCD and Borderline Personality Disorder. Pt sees [facility] for OP treatment. Pt reports compliance with medication and treatment. She has multiple admissions to the Psych unit at [facility] and [facility] as well as [facility]. Diagnoses per psych provider: Suicidal ideation [R45.851]  Depression with suicidal ideation [F32. A, R45.851]  Bipolar depression ([address] [F31.9]     Therapist met with treatment team to discuss patients treatment and discharge plans. Patient's aftercare plan is: [address]     Aftercare appointments made: No - SW will make discharge appointments     Pt lives with: spouse     Collateral obtained from:  SW attempted to contact pt's mom, Santana Ovalles , but it went to voicemail  Collateral obtained on:NAINA did not leave a message due to a man's voice on the voicemail. NAINA was afraid it might be the wrong number.      Attending groups: Yes     Behavior: cooperative, social with peers/staff, reports her mood has improved     Has patient been completing ADL's:  Yes     SI:  patient denies SI  Plan: no   If yes describe: N/A - patient denies plan  HI:  patient denies HI  If present describe: N/A  Delusions: patient denies delusions  If present describe: N/A  Hallucinations: patient denies hallucinations  If present describe: N/A     Patient rates their -- Depression: 1-10:  6  Anxiety:1-10:  7     Sleeping:Yes     Taking medication: Yes     Misc: Tentative discharge Tuesday.

## 2023-02-28 NOTE — CONSULTS
Nutrition Education    Educated on [employer] Diet  Learners: Patient  Readiness: Eager  Method: Explanation, Demonstration, and Handout  Response: Verbalizes Understanding and Demonstrated Understanding  Refer to [facility] Registered Dietitian Qamar Reyes MS, RD, LD  Contact Number: [phone]

## 2023-02-28 NOTE — PROGRESS NOTES
Discharge Note     Patient is discharging on this date. Patient denies SI, HI, and AVH at this time. Patient reports improvement in behavior and is leaving unit in overall good condition. SW and patient discussed patient's follow up appointments and importance of attending appointments as scheduled, patient voiced understanding and agreement. Patient and SW also discussed patient's safety plan and patient was able to verbally identify: warning signs, coping strategies, places and people that help make the patient feel better/distract negative thoughts, friends/family/agencies/professionals the patient can reach out to in a crisis, and something that is important to the patient/worth living for. Patient was provided the national suicide prevention hotline number (5-359-821-353-185-0731) as well as local community behavioral health [facility]) crisis number for emergencies (1-707-397-193-049-0649). Discharge Disposition: home -lives with family      Pt to follow up with:  [address]  on March 1 , 2023 at 1:00 PM for the intake appointment. Patient will follow up with [facility] and [facility]  On March 6 , 2023   at 1:00 PM for the medication management appointment.      Referral to outpatient tobacco cessation counseling treatment:  Patient refused referral to outpatient tobacco cessation counseling    SW offered to assist patient with transportation, patient declined transportation assistance

## 2023-03-02 ENCOUNTER — HOSPITAL ENCOUNTER (INPATIENT)
Age: 58
LOS: 5 days | Discharge: HOME | DRG: 885 | End: 2023-03-07
Attending: EMERGENCY MEDICINE | Admitting: PSYCHIATRY & NEUROLOGY
Payer: MEDICAID

## 2023-03-02 DIAGNOSIS — F32.A DEPRESSION WITH SUICIDAL IDEATION: Primary | ICD-10-CM

## 2023-03-02 DIAGNOSIS — R45.851 DEPRESSION WITH SUICIDAL IDEATION: Primary | ICD-10-CM

## 2023-03-02 LAB
ALBUMIN SERPL-MCNC: 3.9 G/DL (ref 3.5–5.2)
ALP BLD-CCNC: 87 U/L (ref 35–104)
ALT SERPL-CCNC: <5 U/L (ref 5–33)
AMPHETAMINE SCREEN, URINE: NEGATIVE
ANION GAP SERPL CALCULATED.3IONS-SCNC: 13 MMOL/L (ref 7–19)
AST SERPL-CCNC: 19 U/L (ref 5–32)
BACTERIA: NEGATIVE /HPF
BARBITURATE SCREEN URINE: NEGATIVE
BASOPHILS ABSOLUTE: 0.1 K/UL (ref 0–0.2)
BASOPHILS RELATIVE PERCENT: 0.6 % (ref 0–1)
BENZODIAZEPINE SCREEN, URINE: NEGATIVE
BILIRUB SERPL-MCNC: 0.3 MG/DL (ref 0.2–1.2)
BILIRUBIN URINE: NEGATIVE
BLOOD, URINE: NEGATIVE
BUN BLDV-MCNC: 12 MG/DL (ref 6–20)
BUPRENORPHINE URINE: NEGATIVE
CALCIUM SERPL-MCNC: 9.5 MG/DL (ref 8.6–10)
CANNABINOID SCREEN URINE: NEGATIVE
CHLORIDE BLD-SCNC: 103 MMOL/L (ref 98–111)
CLARITY: CLEAR
CO2: 22 MMOL/L (ref 22–29)
COCAINE METABOLITE SCREEN URINE: NEGATIVE
COLOR: YELLOW
CREAT SERPL-MCNC: 0.7 MG/DL (ref 0.5–0.9)
CRYSTALS, UA: ABNORMAL /HPF
EOSINOPHILS ABSOLUTE: 0.1 K/UL (ref 0–0.6)
EOSINOPHILS RELATIVE PERCENT: 1.4 % (ref 0–5)
EPITHELIAL CELLS, UA: 1 /HPF (ref 0–5)
ETHANOL: <10 MG/DL (ref 0–0.08)
GFR SERPL CREATININE-BSD FRML MDRD: >60 ML/MIN/{1.73_M2}
GLUCOSE BLD-MCNC: 88 MG/DL (ref 74–109)
GLUCOSE URINE: NEGATIVE MG/DL
HCT VFR BLD CALC: 42.1 % (ref 37–47)
HEMOGLOBIN: 14 G/DL (ref 12–16)
HYALINE CASTS: 0 /HPF (ref 0–8)
IMMATURE GRANULOCYTES #: 0 K/UL
KETONES, URINE: NEGATIVE MG/DL
LEUKOCYTE ESTERASE, URINE: ABNORMAL
LYMPHOCYTES ABSOLUTE: 2 K/UL (ref 1.1–4.5)
LYMPHOCYTES RELATIVE PERCENT: 22.5 % (ref 20–40)
Lab: NORMAL
MCH RBC QN AUTO: 31.7 PG (ref 27–31)
MCHC RBC AUTO-ENTMCNC: 33.3 G/DL (ref 33–37)
MCV RBC AUTO: 95.2 FL (ref 81–99)
METHADONE SCREEN, URINE: NEGATIVE
METHAMPHETAMINE, URINE: NEGATIVE
MONOCYTES ABSOLUTE: 0.8 K/UL (ref 0–0.9)
MONOCYTES RELATIVE PERCENT: 8.8 % (ref 0–10)
NEUTROPHILS ABSOLUTE: 6 K/UL (ref 1.5–7.5)
NEUTROPHILS RELATIVE PERCENT: 66.5 % (ref 50–65)
NITRITE, URINE: NEGATIVE
OPIATE SCREEN URINE: NEGATIVE
OXYCODONE URINE: NEGATIVE
PDW BLD-RTO: 13 % (ref 11.5–14.5)
PH UA: 7.5 (ref 5–8)
PHENCYCLIDINE SCREEN URINE: NEGATIVE
PLATELET # BLD: 283 K/UL (ref 130–400)
PMV BLD AUTO: 9.4 FL (ref 9.4–12.3)
POTASSIUM SERPL-SCNC: 3.7 MMOL/L (ref 3.5–5)
PROPOXYPHENE SCREEN: NEGATIVE
PROTEIN UA: NEGATIVE MG/DL
RBC # BLD: 4.42 M/UL (ref 4.2–5.4)
RBC UA: 0 /HPF (ref 0–4)
SARS-COV-2, NAAT: NOT DETECTED
SODIUM BLD-SCNC: 138 MMOL/L (ref 136–145)
SPECIFIC GRAVITY UA: 1.01 (ref 1–1.03)
TOTAL PROTEIN: 6.8 G/DL (ref 6.6–8.7)
TRICYCLIC, URINE: NEGATIVE
UROBILINOGEN, URINE: 0.2 E.U./DL
WBC # BLD: 9 K/UL (ref 4.8–10.8)
WBC UA: 2 /HPF (ref 0–5)

## 2023-03-02 PROCEDURE — 99285 EMERGENCY DEPT VISIT HI MDM: CPT

## 2023-03-02 PROCEDURE — 94660 CPAP INITIATION&MGMT: CPT

## 2023-03-02 PROCEDURE — 82077 ASSAY SPEC XCP UR&BREATH IA: CPT

## 2023-03-02 PROCEDURE — 85025 COMPLETE CBC W/AUTO DIFF WBC: CPT

## 2023-03-02 PROCEDURE — 80306 DRUG TEST PRSMV INSTRMNT: CPT

## 2023-03-02 PROCEDURE — 81001 URINALYSIS AUTO W/SCOPE: CPT

## 2023-03-02 PROCEDURE — 1240000000 HC EMOTIONAL WELLNESS R&B

## 2023-03-02 PROCEDURE — 36415 COLL VENOUS BLD VENIPUNCTURE: CPT

## 2023-03-02 PROCEDURE — 80053 COMPREHEN METABOLIC PANEL: CPT

## 2023-03-02 PROCEDURE — 6370000000 HC RX 637 (ALT 250 FOR IP): Performed by: PSYCHIATRY & NEUROLOGY

## 2023-03-02 PROCEDURE — 87635 SARS-COV-2 COVID-19 AMP PRB: CPT

## 2023-03-02 RX ORDER — POLYETHYLENE GLYCOL 3350 17 G/17G
17 POWDER, FOR SOLUTION ORAL DAILY PRN
Status: DISCONTINUED | OUTPATIENT
Start: 2023-03-02 | End: 2023-03-07 | Stop reason: HOSPADM

## 2023-03-02 RX ORDER — HYDROXYZINE HYDROCHLORIDE 25 MG/1
25 TABLET, FILM COATED ORAL NIGHTLY
Status: DISCONTINUED | OUTPATIENT
Start: 2023-03-02 | End: 2023-03-03

## 2023-03-02 RX ORDER — AZITHROMYCIN 1 G
1 PACKET (EA) ORAL ONCE
Status: ON HOLD | COMMUNITY
End: 2023-03-07 | Stop reason: HOSPADM

## 2023-03-02 RX ADMIN — HYDROXYZINE HYDROCHLORIDE 25 MG: 25 TABLET ORAL at 20:23

## 2023-03-02 ASSESSMENT — ENCOUNTER SYMPTOMS
COUGH: 0
SHORTNESS OF BREATH: 0
VOMITING: 0
BACK PAIN: 0
ABDOMINAL PAIN: 0

## 2023-03-02 ASSESSMENT — PAIN - FUNCTIONAL ASSESSMENT: PAIN_FUNCTIONAL_ASSESSMENT: NONE - DENIES PAIN

## 2023-03-02 ASSESSMENT — LIFESTYLE VARIABLES
HOW MANY STANDARD DRINKS CONTAINING ALCOHOL DO YOU HAVE ON A TYPICAL DAY: PATIENT DOES NOT DRINK
HOW OFTEN DO YOU HAVE A DRINK CONTAINING ALCOHOL: NEVER
HOW OFTEN DO YOU HAVE A DRINK CONTAINING ALCOHOL: NEVER
HOW MANY STANDARD DRINKS CONTAINING ALCOHOL DO YOU HAVE ON A TYPICAL DAY: PATIENT DOES NOT DRINK

## 2023-03-02 ASSESSMENT — SLEEP AND FATIGUE QUESTIONNAIRES
DO YOU USE A SLEEP AID: YES
DO YOU HAVE DIFFICULTY SLEEPING: YES
SLEEP PATTERN: RESTLESSNESS

## 2023-03-02 ASSESSMENT — PATIENT HEALTH QUESTIONNAIRE - PHQ9: SUM OF ALL RESPONSES TO PHQ QUESTIONS 1-9: 27
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
## 2023-03-02 NOTE — ED NOTES
Report called to [facility] on 6th floor      Ledy, [address] [facility]  03/02/23 [id number]
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
## 2023-03-03 PROCEDURE — 1240000000 HC EMOTIONAL WELLNESS R&B

## 2023-03-03 PROCEDURE — 6370000000 HC RX 637 (ALT 250 FOR IP): Performed by: PSYCHIATRY & NEUROLOGY

## 2023-03-03 PROCEDURE — 94660 CPAP INITIATION&MGMT: CPT

## 2023-03-03 RX ORDER — HYDROXYZINE PAMOATE 25 MG/1
25 CAPSULE ORAL NIGHTLY
Status: DISCONTINUED | OUTPATIENT
Start: 2023-03-03 | End: 2023-03-07 | Stop reason: HOSPADM

## 2023-03-03 RX ORDER — METRONIDAZOLE 7.5 MG/G
GEL VAGINAL 2 TIMES DAILY
Status: DISCONTINUED | OUTPATIENT
Start: 2023-03-03 | End: 2023-03-04

## 2023-03-03 RX ORDER — M-VIT,TX,IRON,MINS/CALC/FOLIC 27MG-0.4MG
1 TABLET ORAL DAILY
Status: DISCONTINUED | OUTPATIENT
Start: 2023-03-03 | End: 2023-03-07 | Stop reason: HOSPADM

## 2023-03-03 RX ORDER — CALCIUM CARBONATE 200(500)MG
1 TABLET,CHEWABLE ORAL 2 TIMES DAILY PRN
Status: DISCONTINUED | OUTPATIENT
Start: 2023-03-03 | End: 2023-03-07 | Stop reason: HOSPADM

## 2023-03-03 RX ORDER — TROSPIUM CHLORIDE 20 MG/1
20 TABLET, FILM COATED ORAL
Status: DISCONTINUED | OUTPATIENT
Start: 2023-03-03 | End: 2023-03-03 | Stop reason: CLARIF

## 2023-03-03 RX ORDER — CLINDAMYCIN PHOSPHATE 11.9 MG/ML
SOLUTION TOPICAL DAILY
Status: DISCONTINUED | OUTPATIENT
Start: 2023-03-03 | End: 2023-03-04

## 2023-03-03 RX ORDER — ACYCLOVIR 200 MG/1
400 CAPSULE ORAL 3 TIMES DAILY
Status: DISCONTINUED | OUTPATIENT
Start: 2023-03-03 | End: 2023-03-07 | Stop reason: HOSPADM

## 2023-03-03 RX ORDER — DULOXETIN HYDROCHLORIDE 30 MG/1
30 CAPSULE, DELAYED RELEASE ORAL DAILY
Status: DISCONTINUED | OUTPATIENT
Start: 2023-03-03 | End: 2023-03-07 | Stop reason: HOSPADM

## 2023-03-03 RX ORDER — ERGOCALCIFEROL 1.25 MG/1
50000 CAPSULE ORAL WEEKLY
Status: DISCONTINUED | OUTPATIENT
Start: 2023-03-03 | End: 2023-03-07 | Stop reason: HOSPADM

## 2023-03-03 RX ORDER — DOCUSATE SODIUM 100 MG/1
100 CAPSULE, LIQUID FILLED ORAL PRN
Status: DISCONTINUED | OUTPATIENT
Start: 2023-03-03 | End: 2023-03-07 | Stop reason: HOSPADM

## 2023-03-03 RX ADMIN — LURASIDONE HYDROCHLORIDE 80 MG: 40 TABLET, FILM COATED ORAL at 17:05

## 2023-03-03 RX ADMIN — DOCUSATE SODIUM 100 MG: 100 CAPSULE, LIQUID FILLED ORAL at 17:06

## 2023-03-03 RX ADMIN — ACYCLOVIR 400 MG: 200 CAPSULE ORAL at 12:23

## 2023-03-03 RX ADMIN — MULTIPLE VITAMINS W/ MINERALS TAB 1 TABLET: TAB at 12:22

## 2023-03-03 RX ADMIN — ERGOCALCIFEROL 50000 UNITS: 1.25 CAPSULE ORAL at 12:23

## 2023-03-03 RX ADMIN — ACYCLOVIR 400 MG: 200 CAPSULE ORAL at 17:05

## 2023-03-03 RX ADMIN — DULOXETINE HYDROCHLORIDE 30 MG: 30 CAPSULE, DELAYED RELEASE ORAL at 12:23

## 2023-03-03 RX ADMIN — ACYCLOVIR 400 MG: 200 CAPSULE ORAL at 21:00

## 2023-03-03 RX ADMIN — HYDROXYZINE PAMOATE 25 MG: 25 CAPSULE ORAL at 21:01

## 2023-03-03 NOTE — PROGRESS NOTES
Group Note    Date: 03/03/23  Start Time: 8:00 AM   End Time:8:30 AM     Number of Participants: 8    Type of Group: Community/Goal     Patient's Goal:  getting my medication started back working on my notebook    Notes:      Status After Intervention:      Participation Level:  Active Listener    Participation Quality: Appropriate    Speech:  normal    Thought Process/Content: Logical    Mood:  calm    Level of consciousness:  Alert    Response to Learning: Able to verbalize current knowledge/experience    Modes of Intervention: Education and Support    Discipline Responsible: Behavioral Health Technician     Signature:  Mart Page

## 2023-03-03 NOTE — PROGRESS NOTES
Treatment Team Note:    Target Symptoms/Reason for admission: Per nursing admission assessment - Reason for Admission: Pt went to her PCP today and told her that she wanted to die. She advised the patient to return to the ER and that she left the behavioral health unit too soon. Pt reports she feels suicidal with plan to overdose on her pills. Diagnoses per psych provider: Depression with suicidal ideation [F32. A, R45.851]  Bipolar depression ([facility] [address] [F31.9]    Therapist met with treatment team to discuss patients treatment and discharge plans. Patient's aftercare plan is: [address]    Aftercare appointments made: No - SW will make discharge appointments    Pt lives with: SW will meet with patient to gather information    Collateral obtained from: SW will meet with patient to gather information  Collateral obtained on:new admissions    Attending groups: New admission - SW will monitor group attendance    Behavior: calm    Has patient been completing ADL's:  New admission - unknown at this time - SW will monitor    SI:  patient denies SI  Plan: no   If yes describe: N/A - patient denies plan  HI:  patient denies HI  If present describe: N/A  Delusions: patient denies delusions  If present describe: N/A  Hallucinations: patient denies hallucinations  If present describe: N/A    Patient rates their -- Depression: 1-10:  0  Anxiety:1-10:  0    Sleeping:New admission - unknown at this time. Taking medication: New admission - unknown at this time.     Misc:

## 2023-03-03 NOTE — PLAN OF CARE
Problem: Self Harm/Suicidality  Goal: Will have no self-injury during hospital stay  Description: INTERVENTIONS:  1. Ensure constant observer at bedside with Q15M safety checks  2. Maintain a safe environment  3. Secure patient belongings  4. Ensure family/visitors adhere to safety recommendations  5. Ensure safety tray has been added to patient's diet order  6. Every shift and PRN: Re-assess suicidal risk via Frequent Screener    3/3/2023 1333 by Heriberto Beth RN  Outcome: Progressing  Flowsheets (Taken 3/3/2023 1301)  Will have no self-injury during hospital stay:   Maintain a safe environment   Ensure constant observer at bedside with Q15M safety checks   Ensure family/visitors adhere to safety recommendations   Every shift and PRN: Re-assess suicidal risk via Frequent Screener   Ensure safety tray has been added to patient's diet order   Secure patient belongings  3/3/2023 1142 by Leona Chase  Outcome: Progressing     Problem: Pain  Goal: Verbalizes/displays adequate comfort level or baseline comfort level  Outcome: Progressing     Problem: Safety - Adult  Goal: Free from fall injury  Outcome: Progressing     Problem: Infection - Adult  Goal: Absence of infection at discharge  Outcome: Progressing     Problem: Depression  Goal: Will be euthymic at discharge  Description: INTERVENTIONS:  1. Administer medication as ordered  2. Provide emotional support via 1:1 interaction with staff  3. Encourage involvement in milieu/groups/activities  4. Monitor for social isolation  Outcome: Progressing     Problem: Psychosis  Goal: Will report no hallucinations or delusions  Description: INTERVENTIONS:  1. Administer medication as  ordered  2. Assist with reality testing to support increasing orientation  3.  Assess if patient's hallucinations or delusions are encouraging self harm or harm to others and intervene as appropriate  Outcome: Progressing     Problem: Anxiety  Goal: Will report anxiety at manageable levels  Description: INTERVENTIONS:  1. Administer medication as ordered  2. Teach and rehearse alternative coping skills  3. Provide emotional support with 1:1 interaction with staff  Outcome: Progressing  Flowsheets (Taken 3/3/2023 1301)  Will report anxiety at manageable levels:   Administer medication as ordered   Provide emotional support with 1:1 interaction with staff   Teach and rehearse alternative coping skills     Problem: Self Care Deficit  Goal: Return ADL status to a safe level of function  Description: INTERVENTIONS:  1. Administer medication as ordered  2. Assess ADL deficits and provide assistive devices as needed  3. Obtain PT/OT consults as needed  4.  Assist and instruct patient to increase activity and self care as tolerated  Outcome: Progressing

## 2023-03-03 NOTE — PROGRESS NOTES
SW met with patient to complete psychosocial and lifetime CSSR-S on this date. Patients long and short-term goals discussed. Patient voiced understanding. Treatment plan sheet signed. Patient verbalized understanding of the treatment plan. Patient participated in goals and objectives of the treatment plan. Patient discussed safety plan with . SW explained treatment goals with pt. Decreasing depression and anxiety by improvement of positive coping patterns was discussed. Pt acknowledged understanding of treatment goals and signed treatment plan signature sheet. In the last 6 months has the patient been a danger to self: Yes  In the last 6 months has the patient been a danger to others: No  Legal Guardian/POA: No    Provided patient with [employer] handout entitled \"Quitting Smoking. \"  Reviewed handout with patient: addressing dangers of smoking, developing coping skills, and providing basic information about quitting. Patient received all components practical counseling of tobacco practical counseling during the hospital stay.

## 2023-03-03 NOTE — PROGRESS NOTES
Group Note    Date: 03/02/23  Start Time: 7:30 PM   End Time:8:00 PM     Number of Participants: 9    Type of Group: Wrap-Up     Patient's Goal:  none at this time, pt stated Depressed    Notes:  in bed, pt stated depressed    Status After Intervention:  Unchanged    Participation Level: Minimal    Participation Quality: Resistant    Speech:  hesitant    Thought Process/Content: Logical    Mood: anxious and depressed, sad,withdrawn    Level of consciousness:  Alert and Oriented x4    Response to Learning: Able to retain information    Modes of Intervention: Education and Support    Discipline Responsible: Registered Nurse     Signature:  Varun Hamilton RN

## 2023-03-03 NOTE — PROGRESS NOTES
[facility] Adult Unit Daily Assessment  Nursing Progress Note    Room: [facility]-   Name: Emily Hernández   Age: 57 y.o.   Gender: female   Dx: Depression with suicidal ideation  Precautions: suicide risk  Inpatient Status: voluntary       SLEEP:  Sleep Quality Fair  Sleep Medications: No   PRN Sleep Meds: No       MEDICAL:  Other PRN Meds: No   Med Compliant: Yes  Accu-Chek: No  Oxygen/CPAP/BiPAP: Yes  CIWA/CINA: No   PAIN Assessment: none  Side Effects from medication: No      Metabolic Screening:  Lab Results   Component Value Date    LABA1C 6.1 (H) 11/25/2022     Lab Results   Component Value Date    CHOL 172 11/25/2022    CHOL 143 (L) 06/27/2021    CHOL 149 (L) 07/22/2020    CHOL 152 (L) 02/22/2020    CHOL 130 (L) 12/22/2018    CHOL 154 02/11/2014    CHOL 181 05/10/2012     Lab Results   Component Value Date    TRIG 177 (H) 11/25/2022    TRIG 166 (H) 06/27/2021    TRIG 143 07/22/2020    TRIG 130 02/22/2020    TRIG 83 12/22/2018    TRIG 113 02/11/2014    TRIG 197 (H) 05/10/2012     Lab Results   Component Value Date    HDL 59 (L) 11/25/2022    HDL 48 (L) 06/27/2021    HDL 60 (L) 07/22/2020    HDL 52 (L) 02/22/2020    HDL 52 (L) 12/22/2018    HDL 56 02/11/2014    HDL 62 05/10/2012     No components found for: LDLCAL  No results found for: LABVLDL  Body mass index is 36.56 kg/m².  BP Readings from Last 2 Encounters:   03/02/23 117/67   02/28/23 116/63         Medical Bed:   Is patient in a medical bed? no   If medical bed is in use, has nursing secured room while patient is awake and out of the room? NA  Has safety checks by nursing been completed on the bed/room this shift? yes    Protective Factors:  Patient identifies protective factors with nursing staff as follows:   Identifies reasons for living: Yes   Supportive Social Network or family: Yes    Belief that suicide is immoral/high spirituality: Yes   Responsibility to family or others/living with family: Yes   Fear of death or dying due to pain and suffering:  Yes   Engaged in work or school: Yes     If Patient is unable to identify, reason why? PSYCH:  Depression: 10   Anxiety: 10   SI denies suicidal ideation   Risk of Suicide: No Risk  HI Negative for homicidal ideation      AVH:no If Hallucinations are present, describe? GENERAL:  Appetite: good   Percent Meals:    Social: No   Speech: normal   Appearance: appropriately dressed    GROUP:  Group Participation: Yes  Participation Quality: Minimal    Notes:     Patient was isolative to her room this evening. She reports that she feels hopeless and helpless and that she has no motivation to take care of herself at home. She states that she struggles with daily hygiene. Patient has flat affect but is cooperative. Patient is now resting, will continue to monitor.       Electronically signed by Israel Fields on 3/2/23 at 11:23 PM CST

## 2023-03-03 NOTE — PROGRESS NOTES
[facility] Adult Unit Daily Assessment  Nursing Progress Note    Room: [facility]-01   Name: Amie Fox   Age: 65 y.o. Gender: female   Dx: Depression with suicidal ideation  Precautions: close watch and suicide risk  Inpatient Status: voluntary       SLEEP:  Sleep Quality Good  Sleep Medications: No   PRN Sleep Meds: Yes       MEDICAL:  Other PRN Meds: No   Med Compliant: Yes  Accu-Chek: No  Oxygen/CPAP/BiPAP: Yes, CPAP  CIWA/CINA: No   PAIN Assessment: none  Side Effects from medication: No      Metabolic Screening:  Lab Results   Component Value Date    LABA1C 6.1 (H) 11/25/2022     Lab Results   Component Value Date    CHOL 172 11/25/2022    CHOL 143 (L) 06/27/2021    CHOL 149 (L) 07/22/2020    CHOL 152 (L) 02/22/2020    CHOL 130 (L) 12/22/2018    CHOL 154 02/11/2014    CHOL 181 05/10/2012     Lab Results   Component Value Date    TRIG 177 (H) 11/25/2022    TRIG 166 (H) 06/27/2021    TRIG 143 07/22/2020    TRIG 130 02/22/2020    TRIG 83 12/22/2018    TRIG 113 02/11/2014    TRIG 197 (H) 05/10/2012     Lab Results   Component Value Date    HDL 59 (L) 11/25/2022    HDL 48 (L) 06/27/2021    HDL 60 (L) 07/22/2020    HDL 52 (L) 02/22/2020    HDL 52 (L) 12/22/2018    HDL 56 02/11/2014    HDL 62 05/10/2012     No components found for: LDLCAL  No results found for: LABVLDL  Body mass index is 36.56 kg/m². BP Readings from Last 2 Encounters:   03/03/23 (!) 111/56   02/28/23 116/63         Medical Bed:   Is patient in a medical bed? no   If medical bed is in use, has nursing secured room while patient is awake and out of the room? NA  Has safety checks by nursing been completed on the bed/room this shift? yes    Protective Factors:  Patient identifies protective factors with nursing staff as follows:    Identifies reasons for living: Yes   Supportive Social Network or family: Yes    Belief that suicide is immoral/high spirituality: Yes   Responsibility to family or others/living with family: Yes   Fear of death or dying due to pain and suffering: Yes   Engaged in work or school: No     If Patient is unable to identify, reason why? PSYCH:  Depression: 10   Anxiety: 10   SI without plan , contracts for safety  Risk of Suicide: No Risk  HI Negative for homicidal ideation      AVH:no If Hallucinations are present, describe? GENERAL:  Appetite: good   Percent Meals: 75%   Social: No   Speech: hesitant   Appearance: appropriately dressed and healthy looking    GROUP:  Group Participation: Yes  Participation Quality: Active Listener    Notes: ALOx4, pleasant, anxious, withdrawn. Pt is med focused, poor coping skills, helpless. Appropriately dressed and groomed, appetite good, not social with peers, does attend groups. Pt rates depression and anxiety 10, positive for SI with no plan, contracts for safety. Denies HI and AVH. Will continue to monitor for safety and behaviors.       Electronically signed by Tammy Eller RN on 3/3/23 at 1:00 PM CST

## 2023-03-03 NOTE — PLAN OF CARE
Problem: Self Harm/Suicidality  Goal: Will have no self-injury during hospital stay  Description: INTERVENTIONS:  1. Ensure constant observer at bedside with Q15M safety checks  2. Maintain a safe environment  3. Secure patient belongings  4. Ensure family/visitors adhere to safety recommendations  5. Ensure safety tray has been added to patient's diet order  6. Every shift and PRN: Re-assess suicidal risk via Frequent Screener    3/3/2023 1142 by Taina Nguyen  Outcome: Progressing                                                                       Group Therapy Note    Date: 3/3/2023  Start Time: 1000  End Time:  3916  Number of Participants: 8    Type of Group: Psychotherapy  Patient's Goal: Patient will process emotions and actions and how to relate to other or their with others. Patient discussed open communication and feelings and emotions. Notes:  Patient attended process group as scheduled, patient identified a issue to work on today regarding how they will interact and relate to others. Status After Intervention:  Improved    Participation Level:  Active Listener    Participation Quality: Appropriate, Attentive, and Sharing      Speech:  normal      Thought Process/Content: Logical      Affective Functioning: Congruent      Mood: euthymic      Level of consciousness:  Alert      Response to Learning: Able to verbalize current knowledge/experience      Endings: None Reported    Modes of Intervention: Education, Support, and Socialization      Discipline Responsible: /Counselor      Signature:  Porsha Mustafa [facility]

## 2023-03-03 NOTE — PLAN OF CARE
Problem: Self Harm/Suicidality  Goal: Will have no self-injury during hospital stay  Outcome: Progressing                                                                       Group Therapy Note    Date: 3/3/2023  Start Time: 1100  End Time:  4015  Number of Participants: 7    Type of Group: Psychoeducation    Wellness Binder Information  Module Name:  staying well  Session Number:  1    Patient's Goal:  daily maintenance and coping skills    Notes:  pt acknowledged use of positive coping skills daily to help stay well.     Status After Intervention:  Improved    Participation Level: Interactive    Participation Quality: Appropriate, Attentive, and Sharing      Speech:  normal      Thought Process/Content: Logical      Affective Functioning: Congruent      Mood: congruent      Level of consciousness:  Alert, Oriented x4, and Attentive      Response to Learning: Able to verbalize current knowledge/experience      Endings: None Reported    Modes of Intervention: Education      Discipline Responsible: Psychoeducational Specialist      Signature:  Nicole Vargas
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
## 2023-03-03 NOTE — PROGRESS NOTES
Behavioral Services  Medicare Certification Upon Admission    I certify that this patient's inpatient psychiatric hospital admission is medically necessary for:    [x] (1) Treatment which could reasonably be expected to improve this patient's condition,       [x] (2) Or for diagnostic study;     AND     [x](2) The inpatient psychiatric services are provided while the individual is under the care of a physician and are included in the individualized plan of care.     Estimated length of stay/service 5-7 days    Plan for post-hospital care TBD    Electronically signed by Pedro Pablo Mohan MD on 3/3/2023 at 8:27 AM

## 2023-03-03 NOTE — PROGRESS NOTES
[facility] Admission Note  Nursing Admission Note        Reason for Admission: Pt went to her PCP today and told her that she wanted to die. She advised the patient to return to the ER and that she left the behavioral health unit too soon. Pt reports she feels suicidal with plan to overdose on her pills. Patient Active Problem List   Diagnosis    Bipolar depression ([facility]    Borderline personality disorder (HCC)    Insomnia    Hoarding disorder    Obstructive sleep apnea    CPAP (continuous positive airway pressure) dependence    Ingestion of toxic substance    Bipolar 1 disorder, depressed ([facility]    Mixed obsessional thoughts and acts    Depression with suicidal ideation         Addictive Behavior:   Addictive Behavior  In the Past 3 Months, Have You Felt or Has Someone Told You That You Have a Problem With  : None    Medical Problems:   Past Medical History:   Diagnosis Date    Chest pain 02/08/2012    CPAP (continuous positive airway pressure) dependence     Depression     Hypoglycemia     SANDY (obstructive sleep apnea)     [facility] 02/08/2012    Suicide attempt ([facility]        Status EXAM:  Mental Status and Behavioral Exam  Normal: No  Level of Assistance: Independent/Self  Facial Expression: Worried, Sad, Flat  Affect: Blunt, Constricted, Unstable  Level of Consciousness: Alert  Frequency of Checks: 4 times per hour, close  Mood:Normal: No  Mood: Anxious, Depressed, Despair, Helpless, Sad, Worthless, low self-esteem  Motor Activity:Normal: No  Motor Activity: Decreased  Eye Contact: Fair  Observed Behavior: Cooperative, Preoccupied  Sexual Misconduct History: Current - no  Preception: Maybee to person, Maybee to time, Maybee to situation, Maybee to place  Attention:Normal: No  Attention: Distractible  Thought Processes: Circumstantial  Thought Content:Normal: No  Thought Content: Compulsions, Poverty of content, Preoccupations  Depression Symptoms: No problems reported or observed.   Anxiety Symptoms: No problems reported or observed. Estela Symptoms: No problems reported or observed. Hallucinations: None  Delusions: No  Memory:Normal: No  Memory: Poor recent, Poor remote  Insight and Judgment: No  Insight and Judgment: Poor judgment, Poor insight, Unmotivated    Psych:   Suicidal Ideation: yes. If yes, is there a plan? (Describe)               Risk of Suicide: High Risk   Homicidal Ideation:  no.  If yes, describe: Auditory/Visual Hallucinations:  no.      If yes, describe AVH? Metabolic Screening:  Lab Results   Component Value Date    LABA1C 6.1 (H) 11/25/2022     Lab Results   Component Value Date    CHOL 172 11/25/2022    CHOL 143 (L) 06/27/2021    CHOL 149 (L) 07/22/2020    CHOL 152 (L) 02/22/2020    CHOL 130 (L) 12/22/2018    CHOL 154 02/11/2014    CHOL 181 05/10/2012     Lab Results   Component Value Date    TRIG 177 (H) 11/25/2022    TRIG 166 (H) 06/27/2021    TRIG 143 07/22/2020    TRIG 130 02/22/2020    TRIG 83 12/22/2018    TRIG 113 02/11/2014    TRIG 197 (H) 05/10/2012     Lab Results   Component Value Date    HDL 59 (L) 11/25/2022    HDL 48 (L) 06/27/2021    HDL 60 (L) 07/22/2020    HDL 52 (L) 02/22/2020    HDL 52 (L) 12/22/2018    HDL 56 02/11/2014    HDL 62 05/10/2012     No components found for: LDLCAL  No results found for: LABVLDL  Body mass index is 36.56 kg/m². BP Readings from Last 2 Encounters:   03/02/23 104/81   02/28/23 116/63       PATIENT STRENGTHS and Barriers:   Patient Strengths/Barriers  Strengths (Must Choose Two):  Motivation level for treatment, Support from family  Barriers: Recreational/leisure/hobbies, Technical/vocation      Tobacco Screening:  Practical Counseling, on admission, umu X, if applicable and completed (first 3 are required if patient doesn't refuse):            Recognizing danger situations (included triggers and roadblocks)                 Coping skills (new ways to manage stress, exercise, relaxation techniques, changing routine, distraction Basic information about quitting (benefits of quitting, techniques in how to quit, available resources   [facility]    no                                        Patient refused counseling yes  Patient has not smoked in the last 30 days non smoker  Patient offered nicotine patch. Received no  Refused   Patient is a non-smoker yes       Admission to Unit:    Pt admitted to [facility] under the care of Dr. Ashwini Zhou,  arrived on unit via [facility] with security and staff from Emergency Room     Patient arrived dressed in paper scrubs:  yes. Body assessment and safety check completed by Andrey Carmona RN and jaziel Small  and  no contraband discovered. Patient belongings and valuables was cataloged and accounted for by jaziel Small. Admission completed by Mina Pressley RN  Oriented to unit, unit policy and expectations:  yes    Reviewed and explained all legal documents:  yes    Education for Fall Prevention and Restraints given: yes    Patient signed all legal documents yes   Pt verbalizes understanding:yes     Lico Marchi Obtained? No Lico Marchi IS DOWN    Medical Bed:  Does patient require a medical bed? no  If answered yes for medical bed use, does the patient have the following conditions? High risk for falls? no   Obstructive sleep apnea? yes   Oxygen Use? no   Use of assistive devices? no   Other, (explain)? Identifies stressors.yes   .' My house needs to be cleaned and heaalth issues. Protective Factors:  Patient identifies protective factors with nursing staff as follows:    Identifies reasons for living: Yes   Supportive Social Network or family: Yes    Belief that suicide is immoral/high spirituality: Yes   Responsibility to family or others/living with family: Yes   Fear of death or dying due to pain and suffering: Yes   Engaged in work or school: Yes     If Patient is unable to identify, reason why?           Admission Note: Patient arrived to the unit, two day past discharge. She reports she went home and is not eating, and unable to [place] care of her personal hygiene. ' She is at the desk with multiple questions upon arrival to the unit. She reports \" I did not tell the truth about being suicidal when I left. She ate after arriving here. She is worried about her not getting her Latuda tonight.               Electronically signed by Malick Marinelli RN on 3/2/23 at 6:46 PM CST

## 2023-03-03 NOTE — PROGRESS NOTES
Admission Note      Reason for admission/Target Symptom: Per nursing admission assessment - Reason for Admission: Pt went to her PCP today and told her that she wanted to die. She advised the patient to return to the ER and that she left the behavioral health unit too soon. Pt reports she feels suicidal with plan to overdose on her pills. Diagnoses: Depression NOS  UDS: Neg  BAL:  Neg    SW will meet with treatment team to discuss patient's treatment including care planning, discharge planning, and follow-up needs. Patient has been admitted to [facility]. Treatment team will identify the patient's discharge needs. Appointments will be made for medication management and outpatient therapy/counseling. Pt confirmed the need for ongoing treatment post inpatient stay. Pt was also provided a handout of contact information for drug and alcohol treatment centers and other community support service such as [facility], AA, and [facility].

## 2023-03-04 PROCEDURE — 6370000000 HC RX 637 (ALT 250 FOR IP): Performed by: PSYCHIATRY & NEUROLOGY

## 2023-03-04 PROCEDURE — 1240000000 HC EMOTIONAL WELLNESS R&B

## 2023-03-04 PROCEDURE — 94660 CPAP INITIATION&MGMT: CPT

## 2023-03-04 RX ORDER — AZITHROMYCIN 250 MG/1
250 TABLET, FILM COATED ORAL DAILY
Status: COMPLETED | OUTPATIENT
Start: 2023-03-04 | End: 2023-03-05

## 2023-03-04 RX ADMIN — LURASIDONE HYDROCHLORIDE 80 MG: 40 TABLET, FILM COATED ORAL at 17:14

## 2023-03-04 RX ADMIN — ACYCLOVIR 400 MG: 200 CAPSULE ORAL at 13:52

## 2023-03-04 RX ADMIN — DULOXETINE HYDROCHLORIDE 30 MG: 30 CAPSULE, DELAYED RELEASE ORAL at 08:32

## 2023-03-04 RX ADMIN — DOCUSATE SODIUM 100 MG: 100 CAPSULE, LIQUID FILLED ORAL at 21:11

## 2023-03-04 RX ADMIN — HYDROXYZINE PAMOATE 25 MG: 25 CAPSULE ORAL at 21:11

## 2023-03-04 RX ADMIN — AZITHROMYCIN 250 MG: 250 TABLET, FILM COATED ORAL at 15:32

## 2023-03-04 RX ADMIN — MULTIPLE VITAMINS W/ MINERALS TAB 1 TABLET: TAB at 08:32

## 2023-03-04 RX ADMIN — ACYCLOVIR 400 MG: 200 CAPSULE ORAL at 08:32

## 2023-03-04 RX ADMIN — ACYCLOVIR 400 MG: 200 CAPSULE ORAL at 21:11

## 2023-03-04 NOTE — PROGRESS NOTES
Group Note    Date: 03/04/23  Start Time: 8:00 AM   End Time:8:30 AM     Number of Participants: 9    Type of Group: Community/Goal     Patient's Goal:  \"Feeling better thinking more positively\"    Notes:      Status After Intervention:      Participation Level:  Active Listener    Participation Quality: Appropriate    Speech:  normal    Thought Process/Content: Logical    Mood:  Calm    Level of consciousness:  Alert    Response to Learning: Able to verbalize current knowledge/experience    Modes of Intervention: Education and Support    Discipline Responsible: Behavioral Health Technician     Signature:  Connie Licea

## 2023-03-04 NOTE — PROGRESS NOTES
Order received from Dr. India Savage to continue the Z-kyree for the remaining 2 doses. Medication sent to pharmacy for re labeling.

## 2023-03-04 NOTE — PROGRESS NOTES
[facility] Adult Unit Daily Assessment  Nursing Progress Note    Room: [facility]-   Name: Kerrie Figueredo   Age: 65 y.o. Gender: female   Dx: Depression with suicidal ideation  Precautions: close watch and suicide risk  Inpatient Status: voluntary       SLEEP:  Sleep Quality Fair  Sleep Medications: No   PRN Sleep Meds: No       MEDICAL:  Other PRN Meds: No   Med Compliant: Yes  Accu-Chek: No  Oxygen/CPAP/BiPAP: YES  CIWA/CINA: No   PAIN Assessment: none  Side Effects from medication: No      Metabolic Screening:  Lab Results   Component Value Date    LABA1C 6.1 (H) 11/25/2022     Lab Results   Component Value Date    CHOL 172 11/25/2022    CHOL 143 (L) 06/27/2021    CHOL 149 (L) 07/22/2020    CHOL 152 (L) 02/22/2020    CHOL 130 (L) 12/22/2018    CHOL 154 02/11/2014    CHOL 181 05/10/2012     Lab Results   Component Value Date    TRIG 177 (H) 11/25/2022    TRIG 166 (H) 06/27/2021    TRIG 143 07/22/2020    TRIG 130 02/22/2020    TRIG 83 12/22/2018    TRIG 113 02/11/2014    TRIG 197 (H) 05/10/2012     Lab Results   Component Value Date    HDL 59 (L) 11/25/2022    HDL 48 (L) 06/27/2021    HDL 60 (L) 07/22/2020    HDL 52 (L) 02/22/2020    HDL 52 (L) 12/22/2018    HDL 56 02/11/2014    HDL 62 05/10/2012     No components found for: LDLCAL  No results found for: LABVLDL  Body mass index is 36.56 kg/m². BP Readings from Last 2 Encounters:   03/03/23 124/60   02/28/23 116/63         Medical Bed:   Is patient in a medical bed? no   If medical bed is in use, has nursing secured room while patient is awake and out of the room? NA  Has safety checks by nursing been completed on the bed/room this shift? NA    Protective Factors:  Patient identifies protective factors with nursing staff as follows:    Identifies reasons for living: Yes   Supportive Social Network or family: Yes    Belief that suicide is immoral/high spirituality: Yes   Responsibility to family or others/living with family: Yes   Fear of death or dying due to pain and suffering: Yes   Engaged in work or school: No     If Patient is unable to identify, reason why? N/a      PSYCH:  Depression: 9   Anxiety: 9   SI passive   Risk of Suicide: Low Risk  HI Negative for homicidal ideation      AVH:no If Hallucinations are present, describe? N/a        GENERAL:  Appetite: good   Percent Meals: n/a   Social: No   Speech: normal   Appearance: appropriately dressed and healthy looking    GROUP:  Group Participation: Yes  Participation Quality: Minimal    Notes:     PT seen in room this shift and for this encounter. PT states she does sleep but is frequently wakes up because hears noises.  PT has good eye contact, cooperative not social except with staff    Electronically signed by Emelia Lopez RN on 3/3/23 at 10:08 PM CST

## 2023-03-04 NOTE — PROGRESS NOTES
[facility] Adult Unit Daily Assessment  Nursing Progress Note    Room: [facility]-   Name: Timi Blake   Age: 65 y.o. Gender: female   Dx: Depression with suicidal ideation  Precautions: suicide risk and fall risk  Inpatient Status: voluntary       SLEEP:  Sleep Quality Good  Sleep Medications: Yes   PRN Sleep Meds: No       MEDICAL:  Other PRN Meds: No   Med Compliant: Yes  Accu-Chek: No  Oxygen/CPAP/BiPAP: Yes  CIWA/CINA: No   PAIN Assessment: none  Side Effects from medication: No      Metabolic Screening:  Lab Results   Component Value Date    LABA1C 6.1 (H) 11/25/2022     Lab Results   Component Value Date    CHOL 172 11/25/2022    CHOL 143 (L) 06/27/2021    CHOL 149 (L) 07/22/2020    CHOL 152 (L) 02/22/2020    CHOL 130 (L) 12/22/2018    CHOL 154 02/11/2014    CHOL 181 05/10/2012     Lab Results   Component Value Date    TRIG 177 (H) 11/25/2022    TRIG 166 (H) 06/27/2021    TRIG 143 07/22/2020    TRIG 130 02/22/2020    TRIG 83 12/22/2018    TRIG 113 02/11/2014    TRIG 197 (H) 05/10/2012     Lab Results   Component Value Date    HDL 59 (L) 11/25/2022    HDL 48 (L) 06/27/2021    HDL 60 (L) 07/22/2020    HDL 52 (L) 02/22/2020    HDL 52 (L) 12/22/2018    HDL 56 02/11/2014    HDL 62 05/10/2012     No components found for: LDLCAL  No results found for: LABVLDL  Body mass index is 36.56 kg/m². BP Readings from Last 2 Encounters:   03/04/23 118/81   02/28/23 116/63         Medical Bed:   Is patient in a medical bed? no   If medical bed is in use, has nursing secured room while patient is awake and out of the room? NA  Has safety checks by nursing been completed on the bed/room this shift? yes    Protective Factors:  Patient identifies protective factors with nursing staff as follows:    Identifies reasons for living: Yes   Supportive Social Network or family: Yes    Belief that suicide is immoral/high spirituality: Yes   Responsibility to family or others/living with family: Yes   Fear of death or dying due to pain and suffering: Yes   Engaged in work or school: No     If Patient is unable to identify, reason why? NA      PSYCH:  Depression: 9   Anxiety: 9   SI passive   Risk of Suicide: Low Risk  HI Negative for homicidal ideation      AVH:no If Hallucinations are present, describe? na        GENERAL:  Appetite: good   Percent Meals: 100%   Social: No   Speech: normal   Appearance: appropriately dressed and healthy looking    GROUP:  Group Participation: Yes  Participation Quality: Minimal    Notes:   Patient has been in her room most of the day. Patient encouraged to get up and get out of her room and talk with some of the other patients. Patient reports she just feels hopeless. Talked with patient about her wanting to get better and that she has to help herself to get better. Patient verbalized understand of need to use her coping skills and that we all fall sometime, but we have to keep getting up and trying again. Will continue to monitor for safety.         Electronically signed by Mayo Long RN on 3/4/23 at 12:07 PM CST

## 2023-03-04 NOTE — PROGRESS NOTES
Group Note    Date: 03/03/23  Start Time: 7:45 PM   End Time:8:15 PM     Number of Participants: 6    Type of Group: Wrap-Up     Patient's Goal:  wait patiently for meds and eating like I'm supposed to,     Notes:  n/a    Status After Intervention:  Unchanged    Participation Level: Minimal    Participation Quality: Appropriate and Attentive    Speech:  normal    Thought Process/Content: Logical    Mood: anxious and depressed    Level of consciousness:  Alert and Oriented x4    Response to Learning: Progressing to goal    Modes of Intervention: Education and Support    Discipline Responsible: Registered Nurse     Signature:  Jose Alejandro Gamez RN

## 2023-03-04 NOTE — H&P
HISTORY and PHYSICAL      CHIEF COMPLAINT:   Depression, SI    Reason for Admission:  Depression, SI    History Obtained From:  patient, chart    HISTORY OF PRESENT ILLNESS:      The patient is a 65 y.o. female who is admitted to the [place] [address] unit with worsening mood issues. She has no c/o SOA or CP. She has no dysuria. She has no new pain issues. No fevers. She has no HA or dizziness. She has been on topical treatment recently for vaginal infection. Past Medical History:        Diagnosis Date    Chest pain 02/08/2012    CPAP (continuous positive airway pressure) dependence     Depression     Hypoglycemia     SANDY (obstructive sleep apnea)     Schizoaffective disorder ([facility] 02/08/2012    Suicide attempt [facility])      Past Surgical History:        Procedure Laterality Date    DILATION AND CURETTAGE OF UTERUS  2017    LEEP           Medications Prior to Admission:    Medications Prior to Admission: azithromycin (ZITHROMAX) 1 g powder, Take 1 packet by mouth once  metroNIDAZOLE (METROGEL) 0.75 % vaginal gel, One applicator nightly (Patient not taking: Reported on 3/2/2023)  miconazole (MICOTIN) 2 % powder, Apply topically 2 times daily. clindamycin (CLEOCIN T) 1 % external solution, Apply topically daily Apply topically to the groin and abdomen once daily when and where lesions are present as needed.   Multiple Vitamins-Minerals (THERAPEUTIC MULTIVITAMIN-MINERALS) tablet, Take 1 tablet by mouth daily  DULoxetine (CYMBALTA) 30 MG extended release capsule, Take 30 mg by mouth daily  Cholecalciferol (VITAMIN D3) 1.25 MG (91552 UT) CAPS, Take 1 capsule by mouth once a week Takes on Sundays  chlorhexidine (HIBICLENS) 4 % external liquid, Apply topically daily as needed Apply topically daily as needed to boil on thigh (Patient not taking: Reported on 3/2/2023)  lurasidone (LATUDA) 80 MG TABS tablet, Take 1 tablet by mouth Daily with supper  hydrOXYzine pamoate (VISTARIL) 25 MG capsule, Take 25 mg by mouth at bedtime  docusate sodium (COLACE) 100 MG capsule, Take 100 mg by mouth as needed for Constipation  calcium carbonate 600 MG TABS tablet, Take 1 tablet by mouth 2 times daily as needed  mirabegron (MYRBETRIQ) 50 MG TB24, Take 50 mg by mouth daily  valACYclovir (VALTREX) 500 MG tablet, Take 500 mg by mouth daily    Allergies:  Seroquel [quetiapine fumarate], Adderall [amphetamine-dextroamphetamine], Amphetamines, Asa [aspirin], Codeine, Cogentin [benztropine], Depakote [divalproex sodium], Effexor [venlafaxine], Estrogens, Geodon [ziprasidone hcl], Hydrocodone, Lortab [hydrocodone-acetaminophen], Macrolides and ketolides, Metoprolol, Neurontin [gabapentin], Other, Pcn [penicillins], Provera [medroxyprogesterone], Prozac [fluoxetine hcl], Tetracyclines & related, Trazodone and nefazodone, Trileptal [oxcarbazepine], Trintellix [vortioxetine], Valium [diazepam], Vancomycin, Wellbutrin [bupropion], Zoloft [sertraline hcl], Zyprexa [olanzapine], and Macrobid [nitrofurantoin]    Social History:   TOBACCO:   reports that she has never smoked. She has never used smokeless tobacco.  ETOH:   reports no history of alcohol use. DRUGS:   reports no history of drug use. MARITAL STATUS:    OCCUPATION:  Not working  Patient currently lives with family       Family History:   History reviewed. No pertinent family history.   REVIEW OF SYSTEMS:  Constitutional: neg  CV: neg  Pulmonary: neg  GI: neg  : neg  Psych: Depression, SI  Neuro: neg  Skin: neg  MusculoSkeletal: neg  HEENT: neg  Joints: neg    Vitals:  BP (!) 111/56   Pulse 78   Temp 97.3 °F (36.3 °C) (Temporal)   Resp 18   Ht 5' 4\" (1.626 m)   Wt 213 lb (96.6 kg)   SpO2 99%   BMI 36.56 kg/m²     PHYSICAL EXAM:  Gen: NAD, alert  HEENT: WNL  Lymph: no LAD  Neck: no JVD or masses  Chest: CTA bilat  CV: RRR  Abdomen: NT/ND  Extrem: no C/C/E  Neuro: non focal  Skin: no rashes  Joints: no redness    DATA:  I have reviewed the admission labs and imaging tests.     ASSESSMENT AND PLAN:      Patient Active Hospital Problem List:   Depression, SI---follow with Psych   Pre DM    SANDY            Rama Marcus MD  7:16 PM 3/3/2023

## 2023-03-04 NOTE — PLAN OF CARE
Problem: Self Harm/Suicidality  Goal: Will have no self-injury during hospital stay  Description: INTERVENTIONS:  1. Ensure constant observer at bedside with Q15M safety checks  2. Maintain a safe environment  3. Secure patient belongings  4. Ensure family/visitors adhere to safety recommendations  5. Ensure safety tray has been added to patient's diet order  6. Every shift and PRN: Re-assess suicidal risk via Frequent Screener    Outcome: Progressing     Problem: Pain  Goal: Verbalizes/displays adequate comfort level or baseline comfort level  Outcome: Progressing     Problem: Safety - Adult  Goal: Free from fall injury  Outcome: Progressing     Problem: Infection - Adult  Goal: Absence of infection at discharge  Outcome: Progressing     Problem: Depression  Goal: Will be euthymic at discharge  Description: INTERVENTIONS:  1. Administer medication as ordered  2. Provide emotional support via 1:1 interaction with staff  3. Encourage involvement in milieu/groups/activities  4. Monitor for social isolation  Outcome: Progressing     Problem: Psychosis  Goal: Will report no hallucinations or delusions  Description: INTERVENTIONS:  1. Administer medication as  ordered  2. Assist with reality testing to support increasing orientation  3. Assess if patient's hallucinations or delusions are encouraging self harm or harm to others and intervene as appropriate  Outcome: Progressing     Problem: Anxiety  Goal: Will report anxiety at manageable levels  Description: INTERVENTIONS:  1. Administer medication as ordered  2. Teach and rehearse alternative coping skills  3. Provide emotional support with 1:1 interaction with staff  Outcome: Progressing     Problem: Self Care Deficit  Goal: Return ADL status to a safe level of function  Description: INTERVENTIONS:  1. Administer medication as ordered  2. Assess ADL deficits and provide assistive devices as needed  3. Obtain PT/OT consults as needed  4.  Assist and instruct patient to increase activity and self care as tolerated  Outcome: Progressing

## 2023-03-04 NOTE — PROGRESS NOTES
Department of Psychiatry  Attending Progress Note     Chief complaint: \"Not doing well\"    SUBJECTIVE:   Chart reviewed, discussed with the team. No major issues overnight. Patient is med-compliant. No SEs. Performs ADLs. Social and goes to groups. Patient seen in her room resting this morning. Affect is dysphoric. Endorses suicidal ideation. Denies HI/AVH. Rates depression 7/10, anxiety 5/10. Slept 5h. Denies physical complaints. States she has been depressed in the setting of social stressors. Thinking negative thoughts at all times. We processed her feelings. Encouraged her to work on some CBT exercises today and socialize. She was receptive. OBJECTIVE    Physical  Wt Readings from Last 3 Encounters:   03/02/23 213 lb (96.6 kg)   02/23/23 210 lb 8 oz (95.5 kg)   11/23/22 207 lb 8 oz (94.1 kg)     Temp Readings from Last 3 Encounters:   03/04/23 (!) 96.5 °F (35.8 °C) (Temporal)   02/28/23 (!) 96.4 °F (35.8 °C)   12/05/22 97.3 °F (36.3 °C) (Temporal)     BP Readings from Last 3 Encounters:   03/04/23 118/81   02/28/23 116/63   12/05/22 130/88     Pulse Readings from Last 3 Encounters:   03/04/23 85   02/28/23 75   12/05/22 85        Review of Systems: 14-point review of systems negative except as described above    Mental Status Examination:   Appearance: Stated age. Long hair. Gait stable. No abnormal movements or tremor. Behavior: Calm and cooperative  Speech: Normal in tone, volume, and quality. No slurring, dysarthria or pressured speech noted. Mood: \"Not good\"   Affect: Mood congruent. Thought Process: Appears linear. Thought Content: Endorses suicidal ideation and denies homicidal ideation. No overt delusions or paranoia appreciated. Perceptions: Denies auditory or visual hallucinations at present time. Not responding to internal stimuli. Concentration: Intact. Orientation: to person, place, date, and situation. Language: Intact. Fund of information: Intact.    Memory: Recent and remote appear intact. Neurovegitative: Fair appetite and sleep. Insight: Impaired. Judgment: Impaired.     Data  Lab Results   Component Value Date    WBC 9.0 03/02/2023    HGB 14.0 03/02/2023    HCT 42.1 03/02/2023    MCV 95.2 03/02/2023     03/02/2023      Lab Results   Component Value Date     03/02/2023    K 3.7 03/02/2023     03/02/2023    CO2 22 03/02/2023    BUN 12 03/02/2023    CREATININE 0.7 03/02/2023    GLUCOSE 88 03/02/2023    CALCIUM 9.5 03/02/2023    PROT 6.8 03/02/2023    LABALBU 3.9 03/02/2023    BILITOT 0.3 03/02/2023    ALKPHOS 87 03/02/2023    AST 19 03/02/2023    ALT <5 (A) 03/02/2023    LABGLOM >60 03/02/2023    GFRAA >59 06/25/2021    GLOB 3.3 06/18/2016       Medications    Current Facility-Administered Medications:     azithromycin (ZITHROMAX) tablet 250 mg (Patient Supplied), 250 mg, Oral, Daily, Evgeny Momin MD    calcium carbonate (TUMS) chewable tablet 500 mg, 1 tablet, Oral, BID PRN, Emir Magallanes MD    vitamin D (ERGOCALCIFEROL) capsule 50,000 Units, 50,000 Units, Oral, Weekly, Emir Magallanes MD    docusate sodium (COLACE) capsule 100 mg, 100 mg, Oral, PRN, Emir Magallanes MD, 100 mg at 03/03/23 1706    DULoxetine (CYMBALTA) extended release capsule 30 mg, 30 mg, Oral, Daily, Emir Magallanes MD, 30 mg at 03/04/23 9210    hydrOXYzine pamoate (VISTARIL) capsule 25 mg, 25 mg, Oral, Nightly, Emir Magallanes MD, 25 mg at 03/03/23 2101    acyclovir (ZOVIRAX) capsule 400 mg, 400 mg, Oral, TID, Emir Magallanes MD, 400 mg at 03/04/23 1352    therapeutic multivitamin-minerals 1 tablet, 1 tablet, Oral, Daily, Emir Magallanes MD, 1 tablet at 03/04/23 5234    mirabegron (MYRBETRIQ) extended release tablet 50 mg (Patient Supplied), 50 mg, Oral, Daily, Emir Magallanes MD, 50 mg at 03/04/23 7224    polyethylene glycol (GLYCOLAX) packet 17 g, 17 g, Oral, Daily PRN, mEir Magallanes MD    lurasidone (LATUDA) tablet 80 mg, 80 mg, Oral, Griselda Ramírez MD, 80 mg at 03/03/23 7671    ASSESSMENT AND PLAN  DSM 5 DIAGNOSIS  Impression  Bipolar depression  Suicidal ideation  Borderline personality disorder  Anxiety, unspecified  Obstructive sleep apnea    Continue to observe. Engage in recreational activities. Encourage socialization. Plan:   1. Psychiatric Medications:   Continue current psychotropic medications as recommended. Monitor for side effects. The risks, benefits, side effects, indications, contraindications, alternatives and adverse effects of the medications have been discussed with patient. 2. Continue to provide supportive psychotherapy. Encourage socialization and participation in recreational activities. Work on coping skills. 3. Medical Issues:    Continue medical monitoring by Dr. Shannan Ayala and associates. 4. Disposition:     to provide outpatient resources and facilitate disposition.      Amount of time spent with patient:      25 minutes with greater than 50 % of the time spent in counseling and collaboration of care

## 2023-03-04 NOTE — GROUP NOTE
Group Therapy Note    Date: 3/4/2023    Group Start Time: 1600  Group End Time: 36  Group Topic: Healthy Living/Wellness    [facility]    Eva Beckwith RN                                                                      Group Note    Date: 03/04/23  Start Time: 4:00 PM   End Time:4:30 PM     Number of Participants: 8    Type of Group: Health Living/Wellness     Patient's Goal:  Answer questions on the thumb ball    Notes:  participated well    Status After Intervention:  Improved    Participation Level:  Active Listener    Participation Quality: Appropriate and Attentive    Speech:  normal    Thought Process/Content: Logical  Linear    Mood: euthymic    Level of consciousness:  Alert, Oriented x4, and Attentive    Response to Learning: Progressing to goal    Modes of Intervention: Education and Support    Discipline Responsible: Registered Nurse     Signature:  Eva Beckwith RN

## 2023-03-04 NOTE — PROGRESS NOTES
Progress Note  Ruth Ramos  3/4/2023 1:12 PM  Subjective:   Admit Date:   3/2/2023      CC/ADMIT DX:       Interval History:   Reviewed overnight events and nursing notes. She has no c/o CP or SOA. No GI issues. I have reviewed all labs/diagnostics from the last 24hrs. ROS:   I have done a 10 point ROS and all are negative, except what is mentioned in the HPI. ADULT DIET; Regular    Medications:      azithromycin  250 mg Oral Daily    vitamin D  50,000 Units Oral Weekly    DULoxetine  30 mg Oral Daily    hydrOXYzine pamoate  25 mg Oral Nightly    acyclovir  400 mg Oral TID    therapeutic multivitamin-minerals  1 tablet Oral Daily    mirabegron  50 mg Oral Daily    lurasidone  80 mg Oral Dinner           Objective:   Vitals: /81   Pulse 85   Temp (!) 96.5 °F (35.8 °C) (Temporal)   Resp 18   Ht 5' 4\" (1.626 m)   Wt 213 lb (96.6 kg)   SpO2 98%   BMI 36.56 kg/m²  No intake or output data in the 24 hours ending 03/04/23 1312  General appearance: alert and cooperative with exam  Extremities: extremities normal, atraumatic, no cyanosis or edema  Neurologic:  No obvious focal neurologic deficits. Skin: no rashes    Assessment and Plan:   Principal Problem:    Depression with suicidal ideation  Active Problems:    Bipolar depression ([address]  Resolved Problems:    * No resolved hospital problems. *      Plan:   Continue present medication(s)    Follow with Psych   Re-label Zithromax to use for additional 2 doses      Discharge planning:   her home     Reviewed treatment plans with the patient and/or family.              Electronically signed by Torri Floyd MD on 3/4/2023 at 1:12 PM

## 2023-03-05 PROCEDURE — 2500000003 HC RX 250 WO HCPCS: Performed by: PSYCHIATRY & NEUROLOGY

## 2023-03-05 PROCEDURE — 1240000000 HC EMOTIONAL WELLNESS R&B

## 2023-03-05 PROCEDURE — 6370000000 HC RX 637 (ALT 250 FOR IP): Performed by: PSYCHIATRY & NEUROLOGY

## 2023-03-05 PROCEDURE — 94660 CPAP INITIATION&MGMT: CPT

## 2023-03-05 RX ADMIN — ACYCLOVIR 400 MG: 200 CAPSULE ORAL at 14:30

## 2023-03-05 RX ADMIN — HYDROXYZINE PAMOATE 25 MG: 25 CAPSULE ORAL at 20:44

## 2023-03-05 RX ADMIN — LURASIDONE HYDROCHLORIDE 80 MG: 40 TABLET, FILM COATED ORAL at 17:04

## 2023-03-05 RX ADMIN — ACYCLOVIR 400 MG: 200 CAPSULE ORAL at 08:25

## 2023-03-05 RX ADMIN — AZITHROMYCIN 250 MG: 250 TABLET, FILM COATED ORAL at 08:25

## 2023-03-05 RX ADMIN — DULOXETINE HYDROCHLORIDE 30 MG: 30 CAPSULE, DELAYED RELEASE ORAL at 08:25

## 2023-03-05 RX ADMIN — MICONAZOLE NITRATE: 20 POWDER TOPICAL at 08:25

## 2023-03-05 RX ADMIN — MULTIPLE VITAMINS W/ MINERALS TAB 1 TABLET: TAB at 08:25

## 2023-03-05 RX ADMIN — ACYCLOVIR 400 MG: 200 CAPSULE ORAL at 20:45

## 2023-03-05 NOTE — PROGRESS NOTES
Group Note    Date: 03/05/23  Start Time: 8:00 AM   End Time:8:30 AM     Number of Participants: 11    Type of Group: Community/Goal     Patient's Goal:  \"my journal about worries and solutions\"      Notes:      Status After Intervention:  Improved    Participation Level:  Active Listener    Participation Quality: Appropriate    Speech:  normal    Thought Process/Content: Logical    Mood:  calm    Level of consciousness:  Alert    Response to Learning: Able to verbalize current knowledge/experience    Modes of Intervention: Education and Support    Discipline Responsible: Behavioral Health Technician     Signature:  Sony Romero

## 2023-03-05 NOTE — PROGRESS NOTES
I Adult Unit Daily Assessment  Nursing Progress Note    Room: [facility]-   Name: Ivone Johns   Age: 65 y.o. Gender: female   Dx: Depression with suicidal ideation  Precautions: suicide risk and fall risk  Inpatient Status: voluntary       SLEEP:  Sleep Quality Good  Sleep Medications: Yes vistaril 25 mg  PRN Sleep Meds: No       MEDICAL:  Other PRN Meds: Yes Colace 100 mg  Med Compliant: Yes  Accu-Chek: No  Oxygen/CPAP/BiPAP: Yes  C PAP  CIWA/CINA: No   PAIN Assessment: none  Side Effects from medication: No      Metabolic Screening:  Lab Results   Component Value Date    LABA1C 6.1 (H) 11/25/2022     Lab Results   Component Value Date    CHOL 172 11/25/2022    CHOL 143 (L) 06/27/2021    CHOL 149 (L) 07/22/2020    CHOL 152 (L) 02/22/2020    CHOL 130 (L) 12/22/2018    CHOL 154 02/11/2014    CHOL 181 05/10/2012     Lab Results   Component Value Date    TRIG 177 (H) 11/25/2022    TRIG 166 (H) 06/27/2021    TRIG 143 07/22/2020    TRIG 130 02/22/2020    TRIG 83 12/22/2018    TRIG 113 02/11/2014    TRIG 197 (H) 05/10/2012     Lab Results   Component Value Date    HDL 59 (L) 11/25/2022    HDL 48 (L) 06/27/2021    HDL 60 (L) 07/22/2020    HDL 52 (L) 02/22/2020    HDL 52 (L) 12/22/2018    HDL 56 02/11/2014    HDL 62 05/10/2012     No components found for: LDLCAL  No results found for: LABVLDL  Body mass index is 36.56 kg/m². BP Readings from Last 2 Encounters:   03/04/23 136/77   02/28/23 116/63         Medical Bed:   Is patient in a medical bed? no   If medical bed is in use, has nursing secured room while patient is awake and out of the room? NA  Has safety checks by nursing been completed on the bed/room this shift? yes    Protective Factors:  Patient identifies protective factors with nursing staff as follows:    Identifies reasons for living: Yes   Supportive Social Network or family: Yes    Belief that suicide is immoral/high spirituality: Yes   Responsibility to family or others/living with family: Yes   Fear of death or dying due to pain and suffering: Yes   Engaged in work or school: No     If Patient is unable to identify, reason why? PSYCH:  Depression: 6   Anxiety: 6   SI denies suicidal ideation   Risk of Suicide: No Risk  HI Negative for homicidal ideation      AVH:no If Hallucinations are present, describe? GENERAL:  Appetite: decreased   Percent Meals: 75%   Social: Yes   Speech: hesitant   Appearance: appropriately dressed, good hygiene, and healthy looking    GROUP:  Group Participation: Yes  Participation Quality: Active Listener    Notes: Pt is in her room during this assessment,she is writing the instructions she has been given for taking a shower. Pt reported that not remembering the steps was one of the triggers for returning to the hospital the patient also stated her children had moved her son's belongings from his old bedroom at her house and that also triggered her. Pt does state her mood is improved and her sleep is good. Pt is medication compliant ,has attended groups,is social with some peers and staff. Will continue to monitor for safety. clear bilaterally.  Pupils equal, round, and reactive to light.

## 2023-03-05 NOTE — PROGRESS NOTES
Group Note    Date: 03/04/2023  Start Time: 7:25 PM   End Time:7:59 PM     Number of Participants: 9    Type of Group: Wrap-Up     Patient's Goal:  goal oriented, reading journal    Notes:  reading journal    Status After Intervention:  Improved    Participation Level:  Active Listener and Interactive    Participation Quality: Appropriate, Attentive, and Sharing    Speech:  normal and pressured    Thought Process/Content: Logical    Mood: anxious    Level of consciousness:  Alert    Response to Learning: Capable of insight    Modes of Intervention: Education and Support    Discipline Responsible: Registered Nurse     Signature:  Bekah Chilel RN

## 2023-03-05 NOTE — GROUP NOTE
Group Therapy Note    Date: 3/5/2023    Group Start Time: 9086  Group End Time: 9616  Group Topic: Education Group - Inpatient    [facility]    Mo Chapman RN        Group Therapy Note    Attendees: 11/14         Notes: Education about mood disorders, coping techniques, and psychotropics    Status After Intervention:  Improved    Participation Level: Active Listener and Interactive    Participation Quality: Appropriate, Attentive, Sharing, and Supportive      Speech:  normal      Thought Process/Content: Logical  Linear      Affective Functioning: Congruent      Mood: Calm, pleasant, and cooperative.  Mood is improving      Level of consciousness:  Alert, Oriented x4, and Attentive      Response to Learning: Able to verbalize current knowledge/experience, Able to verbalize/acknowledge new learning, Able to retain information, and Capable of insight      Modes of Intervention: Education and Media      Discipline Responsible: Registered Nurse      Signature:  Mo Chapman RN

## 2023-03-05 NOTE — PLAN OF CARE
Problem: Self Harm/Suicidality  Goal: Will have no self-injury during hospital stay  Outcome: Progressing     Problem: Pain  Goal: Verbalizes/displays adequate comfort level or baseline comfort level  Outcome: Progressing     Problem: Safety - Adult  Goal: Free from fall injury  Outcome: Progressing     Problem: Infection - Adult  Goal: Absence of infection at discharge  Outcome: Progressing     Problem: Depression  Goal: Will be euthymic at discharge  Outcome: Progressing     Problem: Psychosis  Goal: Will report no hallucinations or delusions  Outcome: Progressing     Problem: Anxiety  Goal: Will report anxiety at manageable levels  Outcome: Progressing     Problem: Self Care Deficit  Goal: Return ADL status to a safe level of function  Outcome: Progressing

## 2023-03-05 NOTE — PROGRESS NOTES
[facility] Adult Unit Daily Assessment  Nursing Progress Note     Room: [facility]/601-01   Name: Shaq Irby   Age: 65 y.o. Gender: female   Dx: Depression with suicidal ideation  Precautions: suicide risk and fall risk  Inpatient Status: voluntary         SLEEP:  Sleep Quality Good  Sleep Medications: Yes vistaril 25 mg  PRN Sleep Meds: No         MEDICAL:  Other PRN Meds: Yes Colace 100 mg  Med Compliant: Yes  Accu-Chek: No  Oxygen/CPAP/BiPAP: Yes  C PAP  CIWA/CINA: No   PAIN Assessment: none  Side Effects from medication: No                   Medical Bed:   Is patient in a medical bed? no   If medical bed is in use, has nursing secured room while patient is awake and out of the room? NA  Has safety checks by nursing been completed on the bed/room this shift? yes     Protective Factors:  Patient identifies protective factors with nursing staff as follows: Identifies reasons for living: Yes              Supportive Social Network or family: Yes               Belief that suicide is immoral/high spirituality: Yes              Responsibility to family or others/living with family: Yes              Fear of death or dying due to pain and suffering: Yes              Engaged in work or school: No                If Patient is unable to identify, reason why? PSYCH:  Depression: 6   Anxiety: 6   SI denies suicidal ideation   Risk of Suicide: No Risk  HI Negative for homicidal ideation      AVH:no If Hallucinations are present, describe? GENERAL:  Appetite: good  Percent Meals: %   Social: Yes   Speech: normal in tone and volume  Appearance: appropriately dressed, good hygiene, and healthy looking     GROUP:  Group Participation: Yes  Participation Quality: Active Listener    Notes:  Patient is observed in room resting in bed. She is wearing casual attire. She is appropriately dressed and well groomed. Performs ADLS. She is alert and oriented x 4, pleasant, calm, and cooperative.  During interview patient was attentive and had good eye contact. She has been slightly withdrawn and isolated to room this am. Affect is flat with worried expression. She got up and ate breakfast this am and went to morning group. She reports good sleep last night and no issues with appetite. She is compliant with medications with no side effects noted. Social and friendly with staff and with select peers.   Denies suicidal ideation, homicidal ideation, and hallucinations. No evidence of psychosis.     Electronically signed by Ana Paula Zurita RN on 3/5/23 at 10:03 AM CST

## 2023-03-05 NOTE — RESEARCH
Collateral obtained from: Melina Valerie Nnles-[phone]-pt's father    Immediate Stressors & Time Episode Began: Pt father said she had gone to see her PCP in [place] the other day, and told her dad that she wasn't ready to discharge from here the day and needed to come back. Diagnosis/Hx of compliance with meds: Pt takes her medication like she is supposed to, but her dad thinks maybe the mixture all of those medicines together might be the reason she has to keep coming back for help so much. Tx Hx/Past hospitalizations: Pt has been here multiple times in the past and goes to [facility] for medication management and therapy frequently as well. Family hx of psychiatric issues: Pt's great-great aunt that had mental problems pt father said. Substance Abuse: pt has never dealt with substance abuse. Pending Legal: Pt has never had legal troubles. Safety Issues (Weapons? Hx of attempts): Pt father confirms that pt's home has no safety issues, including no weapons of any kind. Support system/Medication Managed by: Pt's father lives across the street from pt and pt's daughter lives beside the pt, and pt's two sons live close by so she has close family support. The importance of medication management and locking extra medication in a secured location was explained and reccommended to collateral.    Additional Info: Pt can live on her own.

## 2023-03-06 PROCEDURE — 1240000000 HC EMOTIONAL WELLNESS R&B

## 2023-03-06 PROCEDURE — 94660 CPAP INITIATION&MGMT: CPT

## 2023-03-06 PROCEDURE — 6370000000 HC RX 637 (ALT 250 FOR IP): Performed by: PSYCHIATRY & NEUROLOGY

## 2023-03-06 RX ADMIN — MICONAZOLE NITRATE: 20 POWDER TOPICAL at 12:21

## 2023-03-06 RX ADMIN — MICONAZOLE NITRATE: 20 POWDER TOPICAL at 20:40

## 2023-03-06 RX ADMIN — DOCUSATE SODIUM 100 MG: 100 CAPSULE, LIQUID FILLED ORAL at 21:04

## 2023-03-06 RX ADMIN — LURASIDONE HYDROCHLORIDE 80 MG: 40 TABLET, FILM COATED ORAL at 16:44

## 2023-03-06 RX ADMIN — HYDROXYZINE PAMOATE 25 MG: 25 CAPSULE ORAL at 20:38

## 2023-03-06 RX ADMIN — MULTIPLE VITAMINS W/ MINERALS TAB 1 TABLET: TAB at 08:23

## 2023-03-06 RX ADMIN — DULOXETINE HYDROCHLORIDE 30 MG: 30 CAPSULE, DELAYED RELEASE ORAL at 08:48

## 2023-03-06 RX ADMIN — ACYCLOVIR 400 MG: 200 CAPSULE ORAL at 20:38

## 2023-03-06 RX ADMIN — ACYCLOVIR 400 MG: 200 CAPSULE ORAL at 14:32

## 2023-03-06 RX ADMIN — ACYCLOVIR 400 MG: 200 CAPSULE ORAL at 08:24

## 2023-03-06 NOTE — PROGRESS NOTES
Group Note    Date: 03/05/23  Start Time: 7:30  End Time:8:00     Number of Participants: 12    Type of Group: Wrap Up    Status After Intervention:  Improved    Participation Level: Active Listener    Participation Quality: Appropriate    Speech:  normal    Thought Process/Content: Logical    Mood:  Calm , Quiet    Level of consciousness:  Alert    Notes:  To Improve sleep patient will work in her journal    Response to Learning:   Modes of Intervention: Education and Support    Discipline Responsible:  [place] PCA      Signature:  Keith Larose

## 2023-03-06 NOTE — PLAN OF CARE
Problem: Coping  Goal: Pt/Family able to verbalize concerns and demonstrate effective coping strategies  Description: INTERVENTIONS:  1. Assist patient/family to identify coping skills, available support systems and cultural and spiritual values  2. Provide emotional support, including active listening and acknowledgement of concerns of patient and caregivers  3. Reduce environmental stimuli, as able  4. Instruct patient/family in relaxation techniques, as appropriate  5. Assess for spiritual pain/suffering and initiate Spiritual Care, Psychosocial Clinical Specialist consults as needed  Outcome: Progressing  Note:                                                                     Group Therapy Note    Date: 3/6/2023  Start Time: 1000  End Time:  1030  Number of Participants: 10    Type of Group: Psychoeducation    Wellness Binder Information  Module Name:  Men's Issues  Session Number:  1    Group Goal for Pt: To improve knowledge of effective stress management techniques    Notes:  Pt demonstrated improved knowledge of effective stress management techniques by actively participating in group discussion. Status After Intervention:  Unchanged    Participation Level:  Active Listener    Participation Quality: Appropriate and Attentive      Speech:  normal      Thought Process/Content: Logical      Affective Functioning: Congruent      Mood: anxious and depressed      Level of consciousness:  Alert and Oriented x4      Response to Learning: Able to verbalize current knowledge/experience, Able to verbalize/acknowledge new learning, and Progressing to goal      Endings: None Reported    Modes of Intervention: Education      Discipline Responsible: Psychoeducational Specialist      Signature:  Sharron Mart

## 2023-03-06 NOTE — PROGRESS NOTES
Department of Psychiatry  Attending Progress Note     Chief complaint: \"Still having suicidal thoughts\"    SUBJECTIVE:   Chart reviewed, discussed with the team. No major issues overnight. Patient is med-compliant. No SEs. Performs ADLs. Social and goes to groups. Patient seen in her room resting this morning. Affect is dysphoric. Endorses suicidal ideation. Denies HI/AVH. Rates depression 8/10, anxiety 4/10. Slept 6h. Denies physical complaints. Talks about her stressors. We processed her feelings. She has been completing CBT exercises. The writer assigned more exercises for today and encourage socialization. Patient is planning to go to groups. States she has a doctor appointment on Thursday. OBJECTIVE    Physical  Wt Readings from Last 3 Encounters:   03/02/23 213 lb (96.6 kg)   02/23/23 210 lb 8 oz (95.5 kg)   11/23/22 207 lb 8 oz (94.1 kg)     Temp Readings from Last 3 Encounters:   03/06/23 (!) 96.6 °F (35.9 °C)   02/28/23 (!) 96.4 °F (35.8 °C)   12/05/22 97.3 °F (36.3 °C) (Temporal)     BP Readings from Last 3 Encounters:   03/06/23 104/84   02/28/23 116/63   12/05/22 130/88     Pulse Readings from Last 3 Encounters:   03/06/23 99   02/28/23 75   12/05/22 85        Review of Systems: 14-point review of systems negative except as described above    Mental Status Examination:   Appearance: Stated age. Long hair. Gait stable. No abnormal movements or tremor. Behavior: Calm and cooperative  Speech: Normal in tone, volume, and quality. No slurring, dysarthria or pressured speech noted. Mood: \"Not doing well\"   Affect: Mood congruent. Thought Process: Appears linear. Thought Content: Endorses suicidal ideation and denies homicidal ideation. No overt delusions or paranoia appreciated. Perceptions: Denies auditory or visual hallucinations at present time. Not responding to internal stimuli. Concentration: Intact. Orientation: to person, place, date, and situation. Language: Intact. Fund of information: Intact. Memory: Recent and remote appear intact. Neurovegitative: Fair appetite and sleep. Insight: Limited. Judgment: Limited.     Data  Lab Results   Component Value Date    WBC 9.0 03/02/2023    HGB 14.0 03/02/2023    HCT 42.1 03/02/2023    MCV 95.2 03/02/2023     03/02/2023      Lab Results   Component Value Date     03/02/2023    K 3.7 03/02/2023     03/02/2023    CO2 22 03/02/2023    BUN 12 03/02/2023    CREATININE 0.7 03/02/2023    GLUCOSE 88 03/02/2023    CALCIUM 9.5 03/02/2023    PROT 6.8 03/02/2023    LABALBU 3.9 03/02/2023    BILITOT 0.3 03/02/2023    ALKPHOS 87 03/02/2023    AST 19 03/02/2023    ALT <5 (A) 03/02/2023    LABGLOM >60 03/02/2023    GFRAA >59 06/25/2021    GLOB 3.3 06/18/2016       Medications    Current Facility-Administered Medications:     miconazole (MICOTIN) 2 % powder, , Topical, BID, Amena Zarate MD, Given at 03/05/23 0825    calcium carbonate (TUMS) chewable tablet 500 mg, 1 tablet, Oral, BID PRN, Laura Garcia MD    vitamin D (ERGOCALCIFEROL) capsule 50,000 Units, 50,000 Units, Oral, Weekly, Laura Garcia MD, 50,000 Units at 03/03/23 1223    docusate sodium (COLACE) capsule 100 mg, 100 mg, Oral, PRN, Laura Garcia MD, 100 mg at 03/04/23 2111    DULoxetine (CYMBALTA) extended release capsule 30 mg, 30 mg, Oral, Daily, Laura Garcia MD, 30 mg at 03/05/23 0825    hydrOXYzine pamoate (VISTARIL) capsule 25 mg, 25 mg, Oral, Nightly, Laura Garcia MD, 25 mg at 03/05/23 2044    acyclovir (ZOVIRAX) capsule 400 mg, 400 mg, Oral, TID, Laura Garcia MD, 400 mg at 03/05/23 2045    therapeutic multivitamin-minerals 1 tablet, 1 tablet, Oral, Daily, Laura Garcia MD, 1 tablet at 03/05/23 0825    mirabegron (MYRBETRIQ) extended release tablet 50 mg (Patient Supplied), 50 mg, Oral, Daily, Laura Garcia MD, 50 mg at 03/05/23 0825    polyethylene glycol (GLYCOLAX) packet 17 g, 17 g, Oral, Daily PRN, Laura Garcia MD    lurasidone (Sharath Latch) tablet 80 mg, 80 mg, Oral, Davis Ingram MD, 80 mg at 03/05/23 2629    ASSESSMENT AND PLAN  DSM 5 DIAGNOSIS  Impression  Bipolar depression  Suicidal ideation  Borderline personality disorder  Anxiety, unspecified  Obstructive sleep apnea    Continue to observe. Engage in recreational activities. Encourage socialization. Plan:   1. Psychiatric Medications:   Continue current psychotropic medications as recommended. Monitor for side effects. The risks, benefits, side effects, indications, contraindications, alternatives and adverse effects of the medications have been discussed with patient. 2. Continue to provide supportive psychotherapy. Encourage socialization and participation in recreational activities. Work on coping skills. 3. Medical Issues:    Continue medical monitoring by Dr. Jonathan Horvath and associates. 4. Disposition:     to provide outpatient resources and facilitate disposition.      Amount of time spent with patient:      35 minutes with greater than 50 % of the time spent in counseling and collaboration of care

## 2023-03-06 NOTE — PLAN OF CARE
Problem: Coping  Goal: Pt/Family able to verbalize concerns and demonstrate effective coping strategies  3/6/2023 1603 by Triston Mcclellan  Outcome: Progressing                                                                       Group Therapy Note    Date: 3/6/2023  Start Time: 0001  End Time:  1600  Number of Participants: 9    Type of Group: Recovery    Wellness Binder Information  Module Name:  relapse prevention  Session Number:  2    Patient's Goal:  identifying early warning signs    Notes:  pt acknowledged negative thinking as an early warning sign to help prevent relapse.     Status After Intervention:  Improved    Participation Level: Interactive    Participation Quality: Appropriate, Attentive, and Sharing      Speech:  normal      Thought Process/Content: Logical      Affective Functioning: Congruent      Mood: congruent      Level of consciousness:  Alert, Oriented x4, and Attentive      Response to Learning: Able to verbalize current knowledge/experience      Endings: None Reported    Modes of Intervention: Education      Discipline Responsible: Psychoeducational Specialist      Signature:  Triston Mcclellan

## 2023-03-06 NOTE — PROGRESS NOTES
I Adult Unit Daily Assessment  Nursing Progress Note    Room: [facility]-   Name: Sara Chung   Age: 65 y.o. Gender: female   Dx: Depression with suicidal ideation  Precautions: suicide risk risk to fall  Inpatient Status: voluntary       SLEEP:  Sleep Quality Good  Sleep Medications: Yes vistaril 25 mg  PRN Sleep Meds: No       MEDICAL:  Other PRN Meds: No   Med Compliant: Yes  Accu-Chek: No  Oxygen/CPAP/BiPAP: No  CIWA/CINA: No   PAIN Assessment: none  Side Effects from medication: No      Metabolic Screening:  Lab Results   Component Value Date    LABA1C 6.1 (H) 11/25/2022     Lab Results   Component Value Date    CHOL 172 11/25/2022    CHOL 143 (L) 06/27/2021    CHOL 149 (L) 07/22/2020    CHOL 152 (L) 02/22/2020    CHOL 130 (L) 12/22/2018    CHOL 154 02/11/2014    CHOL 181 05/10/2012     Lab Results   Component Value Date    TRIG 177 (H) 11/25/2022    TRIG 166 (H) 06/27/2021    TRIG 143 07/22/2020    TRIG 130 02/22/2020    TRIG 83 12/22/2018    TRIG 113 02/11/2014    TRIG 197 (H) 05/10/2012     Lab Results   Component Value Date    HDL 59 (L) 11/25/2022    HDL 48 (L) 06/27/2021    HDL 60 (L) 07/22/2020    HDL 52 (L) 02/22/2020    HDL 52 (L) 12/22/2018    HDL 56 02/11/2014    HDL 62 05/10/2012     No components found for: LDLCAL  No results found for: LABVLDL  Body mass index is 36.56 kg/m². BP Readings from Last 2 Encounters:   03/05/23 116/63   02/28/23 116/63         Medical Bed:   Is patient in a medical bed? no   If medical bed is in use, has nursing secured room while patient is awake and out of the room? NA  Has safety checks by nursing been completed on the bed/room this shift? yes    Protective Factors:  Patient identifies protective factors with nursing staff as follows:    Identifies reasons for living: Yes   Supportive Social Network or family: Yes    Belief that suicide is immoral/high spirituality: Yes   Responsibility to family or others/living with family: Yes   Fear of death or dying due to pain and suffering: Yes   Engaged in work or school: No     If Patient is unable to identify, reason why? PSYCH:  Depression: 6   Anxiety: 6   SI denies suicidal ideation   Risk of Suicide: No Risk  HI Negative for homicidal ideation      AVH:no If Hallucinations are present, describe? GENERAL:  Appetite: good   Percent Meals: 75%   Social: Yes   Speech: normal   Appearance: appropriately dressed, appropriately groomed, and healthy looking    GROUP:  Group Participation: Yes  Participation Quality: Active Listener    Notes: Pt is in her room during this assessment,. She is calm and cooperative. She participates in group ,she is medication compliant,has a good appetite ,sleeps through the night, and seems less stressed. Pt denies SI,HI, and AVH. She has been social with select peers. Watches TV, talks on the phone,participates in some crafts. Will continue to monitor.

## 2023-03-06 NOTE — PROGRESS NOTES
Treatment Team Note:    Target Symptoms/Reason for admission: Per nursing admission assessment - Reason for Admission: Pt went to her PCP today and told her that she wanted to die. She advised the patient to return to the ER and that she left the behavioral health unit too soon. Pt reports she feels suicidal with plan to overdose on her pills. Diagnoses per psych provider: Depression with suicidal ideation [F32. A, R45.851]  Bipolar depression ([facility] [address] [F31.9]    Therapist met with treatment team to discuss patients treatment and discharge plans. Patient's aftercare plan is: SW will meet with patient to gather information    Aftercare appointments made: No - SW will make discharge appointments    Pt lives with: patient lives alone    Collateral obtained from:  Pt's father, Zunilda Lawrence  Collateral obtained on:03/05/23    Attending groups: Yes    Behavior: calm and cooperative, social with select peers    Has patient been completing ADL's:  Yes    SI:  patient denies SI  Plan: no   If yes describe: N/A - patient denies plan  HI:  patient denies HI  If present describe: N/A  Delusions: patient denies delusions  If present describe: N/A  Hallucinations: patient denies hallucinations  If present describe: N/A    Patient rates their -- Depression: 1-10:  6  Anxiety:1-10:  6    Sleeping:Yes    Taking medication: Yes    Misc: Tentative discharge today or Tuesday.

## 2023-03-06 NOTE — PROGRESS NOTES
[facility] Adult Unit Daily Assessment  Nursing Progress Note    Room: [facility]601-   Name: Kerrie Figueredo   Age: 65 y.o. Gender: female   Dx: Depression with suicidal ideation  Precautions: suicide risk, fall risk  Inpatient Status: voluntary       SLEEP:  Sleep Quality Good  Sleep Medications: No   PRN Sleep Meds: No       MEDICAL:  Other PRN Meds: No   Med Compliant: Yes  Accu-Chek: No  Oxygen/CPAP/BiPAP: No  CIWA/CINA: No   PAIN Assessment: none  Side Effects from medication: No      Metabolic Screening:  Lab Results   Component Value Date    LABA1C 6.1 (H) 11/25/2022     Lab Results   Component Value Date    CHOL 172 11/25/2022    CHOL 143 (L) 06/27/2021    CHOL 149 (L) 07/22/2020    CHOL 152 (L) 02/22/2020    CHOL 130 (L) 12/22/2018    CHOL 154 02/11/2014    CHOL 181 05/10/2012     Lab Results   Component Value Date    TRIG 177 (H) 11/25/2022    TRIG 166 (H) 06/27/2021    TRIG 143 07/22/2020    TRIG 130 02/22/2020    TRIG 83 12/22/2018    TRIG 113 02/11/2014    TRIG 197 (H) 05/10/2012     Lab Results   Component Value Date    HDL 59 (L) 11/25/2022    HDL 48 (L) 06/27/2021    HDL 60 (L) 07/22/2020    HDL 52 (L) 02/22/2020    HDL 52 (L) 12/22/2018    HDL 56 02/11/2014    HDL 62 05/10/2012     No components found for: LDLCAL  No results found for: LABVLDL  Body mass index is 36.56 kg/m². BP Readings from Last 2 Encounters:   03/06/23 104/84   02/28/23 116/63         Medical Bed:   Is patient in a medical bed? no   If medical bed is in use, has nursing secured room while patient is awake and out of the room? Has safety checks by nursing been completed on the bed/room this shift? Protective Factors:  Patient identifies protective factors with nursing staff as follows:    Identifies reasons for living: Yes   Supportive Social Network or family: Yes    Belief that suicide is immoral/high spirituality: Yes   Responsibility to family or others/living with family: Yes   Fear of death or dying due to pain and suffering: Yes   Engaged in work or school: No     If Patient is unable to identify, reason why?       PSYCH:  Depression: 9   Anxiety: 9   SI without plan, pt states \"coming and going\"   Risk of Suicide: No Risk  HI Negative for homicidal ideation      AVH: no If Hallucinations are present, describe?         GENERAL:  Appetite: good   Percent Meals: %   Social: Yes   Speech: normal   Appearance: appropriately dressed and appropriately groomed    GROUP:  Group Participation: Yes  Participation Quality: Active Listener    Notes: Pt is alert and oriented x 4, calm/cooperative/friendly. Flat affect, appears worried. Mood congruent. Concentration and memory intact. Mostly linear thought processes, sometimes circumstantial. Limited insight and judgment. Intrusive. Constantly at nurse's station. Rates depression and anxiety 9, pt states \"I feel low today\". Report SI, no plan, contracts for safety, states they are \"coming and going\". Denies HI and AVH. Will continue to monitor pt.     Electronically signed by Ellen Solano RN on 3/6/23 at 12:41 PM CST

## 2023-03-06 NOTE — PROGRESS NOTES
Progress Note  Ralph Naranjo  3/5/2023 10:03 PM  Subjective:   Admit Date:   3/2/2023      CC/ADMIT DX:       Interval History:   Reviewed overnight events and nursing notes. She has c/o vaginal itching. I have reviewed all labs/diagnostics from the last 24hrs. ROS:   I have done a 10 point ROS and all are negative, except what is mentioned in the HPI. ADULT DIET; Regular; 4 carb choices (60 gm/meal)    Medications:      miconazole   Topical BID    vitamin D  50,000 Units Oral Weekly    DULoxetine  30 mg Oral Daily    hydrOXYzine pamoate  25 mg Oral Nightly    acyclovir  400 mg Oral TID    therapeutic multivitamin-minerals  1 tablet Oral Daily    mirabegron  50 mg Oral Daily    lurasidone  80 mg Oral Dinner           Objective:   Vitals: /63   Pulse 65   Temp 97.3 °F (36.3 °C) (Temporal)   Resp 18   Ht 5' 4\" (1.626 m)   Wt 213 lb (96.6 kg)   SpO2 97%   BMI 36.56 kg/m²  No intake or output data in the 24 hours ending 03/05/23 2203  General appearance: alert and cooperative with exam  Extremities: extremities normal, atraumatic, no cyanosis or edema  Neurologic:  No obvious focal neurologic deficits. Skin: no rashes    Assessment and Plan:   Principal Problem:    Depression with suicidal ideation  Active Problems:    Bipolar depression ([address]  Resolved Problems:    * No resolved hospital problems. *    Vaginitis    Plan:   Continue present medication(s)    Follow with Psych   She s on PO antibiotics per GYN      Discharge planning:   her home     Reviewed treatment plans with the patient and/or family.              Electronically signed by Yao Llamas MD on 3/5/2023 at 10:03 PM

## 2023-03-06 NOTE — PLAN OF CARE
Problem: Self Harm/Suicidality  Goal: Will have no self-injury during hospital stay  Description: INTERVENTIONS:  1. Ensure constant observer at bedside with Q15M safety checks  2. Maintain a safe environment  3. Secure patient belongings  4. Ensure family/visitors adhere to safety recommendations  5. Ensure safety tray has been added to patient's diet order  6. Every shift and PRN: Re-assess suicidal risk via Frequent Screener    Outcome: Progressing     Problem: Pain  Goal: Verbalizes/displays adequate comfort level or baseline comfort level  Outcome: Progressing     Problem: Safety - Adult  Goal: Free from fall injury  Outcome: Progressing     Problem: Infection - Adult  Goal: Absence of infection at discharge  Outcome: Progressing     Problem: Depression  Goal: Will be euthymic at discharge  Description: INTERVENTIONS:  1. Administer medication as ordered  2. Provide emotional support via 1:1 interaction with staff  3. Encourage involvement in milieu/groups/activities  4. Monitor for social isolation  Outcome: Progressing     Problem: Psychosis  Goal: Will report no hallucinations or delusions  Description: INTERVENTIONS:  1. Administer medication as  ordered  2. Assist with reality testing to support increasing orientation  3. Assess if patient's hallucinations or delusions are encouraging self harm or harm to others and intervene as appropriate  Outcome: Progressing     Problem: Anxiety  Goal: Will report anxiety at manageable levels  Description: INTERVENTIONS:  1. Administer medication as ordered  2. Teach and rehearse alternative coping skills  3. Provide emotional support with 1:1 interaction with staff  3/6/2023 1205 by Sharon Irby RN  Outcome: Progressing  3/6/2023 1137 by Kelton Hansen  Outcome: Progressing     Problem: Self Care Deficit  Goal: Return ADL status to a safe level of function  Description: INTERVENTIONS:  1. Administer medication as ordered  2.  Assess ADL deficits and provide assistive devices as needed  3. Obtain PT/OT consults as needed  4. Assist and instruct patient to increase activity and self care as tolerated  Outcome: Progressing     Problem: Coping  Goal: Pt/Family able to verbalize concerns and demonstrate effective coping strategies  Description: INTERVENTIONS:  1. Assist patient/family to identify coping skills, available support systems and cultural and spiritual values  2. Provide emotional support, including active listening and acknowledgement of concerns of patient and caregivers  3. Reduce environmental stimuli, as able  4. Instruct patient/family in relaxation techniques, as appropriate  5. Assess for spiritual pain/suffering and initiate Spiritual Care, Psychosocial Clinical Specialist consults as needed  3/6/2023 1205 by Abhijeet Bonilla RN  Outcome: Progressing  3/6/2023 1106 by Liseth Caban  Outcome: Progressing  Note:                                                                     Group Therapy Note    Date: 3/6/2023  Start Time: 1000  End Time:  1030  Number of Participants: 10    Type of Group: Psychoeducation    Wellness Binder Information  Module Name:  Men's Issues  Session Number:  1    Group Goal for Pt: To improve knowledge of effective stress management techniques    Notes:  Pt demonstrated improved knowledge of effective stress management techniques by actively participating in group discussion. Status After Intervention:  Unchanged    Participation Level:  Active Listener    Participation Quality: Appropriate and Attentive      Speech:  normal      Thought Process/Content: Logical      Affective Functioning: Congruent      Mood: anxious and depressed      Level of consciousness:  Alert and Oriented x4      Response to Learning: Able to verbalize current knowledge/experience, Able to verbalize/acknowledge new learning, and Progressing to goal      Endings: None Reported    Modes of Intervention: Education      Discipline Responsible: Psychoeducational Specialist      Signature:  Neva John

## 2023-03-06 NOTE — PLAN OF CARE
Problem: Anxiety  Goal: Will report anxiety at manageable levels  Outcome: Progressing                                                                       Group Therapy Note    Date: 3/6/2023  Start Time: 1100  End Time:  0798  Number of Participants: 11    Type of Group: Psychotherapy    Wellness Binder Information  Module Name:  emotional wellness  Session Number:  1    Patient's Goal:  validation of feelings    Notes:  pt acknowledged to have feelings validated it may be necessary to share feelings with others.     Status After Intervention:  Improved    Participation Level: Interactive    Participation Quality: Appropriate, Attentive, and Sharing      Speech:  normal      Thought Process/Content: Logical      Affective Functioning: Congruent      Mood: congruent      Level of consciousness:  Alert, Oriented x4, and Attentive      Response to Learning: Able to verbalize current knowledge/experience      Endings: None Reported    Modes of Intervention: Education      Discipline Responsible: Psychoeducational Specialist      Signature:  Manjit Silver

## 2023-03-06 NOTE — PSYCHOTHERAPY
Group Therapy Note    Date: 3/6/2023  Start Time: 1330  End Time:  1400  Number of Participants: 10    Type of Group: SPIRITUALITY     Wellness Binder Information  Module Name:  Mindfulness  Session Number:      Patient's Goal:  To rest the mind...    Notes:      Status After Intervention:  Improved    Participation Level: Active Listener and Interactive    Participation Quality: Appropriate, Attentive, and Sharing      Speech:  normal      Thought Process/Content:       Affective Functioning: Congruent      Mood:  calm      Level of consciousness:  Oriented x4      Response to Learning: Able to verbalize current knowledge/experience, Able to verbalize/acknowledge new learning, and Capable of insight      Endings:     Modes of Intervention: Education, Support, and Activity      Discipline Responsible:       Signature:  Shekhar Granger MA [facility]

## 2023-03-06 NOTE — PROGRESS NOTES
Group Note    Date: 03/06/23  Start Time: 8:15 AM   End Time:8:45 AM     Number of Participants: 15    Type of Group: Community/Goal     Patient's Goal:  my journal    Notes:      Status After Intervention:      Participation Level:  Active Listener    Participation Quality: Appropriate    Speech:  normal    Thought Process/Content: Logical    Mood:  calm    Level of consciousness:  Alert    Response to Learning: Able to verbalize current knowledge/experience    Modes of Intervention: Education and Support    Discipline Responsible: Behavioral Health Technician     Signature:  Baudilio Tejada

## 2023-03-07 VITALS
HEIGHT: 64 IN | SYSTOLIC BLOOD PRESSURE: 129 MMHG | DIASTOLIC BLOOD PRESSURE: 77 MMHG | OXYGEN SATURATION: 97 % | WEIGHT: 213 LBS | RESPIRATION RATE: 14 BRPM | TEMPERATURE: 97.7 F | BODY MASS INDEX: 36.37 KG/M2 | HEART RATE: 88 BPM

## 2023-03-07 PROBLEM — F32.A DEPRESSION WITH SUICIDAL IDEATION: Status: RESOLVED | Noted: 2023-02-23 | Resolved: 2023-03-07

## 2023-03-07 PROBLEM — R45.851 DEPRESSION WITH SUICIDAL IDEATION: Status: RESOLVED | Noted: 2023-02-23 | Resolved: 2023-03-07

## 2023-03-07 PROBLEM — F42.2 MIXED OBSESSIONAL THOUGHTS AND ACTS: Status: RESOLVED | Noted: 2022-11-30 | Resolved: 2023-03-07

## 2023-03-07 PROBLEM — F31.9 BIPOLAR 1 DISORDER, DEPRESSED (HCC): Status: RESOLVED | Noted: 2022-11-23 | Resolved: 2023-03-07

## 2023-03-07 PROCEDURE — 5130000000 HC BRIDGE APPOINTMENT

## 2023-03-07 PROCEDURE — 6370000000 HC RX 637 (ALT 250 FOR IP): Performed by: PSYCHIATRY & NEUROLOGY

## 2023-03-07 RX ADMIN — MULTIPLE VITAMINS W/ MINERALS TAB 1 TABLET: TAB at 08:58

## 2023-03-07 RX ADMIN — ACYCLOVIR 400 MG: 200 CAPSULE ORAL at 08:58

## 2023-03-07 RX ADMIN — DULOXETINE HYDROCHLORIDE 30 MG: 30 CAPSULE, DELAYED RELEASE ORAL at 08:58

## 2023-03-07 NOTE — DISCHARGE INSTRUCTIONS
Medications:   Medication Summary Provided. I understand that I should take only the medications on my list.   --why and when I need to take each medication. --which side effects to watch for.   --that I should carry my medication information at all times in case of emergency    Situations. --I will take all medications until discontinued by physician. I will take all my medications to follow up appointments. --check with my physician or pharmacist before taking any new medication, over    the counter product or drink alcohol.   --ask about food, drug and dietary supplement interactions. --discard old lists and update records with medication providers. Behavior Health Follow Up:  Original Referral Source: ED  Discharge Diagnosis: [unfilled]  Recomendations for Level of Care: Follow Up  Patient Status at Discharge: Stable  [facility]  was: [facility]  Aftercare plan faxed:    Faxed by: Social Work staff   Date: 23   Time:   Prescriptions sent with pt.     General Information:   Questions regarding your bill: Call [phone]   Suicide Hotline (Rescue Crisis) [phone]   To obtain results of pending studies call Medical Records at: [phone]   For emergencies and 24 hour/7 days a week contact information: [phone]

## 2023-03-07 NOTE — PLAN OF CARE
Group Therapy Note    Date: 3/7/2023  Start Time: 1100  End Time:  8309  Number of Participants: 10    Type of Group: Psychoeducation    Wellness Binder Information  Module Name:  emotional wellness  Session Number:  5    Patient's Goal:  obstacles to emotional wellness    Notes:  pt acknowledged negative thinking as an obstacle to emotional wellness.     Status After Intervention:  Improved    Participation Level: Interactive    Participation Quality: Appropriate, Attentive, and Sharing      Speech:  normal      Thought Process/Content: Logical      Affective Functioning: Congruent      Mood: congruent      Level of consciousness:  Alert, Oriented x4, and Attentive      Response to Learning: Able to verbalize current knowledge/experience      Endings: None Reported    Modes of Intervention: Education      Discipline Responsible: Psychoeducational Specialist      Signature:  Yamel Plata

## 2023-03-07 NOTE — PROGRESS NOTES
Group Note    Date: 03/07/23  Start Time: 8:00 AM   End Time:8:30 AM     Number of Participants: 15    Type of Group: Community/Goal     Patient's Goal:  positive thinking    Notes:      Status After Intervention:      Participation Level: minimal    Participation Quality: Appropriate    Speech:  normal    Thought Process/Content: Logical    Mood:  calm    Level of consciousness:  Alert    Response to Learning: Able to verbalize current knowledge/experience    Modes of Intervention: Education and Support    Discipline Responsible: Behavioral Health Technician     Signature:  [place]

## 2023-03-07 NOTE — PROGRESS NOTES
Group Note    Date: 03/06/23  Start Time: 7:15 PM   End Time:7:45 PM     Number of Participants: 14    Type of Group: Wrap-Up     Patient's Goal:      Notes:  See wrap up sheet    Status After Intervention:  Unchanged    Participation Level: Minimal    Participation Quality: Appropriate    Speech:  normal    Thought Process/Content: Logical    Mood: anxious    Level of consciousness:  Alert and Oriented x4    Response to Learning: Able to verbalize current knowledge/experience    Modes of Intervention: Education and Support    Discipline Responsible: Registered Nurse     Signature:  Suzie Aquino RN

## 2023-03-07 NOTE — PROGRESS NOTES
[facility] Adult Unit Daily Assessment  Nursing Progress Note    Room: [facility]601-01   Name: Dennis Blanco   Age: 65 y.o. Gender: female   Dx: Depression with suicidal ideation  Precautions: close watch and suicide risk  Inpatient Status: voluntary       SLEEP:  Sleep Quality Good  Sleep Medications: No   PRN Sleep Meds: No       MEDICAL:  Other PRN Meds: No  Med Compliant: Yes  Accu-Chek: No  Oxygen/CPAP/BiPAP: Yes CPAP  CIWA/CINA: No   PAIN Assessment: none  Side Effects from medication: No      Metabolic Screening:  Lab Results   Component Value Date    LABA1C 6.1 (H) 11/25/2022     Lab Results   Component Value Date    CHOL 172 11/25/2022    CHOL 143 (L) 06/27/2021    CHOL 149 (L) 07/22/2020    CHOL 152 (L) 02/22/2020    CHOL 130 (L) 12/22/2018    CHOL 154 02/11/2014    CHOL 181 05/10/2012     Lab Results   Component Value Date    TRIG 177 (H) 11/25/2022    TRIG 166 (H) 06/27/2021    TRIG 143 07/22/2020    TRIG 130 02/22/2020    TRIG 83 12/22/2018    TRIG 113 02/11/2014    TRIG 197 (H) 05/10/2012     Lab Results   Component Value Date    HDL 59 (L) 11/25/2022    HDL 48 (L) 06/27/2021    HDL 60 (L) 07/22/2020    HDL 52 (L) 02/22/2020    HDL 52 (L) 12/22/2018    HDL 56 02/11/2014    HDL 62 05/10/2012     No components found for: LDLCAL  No results found for: LABVLDL  Body mass index is 36.56 kg/m². BP Readings from Last 2 Encounters:   03/06/23 112/63   02/28/23 116/63         Medical Bed:   Is patient in a medical bed? no   If medical bed is in use, has nursing secured room while patient is awake and out of the room? NA  Has safety checks by nursing been completed on the bed/room this shift? NA    Protective Factors:  Patient identifies protective factors with nursing staff as follows:    Identifies reasons for living: Yes   Supportive Social Network or family: Yes    Belief that suicide is immoral/high spirituality: Yes   Responsibility to family or others/living with family: Yes   Fear of death or dying due to pain and suffering: Yes   Engaged in work or school: No     If Patient is unable to identify, reason why? N/a      PSYCH:  Depression: 7   Anxiety: 8   SI denies suicidal ideation   Risk of Suicide: No Risk  HI Negative for homicidal ideation      AVH:no If Hallucinations are present, describe? N/a        GENERAL:  Appetite: good   Percent Meals: n/a   Social: No   Speech: normal   Appearance: appropriately dressed and healthy looking    GROUP:  Group Participation: Yes  Participation Quality: Minimal    Notes:     PT in room at the beginning of this shift. PT seen for this encounter in her room. PT was in bed covered up. PT sat up to [facility] nurse. PT talked with nurse about the positive things list she had made. PT also observed on the phone and the pt reported to this nurse that she spoke with her daughter. PT has good eye contact, appropriate and cooperative at this encounter.     Electronically signed by Selena Ferguson RN on 3/6/23 at 10:27 PM CST

## 2023-03-07 NOTE — DISCHARGE SUMMARY
Discharge Summary     Patient ID:  Betsey Black  [id number]  59 y.o.  1965    Admit date: 3/2/2023  Discharge date: 3/7/2023    Admitting Physician: Sallie Pan MD   Attending Physician: Sallie Pan MD  Discharge Provider: Alexei Luciano MD     Admission Diagnoses:   Bipolar depression  Borderline personality disorder  Dependent personality disorder  Hoarding disorder  Obsessive-compulsive disorder  Anxiety, unspecified  Obstructive sleep apnea  Suicidal ideations    Discharge Diagnoses:   Bipolar depression  Hoarding disorder  Borderline personality disorder  Anxiety, unspecified  Insomnia unspecified  Obstructive sleep apnea    Admission Condition: poor    Discharged Condition: stable    Indication for Admission: SI       CHIEF COMPLAINT: Suicidal ideations     History obtained from:  patient     HISTORY OF PRESENT ILLNESS (by Dr. Ward Aquino): The patient is a 65 y.o. female with previous psychiatric history of bipolar disorder, borderline personality disorder, dependent personality disorder, hoarding disorder, OCD, unspecified anxiety, who has been readmitted to our psychiatric unit secondary to suicidal ideations. Patient is well-known to psychiatry due to multiple previous admissions to our psychiatric unit with same clinical presentation, as at present time. Patient was discharged from our psychiatric unit 3 days ago. Patient has been seen in treatment team room with presence of the patient's nurse. Patient stated \"I lied to you when you discharged me. I was suicidal but I told you that I am not. I saw my doctor and I said that I am still suicidal and the doctor told me to return back to the hospital.\". Patient reported that her mother brought her to ER for evaluation and admission to the hospital.     During the interview patient endorses feeling of depression, anxiety, poor motivation, poor concentration, feeling of hopelessness and helplessness.   She endorses fair appetite and fair quality of sleep at home. Patient continues to endorse suicidal ideations, denies suicidal plans at this time. She denies any homicidal ideations. Patient denies auditory and visual hallucinations. She did not endorse any delusions or paranoid thoughts. PSYCHIATRIC HISTORY:    Diagnoses: Bipolar disorder, borderline personality disorder, dependent personality disorder, hoarding disorder, OCD, unspecified anxiety  Suicide attempts/gestures: 4 times by overdose on different medications and using house cleaning liquids  Prior hospitalizations: [facility] to [facility], with last discharge 3 days ago  Medication trials: Multiple psychotropic medications, including Depakote, lithium, Lamictal, Risperdal, Abilify, trazodone, doxepin, Zoloft, Zyprexa, Wellbutrin, clomipramine, Latuda, Cymbalta  Mental health contact: [facility]  Head trauma: Denies     Hospital Course:  Patient was admitted to the behavioral health floor and was acclimated to the unit. She was placed on suicide precautions. Labs were reviewed and physical exam was completed by Dr. Florinda Gallagher and associates. Home medications were reconciled. MAY was obtained and reviewed. Psychotropics were adjusted - see below. Patient tolerated all the medications well and without side effects. With treatment, mood improved and affect brightened. Patient attended and participated in groups. All interactions with the peers and staff members were appropriate. Behaviorally, she was not a problem. Sleep and appetite improved. There was no evidence of suicidality, homicidality or psychosis. With the above-mentioned medications changes as well as psychotherapeutic interventions, patient reported considerable improvement in her condition and requested discharge home. It was felt that patient was at her baseline. Patient has no access to guns at home.   There were no safety concerns per collateral.  The importance of sobriety was discussed. On 3/7/2023 it was therefore decided to discharge the patient, as it was felt that she received maximal benefit from her hospitalization. This patient is not suicidal, homicidal or psychotic at discharge. She does not present danger to self or others.        Number of antipsychotic medication prescribed at discharge: 1  IF MORE THAN ONE IS USED: NA    History of greater than 3 failed multiple monotherapy trials: NA  Monotherapy taper plan/ cross taper in progress: NA  Augmentation of Clozapine: NA    Referral to addiction treatment: patient refused    Prescription for Alcohol or Drug Disorder Medication: patient refused    Prescription for Tobacco Cessation medication: none    If no prescriptions for Tobacco Cessation must document why: nonsmoker    Consults: Internal medicine    Significant Diagnostic Studies:   Lab Results   Component Value Date    WBC 9.0 03/02/2023    HGB 14.0 03/02/2023    HCT 42.1 03/02/2023    MCV 95.2 03/02/2023     03/02/2023     Lab Results   Component Value Date     03/02/2023    K 3.7 03/02/2023     03/02/2023    CO2 22 03/02/2023    BUN 12 03/02/2023    CREATININE 0.7 03/02/2023    GLUCOSE 88 03/02/2023    CALCIUM 9.5 03/02/2023    PROT 6.8 03/02/2023    LABALBU 3.9 03/02/2023    BILITOT 0.3 03/02/2023    ALKPHOS 87 03/02/2023    AST 19 03/02/2023    ALT <5 (A) 03/02/2023    LABGLOM >60 03/02/2023    GFRAA >59 06/25/2021    GLOB 3.3 06/18/2016         Lab Results   Component Value Date    JXBBNDEF75 656 02/25/2023     Lab Results   Component Value Date    VITD25 59.9 02/25/2023     Lab Results   Component Value Date    CHOL 172 11/25/2022    CHOL 143 (L) 06/27/2021    CHOL 149 (L) 07/22/2020     Lab Results   Component Value Date    TRIG 177 (H) 11/25/2022    TRIG 166 (H) 06/27/2021    TRIG 143 07/22/2020     Lab Results   Component Value Date    HDL 59 (L) 11/25/2022    HDL 48 (L) 06/27/2021    HDL 60 (L) 07/22/2020     Lab Results   Component Value Date    LDLCALC 78 11/25/2022    LDLCALC 62 06/27/2021    LDLCALC 60 07/22/2020     No results found for: LABVLDL, VLDL  No results found for: CHOLHDLRATIO  Hemoglobin A1C   Date Value Ref Range Status   11/25/2022 6.1 (H) 4.0 - 6.0 % Final     Comment:     HbA1c levels >6% are an indication of hyperglycemia during the preceding 2  to 3 months or longer. HbA1c levels may reach 20% or higher in poorly controlled diabetes. Therapeutic action is suggested at levels above 8%. Diabetes patients with HbA1c levels below 7% meet the goal of the American  Diabetes Association. HbA1c levels below the established reference range may indicate recent  episodes of hypoglycemia, the presence of Hb variants, or shortened lifetime  of erythrocytes. Lab Results   Component Value Date    TSHFT4 0.72 02/25/2023    TSH 1.170 02/24/2021       Treatments: RN and SW    Mental status examination at the time of discharge:  Alert, Oriented X 4  Appearance:  Improved Hygiene, smiling  Speech with Regular Rate and Rhythm  Eye Contact:  Good  No Psychomotor Agitation/Retardation Noted  Attitude:  Cooperative  Mood:  \"Good.  I need to see my ObGYN\"  Affective: Congruent, appropriate to the situation, with a normal range and intensity  Thought Processes:  Coherently communicated, logical and goal oriented  Thought Content:  No Suicidal Ideation, No Homicidal Ideation, No Auditory or Visual Hallucinations, NO Overt Delusions  Insight: Improved  Judgement: Improved  Memory is intact for both remote and recent  Intellectual Functioning:  Within the [address] of Knowledge:  Adequate  Attention and Concentration:  Adequate    Discharge Exam:  Please, see medical note    Disposition: home    Patient Instructions:      Medication List        CONTINUE taking these medications      calcium carbonate 600 MG Tabs tablet     clindamycin 1 % external solution  Commonly known as: CLEOCIN T     docusate sodium 100 MG capsule  Commonly known as: COLACE     DULoxetine 30 MG extended release capsule  Commonly known as: CYMBALTA     hydrOXYzine pamoate 25 MG capsule  Commonly known as: VISTARIL     lurasidone 80 MG Tabs tablet  Commonly known as: LATUDA  Take 1 tablet by mouth Daily with supper     miconazole 2 % powder  Commonly known as: MICOTIN  Apply topically 2 times daily. mirabegron 50 MG Tb24  Commonly known as: MYRBETRIQ     therapeutic multivitamin-minerals tablet     valACYclovir 500 MG tablet  Commonly known as: VALTREX     Vitamin D3 1.25 MG (00706 UT) Caps            STOP taking these medications      azithromycin 1 g powder  Commonly known as: ZITHROMAX     Hibiclens 4 % external liquid  Generic drug: chlorhexidine     metroNIDAZOLE 0.75 % vaginal gel  Commonly known as: METROGEL              Activity: as tolerated  Diet: regular diet  Wound Care: none needed    Follow-up:   Follow up with TING Zimmer CNP in 2 week(s)  on follow up with PCP, please review labs, imaging done during this Hospital stay, and discuss any additional/repeat testing or treatment needed with your PCP [facility]  [phone]   Mar8 Go to [address] [facility] [address]  Wednesday Mar 8, 2023  Intake/therapy appt with [facility] at 2:00pm, please provide a photo id, soc sec card, insurance card [facility]   [address] [place] [address], [address]   Phone [phone]   Fax [phone]   CRISIS LINE: [phone]   Mar17 Go to [address]  Friday Mar 17, 2023  Medication managment appt with Lucia Pyle at 8:30am, please provide a current list of medications [facility]   [address] [place] [address], [address]   Phone [phone]   Fax [phone]: [phone]   [facility] Office Visit with IRAM Sunshine  Monday Mar 27, 2023 3:00 PM  Please complete digital registration via the [facility]/[address].      If unable to complete the visit pre-check, arrive 15 minutes early. Bring photo ID, insurance card(s), co-pay, medication bottles & completed forms. If you need to cancel/reschedule, please contact the practice at least 24 hours prior to the appointment.   [address] [place] [address]       Time worked: 34 min    Participation: good    Electronically signed by Emigdio Farrell MD on 3/7/2023 at 10:16 AM

## 2023-03-07 NOTE — PROGRESS NOTES
Treatment Team Note:     Target Symptoms/Reason for admission: Per nursing admission assessment - Reason for Admission: Pt went to her PCP today and told her that she wanted to die. She advised the patient to return to the ER and that she left the behavioral health unit too soon. Pt reports she feels suicidal with plan to overdose on her pills. Diagnoses per psych provider: Depression with suicidal ideation [F32. A, R45.621]  Bipolar depression ([facility] [address] [F31.9]     Therapist met with treatment team to discuss patients treatment and discharge plans. Patient's aftercare plan is: SW will meet with patient to gather information     Aftercare appointments made: yes  ollateral obtained from:  Pt's father, Yaw Carr  Collateral obtained on:03/05/23     Attending groups: Yes     Behavior: in room at the beginning of this shift. PT seen for this encounter in her room. PT was in bed covered up. [facility] PT talked with nurse about the positive things list she had made. PT also observed on the phone and the pt reported to this nurse that she spoke with her daughter. PT has good eye contact, appropriate and cooperative at this encounter. Has patient been completing ADL's:  Yes     SI:  patient denies SI  Plan: no   If yes describe: N/A - patient denies plan  HI:  patient denies HI  If present describe: N/A  Delusions: patient denies delusions  If present describe: N/A  Hallucinations: patient denies hallucinations  If present describe: N/A     Patient rates their -- Depression: 1-10:  7  Anxiety:1-10:  6     Sleeping:YesSleep Quality Good  Sleep Medications: No   PRN Sleep Meds: No         Taking medication: Yes     Misc: Tentative discharge today or Tuesday.

## 2023-03-07 NOTE — PROGRESS NOTES
SW met with patient to complete psychosocial and lifetime CSSR-S on this date. Patients long and short-term goals discussed. Patient voiced understanding. Treatment plan sheet signed. Patient verbalized understanding of the treatment plan. Patient participated in goals and objectives of the treatment plan. Patient discussed safety plan with . SW explained treatment goals with pt. Decreasing depression and anxiety by improvement of positive coping patterns was discussed. Pt acknowledged understanding of treatment goals and signed treatment plan signature sheet. In the last 6 months has the patient been a danger to self: Yes  In the last 6 months has the patient been a danger to others: No  Legal Guardian/POA: No    Provided patient with [employer] handout entitled \"Quitting Smoking. \"  Reviewed handout with patient: addressing dangers of smoking, developing coping skills, and providing basic information about quitting. Patient received all components practical counseling of tobacco practical counseling during the hospital stay.

## 2023-03-07 NOTE — PROGRESS NOTES
Discharge Note     Patient is discharging on this date. Patient denies SI, HI, and AVH at this time. Patient reports improvement in behavior and is leaving unit in overall good condition. SW and patient discussed patient's follow up appointments and importance of attending appointments as scheduled, patient voiced understanding and agreement. Patient and SW also discussed patient's safety plan and patient was able to verbally identify: warning signs, coping strategies, places and people that help make the patient feel better/distract negative thoughts, friends/family/agencies/professionals the patient can reach out to in a crisis, and something that is important to the patient/worth living for. Patient was provided the national suicide prevention hotline number (4-893-510-148-915-6847) as well as local community behavioral health [facility]) crisis number for emergencies ([phone]). Discharge Disposition: home -lives alone      Pt to follow up with:  [id number]  on March 8 , 2023 at 2:00 PM for the intake appointment. Patient will follow [facility] with [facility]  On March 17 , 2023   at 8:30 PM for the medication management appointment.      Referral to outpatient tobacco cessation counseling treatment:  Patient refused referral to outpatient tobacco cessation counseling    SW offered to assist patient with transportation, patient declined transportation assistance

## 2023-03-07 NOTE — PROGRESS NOTES
Progress Note  Sara Certain  3/6/2023 7:53 PM  Subjective:   Admit Date:   3/2/2023      CC/ADMIT DX:       Interval History:   Reviewed overnight events and nursing notes. She denies any new medical issues. I have reviewed all labs/diagnostics from the last 24hrs. ROS:   I have done a 10 point ROS and all are negative, except what is mentioned in the HPI. ADULT DIET; Regular; 4 carb choices (60 gm/meal)    Medications:      miconazole   Topical BID    vitamin D  50,000 Units Oral Weekly    DULoxetine  30 mg Oral Daily    hydrOXYzine pamoate  25 mg Oral Nightly    acyclovir  400 mg Oral TID    therapeutic multivitamin-minerals  1 tablet Oral Daily    mirabegron  50 mg Oral Daily    lurasidone  80 mg Oral Dinner           Objective:   Vitals: /63   Pulse 69   Temp 96.8 °F (36 °C) (Temporal)   Resp 18   Ht 5' 4\" (1.626 m)   Wt 213 lb (96.6 kg)   SpO2 96%   BMI 36.56 kg/m²  No intake or output data in the 24 hours ending 03/06/23 1953  General appearance: alert and cooperative with exam  Extremities: extremities normal, atraumatic, no cyanosis or edema  Neurologic:  No obvious focal neurologic deficits. Skin: no rashes    Assessment and Plan:   Principal Problem:    Depression with suicidal ideation  Active Problems:    Bipolar depression ([address]  Resolved Problems:    * No resolved hospital problems. *    Vaginitis    Plan:   Continue present medication(s)    Follow with Psych   She is medically stable. I will monitor for any changes or concerns. Discharge planning:   her home     Reviewed treatment plans with the patient and/or family.              Electronically signed by Damon Shultz MD on 3/6/2023 at 7:53 PM

## 2023-03-07 NOTE — DISCHARGE INSTR - DIET

## 2023-03-09 ENCOUNTER — TELEPHONE (OUTPATIENT)
Dept: OBSTETRICS AND GYNECOLOGY | Facility: CLINIC | Age: 58
End: 2023-03-09
Payer: COMMERCIAL

## 2023-03-09 DIAGNOSIS — N63.10 MASS OF RIGHT BREAST, UNSPECIFIED QUADRANT: Primary | ICD-10-CM

## 2023-03-09 NOTE — TELEPHONE ENCOUNTER
I spoke with Kimberly Daley to see if she wanted me to add pt on tomorrow at 1:30 as she had an opening, she informed me that as she had done a breast exam 2/17 she would just go ahead and order the diagnostic mammogram and u/s PRN for right breast.  I called and informed pt that she did not need an appointment and Kimberly Daley ordered the diagnostic mammogram and u/s and she should get a call from them to schedule this.  Pt was instructed to call us back if she did not receive a call within a week, pt voiced understanding. Orders faxed to [facility].

## 2023-03-09 NOTE — TELEPHONE ENCOUNTER
Tahira from [facility] called and informed me pt was there for screening mammogram and reported that she found a lump in her right breast that is the size of a dime and has been there for two weeks.  They wanted to know if we wanted to order diagnostic mammogram, informed her that she needed an appt for evaluation and tx, she stated she would inform pt.  Can you please call pt and schedule an appt for her?

## 2023-03-20 ENCOUNTER — HOSPITAL ENCOUNTER (OUTPATIENT)
Dept: ULTRASOUND IMAGING | Facility: HOSPITAL | Age: 58
Discharge: HOME | End: 2023-03-20
Payer: COMMERCIAL

## 2023-03-20 ENCOUNTER — HOSPITAL ENCOUNTER (OUTPATIENT)
Dept: MAMMOGRAPHY | Facility: HOSPITAL | Age: 58
Discharge: HOME | End: 2023-03-20
Payer: COMMERCIAL

## 2023-03-20 DIAGNOSIS — N63.10 MASS OF RIGHT BREAST, UNSPECIFIED QUADRANT: ICD-10-CM

## 2023-03-20 PROCEDURE — G0279 TOMOSYNTHESIS, MAMMO: HCPCS

## 2023-03-20 PROCEDURE — 77066 DX MAMMO INCL CAD BI: CPT

## 2023-03-20 PROCEDURE — 76642 ULTRASOUND BREAST LIMITED: CPT

## 2023-04-24 ENCOUNTER — HOSPITAL ENCOUNTER (INPATIENT)
Age: 58
LOS: 7 days | Discharge: HOME | DRG: 885 | End: 2023-05-01
Attending: PSYCHIATRY & NEUROLOGY | Admitting: PSYCHIATRY & NEUROLOGY
Payer: MEDICAID

## 2023-04-24 DIAGNOSIS — R45.851 DEPRESSION WITH SUICIDAL IDEATION: Primary | ICD-10-CM

## 2023-04-24 DIAGNOSIS — F32.A DEPRESSION WITH SUICIDAL IDEATION: Primary | ICD-10-CM

## 2023-04-24 LAB
ALBUMIN SERPL-MCNC: 4.3 G/DL (ref 3.5–5.2)
ALP SERPL-CCNC: 93 U/L (ref 35–104)
ALT SERPL-CCNC: 15 U/L (ref 5–33)
AMPHET UR QL SCN: NEGATIVE
ANION GAP SERPL CALCULATED.3IONS-SCNC: 11 MMOL/L (ref 7–19)
APAP SERPL-MCNC: <5 UG/ML (ref 10–30)
AST SERPL-CCNC: 12 U/L (ref 5–32)
BARBITURATES UR QL SCN: NEGATIVE
BASOPHILS # BLD: 0.1 K/UL (ref 0–0.2)
BASOPHILS NFR BLD: 0.6 % (ref 0–1)
BENZODIAZ UR QL SCN: NEGATIVE
BILIRUB SERPL-MCNC: 0.3 MG/DL (ref 0.2–1.2)
BUN SERPL-MCNC: 14 MG/DL (ref 6–20)
BUPRENORPHINE URINE: NEGATIVE
CALCIUM SERPL-MCNC: 10 MG/DL (ref 8.6–10)
CANNABINOIDS UR QL SCN: NEGATIVE
CHLORIDE SERPL-SCNC: 107 MMOL/L (ref 98–111)
CO2 SERPL-SCNC: 22 MMOL/L (ref 22–29)
COCAINE UR QL SCN: NEGATIVE
CREAT SERPL-MCNC: 0.8 MG/DL (ref 0.5–0.9)
DRUG SCREEN COMMENT UR-IMP: NORMAL
EOSINOPHIL # BLD: 0.1 K/UL (ref 0–0.6)
EOSINOPHIL NFR BLD: 1.3 % (ref 0–5)
ERYTHROCYTE [DISTWIDTH] IN BLOOD BY AUTOMATED COUNT: 12.7 % (ref 11.5–14.5)
ETHANOLAMINE SERPL-MCNC: <10 MG/DL (ref 0–0.08)
GLUCOSE SERPL-MCNC: 130 MG/DL (ref 74–109)
HCT VFR BLD AUTO: 42.8 % (ref 37–47)
HGB BLD-MCNC: 14.4 G/DL (ref 12–16)
IMM GRANULOCYTES # BLD: 0 K/UL
LYMPHOCYTES # BLD: 1.7 K/UL (ref 1.1–4.5)
LYMPHOCYTES NFR BLD: 18 % (ref 20–40)
MAGNESIUM SERPL-MCNC: 1.9 MG/DL (ref 1.6–2.6)
MCH RBC QN AUTO: 31.9 PG (ref 27–31)
MCHC RBC AUTO-ENTMCNC: 33.6 G/DL (ref 33–37)
MCV RBC AUTO: 94.7 FL (ref 81–99)
METHADONE UR QL SCN: NEGATIVE
METHAMPHETAMINE, URINE: NEGATIVE
MONOCYTES # BLD: 0.7 K/UL (ref 0–0.9)
MONOCYTES NFR BLD: 7.7 % (ref 0–10)
NEUTROPHILS # BLD: 6.8 K/UL (ref 1.5–7.5)
NEUTS SEG NFR BLD: 72.2 % (ref 50–65)
OPIATES UR QL SCN: NEGATIVE
OXYCODONE UR QL SCN: NEGATIVE
PCP UR QL SCN: NEGATIVE
PLATELET # BLD AUTO: 277 K/UL (ref 130–400)
PMV BLD AUTO: 9.8 FL (ref 9.4–12.3)
POTASSIUM SERPL-SCNC: 3.8 MMOL/L (ref 3.5–5)
PROPOXYPH UR QL SCN: NEGATIVE
PROT SERPL-MCNC: 7 G/DL (ref 6.6–8.7)
RBC # BLD AUTO: 4.52 M/UL (ref 4.2–5.4)
SALICYLATES SERPL-MCNC: <0.3 MG/DL (ref 3–10)
SARS-COV-2 RDRP RESP QL NAA+PROBE: NOT DETECTED
SODIUM SERPL-SCNC: 140 MMOL/L (ref 136–145)
TRICYCLIC, URINE: NEGATIVE
WBC # BLD AUTO: 9.4 K/UL (ref 4.8–10.8)

## 2023-04-24 PROCEDURE — 6370000000 HC RX 637 (ALT 250 FOR IP): Performed by: NURSE PRACTITIONER

## 2023-04-24 PROCEDURE — 82077 ASSAY SPEC XCP UR&BREATH IA: CPT

## 2023-04-24 PROCEDURE — 1240000000 HC EMOTIONAL WELLNESS R&B

## 2023-04-24 PROCEDURE — 80306 DRUG TEST PRSMV INSTRMNT: CPT

## 2023-04-24 PROCEDURE — 80179 DRUG ASSAY SALICYLATE: CPT

## 2023-04-24 PROCEDURE — 80143 DRUG ASSAY ACETAMINOPHEN: CPT

## 2023-04-24 PROCEDURE — 85025 COMPLETE CBC W/AUTO DIFF WBC: CPT

## 2023-04-24 PROCEDURE — 36415 COLL VENOUS BLD VENIPUNCTURE: CPT

## 2023-04-24 PROCEDURE — 83735 ASSAY OF MAGNESIUM: CPT

## 2023-04-24 PROCEDURE — 87635 SARS-COV-2 COVID-19 AMP PRB: CPT

## 2023-04-24 PROCEDURE — 99285 EMERGENCY DEPT VISIT HI MDM: CPT

## 2023-04-24 PROCEDURE — 80053 COMPREHEN METABOLIC PANEL: CPT

## 2023-04-24 RX ORDER — POLYETHYLENE GLYCOL 3350 17 G/17G
17 POWDER, FOR SOLUTION ORAL DAILY PRN
Status: DISCONTINUED | OUTPATIENT
Start: 2023-04-24 | End: 2023-04-28

## 2023-04-24 RX ORDER — ACETAMINOPHEN 325 MG/1
650 TABLET ORAL EVERY 4 HOURS PRN
Status: DISCONTINUED | OUTPATIENT
Start: 2023-04-24 | End: 2023-05-01 | Stop reason: HOSPADM

## 2023-04-24 RX ORDER — DOCUSATE SODIUM 100 MG/1
100 CAPSULE, LIQUID FILLED ORAL ONCE
Status: COMPLETED | OUTPATIENT
Start: 2023-04-24 | End: 2023-04-24

## 2023-04-24 RX ORDER — HYDROXYZINE HYDROCHLORIDE 10 MG/1
10 TABLET, FILM COATED ORAL 3 TIMES DAILY PRN
Status: DISCONTINUED | OUTPATIENT
Start: 2023-04-24 | End: 2023-04-28

## 2023-04-24 RX ADMIN — HYDROXYZINE HYDROCHLORIDE 10 MG: 10 TABLET ORAL at 20:35

## 2023-04-24 RX ADMIN — DOCUSATE SODIUM 100 MG: 100 CAPSULE, LIQUID FILLED ORAL at 20:35

## 2023-04-24 ASSESSMENT — LIFESTYLE VARIABLES
HOW MANY STANDARD DRINKS CONTAINING ALCOHOL DO YOU HAVE ON A TYPICAL DAY: PATIENT DOES NOT DRINK
HOW OFTEN DO YOU HAVE A DRINK CONTAINING ALCOHOL: NEVER
HOW MANY STANDARD DRINKS CONTAINING ALCOHOL DO YOU HAVE ON A TYPICAL DAY: PATIENT DOES NOT DRINK

## 2023-04-25 PROCEDURE — 6370000000 HC RX 637 (ALT 250 FOR IP): Performed by: PSYCHIATRY & NEUROLOGY

## 2023-04-25 PROCEDURE — 1240000000 HC EMOTIONAL WELLNESS R&B

## 2023-04-25 PROCEDURE — 2500000003 HC RX 250 WO HCPCS: Performed by: PSYCHIATRY & NEUROLOGY

## 2023-04-25 PROCEDURE — 94660 CPAP INITIATION&MGMT: CPT

## 2023-04-25 RX ORDER — PALIPERIDONE 6 MG/1
6 TABLET, EXTENDED RELEASE ORAL DAILY
Status: DISCONTINUED | OUTPATIENT
Start: 2023-04-25 | End: 2023-05-01 | Stop reason: HOSPADM

## 2023-04-25 RX ORDER — ERGOCALCIFEROL 1.25 MG/1
50000 CAPSULE ORAL WEEKLY
Status: DISCONTINUED | OUTPATIENT
Start: 2023-04-30 | End: 2023-05-01 | Stop reason: HOSPADM

## 2023-04-25 RX ORDER — LURASIDONE HYDROCHLORIDE 40 MG/1
80 TABLET, FILM COATED ORAL
Status: DISCONTINUED | OUTPATIENT
Start: 2023-04-25 | End: 2023-05-01 | Stop reason: HOSPADM

## 2023-04-25 RX ORDER — DULOXETIN HYDROCHLORIDE 60 MG/1
60 CAPSULE, DELAYED RELEASE ORAL DAILY
Status: DISCONTINUED | OUTPATIENT
Start: 2023-04-25 | End: 2023-05-01 | Stop reason: HOSPADM

## 2023-04-25 RX ORDER — VALACYCLOVIR HYDROCHLORIDE 500 MG/1
500 TABLET, FILM COATED ORAL DAILY
Status: DISCONTINUED | OUTPATIENT
Start: 2023-04-25 | End: 2023-05-01 | Stop reason: HOSPADM

## 2023-04-25 RX ORDER — HYDROXYZINE HYDROCHLORIDE 25 MG/1
25 TABLET, FILM COATED ORAL NIGHTLY
Status: DISCONTINUED | OUTPATIENT
Start: 2023-04-25 | End: 2023-04-29

## 2023-04-25 RX ORDER — M-VIT,TX,IRON,MINS/CALC/FOLIC 27MG-0.4MG
1 TABLET ORAL DAILY
Status: DISCONTINUED | OUTPATIENT
Start: 2023-04-25 | End: 2023-05-01 | Stop reason: HOSPADM

## 2023-04-25 RX ORDER — DOCUSATE SODIUM 100 MG/1
100 CAPSULE, LIQUID FILLED ORAL NIGHTLY
Status: DISCONTINUED | OUTPATIENT
Start: 2023-04-25 | End: 2023-04-28

## 2023-04-25 RX ADMIN — VALACYCLOVIR HYDROCHLORIDE 500 MG: 500 TABLET, FILM COATED ORAL at 08:23

## 2023-04-25 RX ADMIN — PALIPERIDONE 6 MG: 6 TABLET, EXTENDED RELEASE ORAL at 12:03

## 2023-04-25 RX ADMIN — LURASIDONE HYDROCHLORIDE 80 MG: 40 TABLET, FILM COATED ORAL at 17:26

## 2023-04-25 RX ADMIN — DOCUSATE SODIUM 100 MG: 100 CAPSULE, LIQUID FILLED ORAL at 20:23

## 2023-04-25 RX ADMIN — MICONAZOLE NITRATE: 2 POWDER TOPICAL at 08:22

## 2023-04-25 RX ADMIN — DULOXETINE 60 MG: 60 CAPSULE, DELAYED RELEASE ORAL at 08:23

## 2023-04-25 RX ADMIN — MULTIPLE VITAMINS W/ MINERALS TAB 1 TABLET: TAB at 08:23

## 2023-04-25 RX ADMIN — MICONAZOLE NITRATE: 2 POWDER TOPICAL at 20:22

## 2023-04-25 RX ADMIN — HYDROXYZINE HYDROCHLORIDE 25 MG: 25 TABLET, FILM COATED ORAL at 20:23

## 2023-04-25 SDOH — ECONOMIC STABILITY: FOOD INSECURITY: WITHIN THE PAST 12 MONTHS, THE FOOD YOU BOUGHT JUST DIDN'T LAST AND YOU DIDN'T HAVE MONEY TO GET MORE.: NEVER TRUE

## 2023-04-25 SDOH — HEALTH STABILITY: PHYSICAL HEALTH: ON AVERAGE, HOW MANY MINUTES DO YOU ENGAGE IN EXERCISE AT THIS LEVEL?: 0 MIN

## 2023-04-25 SDOH — ECONOMIC STABILITY: FOOD INSECURITY: WITHIN THE PAST 12 MONTHS, YOU WORRIED THAT YOUR FOOD WOULD RUN OUT BEFORE YOU GOT MONEY TO BUY MORE.: NEVER TRUE

## 2023-04-25 SDOH — ECONOMIC STABILITY: INCOME INSECURITY: IN THE LAST 12 MONTHS, WAS THERE A TIME WHEN YOU WERE NOT ABLE TO PAY THE MORTGAGE OR RENT ON TIME?: NO

## 2023-04-25 SDOH — ECONOMIC STABILITY: TRANSPORTATION INSECURITY
IN THE PAST 12 MONTHS, HAS LACK OF TRANSPORTATION KEPT YOU FROM MEETINGS, WORK, OR FROM GETTING THINGS NEEDED FOR DAILY LIVING?: YES

## 2023-04-25 SDOH — HEALTH STABILITY: PHYSICAL HEALTH: ON AVERAGE, HOW MANY DAYS PER WEEK DO YOU ENGAGE IN MODERATE TO STRENUOUS EXERCISE (LIKE A BRISK WALK)?: 0 DAYS

## 2023-04-25 ASSESSMENT — SOCIAL DETERMINANTS OF HEALTH (SDOH)
HOW OFTEN DO YOU ATTENT MEETINGS OF THE CLUB OR ORGANIZATION YOU BELONG TO?: NEVER
WITHIN THE LAST YEAR, HAVE TO BEEN RAPED OR FORCED TO HAVE ANY KIND OF SEXUAL ACTIVITY BY YOUR PARTNER OR EX-PARTNER?: NO
WITHIN THE LAST YEAR, HAVE YOU BEEN HUMILIATED OR EMOTIONALLY ABUSED IN OTHER WAYS BY YOUR PARTNER OR EX-PARTNER?: NO
WITHIN THE LAST YEAR, HAVE YOU BEEN AFRAID OF YOUR PARTNER OR EX-PARTNER?: NO
DO YOU BELONG TO ANY CLUBS OR ORGANIZATIONS SUCH AS CHURCH GROUPS UNIONS, FRATERNAL OR ATHLETIC GROUPS, OR SCHOOL GROUPS?: NO
IN A TYPICAL WEEK, HOW MANY TIMES DO YOU TALK ON THE PHONE WITH FAMILY, FRIENDS, OR NEIGHBORS?: MORE THAN THREE TIMES A WEEK
HOW HARD IS IT FOR YOU TO PAY FOR THE VERY BASICS LIKE FOOD, HOUSING, MEDICAL CARE, AND HEATING?: HARD
HOW OFTEN DO YOU ATTEND CHURCH OR RELIGIOUS SERVICES?: NEVER
HOW OFTEN DO YOU GET TOGETHER WITH FRIENDS OR RELATIVES?: THREE TIMES A WEEK
WITHIN THE LAST YEAR, HAVE YOU BEEN KICKED, HIT, SLAPPED, OR OTHERWISE PHYSICALLY HURT BY YOUR PARTNER OR EX-PARTNER?: NO

## 2023-04-25 ASSESSMENT — PATIENT HEALTH QUESTIONNAIRE - PHQ9
4. FEELING TIRED OR HAVING LITTLE ENERGY: NEARLY EVERY DAY
9. THOUGHTS THAT YOU WOULD BE BETTER OFF DEAD, OR OF HURTING YOURSELF: MORE THAN HALF THE DAYS
2. FEELING DOWN, DEPRESSED OR HOPELESS: MORE THAN HALF THE DAYS
1. LITTLE INTEREST OR PLEASURE IN DOING THINGS: SEVERAL DAYS
SUM OF ALL RESPONSES TO PHQ QUESTIONS 1-9: 18
6. FEELING BAD ABOUT YOURSELF - OR THAT YOU ARE A FAILURE OR HAVE LET YOURSELF OR YOUR FAMILY DOWN: SEVERAL DAYS
SUM OF ALL RESPONSES TO PHQ9 QUESTIONS 1 & 2: 3
10. IF YOU CHECKED OFF ANY PROBLEMS, HOW DIFFICULT HAVE THESE PROBLEMS MADE IT FOR YOU TO DO YOUR WORK, TAKE CARE OF THINGS AT HOME, OR GET ALONG WITH OTHER PEOPLE: VERY DIFFICULT
SUM OF ALL RESPONSES TO PHQ QUESTIONS 1-9: 15
5. POOR APPETITE OR OVEREATING: MORE THAN HALF THE DAYS
3. TROUBLE FALLING OR STAYING ASLEEP: MORE THAN HALF THE DAYS
7. TROUBLE CONCENTRATING ON THINGS, SUCH AS READING THE NEWSPAPER OR WATCHING TELEVISION: SEVERAL DAYS
8. MOVING OR SPEAKING SO SLOWLY THAT OTHER PEOPLE COULD HAVE NOTICED. OR THE OPPOSITE, BEING SO FIGETY OR RESTLESS THAT YOU HAVE BEEN MOVING AROUND A LOT MORE THAN USUAL: SEVERAL DAYS

## 2023-04-25 ASSESSMENT — SLEEP AND FATIGUE QUESTIONNAIRES
SLEEP PATTERN: DIFFICULTY FALLING ASLEEP;DISTURBED/INTERRUPTED SLEEP
DO YOU HAVE DIFFICULTY SLEEPING: YES
DO YOU USE A SLEEP AID: YES

## 2023-04-26 PROCEDURE — 94660 CPAP INITIATION&MGMT: CPT

## 2023-04-26 PROCEDURE — 1240000000 HC EMOTIONAL WELLNESS R&B

## 2023-04-26 PROCEDURE — 6370000000 HC RX 637 (ALT 250 FOR IP): Performed by: PSYCHIATRY & NEUROLOGY

## 2023-04-26 RX ADMIN — DOCUSATE SODIUM 100 MG: 100 CAPSULE, LIQUID FILLED ORAL at 21:02

## 2023-04-26 RX ADMIN — DULOXETINE 60 MG: 60 CAPSULE, DELAYED RELEASE ORAL at 08:28

## 2023-04-26 RX ADMIN — MICONAZOLE NITRATE: 2 POWDER TOPICAL at 08:31

## 2023-04-26 RX ADMIN — MICONAZOLE NITRATE: 2 POWDER TOPICAL at 21:03

## 2023-04-26 RX ADMIN — HYDROXYZINE HYDROCHLORIDE 25 MG: 25 TABLET, FILM COATED ORAL at 21:10

## 2023-04-26 RX ADMIN — LURASIDONE HYDROCHLORIDE 80 MG: 40 TABLET, FILM COATED ORAL at 17:58

## 2023-04-26 RX ADMIN — VALACYCLOVIR HYDROCHLORIDE 500 MG: 500 TABLET, FILM COATED ORAL at 08:28

## 2023-04-26 RX ADMIN — PALIPERIDONE 6 MG: 6 TABLET, EXTENDED RELEASE ORAL at 08:28

## 2023-04-26 RX ADMIN — MULTIPLE VITAMINS W/ MINERALS TAB 1 TABLET: TAB at 08:29

## 2023-04-26 SDOH — ECONOMIC STABILITY: HOUSING INSECURITY: IN THE LAST 12 MONTHS, HOW MANY PLACES HAVE YOU LIVED?: 1

## 2023-04-26 ASSESSMENT — LIFESTYLE VARIABLES
HOW OFTEN DO YOU HAVE A DRINK CONTAINING ALCOHOL: NEVER
HOW MANY STANDARD DRINKS CONTAINING ALCOHOL DO YOU HAVE ON A TYPICAL DAY: PATIENT DOES NOT DRINK

## 2023-04-26 ASSESSMENT — PATIENT HEALTH QUESTIONNAIRE - PHQ9
2. FEELING DOWN, DEPRESSED OR HOPELESS: 2
1. LITTLE INTEREST OR PLEASURE IN DOING THINGS: 2
SUM OF ALL RESPONSES TO PHQ QUESTIONS 1-9: 4
SUM OF ALL RESPONSES TO PHQ9 QUESTIONS 1 & 2: 4
SUM OF ALL RESPONSES TO PHQ QUESTIONS 1-9: 4

## 2023-04-27 PROCEDURE — 1240000000 HC EMOTIONAL WELLNESS R&B

## 2023-04-27 PROCEDURE — 94660 CPAP INITIATION&MGMT: CPT

## 2023-04-27 PROCEDURE — 6370000000 HC RX 637 (ALT 250 FOR IP): Performed by: PSYCHIATRY & NEUROLOGY

## 2023-04-27 RX ADMIN — MICONAZOLE NITRATE: 2 POWDER TOPICAL at 20:52

## 2023-04-27 RX ADMIN — MULTIPLE VITAMINS W/ MINERALS TAB 1 TABLET: TAB at 09:21

## 2023-04-27 RX ADMIN — MICONAZOLE NITRATE: 2 POWDER TOPICAL at 09:24

## 2023-04-27 RX ADMIN — HYDROXYZINE HYDROCHLORIDE 25 MG: 25 TABLET, FILM COATED ORAL at 20:52

## 2023-04-27 RX ADMIN — PALIPERIDONE 6 MG: 6 TABLET, EXTENDED RELEASE ORAL at 09:21

## 2023-04-27 RX ADMIN — DOCUSATE SODIUM 100 MG: 100 CAPSULE, LIQUID FILLED ORAL at 20:52

## 2023-04-27 RX ADMIN — DULOXETINE 60 MG: 60 CAPSULE, DELAYED RELEASE ORAL at 09:21

## 2023-04-27 RX ADMIN — VALACYCLOVIR HYDROCHLORIDE 500 MG: 500 TABLET, FILM COATED ORAL at 09:21

## 2023-04-27 RX ADMIN — LURASIDONE HYDROCHLORIDE 80 MG: 40 TABLET, FILM COATED ORAL at 17:07

## 2023-04-28 PROCEDURE — 6370000000 HC RX 637 (ALT 250 FOR IP): Performed by: PSYCHIATRY & NEUROLOGY

## 2023-04-28 PROCEDURE — 1240000000 HC EMOTIONAL WELLNESS R&B

## 2023-04-28 PROCEDURE — 6370000000 HC RX 637 (ALT 250 FOR IP): Performed by: FAMILY MEDICINE

## 2023-04-28 PROCEDURE — 94660 CPAP INITIATION&MGMT: CPT

## 2023-04-28 RX ORDER — HYDROXYZINE HYDROCHLORIDE 10 MG/1
10 TABLET, FILM COATED ORAL 3 TIMES DAILY PRN
Status: DISCONTINUED | OUTPATIENT
Start: 2023-04-28 | End: 2023-05-01 | Stop reason: HOSPADM

## 2023-04-28 RX ORDER — DOCUSATE SODIUM 100 MG/1
100 CAPSULE, LIQUID FILLED ORAL 2 TIMES DAILY
Status: DISCONTINUED | OUTPATIENT
Start: 2023-04-28 | End: 2023-05-01 | Stop reason: HOSPADM

## 2023-04-28 RX ORDER — POLYETHYLENE GLYCOL 3350 17 G/17G
17 POWDER, FOR SOLUTION ORAL DAILY
Status: DISCONTINUED | OUTPATIENT
Start: 2023-04-28 | End: 2023-05-01 | Stop reason: HOSPADM

## 2023-04-28 RX ADMIN — DOCUSATE SODIUM 100 MG: 100 CAPSULE, LIQUID FILLED ORAL at 22:07

## 2023-04-28 RX ADMIN — DULOXETINE 60 MG: 60 CAPSULE, DELAYED RELEASE ORAL at 09:07

## 2023-04-28 RX ADMIN — MICONAZOLE NITRATE: 2 POWDER TOPICAL at 22:08

## 2023-04-28 RX ADMIN — POLYETHYLENE GLYCOL 3350 17 G: 17 POWDER, FOR SOLUTION ORAL at 12:11

## 2023-04-28 RX ADMIN — PALIPERIDONE 6 MG: 6 TABLET, EXTENDED RELEASE ORAL at 09:07

## 2023-04-28 RX ADMIN — VALACYCLOVIR HYDROCHLORIDE 500 MG: 500 TABLET, FILM COATED ORAL at 09:07

## 2023-04-28 RX ADMIN — MULTIPLE VITAMINS W/ MINERALS TAB 1 TABLET: TAB at 09:07

## 2023-04-28 RX ADMIN — LURASIDONE HYDROCHLORIDE 80 MG: 40 TABLET, FILM COATED ORAL at 17:06

## 2023-04-28 RX ADMIN — HYDROXYZINE HYDROCHLORIDE 25 MG: 25 TABLET, FILM COATED ORAL at 22:07

## 2023-04-28 RX ADMIN — MICONAZOLE NITRATE: 2 POWDER TOPICAL at 09:07

## 2023-04-28 RX ADMIN — DOCUSATE SODIUM 100 MG: 100 CAPSULE, LIQUID FILLED ORAL at 12:12

## 2023-04-29 PROCEDURE — 6370000000 HC RX 637 (ALT 250 FOR IP): Performed by: FAMILY MEDICINE

## 2023-04-29 PROCEDURE — 6370000000 HC RX 637 (ALT 250 FOR IP): Performed by: PSYCHIATRY & NEUROLOGY

## 2023-04-29 PROCEDURE — 1240000000 HC EMOTIONAL WELLNESS R&B

## 2023-04-29 PROCEDURE — 94660 CPAP INITIATION&MGMT: CPT

## 2023-04-29 RX ORDER — HYDROXYZINE HYDROCHLORIDE 10 MG/1
10 TABLET, FILM COATED ORAL NIGHTLY
Status: DISCONTINUED | OUTPATIENT
Start: 2023-04-29 | End: 2023-05-01 | Stop reason: HOSPADM

## 2023-04-29 RX ADMIN — DULOXETINE 60 MG: 60 CAPSULE, DELAYED RELEASE ORAL at 08:22

## 2023-04-29 RX ADMIN — MULTIPLE VITAMINS W/ MINERALS TAB 1 TABLET: TAB at 08:23

## 2023-04-29 RX ADMIN — VALACYCLOVIR HYDROCHLORIDE 500 MG: 500 TABLET, FILM COATED ORAL at 08:24

## 2023-04-29 RX ADMIN — HYDROXYZINE HYDROCHLORIDE 10 MG: 10 TABLET ORAL at 22:15

## 2023-04-29 RX ADMIN — DOCUSATE SODIUM 100 MG: 100 CAPSULE, LIQUID FILLED ORAL at 22:15

## 2023-04-29 RX ADMIN — MICONAZOLE NITRATE: 2 POWDER TOPICAL at 22:15

## 2023-04-29 RX ADMIN — DOCUSATE SODIUM 100 MG: 100 CAPSULE, LIQUID FILLED ORAL at 08:22

## 2023-04-29 RX ADMIN — PALIPERIDONE 6 MG: 6 TABLET, EXTENDED RELEASE ORAL at 08:21

## 2023-04-29 RX ADMIN — LURASIDONE HYDROCHLORIDE 80 MG: 40 TABLET, FILM COATED ORAL at 17:12

## 2023-04-29 RX ADMIN — MICONAZOLE NITRATE: 2 POWDER TOPICAL at 10:04

## 2023-04-29 RX ADMIN — POLYETHYLENE GLYCOL 3350 17 G: 17 POWDER, FOR SOLUTION ORAL at 08:26

## 2023-04-30 PROCEDURE — 6370000000 HC RX 637 (ALT 250 FOR IP): Performed by: FAMILY MEDICINE

## 2023-04-30 PROCEDURE — 1240000000 HC EMOTIONAL WELLNESS R&B

## 2023-04-30 PROCEDURE — 6370000000 HC RX 637 (ALT 250 FOR IP): Performed by: PSYCHIATRY & NEUROLOGY

## 2023-04-30 PROCEDURE — 94660 CPAP INITIATION&MGMT: CPT

## 2023-04-30 RX ADMIN — LURASIDONE HYDROCHLORIDE 80 MG: 40 TABLET, FILM COATED ORAL at 17:35

## 2023-04-30 RX ADMIN — DULOXETINE 60 MG: 60 CAPSULE, DELAYED RELEASE ORAL at 07:54

## 2023-04-30 RX ADMIN — HYDROXYZINE HYDROCHLORIDE 10 MG: 10 TABLET ORAL at 11:52

## 2023-04-30 RX ADMIN — MICONAZOLE NITRATE: 2 POWDER TOPICAL at 22:17

## 2023-04-30 RX ADMIN — ERGOCALCIFEROL 50000 UNITS: 1.25 CAPSULE ORAL at 07:54

## 2023-04-30 RX ADMIN — VALACYCLOVIR HYDROCHLORIDE 500 MG: 500 TABLET, FILM COATED ORAL at 07:54

## 2023-04-30 RX ADMIN — PALIPERIDONE 6 MG: 6 TABLET, EXTENDED RELEASE ORAL at 07:54

## 2023-04-30 RX ADMIN — POLYETHYLENE GLYCOL 3350 17 G: 17 POWDER, FOR SOLUTION ORAL at 07:53

## 2023-04-30 RX ADMIN — MICONAZOLE NITRATE: 2 POWDER TOPICAL at 09:49

## 2023-04-30 RX ADMIN — HYDROXYZINE HYDROCHLORIDE 10 MG: 10 TABLET ORAL at 22:17

## 2023-04-30 RX ADMIN — DOCUSATE SODIUM 100 MG: 100 CAPSULE, LIQUID FILLED ORAL at 07:54

## 2023-04-30 RX ADMIN — DOCUSATE SODIUM 100 MG: 100 CAPSULE, LIQUID FILLED ORAL at 22:16

## 2023-04-30 RX ADMIN — MULTIPLE VITAMINS W/ MINERALS TAB 1 TABLET: TAB at 07:54

## 2023-05-01 VITALS
WEIGHT: 203 LBS | OXYGEN SATURATION: 98 % | HEART RATE: 90 BPM | BODY MASS INDEX: 34.66 KG/M2 | SYSTOLIC BLOOD PRESSURE: 123 MMHG | TEMPERATURE: 97.3 F | RESPIRATION RATE: 20 BRPM | DIASTOLIC BLOOD PRESSURE: 72 MMHG | HEIGHT: 64 IN

## 2023-05-01 PROCEDURE — 5130000000 HC BRIDGE APPOINTMENT

## 2023-05-01 PROCEDURE — 6370000000 HC RX 637 (ALT 250 FOR IP): Performed by: PSYCHIATRY & NEUROLOGY

## 2023-05-01 PROCEDURE — 6370000000 HC RX 637 (ALT 250 FOR IP): Performed by: FAMILY MEDICINE

## 2023-05-01 RX ORDER — HYDROXYZINE HYDROCHLORIDE 10 MG/1
10 TABLET, FILM COATED ORAL 3 TIMES DAILY PRN
Qty: 90 TABLET | Refills: 1 | Status: SHIPPED | OUTPATIENT
Start: 2023-05-01

## 2023-05-01 RX ORDER — PALIPERIDONE 6 MG/1
6 TABLET, EXTENDED RELEASE ORAL DAILY
Qty: 30 TABLET | Refills: 1 | Status: SHIPPED | OUTPATIENT
Start: 2023-05-01

## 2023-05-01 RX ORDER — ERGOCALCIFEROL 1.25 MG/1
50000 CAPSULE ORAL WEEKLY
Qty: 11 CAPSULE | Refills: 0 | Status: SHIPPED | OUTPATIENT
Start: 2023-05-07

## 2023-05-01 RX ADMIN — DOCUSATE SODIUM 100 MG: 100 CAPSULE, LIQUID FILLED ORAL at 08:55

## 2023-05-01 RX ADMIN — POLYETHYLENE GLYCOL 3350 17 G: 17 POWDER, FOR SOLUTION ORAL at 08:55

## 2023-05-01 RX ADMIN — VALACYCLOVIR HYDROCHLORIDE 500 MG: 500 TABLET, FILM COATED ORAL at 08:54

## 2023-05-01 RX ADMIN — MICONAZOLE NITRATE: 2 POWDER TOPICAL at 08:58

## 2023-05-01 RX ADMIN — DULOXETINE 60 MG: 60 CAPSULE, DELAYED RELEASE ORAL at 08:54

## 2023-05-01 RX ADMIN — PALIPERIDONE 6 MG: 6 TABLET, EXTENDED RELEASE ORAL at 08:54

## 2023-05-01 RX ADMIN — HYDROXYZINE HYDROCHLORIDE 10 MG: 10 TABLET ORAL at 08:54

## 2023-05-01 RX ADMIN — MULTIPLE VITAMINS W/ MINERALS TAB 1 TABLET: TAB at 08:54

## 2023-05-01 NOTE — DISCHARGE SUMMARY
04/24/2023    CALCIUM 10.0 04/24/2023    PROT 7.0 04/24/2023    LABALBU 4.3 04/24/2023    BILITOT 0.3 04/24/2023    ALKPHOS 93 04/24/2023    AST 12 04/24/2023    ALT 15 04/24/2023    LABGLOM >60 04/24/2023    GFRAA >59 06/25/2021    GLOB 3.3 06/18/2016       Lab Results   Component Value Date    YGSVMJIO51 197 02/25/2023     Lab Results   Component Value Date    VITD25 59.9 02/25/2023        Treatments: therapies:     Mental Status Examination:  Alert, Oriented X 4  Appearance:  Proper Grooming and Hygiene  Speech with Regular Rate and Rhythm  Eye Contact:  Good  No Psychomotor Agitation/Retardation Noted  Attitude:  Cooperative  Mood:  \"Better\"  Affective: Congruent, appropriate to the situation, with a normal range and intensity  Thought Processes:  Coherently communicated, logical and goal oriented  Thought Content:  No Suicidal Ideation, No Homicidal Ideation, No Auditory or Visual Hallucinations, No Overt Delusions  Insight: Limited  Judgement: Limited  Memory is intact for both remote and recent  Intellectual Functioning:  Within the [address] of Knowledge:  Adequate  Attention and Concentration:  Adequate      Discharge Exam:  Please, see medical note    Disposition: home    Patient Instructions:   Current Discharge Medication List        START taking these medications    Details   hydrOXYzine HCl (ATARAX) 10 MG tablet Take 1 tablet by mouth 3 times daily as needed for Anxiety  Qty: 90 tablet, Refills: 1      paliperidone (INVEGA) 6 MG extended release tablet Take 1 tablet by mouth daily  Qty: 30 tablet, Refills: 1      Ergocalciferol (VITAMIN D) 00429 units CAPS Take 50,000 Units by mouth once a week  Qty: 11 capsule, Refills: 0           CONTINUE these medications which have NOT CHANGED    Details   miconazole (MICOTIN) 2 % powder Apply topically 2 times daily.   Qty: 45 g, Refills: 0      clindamycin (CLEOCIN T) 1 % external solution Apply topically daily Apply topically to the groin and abdomen

## 2023-05-01 NOTE — PROGRESS NOTES
Behavioral Health   Discharge Note  Bridge Appointment completed: Reviewed Discharge Instructions with patient. Patient verbalizes understanding and agreement with the discharge plan using the teachback method. Referral for Outpatient Tobacco Cessation Counseling, upon discharge (umu X if applicable and completed):    ( )  Hospital staff assisted patient to call Quit Line or faxed referral                                   during hospitalization                  ( )  Recognizing danger situations (included triggers and roadblocks), if not completed on admission                    ( )  Coping skills (new ways to manage stress, exercise, relaxation techniques, changing routine, distraction), if not completed on admission                                                           ( )  Basic information about quitting (benefits of quitting, techniques in how to quit, available resources, if not completed on admission  ( ) Referral for counseling faxed to [facility]   ( x) Patient refused referral  ( x) Patient refused counseling  (x ) Patient refused smoking cessation medication upon discharge    Vaccinations (umu X if applicable and completed):  ( ) Patient states already received influenza vaccine elsewhere  ( ) Patient received influenza vaccine during this hospitalization  (x ) Patient refused influenza vaccine at this time      Pt discharged with followings belongings:   Dental Appliances: None  Vision - Corrective Lenses: Eyeglasses  Hearing Aid: None  Jewelry: Bracelet, Ring  Body Piercings Removed: N/A  Clothing: Footwear, Undergarments, Shirt, Pants  Other Valuables: Other (Comment) (n/a)   Valuables sent home with patient. Valuables retrieved from safe and returned to patient. Patient left department with parents  via personal vehicle , discharged to home . Patient education on aftercare instructions: yes  Patient verbalize understanding of AVS:  yes. Suicidal Ideations?  No Risk of

## 2023-05-01 NOTE — PLAN OF CARE
Group Therapy Note    Date: 2023  Start Time: 1000  End Time:  1030  Number of Participants: 19    Type of Group: Psychoeducation    Wellness Binder Information  Module Name:  Relapse Prevention  Session Number:  5    Group Goal for Pt: To improve knowledge of relapse prevention strategies    Notes:  Pt demonstrated improved knowledge of relapse prevention strategies by actively participating in group discussion. Status After Intervention:  Improved    Participation Level:  Active Listener and Interactive    Participation Quality: Appropriate and Attentive      Speech:  normal      Thought Process/Content: Logical      Affective Functioning: Congruent      Mood: anxious and depressed      Level of consciousness:  Alert and Oriented x4      Response to Learning: Able to verbalize current knowledge/experience, Able to verbalize/acknowledge new learning, and Progressing to goal      Endings: None Reported    Modes of Intervention: Education      Discipline Responsible: Psychoeducational Specialist      Signature:  Hardeep Kaur

## 2023-05-01 NOTE — PROGRESS NOTES
Group Note    Date: 05/01/23  Start Time: 7:00 PM   End Time:7:30 PM     Number of Participants: 17    Type of Group: Wrap-Up     Patient's Goal:      Notes:      Status After Intervention:  Unchanged    Participation Level: Interactive    Participation Quality: Attentive    Speech:  normal    Thought Process/Content: Logical    Mood:  appropriate for group    Level of consciousness:  Alert    Response to Learning: Progressing to goal    Modes of Intervention: Education and Support    Discipline Responsible: Behavioral Health Technician     Signature:  Lilian Ellington

## 2023-05-01 NOTE — PROGRESS NOTES
Group Note    Date: 05/01/23  Start Time: 8:00 AM   End Time:8:30 AM     Number of Participants: 21    Type of Group: Community/Goal     Patient's Goal:  Getting things ready to go home    Notes:      Status After Intervention:  Improved    Participation Level:  Active Listener    Participation Quality: Appropriate and Attentive    Speech:  normal    Thought Process/Content: Logical    Mood: anxious    Level of consciousness:  Alert and Oriented x4    Response to Learning: Able to verbalize current knowledge/experience    Modes of Intervention: Education and Support    Discipline Responsible: Registered Nurse     Signature:  Rudy Ho RN

## 2023-05-01 NOTE — PLAN OF CARE
Problem: Self Harm/Suicidality  Goal: Will have no self-injury during hospital stay  Description: INTERVENTIONS:  1. Ensure constant observer at bedside with Q15M safety checks  2. Maintain a safe environment  3. Secure patient belongings  4. Ensure family/visitors adhere to safety recommendations  5. Ensure safety tray has been added to patient's diet order  6. Every shift and PRN: Re-assess suicidal risk via Frequent Screener    Outcome: Completed     Problem: Depression  Goal: Will be euthymic at discharge  Description: INTERVENTIONS:  1. Administer medication as ordered  2. Provide emotional support via 1:1 interaction with staff  3. Encourage involvement in milieu/groups/activities  4. Monitor for social isolation  Outcome: Completed     Problem: Psychosis  Goal: Will report no hallucinations or delusions  Description: INTERVENTIONS:  1. Administer medication as  ordered  2. Assist with reality testing to support increasing orientation  3. Assess if patient's hallucinations or delusions are encouraging self harm or harm to others and intervene as appropriate  Outcome: Completed     Problem: Sleep Disturbance  Goal: Will exhibit normal sleeping pattern  Description: INTERVENTIONS:  1. Administer medication as ordered  2. Decrease environmental stimuli, including noise, as appropriate  3. Discourage social isolation and naps during the day  Outcome: Completed     Problem: Coping  Goal: Pt/Family able to verbalize concerns and demonstrate effective coping strategies  Description: INTERVENTIONS:  1. Assist patient/family to identify coping skills, available support systems and cultural and spiritual values  2. Provide emotional support, including active listening and acknowledgement of concerns of patient and caregivers  3. Reduce environmental stimuli, as able  4. Instruct patient/family in relaxation techniques, as appropriate  5.  Assess for spiritual pain/suffering and initiate Spiritual Care,

## 2023-05-01 NOTE — PLAN OF CARE
Group Therapy Note     Date: 5/1/2023  Start Time: 1100  End Time:  1130  Number of Participants: 13     Type of Group: Psychotherapy     Wellness Binder Information  Module Name:  staying well  Session Number:  1     Patient's Goal:  daily maintenance and coping skills     Notes:  pt acknowledged use of positive coping skills daily to help stay well.      Status After Intervention:  Improved     Participation Level: Interactive     Participation Quality: Appropriate, Attentive, and Sharing        Speech:  normal        Thought Process/Content: Logical        Affective Functioning: Congruent        Mood: congruent        Level of consciousness:  Alert, Oriented x4, and Attentive        Response to Learning: Able to verbalize current knowledge/experience        Endings: None Reported     Modes of Intervention: Education        Discipline Responsible: Psychoeducational Specialist        Signature:  Lacey Felton

## 2023-05-01 NOTE — PROGRESS NOTES
Treatment Team Note:    Target Symptoms/Reason for admission: Per nursing admission assessment - Reason for Admission: Per MALATHI Initial Intake on 4/24/2023. PT states reason for ED visit, \"I had a phone call appointment with [facility] today and she told me to come and talk to someone today. I hear voices and I have not told anyone. I have had the voices for years but was ashamed and did not want anyone to know. I have not told very many people I last otld them at [facility] but that was years ago. I just didn't feel comfortable. Today voices tell me to stay in bed, to not get up and eat, they want me to OD on pills if I could get a hold of them. The voices are getting louder and harder to fight off. \"     Ayden John is a 62 YO F that presents to the ED today d/t hearing voices that tell her to take all of her medication to kill herself. PT is flat and appears nervous but does not present as if responding to internal stimuli. She is concerned about not telling Dr. Lydia Gutierrez about the voices. Pt reports parents brought her to [facility] and she is worried about the burden on them. PT reports trouble sleeping. Having vivid dreams that frighten her. Pt has been sleeping more during the day than usual. [facility] appointment June 6th for counseling, car is not reliable. PT reports SI plan to take medication and AH to take medication. Denies HI and VH at this encounter. Diagnoses per psych provider: Depression with suicidal ideation [F32. A, R45.851]  B[facility] ([facility] [F31.9]    Therapist met with treatment team to discuss patients treatment and discharge plans.     Patient's aftercare plan is: [address]    Aftercare appointments made: Yes    Pt lives with: patient lives alone    Collateral obtained from:  Pt's daughter, Jazmyn Police  Collateral obtained on:04/25/23    Attending groups: Yes    Behavior: cooperative, minimally social with peers/staff, good eye

## 2023-05-01 NOTE — PROGRESS NOTES
[facility] Adult Unit Daily Assessment  Nursing Progress Note    Room: [facility]-01   Name: Quan Bernstein   Age: 63 y.o. Gender: female   Dx: Depression with suicidal ideation  Precautions: close watch and suicide risk  Inpatient Status: voluntary     SLEEP:  Sleep Quality Good  Sleep Medications: No   PRN Sleep Meds: No    MEDICAL:  Other PRN Meds: No   Med Compliant: Yes  Accu-Chek: No  Oxygen/CPAP/BiPAP: Yes-CPAP  CIWA/CINA: No   PAIN Assessment: none  Side Effects from medication: No    Metabolic Screening:  Lab Results   Component Value Date    LABA1C 6.1 (H) 11/25/2022     Lab Results   Component Value Date    CHOL 172 11/25/2022    CHOL 143 (L) 06/27/2021    CHOL 149 (L) 07/22/2020    CHOL 152 (L) 02/22/2020    CHOL 130 (L) 12/22/2018    CHOL 154 02/11/2014    CHOL 181 05/10/2012     Lab Results   Component Value Date    TRIG 177 (H) 11/25/2022    TRIG 166 (H) 06/27/2021    TRIG 143 07/22/2020    TRIG 130 02/22/2020    TRIG 83 12/22/2018    TRIG 113 02/11/2014    TRIG 197 (H) 05/10/2012     Lab Results   Component Value Date    HDL 59 (L) 11/25/2022    HDL 48 (L) 06/27/2021    HDL 60 (L) 07/22/2020    HDL 52 (L) 02/22/2020    HDL 52 (L) 12/22/2018    HDL 56 02/11/2014    HDL 62 05/10/2012     No components found for: LDLCAL  No results found for: LABVLDL  Body mass index is 34.84 kg/m². BP Readings from Last 2 Encounters:   04/30/23 (!) 106/52   03/07/23 129/77       Medical Bed:   Is patient in a medical bed? no   If medical bed is in use, has nursing secured room while patient is awake and out of the room? NA  Has safety checks by nursing been completed on the bed/room this shift? N/a    Protective Factors:  Patient identifies protective factors with nursing staff as follows:    Identifies reasons for living: Yes   Supportive Social Network or family: Yes    Belief that suicide is immoral/high spirituality: Yes   Responsibility to family or others/living with family: Yes   Fear of death or dying due to pain and

## 2023-05-01 NOTE — DISCHARGE INSTR - DIET

## 2023-05-02 NOTE — PROGRESS NOTES
Progress Note  Ayden John  5/1/2023 11:26 PM  Subjective:   Admit Date:   4/24/2023      CC/ADMIT DX:       Interval History:   Reviewed overnight events and nursing notes. She has no new medical issues. I have reviewed all labs/diagnostics from the last 24hrs. ROS:   I have done a 10 point ROS and all are negative, except what is mentioned in the HPI. No diet orders on file    Medications:               Objective:   Vitals: /72   Pulse 90   Temp 97.3 °F (36.3 °C)   Resp 20   Ht 5' 4\" (1.626 m)   Wt 203 lb (92.1 kg)   SpO2 98%   PF 99 L/min   BMI 34.84 kg/m²  No intake or output data in the 24 hours ending 05/01/23 2326  General appearance: alert and cooperative with exam  Extremities: extremities normal, atraumatic, no cyanosis or edema  Neurologic:  No obvious focal neurologic deficits. Skin: no rashes    Assessment and Plan:   Principal Problem:    Depression with suicidal ideation  Active Problems:    Bipolar depression ([address]  Resolved Problems:    * No resolved hospital problems. *    Elevated BP    Constipation  Plan:   Continue present medication(s)    She is medically stable. I will monitor for any changes or concerns. Follow with Psych      Discharge planning:   her home     Reviewed treatment plans with the patient and/or family.              Electronically signed by Shaji Armenta MD on 5/1/2023 at 11:26 PM

## 2023-05-02 NOTE — BH NOTE
[facility]    PA Case ID# [id number]  Key# [id number]    Medication:  Paliperidone ER 6MG er tablets    Status of PA request: approved    Notified provider, nurse in charge of care and the pharmacy of the determination.         Electronically signed by Pili Padilla LPN, [address]  on 5/2/23 at 9:53 AM LENINT

## 2023-05-02 NOTE — BH NOTE
Received notification for Prior Authorization (PA) request for the following medication:    Paliperidone ER 6mg tablets    Submitted clinical information at this time to [employer], via [id number] web portal, HIPPA compliant/Confidential web portal.  Response time varies, 1 to 5 business days for a response. Notified provider and nurse in charge of care at this time, will notify when status of PA has been updated.     PA Case ID#  [id number]  Key# [id number]    Electronically signed by Wes Serna LPN [facility]  on 5/2/23 at 9:52 AM CDT

## 2023-05-23 RX ORDER — PALIPERIDONE 6 MG/1
6 TABLET, EXTENDED RELEASE ORAL DAILY
Qty: 30 TABLET | Refills: 1 | OUTPATIENT
Start: 2023-05-23

## 2023-06-08 ENCOUNTER — TELEPHONE (OUTPATIENT)
Dept: OBSTETRICS AND GYNECOLOGY | Facility: CLINIC | Age: 58
End: 2023-06-08
Payer: COMMERCIAL

## 2023-06-08 NOTE — TELEPHONE ENCOUNTER
Pt called and wanted to know how often to clean her bathroom with a hx of HSV2.  I informed her to clean it as she normally would and she informed me she was concerned about spreading the virus using a public restroom without an active outbreak and was informed that without an active outbreak there is no concern, pt voiced understanding.

## 2023-06-23 ENCOUNTER — OFFICE VISIT (OUTPATIENT)
Dept: NEUROLOGY | Age: 58
End: 2023-06-23
Payer: MEDICAID

## 2023-06-23 VITALS
OXYGEN SATURATION: 98 % | HEIGHT: 64 IN | HEART RATE: 71 BPM | DIASTOLIC BLOOD PRESSURE: 72 MMHG | SYSTOLIC BLOOD PRESSURE: 130 MMHG | WEIGHT: 208 LBS | BODY MASS INDEX: 35.51 KG/M2

## 2023-06-23 DIAGNOSIS — G47.33 OBSTRUCTIVE SLEEP APNEA: Primary | ICD-10-CM

## 2023-06-23 DIAGNOSIS — Z99.89 CPAP (CONTINUOUS POSITIVE AIRWAY PRESSURE) DEPENDENCE: ICD-10-CM

## 2023-06-23 PROCEDURE — 99213 OFFICE O/P EST LOW 20 MIN: CPT | Performed by: PHYSICIAN ASSISTANT

## 2023-06-23 RX ORDER — DULOXETIN HYDROCHLORIDE 60 MG/1
CAPSULE, DELAYED RELEASE ORAL
COMMUNITY
Start: 2023-06-16

## 2023-06-23 NOTE — PROGRESS NOTES
REVIEW OF SYSTEMS    Constitutional: []Fever []Sweats []Chills [] Recent Injury   [x] Denies all unless marked  HENT:[]Headache  [] Head Injury  [] Sore Throat  [] Ear Pain  [] Dizziness [] Hearing Loss   [x] Denies all unless marked  Musculoskeletal: [] Arthralgia  [] Myalgias [] Muscle cramps  [] Muscle twitches   [x] Denies all unless marked   Spine:  [] Neck pain  [] Back pain  [] Sciatica  [x] Denies all unless marked  Neurological:[] Visual Disturbance [] Double Vision [] Slurred Speech [] Trouble swallowing  [] Vertigo [] Tingling [] Numbness [] Weakness [] Loss of Balance   [] Loss of Consciousness [] Memory Loss [] Seizures  [x] Denies all unless marked  Psychiatric/Behavioral:[] Depression [] Anxiety  [x] Denies all unless marked  Sleep: []  Insomnia [] Sleep Disturbance [] Snoring [] Restless Legs [] Daytime Sleepiness [x] Sleep Apnea  [] Denies all unless marked
atrial fibrillation, CHF, diabetes, stroke, weight gain, impaired cognition, daytime somnolence, and motor vehicle accidents. Advised to abstain from driving or operating heavy machinery when drowsy and the use of respiratory suppressants. Reviewed current treatment plan. Counseled on multimodal approach to treatment of sleep apnea to include but not limited to diet, exercise, sleep hygiene, and compliance with pap therapy, if indicated. 2.  Will evaluate for PAP clinical benefit and compliance during a 30 day period within the preceding 90 days PRN. 3.  The following educational material has been included in this visit after visit summary for your review: SANDY/PAP guidelines-Discussed with the patient and all questions fully answered. 4.  Continue PAP therapy. Discussed sleep hygiene; daytime fatigue; medication side effects. She has improved in regards to the EDS since taking Invega q hs instead of qd. The patient voices understanding and recognizes the need for adherence to the prescribed therapy  5. Order-supplies-[employer]   6. Follow up in 1 year to reassess symptomatology, PAP tolerance, efficacy and compliance     I spent a total of 20 minutes for this encounter including chart review, management, and coordination of patient care.        Replinishible PAP Supplies, 1 year supply  Item HPCPS Code Frequency   Mask of choice  or  1 per 3 months   Nasal Mask cushion/pillows  or  2 per 30 days   Full Face Mask Interface  1 per 30 days   Headgear  1 per 6 months   Tubing, length of choice  or  1 per 3 months   Water Chamber  1 per 6 months   Chinstrap  1 per 6 months   Disposable Filters  2 per 30 days   Reusable Filters  1 per 6 months     Diagnoses:  Obstructive sleep apnea (G47.33)  Length of Need: Lifetime, 99    Ordering Provider: Avila Gaona PA-C  NPI:  [id number]

## 2023-06-23 NOTE — PATIENT INSTRUCTIONS
reports, additional testing, and face-to-face  clinical evaluation subsequent to any treatment, changes in treatment, and continued treatment. Caution:  Please abstain from driving or engaging in other activities which may be hazardous in the presence of diminished alertness or daytime drowsiness. And avoid the use of sedatives or alcohol, which can worsen sleep apnea and daytime drowsiness. Mask suggestions:  -     Resmed Airfit N20 (Nasal) or F20 (Full face mask). They conform to your face, thus decreasing the potential for mask leakage. You might like the AirTouch F20(full face mask). It has a \"memory foam\" like cushion. The AirFit F30 is a smaller style full face mask designed to sit low on and cover less of your face for fewer facial marks. AirFit N30i has a top of the head tube with a nasal mask. AirFit P10 reported to be the most comfortable nasal pillow mask. Resmed Mirage FX reported to be the most comfortable nasal mask. Resmed Mirage [place] reported to be the most comfortable hybrid mask. AirTouch N20-memory foam nasal mask. Respironics: You might also like to try a nasal mask called a Dreamwear nasal mask or the Dreamwear nasal pillow. Another suggestion is the [facility], it is a minimal contact full face mask. The [place] [address] incredible under the nose design makes it the only full face mask that won't cause red marks on the bridge of your nose when compared to other full face masks. The Dreamwear full face mask has a  soft feel, unique in-frame air-flow, and innovative air tube connection at the top of the head for the ultimate in sleep comfort. Comfort Gel Blue. Dreamwear gel pillows. [employer]: Brevida nasal pillow mask and Simplus FFM    The use of a memory foam CPAP pillow supports the head and neck throughout the night. CLEANING INSTRUCTIONS     Keeping your equipment and supplies clean is very important.      REMINDER: Only use DISTILLED WATER in your humidifier,

## 2023-07-28 ENCOUNTER — TELEPHONE (OUTPATIENT)
Dept: OBSTETRICS AND GYNECOLOGY | Facility: CLINIC | Age: 58
End: 2023-07-28
Payer: COMMERCIAL

## 2023-07-28 NOTE — TELEPHONE ENCOUNTER
Pt has been taking Calcium 600mg OTC and is requesting a script be called in for this medication. Please advise

## 2023-08-09 ENCOUNTER — TELEPHONE (OUTPATIENT)
Dept: OBSTETRICS AND GYNECOLOGY | Facility: CLINIC | Age: 58
End: 2023-08-09
Payer: COMMERCIAL

## 2023-08-09 NOTE — TELEPHONE ENCOUNTER
Pt called requesting a refill on Valtrex. Pt advised to check with pharmacy as she should have refills there. Pt voiced understanding

## 2023-08-30 DIAGNOSIS — A60.04 HERPES, VULVAR: ICD-10-CM

## 2023-08-30 RX ORDER — VALACYCLOVIR HYDROCHLORIDE 500 MG/1
500 TABLET, FILM COATED ORAL DAILY
Qty: 30 TABLET | Refills: 12 | OUTPATIENT
Start: 2023-08-30

## 2023-11-10 ENCOUNTER — TELEPHONE (OUTPATIENT)
Dept: OBSTETRICS AND GYNECOLOGY | Facility: CLINIC | Age: 58
End: 2023-11-10
Payer: COMMERCIAL

## 2023-11-10 NOTE — TELEPHONE ENCOUNTER
Pt calls with questions on proper steps to take to prevent UTI. Pt reports hx of HSV but denies current outbreak. Pt informed to practice good hygiene, increase water intake, use mild unscented bar soap, wipe front to back and urinate after intercourse. Pt voiced understanding.

## 2023-11-13 ENCOUNTER — TELEPHONE (OUTPATIENT)
Dept: OBSTETRICS AND GYNECOLOGY | Facility: CLINIC | Age: 58
End: 2023-11-13
Payer: COMMERCIAL

## 2023-11-13 NOTE — TELEPHONE ENCOUNTER
Pt called and left a v/m regarding how to prevent a UTI and questions about HSV. I spoke with pt and explained the same information that Augustina spoke with her about on Friday. Pt asked several questions regarding cleaning and bathing with HSV. Pt advised on how to shower and dry off and answered all of her questions accordingly. Pt voiced understanding

## 2023-11-14 ENCOUNTER — TELEPHONE (OUTPATIENT)
Dept: OBSTETRICS AND GYNECOLOGY | Facility: CLINIC | Age: 58
End: 2023-11-14
Payer: COMMERCIAL

## 2023-11-14 NOTE — TELEPHONE ENCOUNTER
Spoke with pt. This is the 3rd call from pt today regarding instructions on how she should shower. Pt advised on the same information as the previous phone calls

## 2023-11-14 NOTE — TELEPHONE ENCOUNTER
Pt called stating she was currently in the shower and asking about how she should wash, rinse, dry off etc. All of her questions pertain to HSV(unsure if she has a current breakout) and also preventing UTI. I reiterated to pt the same information that I spoke with her about yesterday and that Augustina spoke with her about on 11/10/23.

## 2023-11-22 ENCOUNTER — TELEPHONE (OUTPATIENT)
Dept: OBSTETRICS AND GYNECOLOGY | Facility: CLINIC | Age: 58
End: 2023-11-22
Payer: COMMERCIAL

## 2023-11-22 DIAGNOSIS — F41.1 GENERALIZED ANXIETY DISORDER: Primary | ICD-10-CM

## 2023-11-22 NOTE — TELEPHONE ENCOUNTER
Pt called and left a v/m requesting advice on how she needs to shower with having HSV and to prevent a UTI. Please see previous encounters. I have spoken with this pt regarding this multiple times. Can we possibly send a referral for home health?

## 2023-12-01 ENCOUNTER — HOME HEALTH ADMISSION (OUTPATIENT)
Dept: HOME HEALTH SERVICES | Facility: HOME HEALTHCARE | Age: 58
End: 2023-12-01
Payer: COMMERCIAL

## 2023-12-01 ENCOUNTER — TELEPHONE (OUTPATIENT)
Dept: OBSTETRICS AND GYNECOLOGY | Facility: CLINIC | Age: 58
End: 2023-12-01
Payer: COMMERCIAL

## 2023-12-01 NOTE — TELEPHONE ENCOUNTER
"A home health referral was placed for this patient but they are unable to schedule this stating, \"Thank you for this referral, however due to there being no Face to Face (either in person or via video,) which is a Home Health requirement, We are unable to proceed with this referral\".   How would you like to proceed?  "

## 2023-12-06 ENCOUNTER — HOSPITAL ENCOUNTER (INPATIENT)
Age: 58
LOS: 7 days | Discharge: HOME | DRG: 885 | End: 2023-12-13
Attending: EMERGENCY MEDICINE | Admitting: PSYCHIATRY & NEUROLOGY
Payer: MEDICAID

## 2023-12-06 DIAGNOSIS — F32.A DEPRESSION WITH SUICIDAL IDEATION: Primary | ICD-10-CM

## 2023-12-06 DIAGNOSIS — R45.851 DEPRESSION WITH SUICIDAL IDEATION: Primary | ICD-10-CM

## 2023-12-06 LAB
ALBUMIN SERPL-MCNC: 4.4 G/DL (ref 3.5–5.2)
ALP SERPL-CCNC: 102 U/L (ref 35–104)
ALT SERPL-CCNC: 23 U/L (ref 5–33)
AMPHET UR QL SCN: NEGATIVE
ANION GAP SERPL CALCULATED.3IONS-SCNC: 11 MMOL/L (ref 7–19)
APAP SERPL-MCNC: <5 UG/ML (ref 10–30)
AST SERPL-CCNC: 16 U/L (ref 5–32)
BACTERIA URNS QL MICRO: NEGATIVE /HPF
BARBITURATES UR QL SCN: NEGATIVE
BASOPHILS # BLD: 0.1 K/UL (ref 0–0.2)
BASOPHILS NFR BLD: 0.5 % (ref 0–1)
BENZODIAZ UR QL SCN: NEGATIVE
BILIRUB SERPL-MCNC: 0.3 MG/DL (ref 0.2–1.2)
BILIRUB UR QL STRIP: NEGATIVE
BUN SERPL-MCNC: 14 MG/DL (ref 6–20)
BUPRENORPHINE URINE: NEGATIVE
CALCIUM SERPL-MCNC: 10.2 MG/DL (ref 8.6–10)
CANNABINOIDS UR QL SCN: NEGATIVE
CHLORIDE SERPL-SCNC: 106 MMOL/L (ref 98–111)
CLARITY UR: CLEAR
CO2 SERPL-SCNC: 21 MMOL/L (ref 22–29)
COCAINE UR QL SCN: NEGATIVE
COLOR UR: YELLOW
CREAT SERPL-MCNC: 0.7 MG/DL (ref 0.5–0.9)
CRYSTALS URNS MICRO: ABNORMAL /HPF
DRUG SCREEN COMMENT UR-IMP: NORMAL
EOSINOPHIL # BLD: 0 K/UL (ref 0–0.6)
EOSINOPHIL NFR BLD: 0.2 % (ref 0–5)
EPI CELLS #/AREA URNS AUTO: 4 /HPF (ref 0–5)
ERYTHROCYTE [DISTWIDTH] IN BLOOD BY AUTOMATED COUNT: 12.9 % (ref 11.5–14.5)
ETHANOLAMINE SERPL-MCNC: <10 MG/DL (ref 0–0.08)
FENTANYL SCREEN, URINE: NEGATIVE
GLUCOSE SERPL-MCNC: 180 MG/DL (ref 74–109)
GLUCOSE UR STRIP.AUTO-MCNC: NEGATIVE MG/DL
HCT VFR BLD AUTO: 42.1 % (ref 37–47)
HGB BLD-MCNC: 14.1 G/DL (ref 12–16)
HGB UR STRIP.AUTO-MCNC: NEGATIVE MG/L
HYALINE CASTS #/AREA URNS AUTO: 0 /HPF (ref 0–8)
IMM GRANULOCYTES # BLD: 0 K/UL
KETONES UR STRIP.AUTO-MCNC: NEGATIVE MG/DL
LEUKOCYTE ESTERASE UR QL STRIP.AUTO: ABNORMAL
LYMPHOCYTES # BLD: 1.3 K/UL (ref 1.1–4.5)
LYMPHOCYTES NFR BLD: 12.1 % (ref 20–40)
MCH RBC QN AUTO: 32.5 PG (ref 27–31)
MCHC RBC AUTO-ENTMCNC: 33.5 G/DL (ref 33–37)
MCV RBC AUTO: 97 FL (ref 81–99)
METHADONE UR QL SCN: NEGATIVE
METHAMPHETAMINE, URINE: NEGATIVE
MONOCYTES # BLD: 0.5 K/UL (ref 0–0.9)
MONOCYTES NFR BLD: 5 % (ref 0–10)
NEUTROPHILS # BLD: 8.5 K/UL (ref 1.5–7.5)
NEUTS SEG NFR BLD: 82 % (ref 50–65)
NITRITE UR QL STRIP.AUTO: NEGATIVE
OPIATES UR QL SCN: NEGATIVE
OXYCODONE UR QL SCN: NEGATIVE
PCP UR QL SCN: NEGATIVE
PH UR STRIP.AUTO: 7 [PH] (ref 5–8)
PLATELET # BLD AUTO: 282 K/UL (ref 130–400)
PMV BLD AUTO: 9.9 FL (ref 9.4–12.3)
POTASSIUM SERPL-SCNC: 4.1 MMOL/L (ref 3.5–5)
PROT SERPL-MCNC: 7.5 G/DL (ref 6.6–8.7)
PROT UR STRIP.AUTO-MCNC: NEGATIVE MG/DL
RBC # BLD AUTO: 4.34 M/UL (ref 4.2–5.4)
RBC #/AREA URNS AUTO: 1 /HPF (ref 0–4)
SALICYLATES SERPL-MCNC: <0.3 MG/DL (ref 3–10)
SARS-COV-2 RDRP RESP QL NAA+PROBE: NOT DETECTED
SODIUM SERPL-SCNC: 138 MMOL/L (ref 136–145)
SP GR UR STRIP.AUTO: 1.01 (ref 1–1.03)
TRICYCLIC, URINE: NEGATIVE
UROBILINOGEN UR STRIP.AUTO-MCNC: 0.2 E.U./DL
WBC # BLD AUTO: 10.3 K/UL (ref 4.8–10.8)
WBC #/AREA URNS AUTO: 5 /HPF (ref 0–5)

## 2023-12-06 PROCEDURE — 94660 CPAP INITIATION&MGMT: CPT

## 2023-12-06 PROCEDURE — 1240000000 HC EMOTIONAL WELLNESS R&B

## 2023-12-06 PROCEDURE — 6370000000 HC RX 637 (ALT 250 FOR IP): Performed by: PSYCHIATRY & NEUROLOGY

## 2023-12-06 PROCEDURE — 80053 COMPREHEN METABOLIC PANEL: CPT

## 2023-12-06 PROCEDURE — 85025 COMPLETE CBC W/AUTO DIFF WBC: CPT

## 2023-12-06 PROCEDURE — 82077 ASSAY SPEC XCP UR&BREATH IA: CPT

## 2023-12-06 PROCEDURE — G0480 DRUG TEST DEF 1-7 CLASSES: HCPCS

## 2023-12-06 PROCEDURE — 87635 SARS-COV-2 COVID-19 AMP PRB: CPT

## 2023-12-06 PROCEDURE — 99285 EMERGENCY DEPT VISIT HI MDM: CPT

## 2023-12-06 PROCEDURE — 36415 COLL VENOUS BLD VENIPUNCTURE: CPT

## 2023-12-06 PROCEDURE — 81001 URINALYSIS AUTO W/SCOPE: CPT

## 2023-12-06 PROCEDURE — 80179 DRUG ASSAY SALICYLATE: CPT

## 2023-12-06 PROCEDURE — 80143 DRUG ASSAY ACETAMINOPHEN: CPT

## 2023-12-06 PROCEDURE — 80307 DRUG TEST PRSMV CHEM ANLYZR: CPT

## 2023-12-06 RX ORDER — MULTIVITAMIN WITH IRON
1 TABLET ORAL DAILY
Status: DISCONTINUED | OUTPATIENT
Start: 2023-12-07 | End: 2023-12-13 | Stop reason: HOSPADM

## 2023-12-06 RX ORDER — BENZOYL PEROXIDE 50 MG/ML
LIQUID TOPICAL DAILY
COMMUNITY

## 2023-12-06 RX ORDER — POLYETHYLENE GLYCOL 3350 17 G/17G
17 POWDER, FOR SOLUTION ORAL DAILY PRN
Status: DISCONTINUED | OUTPATIENT
Start: 2023-12-06 | End: 2023-12-06

## 2023-12-06 RX ORDER — DULOXETIN HYDROCHLORIDE 60 MG/1
120 CAPSULE, DELAYED RELEASE ORAL DAILY
Status: DISCONTINUED | OUTPATIENT
Start: 2023-12-07 | End: 2023-12-08

## 2023-12-06 RX ORDER — LURASIDONE HYDROCHLORIDE 40 MG/1
120 TABLET, FILM COATED ORAL
Status: DISCONTINUED | OUTPATIENT
Start: 2023-12-06 | End: 2023-12-13 | Stop reason: HOSPADM

## 2023-12-06 RX ORDER — POLYETHYLENE GLYCOL 3350 17 G/17G
17 POWDER, FOR SOLUTION ORAL DAILY
Status: DISCONTINUED | OUTPATIENT
Start: 2023-12-07 | End: 2023-12-13 | Stop reason: HOSPADM

## 2023-12-06 RX ORDER — PALIPERIDONE 3 MG/1
3 TABLET, EXTENDED RELEASE ORAL DAILY
Status: ON HOLD | COMMUNITY
End: 2023-12-13 | Stop reason: HOSPADM

## 2023-12-06 RX ORDER — NYSTATIN 100000 [USP'U]/G
POWDER TOPICAL 2 TIMES DAILY PRN
COMMUNITY

## 2023-12-06 RX ORDER — ACETAMINOPHEN 325 MG/1
650 TABLET ORAL EVERY 4 HOURS PRN
Status: DISCONTINUED | OUTPATIENT
Start: 2023-12-06 | End: 2023-12-13 | Stop reason: HOSPADM

## 2023-12-06 RX ORDER — POLYETHYLENE GLYCOL 3350 17 G/17G
17 POWDER, FOR SOLUTION ORAL DAILY
Status: ON HOLD | COMMUNITY
End: 2023-12-13 | Stop reason: HOSPADM

## 2023-12-06 RX ORDER — DOCUSATE SODIUM 100 MG/1
100 CAPSULE, LIQUID FILLED ORAL 2 TIMES DAILY
Status: DISCONTINUED | OUTPATIENT
Start: 2023-12-06 | End: 2023-12-13 | Stop reason: HOSPADM

## 2023-12-06 RX ADMIN — PALIPERIDONE 9 MG: 6 TABLET, EXTENDED RELEASE ORAL at 19:44

## 2023-12-06 RX ADMIN — DOCUSATE SODIUM 100 MG: 100 CAPSULE, LIQUID FILLED ORAL at 19:44

## 2023-12-06 RX ADMIN — LURASIDONE HYDROCHLORIDE 120 MG: 40 TABLET, FILM COATED ORAL at 18:21

## 2023-12-06 SDOH — ECONOMIC STABILITY: INCOME INSECURITY: IN THE PAST 12 MONTHS, HAS THE ELECTRIC, GAS, OIL, OR WATER COMPANY THREATENED TO SHUT OFF SERVICE IN YOUR HOME?: NO

## 2023-12-06 SDOH — ECONOMIC STABILITY: FOOD INSECURITY: WITHIN THE PAST 12 MONTHS, YOU WORRIED THAT YOUR FOOD WOULD RUN OUT BEFORE YOU GOT MONEY TO BUY MORE.: NEVER TRUE

## 2023-12-06 SDOH — ECONOMIC STABILITY: INCOME INSECURITY: HOW HARD IS IT FOR YOU TO PAY FOR THE VERY BASICS LIKE FOOD, HOUSING, MEDICAL CARE, AND HEATING?: NOT HARD AT ALL

## 2023-12-06 ASSESSMENT — PATIENT HEALTH QUESTIONNAIRE - PHQ9
8. MOVING OR SPEAKING SO SLOWLY THAT OTHER PEOPLE COULD HAVE NOTICED. OR THE OPPOSITE, BEING SO FIGETY OR RESTLESS THAT YOU HAVE BEEN MOVING AROUND A LOT MORE THAN USUAL: 1
1. LITTLE INTEREST OR PLEASURE IN DOING THINGS: 2
3. TROUBLE FALLING OR STAYING ASLEEP: 2
SUM OF ALL RESPONSES TO PHQ QUESTIONS 1-9: 17
SUM OF ALL RESPONSES TO PHQ QUESTIONS 1-9: 16
5. POOR APPETITE OR OVEREATING: 1
4. FEELING TIRED OR HAVING LITTLE ENERGY: 2
SUM OF ALL RESPONSES TO PHQ QUESTIONS 1-9: 16
1. LITTLE INTEREST OR PLEASURE IN DOING THINGS: 2
7. TROUBLE CONCENTRATING ON THINGS, SUCH AS READING THE NEWSPAPER OR WATCHING TELEVISION: 2
SUM OF ALL RESPONSES TO PHQ QUESTIONS 1-9: 17
SUM OF ALL RESPONSES TO PHQ QUESTIONS 1-9: 15
SUM OF ALL RESPONSES TO PHQ9 QUESTIONS 1 & 2: 4
9. THOUGHTS THAT YOU WOULD BE BETTER OFF DEAD, OR OF HURTING YOURSELF: 2
4. FEELING TIRED OR HAVING LITTLE ENERGY: 2
SUM OF ALL RESPONSES TO PHQ9 QUESTIONS 1 & 2: 4
7. TROUBLE CONCENTRATING ON THINGS, SUCH AS READING THE NEWSPAPER OR WATCHING TELEVISION: 2
6. FEELING BAD ABOUT YOURSELF - OR THAT YOU ARE A FAILURE OR HAVE LET YOURSELF OR YOUR FAMILY DOWN: 2
9. THOUGHTS THAT YOU WOULD BE BETTER OFF DEAD, OR OF HURTING YOURSELF: 2
10. IF YOU CHECKED OFF ANY PROBLEMS, HOW DIFFICULT HAVE THESE PROBLEMS MADE IT FOR YOU TO DO YOUR WORK, TAKE CARE OF THINGS AT HOME, OR GET ALONG WITH OTHER PEOPLE: 2
3. TROUBLE FALLING OR STAYING ASLEEP: 2
2. FEELING DOWN, DEPRESSED OR HOPELESS: 2
5. POOR APPETITE OR OVEREATING: 1
6. FEELING BAD ABOUT YOURSELF - OR THAT YOU ARE A FAILURE OR HAVE LET YOURSELF OR YOUR FAMILY DOWN: 2
SUM OF ALL RESPONSES TO PHQ QUESTIONS 1-9: 14
SUM OF ALL RESPONSES TO PHQ QUESTIONS 1-9: 17
10. IF YOU CHECKED OFF ANY PROBLEMS, HOW DIFFICULT HAVE THESE PROBLEMS MADE IT FOR YOU TO DO YOUR WORK, TAKE CARE OF THINGS AT HOME, OR GET ALONG WITH OTHER PEOPLE: 2
8. MOVING OR SPEAKING SO SLOWLY THAT OTHER PEOPLE COULD HAVE NOTICED. OR THE OPPOSITE, BEING SO FIGETY OR RESTLESS THAT YOU HAVE BEEN MOVING AROUND A LOT MORE THAN USUAL: 2
SUM OF ALL RESPONSES TO PHQ QUESTIONS 1-9: 16
2. FEELING DOWN, DEPRESSED OR HOPELESS: 2

## 2023-12-06 ASSESSMENT — LIFESTYLE VARIABLES
HOW MANY STANDARD DRINKS CONTAINING ALCOHOL DO YOU HAVE ON A TYPICAL DAY: PATIENT DOES NOT DRINK
HOW OFTEN DO YOU HAVE A DRINK CONTAINING ALCOHOL: NEVER
HOW MANY STANDARD DRINKS CONTAINING ALCOHOL DO YOU HAVE ON A TYPICAL DAY: PATIENT DOES NOT DRINK
HOW OFTEN DO YOU HAVE A DRINK CONTAINING ALCOHOL: NEVER

## 2023-12-06 ASSESSMENT — SLEEP AND FATIGUE QUESTIONNAIRES
DO YOU USE A SLEEP AID: NO
DO YOU HAVE DIFFICULTY SLEEPING: NO
AVERAGE NUMBER OF SLEEP HOURS: 8

## 2023-12-06 NOTE — ED PROVIDER NOTES
[facility] EMERGENCY DEPT  eMERGENCY dEPARTMENT eNCOUnter      Pt Name: Ana Arroyo  MRN: [id number]  [address] [facility] 1965  Date of evaluation: 12/6/2023  Provider: Pili Gunter DO    CHIEF COMPLAINT       Chief Complaint   Patient presents with    Mental Health Problem     Pt states she is having suicidal thoughts with a plan of electrocuting herself this morning. Pt has a hx of mental health issues and has been admitted for this         HISTORY OF PRESENT ILLNESS   (Location/Symptom, Timing/Onset,Context/Setting, Quality, Duration, Modifying Factors, Severity)  Note limiting factors. Ana Arroyo is a 63 y.o. female who presents to the emergency department      The patient is a 64 yo F who presents to the ED c/o suicidal thoughts for one week. She says she has a plan to electrocute herself. She says she was going to stick a fork in an electrical outlet. She reports history of depression with multiple suicide attempts in the past. She denies drug use. No alcohol use. She says she has auditory hallucinations - hears voices. She says this is chronic and unchanged from baseline. Denies visual hallucinations. She says her behavioral health specialist increased Latuda and decreased cymbalta. The patient says sometimes she doesn't sleep much and sometimes she sleeps too much. She denies any recent illness. There are no other complaints/concerns at this time. NursingNotes were reviewed.     REVIEW OF SYSTEMS    (2-9 systems for level 4, 10 or more for level 5)     As per HPI         PAST MEDICALHISTORY     Past Medical History:   Diagnosis Date    Chest pain 02/08/2012    CPAP (continuous positive airway pressure) dependence     Depression     Hypoglycemia     SANDY (obstructive sleep apnea)     Schizoaffective disorder ([address] [facility]) 02/08/2012    Suicide attempt [facility])          SURGICAL HISTORY       Past Surgical History:   Procedure Laterality Date    DILATION AND CURETTAGE OF UTERUS  2017    [facility]           CURRENT MEDICATIONS

## 2023-12-06 NOTE — GROUP NOTE
Group Therapy Note    Date: 12/6/2023    Group Start Time: 1600  Group End Time: 36  Group Topic: Healthy Living/Wellness    [facility]    Ignacio Valenzuela RN                                                                    Group Note    Date: 12/06/23  Start Time: 4:00 PM   End Time:4:30 PM     Number of Participants: 8    Type of Group: Health Living/Wellness     Patient's Goal:  Answer questions from thumb ball    Notes:  Participated well    Status After Intervention:  Improved    Participation Level:  Active Listener and Interactive    Participation Quality: Appropriate and Attentive    Speech:  normal    Thought Process/Content: Logical  Linear    Mood: euthymic    Level of consciousness:  Alert, Oriented x4, and Attentive    Response to Learning: Able to verbalize current knowledge/experience and Progressing to goal    Modes of Intervention: Education and Support    Discipline Responsible: Registered Nurse     Signature:  Ignacio Valenzuela RN

## 2023-12-06 NOTE — ED NOTES
Pt changed in purple scrubs and belongings removed from room. Secured by security.   Sitter at bedside     Toan Joseph [place]  12/06/23 [id number]

## 2023-12-07 PROBLEM — F31.60 BIPOLAR AFFECTIVE DISORDER, CURRENT EPISODE MIXED, WITHOUT PSYCHOTIC FEATURES (HCC): Status: ACTIVE | Noted: 2023-12-07

## 2023-12-07 PROCEDURE — 99223 1ST HOSP IP/OBS HIGH 75: CPT | Performed by: PSYCHIATRY & NEUROLOGY

## 2023-12-07 PROCEDURE — 1240000000 HC EMOTIONAL WELLNESS R&B

## 2023-12-07 PROCEDURE — 94660 CPAP INITIATION&MGMT: CPT

## 2023-12-07 PROCEDURE — 6370000000 HC RX 637 (ALT 250 FOR IP): Performed by: PSYCHIATRY & NEUROLOGY

## 2023-12-07 RX ORDER — NYSTATIN 100000 [USP'U]/G
POWDER TOPICAL 2 TIMES DAILY
Status: DISCONTINUED | OUTPATIENT
Start: 2023-12-07 | End: 2023-12-13 | Stop reason: HOSPADM

## 2023-12-07 RX ORDER — ACYCLOVIR 200 MG/1
400 CAPSULE ORAL 3 TIMES DAILY
Status: DISCONTINUED | OUTPATIENT
Start: 2023-12-07 | End: 2023-12-13 | Stop reason: HOSPADM

## 2023-12-07 RX ADMIN — DULOXETINE HYDROCHLORIDE 120 MG: 60 CAPSULE, DELAYED RELEASE ORAL at 08:05

## 2023-12-07 RX ADMIN — PALIPERIDONE 9 MG: 6 TABLET, EXTENDED RELEASE ORAL at 17:48

## 2023-12-07 RX ADMIN — POLYETHYLENE GLYCOL 3350 17 G: 17 POWDER, FOR SOLUTION ORAL at 08:07

## 2023-12-07 RX ADMIN — THERA TABS 1 TABLET: TAB at 08:06

## 2023-12-07 RX ADMIN — ACYCLOVIR 400 MG: 200 CAPSULE ORAL at 20:56

## 2023-12-07 RX ADMIN — ACYCLOVIR 400 MG: 200 CAPSULE ORAL at 09:29

## 2023-12-07 RX ADMIN — DOCUSATE SODIUM 100 MG: 100 CAPSULE, LIQUID FILLED ORAL at 20:56

## 2023-12-07 RX ADMIN — ACYCLOVIR 400 MG: 200 CAPSULE ORAL at 14:21

## 2023-12-07 RX ADMIN — DOCUSATE SODIUM 100 MG: 100 CAPSULE, LIQUID FILLED ORAL at 08:05

## 2023-12-07 RX ADMIN — LURASIDONE HYDROCHLORIDE 120 MG: 40 TABLET, FILM COATED ORAL at 17:48

## 2023-12-07 NOTE — GROUP NOTE
Group Therapy Note    Date: 12/7/2023    Group Start Time: 1600  Group End Time: 5856  Group Topic: Nursing    [facility] Ginny Skelton, RN; Toro Salomon RN    Group Therapy Note                                                                    Group Note    Date: 12/07/23  Start Time: 4:00 PM   End Time:4:30 PM     Number of Participants: 9    Type of Group: Nursing Education     Patient's Goal:  medication education    Notes:      Status After Intervention:  Improved    Participation Level:  Active Listener    Participation Quality: Appropriate    Speech:  normal    Thought Process/Content: Logical    Mood:  calm and cooperative    Level of consciousness:  Alert    Response to Learning: Able to verbalize/acknowledge new learning    Modes of Intervention: Education and Support    Discipline Responsible: Registered Nurse     Signature:  Sharlene Reyes RN  Electronically signed by Sharlene Reyes RN on 12/7/2023 at 4:36 PM

## 2023-12-08 LAB
25(OH)D3 SERPL-MCNC: 66.1 NG/ML
HBA1C MFR BLD: 5.4 % (ref 4–6)
TSH SERPL DL<=0.005 MIU/L-ACNC: 0.79 UIU/ML (ref 0.35–5.5)
VIT B12 SERPL-MCNC: 599 PG/ML (ref 211–946)

## 2023-12-08 PROCEDURE — 94660 CPAP INITIATION&MGMT: CPT

## 2023-12-08 PROCEDURE — 99232 SBSQ HOSP IP/OBS MODERATE 35: CPT | Performed by: PSYCHIATRY & NEUROLOGY

## 2023-12-08 PROCEDURE — 82607 VITAMIN B-12: CPT

## 2023-12-08 PROCEDURE — 6370000000 HC RX 637 (ALT 250 FOR IP): Performed by: PSYCHIATRY & NEUROLOGY

## 2023-12-08 PROCEDURE — 1240000000 HC EMOTIONAL WELLNESS R&B

## 2023-12-08 PROCEDURE — 36415 COLL VENOUS BLD VENIPUNCTURE: CPT

## 2023-12-08 PROCEDURE — 82306 VITAMIN D 25 HYDROXY: CPT

## 2023-12-08 PROCEDURE — 83036 HEMOGLOBIN GLYCOSYLATED A1C: CPT

## 2023-12-08 PROCEDURE — 84443 ASSAY THYROID STIM HORMONE: CPT

## 2023-12-08 RX ORDER — DULOXETIN HYDROCHLORIDE 60 MG/1
60 CAPSULE, DELAYED RELEASE ORAL DAILY
Status: DISCONTINUED | OUTPATIENT
Start: 2023-12-09 | End: 2023-12-09

## 2023-12-08 RX ADMIN — LURASIDONE HYDROCHLORIDE 120 MG: 40 TABLET, FILM COATED ORAL at 17:14

## 2023-12-08 RX ADMIN — ACYCLOVIR 400 MG: 200 CAPSULE ORAL at 20:50

## 2023-12-08 RX ADMIN — ACYCLOVIR 400 MG: 200 CAPSULE ORAL at 14:17

## 2023-12-08 RX ADMIN — DULOXETINE HYDROCHLORIDE 120 MG: 60 CAPSULE, DELAYED RELEASE ORAL at 08:28

## 2023-12-08 RX ADMIN — DOCUSATE SODIUM 100 MG: 100 CAPSULE, LIQUID FILLED ORAL at 20:50

## 2023-12-08 RX ADMIN — THERA TABS 1 TABLET: TAB at 08:29

## 2023-12-08 RX ADMIN — PALIPERIDONE 9 MG: 6 TABLET, EXTENDED RELEASE ORAL at 20:50

## 2023-12-08 RX ADMIN — POLYETHYLENE GLYCOL 3350 17 G: 17 POWDER, FOR SOLUTION ORAL at 08:28

## 2023-12-08 RX ADMIN — ACYCLOVIR 400 MG: 200 CAPSULE ORAL at 08:28

## 2023-12-08 RX ADMIN — DOCUSATE SODIUM 100 MG: 100 CAPSULE, LIQUID FILLED ORAL at 08:28

## 2023-12-08 NOTE — H&P
Department of Psychiatry  Attending History and Physical - Adult         CHIEF COMPLAINT: Suicidal ideations    History obtained from:  patient    HISTORY OF PRESENT ILLNESS:          The patient is a 63 y.o. female with previous psychiatric history of unspecified anxiety, depression, bipolar depression, borderline personality disorder, dependent personality disorder, hoarding disorder, OCD, who has been admitted to our psychiatric unit secondary to suicidal ideations with suicidal plan to electrocute herself. Patient is well-known to psychiatry due to multiple previous admissions to our psychiatric unit with last admission in April 2023. Patient has been seen in treatment team room with presence of the patient's nurse. She reported that recently she became constantly anxious about \"anything I do in the house\", reported that she cannot even take a shower \"as it was directed by my OB/GYN\", stated that she was instructed to take a shower from top to the bottom, however, said that she feels very anxious to follow those instructions. Also, patient reported that she experienced auditory hallucinations, stated that she heard multiple voices, which told her to harm herself and give up. Patient denies auditory hallucinations during the interview. She reported that she started having suicidal ideations with plan to electrocute herself, however, before she did that she decided to come to the hospital and ask for help. Today during the interview, patient reported feeling of anxiety, depression, decreased motivation, decreased concentration, decreased pleasure in previously enjoyed activities, decreased energy level, feeling of fatigue and tiredness. Patient endorses fair appetite and fair quality of sleep at home. She denies current active suicidal and homicidal ideations, denies any plans. Also she denies auditory and visual hallucinations.   Patient did not endorse any delusions or paranoid thoughts during the
daily.  clindamycin (CLEOCIN T) 1 % external solution, Apply topically daily Apply topically to the groin and abdomen once daily when and where lesions are present as needed. Multiple Vitamins-Minerals (THERAPEUTIC MULTIVITAMIN-MINERALS) tablet, Take 1 tablet by mouth daily  lurasidone (LATUDA) 80 MG TABS tablet, Take 1 tablet by mouth Daily with supper (Patient taking differently: Take 1.5 tablets by mouth Daily with supper Take 1 and 1/2 tabs with dinner)  docusate sodium (COLACE) 100 MG capsule, Take 1 capsule by mouth 2 times daily  calcium carbonate 600 MG TABS tablet, Take 1 tablet by mouth 2 times daily as needed  mirabegron (MYRBETRIQ) 50 MG TB24, Take 50 mg by mouth daily  valACYclovir (VALTREX) 500 MG tablet, Take 1 tablet by mouth daily    Allergies:  Seroquel [quetiapine fumarate], Adderall [amphetamine-dextroamphetamine], Amphetamines, Asa [aspirin], Codeine, Cogentin [benztropine], Depakote [divalproex sodium], Effexor [venlafaxine], Estrogens, Geodon [ziprasidone hcl], Hydrocodone, Lortab [hydrocodone-acetaminophen], Macrolides and ketolides, Metoprolol, Neurontin [gabapentin], Other, Pcn [penicillins], Provera [medroxyprogesterone], Prozac [fluoxetine hcl], Tetracyclines & related, Trazodone and nefazodone, Trileptal [oxcarbazepine], Trintellix [vortioxetine], Valium [diazepam], Vancomycin, Wellbutrin [bupropion], Zoloft [sertraline hcl], Zyprexa [olanzapine], and Macrobid [nitrofurantoin]    Social History:   TOBACCO:   reports that she has never smoked. She has never used smokeless tobacco.  ETOH:   reports no history of alcohol use. DRUGS:   reports no history of drug use. MARITAL STATUS:    OCCUPATION:  Not working  Patient currently lives with family       Family History:   History reviewed. No pertinent family history.   REVIEW OF SYSTEMS:  Constitutional: neg  CV: neg  Pulmonary: neg  GI: neg  : neg  Psych: SI  Neuro: neg  Skin: neg  MusculoSkeletal: neg  HEENT: neg  Joints:

## 2023-12-09 PROCEDURE — 6370000000 HC RX 637 (ALT 250 FOR IP): Performed by: PSYCHIATRY & NEUROLOGY

## 2023-12-09 PROCEDURE — 99232 SBSQ HOSP IP/OBS MODERATE 35: CPT | Performed by: PSYCHIATRY & NEUROLOGY

## 2023-12-09 PROCEDURE — 1240000000 HC EMOTIONAL WELLNESS R&B

## 2023-12-09 PROCEDURE — 94660 CPAP INITIATION&MGMT: CPT

## 2023-12-09 RX ORDER — DULOXETIN HYDROCHLORIDE 60 MG/1
60 CAPSULE, DELAYED RELEASE ORAL 2 TIMES DAILY
Status: DISCONTINUED | OUTPATIENT
Start: 2023-12-09 | End: 2023-12-13 | Stop reason: HOSPADM

## 2023-12-09 RX ADMIN — DOCUSATE SODIUM 100 MG: 100 CAPSULE, LIQUID FILLED ORAL at 07:46

## 2023-12-09 RX ADMIN — DULOXETINE HYDROCHLORIDE 60 MG: 60 CAPSULE, DELAYED RELEASE ORAL at 07:46

## 2023-12-09 RX ADMIN — DOCUSATE SODIUM 100 MG: 100 CAPSULE, LIQUID FILLED ORAL at 20:59

## 2023-12-09 RX ADMIN — ACYCLOVIR 400 MG: 200 CAPSULE ORAL at 07:46

## 2023-12-09 RX ADMIN — POLYETHYLENE GLYCOL 3350 17 G: 17 POWDER, FOR SOLUTION ORAL at 07:50

## 2023-12-09 RX ADMIN — THERA TABS 1 TABLET: TAB at 07:51

## 2023-12-09 RX ADMIN — PALIPERIDONE 9 MG: 6 TABLET, EXTENDED RELEASE ORAL at 16:56

## 2023-12-09 RX ADMIN — ACYCLOVIR 400 MG: 200 CAPSULE ORAL at 20:59

## 2023-12-09 RX ADMIN — DULOXETINE HYDROCHLORIDE 60 MG: 60 CAPSULE, DELAYED RELEASE ORAL at 20:59

## 2023-12-09 RX ADMIN — NYSTATIN 1 EACH: 100000 POWDER TOPICAL at 07:46

## 2023-12-09 RX ADMIN — ACYCLOVIR 400 MG: 200 CAPSULE ORAL at 14:35

## 2023-12-09 RX ADMIN — NYSTATIN: 100000 POWDER TOPICAL at 20:59

## 2023-12-09 RX ADMIN — LURASIDONE HYDROCHLORIDE 120 MG: 40 TABLET, FILM COATED ORAL at 16:54

## 2023-12-10 PROCEDURE — 6370000000 HC RX 637 (ALT 250 FOR IP): Performed by: PSYCHIATRY & NEUROLOGY

## 2023-12-10 PROCEDURE — 1240000000 HC EMOTIONAL WELLNESS R&B

## 2023-12-10 PROCEDURE — 94660 CPAP INITIATION&MGMT: CPT

## 2023-12-10 RX ORDER — ASENAPINE 5 MG/1
5 TABLET SUBLINGUAL 2 TIMES DAILY
Status: DISCONTINUED | OUTPATIENT
Start: 2023-12-10 | End: 2023-12-10

## 2023-12-10 RX ORDER — HYDROXYZINE HYDROCHLORIDE 25 MG/1
25 TABLET, FILM COATED ORAL 3 TIMES DAILY PRN
Status: DISCONTINUED | OUTPATIENT
Start: 2023-12-10 | End: 2023-12-13 | Stop reason: HOSPADM

## 2023-12-10 RX ORDER — ASENAPINE 5 MG/1
5 TABLET SUBLINGUAL 2 TIMES DAILY PRN
Status: DISCONTINUED | OUTPATIENT
Start: 2023-12-10 | End: 2023-12-12

## 2023-12-10 RX ADMIN — HYDROXYZINE HYDROCHLORIDE 25 MG: 25 TABLET, FILM COATED ORAL at 21:19

## 2023-12-10 RX ADMIN — DULOXETINE HYDROCHLORIDE 60 MG: 60 CAPSULE, DELAYED RELEASE ORAL at 21:19

## 2023-12-10 RX ADMIN — DOCUSATE SODIUM 100 MG: 100 CAPSULE, LIQUID FILLED ORAL at 07:51

## 2023-12-10 RX ADMIN — PALIPERIDONE 9 MG: 6 TABLET, EXTENDED RELEASE ORAL at 16:43

## 2023-12-10 RX ADMIN — DOCUSATE SODIUM 100 MG: 100 CAPSULE, LIQUID FILLED ORAL at 21:19

## 2023-12-10 RX ADMIN — DULOXETINE HYDROCHLORIDE 60 MG: 60 CAPSULE, DELAYED RELEASE ORAL at 07:51

## 2023-12-10 RX ADMIN — LURASIDONE HYDROCHLORIDE 120 MG: 40 TABLET, FILM COATED ORAL at 16:42

## 2023-12-10 RX ADMIN — ACYCLOVIR 400 MG: 200 CAPSULE ORAL at 07:51

## 2023-12-10 RX ADMIN — HYDROXYZINE HYDROCHLORIDE 25 MG: 25 TABLET, FILM COATED ORAL at 11:31

## 2023-12-10 RX ADMIN — ACYCLOVIR 400 MG: 200 CAPSULE ORAL at 21:19

## 2023-12-10 RX ADMIN — ASENAPINE MALEATE 5 MG: 5 TABLET SUBLINGUAL at 21:19

## 2023-12-10 RX ADMIN — THERA TABS 1 TABLET: TAB at 07:51

## 2023-12-10 RX ADMIN — POLYETHYLENE GLYCOL 3350 17 G: 17 POWDER, FOR SOLUTION ORAL at 07:50

## 2023-12-10 RX ADMIN — ACYCLOVIR 400 MG: 200 CAPSULE ORAL at 13:43

## 2023-12-10 RX ADMIN — ASENAPINE MALEATE 5 MG: 5 TABLET SUBLINGUAL at 13:43

## 2023-12-11 PROCEDURE — 6370000000 HC RX 637 (ALT 250 FOR IP): Performed by: PSYCHIATRY & NEUROLOGY

## 2023-12-11 PROCEDURE — 1240000000 HC EMOTIONAL WELLNESS R&B

## 2023-12-11 PROCEDURE — 94660 CPAP INITIATION&MGMT: CPT

## 2023-12-11 PROCEDURE — 99232 SBSQ HOSP IP/OBS MODERATE 35: CPT | Performed by: PSYCHIATRY & NEUROLOGY

## 2023-12-11 RX ADMIN — THERA TABS 1 TABLET: TAB at 08:55

## 2023-12-11 RX ADMIN — ACYCLOVIR 400 MG: 200 CAPSULE ORAL at 14:26

## 2023-12-11 RX ADMIN — DULOXETINE HYDROCHLORIDE 60 MG: 60 CAPSULE, DELAYED RELEASE ORAL at 08:55

## 2023-12-11 RX ADMIN — DOCUSATE SODIUM 100 MG: 100 CAPSULE, LIQUID FILLED ORAL at 08:55

## 2023-12-11 RX ADMIN — POLYETHYLENE GLYCOL 3350 17 G: 17 POWDER, FOR SOLUTION ORAL at 08:57

## 2023-12-11 RX ADMIN — LURASIDONE HYDROCHLORIDE 120 MG: 40 TABLET, FILM COATED ORAL at 17:15

## 2023-12-11 RX ADMIN — NYSTATIN: 100000 POWDER TOPICAL at 21:31

## 2023-12-11 RX ADMIN — DOCUSATE SODIUM 100 MG: 100 CAPSULE, LIQUID FILLED ORAL at 21:32

## 2023-12-11 RX ADMIN — DULOXETINE HYDROCHLORIDE 60 MG: 60 CAPSULE, DELAYED RELEASE ORAL at 21:32

## 2023-12-11 RX ADMIN — ACYCLOVIR 400 MG: 200 CAPSULE ORAL at 08:55

## 2023-12-11 RX ADMIN — ASENAPINE MALEATE 5 MG: 5 TABLET SUBLINGUAL at 21:32

## 2023-12-11 RX ADMIN — ACYCLOVIR 400 MG: 200 CAPSULE ORAL at 21:32

## 2023-12-11 RX ADMIN — PALIPERIDONE 9 MG: 6 TABLET, EXTENDED RELEASE ORAL at 17:15

## 2023-12-11 NOTE — PSYCHOTHERAPY
Group Therapy Note    Date: 12/11/2023  Start Time: 1330  End Time:  1400  Number of Participants: 10    Type of Group: SPIRITUALITY     Wellness Binder Information  Module Name:  Mindfulness  Session Number:      Patient's Goal:  To rest the mind. .. Notes:      Status After Intervention:  Improved    Participation Level:  Active Listener and Interactive    Participation Quality: Appropriate, Attentive, and Sharing      Speech:  normal      Thought Process/Content:       Affective Functioning: Congruent      Mood:  Calm      Level of consciousness:  Oriented x4      Response to Learning: Able to verbalize current knowledge/experience and Able to verbalize/acknowledge new learning      Endings:     Modes of Intervention: Education and Activity      Discipline Responsible:       Signature:  Andi Shelton MA [address] [place] [address]

## 2023-12-12 PROCEDURE — 6370000000 HC RX 637 (ALT 250 FOR IP): Performed by: PSYCHIATRY & NEUROLOGY

## 2023-12-12 PROCEDURE — 1240000000 HC EMOTIONAL WELLNESS R&B

## 2023-12-12 PROCEDURE — 94660 CPAP INITIATION&MGMT: CPT

## 2023-12-12 PROCEDURE — 99232 SBSQ HOSP IP/OBS MODERATE 35: CPT | Performed by: PSYCHIATRY & NEUROLOGY

## 2023-12-12 RX ORDER — ASENAPINE MALEATE 2.5 MG/1
2.5 TABLET SUBLINGUAL 2 TIMES DAILY PRN
Status: DISCONTINUED | OUTPATIENT
Start: 2023-12-12 | End: 2023-12-13 | Stop reason: HOSPADM

## 2023-12-12 RX ADMIN — DOCUSATE SODIUM 100 MG: 100 CAPSULE, LIQUID FILLED ORAL at 20:46

## 2023-12-12 RX ADMIN — PALIPERIDONE 9 MG: 6 TABLET, EXTENDED RELEASE ORAL at 16:55

## 2023-12-12 RX ADMIN — DULOXETINE HYDROCHLORIDE 60 MG: 60 CAPSULE, DELAYED RELEASE ORAL at 08:52

## 2023-12-12 RX ADMIN — DULOXETINE HYDROCHLORIDE 60 MG: 60 CAPSULE, DELAYED RELEASE ORAL at 20:47

## 2023-12-12 RX ADMIN — NYSTATIN: 100000 POWDER TOPICAL at 20:47

## 2023-12-12 RX ADMIN — THERA TABS 1 TABLET: TAB at 08:52

## 2023-12-12 RX ADMIN — HYDROXYZINE HYDROCHLORIDE 25 MG: 25 TABLET, FILM COATED ORAL at 20:47

## 2023-12-12 RX ADMIN — ACYCLOVIR 400 MG: 200 CAPSULE ORAL at 20:46

## 2023-12-12 RX ADMIN — NYSTATIN: 100000 POWDER TOPICAL at 15:41

## 2023-12-12 RX ADMIN — ACYCLOVIR 400 MG: 200 CAPSULE ORAL at 08:51

## 2023-12-12 RX ADMIN — ACYCLOVIR 400 MG: 200 CAPSULE ORAL at 14:24

## 2023-12-12 RX ADMIN — LURASIDONE HYDROCHLORIDE 120 MG: 40 TABLET, FILM COATED ORAL at 16:54

## 2023-12-12 RX ADMIN — POLYETHYLENE GLYCOL 3350 17 G: 17 POWDER, FOR SOLUTION ORAL at 08:52

## 2023-12-12 RX ADMIN — DOCUSATE SODIUM 100 MG: 100 CAPSULE, LIQUID FILLED ORAL at 08:52

## 2023-12-13 VITALS
TEMPERATURE: 97.3 F | DIASTOLIC BLOOD PRESSURE: 68 MMHG | HEIGHT: 64 IN | RESPIRATION RATE: 16 BRPM | WEIGHT: 198.41 LBS | SYSTOLIC BLOOD PRESSURE: 117 MMHG | BODY MASS INDEX: 33.87 KG/M2 | OXYGEN SATURATION: 100 % | HEART RATE: 89 BPM

## 2023-12-13 PROBLEM — F31.9 BIPOLAR DEPRESSION (HCC): Status: RESOLVED | Noted: 2020-07-21 | Resolved: 2023-12-13

## 2023-12-13 PROBLEM — R45.851 DEPRESSION WITH SUICIDAL IDEATION: Status: RESOLVED | Noted: 2023-04-24 | Resolved: 2023-12-13

## 2023-12-13 PROBLEM — F32.A DEPRESSION WITH SUICIDAL IDEATION: Status: RESOLVED | Noted: 2023-04-24 | Resolved: 2023-12-13

## 2023-12-13 PROCEDURE — 5130000000 HC BRIDGE APPOINTMENT

## 2023-12-13 PROCEDURE — 6370000000 HC RX 637 (ALT 250 FOR IP): Performed by: PSYCHIATRY & NEUROLOGY

## 2023-12-13 RX ORDER — DULOXETIN HYDROCHLORIDE 60 MG/1
120 CAPSULE, DELAYED RELEASE ORAL 2 TIMES DAILY
Qty: 30 CAPSULE | Refills: 3
Start: 2023-12-13

## 2023-12-13 RX ORDER — PALIPERIDONE 9 MG/1
9 TABLET, EXTENDED RELEASE ORAL
Qty: 30 TABLET | Refills: 3 | Status: SHIPPED | OUTPATIENT
Start: 2023-12-13

## 2023-12-13 RX ORDER — LURASIDONE HYDROCHLORIDE 120 MG/1
120 TABLET, FILM COATED ORAL
Qty: 30 TABLET | Refills: 1 | Status: SHIPPED | OUTPATIENT
Start: 2023-12-13 | End: 2024-01-12

## 2023-12-13 RX ADMIN — THERA TABS 1 TABLET: TAB at 09:11

## 2023-12-13 RX ADMIN — POLYETHYLENE GLYCOL 3350 17 G: 17 POWDER, FOR SOLUTION ORAL at 09:10

## 2023-12-13 RX ADMIN — DULOXETINE HYDROCHLORIDE 60 MG: 60 CAPSULE, DELAYED RELEASE ORAL at 09:10

## 2023-12-13 RX ADMIN — ACYCLOVIR 400 MG: 200 CAPSULE ORAL at 13:37

## 2023-12-13 RX ADMIN — ACYCLOVIR 400 MG: 200 CAPSULE ORAL at 09:11

## 2023-12-13 RX ADMIN — DOCUSATE SODIUM 100 MG: 100 CAPSULE, LIQUID FILLED ORAL at 09:11

## 2023-12-13 NOTE — DISCHARGE SUMMARY
Discharge Summary     Patient ID:  Domingo Saunders  [id number]  2 y.o.  1965    Admit date: 12/6/2023  Discharge date: 12/13/2023    Admitting Physician: Heidi Cummins MD   Attending Physician: Heidi Cummins MD  Discharge Provider: Eboni Olsen MD     Admission Diagnoses:   Bipolar disorder, most recent episode mixed, without psychotic features  Suicidal ideation  Dependent personality disorder  Borderline personality disorder  Hoarding disorder  Obstructive sleep apnea     Discharge Diagnoses:   Bipolar disorder, most recent episode mixed, without psychotic features  Dependent personality disorder  Borderline personality disorder  Hoarding disorder  Obstructive sleep apnea     Admission Condition: poor    Discharged Condition: stable    Indication for Admission: SI    CHIEF COMPLAINT: Suicidal ideations     History obtained from:  patient     HISTORY OF PRESENT ILLNESS (per Dr. Nimco Graves): The patient is a 63 y.o. female with previous psychiatric history of unspecified anxiety, depression, bipolar depression, borderline personality disorder, dependent personality disorder, hoarding disorder, OCD, who has been admitted to our psychiatric unit secondary to suicidal ideations with suicidal plan to electrocute herself. Patient is well-known to psychiatry due to multiple previous admissions to our psychiatric unit with last admission in April 2023. Patient has been seen in treatment team room with presence of the patient's nurse. She reported that recently she became constantly anxious about \"anything I do in the house\", reported that she cannot even take a shower \"as it was directed by my OB/GYN\", stated that she was instructed to take a shower from top to the bottom, however, said that she feels very anxious to follow those instructions. Also, patient reported that she experienced auditory hallucinations, stated that she heard multiple voices, which told her to harm herself and give up.   Patient denies

## 2023-12-13 NOTE — DISCHARGE INSTR - DIET

## 2023-12-13 NOTE — DISCHARGE INSTRUCTIONS
Medications:   Medication Summary Provided. I understand that I should take only the medications on my list.   --why and when I need to take each medication. --which side effects to watch for.   --that I should carry my medication information at all times in case of emergency situations. --I will take all medications until discontinued by physician. I will take all my medications to follow up appointments. --check with my physician or pharmacist before taking any new medication, over the counter product or drink alcohol.   --ask about food, drug and dietary supplement interactions. --discard old lists and update records with medication providers. Behavior Health Follow Up:  Original Referral Source: ED  Discharge Diagnosis: [unfilled]  Recommendations for Level of Care: Follow Up  Patient Status at Discharge: Stable  [facility]  was: Aftercare plan faxed:    Faxed by: Social Work staff [place]   Date: 23   Time:  will fax   Prescriptions sent with pt.     General Information:   Questions regarding your bill: Call [phone]   Suicide Hotline (Rescue Crisis) 7-430.662.6191   To obtain results of pending studies call Medical Records at: [phone]   For emergencies call: 911 24 hour/7 days a week contact information: [phone]

## 2024-01-02 ENCOUNTER — HOSPITAL ENCOUNTER (EMERGENCY)
Age: 59
Discharge: OTHER INSTITUTION | End: 2024-01-03
Attending: PEDIATRICS
Payer: MEDICAID

## 2024-01-02 DIAGNOSIS — R45.851 SUICIDAL IDEATION: Primary | ICD-10-CM

## 2024-01-02 LAB
ALBUMIN SERPL-MCNC: 4.8 G/DL (ref 3.5–5.2)
ALP SERPL-CCNC: 113 U/L (ref 35–104)
ALT SERPL-CCNC: 30 U/L (ref 5–33)
AMPHET UR QL SCN: NEGATIVE
ANION GAP SERPL CALCULATED.3IONS-SCNC: 13 MMOL/L (ref 7–19)
AST SERPL-CCNC: 19 U/L (ref 5–32)
BARBITURATES UR QL SCN: NEGATIVE
BASOPHILS # BLD: 0.1 K/UL (ref 0–0.2)
BASOPHILS NFR BLD: 0.4 % (ref 0–1)
BENZODIAZ UR QL SCN: NEGATIVE
BILIRUB SERPL-MCNC: 0.4 MG/DL (ref 0.2–1.2)
BUN SERPL-MCNC: 14 MG/DL (ref 6–20)
BUPRENORPHINE URINE: NEGATIVE
CALCIUM SERPL-MCNC: 10.6 MG/DL (ref 8.6–10)
CANNABINOIDS UR QL SCN: NEGATIVE
CHLORIDE SERPL-SCNC: 103 MMOL/L (ref 98–111)
CO2 SERPL-SCNC: 24 MMOL/L (ref 22–29)
COCAINE UR QL SCN: NEGATIVE
CREAT SERPL-MCNC: 0.8 MG/DL (ref 0.5–0.9)
DRUG SCREEN COMMENT UR-IMP: NORMAL
EOSINOPHIL # BLD: 0 K/UL (ref 0–0.6)
EOSINOPHIL NFR BLD: 0.3 % (ref 0–5)
ERYTHROCYTE [DISTWIDTH] IN BLOOD BY AUTOMATED COUNT: 12.7 % (ref 11.5–14.5)
ETHANOLAMINE SERPL-MCNC: <10 MG/DL (ref 0–0.08)
FENTANYL SCREEN, URINE: NEGATIVE
FLUAV AG NPH QL: NEGATIVE
FLUBV AG NPH QL: NEGATIVE
GLUCOSE SERPL-MCNC: 120 MG/DL (ref 74–109)
HCG SERPL QL: NEGATIVE
HCT VFR BLD AUTO: 46.5 % (ref 37–47)
HGB BLD-MCNC: 15.5 G/DL (ref 12–16)
IMM GRANULOCYTES # BLD: 0.1 K/UL
LYMPHOCYTES # BLD: 1.4 K/UL (ref 1.1–4.5)
LYMPHOCYTES NFR BLD: 11.9 % (ref 20–40)
MCH RBC QN AUTO: 32.4 PG (ref 27–31)
MCHC RBC AUTO-ENTMCNC: 33.3 G/DL (ref 33–37)
MCV RBC AUTO: 97.1 FL (ref 81–99)
METHADONE UR QL SCN: NEGATIVE
METHAMPHETAMINE, URINE: NEGATIVE
MONOCYTES # BLD: 0.6 K/UL (ref 0–0.9)
MONOCYTES NFR BLD: 5.2 % (ref 0–10)
NEUTROPHILS # BLD: 9.7 K/UL (ref 1.5–7.5)
NEUTS SEG NFR BLD: 81.8 % (ref 50–65)
OPIATES UR QL SCN: NEGATIVE
OXYCODONE UR QL SCN: NEGATIVE
PCP UR QL SCN: NEGATIVE
PLATELET # BLD AUTO: 280 K/UL (ref 130–400)
PMV BLD AUTO: 9.8 FL (ref 9.4–12.3)
POTASSIUM SERPL-SCNC: 4.2 MMOL/L (ref 3.5–5)
PROT SERPL-MCNC: 8 G/DL (ref 6.6–8.7)
RBC # BLD AUTO: 4.79 M/UL (ref 4.2–5.4)
SARS-COV-2 RDRP RESP QL NAA+PROBE: NOT DETECTED
SODIUM SERPL-SCNC: 140 MMOL/L (ref 136–145)
TRICYCLIC, URINE: NEGATIVE
WBC # BLD AUTO: 11.8 K/UL (ref 4.8–10.8)

## 2024-01-02 PROCEDURE — 84703 CHORIONIC GONADOTROPIN ASSAY: CPT

## 2024-01-02 PROCEDURE — 6370000000 HC RX 637 (ALT 250 FOR IP): Performed by: PEDIATRICS

## 2024-01-02 PROCEDURE — 87804 INFLUENZA ASSAY W/OPTIC: CPT

## 2024-01-02 PROCEDURE — 85025 COMPLETE CBC W/AUTO DIFF WBC: CPT

## 2024-01-02 PROCEDURE — 36415 COLL VENOUS BLD VENIPUNCTURE: CPT

## 2024-01-02 PROCEDURE — 80307 DRUG TEST PRSMV CHEM ANLYZR: CPT

## 2024-01-02 PROCEDURE — 87635 SARS-COV-2 COVID-19 AMP PRB: CPT

## 2024-01-02 PROCEDURE — 99285 EMERGENCY DEPT VISIT HI MDM: CPT

## 2024-01-02 PROCEDURE — 80053 COMPREHEN METABOLIC PANEL: CPT

## 2024-01-02 PROCEDURE — G0480 DRUG TEST DEF 1-7 CLASSES: HCPCS

## 2024-01-02 PROCEDURE — 82077 ASSAY SPEC XCP UR&BREATH IA: CPT

## 2024-01-02 RX ORDER — DULOXETIN HYDROCHLORIDE 30 MG/1
60 CAPSULE, DELAYED RELEASE ORAL ONCE
Status: COMPLETED | OUTPATIENT
Start: 2024-01-02 | End: 2024-01-02

## 2024-01-02 RX ORDER — DOCUSATE SODIUM 100 MG/1
100 CAPSULE, LIQUID FILLED ORAL ONCE
Status: COMPLETED | OUTPATIENT
Start: 2024-01-02 | End: 2024-01-02

## 2024-01-02 RX ORDER — LURASIDONE HYDROCHLORIDE 40 MG/1
120 TABLET, FILM COATED ORAL ONCE
Status: DISCONTINUED | OUTPATIENT
Start: 2024-01-02 | End: 2024-01-02 | Stop reason: SDUPTHER

## 2024-01-02 RX ORDER — LURASIDONE HYDROCHLORIDE 40 MG/1
120 TABLET, FILM COATED ORAL ONCE
Status: COMPLETED | OUTPATIENT
Start: 2024-01-02 | End: 2024-01-02

## 2024-01-02 RX ADMIN — LURASIDONE HYDROCHLORIDE 120 MG: 40 TABLET ORAL at 21:18

## 2024-01-02 RX ADMIN — PALIPERIDONE 9 MG: 6 TABLET, EXTENDED RELEASE ORAL at 21:20

## 2024-01-02 RX ADMIN — DULOXETINE HYDROCHLORIDE 60 MG: 30 CAPSULE, DELAYED RELEASE ORAL at 22:19

## 2024-01-02 RX ADMIN — DOCUSATE SODIUM 100 MG: 100 CAPSULE, LIQUID FILLED ORAL at 22:19

## 2024-01-02 ASSESSMENT — PAIN - FUNCTIONAL ASSESSMENT: PAIN_FUNCTIONAL_ASSESSMENT: NONE - DENIES PAIN

## 2024-01-02 ASSESSMENT — PATIENT HEALTH QUESTIONNAIRE - PHQ9
7. TROUBLE CONCENTRATING ON THINGS, SUCH AS READING THE NEWSPAPER OR WATCHING TELEVISION: 1
10. IF YOU CHECKED OFF ANY PROBLEMS, HOW DIFFICULT HAVE THESE PROBLEMS MADE IT FOR YOU TO DO YOUR WORK, TAKE CARE OF THINGS AT HOME, OR GET ALONG WITH OTHER PEOPLE: 2
8. MOVING OR SPEAKING SO SLOWLY THAT OTHER PEOPLE COULD HAVE NOTICED. OR THE OPPOSITE, BEING SO FIGETY OR RESTLESS THAT YOU HAVE BEEN MOVING AROUND A LOT MORE THAN USUAL: 1
SUM OF ALL RESPONSES TO PHQ9 QUESTIONS 1 & 2: 2
2. FEELING DOWN, DEPRESSED OR HOPELESS: 1
6. FEELING BAD ABOUT YOURSELF - OR THAT YOU ARE A FAILURE OR HAVE LET YOURSELF OR YOUR FAMILY DOWN: 1
SUM OF ALL RESPONSES TO PHQ QUESTIONS 1-9: 8
4. FEELING TIRED OR HAVING LITTLE ENERGY: 1
SUM OF ALL RESPONSES TO PHQ QUESTIONS 1-9: 7
1. LITTLE INTEREST OR PLEASURE IN DOING THINGS: 1
9. THOUGHTS THAT YOU WOULD BE BETTER OFF DEAD, OR OF HURTING YOURSELF: 1
5. POOR APPETITE OR OVEREATING: 0
3. TROUBLE FALLING OR STAYING ASLEEP: 1

## 2024-01-02 NOTE — PROGRESS NOTES
[facility] ADULT INITIAL INTAKE ASSESSMENT     1/2/24    Emily Hernández ,a 58 y.o. female, presents to the ED for a psychiatric assessment.     ED Arrival time:   ED physician: MD Jose J  [facility] Notification time: IN ER  [facility] Assessment start time: 1720  Psychiatrist call time:   Spoke with Dr. Walker    Patient is referred by:  Parents brought her     Reason for visit to ED - Presenting problem:     PT states reason for ED visit, \"I tried to choke myself with some candy today and stuck tweezers in a socket. Pt reports it was in an attempt to kill herself. Pt reports feeling suicidal denies HI/AVH at this time. Pt reports she goes to Dr. Jensen at [facility] and her therapist is Alireza at [facility]. Pt reports she is med compliant. Pt has been diagnosed with Bipolar Disorder, Dependent Personality Disorder, Borderline Personality Disorder, and Hoarding Disorder. Pt reports she is sleeping too much and her appetite is good.  Pt denies alcohol and drug use. Pt lives alone and is disabled.Pt has had multiple past suicide attempts and multiple inpt psychiatric hospitalizations.  Pt reports \"I've been punishing myself for sins I've committed in the past. When pt was asked what her stressors were pt responded \" I have a phobia of getting in the shower.\"    Duration of symptoms: Couple days     Current Stressors: Phobia of getting showers    C-SSRS Completed: yes  Risk Assessment Completed:yes  Risk of Suicide: High Risk  Provider notified of the C-SSRS Screening and Risk Assessment Findings:yes    SI:  reports   Plan: yes   If yes, describe:choke on candy or electrocute herself  Past SI attempts: yes   If yes, when and how many times: 5-6  Describe suicide attempts: OD, drank antibacterial , drank spic   HI: denies  If yes describe:   Delusions: denies  If yes describe:   Hallucinations: denies   If yes describe:   Risk of Harm to self: Self injurious/self mutilation behaviors no   If yes explain:   Was it within the  No    Opiates: It was discussed with pt they would not be receiving opiates unless they were within 3 days post surgery/acute injury. Patient voiced understanding and agreed.        Medical History:     Medical Diagnosis/Issues:    Chest pain 02/08/2012    CPAP (continuous positive airway pressure) dependence      Depression      Hypoglycemia      SANDY (obstructive sleep apnea)      Schizoaffective disorder (HCC) 02/08/2012    Suicide attempt (HCC)      CT today in ED:no  Use of 02 or CPAP: yes  Ambulatory: yes  Independent or Need assistance with Self Care: Indpendent    PCP: Michelle Cox APRN - CNP     Current Medications:   Scheduled Meds: No current facility-administered medications for this encounter.    Current Outpatient Medications:     DULoxetine (CYMBALTA) 60 MG extended release capsule, Take 2 capsules by mouth 2 times daily, Disp: 30 capsule, Rfl: 3    lurasidone (LATUDA) 120 MG tablet, Take 1 tablet by mouth Daily with supper, Disp: 30 tablet, Rfl: 1    paliperidone (INVEGA) 9 MG extended release tablet, Take 1 tablet by mouth Daily with supper, Disp: 30 tablet, Rfl: 3    BENZOYL PEROXIDE 5 % external wash, Apply topically daily Apply to the face and groin area once daily., Disp: , Rfl:     nystatin (MYCOSTATIN) 401130 UNIT/GM powder, Apply topically 2 times daily as needed (apply to affected area), Disp: , Rfl:     Multiple Vitamins-Minerals (MULTIVITAMIN GUMMIES ADULT PO), Take by mouth daily Chew 2 gummies by mouth daily, Disp: , Rfl:     paliperidone (INVEGA) 6 MG extended release tablet, Take 1 tablet by mouth daily, Disp: 30 tablet, Rfl: 1    Ergocalciferol (VITAMIN D) 96232 units CAPS, Take 50,000 Units by mouth once a week, Disp: 11 capsule, Rfl: 0    clindamycin (CLEOCIN T) 1 % external solution, Apply topically daily Apply topically to the groin and abdomen once daily when and where lesions are present as needed., Disp: , Rfl:     Multiple Vitamins-Minerals (THERAPEUTIC

## 2024-01-03 VITALS
HEART RATE: 86 BPM | OXYGEN SATURATION: 97 % | WEIGHT: 198 LBS | BODY MASS INDEX: 33.8 KG/M2 | DIASTOLIC BLOOD PRESSURE: 72 MMHG | HEIGHT: 64 IN | RESPIRATION RATE: 16 BRPM | SYSTOLIC BLOOD PRESSURE: 112 MMHG | TEMPERATURE: 98.2 F

## 2024-01-03 PROCEDURE — 6370000000 HC RX 637 (ALT 250 FOR IP): Performed by: PEDIATRICS

## 2024-01-03 RX ORDER — DULOXETIN HYDROCHLORIDE 30 MG/1
60 CAPSULE, DELAYED RELEASE ORAL 2 TIMES DAILY
Status: DISCONTINUED | OUTPATIENT
Start: 2024-01-03 | End: 2024-01-03 | Stop reason: HOSPADM

## 2024-01-03 RX ORDER — TROSPIUM CHLORIDE 20 MG/1
20 TABLET, FILM COATED ORAL
Status: DISCONTINUED | OUTPATIENT
Start: 2024-01-03 | End: 2024-01-03 | Stop reason: HOSPADM

## 2024-01-03 RX ADMIN — DULOXETINE 60 MG: 30 CAPSULE, DELAYED RELEASE ORAL at 08:00

## 2024-01-03 RX ADMIN — TROSPIUM CHLORIDE 20 MG: 20 TABLET, FILM COATED ORAL at 08:00

## 2024-01-08 NOTE — ED PROVIDER NOTES
normal. There is no distension.      Palpations: Abdomen is soft.      Tenderness: There is no abdominal tenderness. There is no guarding or rebound.   Musculoskeletal:         General: No tenderness or deformity.      Cervical back: Neck supple. No rigidity.   Skin:     General: Skin is warm and dry.      Capillary Refill: Capillary refill takes less than 2 seconds.      Coloration: Skin is not jaundiced.   Neurological:      General: No focal deficit present.      Mental Status: She is alert and oriented to person, place, and time. Mental status is at baseline.      Motor: No weakness.      Coordination: Coordination normal.   Psychiatric:         Mood and Affect: Mood normal.         Behavior: Behavior normal. Behavior is cooperative.         Thought Content: Thought content includes suicidal ideation. Thought content does not include homicidal ideation. Thought content includes suicidal plan.         DIAGNOSTIC RESULTS           No orders to display           LABS:  Labs Reviewed   CBC WITH AUTO DIFFERENTIAL - Abnormal; Notable for the following components:       Result Value    WBC 11.8 (*)     MCH 32.4 (*)     Neutrophils % 81.8 (*)     Lymphocytes % 11.9 (*)     Neutrophils Absolute 9.7 (*)     All other components within normal limits   COMPREHENSIVE METABOLIC PANEL - Abnormal; Notable for the following components:    Glucose 120 (*)     Calcium 10.6 (*)     Alkaline Phosphatase 113 (*)     All other components within normal limits   COVID-19, RAPID   RAPID INFLUENZA A/B ANTIGENS   ETHANOL   DRUG SCRN, BUPRENORPHINE   HCG, SERUM, QUALITATIVE       All other labs were within normal range or not returned as of this dictation.    EMERGENCY DEPARTMENT COURSE and DIFFERENTIAL DIAGNOSIS/MDM:   Vitals:    Vitals:    01/02/24 1624 01/02/24 2118 01/03/24 0500 01/03/24 0721   BP: 121/82  106/62 112/72   Pulse: 99  86 86   Resp: 16  20 16   Temp: 98.2 °F (36.8 °C)  98.2 °F (36.8 °C) 98.2 °F (36.8 °C)   TempSrc: Oral

## 2024-02-14 ENCOUNTER — TELEPHONE (OUTPATIENT)
Dept: OBSTETRICS AND GYNECOLOGY | Age: 59
End: 2024-02-14

## 2024-02-14 NOTE — TELEPHONE ENCOUNTER
Attempted to call pt, no answer. LVM stating she does not need to r/s her appt.   Okay for HUB to read.

## 2024-02-14 NOTE — TELEPHONE ENCOUNTER
Caller: Edith Stephens    Relationship to patient: Self    Best call back number: [phone]    Patient is needing: PATIENT CALLED AND STATED THAT THE E.D. GAVE HER AN ANTIBIOTIC FOR A UTI TO START TODAY AND TAKE FOR 7 DAYS     PATIENT WOULD LIKE TO KNOW IF SHE NEEDS TO R/S HER ANNUAL WHICH IS CURRENTLY SCHED. FOR 2/19/24

## 2024-02-19 ENCOUNTER — TELEPHONE (OUTPATIENT)
Dept: OBSTETRICS AND GYNECOLOGY | Age: 59
End: 2024-02-19

## 2024-02-19 NOTE — TELEPHONE ENCOUNTER
Provider: GAYATHRI FLORES    Caller: LISANDRO GEORGE    Phone Number: [phone]    Reason for Call: SAME DAY CANCELLATION//SICK// WILL CALL BACK TO RESCHEDULE

## 2024-03-07 ENCOUNTER — TELEPHONE (OUTPATIENT)
Dept: NEUROLOGY | Age: 59
End: 2024-03-07

## 2024-03-07 NOTE — TELEPHONE ENCOUNTER
Called and left patient a VM to let her know that her appointment for 06-21-24 with IRAM Craft had to be rescheduled due to the provider is not in the office on Fridays anymore. Left on VM of when I have patient appointment rescheduled too.

## 2024-03-15 DIAGNOSIS — A60.04 HERPES, VULVAR: ICD-10-CM

## 2024-03-15 RX ORDER — VALACYCLOVIR HYDROCHLORIDE 500 MG/1
500 TABLET, FILM COATED ORAL DAILY
Qty: 30 TABLET | Refills: 12 | OUTPATIENT
Start: 2024-03-15

## 2024-03-15 RX ORDER — VALACYCLOVIR HYDROCHLORIDE 500 MG/1
500 TABLET, FILM COATED ORAL DAILY
Qty: 30 TABLET | Refills: 0 | Status: SHIPPED | OUTPATIENT
Start: 2024-03-15

## 2024-03-18 ENCOUNTER — TELEPHONE (OUTPATIENT)
Dept: OBSTETRICS AND GYNECOLOGY | Age: 59
End: 2024-03-18
Payer: COMMERCIAL

## 2024-03-18 DIAGNOSIS — A60.04 HERPES, VULVAR: ICD-10-CM

## 2024-03-18 RX ORDER — VALACYCLOVIR HYDROCHLORIDE 500 MG/1
500 TABLET, FILM COATED ORAL DAILY
Qty: 30 TABLET | Refills: 12 | OUTPATIENT
Start: 2024-03-18

## 2024-03-18 NOTE — TELEPHONE ENCOUNTER
Pt called to request refill of her Valtrex. Pt advised that a refill was sent to [facility] on 03/15/24. Pt states she has checked and they did not receive it. I called and spoke with Shannan at [facility] and she was able to verify that they had not received the script for Valtrex, so I gave her a verbal on the phone with the same directions listed on script sent on 03/15/24. Shannan voiced understanding. Attempted to notify pt. Someone answered but I was not able to hear them

## 2024-03-18 NOTE — TELEPHONE ENCOUNTER
Spoke with pt and advised to check with pharmacy later today regarding her script. Pt voiced understanding

## 2024-03-19 NOTE — PROGRESS NOTES
[facility] Adult Unit Daily Assessment  Nursing Progress Note     Room: [facility]/609-01   Name: Elgin Poole   Age: 65 y.o. Gender: female   Dx: Bipolar depression ([address]  Precautions: close watch, suicide risk, and fall risk  Inpatient Status: voluntary         SLEEP:  Sleep Quality Good  Sleep Medications: Yes melatonin     PRN Sleep Meds: No         MEDICAL:  Other PRN Meds: Yes   Med Compliant: Yes  Accu-Chek: No  Oxygen/CPAP/BiPAP: Yes  CIWA/CINA: No   PAIN Assessment: none  Side Effects from medication: No                   Medical Bed:   Is patient in a medical bed? no   If medical bed is in use, has nursing secured room while patient is awake and out of the room? NA  Has safety checks by nursing been completed on the bed/room this shift? NA     Protective Factors:  Patient identifies protective factors with nursing staff as follows: Identifies reasons for living: Yes              Supportive Social Network or family: Yes               Belief that suicide is immoral/high spirituality: Yes              Responsibility to family or others/living with family: Yes              Fear of death or dying due to pain and suffering: Yes              Engaged in work or school: No                If Patient is unable to identify, reason why? N/a        PSYCH:  Depression: 7   Anxiety: 6   SI denies suicidal ideation   Risk of Suicide: No Risk  HI Negative for homicidal ideation      AVH:no If Hallucinations are present, describe? N/a           GENERAL:  Appetite: good   Percent Meals: 100%   Social: Yes   Speech: normal   Appearance: appropriately dressed, good hygiene, and healthy looking     GROUP:  Group Participation: Yes  Participation Quality: Active Listener     Notes: Patient is observed in day area wearing casual attire. She is appropriately dressed and well groomed. Took shower and did ADLs this am. Alert and oriented x 4. Pleasant, calm, and cooperative. Good eye contact with worried expression on face.  Thought Patient brought in by EMS for allergic reaction.  Per EMS report patient was at home ate cheddar chips developed diffuse itching shortness of breath and wheezing was given 50 mg of Benadryl without improvement so was then given an EpiPen with improvement of his symptoms.    Plan Pepcid Solu-Medrol and observe    Differential including but limited to allergic reaction to food no evidence of anaphylaxis at this time processes are slow. Compliant with medications with no side effects noted. Reports good appetite and fair sleep last night. Social and attending groups. Denies suicidal ideation, homicidal ideation, and hallucinations. No evidence of delusions or paranoia.      Electronically signed by Concetta Gong RN on 12/3/22 at 10:45 AM CST Patient brought in by EMS for allergic reaction.  Per EMS report patient has history of dairy allergy was at group home ate cheddar chips developed diffuse itching shortness of breath and wheezing was given 50 mg of Benadryl without improvement so was then given an EpiPen with improvement of his symptoms.    Plan Pepcid Solu-Medrol and observe    Differential including but limited to allergic reaction to food no evidence of anaphylaxis at this time

## 2024-04-22 ENCOUNTER — OFFICE VISIT (OUTPATIENT)
Dept: OBSTETRICS AND GYNECOLOGY | Age: 59
End: 2024-04-22
Payer: COMMERCIAL

## 2024-04-22 VITALS
DIASTOLIC BLOOD PRESSURE: 80 MMHG | SYSTOLIC BLOOD PRESSURE: 104 MMHG | BODY MASS INDEX: 32.1 KG/M2 | HEIGHT: 64 IN | WEIGHT: 188 LBS

## 2024-04-22 DIAGNOSIS — R21 RASH: ICD-10-CM

## 2024-04-22 DIAGNOSIS — A60.04 HERPES, VULVAR: ICD-10-CM

## 2024-04-22 DIAGNOSIS — F33.9 RECURRENT MAJOR DEPRESSIVE DISORDER, REMISSION STATUS UNSPECIFIED: ICD-10-CM

## 2024-04-22 DIAGNOSIS — Z13.820 ENCOUNTER FOR SCREENING FOR OSTEOPOROSIS: ICD-10-CM

## 2024-04-22 DIAGNOSIS — Z12.31 ENCOUNTER FOR SCREENING MAMMOGRAM FOR BREAST CANCER: ICD-10-CM

## 2024-04-22 DIAGNOSIS — E55.9 VITAMIN D DEFICIENCY: ICD-10-CM

## 2024-04-22 DIAGNOSIS — Z01.419 WELL WOMAN EXAM WITH ROUTINE GYNECOLOGICAL EXAM: Primary | ICD-10-CM

## 2024-04-22 PROCEDURE — G0123 SCREEN CERV/VAG THIN LAYER: HCPCS | Performed by: NURSE PRACTITIONER

## 2024-04-22 PROCEDURE — 1159F MED LIST DOCD IN RCRD: CPT | Performed by: NURSE PRACTITIONER

## 2024-04-22 PROCEDURE — 99213 OFFICE O/P EST LOW 20 MIN: CPT | Performed by: NURSE PRACTITIONER

## 2024-04-22 PROCEDURE — 1160F RVW MEDS BY RX/DR IN RCRD: CPT | Performed by: NURSE PRACTITIONER

## 2024-04-22 PROCEDURE — 99396 PREV VISIT EST AGE 40-64: CPT | Performed by: NURSE PRACTITIONER

## 2024-04-22 PROCEDURE — 99459 PELVIC EXAMINATION: CPT | Performed by: NURSE PRACTITIONER

## 2024-04-22 PROCEDURE — 87624 HPV HI-RISK TYP POOLED RSLT: CPT | Performed by: NURSE PRACTITIONER

## 2024-04-22 RX ORDER — NYSTATIN 100000 [USP'U]/G
POWDER TOPICAL 2 TIMES DAILY
Qty: 60 G | Refills: 3 | Status: SHIPPED | OUTPATIENT
Start: 2024-04-22

## 2024-04-22 RX ORDER — VALACYCLOVIR HYDROCHLORIDE 500 MG/1
500 TABLET, FILM COATED ORAL DAILY
Qty: 30 TABLET | Refills: 12 | Status: SHIPPED | OUTPATIENT
Start: 2024-04-22

## 2024-04-22 RX ORDER — ERGOCALCIFEROL 1.25 MG/1
50000 CAPSULE ORAL WEEKLY
Qty: 5 CAPSULE | Refills: 12 | Status: SHIPPED | OUTPATIENT
Start: 2024-04-22

## 2024-04-22 NOTE — PATIENT INSTRUCTIONS
Health Maintenance, Female  Adopting a healthy lifestyle and getting preventive care are important in promoting health and wellness. Ask your health care provider about:  The right schedule for you to have regular tests and exams.  Things you can do on your own to prevent diseases and keep yourself healthy.  What should I know about diet, weight, and exercise?  Eat a healthy diet    Eat a diet that includes plenty of vegetables, fruits, low-fat dairy products, and lean protein.  Do not eat a lot of foods that are high in solid fats, added sugars, or sodium.    Maintain a healthy weight  Body mass index (BMI) is used to identify weight problems. It estimates body fat based on height and weight. Your health care provider can help determine your BMI and help you achieve or maintain a healthy weight.  Get regular exercise  Get regular exercise. This is one of the most important things you can do for your health. Most adults should:  Exercise for at least 150 minutes each week. The exercise should increase your heart rate and make you sweat (moderate-intensity exercise).  Do strengthening exercises at least twice a week. This is in addition to the moderate-intensity exercise.  Spend less time sitting. Even light physical activity can be beneficial.  Start  Vitamin D 5000IU per day. Vitamin D has been shown to improve your mood, help with fatigue and increase your immune system. A normal Vitamin D in women should be 50-70.  You should have your Vitamin D checked yearly  Watch cholesterol and blood lipids  Have your blood tested for lipids and cholesterol at 20 years of age, then have this test every 3 years.  Have your cholesterol levels checked more often if:  Your lipid or cholesterol levels are high.  You are older than 30 years of age.  You are at high risk for heart disease.  What should I know about cancer screening?  Depending on your health history and family history, you may need to have cancer screening at  various ages. This may include screening for:  Breast cancer.  Cervical cancer.  Colorectal cancer.  Skin cancer.  Lung cancer.  What should I know about heart disease, diabetes, and high blood pressure?  Blood pressure and heart disease  High blood pressure causes heart disease and increases the risk of stroke. This is more likely to develop in people who have high blood pressure readings, are of  descent, or are overweight.  Have your blood pressure checked:                  Every year.  Diabetes  Have regular diabetes screenings. This checks your fasting blood sugar level. Have the screening done:  Once every year after age 30 if you are at a normal weight and have a low risk for diabetes.  More often and at a younger age if you are overweight or have a high risk for diabetes.  What should I know about preventing infection?  Hepatitis B  If you have a higher risk for hepatitis B, you should be screened for this virus. Talk with your health care provider to find out if you are at risk for hepatitis B infection.  Hepatitis C  Testing is recommended for:  Everyone born from 1945 through 1965.  Anyone with known risk factors for hepatitis C.  Sexually transmitted infections (STIs)  Get screened for STIs, including gonorrhea and chlamydia, if:  You are sexually active and are younger than 24 years of age.  You are older than 24 years of age and your health care provider tells you that you are at risk for this type of infection.  Your sexual activity has changed since you were last screened, and you are at increased risk for chlamydia or gonorrhea. Ask your health care provider if you are at risk.  Ask your health care provider about whether you are at high risk for HIV. Your health care provider may recommend a prescription medicine to help prevent HIV infection. If you choose to take medicine to prevent HIV, you should first get tested for HIV. You should then be tested every 3 months for as long as you are  taking the medicine.  Pregnancy  If you are about to stop having your period (premenopausal) and you may become pregnant, seek counseling before you get pregnant.  Take 400 to 800 micrograms (mcg) of folic acid every day if you become pregnant.  Ask for birth control (contraception) if you want to prevent pregnancy.  Osteoporosis and menopause  Osteoporosis is a disease in which the bones lose minerals and strength with aging. This can result in bone fractures. If you are 55 years old or older, or if you are at risk for osteoporosis and fractures, ask your health care provider if you should:  Be screened for bone loss.  Take a calcium or vitamin D supplement to lower your risk of fractures.  Be given hormone replacement therapy (HRT) to treat symptoms of menopause.  Follow these instructions at home:  Lifestyle  Do not use any products that contain nicotine or tobacco, such as cigarettes, e-cigarettes, and chewing tobacco. If you need help quitting, ask your health care provider.  Do not use street drugs.  Do not share needles.  Ask your health care provider for help if you need support or information about quitting drugs.  Alcohol use  Do not drink alcohol if:  Your health care provider tells you not to drink.  You are pregnant, may be pregnant, or are planning to become pregnant.  If you drink alcohol:  Limit how much you use to 0-1 drink a day.  Limit intake if you are breastfeeding.  Be aware of how much alcohol is in your drink. In the [place] one drink equals one 12 oz bottle of beer (355 mL), one 5 oz glass of wine (148 mL), or one 1½ oz glass of hard liquor (44 mL).  General instructions  Schedule regular health, dental, and eye exams.  Stay current with your vaccines.  Tell your health care provider if:  You often feel depressed.  You have ever been abused or do not feel safe at home.  Summary  Adopting a healthy lifestyle and getting preventive care are important in promoting health and wellness.  Follow  your health care provider's instructions about healthy diet, exercising, and getting tested or screened for diseases.  Follow your health care provider's instructions on monitoring your cholesterol and blood pressure.  This information is not intended to replace advice given to you by your health care provider. Make sure you discuss any questions you have with your health care provider.  Document Revised: 12/11/2019 Document Reviewed: 12/11/2019  [employer] Patient Education © 2021 [employer] Breast Self-Awareness  Breast self-awareness is knowing how your breasts look and feel. You need to:  Check your breasts on a regular basis.  Tell your doctor about any changes.  Become familiar with the look and feel of your breasts. This can help you catch a breast problem while it is still small and can be treated. You should do breast self-exams even if you have breast implants.  What you need:  A mirror.  A well-lit room.  A pillow or other soft object.  How to do a breast self-exam  Follow these steps to do a breast self-exam:  Look for changes    Take off all the clothes above your waist.   front of a mirror in a room with good lighting.  Put your hands down at your sides.  Compare your breasts in the mirror. Look for any difference between them, such as:  A difference in shape.  A difference in size.  Wrinkles, dips, and bumps in one breast and not the other.  Look at each breast for changes in the skin, such as:  Redness.  Scaly areas.  Skin that has gotten thicker.  Dimpling.  Open sores (ulcers).  Look for changes in your nipples, such as:  Fluid coming out of a nipple.  Fluid around a nipple.  Bleeding.  Dimpling.  Redness.  A nipple that looks pushed in (retracted), or that has changed position.  Feel for changes  Lie on your back.  Feel each breast. To do this:  Pick a breast to feel.  Place a pillow under the shoulder closest to that breast. Put the arm closest to that breast behind your head.  Feel the  nipple area of that breast using the hand of your other arm. Feel the area with the pads of your three middle fingers by making small circles with your fingers. Use light, medium, and firm pressure.  Continue the overlapping circles, moving downward over the breast. Keep making circles with your fingers. Stop when you feel your ribs.  Start making circles with your fingers again, this time going upward until you reach your collarbone.  Then, make circles outward across your breast and into your armpit area.  Squeeze your nipple. Check for discharge and lumps.  Repeat these steps to check your other breast.  Sit or  the tub or shower.  With soapy water on your skin, feel each breast the same way you did when you were lying down.  Write down what you find  Writing down what you find can help you remember what to tell your doctor. Write down:  What is normal for each breast.  Any changes you find in each breast. These include:  The kind of changes you find.  A tender or painful breast.  Any lump you find. Write down its size and where it is.  When you last had your monthly period (menstrual cycle).  General tips  If you are breastfeeding, the best time to check your breasts is after you feed your baby or after you use a breast pump.  If you get monthly bleeding, the best time to check your breasts is 5-7 days after your monthly cycle ends.  With time, you will become comfortable with the self-exam. You will also start to know if there are changes in your breasts.  Contact a doctor if:  You see a change in the shape or size of your breasts or nipples.  You see a change in the skin of your breast or nipples, such as red or scaly skin.  You have fluid coming from your nipples that is not normal.  You find a new lump or thick area.  You have breast pain.  You have any concerns about your breast health.  Summary  Breast self-awareness includes looking for changes in your breasts and feeling for changes within your  breasts.  You should do breast self-awareness in front of a mirror in a well-lit room.  If you get monthly periods (menstrual cycles), the best time to check your breasts is 5-7 days after your period ends.  Tell your doctor about any changes you see in your breasts. Changes include changes in size, changes on the skin, painful or tender breasts, or fluid from your nipples that is not normal.  This information is not intended to replace advice given to you by your health care provider. Make sure you discuss any questions you have with your health care provider.  Document Revised: 05/25/2023 Document Reviewed: 10/20/2022  [employer] Patient Education © 2024 [employer]

## 2024-04-22 NOTE — PROGRESS NOTES
"Subjective     Edith Stephens is a 59 y.o. female    History of Present Illness  Patient is here today for yearly checkup.  She has no GYN complaints.  She denies any spotting or bleeding.  She is still having significant depression and is followed by her primary care.  They have recently changed her medication and she feels she is some improved.  She is also still seeing a therapist.  Gynecologic Exam  The patient's pertinent negatives include no pelvic pain, vaginal bleeding or vaginal discharge. The patient is experiencing no pain. Pertinent negatives include no abdominal pain, anorexia, back pain, chills, constipation, diarrhea, discolored urine, dysuria, fever, flank pain, frequency, headaches, hematuria, joint pain, joint swelling, nausea, painful intercourse, rash, sore throat, urgency or vomiting. She is sexually active. She is postmenopausal.         /80   Ht 162.6 cm (64.02\")   Wt 85.3 kg (188 lb)   LMP 12/13/2015 (Approximate)   BMI 32.25 kg/m²     Outpatient Encounter Medications as of 4/22/2024   Medication Sig Dispense Refill    Denta 5000 Plus 1.1 % cream       docosanol (ABREVA) 10 % cream cream Abreva 10 % topical cream      docusate sodium (COLACE) 100 MG capsule       DULoxetine (CYMBALTA) 30 MG capsule duloxetine 30 mg capsule,delayed release      Lurasidone HCl (LATUDA) 120 MG tablet tablet Latuda 120 mg tablet      Multiple Vitamins-Minerals (MULTIVITAMIN GUMMIES ADULT PO)       Myrbetriq 50 MG tablet sustained-release 24 hour 24 hr tablet TAKE ONE TABLET BY MOUTH DAILY 30 tablet 3    nystatin 408011 UNIT/GM powder Apply  topically to the appropriate area as directed 2 (Two) Times a Day. 60 g 3    valACYclovir (VALTREX) 500 MG tablet Take 1 tablet by mouth Daily. 30 tablet 12    vitamin D (ERGOCALCIFEROL) 1.25 MG (45072 UT) capsule capsule Take 1 capsule by mouth 1 (One) Time Per Week. 5 capsule 12    [DISCONTINUED] nystatin 224941 UNIT/GM powder       [DISCONTINUED] valACYclovir " (VALTREX) 500 MG tablet Take 1 tablet by mouth Daily. 30 tablet 0    [DISCONTINUED] vitamin D (ERGOCALCIFEROL) 1.25 MG (04255 UT) capsule capsule Take 1 capsule by mouth 1 (One) Time Per Week. 5 capsule 12    [DISCONTINUED] azithromycin (Zithromax Z-Chip) 250 MG tablet Take 2 tablets the first day, then 1 tablet daily for 4 days. 6 tablet 0    [DISCONTINUED] clindamycin (CLEOCIN T) 1 % external solution   3    [DISCONTINUED] cloNIDine (CATAPRES) 0.1 MG tablet       [DISCONTINUED] glucose blood test strip FreeStyle Lite Strips      [DISCONTINUED] Hydroquinone 4 % emulsion Apply  topically.      [DISCONTINUED] hydrOXYzine (ATARAX) 25 MG tablet       [DISCONTINUED] Latuda 80 MG tablet tablet       [DISCONTINUED] metFORMIN (GLUCOPHAGE) 500 MG tablet metformin 500 mg tablet   TAKE ONE TABLET BY MOUTH TWICE A DAY -TAKE WITH FOOD      [DISCONTINUED] Multiple Vitamins-Minerals (Multivitamin Gummies Adult) chewable tablet Adult Multivitamin Gummies 200 mcg chewable tablet       No facility-administered encounter medications on file as of 4/22/2024.       Past Medical History  Past Medical History:   Diagnosis Date    Abnormal vaginal bleeding     Anxiety     Bipolar disorder     Depression     Diabetes mellitus     GERD (gastroesophageal reflux disease)     Herpes     Hypoglycemia     Hypoglycemic reaction     PONV (postoperative nausea and vomiting)     Psychiatric illness     PTSD (post-traumatic stress disorder)     Suicide attempt        Surgical History  Past Surgical History:   Procedure Laterality Date    CERVICAL BIOPSY  W/ LOOP ELECTRODE EXCISION      CYST REMOVAL      From foot as a child    D & C HYSTEROSCOPY N/A 05/30/2017    Procedure: DILATATION AND CURETTAGE HYSTEROSCOPY;  Surgeon: Citlali Arita MD;  Location: [facility] OR;  Service:        Family History  Family History   Problem Relation Age of Onset    Hypertension Father     Coronary artery disease Paternal Grandfather     Coronary artery disease Paternal  Grandmother     Diabetes Paternal Grandmother     Kidney cancer Maternal Grandfather     Thyroid disease Mother     No Known Problems Sister     Breast cancer Neg Hx     Ovarian cancer Neg Hx     Uterine cancer Neg Hx     Colon cancer Neg Hx     Melanoma Neg Hx     Prostate cancer Neg Hx        The following portions of the patient's history were reviewed and updated as appropriate: allergies, current medications, past family history, past medical history, past social history, past surgical history, and problem list.    Review of Systems   Constitutional:  Negative for activity change, appetite change, chills, diaphoresis, fatigue, fever, unexpected weight gain and unexpected weight loss.   HENT:  Negative for congestion, dental problem, drooling, ear discharge, ear pain, facial swelling, hearing loss, mouth sores, nosebleeds, postnasal drip, rhinorrhea, sinus pressure, sneezing, sore throat, swollen glands, tinnitus, trouble swallowing and voice change.    Eyes:  Negative for blurred vision, double vision, photophobia, pain, discharge, redness, itching and visual disturbance.   Respiratory:  Negative for apnea, cough, choking, chest tightness, shortness of breath, wheezing and stridor.    Cardiovascular:  Negative for chest pain, palpitations and leg swelling.   Gastrointestinal:  Negative for abdominal distention, abdominal pain, anal bleeding, anorexia, blood in stool, constipation, diarrhea, nausea, rectal pain, vomiting, GERD and indigestion.   Endocrine: Negative for cold intolerance, heat intolerance, polydipsia, polyphagia and polyuria.   Genitourinary:  Negative for amenorrhea, breast discharge, breast lump, breast pain, decreased libido, decreased urine volume, difficulty urinating, dyspareunia, dysuria, flank pain, frequency, genital sores, hematuria, menstrual problem, pelvic pain, pelvic pressure, urgency, urinary incontinence, vaginal bleeding, vaginal discharge and vaginal pain.   Musculoskeletal:   Negative for arthralgias, back pain, gait problem, joint pain, joint swelling, myalgias, neck pain, neck stiffness and bursitis.   Skin:  Negative for color change, dry skin and rash.   Allergic/Immunologic: Negative for environmental allergies, food allergies and immunocompromised state.   Neurological:  Negative for dizziness, tremors, seizures, syncope, facial asymmetry, speech difficulty, weakness, light-headedness, numbness, headache, memory problem and confusion.   Hematological:  Negative for adenopathy. Does not bruise/bleed easily.   Psychiatric/Behavioral:  Positive for sleep disturbance and depressed mood. Negative for agitation, behavioral problems, decreased concentration, dysphoric mood, hallucinations, self-injury, suicidal ideas, negative for hyperactivity and stress. The patient is not nervous/anxious.        Objective   Physical Exam  Vitals and nursing note reviewed. Exam conducted with a chaperone present.   Constitutional:       General: She is not in acute distress.     Appearance: She is well-developed. She is not diaphoretic.   HENT:      Head: Normocephalic.      Right Ear: External ear normal.      Left Ear: External ear normal.      Nose: Nose normal.   Eyes:      General: No scleral icterus.        Right eye: No discharge.         Left eye: No discharge.      Conjunctiva/sclera: Conjunctivae normal.      Pupils: Pupils are equal, round, and reactive to light.   Neck:      Thyroid: No thyromegaly.      Vascular: No carotid bruit.      Trachea: No tracheal deviation.   Cardiovascular:      Rate and Rhythm: Normal rate and regular rhythm.      Heart sounds: Normal heart sounds. No murmur heard.  Pulmonary:      Effort: Pulmonary effort is normal. No respiratory distress.      Breath sounds: Normal breath sounds. No wheezing.   Chest:   Breasts:     Breasts are symmetrical.      Right: Normal. No swelling, bleeding, inverted nipple, mass, nipple discharge, skin change or tenderness.       Left: Normal. No swelling, bleeding, inverted nipple, mass, nipple discharge, skin change or tenderness.   Abdominal:      General: There is no distension.      Palpations: Abdomen is soft. There is no mass.      Tenderness: There is no abdominal tenderness. There is no right CVA tenderness, left CVA tenderness or guarding.      Hernia: No hernia is present. There is no hernia in the left inguinal area or right inguinal area.   Genitourinary:     General: Normal vulva.      Exam position: Lithotomy position.      Labia:         Right: No rash, tenderness, lesion or injury.         Left: No rash, tenderness, lesion or injury.       Vagina: Normal. No signs of injury and foreign body. No vaginal discharge, erythema, tenderness or bleeding.      Cervix: Normal.      Uterus: Normal. Not enlarged, not fixed and not tender.       Adnexa: Right adnexa normal and left adnexa normal.        Right: No mass, tenderness or fullness.          Left: No mass, tenderness or fullness.        Rectum: Normal. No mass.      Comments:   BSU normal  Urethral meatus  Normal  Perineum  Normal  Musculoskeletal:         General: No tenderness. Normal range of motion.      Cervical back: Normal range of motion and neck supple.   Lymphadenopathy:      Head:      Right side of head: No submental, submandibular, tonsillar, preauricular, posterior auricular or occipital adenopathy.      Left side of head: No submental, submandibular, tonsillar, preauricular, posterior auricular or occipital adenopathy.      Cervical: No cervical adenopathy.      Right cervical: No superficial, deep or posterior cervical adenopathy.     Left cervical: No superficial, deep or posterior cervical adenopathy.      Upper Body:      Right upper body: No supraclavicular, axillary or pectoral adenopathy.      Left upper body: No supraclavicular, axillary or pectoral adenopathy.      Lower Body: No right inguinal adenopathy. No left inguinal adenopathy.   Skin:      General: Skin is warm and dry.      Findings: No bruising, erythema or rash.   Neurological:      Mental Status: She is alert and oriented to person, place, and time.      Coordination: Coordination normal.   Psychiatric:         Mood and Affect: Mood normal.         Behavior: Behavior normal.         Thought Content: Thought content normal.         Judgment: Judgment normal.         PHQ-9 Depression Screening  Little interest or pleasure in doing things? 0-->not at all   Feeling down, depressed, or hopeless? 0-->not at all   Trouble falling or staying asleep, or sleeping too much?     Feeling tired or having little energy?     Poor appetite or overeating?     Feeling bad about yourself - or that you are a failure or have let yourself or your family down?     Trouble concentrating on things, such as reading the newspaper or watching television?     Moving or speaking so slowly that other people could have noticed? Or the opposite - being so fidgety or restless that you have been moving around a lot more than usual?     Thoughts that you would be better off dead, or of hurting yourself in some way?     PHQ-9 Total Score 0   If you checked off any problems, how difficult have these problems made it for you to do your work, take care of things at home, or get along with other people?          Assessment & Plan   Diagnoses and all orders for this visit:    1. Well woman exam with routine gynecological exam (Primary)  Normal GYN exam. Will have lab work at PCP. Encouraged SBE, pt is aware how to do self breast exam and the importance of same. Discussed weight management and importance of maintaining a healthy weight. Discussed Vitamin D intake and the importance of adequate vitamin D for both Bone Health and a healthy immune system.  Discussed Daily exercise and the importance of same, in regards to a healthy heart as well as helping to maintain her weight and improving her mental health.  BMI 32.3.  Colonoscopy has  never been done.  She states she is afraid she will not be able to tolerate the prep.  I referred her to gastro to see if they can come up with something for her..  Mammogram and bone density will be scheduled at [facility].  Pap smear is done after  we discussed ASCCP guidelines.  After informed decision patient wishes to proceed with the Pap smear.        -     Liquid-based Pap Smear, Screening  -     Ambulatory Referral For Screening Colonoscopy    2. Encounter for screening mammogram for breast cancer  Comments:  Patient will have a mammogram at [facility].  Orders:  -     Mammo Screening Digital Tomosynthesis Bilateral With CAD; Future    3. Encounter for screening for osteoporosis  Comments:  She will have a bone density at [facility].  Orders:  -     DEXA Bone Density Axial; Future    4. Herpes, vulvar  Comments:  Patient takes daily Valtrex related to HSV.  Requests refill.  Rx sent to pharmacy.  Orders:  -     valACYclovir (VALTREX) 500 MG tablet; Take 1 tablet by mouth Daily.  Dispense: 30 tablet; Refill: 12    5. Vitamin D deficiency  Comments:  Vitamin D refilled today.  Orders:  -     vitamin D (ERGOCALCIFEROL) 1.25 MG (00838 UT) capsule capsule; Take 1 capsule by mouth 1 (One) Time Per Week.  Dispense: 5 capsule; Refill: 12    6. Rash  Comments:  Patient sometimes gets a rash under her breast and in her groin.  She is given nystatin powder.  Orders:  -     nystatin 019111 UNIT/GM powder; Apply  topically to the appropriate area as directed 2 (Two) Times a Day.  Dispense: 60 g; Refill: 3    7. Recurrent major depressive disorder, remission status unspecified  Comments:  Patient is followed by her PCP.  They have recently changed her medication.  She is still having problems getting out of bed in the morning.  She is seeing a therapist on a weekly basis.  She denies any suicidal thoughts.         BMI is >= 30 and <35. (Class 1 Obesity). The following options were  offered after discussion;: weight loss educational material (shared in after visit summary), exercise counseling/recommendations, and nutrition counseling/recommendations      Kimberly Daley, APRN  4/22/2024

## 2024-04-23 ENCOUNTER — TELEPHONE (OUTPATIENT)
Dept: OBSTETRICS AND GYNECOLOGY | Age: 59
End: 2024-04-23
Payer: COMMERCIAL

## 2024-04-23 LAB
GEN CATEG CVX/VAG CYTO-IMP: NORMAL
HPV I/H RISK 4 DNA CVX QL PROBE+SIG AMP: NOT DETECTED
LAB AP CASE REPORT: NORMAL
LAB AP GYN ADDITIONAL INFORMATION: NORMAL
LAB AP GYN OTHER FINDINGS: NORMAL
Lab: NORMAL
PATH INTERP SPEC-IMP: NORMAL
STAT OF ADQ CVX/VAG CYTO-IMP: NORMAL

## 2024-04-23 NOTE — TELEPHONE ENCOUNTER
Pt was in office yesterday to see Kimberly Daley. Pt calling this morning to inquire about colonoscopy and testing. Pt advised that Kimberly placed a referral to gastroenterology and they will contact her for an appt in their office to discuss her options. Pt voiced understanding

## 2024-05-10 ENCOUNTER — HOSPITAL ENCOUNTER (OUTPATIENT)
Dept: BONE DENSITY | Facility: HOSPITAL | Age: 59
Discharge: HOME | End: 2024-05-10
Payer: COMMERCIAL

## 2024-05-10 ENCOUNTER — HOSPITAL ENCOUNTER (OUTPATIENT)
Dept: MAMMOGRAPHY | Facility: HOSPITAL | Age: 59
Discharge: HOME | End: 2024-05-10
Payer: COMMERCIAL

## 2024-05-10 DIAGNOSIS — Z12.31 ENCOUNTER FOR SCREENING MAMMOGRAM FOR BREAST CANCER: ICD-10-CM

## 2024-05-10 DIAGNOSIS — Z13.820 ENCOUNTER FOR SCREENING FOR OSTEOPOROSIS: ICD-10-CM

## 2024-05-10 PROCEDURE — 77067 SCR MAMMO BI INCL CAD: CPT

## 2024-05-10 PROCEDURE — 77063 BREAST TOMOSYNTHESIS BI: CPT

## 2024-05-10 PROCEDURE — 77080 DXA BONE DENSITY AXIAL: CPT

## 2024-05-13 ENCOUNTER — TELEPHONE (OUTPATIENT)
Dept: GENETICS | Facility: HOSPITAL | Age: 59
End: 2024-05-13
Payer: COMMERCIAL

## 2024-05-13 LAB
NCCN CRITERIA FLAG: NORMAL
TYRER CUZICK SCORE: 5.8

## 2024-05-13 NOTE — TELEPHONE ENCOUNTER
The patient requested assistance completing her CARE risk assessment.  I called to obtain her personal and family history to complete the risk assessment for her.  The results showed that she did not meet NCCN criteria for genetic testing and her Tyrer-Yvettezick risk score is 5.8%.  I encouraged her to update this information at her annual mammogram appointment and to contact me if she needs assistance.

## 2024-08-05 NOTE — PROGRESS NOTES
Hudson Bynum is a 48 y.o. female evaluated via telephone on 5/22/2020. Consent:  She and/or health care decision maker is aware that that she may receive a bill for this telephone service, depending on her insurance coverage, and has provided verbal consent to proceed: NA - Consent obtained within past 12 months      Documentation:  I communicated with the patient and/or health care decision maker about fears related to her son's graduation. Details of this discussion including any medical advice provided: negative thought stopping, what she has control over, what is beyond her control, combat cognitive distortions. Pt reported several physical symptoms of anxiety and verbalized she felt encouraged after speaking with LCSW. Diagnosis: Bipolar 1       I do not affirm this is a Patient Initiated Episode with a Patient who has not had a related appointment within my department in the past 7 days or scheduled within the next 24 hours. - Pt called frequently due to panic asking for provider to return call.      Patient identification was verified at the start of the visit: Yes    Total Time: minutes: 21-30 minutes    Note: not billable if this call serves to triage the patient into an appointment for the relevant concern      Cira Alston No chief complaint on file.      PROBLEM LIST  1) CAD  2) dilated ascending aorta  3) HTN  4) HPL    CHIEF COMPLAINT: Check up    HISTORY OF PRESENT ILLNESS:  Dom Gresham is a 69 year old male presenting for annual follow up of f CAD (per CAC score 8026 5/2021) and dilated ascending aorta (4.1 per CT 5/2021).  Patient's NM stress test 8/2023 was negative for ischemia.     Patient c/o chest tightness, unchanged for over the past 3-4 years. Tightness occurs with exertion; happens the first few times he mows the lawn or the first few times he snowblows or shovels. States he stops mowing and chest pain resolves within a minute. He will wait about 3 minutes, and then he resumes mowing without any further issues/symptoms. Has not needed nitro for the pain/tightness as he does not feel it is significant. States he can walk up stairs without any symptoms.     ***    Allergies, Medications, and Social history reviewed.  {DENIES OR REPORTS:437397} Latex allergy or sensitivity.   TOB reviewed, update    REVIEW OF SYSTEMS: {ros:032450}    PHYSICAL EXAMINATION  There were no vitals taken for this visit.  Neck: {NECK:492925}  Pulmonary: {PULMONARY EXAM:764993}  Cardiovascular: {COR EXAM:349351}      Assessment and Plan:   CAD-***    Dilated ascending aorta-***    HTN-***    HPL-***    Follow up: ***

## 2024-08-09 ENCOUNTER — OFFICE VISIT (OUTPATIENT)
Dept: GASTROENTEROLOGY | Facility: CLINIC | Age: 59
End: 2024-08-09
Payer: COMMERCIAL

## 2024-08-09 VITALS
DIASTOLIC BLOOD PRESSURE: 80 MMHG | HEIGHT: 64 IN | OXYGEN SATURATION: 98 % | BODY MASS INDEX: 30.9 KG/M2 | TEMPERATURE: 97.8 F | HEART RATE: 81 BPM | WEIGHT: 181 LBS | SYSTOLIC BLOOD PRESSURE: 128 MMHG

## 2024-08-09 DIAGNOSIS — K59.00 CONSTIPATION, UNSPECIFIED CONSTIPATION TYPE: ICD-10-CM

## 2024-08-09 DIAGNOSIS — K62.5 RECTAL BLEEDING: Primary | ICD-10-CM

## 2024-08-09 PROCEDURE — 1160F RVW MEDS BY RX/DR IN RCRD: CPT | Performed by: NURSE PRACTITIONER

## 2024-08-09 PROCEDURE — 1159F MED LIST DOCD IN RCRD: CPT | Performed by: NURSE PRACTITIONER

## 2024-08-09 PROCEDURE — 99214 OFFICE O/P EST MOD 30 MIN: CPT | Performed by: NURSE PRACTITIONER

## 2024-08-09 RX ORDER — BREXPIPRAZOLE 0.5 MG/1
1 TABLET ORAL NIGHTLY
COMMUNITY
Start: 2024-07-23

## 2024-08-09 NOTE — PROGRESS NOTES
Primary Physician: Susan Bentley APRN    Chief Complaint   Patient presents with    Rectal Bleeding     Pt c/o issues with BRBPR but she does have hemorrhoid-states she hasn't seen any blood in the last week; Pt has never had a colonoscopy    Constipation     Pt also c/o chronic issues with constipation-used stool softeners BID       Subjective     Edith Stephens is a 59 y.o. female.    HPI  Rectal bleeding  Patient does report seeing bright red blood per rectum.  This is intermittent.  She does believe she has hemorrhoids.  No abdominal pain.    She has never had a colonoscopy.    There is no family history of colon cancer to report.      Constipation  Patient has chronic issues with constipation.  Currently she is using over-the-counter stool softeners twice daily.  She will have a BM most days.        Past Medical History:   Diagnosis Date    Abnormal vaginal bleeding     Anxiety     Bipolar disorder     Depression     Diabetes mellitus     Prediabetic    GERD (gastroesophageal reflux disease)     Herpes     Hypoglycemia     Hypoglycemic reaction     PONV (postoperative nausea and vomiting)     Psychiatric illness     PTSD (post-traumatic stress disorder)     Suicide attempt     pt. has had two previous attempts       Past Surgical History:   Procedure Laterality Date    CERVICAL BIOPSY  W/ LOOP ELECTRODE EXCISION      CYST REMOVAL      From foot as a child    D & C HYSTEROSCOPY N/A 05/30/2017    Procedure: DILATATION AND CURETTAGE HYSTEROSCOPY;  Surgeon: Citlali Arita MD;  Location: [facility] OR;  Service:         Current Outpatient Medications:     Denta 5000 Plus 1.1 % cream, , Disp: , Rfl:     docosanol (ABREVA) 10 % cream cream, Abreva 10 % topical cream, Disp: , Rfl:     docusate sodium (COLACE) 100 MG capsule, Take 1 capsule by mouth 2 (Two) Times a Day., Disp: , Rfl:     Lurasidone HCl (LATUDA) 120 MG tablet tablet, Take 1 tablet by mouth Daily., Disp: , Rfl:     Multiple Vitamins-Minerals  (MULTIVITAMIN GUMMIES ADULT PO), Take 1 tablet by mouth Daily., Disp: , Rfl:     Myrbetriq 50 MG tablet sustained-release 24 hour 24 hr tablet, TAKE ONE TABLET BY MOUTH DAILY (Patient taking differently: Take 50 mg by mouth Daily.), Disp: 30 tablet, Rfl: 3    Rexulti 0.5 MG tablet, Take 0.5 mg by mouth Every Night., Disp: , Rfl:     valACYclovir (VALTREX) 500 MG tablet, Take 1 tablet by mouth Daily., Disp: 30 tablet, Rfl: 12    Allergies   Allergen Reactions    Bc Powder [Aspirin-Salicylamide-Caffeine] Swelling     LIPS SWELL    Sertraline Hcl Other (See Comments)     SUICIDAL    Other Hives and Swelling     BC powder    Quetiapine Fumarate GI Intolerance and Other (See Comments)     Pt states it makes her lethargic    Macrobid [Nitrofurantoin] Unknown - High Severity    Medroxyprogesterone Unknown - Low Severity    Aspirin Other (See Comments)     UNKNOWN    Bupropion Other (See Comments)     Makes patient manic    Diazepam Other (See Comments)     UNKNOWN    Gabapentin Other (See Comments)     UNKNOWN    Lortab [Hydrocodone-Acetaminophen] Other (See Comments)     UNKNOWN      Metoprolol Other (See Comments)     UNKNOWN    Penicillins Other (See Comments)     UNKNOWN    Prozac [Fluoxetine Hcl] Rash    Tetracyclines & Related Other (See Comments)     UNKNOWN      Trazodone And Nefazodone Other (See Comments)     UNKNOWN    Trileptal [Oxcarbazepine] Other (See Comments)     UNKNOWN    Vancomycin Other (See Comments)     ALLERGIC TO ALL MYCIN ANTIBIOTICS - UNKNOWN REACTION    Venlafaxine Other (See Comments)     UNKNOWN    Ziprasidone Hcl Other (See Comments)     UNKNOWN    Zyprexa [Olanzapine] Other (See Comments)     UNKNOWN       Social History     Socioeconomic History    Marital status:    Tobacco Use    Smoking status: Never    Smokeless tobacco: Never   Vaping Use    Vaping status: Never Used   Substance and Sexual Activity    Alcohol use: No    Drug use: No    Sexual activity: Not Currently      "Partners: Male     Comment: last act intercourse 7 years       Family History   Problem Relation Age of Onset    Thyroid disease Mother     Hypertension Father     No Known Problems Sister     Kidney cancer Maternal Grandfather     Coronary artery disease Paternal Grandmother     Diabetes Paternal Grandmother     Coronary artery disease Paternal Grandfather     Breast cancer Neg Hx     Ovarian cancer Neg Hx     Uterine cancer Neg Hx     Colon cancer Neg Hx     Melanoma Neg Hx     Prostate cancer Neg Hx     Colon polyps Neg Hx     Esophageal cancer Neg Hx     Liver cancer Neg Hx     Stomach cancer Neg Hx     Rectal cancer Neg Hx     Liver disease Neg Hx        Review of Systems   Constitutional:  Negative for unexpected weight change.   Respiratory:  Negative for shortness of breath.    Cardiovascular:  Negative for chest pain.   Psychiatric/Behavioral:  The patient is nervous/anxious.        Objective     /80 (BP Location: Left arm, Patient Position: Sitting, Cuff Size: Adult)   Pulse 81   Temp 97.8 °F (36.6 °C) (Infrared)   Ht 162.6 cm (64\")   Wt 82.1 kg (181 lb)   LMP 12/13/2015 (Approximate)   SpO2 98%   Breastfeeding No   BMI 31.07 kg/m²     Physical Exam  Vitals reviewed.   Constitutional:       Appearance: Normal appearance.   Cardiovascular:      Rate and Rhythm: Normal rate and regular rhythm.      Heart sounds: Normal heart sounds.   Pulmonary:      Effort: Pulmonary effort is normal.      Breath sounds: Normal breath sounds.   Neurological:      Mental Status: She is alert.               IMPRESSION/PLAN:    Assessment & Plan      Problem List Items Addressed This Visit       Rectal bleeding - Primary    Overview     Bright red blood post defecation after hard stools         Relevant Orders    Case Request (Completed)    Constipation    Overview     Uses stool softener daily         Relevant Orders    Case Request (Completed)     Colonoscopy per Dr. Brent Paul Prep          ..The risks, " benefits, and alternatives of colonoscopy were reviewed with the patient today.  Risks including perforation of the colon possibly requiring surgery or colostomy.  Additional risks include risk of bleeding from biopsies or removal of colon tissue.  There is also the risk of a drug reaction or problems with anesthesia.  This will be discussed with the further by the anesthesia team on the day of the procedure.  Lastly there is a possibility of missing a colon polyp or cancer.  The benefits include the diagnosis and management of disease of the colon and rectum.  Alternatives to colonoscopy include barium enema, laboratory testing, radiographic evaluation, or no intervention.  The patient verbalizes understanding and agrees.    In accordance with requirements under the Affordable Care Act, [facility] has provided pricing for all hospital services and items on each of its websites. However, a patient's actual cost may differ based on the services the patient receives to meet individual healthcare needs and based on the benefits provided under the patient’s insurance coverage.        Alexandra Kellogg, APRN  08/09/24  10:24 CDT    Part of this note may be an electronic transcription/translation of spoken language to printed text.

## 2024-09-30 ENCOUNTER — TELEPHONE (OUTPATIENT)
Dept: GASTROENTEROLOGY | Facility: CLINIC | Age: 59
End: 2024-09-30

## 2024-09-30 NOTE — TELEPHONE ENCOUNTER
Caller: FAZAL EDWARD    Relationship to patient: Other    Best call back number: [phone]    Chief complaint: PATIENT HAS BEEN HOSPITALIZED.    Type of visit: COLONOSCOPY    If rescheduling, when is the original appointment: 10/4/24 WITH SWAPNA K[facility]IS[facility]     Additional notes:PATIENT WILL CALL TO RESCHEDULE.

## 2024-11-26 ENCOUNTER — TELEPHONE (OUTPATIENT)
Dept: GASTROENTEROLOGY | Facility: CLINIC | Age: 59
End: 2024-11-26
Payer: COMMERCIAL

## 2025-01-17 ENCOUNTER — TELEPHONE (OUTPATIENT)
Dept: GASTROENTEROLOGY | Facility: CLINIC | Age: 60
End: 2025-01-17
Payer: COMMERCIAL

## 2025-01-17 ENCOUNTER — HOSPITAL ENCOUNTER (OUTPATIENT)
Facility: HOSPITAL | Age: 60
Setting detail: HOSPITAL OUTPATIENT SURGERY
Discharge: HOME | End: 2025-01-17
Attending: INTERNAL MEDICINE | Admitting: INTERNAL MEDICINE
Payer: COMMERCIAL

## 2025-01-17 ENCOUNTER — ANESTHESIA (OUTPATIENT)
Dept: GASTROENTEROLOGY | Facility: HOSPITAL | Age: 60
End: 2025-01-17
Payer: COMMERCIAL

## 2025-01-17 ENCOUNTER — ANESTHESIA EVENT (OUTPATIENT)
Dept: GASTROENTEROLOGY | Facility: HOSPITAL | Age: 60
End: 2025-01-17
Payer: COMMERCIAL

## 2025-01-17 VITALS
DIASTOLIC BLOOD PRESSURE: 64 MMHG | SYSTOLIC BLOOD PRESSURE: 113 MMHG | HEART RATE: 66 BPM | BODY MASS INDEX: 29.37 KG/M2 | WEIGHT: 172 LBS | OXYGEN SATURATION: 100 % | TEMPERATURE: 97.5 F | RESPIRATION RATE: 20 BRPM | HEIGHT: 64 IN

## 2025-01-17 DIAGNOSIS — K59.00 CONSTIPATION, UNSPECIFIED CONSTIPATION TYPE: ICD-10-CM

## 2025-01-17 DIAGNOSIS — K62.5 RECTAL BLEEDING: ICD-10-CM

## 2025-01-17 PROCEDURE — 25810000003 SODIUM CHLORIDE 0.9 % SOLUTION: Performed by: NURSE ANESTHETIST, CERTIFIED REGISTERED

## 2025-01-17 PROCEDURE — 45378 DIAGNOSTIC COLONOSCOPY: CPT | Performed by: INTERNAL MEDICINE

## 2025-01-17 PROCEDURE — 25010000002 PROPOFOL 10 MG/ML EMULSION: Performed by: NURSE ANESTHETIST, CERTIFIED REGISTERED

## 2025-01-17 RX ORDER — SODIUM CHLORIDE 0.9 % (FLUSH) 0.9 %
10 SYRINGE (ML) INJECTION AS NEEDED
Status: DISCONTINUED | OUTPATIENT
Start: 2025-01-17 | End: 2025-01-17 | Stop reason: HOSPADM

## 2025-01-17 RX ORDER — SODIUM CHLORIDE 9 MG/ML
40 INJECTION, SOLUTION INTRAVENOUS AS NEEDED
Status: CANCELLED | OUTPATIENT
Start: 2025-01-17

## 2025-01-17 RX ORDER — HYDROXYZINE HYDROCHLORIDE 25 MG/1
25 TABLET, FILM COATED ORAL 3 TIMES DAILY PRN
COMMUNITY

## 2025-01-17 RX ORDER — PROPOFOL 10 MG/ML
VIAL (ML) INTRAVENOUS AS NEEDED
Status: DISCONTINUED | OUTPATIENT
Start: 2025-01-17 | End: 2025-01-17 | Stop reason: SURG

## 2025-01-17 RX ORDER — SODIUM CHLORIDE 9 MG/ML
100 INJECTION, SOLUTION INTRAVENOUS CONTINUOUS
Status: DISCONTINUED | OUTPATIENT
Start: 2025-01-17 | End: 2025-01-17 | Stop reason: HOSPADM

## 2025-01-17 RX ORDER — VENLAFAXINE 75 MG/1
75 TABLET ORAL 2 TIMES DAILY
COMMUNITY

## 2025-01-17 RX ORDER — DEXMEDETOMIDINE HYDROCHLORIDE 4 UG/ML
INJECTION, SOLUTION INTRAVENOUS AS NEEDED
Status: DISCONTINUED | OUTPATIENT
Start: 2025-01-17 | End: 2025-01-17 | Stop reason: SURG

## 2025-01-17 RX ORDER — POLYETHYLENE GLYCOL 3350 17 G/17G
17 POWDER, FOR SOLUTION ORAL 2 TIMES DAILY
Qty: 1020 G | Refills: 11 | Status: SHIPPED | OUTPATIENT
Start: 2025-01-17

## 2025-01-17 RX ORDER — SODIUM CHLORIDE 0.9 % (FLUSH) 0.9 %
10 SYRINGE (ML) INJECTION EVERY 12 HOURS SCHEDULED
Status: DISCONTINUED | OUTPATIENT
Start: 2025-01-17 | End: 2025-01-17 | Stop reason: HOSPADM

## 2025-01-17 RX ADMIN — DEXMEDETOMIDINE HYDROCHLORIDE IN 0.9% SODIUM CHLORIDE 20 MCG: 4 INJECTION INTRAVENOUS at 08:10

## 2025-01-17 RX ADMIN — PROPOFOL 50 MG: 10 INJECTION, EMULSION INTRAVENOUS at 08:19

## 2025-01-17 RX ADMIN — SODIUM CHLORIDE 100 ML/HR: 9 INJECTION, SOLUTION INTRAVENOUS at 08:04

## 2025-01-17 RX ADMIN — PROPOFOL 100 MG: 10 INJECTION, EMULSION INTRAVENOUS at 08:10

## 2025-01-17 NOTE — ANESTHESIA PREPROCEDURE EVALUATION
Anesthesia Evaluation     Patient summary reviewed and Nursing notes reviewed   history of anesthetic complications:  PONV  NPO Solid Status: > 8 hours  NPO Liquid Status: > 6 hours           Airway   Mallampati: I  TM distance: >3 FB  No difficulty expected  Dental - normal exam     Pulmonary - negative pulmonary ROS and normal exam   Cardiovascular - negative cardio ROS and normal exam        Neuro/Psych  (+) psychiatric history Anxiety and Depression  GI/Hepatic/Renal/Endo    (+) GERD well controlled, GI bleeding upper resolved, diabetes mellitus type 2 well controlled    Musculoskeletal (-) negative ROS    Abdominal  - normal exam   Substance History - negative use     OB/GYN negative ob/gyn ROS         Other - negative ROS                   Anesthesia Plan    ASA 2     MAC   total IV anesthesia  intravenous induction     Anesthetic plan, risks, benefits, and alternatives have been provided, discussed and informed consent has been obtained with: patient.    CODE STATUS:

## 2025-01-17 NOTE — ANESTHESIA POSTPROCEDURE EVALUATION
"Patient: Edith Stephens    Procedure Summary       Date: 01/17/25 Room / Location: [facility] ENDOSCOPY 6 / [facility] ENDOSCOPY    Anesthesia Start: 0809 Anesthesia Stop: 0834    Procedure: COLONOSCOPY WITH ANESTHESIA Diagnosis:       Rectal bleeding      Constipation, unspecified constipation type      (Rectal bleeding [K62.5])      (Constipation, unspecified constipation type [K59.00])    Surgeons: Citlali Kennedy MD Provider: Joey Bernabe CRNA    Anesthesia Type: MAC ASA Status: 2            Anesthesia Type: MAC    Vitals  Vitals Value Taken Time   BP     Temp     Pulse 66 01/17/25 0834   Resp     SpO2 95 % 01/17/25 0834   Vitals shown include unfiled device data.        Post Anesthesia Care and Evaluation    Patient location during evaluation: PHASE II  Patient participation: complete - patient participated  Level of consciousness: awake and alert  Pain management: adequate    Airway patency: patent  Anesthetic complications: No anesthetic complications    Cardiovascular status: acceptable  Respiratory status: acceptable  Hydration status: acceptable    Comments: Blood pressure 128/85, pulse 75, temperature 97.5 °F (36.4 °C), temperature source Temporal, resp. rate 18, height 162.6 cm (64\"), weight 78 kg (172 lb), last menstrual period 12/13/2015, SpO2 98%, not currently breastfeeding.    Pt discharged from PACU based on deanna score >8    "

## 2025-01-17 NOTE — H&P
Chief Complaint:   Rectal bleeding and constipation    Subjective     HPI:   Patient has had episodes of rectal bleeding associated with constipation.  No prior colonoscopy.  Family history is negative.    Past Medical History:   Past Medical History:   Diagnosis Date    Abnormal vaginal bleeding     Anxiety     Bipolar disorder     Depression     Diabetes mellitus     Prediabetic    GERD (gastroesophageal reflux disease)     Herpes     Hypoglycemia     Hypoglycemic reaction     PONV (postoperative nausea and vomiting)     Psychiatric illness     PTSD (post-traumatic stress disorder)     Suicide attempt     pt. has had two previous attempts       Past Surgical History:  Past Surgical History:   Procedure Laterality Date    CERVICAL BIOPSY  W/ LOOP ELECTRODE EXCISION      COLONOSCOPY      CYST REMOVAL      From foot as a child    D & C HYSTEROSCOPY N/A 05/30/2017    Procedure: DILATATION AND CURETTAGE HYSTEROSCOPY;  Surgeon: Citlali Arita MD;  Location: [facility] OR;  Service:         Family History:  Family History   Problem Relation Age of Onset    Thyroid disease Mother     Hypertension Father     No Known Problems Sister     Kidney cancer Maternal Grandfather     Coronary artery disease Paternal Grandmother     Diabetes Paternal Grandmother     Coronary artery disease Paternal Grandfather     Breast cancer Neg Hx     Ovarian cancer Neg Hx     Uterine cancer Neg Hx     Colon cancer Neg Hx     Melanoma Neg Hx     Prostate cancer Neg Hx     Colon polyps Neg Hx     Esophageal cancer Neg Hx     Liver cancer Neg Hx     Stomach cancer Neg Hx     Rectal cancer Neg Hx     Liver disease Neg Hx        Social History:   reports that she has never smoked. She has never used smokeless tobacco. She reports that she does not drink alcohol and does not use drugs.    Medications:   Medications Prior to Admission   Medication Sig Dispense Refill Last Dose/Taking    Denta 5000 Plus 1.1 % cream    Past Week    docusate sodium  "(COLACE) 100 MG capsule Take 1 capsule by mouth 2 (Two) Times a Day.   1/16/2025 Bedtime    hydrOXYzine (ATARAX) 25 MG tablet Take 1 tablet by mouth 3 (Three) Times a Day As Needed for Itching.   1/16/2025 Evening    Lurasidone HCl (LATUDA) 120 MG tablet tablet Take 1 tablet by mouth Daily.   1/16/2025 Evening    Multiple Vitamins-Minerals (MULTIVITAMIN GUMMIES ADULT PO) Take 1 tablet by mouth Daily.   Past Week    Myrbetriq 50 MG tablet sustained-release 24 hour 24 hr tablet TAKE ONE TABLET BY MOUTH DAILY (Patient taking differently: Take 50 mg by mouth Daily.) 30 tablet 3 1/16/2025 Morning    valACYclovir (VALTREX) 500 MG tablet Take 1 tablet by mouth Daily. 30 tablet 12 1/16/2025 Evening    venlafaxine (EFFEXOR) 75 MG tablet Take 1 tablet by mouth 2 (Two) Times a Day.   1/16/2025 Morning    docosanol (ABREVA) 10 % cream cream Abreva 10 % topical cream   More than a month    Rexulti 0.5 MG tablet Take 0.5 mg by mouth Every Night.   More than a month       Allergies:  Bc powder [aspirin-salicylamide-caffeine], Sertraline hcl, Other, Quetiapine fumarate, Macrobid [nitrofurantoin], Medroxyprogesterone, Aspirin, Bupropion, Diazepam, Gabapentin, Lortab [hydrocodone-acetaminophen], Metoprolol, Penicillins, Prozac [fluoxetine hcl], Tetracyclines & related, Trazodone and nefazodone, Trileptal [oxcarbazepine], Vancomycin, Venlafaxine, Ziprasidone hcl, and Zyprexa [olanzapine]    ROS:    Resp: No SOA  Cardiovascular: No CP      Objective     /85 (BP Location: Right arm, Patient Position: Sitting)   Pulse 75   Temp 97.5 °F (36.4 °C) (Temporal)   Resp 18   Ht 162.6 cm (64\")   Wt 78 kg (172 lb)   LMP 12/13/2015 (Approximate)   SpO2 98%   BMI 29.52 kg/m²     Physical Exam   Constitutional: Patient is oriented to person, place, and in no distress.  Pulmonary/Chest: No distress.  No audible wheezes  Psychiatric: Mood, memory, affect and judgment appear normal.     Assessment & Plan     Diagnosis:  Rectal " bleeding  Constipation    Anticipated Surgical Procedure:  Colonoscopy    The risks, benefits, and alternatives of colonoscopy were reviewed with the patient today.  Risks including perforation of the colon possibly requiring surgery or colostomy.  Additional risks include risk of bleeding from biopsies or removal of colon tissue.  There is also the risk of a drug reaction or problems with anesthesia.  This will be discussed with the patient further by the anesthesia team on the day of the procedure.  Lastly there is a possibility of missing a colon polyp or cancer.  The benefits include the diagnosis and management of disease of the colon and rectum.  Alternatives to colonoscopy include barium enema, laboratory testing, radiographic evaluation, or no intervention.  The patient verbalizes understanding and agrees.        Please note that portions of this note were completed with a voice recognition program.

## 2025-04-25 ENCOUNTER — OFFICE VISIT (OUTPATIENT)
Dept: OBSTETRICS AND GYNECOLOGY | Age: 60
End: 2025-04-25
Payer: COMMERCIAL

## 2025-04-25 VITALS
HEIGHT: 64 IN | SYSTOLIC BLOOD PRESSURE: 104 MMHG | DIASTOLIC BLOOD PRESSURE: 74 MMHG | WEIGHT: 173 LBS | BODY MASS INDEX: 29.53 KG/M2

## 2025-04-25 DIAGNOSIS — R21 RASH: ICD-10-CM

## 2025-04-25 DIAGNOSIS — B00.1 FEVER BLISTER: ICD-10-CM

## 2025-04-25 DIAGNOSIS — Z01.419 WELL WOMAN EXAM WITH ROUTINE GYNECOLOGICAL EXAM: Primary | ICD-10-CM

## 2025-04-25 DIAGNOSIS — Z12.31 ENCOUNTER FOR SCREENING MAMMOGRAM FOR BREAST CANCER: ICD-10-CM

## 2025-04-25 DIAGNOSIS — A60.04 HERPES, VULVAR: ICD-10-CM

## 2025-04-25 PROCEDURE — G0123 SCREEN CERV/VAG THIN LAYER: HCPCS | Performed by: NURSE PRACTITIONER

## 2025-04-25 PROCEDURE — 87624 HPV HI-RISK TYP POOLED RSLT: CPT | Performed by: NURSE PRACTITIONER

## 2025-04-25 RX ORDER — DOCOSANOL 100 MG/G
CREAM TOPICAL
Qty: 2 G | Refills: 1 | Status: SHIPPED | OUTPATIENT
Start: 2025-04-25

## 2025-04-25 RX ORDER — NYSTATIN 100000 [USP'U]/G
POWDER TOPICAL 2 TIMES DAILY
Qty: 60 G | Refills: 2 | Status: SHIPPED | OUTPATIENT
Start: 2025-04-25

## 2025-04-25 RX ORDER — VALACYCLOVIR HYDROCHLORIDE 500 MG/1
500 TABLET, FILM COATED ORAL DAILY
Qty: 30 TABLET | Refills: 12 | Status: SHIPPED | OUTPATIENT
Start: 2025-04-25

## 2025-04-25 RX ORDER — ERGOCALCIFEROL 1.25 MG/1
50000 CAPSULE, LIQUID FILLED ORAL
COMMUNITY
Start: 2025-03-25

## 2025-04-25 RX ORDER — NYSTATIN 100000 [USP'U]/G
POWDER TOPICAL 2 TIMES DAILY
COMMUNITY
End: 2025-04-25 | Stop reason: SDUPTHER

## 2025-04-25 NOTE — PROGRESS NOTES
"Subjective     Edith Stephens is a 60 y.o. female    History of Present Illness    History of Present Illness  The patient is a 60-year-old female who presents for a yearly checkup.    Blood work was completed with her primary care physician, yielding normal results. She is no longer prediabetic and has experienced weight loss since her colonoscopy in 01/2025. No new surgical interventions are reported. The colonoscopy results were normal. MiraLAX is taken twice daily to manage constipation, a side effect of her medication. Nystatin powder is used under the breasts, abdomen, and occasionally the groin area. A refill of Valtrex is requested, which has not been needed due to the absence of breakouts. A refill of Abreva is also requested, previously used on a white lesion that subsequently ruptured. Vitamin D is taken once weekly on Sundays. Difficulty in performing self-breast examinations at home is expressed.    FAMILY HISTORY  Her grandfather had kidney cancer. There is no family history of breast cancer or colon cancer.    /74   Ht 162.6 cm (64\")   Wt 78.5 kg (173 lb)   LMP 12/13/2015 (Approximate)   BMI 29.70 kg/m²     Outpatient Encounter Medications as of 4/25/2025   Medication Sig Dispense Refill    Denta 5000 Plus 1.1 % cream       docosanol (ABREVA) 10 % cream cream Apply  topically to the appropriate area as directed 5 (Five) Times a Day. 2 g 1    docusate sodium (COLACE) 100 MG capsule Take 1 capsule by mouth 2 (Two) Times a Day.      hydrOXYzine (ATARAX) 25 MG tablet Take 1 tablet by mouth 3 (Three) Times a Day As Needed for Itching. (Patient taking differently: Take 1 tablet by mouth Every 8 (Eight) Hours As Needed for Itching.)      Lurasidone HCl (LATUDA) 120 MG tablet tablet Take 1 tablet by mouth Daily.      Multiple Vitamins-Minerals (MULTIVITAMIN GUMMIES ADULT PO) Take 1 tablet by mouth Daily.      Myrbetriq 50 MG tablet sustained-release 24 hour 24 hr tablet TAKE ONE TABLET BY MOUTH DAILY " (Patient taking differently: Take 50 mg by mouth Daily.) 30 tablet 3    nystatin 079937 UNIT/GM powder Apply  topically to the appropriate area as directed 2 (Two) Times a Day. 60 g 2    polyethylene glycol (MIRALAX) 17 GM/SCOOP powder Take 17 g by mouth 2 (Two) Times a Day. 1020 g 11    Rexulti 0.5 MG tablet Take 0.5 mg by mouth Every Night.      valACYclovir (VALTREX) 500 MG tablet Take 1 tablet by mouth Daily. 30 tablet 12    venlafaxine (EFFEXOR) 75 MG tablet Take 1 tablet by mouth 2 (Two) Times a Day. (Patient taking differently: Take 3 tablets by mouth Daily.)      vitamin D (ERGOCALCIFEROL) 1.25 MG (26093 UT) capsule capsule Take 1 capsule by mouth Every 7 (Seven) Days.      [DISCONTINUED] docosanol (ABREVA) 10 % cream cream Abreva 10 % topical cream      [DISCONTINUED] nystatin 038793 UNIT/GM powder Apply  topically to the appropriate area as directed 2 (Two) Times a Day.      [DISCONTINUED] valACYclovir (VALTREX) 500 MG tablet Take 1 tablet by mouth Daily. 30 tablet 12     No facility-administered encounter medications on file as of 4/25/2025.       Past Medical History  Past Medical History:   Diagnosis Date    Abnormal vaginal bleeding     Anxiety     Bipolar disorder     Depression     Diabetes mellitus     GERD (gastroesophageal reflux disease)     Herpes     Hypoglycemia     Hypoglycemic reaction     PONV (postoperative nausea and vomiting)     Psychiatric illness     PTSD (post-traumatic stress disorder)     Suicide attempt        Surgical History  Past Surgical History:   Procedure Laterality Date    CERVICAL BIOPSY  W/ LOOP ELECTRODE EXCISION      COLONOSCOPY      COLONOSCOPY N/A 1/17/2025    Procedure: COLONOSCOPY WITH ANESTHESIA;  Surgeon: Citlali Kennedy MD;  Location: [facility] ENDOSCOPY;  Service: Gastroenterology;  Laterality: N/A;  pre: rectal bleeding. constipation.  post: normal  ana  cruz    CYST REMOVAL      From foot as a child    D & C HYSTEROSCOPY N/A 05/30/2017    Procedure:  DILATATION AND CURETTAGE HYSTEROSCOPY;  Surgeon: Citlali Arita MD;  Location: [facility];  Service:        Family History  Family History   Problem Relation Age of Onset    Thyroid disease Mother     Hypertension Father     No Known Problems Sister     Kidney cancer Maternal Grandfather     Coronary artery disease Paternal Grandmother     Diabetes Paternal Grandmother     Coronary artery disease Paternal Grandfather     Breast cancer Neg Hx     Ovarian cancer Neg Hx     Uterine cancer Neg Hx     Colon cancer Neg Hx     Melanoma Neg Hx     Prostate cancer Neg Hx     Colon polyps Neg Hx     Esophageal cancer Neg Hx     Liver cancer Neg Hx     Stomach cancer Neg Hx     Rectal cancer Neg Hx     Liver disease Neg Hx        The following portions of the patient's history were reviewed and updated as appropriate: allergies, current medications, past family history, past medical history, past social history, past surgical history, and problem list.    Review of Systems   Constitutional:  Negative for activity change, appetite change, chills, diaphoresis, fatigue, fever, unexpected weight gain and unexpected weight loss.   HENT:  Negative for congestion, dental problem, drooling, ear discharge, ear pain, facial swelling, hearing loss, mouth sores, nosebleeds, postnasal drip, rhinorrhea, sinus pressure, sneezing, sore throat, swollen glands, tinnitus, trouble swallowing and voice change.    Eyes:  Negative for blurred vision, double vision, photophobia, pain, discharge, redness, itching and visual disturbance.   Respiratory:  Negative for apnea, cough, choking, chest tightness, shortness of breath, wheezing and stridor.    Cardiovascular:  Negative for chest pain, palpitations and leg swelling.   Gastrointestinal:  Negative for abdominal distention, abdominal pain, anal bleeding, blood in stool, constipation, diarrhea, nausea, rectal pain, vomiting, GERD and indigestion.   Endocrine: Negative for cold intolerance, heat  intolerance, polydipsia, polyphagia and polyuria.   Genitourinary:  Negative for amenorrhea, breast discharge, breast lump, breast pain, decreased libido, decreased urine volume, difficulty urinating, dyspareunia, dysuria, flank pain, frequency, genital sores, hematuria, menstrual problem, pelvic pain, pelvic pressure, urgency, urinary incontinence, vaginal bleeding, vaginal discharge and vaginal pain.   Musculoskeletal:  Negative for arthralgias, back pain, gait problem, joint swelling, myalgias, neck pain, neck stiffness and bursitis.   Skin:  Positive for rash. Negative for color change and dry skin.   Allergic/Immunologic: Negative for environmental allergies, food allergies and immunocompromised state.   Neurological:  Negative for dizziness, tremors, seizures, syncope, facial asymmetry, speech difficulty, weakness, light-headedness, numbness, headache, memory problem and confusion.   Hematological:  Negative for adenopathy. Does not bruise/bleed easily.   Psychiatric/Behavioral:  Negative for agitation, behavioral problems, decreased concentration, dysphoric mood, hallucinations, self-injury, sleep disturbance, suicidal ideas, negative for hyperactivity, depressed mood and stress. The patient is not nervous/anxious.        Objective   Physical Exam  Vitals and nursing note reviewed. Exam conducted with a chaperone present.   Constitutional:       General: She is not in acute distress.     Appearance: She is well-developed. She is not diaphoretic.   HENT:      Head: Normocephalic.      Right Ear: External ear normal.      Left Ear: External ear normal.      Nose: Nose normal.   Eyes:      General: No scleral icterus.        Right eye: No discharge.         Left eye: No discharge.      Conjunctiva/sclera: Conjunctivae normal.      Pupils: Pupils are equal, round, and reactive to light.   Neck:      Thyroid: No thyromegaly.      Vascular: No carotid bruit.      Trachea: No tracheal deviation.   Cardiovascular:       Rate and Rhythm: Normal rate and regular rhythm.      Heart sounds: Normal heart sounds. No murmur heard.  Pulmonary:      Effort: Pulmonary effort is normal. No respiratory distress.      Breath sounds: Normal breath sounds. No wheezing.   Chest:   Breasts:     Breasts are symmetrical.      Right: Normal. No swelling, bleeding, inverted nipple, mass, nipple discharge, skin change or tenderness.      Left: Normal. No swelling, bleeding, inverted nipple, mass, nipple discharge, skin change or tenderness.   Abdominal:      General: There is no distension.      Palpations: Abdomen is soft. There is no mass.      Tenderness: There is no abdominal tenderness. There is no right CVA tenderness, left CVA tenderness or guarding.      Hernia: No hernia is present. There is no hernia in the left inguinal area or right inguinal area.   Genitourinary:     General: Normal vulva.      Exam position: Lithotomy position.      Labia:         Right: No rash, tenderness, lesion or injury.         Left: No rash, tenderness, lesion or injury.       Vagina: Normal. No signs of injury and foreign body. No vaginal discharge, erythema, tenderness or bleeding.      Cervix: Normal.      Uterus: Normal. Not enlarged, not fixed and not tender.       Adnexa: Right adnexa normal and left adnexa normal.        Right: No mass, tenderness or fullness.          Left: No mass, tenderness or fullness.        Rectum: Normal. No mass.      Comments:   BSU normal  Urethral meatus  Normal  Perineum  Normal  Musculoskeletal:         General: No tenderness. Normal range of motion.      Cervical back: Normal range of motion and neck supple.   Lymphadenopathy:      Head:      Right side of head: No submental, submandibular, tonsillar, preauricular, posterior auricular or occipital adenopathy.      Left side of head: No submental, submandibular, tonsillar, preauricular, posterior auricular or occipital adenopathy.      Cervical: No cervical adenopathy.       Right cervical: No superficial, deep or posterior cervical adenopathy.     Left cervical: No superficial, deep or posterior cervical adenopathy.      Upper Body:      Right upper body: No supraclavicular, axillary or pectoral adenopathy.      Left upper body: No supraclavicular, axillary or pectoral adenopathy.      Lower Body: No right inguinal adenopathy. No left inguinal adenopathy.   Skin:     General: Skin is warm and dry.      Findings: No bruising, erythema or rash.   Neurological:      Mental Status: She is alert and oriented to person, place, and time.      Coordination: Coordination normal.   Psychiatric:         Mood and Affect: Mood normal.         Behavior: Behavior normal.         Thought Content: Thought content normal.         Judgment: Judgment normal.         PHQ-9 Depression Screening  Little interest or pleasure in doing things? Not at all   Feeling down, depressed, or hopeless? Not at all   PHQ-2 Total Score 0   Trouble falling or staying asleep, or sleeping too much?     Feeling tired or having little energy?     Poor appetite or overeating?     Feeling bad about yourself - or that you are a failure or have let yourself or your family down?     Trouble concentrating on things, such as reading the newspaper or watching television?     Moving or speaking so slowly that other people could have noticed? Or the opposite - being so fidgety or restless that you have been moving around a lot more than usual?     Thoughts that you would be better off dead, or of hurting yourself in some way?     PHQ-9 Total Score     If you checked off any problems, how difficult have these problems made it for you to do your work, take care of things at home, or get along with other people? Not difficult at all       Assessment & Plan   Diagnoses and all orders for this visit:    1. Well woman exam with routine gynecological exam (Primary)  Normal GYN exam. Will have lab work. Encouraged SBE, pt is aware how to do self  breast exam and the importance of same. Discussed weight management and importance of maintaining a healthy weight. Discussed Vitamin D intake and the importance of adequate vitamin D for both Bone Health and a healthy immune system.  Discussed Daily exercise and the importance of same, in regards to a healthy heart as well as helping to maintain her weight and improving her mental health.  BMI 29.7.  Colonoscopy is up to date.  Mammogram will be scheduled at [facility].  Pap smear is done after  we discussed ASCCP guidelines.  After informed decision patient wishes to proceed with the Pap smear.        -     Liquid-based Pap Smear, Screening    2. Encounter for screening mammogram for breast cancer  Comments:  Patient will have a mammogram at [facility].  Orders:  -     Mammo Screening Digital Tomosynthesis Bilateral With CAD; Future    3. Herpes, vulvar  Comments:  Patient takes daily Valtrex related to HSV.  Requests refill.  Rx sent to pharmacy.  Orders:  -     valACYclovir (VALTREX) 500 MG tablet; Take 1 tablet by mouth Daily.  Dispense: 30 tablet; Refill: 12    4. Fever blister  Comments:  Patient requests Abreva cream.  Orders:  -     docosanol (ABREVA) 10 % cream cream; Apply  topically to the appropriate area as directed 5 (Five) Times a Day.  Dispense: 2 g; Refill: 1    5. Rash  Comments:  Patient refill on nystatin powder for rash that she gets under her breast and in her groin.  Orders:  -     nystatin 239291 UNIT/GM powder; Apply  topically to the appropriate area as directed 2 (Two) Times a Day.  Dispense: 60 g; Refill: 2         Assessment & Plan  1. Annual health maintenance.    - Blood pressure readings are within the normal range at 104/74.  - Experienced weight loss since the last visit.  - Colonoscopy results were unremarkable.  - Due for a mammogram next month, which will be scheduled accordingly.  - Pap smear conducted during this visit.  - Advised to perform self-breast  examinations monthly.  - Informed that spotting may occur following the Pap smear and instructed to contact immediately if any bleeding is observed at any other time.  - Declined an ultrasound for the ovary at this time but advised to inform if experiencing any pain, at which point an ultrasound will be arranged.    2. Constipation.    - Reports that MiraLAX is helping with constipation, especially related to medication.  - Advised to continue using MiraLAX as needed.    3. Medication management.    - Requested refills for Valtrex and Abreva.  - Prescriptions for Valtrex and Abreva renewed and sent to the pharmacy.    4. Vitamin D deficiency.    - Placed back on vitamin D by primary care physician, to be taken once a week.  - Does not know recent vitamin D levels but advised to continue supplementation.    BMI is >= 25 and <30. (Overweight) The following options were offered after discussion;: weight loss educational material (shared in after visit summary), exercise counseling/recommendations, and nutrition counseling/recommendations      Patient or patient representative verbalized consent for the use of Ambient Listening during the visit with  GAYATHRI Inman for chart documentation. 4/25/2025  13:25 CDT    GAYATHRI Inman  4/25/2025

## 2025-05-16 ENCOUNTER — HOSPITAL ENCOUNTER (OUTPATIENT)
Dept: MAMMOGRAPHY | Facility: HOSPITAL | Age: 60
Discharge: HOME | End: 2025-05-16
Admitting: NURSE PRACTITIONER
Payer: COMMERCIAL

## 2025-05-16 ENCOUNTER — OFFICE VISIT (OUTPATIENT)
Dept: GASTROENTEROLOGY | Facility: CLINIC | Age: 60
End: 2025-05-16
Payer: COMMERCIAL

## 2025-05-16 VITALS
WEIGHT: 171.8 LBS | HEIGHT: 64 IN | TEMPERATURE: 96.2 F | DIASTOLIC BLOOD PRESSURE: 80 MMHG | SYSTOLIC BLOOD PRESSURE: 122 MMHG | BODY MASS INDEX: 29.33 KG/M2 | HEART RATE: 83 BPM | OXYGEN SATURATION: 99 %

## 2025-05-16 DIAGNOSIS — Z12.31 ENCOUNTER FOR SCREENING MAMMOGRAM FOR BREAST CANCER: ICD-10-CM

## 2025-05-16 DIAGNOSIS — K59.00 CONSTIPATION, UNSPECIFIED CONSTIPATION TYPE: Primary | ICD-10-CM

## 2025-05-16 PROCEDURE — 99213 OFFICE O/P EST LOW 20 MIN: CPT | Performed by: NURSE PRACTITIONER

## 2025-05-16 PROCEDURE — 77067 SCR MAMMO BI INCL CAD: CPT

## 2025-05-16 PROCEDURE — 1160F RVW MEDS BY RX/DR IN RCRD: CPT | Performed by: NURSE PRACTITIONER

## 2025-05-16 PROCEDURE — 1159F MED LIST DOCD IN RCRD: CPT | Performed by: NURSE PRACTITIONER

## 2025-05-16 PROCEDURE — 77063 BREAST TOMOSYNTHESIS BI: CPT

## 2025-05-16 NOTE — PROGRESS NOTES
Primary Physician: Susan Bentley APRN    Chief Complaint   Patient presents with    GI Problem     4 month fu-rectal bleeding-doing better       Subjective     Edith Stephens is a 60 y.o. female.    HPI  Constipation/Rectal Bleeding follow-up  Pt with a history of constipation. She was recommended to start Miralax.    5/16/2025 She has been taking Miralax BID and Colace twice daily.  She does NOT see any blood in her stools.  She does not have abdominal pain.  For the most part her bowel movements are moving much more smoothly.  They did just do an adjustment to her medications.  She tells me they are trying to get her off the Effexor as that has been a contributor to her constipation.  I have told her that if she gets off of this and her stools become too loose with her current regimen, she can back off of the MiraLAX to just once a day.      1/17/2025 Colonoscopy: entire examined colon appeared normal. 10 year recall.    Past Medical History:   Diagnosis Date    Abnormal vaginal bleeding     Anxiety     Bipolar disorder     Depression     Diabetes mellitus     Prediabetic    GERD (gastroesophageal reflux disease)     Herpes     Hypoglycemia     Hypoglycemic reaction     PONV (postoperative nausea and vomiting)     Psychiatric illness     PTSD (post-traumatic stress disorder)     Suicide attempt     pt. has had two previous attempts       Past Surgical History:   Procedure Laterality Date    CERVICAL BIOPSY  W/ LOOP ELECTRODE EXCISION      COLONOSCOPY N/A 01/17/2025    The entire examined colon is normal on direct and retroflexion views; No specimens collected; Repeat 10 years    CYST REMOVAL      From foot as a child    D & C HYSTEROSCOPY N/A 05/30/2017    Procedure: DILATATION AND CURETTAGE HYSTEROSCOPY;  Surgeon: Citlali Arita MD;  Location: [facility] OR;  Service:         Current Outpatient Medications:     Denta 5000 Plus 1.1 % cream, , Disp: , Rfl:     docosanol (ABREVA) 10 % cream cream, Apply  topically  to the appropriate area as directed 5 (Five) Times a Day., Disp: 2 g, Rfl: 1    docusate sodium (COLACE) 100 MG capsule, Take 1 capsule by mouth 2 (Two) Times a Day., Disp: , Rfl:     hydrOXYzine (ATARAX) 25 MG tablet, Take 1 tablet by mouth 3 (Three) Times a Day As Needed for Itching. (Patient taking differently: Take 1 tablet by mouth Every 8 (Eight) Hours As Needed for Itching.), Disp: , Rfl:     Lurasidone HCl (LATUDA) 120 MG tablet tablet, Take 1 tablet by mouth Daily., Disp: , Rfl:     Multiple Vitamins-Minerals (MULTIVITAMIN GUMMIES ADULT PO), Take 1 tablet by mouth Daily., Disp: , Rfl:     Myrbetriq 50 MG tablet sustained-release 24 hour 24 hr tablet, TAKE ONE TABLET BY MOUTH DAILY (Patient taking differently: Take 50 mg by mouth Daily.), Disp: 30 tablet, Rfl: 3    nystatin [id number] UNIT/GM powder, Apply  topically to the appropriate area as directed 2 (Two) Times a Day., Disp: 60 g, Rfl: 2    polyethylene glycol (MIRALAX) 17 GM/SCOOP powder, Take 17 g by mouth 2 (Two) Times a Day., Disp: 1020 g, Rfl: 11    valACYclovir (VALTREX) 500 MG tablet, Take 1 tablet by mouth Daily., Disp: 30 tablet, Rfl: 12    venlafaxine (EFFEXOR) 75 MG tablet, Take 1 tablet by mouth 2 (Two) Times a Day. (Patient taking differently: Take 3 tablets by mouth Daily.), Disp: , Rfl:     vitamin D (ERGOCALCIFEROL) 1.25 MG (64649 UT) capsule capsule, Take 1 capsule by mouth Every 7 (Seven) Days., Disp: , Rfl:     Rexulti 0.5 MG tablet, Take 0.5 mg by mouth Every Night. (Patient not taking: Reported on 5/16/2025), Disp: , Rfl:     Allergies   Allergen Reactions    Bc Powder [Aspirin-Salicylamide-Caffeine] Swelling     LIPS SWELL    Sertraline Hcl Other (See Comments)     SUICIDAL    Other Hives and Swelling     BC powder    Quetiapine Fumarate GI Intolerance and Other (See Comments)     Pt states it makes her lethargic    Codeine Unknown - Low Severity    Macrobid [Nitrofurantoin] Unknown - High Severity    Medroxyprogesterone Unknown - Low  Severity    Aspirin Other (See Comments)     UNKNOWN    Bupropion Other (See Comments)     Makes patient manic    Diazepam Other (See Comments)     UNKNOWN    Gabapentin Other (See Comments)     UNKNOWN    Lortab [Hydrocodone-Acetaminophen] Other (See Comments)     UNKNOWN      Metoprolol Other (See Comments)     UNKNOWN    Penicillins Other (See Comments)     UNKNOWN    Prozac [Fluoxetine Hcl] Rash    Tetracyclines & Related Other (See Comments)     UNKNOWN      Trazodone And Nefazodone Other (See Comments)     UNKNOWN    Trileptal [Oxcarbazepine] Other (See Comments)     UNKNOWN    Vancomycin Other (See Comments)     ALLERGIC TO ALL MYCIN ANTIBIOTICS - UNKNOWN REACTION    Venlafaxine Other (See Comments)     UNKNOWN    Ziprasidone Hcl Other (See Comments)     UNKNOWN    Zyprexa [Olanzapine] Other (See Comments)     UNKNOWN       Social History     Socioeconomic History    Marital status:    Tobacco Use    Smoking status: Never    Smokeless tobacco: Never   Vaping Use    Vaping status: Never Used   Substance and Sexual Activity    Alcohol use: No    Drug use: No    Sexual activity: Not Currently     Partners: Male     Comment: last act intercourse 7 years       Family History   Problem Relation Age of Onset    Thyroid disease Mother     Hypertension Father     No Known Problems Sister     Kidney cancer Maternal Grandfather     Coronary artery disease Paternal Grandmother     Diabetes Paternal Grandmother     Coronary artery disease Paternal Grandfather     Breast cancer Neg Hx     Ovarian cancer Neg Hx     Uterine cancer Neg Hx     Colon cancer Neg Hx     Melanoma Neg Hx     Prostate cancer Neg Hx     Colon polyps Neg Hx     Esophageal cancer Neg Hx     Liver cancer Neg Hx     Stomach cancer Neg Hx     Rectal cancer Neg Hx     Liver disease Neg Hx        Review of Systems   Gastrointestinal:  Negative for abdominal pain and blood in stool.       Objective     /80 (BP Location: Left arm)   Pulse 83    "Temp 96.2 °F (35.7 °C) (Temporal)   Ht 162.6 cm (64.02\")   Wt 77.9 kg (171 lb 12.8 oz)   LMP 12/13/2015 (Approximate)   SpO2 99%   BMI 29.47 kg/m²     Physical Exam  Vitals reviewed.   Constitutional:       Appearance: Normal appearance.   Neurological:      Mental Status: She is alert.                 IMPRESSION/PLAN:    Assessment & Plan      Problem List Items Addressed This Visit       Constipation - Primary    Overview   Constipation more stable at this time with twice daily dosing of MiraLAX and stool softener.  Follow-up as needed  1/17/2025 colonoscopy normal          Follow-up as needed                Alexandra Kellogg, APRN  05/16/25  09:49 CDT    Part of this note may be an electronic transcription/translation of spoken language to printed text.      "

## 2025-06-17 NOTE — PATIENT INSTRUCTIONS
"BMI for Adults  What is BMI?  Body mass index (BMI) is a number that is calculated from a person's weight and height. BMI can help estimate how much of a person's weight is composed of fat. BMI does not measure body fat directly. Rather, it is an alternative to procedures that directly measure body fat, which can be difficult and expensive.  BMI can help identify people who may be at higher risk for certain medical problems.  What are BMI measurements used for?  BMI is used as a screening tool to identify possible weight problems. It helps determine whether a person is obese, overweight, a healthy weight, or underweight.  BMI is useful for:  · Identifying a weight problem that may be related to a medical condition or may increase the risk for medical problems.  · Promoting changes, such as changes in diet and exercise, to help reach a healthy weight. BMI screening can be repeated to see if these changes are working.  How is BMI calculated?  BMI involves measuring your weight in relation to your height. Both height and weight are measured, and the BMI is calculated from those numbers. This can be done either in English ([place] or metric measurements. Note that charts and online BMI calculators are available to help you find your BMI quickly and easily without having to do these calculations yourself.  To calculate your BMI in English ([place] measurements:    1. Measure your weight in pounds (lb).  2. Multiply the number of pounds by 703.  ? For example, for a person who weighs 180 lb, multiply that number by 703, which equals 126,540.  3. Measure your height in inches. Then multiply that number by itself to get a measurement called \"inches squared.\"  ? For example, for a person who is 70 inches tall, the \"inches squared\" measurement is 70 inches x 70 inches, which equals 4,900 inches squared.  4. Divide the total from step 2 (number of lb x 703) by the total from step 3 (inches squared): 126,540 ÷ 4,900 = 25.8. This is " Adult Annual Physical  Name: Pawel Gleason      : 1967      MRN: [id number]  Encounter Provider: Fran Omer DO  Encounter Date: 2025   Encounter department: [facility]    :  Assessment & Plan  Annual physical exam  Physical exam unremarkable.  Immunizations up-to-date.  Will have colonoscopy and EGD later this summer.  He follows with gastroenterology for his Chanel's esophagus.  Routine lab work was ordered today.  Reviewed diet and exercise.  Orders:  •  Comprehensive metabolic panel; Future  •  TSH, 3rd generation with Free T4 reflex; Future    Benign essential hypertension  Blood pressure readings elevated in office but home readings well within normal, acceptable limits.  Orders:  •  losartan (COZAAR) 100 MG tablet; Take 1 tablet (100 mg total) by mouth daily    Chanel's esophagus without dysplasia  Continue omeprazole and famotidine.  Follow-up with GI for EGD later this summer       Allergic rhinitis, unspecified seasonality, unspecified trigger  Continue Flonase and Singulair       Screening for cardiovascular condition    Orders:  •  Lipid Panel with Direct LDL reflex; Future    Screening for deficiency anemia    Orders:  •  CBC and differential; Future    Screening for prostate cancer    Orders:  •  PSA, Total Screen; Future    Screening for diabetes mellitus    Orders:  •  Hemoglobin A1C; Future            Immunizations:  - Immunizations due: Prevnar 20         History of Present Illness     Adult Annual Physical:  Patient presents for annual physical. Patient presents for annual physical exam.  He has no specific complaints.  He is compliant with his medication.  He checks his blood pressure regularly at home and systolics generally range in the 120s..     Diet and Physical Activity:  - Diet/Nutrition: no special diet and well balanced diet.  - Exercise: 5-7 times a week on average and 30-60 minutes on average.    Depression Screening:  - PHQ-2 Score:  "0    General Health:  - Sleep: sleeps well and 7-8 hours of sleep on average.  - Hearing: normal hearing right ear and normal hearing left ear.  - Vision: no vision problems.  - Dental: regular dental visits.     Health:  - History of STDs: no.   - Urinary symptoms: none.     Advanced Care Planning:  - Has an advanced directive?: yes    - Has a durable medical POA?: yes      Review of Systems   Constitutional: Negative.    Respiratory: Negative.     Cardiovascular: Negative.    Gastrointestinal: Negative.    Genitourinary: Negative.    Musculoskeletal: Negative.    Psychiatric/Behavioral: Negative.           Objective   /86   Pulse 67   Temp 97.5 °F (36.4 °C)   Ht 5' 6\" (1.676 m)   Wt 77.1 kg (170 lb)   SpO2 98%   BMI 27.44 kg/m²     Physical Exam  Vitals and nursing note reviewed.   Constitutional:       General: He is not in acute distress.     Appearance: Normal appearance. He is well-developed. He is not diaphoretic.   HENT:      Head: Normocephalic and atraumatic.      Right Ear: Hearing, tympanic membrane and external ear normal.      Left Ear: Hearing, tympanic membrane, ear canal and external ear normal.      Nose: Nose normal. No nasal deformity or rhinorrhea.      Right Sinus: No maxillary sinus tenderness or frontal sinus tenderness.      Left Sinus: No maxillary sinus tenderness or frontal sinus tenderness.      Mouth/Throat:      Mouth: No oral lesions.      Dentition: Normal dentition.      Pharynx: Uvula midline. No oropharyngeal exudate or posterior oropharyngeal erythema.     Eyes:      General: Lids are normal.         Right eye: No discharge.      Conjunctiva/sclera: Conjunctivae normal.      Pupils: Pupils are equal, round, and reactive to light.     Neck:      Thyroid: No thyroid mass or thyromegaly.      Vascular: No carotid bruit or JVD.      Trachea: Trachea normal.     Cardiovascular:      Rate and Rhythm: Normal rate and regular rhythm.      Pulses: Normal pulses.           " "your BMI.    To calculate your BMI in metric measurements:  1. Measure your weight in kilograms (kg).  2. Measure your height in meters (m). Then multiply that number by itself to get a measurement called \"meters squared.\"  ? For example, for a person who is 1.75 m tall, the \"meters squared\" measurement is 1.75 m x 1.75 m, which is equal to 3.1 meters squared.  3. Divide the number of kilograms (your weight) by the meters squared number. In this example: 70 ÷ 3.1 = 22.6. This is your BMI.  What do the results mean?  BMI charts are used to identify whether you are underweight, normal weight, overweight, or obese. The following guidelines will be used:  · Underweight: BMI less than 18.5.  · Normal weight: BMI between 18.5 and 24.9.  · Overweight: BMI between 25 and 29.9.  · Obese: BMI of 30 or above.  Keep these notes in mind:  · Weight includes both fat and muscle, so someone with a muscular build, such as an athlete, may have a BMI that is higher than 24.9. In cases like these, BMI is not an accurate measure of body fat.  · To determine if excess body fat is the cause of a BMI of 25 or higher, further assessments may need to be done by a health care provider.  · BMI is usually interpreted in the same way for men and women.  Where to find more information  For more information about BMI, including tools to quickly calculate your BMI, go to these websites:  · Centers for Disease Control and Prevention: www.cdc.gov  · American Heart Association: www.heart.org  · National Heart, Lung, and Blood [place]: www.nhlbi.nih.gov  Summary  · Body mass index (BMI) is a number that is calculated from a person's weight and height.  · BMI may help estimate how much of a person's weight is composed of fat. BMI can help identify those who may be at higher risk for certain medical problems.  · BMI can be measured using English measurements or metric measurements.  · BMI charts are used to identify whether you are underweight, normal " Carotid pulses are 2+ on the right side and 2+ on the left side.       Radial pulses are 2+ on the right side and 2+ on the left side.      Heart sounds: No murmur heard.  Pulmonary:      Effort: Pulmonary effort is normal. No accessory muscle usage or respiratory distress.      Breath sounds: Normal breath sounds.   Abdominal:      General: Bowel sounds are normal. There is no distension.      Palpations: Abdomen is soft. There is no mass.      Tenderness: There is no abdominal tenderness.      Hernia: No hernia is present.     Musculoskeletal:         General: No tenderness or deformity. Normal range of motion.      Cervical back: Normal range of motion and neck supple.   Lymphadenopathy:      Cervical: No cervical adenopathy.     Skin:     General: Skin is warm and dry.      Findings: No lesion or rash.     Neurological:      Mental Status: He is alert and oriented to person, place, and time.      Cranial Nerves: No cranial nerve deficit.      Sensory: No sensory deficit.      Deep Tendon Reflexes: Reflexes are normal and symmetric.     Psychiatric:         Mood and Affect: Mood normal.         Speech: Speech normal.         Behavior: Behavior normal.         Thought Content: Thought content normal.         Judgment: Judgment normal.          weight, overweight, or obese.  This information is not intended to replace advice given to you by your health care provider. Make sure you discuss any questions you have with your health care provider.  Document Revised: 09/09/2020 Document Reviewed: 07/17/2020  [employer]vier Patient Education © 2021 [employer]

## (undated) DEVICE — Device: Brand: DEFENDO AIR/WATER/SUCTION AND BIOPSY VALVE

## (undated) DEVICE — PAD D&C: Brand: MEDLINE INDUSTRIES, INC.

## (undated) DEVICE — PK TURNOVER RM ADV

## (undated) DEVICE — GLV SURG BIOGEL M LTX PF 6 1/2

## (undated) DEVICE — SENSR O2 OXIMAX FNGR A/ 18IN NONSTR

## (undated) DEVICE — TUBING, SUCTION, 1/4" X 12', STRAIGHT: Brand: MEDLINE

## (undated) DEVICE — CYSTO/BLADDER IRRIGATION SET, REGULATING CLAMP

## (undated) DEVICE — CUFF,BP,DISP,1 TUBE,ADULT,HP: Brand: MEDLINE

## (undated) DEVICE — YANKAUER,BULB TIP WITH VENT: Brand: ARGYLE

## (undated) DEVICE — MASK,OXYGEN,MED CONC,ADLT,7' TUB, UC: Brand: PENDING

## (undated) DEVICE — CAP CONN RED

## (undated) DEVICE — THE CHANNEL CLEANING BRUSH IS A NYLON FLEXI BRUSH ATTACHED TO A FLEXIBLE PLASTIC SHEATH DESIGNED TO SAFELY REMOVE DEBRIS FROM FLEXIBLE ENDOSCOPES.